# Patient Record
Sex: FEMALE | Race: WHITE | NOT HISPANIC OR LATINO | Employment: OTHER | URBAN - METROPOLITAN AREA
[De-identification: names, ages, dates, MRNs, and addresses within clinical notes are randomized per-mention and may not be internally consistent; named-entity substitution may affect disease eponyms.]

---

## 2017-07-29 ENCOUNTER — HOSPITAL ENCOUNTER (EMERGENCY)
Facility: HOSPITAL | Age: 82
Discharge: HOME | End: 2017-07-29
Attending: EMERGENCY MEDICINE | Admitting: EMERGENCY MEDICINE

## 2017-07-29 ENCOUNTER — APPOINTMENT (OUTPATIENT)
Dept: CT IMAGING | Facility: HOSPITAL | Age: 82
End: 2017-07-29

## 2017-07-29 ENCOUNTER — APPOINTMENT (OUTPATIENT)
Dept: GENERAL RADIOLOGY | Facility: HOSPITAL | Age: 82
End: 2017-07-29

## 2017-07-29 VITALS
TEMPERATURE: 97.5 F | RESPIRATION RATE: 18 BRPM | SYSTOLIC BLOOD PRESSURE: 156 MMHG | HEART RATE: 72 BPM | BODY MASS INDEX: 21.26 KG/M2 | WEIGHT: 120 LBS | DIASTOLIC BLOOD PRESSURE: 70 MMHG | OXYGEN SATURATION: 98 % | HEIGHT: 63 IN

## 2017-07-29 DIAGNOSIS — R42 DIZZINESS: ICD-10-CM

## 2017-07-29 DIAGNOSIS — N39.0 ACUTE UTI (URINARY TRACT INFECTION): Primary | ICD-10-CM

## 2017-07-29 LAB
ALBUMIN SERPL-MCNC: 4.2 G/DL (ref 3.2–4.8)
ALBUMIN/GLOB SERPL: 1.3 G/DL (ref 1.5–2.5)
ALP SERPL-CCNC: 50 U/L (ref 25–100)
ALT SERPL W P-5'-P-CCNC: 11 U/L (ref 7–40)
ANION GAP SERPL CALCULATED.3IONS-SCNC: 4 MMOL/L (ref 3–11)
AST SERPL-CCNC: 14 U/L (ref 0–33)
BACTERIA UR QL AUTO: ABNORMAL /HPF
BASOPHILS # BLD AUTO: 0.07 10*3/MM3 (ref 0–0.2)
BASOPHILS NFR BLD AUTO: 0.8 % (ref 0–1)
BILIRUB SERPL-MCNC: 0.2 MG/DL (ref 0.3–1.2)
BILIRUB UR QL STRIP: NEGATIVE
BUN BLD-MCNC: 24 MG/DL (ref 9–23)
BUN/CREAT SERPL: 17.1 (ref 7–25)
CALCIUM SPEC-SCNC: 9.7 MG/DL (ref 8.7–10.4)
CHLORIDE SERPL-SCNC: 105 MMOL/L (ref 99–109)
CLARITY UR: CLEAR
CO2 SERPL-SCNC: 25 MMOL/L (ref 20–31)
COLOR UR: YELLOW
CREAT BLD-MCNC: 1.4 MG/DL (ref 0.6–1.3)
DEPRECATED RDW RBC AUTO: 46.3 FL (ref 37–54)
EOSINOPHIL # BLD AUTO: 0.3 10*3/MM3 (ref 0–0.3)
EOSINOPHIL NFR BLD AUTO: 3.6 % (ref 0–3)
ERYTHROCYTE [DISTWIDTH] IN BLOOD BY AUTOMATED COUNT: 14 % (ref 11.3–14.5)
GFR SERPL CREATININE-BSD FRML MDRD: 36 ML/MIN/1.73
GLOBULIN UR ELPH-MCNC: 3.2 GM/DL
GLUCOSE BLD-MCNC: 91 MG/DL (ref 70–100)
GLUCOSE UR STRIP-MCNC: NEGATIVE MG/DL
HCT VFR BLD AUTO: 34.5 % (ref 34.5–44)
HGB BLD-MCNC: 11 G/DL (ref 11.5–15.5)
HGB UR QL STRIP.AUTO: NEGATIVE
HOLD SPECIMEN: NORMAL
HOLD SPECIMEN: NORMAL
HYALINE CASTS UR QL AUTO: ABNORMAL /LPF
IMM GRANULOCYTES # BLD: 0.01 10*3/MM3 (ref 0–0.03)
IMM GRANULOCYTES NFR BLD: 0.1 % (ref 0–0.6)
KETONES UR QL STRIP: NEGATIVE
LEUKOCYTE ESTERASE UR QL STRIP.AUTO: ABNORMAL
LYMPHOCYTES # BLD AUTO: 1.5 10*3/MM3 (ref 0.6–4.8)
LYMPHOCYTES NFR BLD AUTO: 18 % (ref 24–44)
MAGNESIUM SERPL-MCNC: 2.1 MG/DL (ref 1.3–2.7)
MCH RBC QN AUTO: 28.9 PG (ref 27–31)
MCHC RBC AUTO-ENTMCNC: 31.9 G/DL (ref 32–36)
MCV RBC AUTO: 90.8 FL (ref 80–99)
MONOCYTES # BLD AUTO: 1.03 10*3/MM3 (ref 0–1)
MONOCYTES NFR BLD AUTO: 12.4 % (ref 0–12)
NEUTROPHILS # BLD AUTO: 5.42 10*3/MM3 (ref 1.5–8.3)
NEUTROPHILS NFR BLD AUTO: 65.1 % (ref 41–71)
NITRITE UR QL STRIP: NEGATIVE
PH UR STRIP.AUTO: 6 [PH] (ref 5–8)
PLATELET # BLD AUTO: 311 10*3/MM3 (ref 150–450)
PMV BLD AUTO: 10.3 FL (ref 6–12)
POTASSIUM BLD-SCNC: 4.6 MMOL/L (ref 3.5–5.5)
PROT SERPL-MCNC: 7.4 G/DL (ref 5.7–8.2)
PROT UR QL STRIP: NEGATIVE
RBC # BLD AUTO: 3.8 10*6/MM3 (ref 3.89–5.14)
RBC # UR: ABNORMAL /HPF
REF LAB TEST METHOD: ABNORMAL
SODIUM BLD-SCNC: 134 MMOL/L (ref 132–146)
SP GR UR STRIP: 1.01 (ref 1–1.03)
SQUAMOUS #/AREA URNS HPF: ABNORMAL /HPF
TROPONIN I SERPL-MCNC: 0 NG/ML (ref 0–0.07)
UROBILINOGEN UR QL STRIP: ABNORMAL
WBC NRBC COR # BLD: 8.33 10*3/MM3 (ref 3.5–10.8)
WBC UR QL AUTO: ABNORMAL /HPF
WHOLE BLOOD HOLD SPECIMEN: NORMAL
WHOLE BLOOD HOLD SPECIMEN: NORMAL

## 2017-07-29 PROCEDURE — 96365 THER/PROPH/DIAG IV INF INIT: CPT

## 2017-07-29 PROCEDURE — 99284 EMERGENCY DEPT VISIT MOD MDM: CPT

## 2017-07-29 PROCEDURE — 71010 HC CHEST PA OR AP: CPT

## 2017-07-29 PROCEDURE — 87147 CULTURE TYPE IMMUNOLOGIC: CPT | Performed by: EMERGENCY MEDICINE

## 2017-07-29 PROCEDURE — 96361 HYDRATE IV INFUSION ADD-ON: CPT

## 2017-07-29 PROCEDURE — 87086 URINE CULTURE/COLONY COUNT: CPT | Performed by: EMERGENCY MEDICINE

## 2017-07-29 PROCEDURE — 70450 CT HEAD/BRAIN W/O DYE: CPT

## 2017-07-29 PROCEDURE — 93005 ELECTROCARDIOGRAM TRACING: CPT

## 2017-07-29 PROCEDURE — 84484 ASSAY OF TROPONIN QUANT: CPT

## 2017-07-29 PROCEDURE — 85025 COMPLETE CBC W/AUTO DIFF WBC: CPT | Performed by: EMERGENCY MEDICINE

## 2017-07-29 PROCEDURE — 25010000002 CEFTRIAXONE PER 250 MG: Performed by: NURSE PRACTITIONER

## 2017-07-29 PROCEDURE — 81001 URINALYSIS AUTO W/SCOPE: CPT | Performed by: EMERGENCY MEDICINE

## 2017-07-29 PROCEDURE — 83735 ASSAY OF MAGNESIUM: CPT | Performed by: EMERGENCY MEDICINE

## 2017-07-29 PROCEDURE — 80053 COMPREHEN METABOLIC PANEL: CPT | Performed by: EMERGENCY MEDICINE

## 2017-07-29 RX ORDER — CEFTRIAXONE SODIUM 1 G/50ML
1 INJECTION, SOLUTION INTRAVENOUS ONCE
Status: COMPLETED | OUTPATIENT
Start: 2017-07-29 | End: 2017-07-29

## 2017-07-29 RX ORDER — CEFDINIR 300 MG/1
300 CAPSULE ORAL 2 TIMES DAILY
Qty: 20 CAPSULE | Refills: 0 | Status: SHIPPED | OUTPATIENT
Start: 2017-07-29 | End: 2017-09-24

## 2017-07-29 RX ORDER — SODIUM CHLORIDE 0.9 % (FLUSH) 0.9 %
10 SYRINGE (ML) INJECTION AS NEEDED
Status: DISCONTINUED | OUTPATIENT
Start: 2017-07-29 | End: 2017-07-29 | Stop reason: HOSPADM

## 2017-07-29 RX ADMIN — SODIUM CHLORIDE 500 ML: 9 INJECTION, SOLUTION INTRAVENOUS at 17:44

## 2017-07-29 RX ADMIN — SODIUM CHLORIDE 500 ML: 9 INJECTION, SOLUTION INTRAVENOUS at 15:27

## 2017-07-29 RX ADMIN — CEFTRIAXONE SODIUM 1 G: 1 INJECTION, SOLUTION INTRAVENOUS at 17:00

## 2017-07-31 LAB
BACTERIA SPEC AEROBE CULT: ABNORMAL
BACTERIA SPEC AEROBE CULT: ABNORMAL
STREP GROUPING: ABNORMAL

## 2017-09-24 ENCOUNTER — APPOINTMENT (OUTPATIENT)
Dept: GENERAL RADIOLOGY | Facility: HOSPITAL | Age: 82
End: 2017-09-24

## 2017-09-24 ENCOUNTER — HOSPITAL ENCOUNTER (OUTPATIENT)
Facility: HOSPITAL | Age: 82
Setting detail: OBSERVATION
Discharge: HOME HEALTH | End: 2017-09-27
Attending: EMERGENCY MEDICINE | Admitting: INTERNAL MEDICINE

## 2017-09-24 DIAGNOSIS — N39.0 UTI (URINARY TRACT INFECTION), BACTERIAL: ICD-10-CM

## 2017-09-24 DIAGNOSIS — R55 SYNCOPE AND COLLAPSE: Primary | ICD-10-CM

## 2017-09-24 DIAGNOSIS — Z74.09 IMPAIRED MOBILITY AND ADLS: ICD-10-CM

## 2017-09-24 DIAGNOSIS — E87.1 HYPONATREMIA: ICD-10-CM

## 2017-09-24 DIAGNOSIS — Z78.9 IMPAIRED MOBILITY AND ADLS: ICD-10-CM

## 2017-09-24 DIAGNOSIS — Z86.73 HISTORY OF CVA (CEREBROVASCULAR ACCIDENT): ICD-10-CM

## 2017-09-24 DIAGNOSIS — Z74.09 IMPAIRED FUNCTIONAL MOBILITY, BALANCE, GAIT, AND ENDURANCE: ICD-10-CM

## 2017-09-24 DIAGNOSIS — D64.9 ANEMIA, UNSPECIFIED TYPE: ICD-10-CM

## 2017-09-24 DIAGNOSIS — R53.1 GENERALIZED WEAKNESS: ICD-10-CM

## 2017-09-24 DIAGNOSIS — A49.9 UTI (URINARY TRACT INFECTION), BACTERIAL: ICD-10-CM

## 2017-09-24 PROBLEM — N30.00 ACUTE CYSTITIS: Status: ACTIVE | Noted: 2017-09-24

## 2017-09-24 PROBLEM — I10 HYPERTENSION: Status: ACTIVE | Noted: 2017-09-24

## 2017-09-24 LAB
ALBUMIN SERPL-MCNC: 4 G/DL (ref 3.2–4.8)
ALBUMIN/GLOB SERPL: 1.4 G/DL (ref 1.5–2.5)
ALP SERPL-CCNC: 47 U/L (ref 25–100)
ALT SERPL W P-5'-P-CCNC: 9 U/L (ref 7–40)
ANION GAP SERPL CALCULATED.3IONS-SCNC: 4 MMOL/L (ref 3–11)
AST SERPL-CCNC: 18 U/L (ref 0–33)
BACTERIA UR QL AUTO: ABNORMAL /HPF
BASOPHILS # BLD AUTO: 0.09 10*3/MM3 (ref 0–0.2)
BASOPHILS NFR BLD AUTO: 1.4 % (ref 0–1)
BILIRUB SERPL-MCNC: 0.2 MG/DL (ref 0.3–1.2)
BILIRUB UR QL STRIP: NEGATIVE
BNP SERPL-MCNC: 292 PG/ML (ref 0–100)
BUN BLD-MCNC: 12 MG/DL (ref 9–23)
BUN/CREAT SERPL: 10.9 (ref 7–25)
CALCIUM SPEC-SCNC: 8.7 MG/DL (ref 8.7–10.4)
CHLORIDE SERPL-SCNC: 100 MMOL/L (ref 99–109)
CLARITY UR: ABNORMAL
CO2 SERPL-SCNC: 23 MMOL/L (ref 20–31)
COLOR UR: YELLOW
CREAT BLD-MCNC: 1.1 MG/DL (ref 0.6–1.3)
DEPRECATED RDW RBC AUTO: 41.6 FL (ref 37–54)
EOSINOPHIL # BLD AUTO: 0.4 10*3/MM3 (ref 0–0.3)
EOSINOPHIL NFR BLD AUTO: 6.3 % (ref 0–3)
ERYTHROCYTE [DISTWIDTH] IN BLOOD BY AUTOMATED COUNT: 13.3 % (ref 11.3–14.5)
GFR SERPL CREATININE-BSD FRML MDRD: 47 ML/MIN/1.73
GLOBULIN UR ELPH-MCNC: 2.9 GM/DL
GLUCOSE BLD-MCNC: 115 MG/DL (ref 70–100)
GLUCOSE UR STRIP-MCNC: NEGATIVE MG/DL
HCT VFR BLD AUTO: 29.4 % (ref 34.5–44)
HGB BLD-MCNC: 9.9 G/DL (ref 11.5–15.5)
HGB UR QL STRIP.AUTO: ABNORMAL
HOLD SPECIMEN: NORMAL
HOLD SPECIMEN: NORMAL
HYALINE CASTS UR QL AUTO: ABNORMAL /LPF
IMM GRANULOCYTES # BLD: 0.01 10*3/MM3 (ref 0–0.03)
IMM GRANULOCYTES NFR BLD: 0.2 % (ref 0–0.6)
KETONES UR QL STRIP: NEGATIVE
LEUKOCYTE ESTERASE UR QL STRIP.AUTO: ABNORMAL
LYMPHOCYTES # BLD AUTO: 1.18 10*3/MM3 (ref 0.6–4.8)
LYMPHOCYTES NFR BLD AUTO: 18.6 % (ref 24–44)
MAGNESIUM SERPL-MCNC: 1.8 MG/DL (ref 1.3–2.7)
MCH RBC QN AUTO: 28.8 PG (ref 27–31)
MCHC RBC AUTO-ENTMCNC: 33.7 G/DL (ref 32–36)
MCV RBC AUTO: 85.5 FL (ref 80–99)
MONOCYTES # BLD AUTO: 1.02 10*3/MM3 (ref 0–1)
MONOCYTES NFR BLD AUTO: 16.1 % (ref 0–12)
NEUTROPHILS # BLD AUTO: 3.65 10*3/MM3 (ref 1.5–8.3)
NEUTROPHILS NFR BLD AUTO: 57.4 % (ref 41–71)
NITRITE UR QL STRIP: NEGATIVE
PH UR STRIP.AUTO: 6.5 [PH] (ref 5–8)
PLATELET # BLD AUTO: 269 10*3/MM3 (ref 150–450)
PMV BLD AUTO: 10.1 FL (ref 6–12)
POTASSIUM BLD-SCNC: 3.9 MMOL/L (ref 3.5–5.5)
PROT SERPL-MCNC: 6.9 G/DL (ref 5.7–8.2)
PROT UR QL STRIP: NEGATIVE
RBC # BLD AUTO: 3.44 10*6/MM3 (ref 3.89–5.14)
RBC # UR: ABNORMAL /HPF
REF LAB TEST METHOD: ABNORMAL
SODIUM BLD-SCNC: 127 MMOL/L (ref 132–146)
SP GR UR STRIP: 1.01 (ref 1–1.03)
SQUAMOUS #/AREA URNS HPF: ABNORMAL /HPF
TROPONIN I SERPL-MCNC: 0 NG/ML (ref 0–0.07)
TROPONIN I SERPL-MCNC: 0.01 NG/ML (ref 0–0.07)
UROBILINOGEN UR QL STRIP: ABNORMAL
WBC NRBC COR # BLD: 6.35 10*3/MM3 (ref 3.5–10.8)
WBC UR QL AUTO: ABNORMAL /HPF
WHOLE BLOOD HOLD SPECIMEN: NORMAL
WHOLE BLOOD HOLD SPECIMEN: NORMAL

## 2017-09-24 PROCEDURE — 87086 URINE CULTURE/COLONY COUNT: CPT | Performed by: EMERGENCY MEDICINE

## 2017-09-24 PROCEDURE — 84484 ASSAY OF TROPONIN QUANT: CPT

## 2017-09-24 PROCEDURE — 96361 HYDRATE IV INFUSION ADD-ON: CPT

## 2017-09-24 PROCEDURE — 83880 ASSAY OF NATRIURETIC PEPTIDE: CPT | Performed by: EMERGENCY MEDICINE

## 2017-09-24 PROCEDURE — 25010000002 HEPARIN (PORCINE) PER 1000 UNITS: Performed by: INTERNAL MEDICINE

## 2017-09-24 PROCEDURE — 25010000002 CEFTRIAXONE PER 250 MG: Performed by: EMERGENCY MEDICINE

## 2017-09-24 PROCEDURE — 99220 PR INITIAL OBSERVATION CARE/DAY 70 MINUTES: CPT | Performed by: INTERNAL MEDICINE

## 2017-09-24 PROCEDURE — G0378 HOSPITAL OBSERVATION PER HR: HCPCS

## 2017-09-24 PROCEDURE — 87147 CULTURE TYPE IMMUNOLOGIC: CPT | Performed by: EMERGENCY MEDICINE

## 2017-09-24 PROCEDURE — 93005 ELECTROCARDIOGRAM TRACING: CPT

## 2017-09-24 PROCEDURE — 81001 URINALYSIS AUTO W/SCOPE: CPT | Performed by: EMERGENCY MEDICINE

## 2017-09-24 PROCEDURE — 96365 THER/PROPH/DIAG IV INF INIT: CPT

## 2017-09-24 PROCEDURE — 96372 THER/PROPH/DIAG INJ SC/IM: CPT

## 2017-09-24 PROCEDURE — 36415 COLL VENOUS BLD VENIPUNCTURE: CPT

## 2017-09-24 PROCEDURE — 93005 ELECTROCARDIOGRAM TRACING: CPT | Performed by: EMERGENCY MEDICINE

## 2017-09-24 PROCEDURE — 80053 COMPREHEN METABOLIC PANEL: CPT | Performed by: EMERGENCY MEDICINE

## 2017-09-24 PROCEDURE — 99284 EMERGENCY DEPT VISIT MOD MDM: CPT

## 2017-09-24 PROCEDURE — 85025 COMPLETE CBC W/AUTO DIFF WBC: CPT | Performed by: EMERGENCY MEDICINE

## 2017-09-24 PROCEDURE — 71010 HC CHEST PA OR AP: CPT

## 2017-09-24 PROCEDURE — 83735 ASSAY OF MAGNESIUM: CPT | Performed by: EMERGENCY MEDICINE

## 2017-09-24 RX ORDER — CEFTRIAXONE SODIUM 1 G/50ML
1 INJECTION, SOLUTION INTRAVENOUS ONCE
Status: COMPLETED | OUTPATIENT
Start: 2017-09-24 | End: 2017-09-24

## 2017-09-24 RX ORDER — HEPARIN SODIUM 5000 [USP'U]/ML
5000 INJECTION, SOLUTION INTRAVENOUS; SUBCUTANEOUS EVERY 8 HOURS SCHEDULED
Status: DISCONTINUED | OUTPATIENT
Start: 2017-09-24 | End: 2017-09-27 | Stop reason: HOSPADM

## 2017-09-24 RX ORDER — ATORVASTATIN CALCIUM 10 MG/1
10 TABLET, FILM COATED ORAL DAILY
Status: DISCONTINUED | OUTPATIENT
Start: 2017-09-24 | End: 2017-09-27 | Stop reason: HOSPADM

## 2017-09-24 RX ORDER — PANTOPRAZOLE SODIUM 40 MG/1
40 TABLET, DELAYED RELEASE ORAL
Status: DISCONTINUED | OUTPATIENT
Start: 2017-09-25 | End: 2017-09-27 | Stop reason: HOSPADM

## 2017-09-24 RX ORDER — LISINOPRIL 2.5 MG/1
2.5 TABLET ORAL
Status: DISCONTINUED | OUTPATIENT
Start: 2017-09-25 | End: 2017-09-27 | Stop reason: HOSPADM

## 2017-09-24 RX ORDER — DIGOXIN 125 MCG
125 TABLET ORAL
Status: DISCONTINUED | OUTPATIENT
Start: 2017-09-25 | End: 2017-09-27 | Stop reason: HOSPADM

## 2017-09-24 RX ORDER — ROPINIROLE 0.5 MG/1
0.5 TABLET, FILM COATED ORAL NIGHTLY
Status: DISCONTINUED | OUTPATIENT
Start: 2017-09-24 | End: 2017-09-27 | Stop reason: HOSPADM

## 2017-09-24 RX ORDER — CARVEDILOL 3.12 MG/1
3.12 TABLET ORAL EVERY 12 HOURS SCHEDULED
Status: DISCONTINUED | OUTPATIENT
Start: 2017-09-25 | End: 2017-09-26

## 2017-09-24 RX ORDER — DONEPEZIL HYDROCHLORIDE 5 MG/1
5 TABLET, FILM COATED ORAL NIGHTLY
Status: DISCONTINUED | OUTPATIENT
Start: 2017-09-25 | End: 2017-09-27 | Stop reason: HOSPADM

## 2017-09-24 RX ORDER — FAMOTIDINE 20 MG/1
20 TABLET, FILM COATED ORAL DAILY
Status: DISCONTINUED | OUTPATIENT
Start: 2017-09-25 | End: 2017-09-27 | Stop reason: HOSPADM

## 2017-09-24 RX ORDER — ALPRAZOLAM 0.25 MG/1
0.25 TABLET ORAL NIGHTLY PRN
Status: DISCONTINUED | OUTPATIENT
Start: 2017-09-24 | End: 2017-09-27 | Stop reason: HOSPADM

## 2017-09-24 RX ORDER — SODIUM CHLORIDE 0.9 % (FLUSH) 0.9 %
1-10 SYRINGE (ML) INJECTION AS NEEDED
Status: DISCONTINUED | OUTPATIENT
Start: 2017-09-24 | End: 2017-09-27 | Stop reason: HOSPADM

## 2017-09-24 RX ORDER — CLOPIDOGREL BISULFATE 75 MG/1
75 TABLET ORAL DAILY
Status: DISCONTINUED | OUTPATIENT
Start: 2017-09-25 | End: 2017-09-27 | Stop reason: HOSPADM

## 2017-09-24 RX ORDER — CYCLOSPORINE 0.5 MG/ML
1 EMULSION OPHTHALMIC 2 TIMES DAILY
Status: DISCONTINUED | OUTPATIENT
Start: 2017-09-24 | End: 2017-09-27 | Stop reason: HOSPADM

## 2017-09-24 RX ORDER — GABAPENTIN 300 MG/1
300 CAPSULE ORAL NIGHTLY
Status: DISCONTINUED | OUTPATIENT
Start: 2017-09-24 | End: 2017-09-27 | Stop reason: HOSPADM

## 2017-09-24 RX ORDER — CEFTRIAXONE SODIUM 1 G/50ML
1 INJECTION, SOLUTION INTRAVENOUS EVERY 24 HOURS
Status: DISCONTINUED | OUTPATIENT
Start: 2017-09-25 | End: 2017-09-27

## 2017-09-24 RX ORDER — SODIUM CHLORIDE 0.9 % (FLUSH) 0.9 %
10 SYRINGE (ML) INJECTION AS NEEDED
Status: DISCONTINUED | OUTPATIENT
Start: 2017-09-24 | End: 2017-09-27 | Stop reason: HOSPADM

## 2017-09-24 RX ORDER — LEVOTHYROXINE SODIUM 0.05 MG/1
50 TABLET ORAL
Status: DISCONTINUED | OUTPATIENT
Start: 2017-09-25 | End: 2017-09-27 | Stop reason: HOSPADM

## 2017-09-24 RX ADMIN — ATORVASTATIN CALCIUM 10 MG: 10 TABLET, FILM COATED ORAL at 22:19

## 2017-09-24 RX ADMIN — CYCLOSPORINE 1 DROP: 0.5 EMULSION OPHTHALMIC at 22:20

## 2017-09-24 RX ADMIN — GABAPENTIN 300 MG: 300 CAPSULE ORAL at 22:19

## 2017-09-24 RX ADMIN — HEPARIN SODIUM 5000 UNITS: 5000 INJECTION, SOLUTION INTRAVENOUS; SUBCUTANEOUS at 22:19

## 2017-09-24 RX ADMIN — SODIUM CHLORIDE 1000 ML: 9 INJECTION, SOLUTION INTRAVENOUS at 18:57

## 2017-09-24 RX ADMIN — CEFTRIAXONE SODIUM 1 G: 1 INJECTION, SOLUTION INTRAVENOUS at 20:29

## 2017-09-24 RX ADMIN — ROPINIROLE 0.5 MG: 0.5 TABLET, FILM COATED ORAL at 22:19

## 2017-09-24 NOTE — ED PROVIDER NOTES
Subjective   HPI Comments: Scott Sinha is a 87 y.o.female with a hx of CHF and CVA who presents to the ED with c/o fatigue. Last night the pt was about to use the restroom, and the next thing she remembers is waking up on the floor. She felt weak and nauseous after regaining consciousness. She has felt fatigued today, and tonight her nausea returned. After continued symptoms, she presents to the ED. At the ED the pt also c/o recent SoA but denies CP, abdominal pain, fever, HA, vomiting or any other acute sx at this time.      Patient is a 87 y.o. female presenting with weakness.   History provided by:  Patient  Weakness - Generalized   Onset quality:  Sudden  Duration:  1 day  Timing:  Constant  Progression:  Unchanged  Relieved by:  None tried  Worsened by:  Nothing  Ineffective treatments:  None tried  Associated symptoms: loss of consciousness, nausea and shortness of breath    Associated symptoms: no abdominal pain, no chest pain, no fever, no headaches and no vomiting        Review of Systems   Constitutional: Negative for chills, diaphoresis and fever.   Respiratory: Positive for shortness of breath.    Cardiovascular: Negative for chest pain.   Gastrointestinal: Positive for nausea. Negative for abdominal pain and vomiting.   Neurological: Positive for loss of consciousness and weakness. Negative for headaches.   All other systems reviewed and are negative.      Past Medical History:   Diagnosis Date   • Congestive heart failure    • Malignant neoplasm    • Stroke     mini stroke       Allergies   Allergen Reactions   • Augmentin [Amoxicillin-Pot Clavulanate]    • Claritin [Loratadine]        Past Surgical History:   Procedure Laterality Date   • CHOLECYSTECTOMY     • EYE SURGERY     • HYSTERECTOMY         History reviewed. No pertinent family history.    Social History     Social History   • Marital status:      Spouse name: N/A   • Number of children: N/A   • Years of education: N/A     Social  History Main Topics   • Smoking status: Never Smoker   • Smokeless tobacco: Never Used   • Alcohol use No   • Drug use: None   • Sexual activity: Not Asked     Other Topics Concern   • None     Social History Narrative   • None         Objective   Physical Exam   Constitutional: She is oriented to person, place, and time. She appears well-developed and well-nourished. No distress.   HENT:   Head: Normocephalic and atraumatic.   Mouth/Throat: No oropharyngeal exudate.   Dry mucous membranes.   Eyes: Conjunctivae are normal. No scleral icterus.   Neck: Normal range of motion. Neck supple. No JVD present.   Cardiovascular: Normal rate, regular rhythm and normal heart sounds.  Exam reveals no gallop and no friction rub.    No murmur heard.  Pulmonary/Chest: Effort normal and breath sounds normal. No respiratory distress. She has no wheezes. She has no rales.   Abdominal: Soft. Bowel sounds are normal. She exhibits no distension. There is no tenderness. There is no rebound and no guarding.   Genitourinary: Rectal exam shows guaiac negative stool.   Musculoskeletal: Normal range of motion. She exhibits no edema.   Neurological: She is alert and oriented to person, place, and time.   Skin: Skin is warm and dry. She is not diaphoretic.   Psychiatric: She has a normal mood and affect. Her behavior is normal.   Nursing note and vitals reviewed.      Procedures         ED Course  ED Course   Comment By Time   Dr. Phillip re-evaluated the pt and discussed all findings. [facility]   Mrs. Sinha had a fainting spell at home where she essentially woke up on the ground.  She had another near fall.  I don't think it would be a good idea to send her home until we have further treated her urinary tract infection and weakness.  She agrees.  I have ordered IV antibiotics and paged the hospitalist.  She is a little bit anemic whereas a few months ago her hemoglobin was 11.0.  She is heme negative.  Her sodium is also a  little bit low.  I reviewed her old records and that is a new finding.  She reports Augmentin as an allergy but tells us that she gets nauseous with it. I think the risk of serious allergic reaction to Rocephin is very low. Onel Phillip MD 09/24 2009                     MDM  Number of Diagnoses or Management Options  new and requires workup  new and requires workup     Amount and/or Complexity of Data Reviewed  Clinical lab tests: ordered and reviewed  Tests in the radiology section of CPT®: ordered and reviewed  Discuss the patient with other providers: yes  Independent visualization of images, tracings, or specimens: yes    Patient Progress  Patient progress: improved      Final diagnoses:   Syncope and collapse   UTI (urinary tract infection), bacterial   Generalized weakness   Anemia, unspecified type   Hyponatremia       Documentation assistance provided by tami Carlisle.  Information recorded by the scribe was done at my direction and has been verified and validated by me.     Jefry Carlisle  09/24/17 1854       Onel Phillip MD  09/27/17 0033

## 2017-09-25 ENCOUNTER — APPOINTMENT (OUTPATIENT)
Dept: CARDIOLOGY | Facility: HOSPITAL | Age: 82
End: 2017-09-25
Attending: INTERNAL MEDICINE

## 2017-09-25 PROBLEM — Z86.73 HISTORY OF CVA (CEREBROVASCULAR ACCIDENT): Status: ACTIVE | Noted: 2017-09-25

## 2017-09-25 PROBLEM — I44.7 LEFT BUNDLE BRANCH BLOCK: Status: ACTIVE | Noted: 2017-09-25

## 2017-09-25 PROBLEM — E86.0 DEHYDRATION: Status: ACTIVE | Noted: 2017-09-25

## 2017-09-25 PROBLEM — R55 VASO VAGAL EPISODE: Status: ACTIVE | Noted: 2017-09-25

## 2017-09-25 PROBLEM — F03.90 DEMENTIA (HCC): Status: ACTIVE | Noted: 2017-09-25

## 2017-09-25 PROBLEM — I50.20 SYSTOLIC HEART FAILURE (HCC): Status: ACTIVE | Noted: 2017-09-25

## 2017-09-25 LAB
ANION GAP SERPL CALCULATED.3IONS-SCNC: 6 MMOL/L (ref 3–11)
BH CV ECHO MEAS - AI DEC SLOPE: 245.3 CM/SEC^2
BH CV ECHO MEAS - AI MAX PG: 78.3 MMHG
BH CV ECHO MEAS - AI MAX VEL: 442.5 CM/SEC
BH CV ECHO MEAS - AI P1/2T: 528.3 MSEC
BH CV ECHO MEAS - AO ROOT AREA (BSA CORRECTED): 1.9
BH CV ECHO MEAS - AO ROOT AREA: 7.4 CM^2
BH CV ECHO MEAS - AO ROOT DIAM: 3.1 CM
BH CV ECHO MEAS - BSA(HAYCOCK): 1.6 M^2
BH CV ECHO MEAS - BSA: 1.6 M^2
BH CV ECHO MEAS - BZI_BMI: 20.8 KILOGRAMS/M^2
BH CV ECHO MEAS - BZI_METRIC_HEIGHT: 162.6 CM
BH CV ECHO MEAS - BZI_METRIC_WEIGHT: 54.9 KG
BH CV ECHO MEAS - CONTRAST EF 4CH: 41.7 ML/M^2
BH CV ECHO MEAS - EDV(CUBED): 186.4 ML
BH CV ECHO MEAS - EDV(MOD-SP4): 151 ML
BH CV ECHO MEAS - EDV(TEICH): 160.9 ML
BH CV ECHO MEAS - EF(CUBED): 38.7 %
BH CV ECHO MEAS - EF(MOD-SP4): 41.7 %
BH CV ECHO MEAS - EF(TEICH): 31.4 %
BH CV ECHO MEAS - ESV(CUBED): 114.3 ML
BH CV ECHO MEAS - ESV(MOD-SP4): 88 ML
BH CV ECHO MEAS - ESV(TEICH): 110.3 ML
BH CV ECHO MEAS - FS: 15 %
BH CV ECHO MEAS - IVS/LVPW: 0.94
BH CV ECHO MEAS - IVSD: 1.2 CM
BH CV ECHO MEAS - LA DIMENSION: 3.5 CM
BH CV ECHO MEAS - LA/AO: 1.1
BH CV ECHO MEAS - LAT PEAK E' VEL: 11.1 CM/SEC
BH CV ECHO MEAS - LV DIASTOLIC VOL/BSA (35-75): 95.6 ML/M^2
BH CV ECHO MEAS - LV MASS(C)D: 296.6 GRAMS
BH CV ECHO MEAS - LV MASS(C)DI: 187.7 GRAMS/M^2
BH CV ECHO MEAS - LV MAX PG: 5.5 MMHG
BH CV ECHO MEAS - LV MEAN PG: 2.2 MMHG
BH CV ECHO MEAS - LV SYSTOLIC VOL/BSA (12-30): 55.7 ML/M^2
BH CV ECHO MEAS - LV V1 MAX: 117 CM/SEC
BH CV ECHO MEAS - LV V1 MEAN: 64.3 CM/SEC
BH CV ECHO MEAS - LV V1 VTI: 22.6 CM
BH CV ECHO MEAS - LVIDD: 5.7 CM
BH CV ECHO MEAS - LVIDS: 4.9 CM
BH CV ECHO MEAS - LVLD AP4: 8.1 CM
BH CV ECHO MEAS - LVLS AP4: 7 CM
BH CV ECHO MEAS - LVOT AREA (M): 2.3 CM^2
BH CV ECHO MEAS - LVOT AREA: 2.4 CM^2
BH CV ECHO MEAS - LVOT DIAM: 1.7 CM
BH CV ECHO MEAS - LVPWD: 1.3 CM
BH CV ECHO MEAS - MED PEAK E' VEL: 3.67 CM/SEC
BH CV ECHO MEAS - MV A MAX VEL: 107.1 CM/SEC
BH CV ECHO MEAS - MV E MAX VEL: 81.4 CM/SEC
BH CV ECHO MEAS - MV E/A: 0.76
BH CV ECHO MEAS - PA ACC SLOPE: 1288 CM/SEC^2
BH CV ECHO MEAS - PA ACC TIME: 0.07 SEC
BH CV ECHO MEAS - PA PR(ACCEL): 45.7 MMHG
BH CV ECHO MEAS - RAP SYSTOLE: 8 MMHG
BH CV ECHO MEAS - RVDD: 2.5 CM
BH CV ECHO MEAS - RVSP: 35.5 MMHG
BH CV ECHO MEAS - SI(CUBED): 45.6 ML/M^2
BH CV ECHO MEAS - SI(LVOT): 33.9 ML/M^2
BH CV ECHO MEAS - SI(MOD-SP4): 39.9 ML/M^2
BH CV ECHO MEAS - SI(TEICH): 32 ML/M^2
BH CV ECHO MEAS - SV(CUBED): 72.1 ML
BH CV ECHO MEAS - SV(LVOT): 53.5 ML
BH CV ECHO MEAS - SV(MOD-SP4): 63 ML
BH CV ECHO MEAS - SV(TEICH): 50.5 ML
BH CV ECHO MEAS - TAPSE (>1.6): 2.3 CM2
BH CV ECHO MEAS - TR MAX VEL: 262 CM/SEC
BH CV VAS BP RIGHT ARM: NORMAL MMHG
BH CV XLRA - RV BASE: 2.7 CM
BH CV XLRA - RV LENGTH: 6 CM
BH CV XLRA - RV MID: 2.6 CM
BH CV XLRA - TDI S': 13.6 CM/SEC
BUN BLD-MCNC: 10 MG/DL (ref 9–23)
BUN/CREAT SERPL: 9.1 (ref 7–25)
CALCIUM SPEC-SCNC: 9.1 MG/DL (ref 8.7–10.4)
CHLORIDE SERPL-SCNC: 106 MMOL/L (ref 99–109)
CO2 SERPL-SCNC: 23 MMOL/L (ref 20–31)
CREAT BLD-MCNC: 1.1 MG/DL (ref 0.6–1.3)
DEPRECATED RDW RBC AUTO: 42.7 FL (ref 37–54)
DIGOXIN SERPL-MCNC: 1.09 NG/ML (ref 0.8–2)
E/E' RATIO: 15
ERYTHROCYTE [DISTWIDTH] IN BLOOD BY AUTOMATED COUNT: 13.5 % (ref 11.3–14.5)
GFR SERPL CREATININE-BSD FRML MDRD: 47 ML/MIN/1.73
GLUCOSE BLD-MCNC: 97 MG/DL (ref 70–100)
HCT VFR BLD AUTO: 31.9 % (ref 34.5–44)
HGB BLD-MCNC: 10.5 G/DL (ref 11.5–15.5)
LEFT ATRIUM VOLUME INDEX: 53.2 ML/M2
LV EF 2D ECHO EST: 25 %
MCH RBC QN AUTO: 28.4 PG (ref 27–31)
MCHC RBC AUTO-ENTMCNC: 32.9 G/DL (ref 32–36)
MCV RBC AUTO: 86.2 FL (ref 80–99)
PLATELET # BLD AUTO: 281 10*3/MM3 (ref 150–450)
PMV BLD AUTO: 10.5 FL (ref 6–12)
POTASSIUM BLD-SCNC: 4.2 MMOL/L (ref 3.5–5.5)
RBC # BLD AUTO: 3.7 10*6/MM3 (ref 3.89–5.14)
SODIUM BLD-SCNC: 135 MMOL/L (ref 132–146)
WBC NRBC COR # BLD: 6.13 10*3/MM3 (ref 3.5–10.8)

## 2017-09-25 PROCEDURE — 85027 COMPLETE CBC AUTOMATED: CPT | Performed by: INTERNAL MEDICINE

## 2017-09-25 PROCEDURE — G0378 HOSPITAL OBSERVATION PER HR: HCPCS

## 2017-09-25 PROCEDURE — 96366 THER/PROPH/DIAG IV INF ADDON: CPT

## 2017-09-25 PROCEDURE — 93306 TTE W/DOPPLER COMPLETE: CPT

## 2017-09-25 PROCEDURE — 25010000002 CEFTRIAXONE PER 250 MG: Performed by: INTERNAL MEDICINE

## 2017-09-25 PROCEDURE — 80162 ASSAY OF DIGOXIN TOTAL: CPT | Performed by: INTERNAL MEDICINE

## 2017-09-25 PROCEDURE — 96372 THER/PROPH/DIAG INJ SC/IM: CPT

## 2017-09-25 PROCEDURE — 99226 PR SBSQ OBSERVATION CARE/DAY 35 MINUTES: CPT | Performed by: INTERNAL MEDICINE

## 2017-09-25 PROCEDURE — 80048 BASIC METABOLIC PNL TOTAL CA: CPT | Performed by: INTERNAL MEDICINE

## 2017-09-25 PROCEDURE — 25010000002 HEPARIN (PORCINE) PER 1000 UNITS: Performed by: INTERNAL MEDICINE

## 2017-09-25 PROCEDURE — 93306 TTE W/DOPPLER COMPLETE: CPT | Performed by: INTERNAL MEDICINE

## 2017-09-25 RX ORDER — SODIUM CHLORIDE 9 MG/ML
50 INJECTION, SOLUTION INTRAVENOUS CONTINUOUS
Status: ACTIVE | OUTPATIENT
Start: 2017-09-25 | End: 2017-09-26

## 2017-09-25 RX ADMIN — ALPRAZOLAM 0.25 MG: 0.25 TABLET ORAL at 23:18

## 2017-09-25 RX ADMIN — HEPARIN SODIUM 5000 UNITS: 5000 INJECTION, SOLUTION INTRAVENOUS; SUBCUTANEOUS at 22:21

## 2017-09-25 RX ADMIN — ROPINIROLE 0.5 MG: 0.5 TABLET, FILM COATED ORAL at 20:48

## 2017-09-25 RX ADMIN — GABAPENTIN 300 MG: 300 CAPSULE ORAL at 20:48

## 2017-09-25 RX ADMIN — HEPARIN SODIUM 5000 UNITS: 5000 INJECTION, SOLUTION INTRAVENOUS; SUBCUTANEOUS at 15:15

## 2017-09-25 RX ADMIN — LEVOTHYROXINE SODIUM 50 MCG: 50 TABLET ORAL at 06:05

## 2017-09-25 RX ADMIN — CYCLOSPORINE 1 DROP: 0.5 EMULSION OPHTHALMIC at 09:23

## 2017-09-25 RX ADMIN — SODIUM CHLORIDE 50 ML/HR: 9 INJECTION, SOLUTION INTRAVENOUS at 17:13

## 2017-09-25 RX ADMIN — CARVEDILOL 3.12 MG: 3.12 TABLET, FILM COATED ORAL at 09:23

## 2017-09-25 RX ADMIN — CARVEDILOL 3.12 MG: 3.12 TABLET, FILM COATED ORAL at 20:48

## 2017-09-25 RX ADMIN — PANTOPRAZOLE SODIUM 40 MG: 40 TABLET, DELAYED RELEASE ORAL at 06:05

## 2017-09-25 RX ADMIN — HEPARIN SODIUM 5000 UNITS: 5000 INJECTION, SOLUTION INTRAVENOUS; SUBCUTANEOUS at 06:05

## 2017-09-25 RX ADMIN — CYCLOSPORINE 1 DROP: 0.5 EMULSION OPHTHALMIC at 20:51

## 2017-09-25 RX ADMIN — DONEPEZIL HYDROCHLORIDE 5 MG: 5 TABLET, FILM COATED ORAL at 20:48

## 2017-09-25 RX ADMIN — CEFTRIAXONE SODIUM 1 G: 1 INJECTION, SOLUTION INTRAVENOUS at 20:48

## 2017-09-25 RX ADMIN — DIGOXIN 125 MCG: 125 TABLET ORAL at 11:57

## 2017-09-25 RX ADMIN — FAMOTIDINE 20 MG: 20 TABLET ORAL at 09:23

## 2017-09-25 RX ADMIN — LISINOPRIL 2.5 MG: 2.5 TABLET ORAL at 09:23

## 2017-09-25 RX ADMIN — CLOPIDOGREL BISULFATE 75 MG: 75 TABLET ORAL at 09:23

## 2017-09-25 NOTE — H&P
"    [facility] Medicine Services  HISTORY AND PHYSICAL    Primary Care Physician: CHRISTY Hair MD    Subjective     Chief Complaint:  Nausea and weakness     History of Present Illness:   Ms. Sinha is an 87 year old female with hypertension, CHF, mild cognitive impairment who presents to the ED today with complaint of nausea and weakness as well as a possible syncopal episode yesterday.  She was in her usual state of health until around 4pm yesterday when she developed nausea and felt dizzy.  She went to urinate on the commode and then the next thing she knew, she was sitting on the floor next to the commode.  Today, she has felt \"dizzy\" and \"woozy\" all day and has had associated nausea and weakness.  She has also had some \"cold sweats.\"  She has not passed out today.  She has a history of UTIs and was treated at the end of July with cefdinir.  In the ED, her UA was suggestive of UTI.  She was started on ceftriaxone and given IV fluids and will be admitted to medicine for further management.    Review of Systems   GEN: \"cold sweats\"  HENT: Dry eyes  CV: Chest heaviness intermittently while at rest, no palpitations   PULM: Feels like she is not able to breathe deeply  GI: Nausea  : No dysuria  DERM: Negative  NEURO: Mild cognitive impairment  PSYCH: Negative  MSK: Feels weak generally    Past Medical History:   Diagnosis Date   • Congestive heart failure    • Malignant neoplasm    • Stroke     mini stroke       Past Surgical History:   Procedure Laterality Date   • CHOLECYSTECTOMY     • EYE SURGERY     • HYSTERECTOMY         History reviewed. No pertinent family history.    Social History     Social History   • Marital status:      Spouse name: N/A   • Number of children: N/A   • Years of education: N/A     Occupational History   • Not on file.     Social History Main Topics   • Smoking status: Never Smoker   • Smokeless tobacco: Never Used   • Alcohol use No   • Drug use: Not on " "file   • Sexual activity: Not on file     Other Topics Concern   • Not on file     Social History Narrative   • No narrative on file       Medications:  Prescriptions Prior to Admission   Medication Sig Dispense Refill Last Dose   • ALPRAZolam (XANAX) 0.25 MG tablet TAKE 1 TABLET BY MOUTH 3 TIMES A DAY AS NEEDED  0 Taking   • atorvastatin (LIPITOR) 10 MG tablet    Taking   • carvedilol (COREG) 3.125 MG tablet TAKE 1 TABLET BY MOUTH TWICE A DAY  0 Taking   • clopidogrel (PLAVIX) 75 MG tablet TAKE 1 TABLET BY MOUTH EVERY DAY  0 Taking   • clotrimazole (MYCELEX) 10 MG ekaterina DISSOLVE 1 TABLET BY MOUTH FIVE TIMES A DAY  0 Taking   • donepezil (ARICEPT) 5 MG tablet TAKE 1 TABLET BY MOUTH EVERY DAY  0 Taking   • gabapentin (NEURONTIN) 300 MG capsule TAKE 1 CAPSULE TWICE A DAY  0 Taking   • ipratropium (ATROVENT) 0.06 % nasal spray 2 PUFFS, DAILY, EACH NOSTRIL  11 Taking   • ketorolac (ACULAR) 0.5 % ophthalmic solution USE 1 DROP INTO BOTH EYES 3 TIMES A DAY  2 Taking   • lisinopril (PRINIVIL,ZESTRIL) 2.5 MG tablet TAKE 1 TABLET BY MOUTH EVERY DAY  0 Taking   • NAPHCON-A 0.025-0.3 % ophthalmic solution INSTILL 1 DROP 4 TIMES PER DAY AS NEEDED FOR ALLERGIC CONJUNCTIVI  0 Taking   • nystatin (MYCOSTATIN) 860522 UNIT/ML suspension SWISH AND GARGLE FOR 1 MINUTE AND SPIT 3 TIMES DAILY, REPEAT AFTER 3 WEEKS IF STILL SYMPTOMATIC  1 Taking   • sulfacetamide (BLEPH-10) 10 % ophthalmic solution INSTILL 2 (TWO) DROP(S), OPHTHALMIC, EVERY 6 HOURS FOR BACTERIAL CONJUNCTIVITIS  0 Taking   • triamcinolone (KENALOG) 0.1 % paste APPLY TO AFFECTED AREA TWICE DAILY FOR 5 DAYS, THEN WAIT 1 WEEK AND REPEAT  0 Taking       Allergies:  Allergies   Allergen Reactions   • Augmentin [Amoxicillin-Pot Clavulanate]    • Claritin [Loratadine]          Objective     Physical Exam:  Vital Signs: BP (!) 188/90 (BP Location: Right arm, Patient Position: Lying)  Pulse 75  Temp 97.8 °F (36.6 °C) (Oral)   Resp 18  Ht 64\" (162.6 cm)  Wt 121 lb (54.9 kg)  " SpO2 100%  BMI 20.77 kg/m2  Physical Exam  Constitutional: No acute distress, awake, alert, laying in bed  Eyes: PERRLA, sclerae anicteric, no conjunctival injection  HENT: NCAT, mucous membranes moist  Neck: Supple, trachea midline  Respiratory: Clear to auscultation bilaterally, nonlabored respirations   Cardiovascular: RRR, no murmurs, rubs, or gallops  Gastrointestinal: Positive bowel sounds, soft, nondistended, mild suprapubic tenderness  Musculoskeletal: No bilateral ankle edema, no clubbing or cyanosis to bilateral lower extremities  Psychiatric: Appropriate affect, cooperative  Neurologic: Cranial Nerves grossly intact to confrontation, speech clear  Skin: No rashes    Results Reviewed:    Results from last 7 days  Lab Units 09/24/17  1743   WBC 10*3/mm3 6.35   HEMOGLOBIN g/dL 9.9*   PLATELETS 10*3/mm3 269       Results from last 7 days  Lab Units 09/24/17  1743   SODIUM mmol/L 127*   POTASSIUM mmol/L 3.9   CO2 mmol/L 23.0   CREATININE mg/dL 1.10   GLUCOSE mg/dL 115*   CALCIUM mg/dL 8.7       Imaging Results (last 24 hours)     Procedure Component Value Units Date/Time    XR Chest 1 View [[id number]] Collected:  09/24/17 2112     Updated:  09/24/17 2116    Narrative:          EXAMINATION: XR CHEST 1 VW-      INDICATION: Weak/Dizzy/AMS triage protocol      COMPARISON: 7/29/2017     FINDINGS: The heart remains upper normal to borderline enlarged in size.  The vasculature is at upper limits of normal. Mild chronic appearing  interstitial lung changes are again noted, perhaps slightly greater in  the left midlung than on the previous exam, but this is similar to  earlier 2/18/2016 study. No lung consolidation effusion or pneumothorax  is seen..           Impression:       Chronic appearing interstitial lung changes similar to  previous exams. No clearly new chest disease is seen.     This report was finalized on 9/24/2017 9:14 PM by DR. Jan Alvarez MD.           I have personally reviewed and interpreted  available lab data, radiology studies and ECG obtained at time of admission.     Assessment / Plan     Problem List:   Hospital Problem List     * (Principal)Acute cystitis    Syncope and collapse    Anemia    Hyponatremia    Hypertension        Assessment:  87 year old female presenting with nausea, weakness, possible syncopal episode found to have UTI.    Plan:  -Continue ceftriaxone pending urine culture  -S/P 1L IVF in ED; bolus PRN  -Recheck sodium with am labs  -EKG is similar to prior and troponin negative x 2  -I suspect her syncope was related to hypovolemia with this infection, but will obtain echocardiogram to evaluate for possible cardiac etiology given her reported history of CHF and no echo in our system  -Continue other home medications    DVT prophylaxis: SQ heparin  Code Status: DNR/DNI  Admission Status: INPATIENT status due to the need for care which can only be reasonably provided in an hospital setting such as aggressive/expedited ancillary services and/or consultation services, the necessity for IV medications, close physician monitoring and/or the possible need for procedures.  In such, I feel patient’s risk for adverse outcomes and need for care warrant INPATIENT evaluation and predict the patient’s care encounter to likely last beyond 2 midnights.     Hannah Logan MD 09/24/17 11:11 PM

## 2017-09-25 NOTE — PLAN OF CARE
Problem: Patient Care Overview (Adult)  Goal: Plan of Care Review  Outcome: Ongoing (interventions implemented as appropriate)    09/25/17 0657   Coping/Psychosocial Response Interventions   Plan Of Care Reviewed With patient   Patient Care Overview   Progress progress toward functional goals as expected   Outcome Evaluation   Outcome Summary/Follow up Plan Pt rested well this shift. No c/o pain. Slightly unsteady when getting up to bathroom. VSS. NSR.       Goal: Adult Individualization and Mutuality  Outcome: Ongoing (interventions implemented as appropriate)  Goal: Discharge Needs Assessment  Outcome: Ongoing (interventions implemented as appropriate)    Problem: Fall Risk (Adult)  Goal: Identify Related Risk Factors and Signs and Symptoms  Outcome: Ongoing (interventions implemented as appropriate)  Goal: Absence of Falls  Outcome: Ongoing (interventions implemented as appropriate)

## 2017-09-25 NOTE — PLAN OF CARE
Problem: Patient Care Overview (Adult)  Goal: Plan of Care Review  Outcome: Ongoing (interventions implemented as appropriate)    09/25/17 3814   Outcome Evaluation   Outcome Summary/Follow up Plan pt has rested well today. she has complained of weakness. she is up with one to the bathroom. we are slightly hydrating her . no complaints of pain. pt has complained of being nauseated but did not request medicine. vss.        Goal: Adult Individualization and Mutuality  Outcome: Ongoing (interventions implemented as appropriate)  Goal: Discharge Needs Assessment  Outcome: Ongoing (interventions implemented as appropriate)    Problem: Fall Risk (Adult)  Goal: Identify Related Risk Factors and Signs and Symptoms  Outcome: Ongoing (interventions implemented as appropriate)  Goal: Absence of Falls  Outcome: Ongoing (interventions implemented as appropriate)    Problem: Cardiac Output, Decreased (Adult)  Goal: Identify Related Risk Factors and Signs and Symptoms  Outcome: Ongoing (interventions implemented as appropriate)  Goal: Adequate Cardiac Output/Effective Tissue Perfusion  Outcome: Ongoing (interventions implemented as appropriate)

## 2017-09-25 NOTE — PROGRESS NOTES
[facility] Medicine Services  INPATIENT PROGRESS NOTE    Date of Admission: 9/24/2017  Length of Stay: 0  Primary Care Physician: CHRISTY Hair MD    Subjective   CC: no further syncope, nausea improved  HPI:  No f/c, no chest pain, hasn't been walking yet    Review Of Systems:   Review of Systems  No f/c, no dysuria, no abd pain. + nausea. No dyspnea  Otherwise 10 system ROS negative except as noted pertinent positives above in HPI.     Objective      Temp:  [97.8 °F (36.6 °C)-98 °F (36.7 °C)] 97.9 °F (36.6 °C)  Heart Rate:  [69-86] 77  Resp:  [18] 18  BP: (149-188)/(61-92) 166/87  Physical Exam  Alert, oriented to person, year, wrong month answer simple questions appropriately  Ncat, oroph clear  rrr  ctab  abd soft, nontender  No cce  No extremity rash  Equal  and leg raise, pupils equal, eomi    Results Review:    I have reviewed the labs, radiology results and diagnostic studies.      Results from last 7 days  Lab Units 09/25/17  0618   WBC 10*3/mm3 6.13   HEMOGLOBIN g/dL 10.5*   PLATELETS 10*3/mm3 281       Results from last 7 days  Lab Units 09/25/17  0618   SODIUM mmol/L 135   POTASSIUM mmol/L 4.2   CHLORIDE mmol/L 106   CO2 mmol/L 23.0   BUN mg/dL 10   CREATININE mg/dL 1.10   GLUCOSE mg/dL 97   CALCIUM mg/dL 9.1       Culture Data: Cultures:    Urine Culture   Date Value Ref Range Status   09/24/2017 Culture in progress  Preliminary         Assessment/Plan     Problem List  Hospital Problem List     * (Principal)Acute cystitis    Syncope and collapse    Anemia    Hyponatremia    Hypertension    Vaso vagal episode    Overview Signed 9/25/2017  3:55 PM by Doug Plata MD     With nausea         Dehydration    History of CVA (cerebrovascular accident)    Dementia    Left bundle branch block    Overview Signed 9/25/2017  4:01 PM by Doug Plata MD     chronic         Systolic heart failure    Overview Signed 9/25/2017  4:12 PM by Doug Plata MD     unknow  "chronicity                    Assessment/Plan:  -day #2 rocpehin, follow urine culture  -gently hydrate w/ ns at 50cc/hr  -orthostatic bp and hr in a.m.  -fall precautions  -pt ot eval  -echo w/ EF 25% (unknown basleine; requesting any old)   -continue b-blocker, ace-inh   -likely need outpatient follow up w/ cards but will attempt get old records 1st  -get dr. Castillo ying pcp records requested (echo, any cards notes, most recent h&p, progress notes) and requested to put in chart  Hgb, bmp in a.m., mag  -restart digoxin (on at home)  -ask pharmacy call [facility] and get updated medicine list/confirm meds  -confirmed dnr status (no mech vent, no cpr) but full support otherwise  -pt ot eval. ambulate   -called son (laron @ [phone]); patient lives w/ her son and her grandchildren.    -per discussion, was nauseated at alone and went to move bowels and felt nauseated and \"woke up next to the toilet\", was generally weak. + diarrhea. Is on laxatives on at home  -possibly home soon depending on symptoms, outside records, etc (+/- cards follow up)  DVT prophylaxis: sq heparin  Discharge Planning: I expect patient to be discharged to be determined  Doug Plata MD   09/25/17   3:55 PM      "

## 2017-09-25 NOTE — CONSULTS
"Adult Nutrition  Assessment/PES    Patient Name:  Scott Sinha  YOB: 1930  MRN: [id number]  Admit Date:  9/24/2017    Assessment Date:  9/25/2017        Reason for Assessment       09/25/17 1655    Reason for Assessment    Reason For Assessment/Visit identified at risk by screening criteria;physician consult   Pt does not meet screen criteria for nutrition risk per reported weight, intake history. Continue to follow per protocol.     Identified At Risk By Screening Criteria MST SCORE 2+    Time Spent (min) 20              Nutrition/Diet History       09/25/17 1657    Nutrition/Diet History    Reported/Observed By Patient    Appetite Good    Other Pt reported UBW to be 123 lbs, last weighed a few weeks ago, reported dropping down to 118lbs and now back up to 121 lbs. Pt reported over the couple weeks that she lost weight her appetite was poor due to loss of taste and smell. Pt reported appetite to be back to normal and ate a good breakfast and lunch today. Reported drinking ensure supplements while at home.              Anthropometrics       09/25/17 1704    Anthropometrics    Height 162.6 cm (64\")    Weight 54.9 kg (121 lb)   stated weight    Ideal Body Weight (IBW)    Ideal Body Weight (IBW), Female 55.4    % Ideal Body Weight 99.27    Usual Body Weight (UBW)    Usual Body Weight 55.8 kg (123 lb)    % Usual Body Weight 98.37    Weight Loss 0.907 kg (2 lb)   Amount away from UBW    % Weight Loss  1.62 %    Weight Loss Time Frame Few weeks   In those few weeks pt lost 5lbs and regained 3lbs back.    Body Mass Index (BMI)    BMI (kg/m2) 20.81            Labs/Tests/Procedures/Meds       09/25/17 1707    Labs/Tests/Procedures/Meds    Labs/Tests Review Reviewed                Nutrition Prescription Ordered       09/25/17 1707    Nutrition Prescription PO    Current PO Diet Regular            Evaluation of Received Nutrient/Fluid Intake       09/25/17 1707    PO Evaluation    Number of Days PO Intake " Evaluated Insufficient Data   No PO data recorded.               Malnutrition Severity Assessment       09/25/17 1659    Malnutrition Severity Assessment    Malnutrition Type Acute Illness/Injury Malnutrition        Problem/Interventions:        Problem 1       09/25/17 1707    Nutrition Diagnoses Problem 1    Problem 1 No Nutrition Diagnosis at this Time              Nutrition Intervention       09/25/17 1707    Nutrition Intervention    RD/Tech Action Advise available snack;Menu provided;Encourage intake;Recommend/ordered;Follow Tx progress;Care plan reviewd    Recommended/Ordered Supplement            Nutrition Prescription       09/25/17 1708    Nutrition Prescription PO    PO Prescription Begin/change supplement    Supplement Ensure HP    Supplement Frequency Daily    New PO Prescription Ordered? Yes            Education/Evaluation       09/25/17 1708    Monitor/Evaluation    Monitor Per protocol;PO intake;Supplement intake        Electronically signed by:  Jessie Andrade  09/25/17 5:08 PM

## 2017-09-25 NOTE — PROGRESS NOTES
Discharge Planning Assessment  [facility]     Patient Name: Scott Sinha  MRN: [id number]  Today's Date: 9/25/2017    Admit Date: 9/24/2017          Discharge Needs Assessment       09/25/17 1624    Living Environment    Lives With child(chong), adult;other relative(s) (specify)    Living Arrangements house    Home Accessibility stairs (1 railing present)    Stair Railings at Home inside, present on left side    Type of Financial/Environmental Concern none    Transportation Available car;family or friend will provide    Living Environment    Provides Primary Care For no one    Discharge Needs Assessment    Concerns To Be Addressed denies needs/concerns at this time    Readmission Within The Last 30 Days no previous admission in last 30 days    Anticipated Changes Related to Illness none    Equipment Currently Used at Home walker, rolling    Equipment Needed After Discharge cane, straight;walker, rolling   has equipment but doesn't use    Discharge Disposition still a patient            Discharge Plan       09/25/17 1626    Case Management/Social Work Plan    Plan Home    Additional Comments Met with Ms. Sinha at the bedside, she reports having a rolling walker and a straight cane.  She denies ever having  services.  She reports her medications and copays are affordable. She states her son Noah lives with her and helps her.   This CM tried to call Noah to verify information given but did not get an answer at number on record, will try again 9/26/17.  Ms. Sinha plans to return home, Noah will transport.  No needs identified at this time.         Discharge Placement     No information found        Expected Discharge Date and Time     Expected Discharge Date Expected Discharge Time    Sep 26, 2017               Demographic Summary       09/25/17 1623    Referral Information    Admission Type observation    Arrived From home or self-care    Referral Source admission list    Reason For Consult discharge planning     Record Reviewed medical record    Primary Care Physician Information    Name CHRISTY Antunez            Functional Status       09/25/17 1628    Functional Status Current    Current Functional Level Comment see nursing notes    Functional Status Prior    Ambulation 0-->independent    Transferring 0-->independent    Toileting 0-->independent    Bathing 0-->independent    Dressing 0-->independent    Eating 0-->independent    Communication 0-->understands/communicates without difficulty    Swallowing 0-->swallows foods/liquids without difficulty    IADL    Medications independent    Meal Preparation independent    Housekeeping independent    Laundry independent    Shopping independent    Oral Care independent    Activity Tolerance    Usual Activity Tolerance moderate    Current Activity Tolerance moderate            Psychosocial     None            Abuse/Neglect     None            Legal     None            Substance Abuse     None            Patient Forms     None          Fabiana Lawler RN

## 2017-09-26 ENCOUNTER — APPOINTMENT (OUTPATIENT)
Dept: CT IMAGING | Facility: HOSPITAL | Age: 82
End: 2017-09-26

## 2017-09-26 PROBLEM — R42 ORTHOSTATIC DIZZINESS: Status: ACTIVE | Noted: 2017-09-26

## 2017-09-26 LAB
ANION GAP SERPL CALCULATED.3IONS-SCNC: 5 MMOL/L (ref 3–11)
BACTERIA SPEC AEROBE CULT: ABNORMAL
BUN BLD-MCNC: 13 MG/DL (ref 9–23)
BUN/CREAT SERPL: 10.8 (ref 7–25)
CALCIUM SPEC-SCNC: 9.4 MG/DL (ref 8.7–10.4)
CHLORIDE SERPL-SCNC: 103 MMOL/L (ref 99–109)
CO2 SERPL-SCNC: 24 MMOL/L (ref 20–31)
CREAT BLD-MCNC: 1.2 MG/DL (ref 0.6–1.3)
GFR SERPL CREATININE-BSD FRML MDRD: 42 ML/MIN/1.73
GLUCOSE BLD-MCNC: 93 MG/DL (ref 70–100)
HCT VFR BLD AUTO: 29.8 % (ref 34.5–44)
HGB BLD-MCNC: 10.1 G/DL (ref 11.5–15.5)
MAGNESIUM SERPL-MCNC: 1.9 MG/DL (ref 1.3–2.7)
POTASSIUM BLD-SCNC: 4.3 MMOL/L (ref 3.5–5.5)
SODIUM BLD-SCNC: 132 MMOL/L (ref 132–146)
STREP GROUPING: ABNORMAL

## 2017-09-26 PROCEDURE — 85018 HEMOGLOBIN: CPT | Performed by: INTERNAL MEDICINE

## 2017-09-26 PROCEDURE — G8979 MOBILITY GOAL STATUS: HCPCS

## 2017-09-26 PROCEDURE — 25010000002 CEFTRIAXONE PER 250 MG: Performed by: INTERNAL MEDICINE

## 2017-09-26 PROCEDURE — 25010000002 HEPARIN (PORCINE) PER 1000 UNITS: Performed by: INTERNAL MEDICINE

## 2017-09-26 PROCEDURE — G0378 HOSPITAL OBSERVATION PER HR: HCPCS

## 2017-09-26 PROCEDURE — 80048 BASIC METABOLIC PNL TOTAL CA: CPT | Performed by: INTERNAL MEDICINE

## 2017-09-26 PROCEDURE — 70450 CT HEAD/BRAIN W/O DYE: CPT

## 2017-09-26 PROCEDURE — 83735 ASSAY OF MAGNESIUM: CPT | Performed by: INTERNAL MEDICINE

## 2017-09-26 PROCEDURE — G8978 MOBILITY CURRENT STATUS: HCPCS

## 2017-09-26 PROCEDURE — 99204 OFFICE O/P NEW MOD 45 MIN: CPT | Performed by: INTERNAL MEDICINE

## 2017-09-26 PROCEDURE — 99225 PR SBSQ OBSERVATION CARE/DAY 25 MINUTES: CPT | Performed by: INTERNAL MEDICINE

## 2017-09-26 PROCEDURE — 96372 THER/PROPH/DIAG INJ SC/IM: CPT

## 2017-09-26 PROCEDURE — 96366 THER/PROPH/DIAG IV INF ADDON: CPT

## 2017-09-26 PROCEDURE — 85014 HEMATOCRIT: CPT | Performed by: INTERNAL MEDICINE

## 2017-09-26 RX ORDER — LEVOTHYROXINE SODIUM 0.05 MG/1
50 TABLET ORAL
COMMUNITY
End: 2022-07-21 | Stop reason: SDUPTHER

## 2017-09-26 RX ORDER — DIGOXIN 125 MCG
125 TABLET ORAL
COMMUNITY
End: 2017-11-08 | Stop reason: SINTOL

## 2017-09-26 RX ORDER — DEXLANSOPRAZOLE 60 MG/1
60 CAPSULE, DELAYED RELEASE ORAL DAILY
Status: ON HOLD | COMMUNITY
End: 2018-06-15

## 2017-09-26 RX ORDER — RANITIDINE 300 MG/1
300 TABLET ORAL NIGHTLY
COMMUNITY
End: 2017-09-27 | Stop reason: HOSPADM

## 2017-09-26 RX ORDER — ACETAMINOPHEN 325 MG/1
650 TABLET ORAL EVERY 6 HOURS PRN
Status: DISCONTINUED | OUTPATIENT
Start: 2017-09-26 | End: 2017-09-27 | Stop reason: HOSPADM

## 2017-09-26 RX ORDER — CYCLOSPORINE 0.5 MG/ML
1 EMULSION OPHTHALMIC EVERY 12 HOURS
Status: ON HOLD | COMMUNITY
End: 2022-03-04

## 2017-09-26 RX ORDER — ROPINIROLE 0.5 MG/1
0.5 TABLET, FILM COATED ORAL DAILY
Status: ON HOLD | COMMUNITY
End: 2022-03-03

## 2017-09-26 RX ORDER — SODIUM CHLORIDE 9 MG/ML
50 INJECTION, SOLUTION INTRAVENOUS CONTINUOUS
Status: ACTIVE | OUTPATIENT
Start: 2017-09-26 | End: 2017-09-27

## 2017-09-26 RX ADMIN — HEPARIN SODIUM 5000 UNITS: 5000 INJECTION, SOLUTION INTRAVENOUS; SUBCUTANEOUS at 22:18

## 2017-09-26 RX ADMIN — CYCLOSPORINE 1 DROP: 0.5 EMULSION OPHTHALMIC at 08:33

## 2017-09-26 RX ADMIN — FAMOTIDINE 20 MG: 20 TABLET ORAL at 08:32

## 2017-09-26 RX ADMIN — LEVOTHYROXINE SODIUM 50 MCG: 50 TABLET ORAL at 05:43

## 2017-09-26 RX ADMIN — PANTOPRAZOLE SODIUM 40 MG: 40 TABLET, DELAYED RELEASE ORAL at 05:43

## 2017-09-26 RX ADMIN — METOPROLOL TARTRATE 12.5 MG: 25 TABLET, FILM COATED ORAL at 14:42

## 2017-09-26 RX ADMIN — GABAPENTIN 300 MG: 300 CAPSULE ORAL at 20:02

## 2017-09-26 RX ADMIN — ROPINIROLE 0.5 MG: 0.5 TABLET, FILM COATED ORAL at 20:03

## 2017-09-26 RX ADMIN — SODIUM CHLORIDE 50 ML/HR: 9 INJECTION, SOLUTION INTRAVENOUS at 20:01

## 2017-09-26 RX ADMIN — ATORVASTATIN CALCIUM 10 MG: 10 TABLET, FILM COATED ORAL at 20:02

## 2017-09-26 RX ADMIN — LISINOPRIL 2.5 MG: 2.5 TABLET ORAL at 08:32

## 2017-09-26 RX ADMIN — ACETAMINOPHEN 650 MG: 325 TABLET, FILM COATED ORAL at 15:13

## 2017-09-26 RX ADMIN — HEPARIN SODIUM 5000 UNITS: 5000 INJECTION, SOLUTION INTRAVENOUS; SUBCUTANEOUS at 05:43

## 2017-09-26 RX ADMIN — ALPRAZOLAM 0.25 MG: 0.25 TABLET ORAL at 22:18

## 2017-09-26 RX ADMIN — CARVEDILOL 3.12 MG: 3.12 TABLET, FILM COATED ORAL at 08:32

## 2017-09-26 RX ADMIN — CLOPIDOGREL BISULFATE 75 MG: 75 TABLET ORAL at 08:32

## 2017-09-26 RX ADMIN — CEFTRIAXONE SODIUM 1 G: 1 INJECTION, SOLUTION INTRAVENOUS at 20:01

## 2017-09-26 RX ADMIN — DONEPEZIL HYDROCHLORIDE 5 MG: 5 TABLET, FILM COATED ORAL at 20:02

## 2017-09-26 RX ADMIN — CYCLOSPORINE 1 DROP: 0.5 EMULSION OPHTHALMIC at 20:03

## 2017-09-26 RX ADMIN — DIGOXIN 125 MCG: 125 TABLET ORAL at 12:01

## 2017-09-26 RX ADMIN — HEPARIN SODIUM 5000 UNITS: 5000 INJECTION, SOLUTION INTRAVENOUS; SUBCUTANEOUS at 14:38

## 2017-09-26 RX ADMIN — METOPROLOL TARTRATE 12.5 MG: 25 TABLET, FILM COATED ORAL at 20:04

## 2017-09-26 NOTE — PLAN OF CARE
Problem: Patient Care Overview (Adult)  Goal: Plan of Care Review  Outcome: Ongoing (interventions implemented as appropriate)    09/26/17 8567   Outcome Evaluation   Outcome Summary/Follow up Plan pt has rested well today, did complain of a headache. she received tylenol for pain and has not mentioned anymore about pain. she is up with one to the bathroom. vss. cardiology seen her for dizzy episodes. stopped her carvedilol and started metoplrolol.        Goal: Adult Individualization and Mutuality  Outcome: Ongoing (interventions implemented as appropriate)  Goal: Discharge Needs Assessment  Outcome: Ongoing (interventions implemented as appropriate)    Problem: Fall Risk (Adult)  Goal: Identify Related Risk Factors and Signs and Symptoms  Outcome: Ongoing (interventions implemented as appropriate)  Goal: Absence of Falls  Outcome: Ongoing (interventions implemented as appropriate)    Problem: Cardiac Output, Decreased (Adult)  Goal: Identify Related Risk Factors and Signs and Symptoms  Outcome: Ongoing (interventions implemented as appropriate)  Goal: Adequate Cardiac Output/Effective Tissue Perfusion  Outcome: Ongoing (interventions implemented as appropriate)

## 2017-09-26 NOTE — PROGRESS NOTES
[facility] Medicine Services  INPATIENT PROGRESS NOTE    Date of Admission: 9/24/2017  Length of Stay: 0  Primary Care Physician: CHRISTY Hair MD    Subjective   CC: no further syncope, nausea improved  HPI:  No f/c, no chest pain. dizzi upon standing. No dyspnea. Dysuria resolved. Tolerating diet    Review Of Systems:   Review of Systems  No f/c, no dysuria, no abd pain. + nausea. No dyspnea    Otherwise 10 system ROS negative except as noted pertinent positives above in HPI.     Objective      Temp:  [97.6 °F (36.4 °C)-98 °F (36.7 °C)] 97.7 °F (36.5 °C)  Heart Rate:  [72-84] 75  Resp:  [16-18] 16  BP: (131-173)/(64-83) 173/76  Physical Exam  Alert, oriented to person, year, wrong month answer simple questions appropriately  Ncat, oroph clear  rrr  ctab  abd soft, nontender  No cce  No extremity rash  Equal  and leg raise, pupils equal, eomi    Results Review:    I have reviewed the labs, radiology results and diagnostic studies.      Results from last 7 days  Lab Units 09/26/17  0452 09/25/17  0618   WBC 10*3/mm3  --  6.13   HEMOGLOBIN g/dL 10.1* 10.5*   PLATELETS 10*3/mm3  --  281       Results from last 7 days  Lab Units 09/26/17  0452   SODIUM mmol/L 132   POTASSIUM mmol/L 4.3   CHLORIDE mmol/L 103   CO2 mmol/L 24.0   BUN mg/dL 13   CREATININE mg/dL 1.20   GLUCOSE mg/dL 93   CALCIUM mg/dL 9.4       Culture Data: Cultures:    reviewed    Assessment/Plan     Problem List  Hospital Problem List     * (Principal)Acute cystitis    Syncope and collapse    Vaso vagal episode    Overview Signed 9/25/2017  3:55 PM by Doug Plata MD     With nausea         Systolic heart failure    Overview Addendum 9/26/2017  6:54 PM by Doug Plata MD     unknown chronicity; EF 25%         Orthostatic dizziness    Anemia    Hyponatremia    Hypertension    Dehydration    History of CVA (cerebrovascular accident)    Dementia    Left bundle branch block    Overview Signed 9/25/2017  4:01  PM by Doug Plata MD     chronic                    Assessment/Plan:  -day #3/5 rocephin (or ceftin to complete rx). Gets dose in evening  -ns at 50cc/hr x 6 hours  -a.m. Orthostatics  -bmp in a.m.  -appreciate cards assistance in maximal medical management of systolic heart failure (not candidate for icd given age/cognitive impairment)   -if stil orthostatic, considering holding ace-inh, etc but attempt to keep on aceinh due to systolic dysfxn  -s/p pt eval; if orthostasis improved, then possible d/c home w/ h.h. In a.m.    DVT prophylaxis: sq heparin  Discharge Planning: I expect patient to be discharged to be determined  Doug Plata MD   09/26/17   6:50 PM

## 2017-09-26 NOTE — PROGRESS NOTES
Continued Stay Note  [facility]     Patient Name: Scott Sinha  MRN: [id number]  Today's Date: 9/26/2017    Admit Date: 9/24/2017          Discharge Plan       09/26/17 1633    Case Management/Social Work Plan    Plan Home    Additional Comments Spoke with pt's son Noah his plan is the same as his mother's states she is independant with ADLs and he will transport her home at DC.  CM will cont. to follow.               Discharge Codes     None        Expected Discharge Date and Time     Expected Discharge Date Expected Discharge Time    Sep 26, 2017             Fabiana Lawler RN

## 2017-09-26 NOTE — PLAN OF CARE
Problem: Patient Care Overview (Adult)  Goal: Plan of Care Review  Outcome: Ongoing (interventions implemented as appropriate)    09/26/17 0522   Coping/Psychosocial Response Interventions   Plan Of Care Reviewed With patient   Patient Care Overview   Progress no change   Outcome Evaluation   Outcome Summary/Follow up Plan Pt. was pleasant and cooperative during the evening; maintenance fluids and IV Abx. continued; difficulty falling asleep, PRN Xanex given; VSS; no further c/o of nausea or vomiting; continue to monitor.

## 2017-09-26 NOTE — THERAPY EVALUATION
"Acute Care - Physical Therapy Initial Evaluation  [facility]     Patient Name: Scott Sinha  : 4/10/1930  MRN: [id number]  Today's Date: 2017   Onset of Illness/Injury or Date of Surgery Date: 17  Date of Referral to PT: 17  Referring Physician: MD Cecy      Admit Date: 2017     Visit Dx:    ICD-10-CM ICD-9-CM   1. Syncope and collapse R55 780.2   2. UTI (urinary tract infection), bacterial N39.0 599.0    A49.9 041.9   3. Generalized weakness R53.1 780.79   4. Anemia, unspecified type D64.9 285.9   5. Hyponatremia E87.1 276.1   6. Impaired functional mobility, balance, gait, and endurance Z74.09 V49.89     Patient Active Problem List   Diagnosis   • Post-concussion headache   • Idiopathic peripheral neuropathy   • Neck pain   • Mild cognitive impairment   • Syncope and collapse   • Acute cystitis   • Anemia   • Hyponatremia   • Hypertension   • Vaso vagal episode   • Dehydration   • History of CVA (cerebrovascular accident)   • Dementia   • Left bundle branch block   • Systolic heart failure     Past Medical History:   Diagnosis Date   • Congestive heart failure    • Malignant neoplasm    • Stroke     mini stroke     Past Surgical History:   Procedure Laterality Date   • CHOLECYSTECTOMY     • EYE SURGERY     • HYSTERECTOMY            PT ASSESSMENT (last 72 hours)      PT Evaluation       17 1530 17 1624    Rehab Evaluation    Document Type evaluation  -DM     Subjective Information agree to therapy;complains of;weakness;pain;nausea/vomiting   \"So sick from all my company;wore me out;2 tylenol;toBR ok \"  -DM     Patient Effort, Rehab Treatment good  -DM     Symptoms Noted During/After Treatment fatigue;increased pain  -DM     Symptoms Noted Comment \"call my nurse & tell her I need pain med\"(pt shown callBell)  -DM     General Information    Patient Profile Review yes  -DM     Onset of Illness/Injury or Date of Surgery Date 17  -DM     Referring Physician MD Cecy  " "-DM     General Observations SUP IN BED;TELE;RA;exit alarm;iv hep locked  -DM     Pertinent History Of Current Problem onset dizziness,nausea, weakness & apparent syncope spell 9-23 in her BR (\"found herself sitting on BR Floor\"); to ER, W/ DX OF SYNCOPE & COLLAPSE,UTI, BLADDER INFLAM, dehydration, syst. heart fail; h/o Dem, chr HA & Dry eyes   -DM     Precautions/Limitations fall precautions;other (see comments)   Dem.; episode syncope/collapse P.T.A.  -DM     Prior Level of Function independent:;all household mobility;community mobility;gait;transfer;bed mobility;ADL's;cooking;cleaning;dependent:;driving;shopping;yard work   Indep gt w/o AD;\"does own laundry,cooking,&Son does his own\"  -DM     Equipment Currently Used at Home walker, rolling;cane, straight;shower chair   doesn't use either A.D., or sh.ch.(\"only sponge bathes\")  -DM walker, rolling  -TC    Plans/Goals Discussed With patient;agreed upon  -DM     Risks Reviewed patient:;LOB;nausea/vomiting;dizziness;increased discomfort;change in vital signs;lines disloged  -DM     Benefits Reviewed patient:;improve function;increase independence;increase strength;increase balance;decrease pain;increase knowledge  -DM     Barriers to Rehab cognitive status  -DM     Living Environment    Lives With child(chong), adult;other relative(s) (specify)   son & 3 gr.grandkids moved in w/pt.  -DM child(chong), adult;other relative(s) (specify)  -TC    Living Arrangements house  -DM house  -TC    Home Accessibility stairs within home;tub/shower is not walk in   doesn't use steps to downst.(Walk-outBasement)  -DM stairs (1 railing present)  -TC    Number of Stairs to Enter Home --   none into front door (her access)  -DM     Number of Stairs Within Home 12   to walk out basement level  -DM     Stair Railings at Home inside, present on left side  -DM inside, present on left side  -TC    Type of Financial/Environmental Concern none  -DM none  -TC    Transportation Available car;family " "or friend will provide  -DM car;family or friend will provide  -TC    Living Environment Comment \"lets son/gr.grandkids use the tub/shower;I sponge bathe\"  -DM     Clinical Impression    Date of Referral to PT 09/25/17  -DM     PT Diagnosis impaired funct mobil  -DM     Criteria for Skilled Therapeutic Interventions Met yes;treatment indicated  -DM     Pathology/Pathophysiology Noted (Describe Specifically for Each System) genitourinary  -DM     Impairments Found (describe specific impairments) gait, locomotion, and balance  -DM     Rehab Potential fair, will monitor progress closely  -DM     Vital Signs    Pre Systolic BP Rehab 173  -DM     Pre Treatment Diastolic BP 76  -DM     Post Systolic BP Rehab 176  -DM     Post Treatment Diastolic BP 69  -DM     Pretreatment Heart Rate (beats/min) 77  -DM     Intratreatment Heart Rate (beats/min) 126  -DM     Posttreatment Heart Rate (beats/min) 99  -DM     O2 Delivery Pre Treatment room air  -DM     O2 Delivery Intra Treatment room air  -DM     O2 Delivery Post Treatment room air  -DM     Pre Patient Position Supine  -DM     Intra Patient Position Standing  -DM     Post Patient Position Supine  -DM     Pain Assessment    Pain Assessment Abbott-Reed FACES  -DM     Abbott-Reed FACES Pain Rating 4  -DM     Pain Type Chronic pain  -DM     Pain Location Head  -DM     Pain Descriptors Aching  -DM     Pain Frequency Constant/continuous  -DM     Patient's Stated Pain Goal No pain  -DM     Pain Intervention(s) Medication (See MAR);Repositioned;Ambulation/increased activity   tylenol x 2  -DM     Response to Interventions tolerated  -DM     Cognitive Assessment/Intervention    Current Cognitive/Communication Assessment impaired  -DM     Orientation Status oriented x 4  -DM     Follows Commands/Answers Questions 100% of the time;able to follow single-step instructions;needs cueing;needs increased time;needs repetition  -DM     Personal Safety mild impairment;decreased awareness, need " for assist;decreased awareness, need for safety;decreased insight to deficits;impulsive  -DM     Personal Safety Interventions fall prevention program maintained;gait belt;nonskid shoes/slippers when out of bed  -DM     ROM (Range of Motion)    General ROM no range of motion deficits identified  -DM     MMT (Manual Muscle Testing)    General MMT Assessment lower extremity strength deficits identified   B hip flex 4+/5  -DM     Bed Mobility, Assessment/Treatment    Bed Mobility, Assistive Device bed rails;head of bed elevated  -DM     Bed Mobility, Roll Right, [place] conditional independence  -DM     Bed Mob, Supine to Sit, [place] conditional independence  -DM     Bed Mob, Sit to Supine, [place] conditional independence  -DM     Transfer Assessment/Treatment    Transfers, Sit-Stand [place] minimum assist (75% patient effort);verbal cues required   cued to use bedrail;pt holding head& c/o INCR.HA pain  -DM     Transfers, Stand-Sit [place] minimum assist (75% patient effort)   gingerly feeling for bedrail & c/o incr. HA  -DM     Transfers, Sit-Stand-Sit, Assist Device --   GT Belt  -DM     Toilet Transfer, [place] minimum assist (75% patient effort);verbal cues required  -DM     Toilet Transfer, Assistive Device other (see comments)   low commode;grab bar  -DM     Transfer, Safety Issues weight-shifting ability decreased;loses balance backward   slow transit mvmts  -DM     Transfer, Impairments strength decreased;impaired balance;pain  -DM     Gait Assessment/Treatment    Gait, [place] Level minimum assist (75% patient effort);verbal cues required   min HHA ON R to BR,then on L to ret. to EOB;toileting 5 min.  -DM     Gait, Assistive Device --   gt belt;UE supp  -DM     Gait, Distance (Feet) 20  -DM     Gait, Gait Pattern Analysis swing-to gait  -DM     Gait, Gait Deviations maria e decreased;decreased heel strike;forward flexed posture;step length decreased;weight-shifting ability  decreased  -DM     Gait, Safety Issues step length decreased;weight-shifting ability decreased  -DM     Gait, Impairments strength decreased;impaired balance;pain  -DM     Gait, Comment cues to incr. step length,ext. trunk  -DM     Therapy Exercises    Bilateral Lower Extremities AROM:;10 reps;sitting;supine;ankle pumps/circles;glut sets;heel slides;hip abduction/adduction;hip ER;hip flexion;hip IR;LAQ;quad sets   HS sets; BKFO  -DM     Bilateral Upper Extremity AROM:;5 reps;sitting;elbow flexion/extension;shoulder abduction/adduction;shoulder extension/flexion   PT WILL do 5 addl reps s/p HA med  -DM     Positioning and Restraints    Pre-Treatment Position in bed  -DM     Post Treatment Position bed  -DM     In Bed notified nsg;supine;call light within reach;encouraged to call for assist;exit alarm on  -DM       09/25/17 0657 09/24/17 3254    General Information    Equipment Currently Used at Home none  - cane, straight   does not use  -    Living Environment    Lives With  child(chong), adult;other relative(s) (specify)   grandchildren  -    Living Arrangements  house  -    Home Accessibility  stairs (1 railing present)  -    Stair Railings at Home  inside, present on left side  -    Type of Financial/Environmental Concern  none  -    Transportation Available  car;family or friend will provide  -      User Key  (r) = Recorded By, (t) = Taken By, (c) = Cosigned By    Initials Name Provider Type    KAPIL Hargrove, PT Physical Therapist    TC Fabiana Lawler, RN Case Manager    EDMOND Iqbal, RN Registered Nurse          Physical Therapy Education     Title: PT OT SLP Therapies (Active)     Topic: Physical Therapy (Active)     Point: Mobility training (Active)    Learning Progress Summary    Learner Readiness Method Response Comment Documented by Status   Patient ROSARIO Luis NR   09/26/17 9964 Active               Point: Home exercise program (Active)    Learning Progress Summary    Learner  "Readiness Method Response Comment Documented by Status   Patient ROSARIO Luis NR  DM 09/26/17 1705 Active               Point: Body mechanics (Active)    Learning Progress Summary    Learner Readiness Method Response Comment Documented by Status   Patient ROSARIO Luis NR  DM 09/26/17 1705 Active               Point: Precautions (Active)    Learning Progress Summary    Learner Readiness Method Response Comment Documented by Status   Patient ROSARIO Luis NR  DM 09/26/17 1705 Active                      User Key     Initials Effective Dates Name Provider Type Discipline     06/19/15 -  Aye Hargrove, PT Physical Therapist PT                PT Recommendation and Plan  Anticipated Discharge Disposition: home with home health  PT Frequency: daily  Plan of Care Review  Plan Of Care Reviewed With: patient  Progress: progress towards functional goals is fair  Outcome Summary/Follow up Plan: Presents w/ evolving sympt incl. syncope w/collapse, decr.NA+ , anemia, elev SBP, Dehyd, nausea/chr. HA & dry eyes,  & impulsive behavior d/t dementia; thinks she struck head & afraid she has \"knot\" as result; able to amb 20 ft w/ Min.HHA (decl. A.D.), & c/o wave of nausea;will prog. gt as freddy, possibly w/  SPC for supp., to achieve indep mobil.  & ret. to PLOF            IP PT Goals       09/26/17 1705          Bed Mobility PT LTG    Bed Mobility PT LTG, Date Established 09/26/17  -DM      Bed Mobility PT LTG, Time to Achieve 5 days  -DM      Bed Mobility PT LTG, Activity Type all bed mobility  -DM      Bed Mobility PT LTG, [place] Level independent  -DM      Transfer Training PT LTG    Transfer Training PT LTG, Date Established 09/26/17  -DM      Transfer Training PT LTG, Time to Achieve 5 days  -DM      Transfer Training PT LTG, Activity Type bed to chair /chair to bed;sit to stand/stand to sit;toilet  -DM      Transfer Training PT LTG, [place] Level independent  -DM      Gait Training PT LTG    Gait Training Goal PT LTG, Date " Established 09/26/17  -DM      Gait Training Goal PT LTG, Time to Achieve 5 days  -DM      Gait Training Goal PT LTG, [place] Level independent   stable VS  -DM      Gait Training Goal PT LTG, Distance to Achieve 350  -DM      Stair Training PT LTG    Stair Training Goal PT LTG, Date Established 09/26/17  -DM      Stair Training Goal PT LTG, Time to Achieve 5 days  -DM      Stair Training Goal PT LTG, Number of Steps 10  -DM      Stair Training Goal PT LTG, [place] Level independent  -DM        User Key  (r) = Recorded By, (t) = Taken By, (c) = Cosigned By    Initials Name Provider Type    KAPIL Hargrove, PT Physical Therapist                Outcome Measures       09/26/17 1530          How much help from another person do you currently need...    Turning from your back to your side while in flat bed without using bedrails? 4  -DM      Moving from lying on back to sitting on the side of a flat bed without bedrails? 4  -DM      Moving to and from a bed to a chair (including a wheelchair)? 3  -DM      Standing up from a chair using your arms (e.g., wheelchair, bedside chair)? 3  -DM      Climbing 3-5 steps with a railing? 3  -DM      To walk in hospital room? 3  -DM      AM-PAC 6 Clicks Score 20  -DM      Functional Assessment    Outcome Measure Options AM-PAC 6 Clicks Basic Mobility (PT)  -DM        User Key  (r) = Recorded By, (t) = Taken By, (c) = Cosigned By    Initials Name Provider Type    KAPIL Hargrove, PT Physical Therapist           Time Calculation:         PT Charges       09/26/17 1705          Time Calculation    Start Time 1530  -DM      PT Received On 09/26/17  -DM      PT Goal Re-Cert Due Date 10/06/17  -DM      Time Calculation- PT    Total Timed Code Minutes- PT 35 minute(s)  -DM        User Key  (r) = Recorded By, (t) = Taken By, (c) = Cosigned By    Initials Name Provider Type    KAPIL Hargrove, PT Physical Therapist          Therapy Charges for Today     Code Description  Service Date Service Provider Modifiers Qty    94178194239 HC PT MOBILITY CURRENT 9/26/2017 Aye Hargrove, PT GP, CJ 1    [id number] HC PT MOBILITY PROJECTED 9/26/2017 Aye Hargrove, PT GP, CI 1          PT G-Codes  PT Professional Judgement Used?: Yes  Outcome Measure Options: AM-PAC 6 Clicks Basic Mobility (PT)  Score: 20  Functional Limitation: Mobility: Walking and moving around  Mobility: Walking and Moving Around Current Status (): At least 20 percent but less than 40 percent impaired, limited or restricted  Mobility: Walking and Moving Around Goal Status (): At least 1 percent but less than 20 percent impaired, limited or restricted      Aye Hargrove, PT  9/26/2017

## 2017-09-26 NOTE — PLAN OF CARE
"Problem: Patient Care Overview (Adult)  Goal: Plan of Care Review  Outcome: Ongoing (interventions implemented as appropriate)    09/26/17 1705   Coping/Psychosocial Response Interventions   Plan Of Care Reviewed With patient   Patient Care Overview   Progress progress towards functional goals is fair   Outcome Evaluation   Outcome Summary/Follow up Plan Presents w/ evolving sympt incl. syncope w/collapse, decr.NA+ , anemia, elev SBP, Dehyd, nausea/chr. HA & dry eyes, & impulsive behavior d/t dementia; thinks she struck head & afraid she has \"knot\" as result; able to amb 20 ft w/ Min.HHA (decl. A.D.), & c/o wave of nausea;will prog. gt as freddy, possibly w/ SPC for supp., to achieve indep mobil. & ret. to PLOF          Problem: Inpatient Physical Therapy  Goal: Bed Mobility Goal LTG- PT  Outcome: Ongoing (interventions implemented as appropriate)    09/26/17 1705   Bed Mobility PT LTG   Bed Mobility PT LTG, Date Established 09/26/17   Bed Mobility PT LTG, Time to Achieve 5 days   Bed Mobility PT LTG, Activity Type all bed mobility   Bed Mobility PT LTG, [place] Level independent       Goal: Transfer Training Goal 1 LTG- PT  Outcome: Ongoing (interventions implemented as appropriate)    09/26/17 1705   Transfer Training PT LTG   Transfer Training PT LTG, Date Established 09/26/17   Transfer Training PT LTG, Time to Achieve 5 days   Transfer Training PT LTG, Activity Type bed to chair /chair to bed;sit to stand/stand to sit;toilet   Transfer Training PT LTG, [place] Level independent       Goal: Gait Training Goal LTG- PT  Outcome: Ongoing (interventions implemented as appropriate)    09/26/17 1705   Gait Training PT LTG   Gait Training Goal PT LTG, Date Established 09/26/17   Gait Training Goal PT LTG, Time to Achieve 5 days   Gait Training Goal PT LTG, [place] Level independent  (stable VS)   Gait Training Goal PT LTG, Distance to Achieve 350       Goal: Stair Training Goal LTG- PT  Outcome: Ongoing " (interventions implemented as appropriate)    09/26/17 1705   Stair Training PT LTG   Stair Training Goal PT LTG, Date Established 09/26/17   Stair Training Goal PT LTG, Time to Achieve 5 days   Stair Training Goal PT LTG, Number of Steps 10   Stair Training Goal PT LTG, [place] Level independent

## 2017-09-26 NOTE — CONSULTS
"Cardiology Consultation     Scott Sinha  [id number]  [id number]  4/10/1930    DATE OF ADMISSION: 9/24/2017    CHRISTY Hair MD  Primary Cardiologist: Jose Antonio Aragon MD      Chief complaint: CHF and Syncope     History of Present Illness     Patient is an 86 yo male with a hx of NICM, TIA, and LBBB that presented to ED on 9/24/17 with complaints of nausea, weakness, and near syncope. She had been in her usual state of health until the day prior when she became nauseated. She went to urinate and then the next thing she remembers is sitting on the floor next to the toilet. She felt dizzy and \"woozy\" the entire following day and that is when she presented to ED. Her UA was suggestive of UTI. She has seen Dr. Aragon in past following TIA and known NICM w/ LVEF 35-40% on last echo in 2012. She stated she has noticed ALMANZAR a little more over the past week. She is somewhat of a poor historian with mild cognitive impairment. Her only complaint today is that she is nauseated. She denies feeling dizzy. Negative troponins and BNP mildly elevated at 292. No chest pain, PND, edema, orthopnea, fevers, chills.     Past Medical History:   Diagnosis Date   • Congestive heart failure    • Malignant neoplasm    • Stroke     mini stroke         Past Surgical History:   Procedure Laterality Date   • CHOLECYSTECTOMY     • EYE SURGERY     • HYSTERECTOMY         Prescriptions Prior to Admission   Medication Sig Dispense Refill Last Dose   • ALPRAZolam (XANAX) 0.25 MG tablet TAKE 1 TABLET BY MOUTH 3 TIMES A DAY AS NEEDED  0 Taking   • atorvastatin (LIPITOR) 10 MG tablet    Taking   • carvedilol (COREG) 3.125 MG tablet TAKE 1 TABLET BY MOUTH TWICE A DAY  0 Taking   • clopidogrel (PLAVIX) 75 MG tablet TAKE 1 TABLET BY MOUTH EVERY DAY  0 Taking   • clotrimazole (MYCELEX) 10 MG ekaterina DISSOLVE 1 TABLET BY MOUTH FIVE TIMES A DAY  0 Taking   • donepezil (ARICEPT) 5 MG tablet TAKE 1 TABLET BY MOUTH EVERY DAY  0 Taking   • gabapentin (NEURONTIN) 300 " MG capsule TAKE 1 CAPSULE TWICE A DAY  0 Taking   • ipratropium (ATROVENT) 0.06 % nasal spray 2 PUFFS, DAILY, EACH NOSTRIL  11 Taking   • ketorolac (ACULAR) 0.5 % ophthalmic solution USE 1 DROP INTO BOTH EYES 3 TIMES A DAY  2 Taking   • lisinopril (PRINIVIL,ZESTRIL) 2.5 MG tablet TAKE 1 TABLET BY MOUTH EVERY DAY  0 Taking   • NAPHCON-A 0.025-0.3 % ophthalmic solution INSTILL 1 DROP 4 TIMES PER DAY AS NEEDED FOR ALLERGIC CONJUNCTIVI  0 Taking   • nystatin (MYCOSTATIN) 014580 UNIT/ML suspension SWISH AND GARGLE FOR 1 MINUTE AND SPIT 3 TIMES DAILY, REPEAT AFTER 3 WEEKS IF STILL SYMPTOMATIC  1 Taking   • sulfacetamide (BLEPH-10) 10 % ophthalmic solution INSTILL 2 (TWO) DROP(S), OPHTHALMIC, EVERY 6 HOURS FOR BACTERIAL CONJUNCTIVITIS  0 Taking   • triamcinolone (KENALOG) 0.1 % paste APPLY TO AFFECTED AREA TWICE DAILY FOR 5 DAYS, THEN WAIT 1 WEEK AND REPEAT  0 Taking       Current Meds  No current facility-administered medications on file prior to encounter.      Current Outpatient Prescriptions on File Prior to Encounter   Medication Sig Dispense Refill   • ALPRAZolam (XANAX) 0.25 MG tablet TAKE 1 TABLET BY MOUTH 3 TIMES A DAY AS NEEDED  0   • atorvastatin (LIPITOR) 10 MG tablet      • carvedilol (COREG) 3.125 MG tablet TAKE 1 TABLET BY MOUTH TWICE A DAY  0   • clopidogrel (PLAVIX) 75 MG tablet TAKE 1 TABLET BY MOUTH EVERY DAY  0   • clotrimazole (MYCELEX) 10 MG ekaterina DISSOLVE 1 TABLET BY MOUTH FIVE TIMES A DAY  0   • donepezil (ARICEPT) 5 MG tablet TAKE 1 TABLET BY MOUTH EVERY DAY  0   • gabapentin (NEURONTIN) 300 MG capsule TAKE 1 CAPSULE TWICE A DAY  0   • ipratropium (ATROVENT) 0.06 % nasal spray 2 PUFFS, DAILY, EACH NOSTRIL  11   • ketorolac (ACULAR) 0.5 % ophthalmic solution USE 1 DROP INTO BOTH EYES 3 TIMES A DAY  2   • lisinopril (PRINIVIL,ZESTRIL) 2.5 MG tablet TAKE 1 TABLET BY MOUTH EVERY DAY  0   • NAPHCON-A 0.025-0.3 % ophthalmic solution INSTILL 1 DROP 4 TIMES PER DAY AS NEEDED FOR ALLERGIC CONJUNCTIVI  0  "  • nystatin (MYCOSTATIN) 259070 UNIT/ML suspension SWISH AND GARGLE FOR 1 MINUTE AND SPIT 3 TIMES DAILY, REPEAT AFTER 3 WEEKS IF STILL SYMPTOMATIC  1   • sulfacetamide (BLEPH-10) 10 % ophthalmic solution INSTILL 2 (TWO) DROP(S), OPHTHALMIC, EVERY 6 HOURS FOR BACTERIAL CONJUNCTIVITIS  0   • triamcinolone (KENALOG) 0.1 % paste APPLY TO AFFECTED AREA TWICE DAILY FOR 5 DAYS, THEN WAIT 1 WEEK AND REPEAT  0         Social History     Social History   • Marital status:      Spouse name: N/A   • Number of children: N/A   • Years of education: N/A     Occupational History   • Not on file.     Social History Main Topics   • Smoking status: Never Smoker   • Smokeless tobacco: Never Used   • Alcohol use No   • Drug use: Not on file   • Sexual activity: Not on file     Other Topics Concern   • Not on file     Social History Narrative   • No narrative on file       History reviewed. No pertinent family history.      REVIEW OF SYSTEMS:   12 point ROS was performed and is negative except as outlined in HPI           Objective:     Vitals:    09/26/17 0328 09/26/17 0729 09/26/17 0730 09/26/17 0732   BP: 173/83 150/77 152/68 131/64   BP Location: Left arm Left arm Left arm Left arm   Patient Position: Lying Lying Sitting Standing   Pulse: 75 84 80 72   Resp: 18 16     Temp: 97.7 °F (36.5 °C) 97.6 °F (36.4 °C)     TempSrc: Oral Oral     SpO2: 95% 97% 95% 94%   Weight:       Height:         Body mass index is 20.77 kg/(m^2).  Flowsheet Rows         First Filed Value    Admission Height  64\" (162.6 cm) Documented at 09/24/2017 1728    Admission Weight  121 lb (54.9 kg) Documented at 09/24/2017 1728          General Appearance:    Alert, cooperative, thin female in no acute distress   Head:    Normocephalic, without obvious abnormality, atraumatic   Eyes:            Lids and lashes normal, conjunctivae and sclerae normal, no   icterus, no pallor, corneas clear, PERRLA   Ears:    Ears appear intact with no abnormalities noted     "   Neck:  supple, trachea midline, no carotid bruit, no JVD   Back:     No kyphosis present, no scoliosis present, no skin lesions, erythema or scars, no tenderness to percussion or palpation, range of motion normal   Lungs:     Clear to auscultation,respirations regular, even and unlabored    Heart:    Regular rhythm and normal rate, normal S1 and S2, no murmur, no gallop, no rub, no click   Chest Wall:    No abnormalities observed   Abdomen:     Normal bowel sounds, no masses, no organomegaly, soft        non-tender, non-distended, no guarding, no rebound  tenderness   Extremities:   Moves all extremities well, no edema, no cyanosis, no redness   Pulses:   Pulses palpable and equal bilaterally. Normal radial, carotid, femoral, dorsalis pedis and posterior tibial pulses bilaterally. Normal abdominal aorta   Skin:   No bleeding, bruising or rash       sychiatric:   Alert and oented x 3, normal mood and affect. Some confusion and difficulty remembering details.    Lab Review:      Results Review:     I reviewed the patient's new clinical results.      Results from last 7 days  Lab Units 09/26/17 0452 09/25/17  0618   WBC 10*3/mm3  --  6.13   HEMOGLOBIN g/dL 10.1* 10.5*   HEMATOCRIT % 29.8* 31.9*   PLATELETS 10*3/mm3  --  281       Results from last 7 days  Lab Units 09/26/17  0452  09/24/17  1743   SODIUM mmol/L 132  < > 127*   POTASSIUM mmol/L 4.3  < > 3.9   CHLORIDE mmol/L 103  < > 100   CO2 mmol/L 24.0  < > 23.0   BUN mg/dL 13  < > 12   CREATININE mg/dL 1.20  < > 1.10   CALCIUM mg/dL 9.4  < > 8.7   BILIRUBIN mg/dL  --   --  0.2*   ALK PHOS U/L  --   --  47   ALT (SGPT) U/L  --   --  9   AST (SGOT) U/L  --   --  18   GLUCOSE mg/dL 93  < > 115*   < > = values in this interval not displayed.    Results from last 7 days  Lab Units 09/26/17  0452   SODIUM mmol/L 132   POTASSIUM mmol/L 4.3   CHLORIDE mmol/L 103   CO2 mmol/L 24.0   BUN mg/dL 13   CREATININE mg/dL 1.20   GLUCOSE mg/dL 93   CALCIUM mg/dL 9.4       I  personally viewed and interpreted the patient's EKG/Telemetry data    Assessment:    Principal Problem:    Acute cystitis  Active Problems:    Syncope and collapse    Anemia    Hyponatremia    Hypertension    Vaso vagal episode    Dehydration    History of CVA (cerebrovascular accident)    Dementia    Left bundle branch block    Systolic heart failure       Plan:       1. Chronic Systolic Heart failure/Nonischemic cardiomyopathy w/ LVEF 25%   - patient with chronic NYHA FC III HF symptoms. Compensated  - continue optimal rx w/ Coreg and Lisinopril   - patient not a candidate for ICD given advanced age and cognitive impairment.     2. Syncope and collapse/ probably vasovagal syncope   - likely secondary to acute infection and dehydration   - continue with gentle hydration and treatment of acute cystitis     3. Acute Cystitis   - abx per hospitalists     Scribed for Rafita Goldman MD by Hannah Pinto PA-C. 9/26/2017  10:19 AM     I talked to the patient and she tells me that she does get woozy when she stands up.  I had the patient stand up at the bedside and she became very lightheaded and nauseous and had a setback quickly.  We also documented some orthostatic changes.  At this time we'll stop her Coreg and put her on metoprolol as her beta blocker.  If she continues to have orthostatic issues after that we may have to get rid of her ACE inhibitor but with her LV dysfunction and history of heart failure we need to leave that on board if possible.      TOSIN Nix  [facility]  09/26/17  10:19 AM

## 2017-09-27 VITALS
BODY MASS INDEX: 20.66 KG/M2 | RESPIRATION RATE: 14 BRPM | OXYGEN SATURATION: 94 % | HEIGHT: 64 IN | DIASTOLIC BLOOD PRESSURE: 56 MMHG | HEART RATE: 86 BPM | TEMPERATURE: 97.8 F | SYSTOLIC BLOOD PRESSURE: 126 MMHG | WEIGHT: 121 LBS

## 2017-09-27 LAB
ANION GAP SERPL CALCULATED.3IONS-SCNC: 4 MMOL/L (ref 3–11)
BUN BLD-MCNC: 16 MG/DL (ref 9–23)
BUN/CREAT SERPL: 13.3 (ref 7–25)
CALCIUM SPEC-SCNC: 9.3 MG/DL (ref 8.7–10.4)
CHLORIDE SERPL-SCNC: 102 MMOL/L (ref 99–109)
CO2 SERPL-SCNC: 27 MMOL/L (ref 20–31)
CREAT BLD-MCNC: 1.2 MG/DL (ref 0.6–1.3)
GFR SERPL CREATININE-BSD FRML MDRD: 42 ML/MIN/1.73
GLUCOSE BLD-MCNC: 95 MG/DL (ref 70–100)
MAGNESIUM SERPL-MCNC: 1.8 MG/DL (ref 1.3–2.7)
POTASSIUM BLD-SCNC: 4.4 MMOL/L (ref 3.5–5.5)
SODIUM BLD-SCNC: 133 MMOL/L (ref 132–146)

## 2017-09-27 PROCEDURE — 25010000002 HEPARIN (PORCINE) PER 1000 UNITS: Performed by: INTERNAL MEDICINE

## 2017-09-27 PROCEDURE — G8987 SELF CARE CURRENT STATUS: HCPCS

## 2017-09-27 PROCEDURE — G8988 SELF CARE GOAL STATUS: HCPCS

## 2017-09-27 PROCEDURE — 83735 ASSAY OF MAGNESIUM: CPT | Performed by: INTERNAL MEDICINE

## 2017-09-27 PROCEDURE — 96372 THER/PROPH/DIAG INJ SC/IM: CPT

## 2017-09-27 PROCEDURE — G8989 SELF CARE D/C STATUS: HCPCS

## 2017-09-27 PROCEDURE — 97165 OT EVAL LOW COMPLEX 30 MIN: CPT

## 2017-09-27 PROCEDURE — 80048 BASIC METABOLIC PNL TOTAL CA: CPT | Performed by: INTERNAL MEDICINE

## 2017-09-27 PROCEDURE — G0378 HOSPITAL OBSERVATION PER HR: HCPCS

## 2017-09-27 PROCEDURE — 99217 PR OBSERVATION CARE DISCHARGE MANAGEMENT: CPT | Performed by: INTERNAL MEDICINE

## 2017-09-27 RX ORDER — ATORVASTATIN CALCIUM 10 MG/1
10 TABLET, FILM COATED ORAL DAILY
Qty: 30 TABLET | Refills: 0 | Status: ON HOLD | OUTPATIENT
Start: 2017-09-27 | End: 2018-06-15

## 2017-09-27 RX ORDER — CEFUROXIME AXETIL 250 MG/1
250 TABLET ORAL EVERY 12 HOURS SCHEDULED
Qty: 6 TABLET | Refills: 0 | Status: SHIPPED | OUTPATIENT
Start: 2017-09-27 | End: 2017-11-08

## 2017-09-27 RX ORDER — CEFUROXIME AXETIL 250 MG/1
250 TABLET ORAL EVERY 12 HOURS SCHEDULED
Status: DISCONTINUED | OUTPATIENT
Start: 2017-09-27 | End: 2017-09-27 | Stop reason: HOSPADM

## 2017-09-27 RX ORDER — ONDANSETRON 4 MG/1
4 TABLET, FILM COATED ORAL EVERY 8 HOURS PRN
Qty: 30 TABLET | Refills: 0 | Status: SHIPPED | OUTPATIENT
Start: 2017-09-27 | End: 2017-11-08

## 2017-09-27 RX ORDER — GABAPENTIN 300 MG/1
300 CAPSULE ORAL NIGHTLY
Qty: 30 CAPSULE | Refills: 0 | Status: SHIPPED | OUTPATIENT
Start: 2017-09-27 | End: 2017-12-26 | Stop reason: HOSPADM

## 2017-09-27 RX ADMIN — LISINOPRIL 2.5 MG: 2.5 TABLET ORAL at 08:21

## 2017-09-27 RX ADMIN — DIGOXIN 125 MCG: 125 TABLET ORAL at 13:22

## 2017-09-27 RX ADMIN — FAMOTIDINE 20 MG: 20 TABLET ORAL at 08:21

## 2017-09-27 RX ADMIN — CYCLOSPORINE 1 DROP: 0.5 EMULSION OPHTHALMIC at 08:21

## 2017-09-27 RX ADMIN — LEVOTHYROXINE SODIUM 50 MCG: 50 TABLET ORAL at 06:31

## 2017-09-27 RX ADMIN — PANTOPRAZOLE SODIUM 40 MG: 40 TABLET, DELAYED RELEASE ORAL at 06:31

## 2017-09-27 RX ADMIN — ACETAMINOPHEN 650 MG: 325 TABLET, FILM COATED ORAL at 08:21

## 2017-09-27 RX ADMIN — CLOPIDOGREL BISULFATE 75 MG: 75 TABLET ORAL at 08:21

## 2017-09-27 RX ADMIN — METOPROLOL TARTRATE 12.5 MG: 25 TABLET, FILM COATED ORAL at 08:21

## 2017-09-27 RX ADMIN — HEPARIN SODIUM 5000 UNITS: 5000 INJECTION, SOLUTION INTRAVENOUS; SUBCUTANEOUS at 06:31

## 2017-09-27 RX ADMIN — HEPARIN SODIUM 5000 UNITS: 5000 INJECTION, SOLUTION INTRAVENOUS; SUBCUTANEOUS at 13:24

## 2017-09-27 NOTE — PROGRESS NOTES
Continued Stay Note  [facility]     Patient Name: Scott Sinha  MRN: [id number]  Today's Date: 9/27/2017    Admit Date: 9/24/2017          Discharge Plan       09/27/17 1555    Case Management/Social Work Plan    Plan Home with Home Health    Additional Comments Received consult from Dr. Plata for home health PT/OT.  Gave Ms. Sinha a list of providers in [place] she chose [facility].  Referral called to [facility].  CM will cont to follow.               Discharge Codes     None        Expected Discharge Date and Time     Expected Discharge Date Expected Discharge Time    Sep 27, 2017             Fabiana Lawler RN

## 2017-09-27 NOTE — PLAN OF CARE
Problem: Patient Care Overview (Adult)  Goal: Plan of Care Review  Outcome: Ongoing (interventions implemented as appropriate)    09/27/17 6616   Coping/Psychosocial Response Interventions   Plan Of Care Reviewed With patient;family   Outcome Evaluation   Outcome Summary/Follow up Plan OT initial evaluation completed. No goals established, pt appears at baseline with ADL's, will defer balance and mobility to PTx.

## 2017-09-27 NOTE — PROGRESS NOTES
Continued Stay Note  [facility]     Patient Name: Scott Sinha  MRN: [id number]  Today's Date: 9/27/2017    Admit Date: 9/24/2017          Discharge Plan     Consent obtained for the participation in the [facility] Transitions Program. Keri Faircihld RN                Discharge Codes     None        Expected Discharge Date and Time     Expected Discharge Date Expected Discharge Time    Sep 26, 2017             Keri Fairchild RN

## 2017-09-27 NOTE — PLAN OF CARE
Problem: Patient Care Overview (Adult)  Goal: Plan of Care Review  Outcome: Ongoing (interventions implemented as appropriate)    09/27/17 0561   Coping/Psychosocial Response Interventions   Plan Of Care Reviewed With patient   Patient Care Overview   Progress no change   Outcome Evaluation   Outcome Summary/Follow up Plan Pt. was taken down for a CT scan- no acute findings; seemed to rest better than the night before; VSS; cont. IV antibiotic therapy; continue to monitor.

## 2017-09-27 NOTE — THERAPY DISCHARGE NOTE
"Acute Care - Occupational Therapy Initial Eval/Discharge  [facility]     Patient Name: Scott Sinha  : 4/10/1930  MRN: [id number]  Today's Date: 2017  Onset of Illness/Injury or Date of Surgery Date: 17  Date of Referral to OT: 17  Referring Physician: Dr. Plata      Admit Date: 2017       ICD-10-CM ICD-9-CM   1. Syncope and collapse R55 780.2   2. UTI (urinary tract infection), bacterial N39.0 599.0    A49.9 041.9   3. Generalized weakness R53.1 780.79   4. Anemia, unspecified type D64.9 285.9   5. Hyponatremia E87.1 276.1   6. Impaired functional mobility, balance, gait, and endurance Z74.09 V49.89   7. Impaired mobility and ADLs Z74.09 799.89     Patient Active Problem List   Diagnosis   • Post-concussion headache   • Idiopathic peripheral neuropathy   • Neck pain   • Mild cognitive impairment   • Syncope and collapse   • Acute cystitis   • Anemia   • Hyponatremia   • Hypertension   • Vaso vagal episode   • Dehydration   • History of CVA (cerebrovascular accident)   • Dementia   • Left bundle branch block   • Systolic heart failure   • Orthostatic dizziness     Past Medical History:   Diagnosis Date   • Congestive heart failure    • Malignant neoplasm    • Stroke     mini stroke     Past Surgical History:   Procedure Laterality Date   • CHOLECYSTECTOMY     • EYE SURGERY     • HYSTERECTOMY            OT ASSESSMENT FLOWSHEET (last 72 hours)      OT Evaluation       17 1442 17 1346 17 1530 17 1624 17 0657    Rehab Evaluation    Document Type  evaluation;discharge summary  -JR evaluation  -DM      Subjective Information  no complaints;agree to therapy  -JR agree to therapy;complains of;weakness;pain;nausea/vomiting   \"So sick from all my company;wore me out;2 tylenol;toBR ok \"  -DM      Patient Effort, Rehab Treatment  good  -JR good  -DM      Symptoms Noted During/After Treatment  none  -JR fatigue;increased pain  -DM      Symptoms Noted Comment   \"call my " "nurse & tell her I need pain med\"(pt shown callBell)  -DM      General Information    Patient Profile Review  yes  -JR yes  -DM      Onset of Illness/Injury or Date of Surgery Date  09/24/17  -JR 09/24/17  -DM      Referring Physician  Dr. Plata  -JR Cecy MD  -DM      General Observations   SUP IN BED;TELE;RA;exit alarm;iv hep locked  -DM      Pertinent History Of Current Problem  Pt admitted with nausea, weakness as well as possible syncopal episode. Pt found to have UTI. CT of head showed no acute findings.  -JR onset dizziness,nausea, weakness & apparent syncope spell 9-23 in her BR (\"found herself sitting on BR Floor\"); to ER, W/ DX OF SYNCOPE & COLLAPSE,UTI, BLADDER INFLAM, dehydration, syst. heart fail; h/o Dem, chr HA & Dry eyes   -DM      Precautions/Limitations  fall precautions  -JR fall precautions;other (see comments)   Dem.; episode syncope/collapse P.T.A.  -DM      Prior Level of Function  independent:;gait;transfer;bed mobility;ADL's;cooking;cleaning;home management  -JR independent:;all household mobility;community mobility;gait;transfer;bed mobility;ADL's;cooking;cleaning;dependent:;driving;shopping;yard work   Indep gt w/o AD;\"does own laundry,cooking,&Son does his own\"  -DM      Equipment Currently Used at Home  walker, rolling;cane, straight;shower chair   Not currently using equipment  -JR walker, rolling;cane, straight;shower chair   doesn't use either A.D., or .ch.(\"only sponge bathes\")  -DM walker, rolling  -TC none  -EDMOND    Plans/Goals Discussed With  patient;agreed upon  -JR patient;agreed upon  -DM      Risks Reviewed  patient:;nausea/vomiting;increased discomfort  -JR patient:;LOB;nausea/vomiting;dizziness;increased discomfort;change in vital signs;lines disloged  -DM      Benefits Reviewed  patient:;improve function;increase independence  -JR patient:;improve function;increase independence;increase strength;increase balance;decrease pain;increase knowledge  -DM      Barriers to Rehab  " "cognitive status  -JR cognitive status  -DM      Living Environment    Lives With  child(chong), adult;other (see comments)   Lives with son and great grandchildren  -JR child(chong), adult;other relative(s) (specify)   son & 3 gr.grandkids moved in w/pt.  -DM child(chong), adult;other relative(s) (specify)  -TC     Living Arrangements  house  -JR house  -DM house  -TC     Home Accessibility  stairs to enter home;stairs within home;tub/shower is not walk in  -JR stairs within home;tub/shower is not walk in   doesn't use steps to downst.(Walk-outBasement)  -DM stairs (1 railing present)  -TC     Number of Stairs to Enter Home  2  -JR --   none into front door (her access)  -DM      Number of Stairs Within Home  12  -JR 12   to walk out basement level  -DM      Stair Railings at Home   inside, present on left side  -DM inside, present on left side  -TC     Type of Financial/Environmental Concern   none  -DM none  -TC     Transportation Available   car;family or friend will provide  -DM car;family or friend will provide  -TC     Living Environment Comment  Pt reports living in a one story home with a basement. Pt reports she goes to the basement and there is a walk-in shower in the basement. Pt reports she prefers to sponge bathe and does not use shower chair.  -JR \"lets son/gr.grandkids use the tub/shower;I sponge bathe\"  -DM      Clinical Impression    Date of Referral to OT  09/25/17  -JR       Patient/Family Goals Statement  Return home  -JR       Criteria for Skilled Therapeutic Interventions Met  no;no problems identified which require skilled intervention  -JR       Rehab Potential  good, to achieve stated therapy goals  -JR       Therapy Frequency  evaluation only  -JR       Anticipated Equipment Needs At Discharge  --   none  -JR       Anticipated Discharge Disposition home with home health  -JR home with home health  -JR       Functional Level Prior    Ambulation    0-->independent  -TC     Transferring    " 0-->independent  -TC     Toileting    0-->independent  -TC     Bathing    0-->independent  -TC     Dressing    0-->independent  -TC     Eating    0-->independent  -TC     Communication    0-->understands/communicates without difficulty  -TC     Swallowing    0-->swallows foods/liquids without difficulty  -TC     Vital Signs    Pre Systolic BP Rehab  127  -  -DM      Pre Treatment Diastolic BP  56  -JR 76  -DM      Post Systolic BP Rehab  177  -  -DM      Post Treatment Diastolic BP  83  -JR 69  -DM      Pretreatment Heart Rate (beats/min)   77  -DM      Intratreatment Heart Rate (beats/min)   126  -DM      Posttreatment Heart Rate (beats/min)   99  -DM      O2 Delivery Pre Treatment   room air  -DM      O2 Delivery Intra Treatment   room air  -DM      O2 Delivery Post Treatment   room air  -DM      Pre Patient Position  Supine  -JR Supine  -DM      Intra Patient Position  Standing  -JR Standing  -DM      Post Patient Position  Sitting  -JR Supine  -DM      Pain Assessment    Pain Assessment  Abbott-Reed FACES  -JR Abbott-Reed FACES  -DM      Abbott-Reed FACES Pain Rating  4  -JR 4  -DM      Pain Score  4  -JR       Post Pain Score  0  -JR       Pain Type   Chronic pain  -DM      Pain Location  Head  -JR Head  -DM      Pain Descriptors   Aching  -DM      Pain Frequency   Constant/continuous  -DM      Patient's Stated Pain Goal   No pain  -DM      Pain Intervention(s)  Repositioned;Ambulation/increased activity  -JR Medication (See MAR);Repositioned;Ambulation/increased activity   tylenol x 2  -DM      Response to Interventions  pain improved  -JR tolerated  -DM      Cognitive Assessment/Intervention    Current Cognitive/Communication Assessment  functional  -JR impaired  -DM      Orientation Status  oriented to;person;place   Oriented to year, not month  -JR oriented x 4  -DM      Follows Commands/Answers Questions  100% of the time;able to follow single-step instructions  -% of the time;able to follow  single-step instructions;needs cueing;needs increased time;needs repetition  -DM      Personal Safety  mild impairment;decreased awareness, need for assist;decreased awareness, need for safety;decreased insight to deficits  -JR mild impairment;decreased awareness, need for assist;decreased awareness, need for safety;decreased insight to deficits;impulsive  -DM      Personal Safety Interventions   fall prevention program maintained;gait belt;nonskid shoes/slippers when out of bed  -DM      ROM (Range of Motion)    General ROM  no range of motion deficits identified  -JR no range of motion deficits identified  -DM      MMT (Manual Muscle Testing)    General MMT Assessment  no strength deficits identified  -JR lower extremity strength deficits identified   B hip flex 4+/5  -DM      Bed Mobility, Assessment/Treatment    Bed Mobility, Assistive Device  head of bed elevated  -JR bed rails;head of bed elevated  -DM      Bed Mobility, Roll Right, Colony   conditional independence  -DM      Bed Mob, Supine to Sit, Colony  conditional independence  -JR conditional independence  -DM      Bed Mob, Sit to Supine, Colony   conditional independence  -DM      Transfer Assessment/Treatment    Transfers, Sit-Stand Colony  supervision required  -JR minimum assist (75% patient effort);verbal cues required   cued to use bedrail;pt holding head& c/o INCR.HA pain  -DM      Transfers, Stand-Sit Colony  supervision required  -JR minimum assist (75% patient effort)   gingerly feeling for bedrail & c/o incr. HA  -DM      Transfers, Sit-Stand-Sit, Assist Device   --   GT Belt  -DM      Toilet Transfer, Colony   minimum assist (75% patient effort);verbal cues required  -DM      Toilet Transfer, Assistive Device   other (see comments)   low commode;grab bar  -DM      Transfer, Safety Issues   weight-shifting ability decreased;loses balance backward   slow transit mvmts  -DM      Transfer, Impairments    strength decreased;impaired balance;pain  -DM      Transfer, Comment  Pt required verbal cues for hand placement with transfers.  -       Functional Mobility    Functional Mobility- Ind. Level  minimum assist (75% patient effort)  -       Functional Mobility- Device  --   UE support x1  -       Functional Mobility-Distance (Feet)  --   in hallway  -       Functional Mobility- Safety Issues  step length decreased;balance decreased during turns  -       Functional Mobility- Comment  Verbal cues to increase step length on L and strike with L heel.  -       ADL Assessment/Intervention    Additional Documentation  Self-Feeding Assessment/Training (Group)  -       Lower Body Dressing Assessment/Training    LB Dressing Assess/Train, Comment  Pt able to adjust B socks in sitting with no LOB  -       Self-Feeding Assessment/Training    Self-Feeding Assess/Train, Position  supported sitting  -       Self-Feeding Assess/Train, Spencer  independent  -       Motor Skills/Interventions    Additional Documentation  Balance Skills Training (Group)  -       Balance Skills Training    Sitting-Level of Assistance  Independent  -       Sitting-Balance Support  Feet supported  -       Standing-Level of Assistance  Contact guard  -       Static Standing Balance Support  No upper extremity supported  -       Gait Balance-Level of Assistance  Minimum assistance  -       Gait Balance Support  Left upper extremity supported  -       Therapy Exercises    Bilateral Lower Extremities   AROM:;10 reps;sitting;supine;ankle pumps/circles;glut sets;heel slides;hip abduction/adduction;hip ER;hip flexion;hip IR;LAQ;quad sets   HS sets; BKFO  -DM      Bilateral Upper Extremity   AROM:;5 reps;sitting;elbow flexion/extension;shoulder abduction/adduction;shoulder extension/flexion   PT WILL do 5 addl reps s/p HA med  -DM      Sensory Assessment/Intervention    Light Touch  LUE;RUE  -JR       LUE Light Touch  WNL  -        RUE Light Touch  WNL  -JR       Positioning and Restraints    Pre-Treatment Position  in bed  -JR in bed  -DM      Post Treatment Position  chair  -JR bed  -DM      In Bed   notified nsg;supine;call light within reach;encouraged to call for assist;exit alarm on  -DM      In Chair  notified nsg;reclined;call light within reach;encouraged to call for assist;exit alarm on;with family/caregiver  -JR         09/24/17 2341                General Information    Equipment Currently Used at Home cane, straight   does not use  -EDMOND        Living Environment    Lives With child(chong), adult;other relative(s) (specify)   grandchildren  -EDMOND        Living Arrangements house  -EDMOND        Home Accessibility stairs (1 railing present)  -EDMOND        Stair Railings at Home inside, present on left side  -EDMOND        Type of Financial/Environmental Concern none  -EDMOND        Transportation Available car;family or friend will provide  -EDMOND        Functional Level Prior    Ambulation 0-->independent  -EDMOND        Transferring 0-->independent  -EDMOND        Toileting 0-->independent  -EDMOND        Bathing 0-->independent  -EDMOND        Dressing 0-->independent  -DEMOND        Eating 0-->independent  -EDMOND        Communication 0-->understands/communicates without difficulty  -EDMOND        Swallowing 0-->swallows foods/liquids without difficulty  -EDMOND        Prior Functional Level Comment 0  -EDMOND          User Key  (r) = Recorded By, (t) = Taken By, (c) = Cosigned By    Initials Name Effective Dates    DM Aye Hargrove, PT 06/19/15 -     JR Kasey Daily OT 06/22/15 -     KATHI Lawler RN 07/24/17 -     EDMOND Iqbal RN 06/16/16 -           Occupational Therapy Education     Title: PT OT SLP Therapies (Active)     Topic: Occupational Therapy (Done)     Point: ADL training (Done)    Description: Instruct learner(s) on proper safety adaptation and remediation techniques during self care or transfers.   Instruct in proper use of assistive devices.     Learning Progress Summary    Learner Readiness Method Response Comment Documented by Status   Patient Acceptance E VU Educated pt on appropriate transfer techniques and role of therapy.  09/27/17 1438 Done   Family Acceptance E VU Educated pt on appropriate transfer techniques and role of therapy.  09/27/17 1438 Done                      User Key     Initials Effective Dates Name Provider Type Discipline     06/22/15 -  Kasey Daily, OT Occupational Therapist OT                OT Recommendation and Plan  Anticipated Equipment Needs At Discharge:  (none)  Anticipated Discharge Disposition: home with home health  Therapy Frequency: evaluation only  Plan of Care Review  Plan Of Care Reviewed With: patient, family  Outcome Summary/Follow up Plan: OT initial evaluation completed. No goals established, pt appears at baseline with ADL's, will defer balance and mobility to PTx.               Outcome Measures       09/27/17 1346 09/26/17 1530       How much help from another person do you currently need...    Turning from your back to your side while in flat bed without using bedrails?  4  -DM     Moving from lying on back to sitting on the side of a flat bed without bedrails?  4  -DM     Moving to and from a bed to a chair (including a wheelchair)?  3  -DM     Standing up from a chair using your arms (e.g., wheelchair, bedside chair)?  3  -DM     Climbing 3-5 steps with a railing?  3  -DM     To walk in hospital room?  3  -DM     AM-PAC 6 Clicks Score  20  -DM     How much help from another is currently needed...    Putting on and taking off regular lower body clothing? 4  -JR      Bathing (including washing, rinsing, and drying) 4  -JR      Toileting (which includes using toilet bed pan or urinal) 4  -JR      Putting on and taking off regular upper body clothing 4  -JR      Taking care of personal grooming (such as brushing teeth) 4  -JR      Eating meals 4  -JR      Score 24  -JR      Functional Assessment     Outcome Measure Options AM-PAC 6 Clicks Daily Activity (OT)  -JR AM-PAC 6 Clicks Basic Mobility (PT)  -DM       User Key  (r) = Recorded By, (t) = Taken By, (c) = Cosigned By    Initials Name Provider Type    KAPIL Hargrove, PT Physical Therapist    JR Kasey Daily, OT Occupational Therapist          Time Calculation:         Time Calculation- OT       09/27/17 1443          Time Calculation- OT    OT Start Time 1346  -JR      OT Received On 09/27/17  -        User Key  (r) = Recorded By, (t) = Taken By, (c) = Cosigned By    Initials Name Provider Type    JR Kasey Daily, OT Occupational Therapist          Therapy Charges for Today     Code Description Service Date Service Provider Modifiers Qty    26093218710 HC OT SELFCARE CURRENT 9/27/2017 Kasey LOCKE [place], OT GO, CH 1    25530928791 HC OT SELFCARE PROJECTED 9/27/2017 Kasey LOCKE [place], OT GO, CH 1    [id number] HC OT SELFCARE DISCHARGE 9/27/2017 Kasey LOCKE [place], OT GO, CH 1    [id number]  OT EVAL LOW COMPLEXITY 4 9/27/2017 Kasey LOCKE [place], OT GO 1          OT G-codes  OT Functional Scales Options: AM-PAC 6 Clicks Daily Activity (OT)  Functional Limitation: Self care  Self Care Current Status (): 0 percent impaired, limited or restricted  Self Care Goal Status (): 0 percent impaired, limited or restricted  Self Care Discharge Status (): 0 percent impaired, limited or restricted    OT Discharge Summary  Anticipated Discharge Disposition: home with home health  Reason for Discharge: At baseline function  Outcomes Achieved: Refer to plan of care for updates on goals achieved  Discharge Destination: Home with home health    Kasey Daily OT  9/27/2017

## 2017-09-27 NOTE — DISCHARGE SUMMARY
"    [facility]  DISCHARGE SUMMARY       Date of Admission: 9/24/2017  Date of Discharge:  9/27/2017  Primary Care Physician: CHRISTY Hair MD  Consulting Physician(s)     Provider Relationship Specialty    Jose Antonio Aragon MD Consulting Physician Cardiology          Discharge Diagnoses:  Active Hospital Problems (** Indicates Principal Problem)    Diagnosis Date Noted   • **Syncope and collapse [R55] 09/24/2017     Priority: High   • Vaso vagal episode [R55] 09/25/2017     Priority: High     With nausea     • Acute cystitis [N30.00] 09/24/2017     Priority: High   • Orthostatic dizziness [R42] 09/26/2017     Priority: Medium   • Dehydration [E86.0] 09/25/2017     Priority: Medium   • Systolic heart failure [I50.20] 09/25/2017     Priority: Medium     Chronic/compensated (EF 25%)     • Dementia [F03.90] 09/25/2017     Priority: Low   • History of CVA (cerebrovascular accident) [Z86.73] 09/25/2017   • Left bundle branch block [I44.7] 09/25/2017     chronic     • Anemia [D64.9] 09/24/2017   • Hyponatremia [E87.1] 09/24/2017     resolved     • Hypertension [I10] 09/24/2017      Resolved Hospital Problems    Diagnosis Date Noted Date Resolved   No resolved problems to display.       Presenting Problem/History of Present Illness  Syncope and collapse [R55]     Chief Complaint on Day of Discharge: following up orthostasis, uti, systolic heart failure, prior stroke presented after an apparent syncopal event. Patient lives with son, but family was out of the house when event occurred. Patient remembered sitting on toilet to move bowels, then \"woke up on the toilet\". Wasn't sure is she lost consciousness but feels that she might have. Was very weak and ultimately brought to ER at [facility]. Was found to have a uti, clinically dehydrated and given fluids. Orthostatic with lightheadeness with standing. Echocardiogram revealed EF of 25%. Cards consulted. Due to persistent " orthostasis (after iv fluids) cardiology switched coreg to metoprolol, kept on low dose ace-inhibitor. Patient deemed not candidate for icd (cognitive impairement, age) and patient encouraged to get compression hose, saw pt/ot and taught to stand slowly from seated position. Day of discharge patient ambulated with ot well. Tolerating some food, no pain. Still weak but improved. No dysuria. Eager to go home today. Son at bedside lives with patient and will be present tonight. Will continue another 3 days of ceftin for uti. Encourage hydration/po intake. Will give prn zofran. Will set up pt ot home health evaluation. Will set up w/ pcp 1 week and cardiology 6 weeks. Son appreciative of care received.    History of Present Illness on Day of Discharge:   Worked with o.t. Today and ambulated, still nausea at times but did tolerate some food. Says at times gets nauseated sitting up but doing much better than admission. No chest pain, no dyspnea. Eager to go home  Hospital Course  Patient is a 87 y.o. female with a history of dementia, chr,        Consults:   Consults     Date and Time Order Name Status Description    9/26/2017 0719 Inpatient Consult to Cardiology Completed           Pertinent Test Results:  ----------------------------------------------------------------------  Transthoracic Echo 9/25/17:  Interpretation Summary      · Left ventricular systolic function is moderately decreased. Estimated EF = 25%.  · The left ventricular cavity is moderately dilated.  · Mild aortic valve regurgitation is present.  · Mild mitral valve regurgitation is present   -----------------------------------------------------------------------  CT Head 9/26/17:  CT Head Without Intravenous Contrast     CLINICAL HISTORY:  87 years old, female; Bacterial infection, unspecified; Anemia, unspecified;   Hypo-osmolality and hyponatremia; Urinary tract infection, site not specified;   Weakness; Syncope and collapse; Other reduced mobility;  "Signs and symptoms;   Other: See notes; Additional info: Headache after fall 9/24     TECHNIQUE:  Axial computed tomography images of the head/brain without intravenous   contrast.  All CT scans at this facility use one or more dose reduction   techniques, viz.: automated exposure control; ma/kV adjustment per patient size   (including targeted exams where dose is matched to indication; i.e. head); or   iterative reconstruction technique.     COMPARISON:  CT HEAD WO CONTRAST 7/29/2017 2:49:50 PM     FINDINGS:  Brain:  Scattered areas of hypoattenuation, likely chronic small vessel   ischemic change, demyelination, or gliosis.  There is parenchymal atrophy.  Ventricles:  Normal.  Bones/joints:  Normal.  No acute fracture.  Soft tissues:  Normal.  Vasculature:  Atherosclerotic vascular calcifications.  Sinuses:  Minimal ethmoid sinus disease.  Mastoid air cells:  Normal as visualized.  No mastoid effusion.     IMPRESSION:     1.                    No acute findings.     2.                    Non-acute findings are described above.    Condition on Discharge:  stable    Physical Exam on Discharge:/56 (BP Location: Left arm, Patient Position: Standing)  Pulse 86  Temp 97.8 °F (36.6 °C) (Oral)   Resp 14  Ht 64\" (162.6 cm)  Wt 121 lb (54.9 kg) Comment: stated weight  SpO2 94%  BMI 20.77 kg/m2  Physical Exam  Alert, oriented to person, confused to details, answers simple questions appropriately but does exhibit tangential speech at times  Appropriate affect  Ncat, oroph clear, pupils equal  Equal , face symmetric, speech clear  rrr  Lungs clear, equal   No cce  No extremity rash    Discharge Disposition  Home or Self Care  Set up with home health pt/ot evaluation (home safety evaluation/gait stability & training    Discharge Medications   Scott Sinha   Home Medication Instructions GIOVANNA:[id number]    Printed on:09/27/17 150   Medication Information                      atorvastatin (LIPITOR) 10 MG " tablet  Take 1 tablet by mouth Daily.             cefuroxime (CEFTIN) 250 MG tablet  Take 1 tablet by mouth Every 12 (Twelve) Hours. Indications: Urinary Tract Infection             clopidogrel (PLAVIX) 75 MG tablet  TAKE 1 TABLET BY MOUTH EVERY DAY             cycloSPORINE (RESTASIS) 0.05 % ophthalmic emulsion  Administer 1 drop to both eyes Every 12 (Twelve) Hours.             dexlansoprazole (DEXILANT) 60 MG capsule  Take 60 mg by mouth Daily.             digoxin (LANOXIN) 125 MCG tablet  Take 125 mcg by mouth Daily.             donepezil (ARICEPT) 5 MG tablet  TAKE 1 TABLET BY MOUTH EVERY DAY             gabapentin (NEURONTIN) 300 MG capsule  Take 1 capsule by mouth Every Night.             levothyroxine (SYNTHROID, LEVOTHROID) 50 MCG tablet  Take 50 mcg by mouth Daily.             lisinopril (PRINIVIL,ZESTRIL) 2.5 MG tablet  TAKE 1 TABLET BY MOUTH EVERY DAY             metoprolol tartrate (LOPRESSOR) 25 MG tablet  Take 0.5 tablets by mouth Every 12 (Twelve) Hours.             ondansetron (ZOFRAN) 4 MG tablet  Take 1 tablet by mouth Every 8 (Eight) Hours As Needed for Nausea or Vomiting.             rOPINIRole (REQUIP) 0.5 MG tablet  Take 0.5 mg by mouth Daily. Take 1 hour before bedtime.                 Discharge Diet: regular    Discharge Care Plan / Instructions: d/c home w/ family; lives with son who will be with her tonight    Activity at Discharge: ad jazmyn, rise slowly from seated position to minimize orthostasis    Follow-up Appointments  No future appointments.  Additional Instructions for the Follow-ups that You Need to Schedule     Discharge Follow-up with PCP    As directed    Follow Up Details:  1 week       Discharge Follow-up with Specialty    As directed    Specialty:  [facility] cardiology   Follow Up:  6 Weeks                    Doug Plata MD 09/27/17 3:08 PM    Time: total 40 min spent at bedside, discussing with patient, coordinating and documenting discharge    Please note that portions  of this note may have been completed with a voice recognition program. Efforts were made to edit the dictations, but occasionally words are mistranscribed.

## 2017-09-30 NOTE — THERAPY DISCHARGE NOTE
Acute Care - Physical Therapy Discharge Summary  [facility]       Patient Name: Scott Sinha  : 4/10/1930  MRN: [id number]    Today's Date: 2017  Onset of Illness/Injury or Date of Surgery Date: 17    Date of Referral to PT: 17  Referring Physician: Dr. Plata      Admit Date: 2017      PT Recommendation and Plan    Visit Dx:    ICD-10-CM ICD-9-CM   1. Syncope and collapse R55 780.2   2. UTI (urinary tract infection), bacterial N39.0 599.0    A49.9 041.9   3. Generalized weakness R53.1 780.79   4. Anemia, unspecified type D64.9 285.9   5. Hyponatremia E87.1 276.1   6. Impaired functional mobility, balance, gait, and endurance Z74.09 V49.89   7. Impaired mobility and ADLs Z74.09 799.89   8. History of CVA (cerebrovascular accident) Z86.73 V12.54             Outcome Measures       17 1346          How much help from another is currently needed...    Putting on and taking off regular lower body clothing? 4  -JR      Bathing (including washing, rinsing, and drying) 4  -JR      Toileting (which includes using toilet bed pan or urinal) 4  -JR      Putting on and taking off regular upper body clothing 4  -JR      Taking care of personal grooming (such as brushing teeth) 4  -JR      Eating meals 4  -      Score 24  -      Functional Assessment    Outcome Measure Options AM-PAC 6 Clicks Daily Activity (OT)  -JR        User Key  (r) = Recorded By, (t) = Taken By, (c) = Cosigned By    Initials Name Provider Type    JR Kasey Daily, OT Occupational Therapist                      IP PT Goals       17 1705          Bed Mobility PT LTG    Bed Mobility PT LTG, Date Established 17  -DM      Bed Mobility PT LTG, Time to Achieve 5 days  -DM      Bed Mobility PT LTG, Activity Type all bed mobility  -DM      Bed Mobility PT LTG, [place] Level independent  -DM      Transfer Training PT LTG    Transfer Training PT LTG, Date Established 17  -DM      Transfer Training PT LTG,  Time to Achieve 5 days  -DM      Transfer Training PT LTG, Activity Type bed to chair /chair to bed;sit to stand/stand to sit;toilet  -DM      Transfer Training PT LTG, [place] Level independent  -DM      Gait Training PT LTG    Gait Training Goal PT LTG, Date Established 09/26/17  -DM      Gait Training Goal PT LTG, Time to Achieve 5 days  -DM      Gait Training Goal PT LTG, [place] Level independent   stable VS  -DM      Gait Training Goal PT LTG, Distance to Achieve 350  -DM      Stair Training PT LTG    Stair Training Goal PT LTG, Date Established 09/26/17  -DM      Stair Training Goal PT LTG, Time to Achieve 5 days  -DM      Stair Training Goal PT LTG, Number of Steps 10  -DM      Stair Training Goal PT LTG, [place] Level independent  -DM        User Key  (r) = Recorded By, (t) = Taken By, (c) = Cosigned By    Initials Name Provider Type    KAPIL Hargrove, PT Physical Therapist            Goals Status: Treatment plan discontinued secondary to discharge from acute facility.  PT Discharge Summary  Reason for Discharge: Discharge from facility  Outcomes Achieved: Refer to plan of care for updates on goals achieved  Discharge Destination: Home with home health      Elizabeth Valentino, PT   9/30/2017

## 2017-10-05 ENCOUNTER — OUTSIDE FACILITY SERVICE (OUTPATIENT)
Dept: HOSPITALIST | Facility: HOSPITAL | Age: 82
End: 2017-10-05

## 2017-10-05 PROCEDURE — G0180 MD CERTIFICATION HHA PATIENT: HCPCS | Performed by: INTERNAL MEDICINE

## 2017-11-08 ENCOUNTER — OFFICE VISIT (OUTPATIENT)
Dept: CARDIOLOGY | Facility: CLINIC | Age: 82
End: 2017-11-08

## 2017-11-08 VITALS
BODY MASS INDEX: 21.24 KG/M2 | SYSTOLIC BLOOD PRESSURE: 174 MMHG | DIASTOLIC BLOOD PRESSURE: 64 MMHG | HEIGHT: 64 IN | HEART RATE: 62 BPM | WEIGHT: 124.4 LBS

## 2017-11-08 DIAGNOSIS — I44.7 LBBB (LEFT BUNDLE BRANCH BLOCK): ICD-10-CM

## 2017-11-08 DIAGNOSIS — I50.22 CHRONIC SYSTOLIC CONGESTIVE HEART FAILURE (HCC): Primary | ICD-10-CM

## 2017-11-08 PROCEDURE — 99213 OFFICE O/P EST LOW 20 MIN: CPT | Performed by: INTERNAL MEDICINE

## 2017-11-08 RX ORDER — CHLORAL HYDRATE 500 MG
1000 CAPSULE ORAL 2 TIMES DAILY
COMMUNITY
End: 2022-03-03

## 2017-11-08 NOTE — PROGRESS NOTES
[facility] at [facility]  Office Progress Note  Scott Sinha  4/10/1930  [phone]    Visit Date: 11/08/2017    PCP: CHRISTY Hair MD  [address] [address]    IDENTIFICATION: A 87 y.o. female   female from [place]      PROBLEM LIST:  1. NICM  9/17 EF 25% -deemed not candidate for ICD  bnp 292, chronic LBBB  2. Orthostasis/syncope-vasodepressor  3.  Mild cognitive decline/AD  4. Hypothyroidism    Allergies  Allergies   Allergen Reactions   • Augmentin [Amoxicillin-Pot Clavulanate]    • Claritin [Loratadine]        Current Medications    Current Outpatient Prescriptions:   •  atorvastatin (LIPITOR) 10 MG tablet, Take 1 tablet by mouth Daily., Disp: 30 tablet, Rfl: 0  •  clopidogrel (PLAVIX) 75 MG tablet, TAKE 1 TABLET BY MOUTH EVERY DAY, Disp: , Rfl: 0  •  cycloSPORINE (RESTASIS) 0.05 % ophthalmic emulsion, Administer 1 drop to both eyes Every 12 (Twelve) Hours., Disp: , Rfl:   •  dexlansoprazole (DEXILANT) 60 MG capsule, Take 60 mg by mouth Daily., Disp: , Rfl:   •  digoxin (LANOXIN) 125 MCG tablet, Take 125 mcg by mouth Daily., Disp: , Rfl:   •  donepezil (ARICEPT) 5 MG tablet, TAKE 1 TABLET BY MOUTH EVERY DAY, Disp: , Rfl: 0  •  gabapentin (NEURONTIN) 300 MG capsule, Take 1 capsule by mouth Every Night., Disp: 30 capsule, Rfl: 0  •  levothyroxine (SYNTHROID, LEVOTHROID) 50 MCG tablet, Take 50 mcg by mouth Daily., Disp: , Rfl:   •  lisinopril (PRINIVIL,ZESTRIL) 2.5 MG tablet, TAKE 1 TABLET BY MOUTH EVERY DAY, Disp: , Rfl: 0  •  metoprolol tartrate (LOPRESSOR) 25 MG tablet, Take 0.5 tablets by mouth Every 12 (Twelve) Hours., Disp: 60 tablet, Rfl: 0  •  Multiple Vitamins-Minerals (MULTI COMPLETE PO), Take  by mouth., Disp: , Rfl:   •  Omega-3 Fatty Acids (FISH OIL) 1000 MG capsule capsule, Take  by mouth Daily With Breakfast., Disp: , Rfl:   •  rOPINIRole (REQUIP) 0.5 MG tablet, Take 0.5 mg by mouth Daily. Take 1 hour before bedtime., Disp: , Rfl:    "    History of Present Illness     Pt denies any new chest pain, dyspnea, dyspnea on exertion, orthopnea, PND, palpitations, lower extremity edema, or claudication.  She was in the hospital 9 of 17 with a syncopal episode after vasodepressor event after continues restroom.  She was noted at that time with decline of her previous LVEF from 35-25%.  She she attained maximum benefit of hospitalization was discharged after discussion of potential tachyarrhythmic devices being deemed not indicated due to advanced age and dementia.  She is coming by her son today states that she does activities daily living she continues to care for herself largely.  She is pacing herself somewhat.  She continues to have presyncope without overt syncopal episodes.    ROS:  All systems have been reviewed and are negative with the exception of those mentioned in the HPI.    OBJECTIVE:  Vitals:    11/08/17 1410   BP: 174/64   BP Location: Right arm   Patient Position: Sitting   Pulse: 62   Weight: 124 lb 6.4 oz (56.4 kg)   Height: 64\" (162.6 cm)     Physical Exam   Constitutional: She appears well-developed and well-nourished.   Neck: Normal range of motion. Neck supple. No hepatojugular reflux and no JVD present. Carotid bruit is not present. No tracheal deviation present. No thyromegaly present.   Cardiovascular: Normal rate, regular rhythm, S1 normal, S2 normal, intact distal pulses and normal pulses.  PMI is not displaced.  Exam reveals no gallop, no distant heart sounds, no friction rub, no midsystolic click and no opening snap.    No murmur heard.  Pulses:       Radial pulses are 2+ on the right side, and 2+ on the left side.        Dorsalis pedis pulses are 2+ on the right side, and 2+ on the left side.        Posterior tibial pulses are 2+ on the right side, and 2+ on the left side.   Pulmonary/Chest: Effort normal and breath sounds normal. She has no wheezes. She has no rales.   Abdominal: Soft. Bowel sounds are normal. She exhibits " no mass. There is no tenderness. There is no guarding.       Diagnostic Data:  Procedures      ASSESSMENT:   Diagnosis Plan   1. Chronic systolic congestive heart failure     2. LBBB (left bundle branch block)         PLAN:  I would discontinue concomitant digoxin with metoprolol will refill her metoprolol at current.    She remained on antiplatelets due to her prior TIA symptomatology.    CHRISTY Hair MD, thank you for referring Ms. Sinha for evaluation.  I have forwarded my electronically generated recommendations to you for review.  Please do not hesitate to call with any questions.      Jose Antonio Aragon MD, [facility]

## 2017-12-13 ENCOUNTER — APPOINTMENT (OUTPATIENT)
Dept: GENERAL RADIOLOGY | Facility: HOSPITAL | Age: 82
End: 2017-12-13

## 2017-12-13 ENCOUNTER — HOSPITAL ENCOUNTER (EMERGENCY)
Facility: HOSPITAL | Age: 82
Discharge: HOME | End: 2017-12-14
Attending: EMERGENCY MEDICINE | Admitting: EMERGENCY MEDICINE

## 2017-12-13 DIAGNOSIS — T50.905A ADVERSE EFFECT OF DRUG, INITIAL ENCOUNTER: ICD-10-CM

## 2017-12-13 DIAGNOSIS — N28.9 ACUTE RENAL INSUFFICIENCY: ICD-10-CM

## 2017-12-13 DIAGNOSIS — E86.0 DEHYDRATION: ICD-10-CM

## 2017-12-13 DIAGNOSIS — R55 SYNCOPE, UNSPECIFIED SYNCOPE TYPE: Primary | ICD-10-CM

## 2017-12-13 LAB
ALBUMIN SERPL-MCNC: 4.3 G/DL (ref 3.2–4.8)
ALBUMIN/GLOB SERPL: 1.4 G/DL (ref 1.5–2.5)
ALP SERPL-CCNC: 38 U/L (ref 25–100)
ALT SERPL W P-5'-P-CCNC: 13 U/L (ref 7–40)
ANION GAP SERPL CALCULATED.3IONS-SCNC: 9 MMOL/L (ref 3–11)
AST SERPL-CCNC: 24 U/L (ref 0–33)
BASOPHILS # BLD AUTO: 0.08 10*3/MM3 (ref 0–0.2)
BASOPHILS NFR BLD AUTO: 1.2 % (ref 0–1)
BILIRUB SERPL-MCNC: 0.2 MG/DL (ref 0.3–1.2)
BILIRUB UR QL STRIP: NEGATIVE
BUN BLD-MCNC: 31 MG/DL (ref 9–23)
BUN/CREAT SERPL: 20.7 (ref 7–25)
CALCIUM SPEC-SCNC: 9.4 MG/DL (ref 8.7–10.4)
CHLORIDE SERPL-SCNC: 104 MMOL/L (ref 99–109)
CLARITY UR: CLEAR
CO2 SERPL-SCNC: 20 MMOL/L (ref 20–31)
COLOR UR: YELLOW
CREAT BLD-MCNC: 1.5 MG/DL (ref 0.6–1.3)
DEPRECATED RDW RBC AUTO: 44.5 FL (ref 37–54)
EOSINOPHIL # BLD AUTO: 0.19 10*3/MM3 (ref 0–0.3)
EOSINOPHIL NFR BLD AUTO: 2.9 % (ref 0–3)
ERYTHROCYTE [DISTWIDTH] IN BLOOD BY AUTOMATED COUNT: 14 % (ref 11.3–14.5)
GFR SERPL CREATININE-BSD FRML MDRD: 33 ML/MIN/1.73
GLOBULIN UR ELPH-MCNC: 3 GM/DL
GLUCOSE BLD-MCNC: 152 MG/DL (ref 70–100)
GLUCOSE UR STRIP-MCNC: NEGATIVE MG/DL
HCT VFR BLD AUTO: 28.8 % (ref 34.5–44)
HGB BLD-MCNC: 9.1 G/DL (ref 11.5–15.5)
HGB UR QL STRIP.AUTO: NEGATIVE
HOLD SPECIMEN: NORMAL
HOLD SPECIMEN: NORMAL
IMM GRANULOCYTES # BLD: 0.01 10*3/MM3 (ref 0–0.03)
IMM GRANULOCYTES NFR BLD: 0.2 % (ref 0–0.6)
KETONES UR QL STRIP: NEGATIVE
LEUKOCYTE ESTERASE UR QL STRIP.AUTO: NEGATIVE
LYMPHOCYTES # BLD AUTO: 1.17 10*3/MM3 (ref 0.6–4.8)
LYMPHOCYTES NFR BLD AUTO: 18.1 % (ref 24–44)
MAGNESIUM SERPL-MCNC: 2.1 MG/DL (ref 1.3–2.7)
MCH RBC QN AUTO: 27.3 PG (ref 27–31)
MCHC RBC AUTO-ENTMCNC: 31.6 G/DL (ref 32–36)
MCV RBC AUTO: 86.5 FL (ref 80–99)
MONOCYTES # BLD AUTO: 0.71 10*3/MM3 (ref 0–1)
MONOCYTES NFR BLD AUTO: 11 % (ref 0–12)
NEUTROPHILS # BLD AUTO: 4.32 10*3/MM3 (ref 1.5–8.3)
NEUTROPHILS NFR BLD AUTO: 66.6 % (ref 41–71)
NITRITE UR QL STRIP: NEGATIVE
PH UR STRIP.AUTO: 7.5 [PH] (ref 5–8)
PLATELET # BLD AUTO: 263 10*3/MM3 (ref 150–450)
PMV BLD AUTO: 11.1 FL (ref 6–12)
POTASSIUM BLD-SCNC: 5.6 MMOL/L (ref 3.5–5.5)
PROT SERPL-MCNC: 7.3 G/DL (ref 5.7–8.2)
PROT UR QL STRIP: NEGATIVE
RBC # BLD AUTO: 3.33 10*6/MM3 (ref 3.89–5.14)
SODIUM BLD-SCNC: 133 MMOL/L (ref 132–146)
SP GR UR STRIP: 1.01 (ref 1–1.03)
TROPONIN I SERPL-MCNC: 0 NG/ML (ref 0–0.07)
UROBILINOGEN UR QL STRIP: NORMAL
WBC NRBC COR # BLD: 6.48 10*3/MM3 (ref 3.5–10.8)
WHOLE BLOOD HOLD SPECIMEN: NORMAL
WHOLE BLOOD HOLD SPECIMEN: NORMAL

## 2017-12-13 PROCEDURE — 99285 EMERGENCY DEPT VISIT HI MDM: CPT

## 2017-12-13 PROCEDURE — 36415 COLL VENOUS BLD VENIPUNCTURE: CPT

## 2017-12-13 PROCEDURE — 85025 COMPLETE CBC W/AUTO DIFF WBC: CPT | Performed by: EMERGENCY MEDICINE

## 2017-12-13 PROCEDURE — P9612 CATHETERIZE FOR URINE SPEC: HCPCS

## 2017-12-13 PROCEDURE — 81003 URINALYSIS AUTO W/O SCOPE: CPT | Performed by: EMERGENCY MEDICINE

## 2017-12-13 PROCEDURE — 93005 ELECTROCARDIOGRAM TRACING: CPT

## 2017-12-13 PROCEDURE — 84484 ASSAY OF TROPONIN QUANT: CPT

## 2017-12-13 PROCEDURE — 83735 ASSAY OF MAGNESIUM: CPT | Performed by: EMERGENCY MEDICINE

## 2017-12-13 PROCEDURE — 80053 COMPREHEN METABOLIC PANEL: CPT | Performed by: EMERGENCY MEDICINE

## 2017-12-13 PROCEDURE — 71010 HC CHEST PA OR AP: CPT

## 2017-12-13 RX ORDER — SODIUM CHLORIDE 0.9 % (FLUSH) 0.9 %
10 SYRINGE (ML) INJECTION AS NEEDED
Status: DISCONTINUED | OUTPATIENT
Start: 2017-12-13 | End: 2017-12-14 | Stop reason: HOSPADM

## 2017-12-13 RX ADMIN — SODIUM CHLORIDE 500 ML: 9 INJECTION, SOLUTION INTRAVENOUS at 23:00

## 2017-12-14 VITALS
WEIGHT: 122 LBS | BODY MASS INDEX: 23.03 KG/M2 | HEIGHT: 61 IN | TEMPERATURE: 97.5 F | RESPIRATION RATE: 18 BRPM | SYSTOLIC BLOOD PRESSURE: 150 MMHG | HEART RATE: 84 BPM | OXYGEN SATURATION: 96 % | DIASTOLIC BLOOD PRESSURE: 81 MMHG

## 2017-12-14 NOTE — ED PROVIDER NOTES
Subjective   HPI Comments: Scott Sinha is a 87 y.o.female who presents to the ED with complaints of a syncopal episode with onset a couple hours ago. EMS states that the syncopal episode lasts around 2-3 minutes. She states that she started to take an increased dosage of Gabapentin and Xanaflex, yesterday, and she states that she felt dizzy shortly after taking those medications. She then adds that her dizziness was resolved upon laying down. She then states that she was able to go about her daily routine all day today, up until when she took the medications again. She then states that she became dizzy again and had fallen. The son denies that she did not hit her head during the fall. She also reports not eating much today, nausea, and generalized weakness. There are no other known complaints at this time. Additionally, the patient has a hx of syncopal episodes with relation to a UTI.      Patient is a 87 y.o. female presenting with syncope.   History provided by:  Patient  Syncope   Episode history:  Single  Most recent episode:  Today  Timing:  Constant  Progression:  Resolved  Chronicity:  New  Witnessed: yes    Relieved by:  Nothing  Worsened by:  Nothing  Ineffective treatments:  None tried  Associated symptoms: dizziness, nausea and weakness (Generalized)    Risk factors comment:  Increase in medication dosage      Review of Systems   Constitutional: Positive for appetite change (Decreased).   Cardiovascular: Positive for syncope.   Gastrointestinal: Positive for nausea.   Neurological: Positive for dizziness and weakness (Generalized).   All other systems reviewed and are negative.      Past Medical History:   Diagnosis Date   • Congestive heart failure    • Malignant neoplasm    • Stroke     mini stroke       Allergies   Allergen Reactions   • Augmentin [Amoxicillin-Pot Clavulanate]    • Claritin [Loratadine]        Past Surgical History:   Procedure Laterality Date   • CHOLECYSTECTOMY     • EYE SURGERY      • HYSTERECTOMY         Family History   Problem Relation Age of Onset   • Cancer Mother    • No Known Problems Father    • Cancer Sister    • Cancer Brother        Social History     Social History   • Marital status:      Spouse name: N/A   • Number of children: N/A   • Years of education: N/A     Social History Main Topics   • Smoking status: Never Smoker   • Smokeless tobacco: Never Used   • Alcohol use No   • Drug use: No   • Sexual activity: Defer     Other Topics Concern   • Not on file     Social History Narrative         Objective   Physical Exam   Constitutional: She is oriented to person, place, and time. She appears well-developed and well-nourished.   Slight female who is under no acute distress. She jokes and talks with ease. I encountered in front of the nurses station on [facility]. She had a emesis bag in hand, and had a smal amount in the bag.     HENT:   Head: Normocephalic and atraumatic.   Nose: Nose normal.   Eyes: Conjunctivae are normal. No scleral icterus.   Neck: Normal range of motion. Neck supple.   Cardiovascular: Normal rate and regular rhythm.    Pulmonary/Chest: Effort normal and breath sounds normal. No respiratory distress.   Abdominal: Soft. There is no tenderness.   Musculoskeletal: Normal range of motion. She exhibits no tenderness.   Neurological: She is alert and oriented to person, place, and time. No cranial nerve deficit.   Skin: Skin is warm and dry. There is pallor.   Psychiatric: She has a normal mood and affect. Her behavior is normal.   Nursing note and vitals reviewed.      Procedures         ED Course  ED Course       Recent Results (from the past 24 hour(s))   Comprehensive Metabolic Panel    Collection Time: 12/13/17  9:59 PM   Result Value Ref Range    Glucose 152 (H) 70 - 100 mg/dL    BUN 31 (H) 9 - 23 mg/dL    Creatinine 1.50 (H) 0.60 - 1.30 mg/dL    Sodium 133 132 - 146 mmol/L    Potassium 5.6 (H) 3.5 - 5.5 mmol/L    Chloride 104 99 - 109 mmol/L    CO2 20.0  20.0 - 31.0 mmol/L    Calcium 9.4 8.7 - 10.4 mg/dL    Total Protein 7.3 5.7 - 8.2 g/dL    Albumin 4.30 3.20 - 4.80 g/dL    ALT (SGPT) 13 7 - 40 U/L    AST (SGOT) 24 0 - 33 U/L    Alkaline Phosphatase 38 25 - 100 U/L    Total Bilirubin 0.2 (L) 0.3 - 1.2 mg/dL    eGFR Non African Amer 33 (L) >60 mL/min/1.73    Globulin 3.0 gm/dL    A/G Ratio 1.4 (L) 1.5 - 2.5 g/dL    BUN/Creatinine Ratio 20.7 7.0 - 25.0    Anion Gap 9.0 3.0 - 11.0 mmol/L   Magnesium    Collection Time: 12/13/17  9:59 PM   Result Value Ref Range    Magnesium 2.1 1.3 - 2.7 mg/dL   Light Blue Top    Collection Time: 12/13/17  9:59 PM   Result Value Ref Range    Extra Tube hold for add-on    Green Top (Gel)    Collection Time: 12/13/17  9:59 PM   Result Value Ref Range    Extra Tube Hold for add-ons.    Lavender Top    Collection Time: 12/13/17  9:59 PM   Result Value Ref Range    Extra Tube hold for add-on    Gold Top - SST    Collection Time: 12/13/17  9:59 PM   Result Value Ref Range    Extra Tube Hold for add-ons.    CBC Auto Differential    Collection Time: 12/13/17  9:59 PM   Result Value Ref Range    WBC 6.48 3.50 - 10.80 10*3/mm3    RBC 3.33 (L) 3.89 - 5.14 10*6/mm3    Hemoglobin 9.1 (L) 11.5 - 15.5 g/dL    Hematocrit 28.8 (L) 34.5 - 44.0 %    MCV 86.5 80.0 - 99.0 fL    MCH 27.3 27.0 - 31.0 pg    MCHC 31.6 (L) 32.0 - 36.0 g/dL    RDW 14.0 11.3 - 14.5 %    RDW-SD 44.5 37.0 - 54.0 fl    MPV 11.1 6.0 - 12.0 fL    Platelets 263 150 - 450 10*3/mm3    Neutrophil % 66.6 41.0 - 71.0 %    Lymphocyte % 18.1 (L) 24.0 - 44.0 %    Monocyte % 11.0 0.0 - 12.0 %    Eosinophil % 2.9 0.0 - 3.0 %    Basophil % 1.2 (H) 0.0 - 1.0 %    Immature Grans % 0.2 0.0 - 0.6 %    Neutrophils, Absolute 4.32 1.50 - 8.30 10*3/mm3    Lymphocytes, Absolute 1.17 0.60 - 4.80 10*3/mm3    Monocytes, Absolute 0.71 0.00 - 1.00 10*3/mm3    Eosinophils, Absolute 0.19 0.00 - 0.30 10*3/mm3    Basophils, Absolute 0.08 0.00 - 0.20 10*3/mm3    Immature Grans, Absolute 0.01 0.00 - 0.03 10*3/mm3    POC Troponin, Rapid    Collection Time: 12/13/17 10:01 PM   Result Value Ref Range    Troponin I 0.00 0.00 - 0.07 ng/mL   Urinalysis With / Culture If Indicated - Urine, Catheter    Collection Time: 12/13/17 10:30 PM   Result Value Ref Range    Color, UA Yellow Yellow, Straw    Appearance, UA Clear Clear    pH, UA 7.5 5.0 - 8.0    Specific Gravity, UA 1.010 1.001 - 1.030    Glucose, UA Negative Negative    Ketones, UA Negative Negative    Bilirubin, UA Negative Negative    Blood, UA Negative Negative    Protein, UA Negative Negative    Leuk Esterase, UA Negative Negative    Nitrite, UA Negative Negative    Urobilinogen, UA 0.2 E.U./dL 0.2 - 1.0 E.U./dL     Note: In addition to lab results from this visit, the labs listed above may include labs taken at another facility or during a different encounter within the last 24 hours. Please correlate lab times with ED admission and discharge times for further clarification of the services performed during this visit.    XR Chest 1 View   Final Result     No acute cardiopulmonary disease.      THIS DOCUMENT HAS BEEN ELECTRONICALLY SIGNED BY SMILEY AVILEZ MD        Vitals:    12/13/17 2148 12/13/17 2230 12/13/17 2231 12/13/17 2234   BP: 137/61 133/65 141/67 135/67   BP Location: Left arm      Patient Position: Sitting Lying Sitting Standing   Pulse: 61 64 64 65   Resp: 16      Temp: 97.5 °F (36.4 °C)      TempSrc: Oral      SpO2: 96%      Weight:       Height:         Medications   sodium chloride 0.9 % flush 10 mL (not administered)   sodium chloride 0.9 % bolus 500 mL (not administered)     ECG/EMG Results (last 24 hours)     Procedure Component Value Units Date/Time    ECG 12 Lead [[id number]] Collected:  12/13/17 2127     Updated:  12/13/17 2135    Narrative:       Test Reason : SYNCOPE  Blood Pressure : **/** mmHG  Vent. Rate : 060 BPM     Atrial Rate : 060 BPM     P-R Int : 188 ms          QRS Dur : 158 ms      QT Int : 522 ms       P-R-T Axes : 054 -28 139 degrees      QTc Int : 522 ms    Normal sinus rhythm  Left bundle branch block  Abnormal ECG  Confirmed by BISHOP TORIBIO, CANDIE ([id number]) on 12/13/2017 9:35:00 PM    Referred By:  BISHOP           Confirmed By:CANDIE ALAS MD                      [facility]    Final diagnoses:   Syncope, unspecified syncope type   Dehydration   Acute renal insufficiency   Adverse effect of drug, initial encounter       Documentation assistance provided by tami Bsas.  Information recorded by the scribe was done at my direction and has been verified and validated by me.     Ty Bass  12/13/17 2154       Ty Bass  12/13/17 2212       Ty Bass  12/13/17 2320       Candie Alas MD  12/15/17 0104

## 2017-12-14 NOTE — DISCHARGE INSTRUCTIONS
Please push fluids    If you being to feel light-headed, lie down and raise your head.     Return to your Gabapentin 300 mg nightly, but do not take the double dose.    Decrease Zanaflex by one half.    Return to the ED If you begin to experience any acute or worsening of symptoms.    Please follow up with Dr. Hair at the earliest of convenience.    Please review the medications you are supposed to be taking according to prior physician recommendations. I have not changed your home medications during this visit. If your discharge instructions indicate that I have changed your home medications, this is not the case, and you should disregard. If you have any questions about the medication you should be taking at home, please call your physician.

## 2017-12-24 ENCOUNTER — HOSPITAL ENCOUNTER (INPATIENT)
Facility: HOSPITAL | Age: 82
LOS: 1 days | Discharge: HOME | End: 2017-12-26
Attending: EMERGENCY MEDICINE | Admitting: HOSPITALIST

## 2017-12-24 ENCOUNTER — APPOINTMENT (OUTPATIENT)
Dept: GENERAL RADIOLOGY | Facility: HOSPITAL | Age: 82
End: 2017-12-24

## 2017-12-24 DIAGNOSIS — E87.1 HYPONATREMIA: ICD-10-CM

## 2017-12-24 DIAGNOSIS — N28.9 RENAL INSUFFICIENCY: ICD-10-CM

## 2017-12-24 DIAGNOSIS — I50.23 ACUTE ON CHRONIC SYSTOLIC HEART FAILURE (HCC): Primary | ICD-10-CM

## 2017-12-24 DIAGNOSIS — J06.9 VIRAL URI WITH COUGH: ICD-10-CM

## 2017-12-24 DIAGNOSIS — I42.8 NONISCHEMIC CARDIOMYOPATHY (HCC): ICD-10-CM

## 2017-12-24 LAB
ALBUMIN SERPL-MCNC: 4.4 G/DL (ref 3.2–4.8)
ALBUMIN/GLOB SERPL: 1.4 G/DL (ref 1.5–2.5)
ALP SERPL-CCNC: 76 U/L (ref 25–100)
ALT SERPL W P-5'-P-CCNC: 27 U/L (ref 7–40)
ANION GAP SERPL CALCULATED.3IONS-SCNC: 7 MMOL/L (ref 3–11)
AST SERPL-CCNC: 63 U/L (ref 0–33)
BASOPHILS # BLD AUTO: 0.06 10*3/MM3 (ref 0–0.2)
BASOPHILS NFR BLD AUTO: 0.8 % (ref 0–1)
BILIRUB SERPL-MCNC: 0.3 MG/DL (ref 0.3–1.2)
BNP SERPL-MCNC: 678 PG/ML (ref 0–100)
BUN BLD-MCNC: 19 MG/DL (ref 9–23)
BUN/CREAT SERPL: 12.7 (ref 7–25)
CALCIUM SPEC-SCNC: 9.5 MG/DL (ref 8.7–10.4)
CHLORIDE SERPL-SCNC: 94 MMOL/L (ref 99–109)
CO2 SERPL-SCNC: 24 MMOL/L (ref 20–31)
CREAT BLD-MCNC: 1.5 MG/DL (ref 0.6–1.3)
DEPRECATED RDW RBC AUTO: 43.9 FL (ref 37–54)
EOSINOPHIL # BLD AUTO: 0.3 10*3/MM3 (ref 0–0.3)
EOSINOPHIL NFR BLD AUTO: 3.8 % (ref 0–3)
ERYTHROCYTE [DISTWIDTH] IN BLOOD BY AUTOMATED COUNT: 14 % (ref 11.3–14.5)
GFR SERPL CREATININE-BSD FRML MDRD: 33 ML/MIN/1.73
GLOBULIN UR ELPH-MCNC: 3.1 GM/DL
GLUCOSE BLD-MCNC: 121 MG/DL (ref 70–100)
HCT VFR BLD AUTO: 29.6 % (ref 34.5–44)
HGB BLD-MCNC: 9.4 G/DL (ref 11.5–15.5)
HOLD SPECIMEN: NORMAL
HOLD SPECIMEN: NORMAL
IMM GRANULOCYTES # BLD: 0.01 10*3/MM3 (ref 0–0.03)
IMM GRANULOCYTES NFR BLD: 0.1 % (ref 0–0.6)
LYMPHOCYTES # BLD AUTO: 1.19 10*3/MM3 (ref 0.6–4.8)
LYMPHOCYTES NFR BLD AUTO: 14.9 % (ref 24–44)
MCH RBC QN AUTO: 27.3 PG (ref 27–31)
MCHC RBC AUTO-ENTMCNC: 31.8 G/DL (ref 32–36)
MCV RBC AUTO: 86 FL (ref 80–99)
MONOCYTES # BLD AUTO: 0.78 10*3/MM3 (ref 0–1)
MONOCYTES NFR BLD AUTO: 9.8 % (ref 0–12)
NEUTROPHILS # BLD AUTO: 5.66 10*3/MM3 (ref 1.5–8.3)
NEUTROPHILS NFR BLD AUTO: 70.6 % (ref 41–71)
PLATELET # BLD AUTO: 286 10*3/MM3 (ref 150–450)
PMV BLD AUTO: 11.3 FL (ref 6–12)
POTASSIUM BLD-SCNC: 5.6 MMOL/L (ref 3.5–5.5)
PROT SERPL-MCNC: 7.5 G/DL (ref 5.7–8.2)
RBC # BLD AUTO: 3.44 10*6/MM3 (ref 3.89–5.14)
SODIUM BLD-SCNC: 125 MMOL/L (ref 132–146)
TROPONIN I SERPL-MCNC: 0 NG/ML (ref 0–0.07)
WBC NRBC COR # BLD: 8 10*3/MM3 (ref 3.5–10.8)
WHOLE BLOOD HOLD SPECIMEN: NORMAL
WHOLE BLOOD HOLD SPECIMEN: NORMAL

## 2017-12-24 PROCEDURE — 83880 ASSAY OF NATRIURETIC PEPTIDE: CPT | Performed by: EMERGENCY MEDICINE

## 2017-12-24 PROCEDURE — 93005 ELECTROCARDIOGRAM TRACING: CPT

## 2017-12-24 PROCEDURE — 84484 ASSAY OF TROPONIN QUANT: CPT

## 2017-12-24 PROCEDURE — 80053 COMPREHEN METABOLIC PANEL: CPT | Performed by: EMERGENCY MEDICINE

## 2017-12-24 PROCEDURE — 99284 EMERGENCY DEPT VISIT MOD MDM: CPT

## 2017-12-24 PROCEDURE — 71010 HC CHEST PA OR AP: CPT

## 2017-12-24 PROCEDURE — 85025 COMPLETE CBC W/AUTO DIFF WBC: CPT | Performed by: EMERGENCY MEDICINE

## 2017-12-24 PROCEDURE — 25010000002 FUROSEMIDE PER 20 MG: Performed by: EMERGENCY MEDICINE

## 2017-12-24 RX ORDER — FUROSEMIDE 10 MG/ML
40 INJECTION INTRAMUSCULAR; INTRAVENOUS ONCE
Status: COMPLETED | OUTPATIENT
Start: 2017-12-24 | End: 2017-12-24

## 2017-12-24 RX ORDER — SODIUM CHLORIDE 0.9 % (FLUSH) 0.9 %
10 SYRINGE (ML) INJECTION AS NEEDED
Status: DISCONTINUED | OUTPATIENT
Start: 2017-12-24 | End: 2017-12-26 | Stop reason: HOSPADM

## 2017-12-24 RX ORDER — ONDANSETRON 2 MG/ML
4 INJECTION INTRAMUSCULAR; INTRAVENOUS ONCE
Status: COMPLETED | OUTPATIENT
Start: 2017-12-24 | End: 2017-12-25

## 2017-12-24 RX ADMIN — FUROSEMIDE 40 MG: 10 INJECTION, SOLUTION INTRAMUSCULAR; INTRAVENOUS at 23:06

## 2017-12-25 PROBLEM — R09.02 HYPOXIA: Status: ACTIVE | Noted: 2017-12-25

## 2017-12-25 PROBLEM — I50.23 ACUTE ON CHRONIC SYSTOLIC HEART FAILURE: Status: ACTIVE | Noted: 2017-12-25

## 2017-12-25 PROBLEM — R06.00 DYSPNEA: Status: ACTIVE | Noted: 2017-12-25

## 2017-12-25 PROBLEM — E87.5 HYPERKALEMIA: Status: ACTIVE | Noted: 2017-12-25

## 2017-12-25 PROBLEM — N17.9 AKI (ACUTE KIDNEY INJURY): Status: ACTIVE | Noted: 2017-12-25

## 2017-12-25 PROBLEM — D64.9 NORMOCYTIC ANEMIA: Status: ACTIVE | Noted: 2017-12-25

## 2017-12-25 LAB
ANION GAP SERPL CALCULATED.3IONS-SCNC: 6 MMOL/L (ref 3–11)
BUN BLD-MCNC: 18 MG/DL (ref 9–23)
BUN/CREAT SERPL: 12.9 (ref 7–25)
CALCIUM SPEC-SCNC: 9.4 MG/DL (ref 8.7–10.4)
CHLORIDE SERPL-SCNC: 95 MMOL/L (ref 99–109)
CO2 SERPL-SCNC: 26 MMOL/L (ref 20–31)
CREAT BLD-MCNC: 1.4 MG/DL (ref 0.6–1.3)
DEPRECATED RDW RBC AUTO: 42.5 FL (ref 37–54)
ERYTHROCYTE [DISTWIDTH] IN BLOOD BY AUTOMATED COUNT: 13.7 % (ref 11.3–14.5)
FERRITIN SERPL-MCNC: 25 NG/ML (ref 10–291)
GFR SERPL CREATININE-BSD FRML MDRD: 36 ML/MIN/1.73
GLUCOSE BLD-MCNC: 105 MG/DL (ref 70–100)
HCT VFR BLD AUTO: 28.2 % (ref 34.5–44)
HGB BLD-MCNC: 9.1 G/DL (ref 11.5–15.5)
IRON 24H UR-MRATE: 32 MCG/DL (ref 50–175)
IRON SATN MFR SERPL: 8 % (ref 15–50)
MAGNESIUM SERPL-MCNC: 1.9 MG/DL (ref 1.3–2.7)
MCH RBC QN AUTO: 27.3 PG (ref 27–31)
MCHC RBC AUTO-ENTMCNC: 32.3 G/DL (ref 32–36)
MCV RBC AUTO: 84.7 FL (ref 80–99)
PLATELET # BLD AUTO: 303 10*3/MM3 (ref 150–450)
PMV BLD AUTO: 11.1 FL (ref 6–12)
POTASSIUM BLD-SCNC: 5.1 MMOL/L (ref 3.5–5.5)
RBC # BLD AUTO: 3.33 10*6/MM3 (ref 3.89–5.14)
SODIUM BLD-SCNC: 127 MMOL/L (ref 132–146)
TIBC SERPL-MCNC: 392 MCG/DL (ref 250–450)
TSH SERPL DL<=0.05 MIU/L-ACNC: 2.07 MIU/ML (ref 0.35–5.35)
WBC NRBC COR # BLD: 8.78 10*3/MM3 (ref 3.5–10.8)

## 2017-12-25 PROCEDURE — 94640 AIRWAY INHALATION TREATMENT: CPT

## 2017-12-25 PROCEDURE — 83735 ASSAY OF MAGNESIUM: CPT | Performed by: PHYSICIAN ASSISTANT

## 2017-12-25 PROCEDURE — 84443 ASSAY THYROID STIM HORMONE: CPT | Performed by: PHYSICIAN ASSISTANT

## 2017-12-25 PROCEDURE — 25010000002 ONDANSETRON PER 1 MG: Performed by: EMERGENCY MEDICINE

## 2017-12-25 PROCEDURE — 83540 ASSAY OF IRON: CPT | Performed by: PHYSICIAN ASSISTANT

## 2017-12-25 PROCEDURE — 85027 COMPLETE CBC AUTOMATED: CPT | Performed by: PHYSICIAN ASSISTANT

## 2017-12-25 PROCEDURE — 83550 IRON BINDING TEST: CPT | Performed by: PHYSICIAN ASSISTANT

## 2017-12-25 PROCEDURE — 80048 BASIC METABOLIC PNL TOTAL CA: CPT | Performed by: PHYSICIAN ASSISTANT

## 2017-12-25 PROCEDURE — 94799 UNLISTED PULMONARY SVC/PX: CPT

## 2017-12-25 PROCEDURE — 25010000002 HEPARIN (PORCINE) PER 1000 UNITS: Performed by: PHYSICIAN ASSISTANT

## 2017-12-25 PROCEDURE — 99223 1ST HOSP IP/OBS HIGH 75: CPT | Performed by: INTERNAL MEDICINE

## 2017-12-25 PROCEDURE — 82728 ASSAY OF FERRITIN: CPT | Performed by: PHYSICIAN ASSISTANT

## 2017-12-25 RX ORDER — LORAZEPAM 0.5 MG/1
0.5 TABLET ORAL ONCE
Status: COMPLETED | OUTPATIENT
Start: 2017-12-25 | End: 2017-12-25

## 2017-12-25 RX ORDER — ACETAMINOPHEN 325 MG/1
650 TABLET ORAL EVERY 4 HOURS PRN
Status: DISCONTINUED | OUTPATIENT
Start: 2017-12-25 | End: 2017-12-26 | Stop reason: HOSPADM

## 2017-12-25 RX ORDER — GABAPENTIN 300 MG/1
300 CAPSULE ORAL EVERY 12 HOURS SCHEDULED
Status: DISCONTINUED | OUTPATIENT
Start: 2017-12-25 | End: 2017-12-25

## 2017-12-25 RX ORDER — DONEPEZIL HYDROCHLORIDE 5 MG/1
5 TABLET, FILM COATED ORAL NIGHTLY
Status: DISCONTINUED | OUTPATIENT
Start: 2017-12-25 | End: 2017-12-26 | Stop reason: HOSPADM

## 2017-12-25 RX ORDER — CLOPIDOGREL BISULFATE 75 MG/1
75 TABLET ORAL DAILY
Status: DISCONTINUED | OUTPATIENT
Start: 2017-12-25 | End: 2017-12-26 | Stop reason: HOSPADM

## 2017-12-25 RX ORDER — SODIUM CHLORIDE 0.9 % (FLUSH) 0.9 %
1-10 SYRINGE (ML) INJECTION AS NEEDED
Status: DISCONTINUED | OUTPATIENT
Start: 2017-12-25 | End: 2017-12-26 | Stop reason: HOSPADM

## 2017-12-25 RX ORDER — IPRATROPIUM BROMIDE AND ALBUTEROL SULFATE 2.5; .5 MG/3ML; MG/3ML
3 SOLUTION RESPIRATORY (INHALATION)
Status: DISCONTINUED | OUTPATIENT
Start: 2017-12-25 | End: 2017-12-26 | Stop reason: HOSPADM

## 2017-12-25 RX ORDER — METHOCARBAMOL 500 MG/1
1000 TABLET, FILM COATED ORAL EVERY 8 HOURS SCHEDULED
Status: DISCONTINUED | OUTPATIENT
Start: 2017-12-25 | End: 2017-12-26 | Stop reason: HOSPADM

## 2017-12-25 RX ORDER — ASCORBIC ACID 500 MG
500 TABLET ORAL 2 TIMES DAILY
Status: DISCONTINUED | OUTPATIENT
Start: 2017-12-25 | End: 2017-12-26 | Stop reason: HOSPADM

## 2017-12-25 RX ORDER — GABAPENTIN 100 MG/1
100 CAPSULE ORAL EVERY 8 HOURS SCHEDULED
Status: DISCONTINUED | OUTPATIENT
Start: 2017-12-25 | End: 2017-12-26 | Stop reason: HOSPADM

## 2017-12-25 RX ORDER — CEPHALEXIN 250 MG/1
CAPSULE ORAL 4 TIMES DAILY
COMMUNITY
End: 2017-12-26 | Stop reason: HOSPADM

## 2017-12-25 RX ORDER — ROPINIROLE 0.5 MG/1
0.5 TABLET, FILM COATED ORAL NIGHTLY
Status: DISCONTINUED | OUTPATIENT
Start: 2017-12-25 | End: 2017-12-26 | Stop reason: HOSPADM

## 2017-12-25 RX ORDER — HEPARIN SODIUM 5000 [USP'U]/ML
5000 INJECTION, SOLUTION INTRAVENOUS; SUBCUTANEOUS EVERY 12 HOURS SCHEDULED
Status: DISCONTINUED | OUTPATIENT
Start: 2017-12-25 | End: 2017-12-26 | Stop reason: HOSPADM

## 2017-12-25 RX ORDER — FERROUS SULFATE 325(65) MG
325 TABLET ORAL 2 TIMES DAILY WITH MEALS
Status: DISCONTINUED | OUTPATIENT
Start: 2017-12-25 | End: 2017-12-26 | Stop reason: HOSPADM

## 2017-12-25 RX ADMIN — Medication 325 MG: at 10:38

## 2017-12-25 RX ADMIN — HEPARIN SODIUM 5000 UNITS: 5000 INJECTION, SOLUTION INTRAVENOUS; SUBCUTANEOUS at 21:11

## 2017-12-25 RX ADMIN — IPRATROPIUM BROMIDE AND ALBUTEROL SULFATE 3 ML: .5; 3 SOLUTION RESPIRATORY (INHALATION) at 19:44

## 2017-12-25 RX ADMIN — DONEPEZIL HYDROCHLORIDE 5 MG: 5 TABLET ORAL at 21:11

## 2017-12-25 RX ADMIN — IPRATROPIUM BROMIDE AND ALBUTEROL SULFATE 3 ML: .5; 3 SOLUTION RESPIRATORY (INHALATION) at 15:54

## 2017-12-25 RX ADMIN — IPRATROPIUM BROMIDE AND ALBUTEROL SULFATE 3 ML: .5; 3 SOLUTION RESPIRATORY (INHALATION) at 08:11

## 2017-12-25 RX ADMIN — IPRATROPIUM BROMIDE AND ALBUTEROL SULFATE 3 ML: .5; 3 SOLUTION RESPIRATORY (INHALATION) at 12:55

## 2017-12-25 RX ADMIN — HEPARIN SODIUM 5000 UNITS: 5000 INJECTION, SOLUTION INTRAVENOUS; SUBCUTANEOUS at 08:36

## 2017-12-25 RX ADMIN — ONDANSETRON 4 MG: 2 INJECTION INTRAMUSCULAR; INTRAVENOUS at 00:07

## 2017-12-25 RX ADMIN — METHOCARBAMOL 1000 MG: 500 TABLET ORAL at 13:59

## 2017-12-25 RX ADMIN — LORAZEPAM 0.5 MG: 0.5 TABLET ORAL at 14:00

## 2017-12-25 RX ADMIN — ROPINIROLE 0.5 MG: 0.5 TABLET, FILM COATED ORAL at 21:11

## 2017-12-25 RX ADMIN — OXYCODONE HYDROCHLORIDE AND ACETAMINOPHEN 500 MG: 500 TABLET ORAL at 21:11

## 2017-12-25 RX ADMIN — OXYCODONE HYDROCHLORIDE AND ACETAMINOPHEN 500 MG: 500 TABLET ORAL at 10:38

## 2017-12-25 RX ADMIN — Medication 325 MG: at 17:45

## 2017-12-25 RX ADMIN — GABAPENTIN 100 MG: 100 CAPSULE ORAL at 21:10

## 2017-12-25 RX ADMIN — METOPROLOL TARTRATE 12.5 MG: 25 TABLET, FILM COATED ORAL at 21:11

## 2017-12-25 RX ADMIN — GABAPENTIN 300 MG: 300 CAPSULE ORAL at 10:38

## 2017-12-25 RX ADMIN — CLOPIDOGREL BISULFATE 75 MG: 75 TABLET ORAL at 13:59

## 2017-12-25 RX ADMIN — METHOCARBAMOL 1000 MG: 500 TABLET ORAL at 21:11

## 2017-12-25 RX ADMIN — METOPROLOL TARTRATE 12.5 MG: 25 TABLET, FILM COATED ORAL at 13:59

## 2017-12-26 VITALS
TEMPERATURE: 98.6 F | WEIGHT: 124 LBS | HEIGHT: 64 IN | RESPIRATION RATE: 18 BRPM | DIASTOLIC BLOOD PRESSURE: 73 MMHG | HEART RATE: 98 BPM | OXYGEN SATURATION: 96 % | SYSTOLIC BLOOD PRESSURE: 129 MMHG | BODY MASS INDEX: 21.17 KG/M2

## 2017-12-26 LAB
ANION GAP SERPL CALCULATED.3IONS-SCNC: 10 MMOL/L (ref 3–11)
BNP SERPL-MCNC: 366 PG/ML (ref 0–100)
BUN BLD-MCNC: 21 MG/DL (ref 9–23)
BUN/CREAT SERPL: 14 (ref 7–25)
CALCIUM SPEC-SCNC: 9 MG/DL (ref 8.7–10.4)
CHLORIDE SERPL-SCNC: 91 MMOL/L (ref 99–109)
CO2 SERPL-SCNC: 25 MMOL/L (ref 20–31)
CREAT BLD-MCNC: 1.5 MG/DL (ref 0.6–1.3)
DEPRECATED RDW RBC AUTO: 42.6 FL (ref 37–54)
ERYTHROCYTE [DISTWIDTH] IN BLOOD BY AUTOMATED COUNT: 13.9 % (ref 11.3–14.5)
GFR SERPL CREATININE-BSD FRML MDRD: 33 ML/MIN/1.73
GLUCOSE BLD-MCNC: 90 MG/DL (ref 70–100)
HCT VFR BLD AUTO: 25.8 % (ref 34.5–44)
HGB BLD-MCNC: 8.4 G/DL (ref 11.5–15.5)
MAGNESIUM SERPL-MCNC: 1.9 MG/DL (ref 1.3–2.7)
MCH RBC QN AUTO: 27.3 PG (ref 27–31)
MCHC RBC AUTO-ENTMCNC: 32.6 G/DL (ref 32–36)
MCV RBC AUTO: 83.8 FL (ref 80–99)
PHOSPHATE SERPL-MCNC: 4 MG/DL (ref 2.4–5.1)
PLATELET # BLD AUTO: 272 10*3/MM3 (ref 150–450)
PMV BLD AUTO: 11.2 FL (ref 6–12)
POTASSIUM BLD-SCNC: 4.6 MMOL/L (ref 3.5–5.5)
RBC # BLD AUTO: 3.08 10*6/MM3 (ref 3.89–5.14)
SODIUM BLD-SCNC: 126 MMOL/L (ref 132–146)
WBC NRBC COR # BLD: 6.23 10*3/MM3 (ref 3.5–10.8)

## 2017-12-26 PROCEDURE — 84100 ASSAY OF PHOSPHORUS: CPT | Performed by: HOSPITALIST

## 2017-12-26 PROCEDURE — 25010000002 HEPARIN (PORCINE) PER 1000 UNITS: Performed by: PHYSICIAN ASSISTANT

## 2017-12-26 PROCEDURE — 94640 AIRWAY INHALATION TREATMENT: CPT

## 2017-12-26 PROCEDURE — 83735 ASSAY OF MAGNESIUM: CPT | Performed by: HOSPITALIST

## 2017-12-26 PROCEDURE — 94799 UNLISTED PULMONARY SVC/PX: CPT

## 2017-12-26 PROCEDURE — 85027 COMPLETE CBC AUTOMATED: CPT | Performed by: HOSPITALIST

## 2017-12-26 PROCEDURE — 99239 HOSP IP/OBS DSCHRG MGMT >30: CPT | Performed by: HOSPITALIST

## 2017-12-26 PROCEDURE — 83880 ASSAY OF NATRIURETIC PEPTIDE: CPT | Performed by: HOSPITALIST

## 2017-12-26 PROCEDURE — 80048 BASIC METABOLIC PNL TOTAL CA: CPT | Performed by: HOSPITALIST

## 2017-12-26 RX ORDER — FERROUS SULFATE 325(65) MG
325 TABLET ORAL 2 TIMES DAILY WITH MEALS
Qty: 60 TABLET | Refills: 0 | Status: SHIPPED | OUTPATIENT
Start: 2017-12-27 | End: 2018-01-26

## 2017-12-26 RX ORDER — FUROSEMIDE 20 MG/1
10 TABLET ORAL DAILY
Status: DISCONTINUED | OUTPATIENT
Start: 2017-12-26 | End: 2018-01-04 | Stop reason: HOSPADM

## 2017-12-26 RX ORDER — ASCORBIC ACID 500 MG
500 TABLET ORAL 2 TIMES DAILY
Qty: 60 TABLET | Refills: 0 | Status: SHIPPED | OUTPATIENT
Start: 2017-12-26 | End: 2018-01-25

## 2017-12-26 RX ADMIN — Medication 325 MG: at 17:08

## 2017-12-26 RX ADMIN — GABAPENTIN 100 MG: 100 CAPSULE ORAL at 14:13

## 2017-12-26 RX ADMIN — GABAPENTIN 100 MG: 100 CAPSULE ORAL at 05:59

## 2017-12-26 RX ADMIN — Medication 325 MG: at 09:08

## 2017-12-26 RX ADMIN — HEPARIN SODIUM 5000 UNITS: 5000 INJECTION, SOLUTION INTRAVENOUS; SUBCUTANEOUS at 09:08

## 2017-12-26 RX ADMIN — IPRATROPIUM BROMIDE AND ALBUTEROL SULFATE 3 ML: .5; 3 SOLUTION RESPIRATORY (INHALATION) at 08:43

## 2017-12-26 RX ADMIN — IPRATROPIUM BROMIDE AND ALBUTEROL SULFATE 3 ML: .5; 3 SOLUTION RESPIRATORY (INHALATION) at 16:47

## 2017-12-26 RX ADMIN — METOPROLOL TARTRATE 12.5 MG: 25 TABLET, FILM COATED ORAL at 09:08

## 2017-12-26 RX ADMIN — METHOCARBAMOL 1000 MG: 500 TABLET ORAL at 14:14

## 2017-12-26 RX ADMIN — OXYCODONE HYDROCHLORIDE AND ACETAMINOPHEN 500 MG: 500 TABLET ORAL at 14:14

## 2017-12-26 RX ADMIN — CLOPIDOGREL BISULFATE 75 MG: 75 TABLET ORAL at 09:08

## 2017-12-26 RX ADMIN — METHOCARBAMOL 1000 MG: 500 TABLET ORAL at 05:59

## 2017-12-26 RX ADMIN — IPRATROPIUM BROMIDE AND ALBUTEROL SULFATE 3 ML: .5; 3 SOLUTION RESPIRATORY (INHALATION) at 12:56

## 2017-12-28 ENCOUNTER — HOSPITAL ENCOUNTER (EMERGENCY)
Facility: HOSPITAL | Age: 82
Discharge: HOME | End: 2017-12-29
Attending: EMERGENCY MEDICINE | Admitting: EMERGENCY MEDICINE

## 2017-12-28 DIAGNOSIS — I50.9 CONGESTIVE HEART FAILURE, UNSPECIFIED CONGESTIVE HEART FAILURE CHRONICITY, UNSPECIFIED CONGESTIVE HEART FAILURE TYPE: Primary | ICD-10-CM

## 2017-12-28 PROCEDURE — 80053 COMPREHEN METABOLIC PANEL: CPT | Performed by: NURSE PRACTITIONER

## 2017-12-28 PROCEDURE — 85025 COMPLETE CBC W/AUTO DIFF WBC: CPT | Performed by: NURSE PRACTITIONER

## 2017-12-28 PROCEDURE — 99284 EMERGENCY DEPT VISIT MOD MDM: CPT

## 2017-12-28 PROCEDURE — 93005 ELECTROCARDIOGRAM TRACING: CPT | Performed by: NURSE PRACTITIONER

## 2017-12-28 PROCEDURE — 96374 THER/PROPH/DIAG INJ IV PUSH: CPT

## 2017-12-28 PROCEDURE — 83880 ASSAY OF NATRIURETIC PEPTIDE: CPT | Performed by: NURSE PRACTITIONER

## 2017-12-28 RX ORDER — SODIUM CHLORIDE 0.9 % (FLUSH) 0.9 %
10 SYRINGE (ML) INJECTION AS NEEDED
Status: DISCONTINUED | OUTPATIENT
Start: 2017-12-28 | End: 2017-12-29 | Stop reason: HOSPADM

## 2017-12-29 ENCOUNTER — APPOINTMENT (OUTPATIENT)
Dept: CT IMAGING | Facility: HOSPITAL | Age: 82
End: 2017-12-29

## 2017-12-29 ENCOUNTER — APPOINTMENT (OUTPATIENT)
Dept: GENERAL RADIOLOGY | Facility: HOSPITAL | Age: 82
End: 2017-12-29

## 2017-12-29 VITALS
DIASTOLIC BLOOD PRESSURE: 76 MMHG | SYSTOLIC BLOOD PRESSURE: 131 MMHG | TEMPERATURE: 98.4 F | HEART RATE: 82 BPM | HEIGHT: 65 IN | RESPIRATION RATE: 16 BRPM | WEIGHT: 124 LBS | OXYGEN SATURATION: 95 % | BODY MASS INDEX: 20.66 KG/M2

## 2017-12-29 LAB
ALBUMIN SERPL-MCNC: 4.2 G/DL (ref 3.2–4.8)
ALBUMIN/GLOB SERPL: 1.3 G/DL (ref 1.5–2.5)
ALP SERPL-CCNC: 51 U/L (ref 25–100)
ALT SERPL W P-5'-P-CCNC: 18 U/L (ref 7–40)
ANION GAP SERPL CALCULATED.3IONS-SCNC: 7 MMOL/L (ref 3–11)
AST SERPL-CCNC: 26 U/L (ref 0–33)
BASOPHILS # BLD AUTO: 0.04 10*3/MM3 (ref 0–0.2)
BASOPHILS NFR BLD AUTO: 0.5 % (ref 0–1)
BILIRUB SERPL-MCNC: 0.2 MG/DL (ref 0.3–1.2)
BILIRUB UR QL STRIP: NEGATIVE
BNP SERPL-MCNC: 592 PG/ML (ref 0–100)
BUN BLD-MCNC: 22 MG/DL (ref 9–23)
BUN/CREAT SERPL: 16.9 (ref 7–25)
CALCIUM SPEC-SCNC: 9.1 MG/DL (ref 8.7–10.4)
CHLORIDE SERPL-SCNC: 94 MMOL/L (ref 99–109)
CLARITY UR: CLEAR
CO2 SERPL-SCNC: 23 MMOL/L (ref 20–31)
COLOR UR: YELLOW
CREAT BLD-MCNC: 1.3 MG/DL (ref 0.6–1.3)
DEPRECATED RDW RBC AUTO: 42.5 FL (ref 37–54)
EOSINOPHIL # BLD AUTO: 0.14 10*3/MM3 (ref 0–0.3)
EOSINOPHIL NFR BLD AUTO: 1.7 % (ref 0–3)
ERYTHROCYTE [DISTWIDTH] IN BLOOD BY AUTOMATED COUNT: 14 % (ref 11.3–14.5)
FLUAV AG NPH QL: NEGATIVE
FLUBV AG NPH QL IA: NEGATIVE
GFR SERPL CREATININE-BSD FRML MDRD: 39 ML/MIN/1.73
GLOBULIN UR ELPH-MCNC: 3.2 GM/DL
GLUCOSE BLD-MCNC: 112 MG/DL (ref 70–100)
GLUCOSE UR STRIP-MCNC: NEGATIVE MG/DL
HCT VFR BLD AUTO: 28.2 % (ref 34.5–44)
HGB BLD-MCNC: 9.2 G/DL (ref 11.5–15.5)
HGB UR QL STRIP.AUTO: NEGATIVE
IMM GRANULOCYTES # BLD: 0.02 10*3/MM3 (ref 0–0.03)
IMM GRANULOCYTES NFR BLD: 0.2 % (ref 0–0.6)
KETONES UR QL STRIP: NEGATIVE
LEUKOCYTE ESTERASE UR QL STRIP.AUTO: NEGATIVE
LYMPHOCYTES # BLD AUTO: 0.87 10*3/MM3 (ref 0.6–4.8)
LYMPHOCYTES NFR BLD AUTO: 10.7 % (ref 24–44)
MCH RBC QN AUTO: 27.2 PG (ref 27–31)
MCHC RBC AUTO-ENTMCNC: 32.6 G/DL (ref 32–36)
MCV RBC AUTO: 83.4 FL (ref 80–99)
MONOCYTES # BLD AUTO: 0.86 10*3/MM3 (ref 0–1)
MONOCYTES NFR BLD AUTO: 10.6 % (ref 0–12)
NEUTROPHILS # BLD AUTO: 6.2 10*3/MM3 (ref 1.5–8.3)
NEUTROPHILS NFR BLD AUTO: 76.3 % (ref 41–71)
NITRITE UR QL STRIP: NEGATIVE
PH UR STRIP.AUTO: 6.5 [PH] (ref 5–8)
PLATELET # BLD AUTO: 340 10*3/MM3 (ref 150–450)
PMV BLD AUTO: 11 FL (ref 6–12)
POTASSIUM BLD-SCNC: 4.3 MMOL/L (ref 3.5–5.5)
PROT SERPL-MCNC: 7.4 G/DL (ref 5.7–8.2)
PROT UR QL STRIP: NEGATIVE
RBC # BLD AUTO: 3.38 10*6/MM3 (ref 3.89–5.14)
SODIUM BLD-SCNC: 124 MMOL/L (ref 132–146)
SP GR UR STRIP: 1.01 (ref 1–1.03)
TROPONIN I SERPL-MCNC: 0.01 NG/ML (ref 0–0.07)
UROBILINOGEN UR QL STRIP: NORMAL
WBC NRBC COR # BLD: 8.13 10*3/MM3 (ref 3.5–10.8)

## 2017-12-29 PROCEDURE — 81003 URINALYSIS AUTO W/O SCOPE: CPT | Performed by: NURSE PRACTITIONER

## 2017-12-29 PROCEDURE — 84484 ASSAY OF TROPONIN QUANT: CPT

## 2017-12-29 PROCEDURE — 96374 THER/PROPH/DIAG INJ IV PUSH: CPT

## 2017-12-29 PROCEDURE — 71010 HC CHEST PA OR AP: CPT

## 2017-12-29 PROCEDURE — 87804 INFLUENZA ASSAY W/OPTIC: CPT | Performed by: NURSE PRACTITIONER

## 2017-12-29 PROCEDURE — 25010000002 FUROSEMIDE PER 20 MG: Performed by: NURSE PRACTITIONER

## 2017-12-29 PROCEDURE — 70450 CT HEAD/BRAIN W/O DYE: CPT

## 2017-12-29 RX ORDER — FUROSEMIDE 10 MG/ML
40 INJECTION INTRAMUSCULAR; INTRAVENOUS ONCE
Status: COMPLETED | OUTPATIENT
Start: 2017-12-29 | End: 2017-12-29

## 2017-12-29 RX ADMIN — NITROGLYCERIN 1 INCH: 20 OINTMENT TOPICAL at 01:26

## 2017-12-29 RX ADMIN — FUROSEMIDE 40 MG: 10 INJECTION, SOLUTION INTRAMUSCULAR; INTRAVENOUS at 01:31

## 2017-12-30 ENCOUNTER — APPOINTMENT (OUTPATIENT)
Dept: GENERAL RADIOLOGY | Facility: HOSPITAL | Age: 82
End: 2017-12-30

## 2017-12-30 ENCOUNTER — HOSPITAL ENCOUNTER (INPATIENT)
Facility: HOSPITAL | Age: 82
LOS: 4 days | Discharge: HOME | End: 2018-01-04
Attending: EMERGENCY MEDICINE | Admitting: HOSPITALIST

## 2017-12-30 DIAGNOSIS — E87.1 HYPONATREMIA: ICD-10-CM

## 2017-12-30 DIAGNOSIS — E86.0 DEHYDRATION: ICD-10-CM

## 2017-12-30 DIAGNOSIS — E87.8 HYPOCHLOREMIA: ICD-10-CM

## 2017-12-30 DIAGNOSIS — R53.83 OTHER FATIGUE: ICD-10-CM

## 2017-12-30 DIAGNOSIS — I50.23 ACUTE ON CHRONIC SYSTOLIC HEART FAILURE (HCC): Primary | ICD-10-CM

## 2017-12-30 PROBLEM — E03.9 HYPOTHYROID: Status: ACTIVE | Noted: 2017-12-30

## 2017-12-30 LAB
ALBUMIN SERPL-MCNC: 4.5 G/DL (ref 3.2–4.8)
ALBUMIN/GLOB SERPL: 1.4 G/DL (ref 1.5–2.5)
ALP SERPL-CCNC: 53 U/L (ref 25–100)
ALT SERPL W P-5'-P-CCNC: 14 U/L (ref 7–40)
ANION GAP SERPL CALCULATED.3IONS-SCNC: 9 MMOL/L (ref 3–11)
AST SERPL-CCNC: 26 U/L (ref 0–33)
BASOPHILS # BLD AUTO: 0.07 10*3/MM3 (ref 0–0.2)
BASOPHILS NFR BLD AUTO: 0.8 % (ref 0–1)
BILIRUB SERPL-MCNC: 0.2 MG/DL (ref 0.3–1.2)
BILIRUB UR QL STRIP: NEGATIVE
BNP SERPL-MCNC: 354 PG/ML (ref 0–100)
BUN BLD-MCNC: 21 MG/DL (ref 9–23)
BUN/CREAT SERPL: 16.2 (ref 7–25)
CALCIUM SPEC-SCNC: 9.4 MG/DL (ref 8.7–10.4)
CHLORIDE SERPL-SCNC: 90 MMOL/L (ref 99–109)
CLARITY UR: CLEAR
CO2 SERPL-SCNC: 26 MMOL/L (ref 20–31)
COLOR UR: YELLOW
CREAT BLD-MCNC: 1.3 MG/DL (ref 0.6–1.3)
DEPRECATED RDW RBC AUTO: 43.6 FL (ref 37–54)
EOSINOPHIL # BLD AUTO: 0.26 10*3/MM3 (ref 0–0.3)
EOSINOPHIL NFR BLD AUTO: 2.9 % (ref 0–3)
ERYTHROCYTE [DISTWIDTH] IN BLOOD BY AUTOMATED COUNT: 14.2 % (ref 11.3–14.5)
FLUAV AG NPH QL: NEGATIVE
FLUBV AG NPH QL IA: NEGATIVE
GFR SERPL CREATININE-BSD FRML MDRD: 39 ML/MIN/1.73
GLOBULIN UR ELPH-MCNC: 3.2 GM/DL
GLUCOSE BLD-MCNC: 101 MG/DL (ref 70–100)
GLUCOSE UR STRIP-MCNC: NEGATIVE MG/DL
HCT VFR BLD AUTO: 30.4 % (ref 34.5–44)
HGB BLD-MCNC: 10 G/DL (ref 11.5–15.5)
HGB UR QL STRIP.AUTO: NEGATIVE
HOLD SPECIMEN: NORMAL
HOLD SPECIMEN: NORMAL
IMM GRANULOCYTES # BLD: 0.02 10*3/MM3 (ref 0–0.03)
IMM GRANULOCYTES NFR BLD: 0.2 % (ref 0–0.6)
KETONES UR QL STRIP: NEGATIVE
LEUKOCYTE ESTERASE UR QL STRIP.AUTO: NEGATIVE
LYMPHOCYTES # BLD AUTO: 1.64 10*3/MM3 (ref 0.6–4.8)
LYMPHOCYTES NFR BLD AUTO: 18 % (ref 24–44)
MCH RBC QN AUTO: 27.7 PG (ref 27–31)
MCHC RBC AUTO-ENTMCNC: 32.9 G/DL (ref 32–36)
MCV RBC AUTO: 84.2 FL (ref 80–99)
MONOCYTES # BLD AUTO: 1.2 10*3/MM3 (ref 0–1)
MONOCYTES NFR BLD AUTO: 13.2 % (ref 0–12)
NEUTROPHILS # BLD AUTO: 5.92 10*3/MM3 (ref 1.5–8.3)
NEUTROPHILS NFR BLD AUTO: 64.9 % (ref 41–71)
NITRITE UR QL STRIP: NEGATIVE
PH UR STRIP.AUTO: 6.5 [PH] (ref 5–8)
PLATELET # BLD AUTO: 359 10*3/MM3 (ref 150–450)
PMV BLD AUTO: 10.8 FL (ref 6–12)
POTASSIUM BLD-SCNC: 3.8 MMOL/L (ref 3.5–5.5)
PROT SERPL-MCNC: 7.7 G/DL (ref 5.7–8.2)
PROT UR QL STRIP: NEGATIVE
RBC # BLD AUTO: 3.61 10*6/MM3 (ref 3.89–5.14)
SODIUM BLD-SCNC: 125 MMOL/L (ref 132–146)
SP GR UR STRIP: 1.01 (ref 1–1.03)
UROBILINOGEN UR QL STRIP: NORMAL
WBC NRBC COR # BLD: 9.11 10*3/MM3 (ref 3.5–10.8)
WHOLE BLOOD HOLD SPECIMEN: NORMAL
WHOLE BLOOD HOLD SPECIMEN: NORMAL

## 2017-12-30 PROCEDURE — 93005 ELECTROCARDIOGRAM TRACING: CPT | Performed by: EMERGENCY MEDICINE

## 2017-12-30 PROCEDURE — 81003 URINALYSIS AUTO W/O SCOPE: CPT | Performed by: PHYSICIAN ASSISTANT

## 2017-12-30 PROCEDURE — 83935 ASSAY OF URINE OSMOLALITY: CPT | Performed by: NURSE PRACTITIONER

## 2017-12-30 PROCEDURE — 85025 COMPLETE CBC W/AUTO DIFF WBC: CPT | Performed by: EMERGENCY MEDICINE

## 2017-12-30 PROCEDURE — 84300 ASSAY OF URINE SODIUM: CPT | Performed by: NURSE PRACTITIONER

## 2017-12-30 PROCEDURE — 83880 ASSAY OF NATRIURETIC PEPTIDE: CPT | Performed by: EMERGENCY MEDICINE

## 2017-12-30 PROCEDURE — G0378 HOSPITAL OBSERVATION PER HR: HCPCS

## 2017-12-30 PROCEDURE — 80053 COMPREHEN METABOLIC PANEL: CPT | Performed by: EMERGENCY MEDICINE

## 2017-12-30 PROCEDURE — 99219 PR INITIAL OBSERVATION CARE/DAY 50 MINUTES: CPT | Performed by: NURSE PRACTITIONER

## 2017-12-30 PROCEDURE — 99284 EMERGENCY DEPT VISIT MOD MDM: CPT

## 2017-12-30 PROCEDURE — 87804 INFLUENZA ASSAY W/OPTIC: CPT | Performed by: PHYSICIAN ASSISTANT

## 2017-12-30 PROCEDURE — 71010 HC CHEST PA OR AP: CPT

## 2017-12-30 RX ORDER — SODIUM CHLORIDE 0.9 % (FLUSH) 0.9 %
10 SYRINGE (ML) INJECTION AS NEEDED
Status: DISCONTINUED | OUTPATIENT
Start: 2017-12-30 | End: 2018-01-04 | Stop reason: HOSPADM

## 2017-12-30 RX ADMIN — SODIUM CHLORIDE 500 ML: 9 INJECTION, SOLUTION INTRAVENOUS at 21:30

## 2017-12-31 LAB
ANION GAP SERPL CALCULATED.3IONS-SCNC: 7 MMOL/L (ref 3–11)
BASOPHILS # BLD AUTO: 0.06 10*3/MM3 (ref 0–0.2)
BASOPHILS NFR BLD AUTO: 0.6 % (ref 0–1)
BUN BLD-MCNC: 18 MG/DL (ref 9–23)
BUN/CREAT SERPL: 15 (ref 7–25)
CALCIUM SPEC-SCNC: 9.6 MG/DL (ref 8.7–10.4)
CHLORIDE SERPL-SCNC: 95 MMOL/L (ref 99–109)
CO2 SERPL-SCNC: 25 MMOL/L (ref 20–31)
CREAT BLD-MCNC: 1.2 MG/DL (ref 0.6–1.3)
DEPRECATED RDW RBC AUTO: 43.2 FL (ref 37–54)
EOSINOPHIL # BLD AUTO: 0.36 10*3/MM3 (ref 0–0.3)
EOSINOPHIL NFR BLD AUTO: 3.9 % (ref 0–3)
ERYTHROCYTE [DISTWIDTH] IN BLOOD BY AUTOMATED COUNT: 14.2 % (ref 11.3–14.5)
FLUAV SUBTYP SPEC NAA+PROBE: NOT DETECTED
FLUBV RNA ISLT QL NAA+PROBE: NOT DETECTED
GFR SERPL CREATININE-BSD FRML MDRD: 42 ML/MIN/1.73
GLUCOSE BLD-MCNC: 114 MG/DL (ref 70–100)
HCT VFR BLD AUTO: 30.3 % (ref 34.5–44)
HGB BLD-MCNC: 9.9 G/DL (ref 11.5–15.5)
IMM GRANULOCYTES # BLD: 0.03 10*3/MM3 (ref 0–0.03)
IMM GRANULOCYTES NFR BLD: 0.3 % (ref 0–0.6)
LYMPHOCYTES # BLD AUTO: 1.53 10*3/MM3 (ref 0.6–4.8)
LYMPHOCYTES NFR BLD AUTO: 16.5 % (ref 24–44)
MCH RBC QN AUTO: 27.5 PG (ref 27–31)
MCHC RBC AUTO-ENTMCNC: 32.7 G/DL (ref 32–36)
MCV RBC AUTO: 84.2 FL (ref 80–99)
MONOCYTES # BLD AUTO: 1.28 10*3/MM3 (ref 0–1)
MONOCYTES NFR BLD AUTO: 13.8 % (ref 0–12)
NEUTROPHILS # BLD AUTO: 6.02 10*3/MM3 (ref 1.5–8.3)
NEUTROPHILS NFR BLD AUTO: 64.9 % (ref 41–71)
OSMOLALITY SERPL: 274 MOSM/KG (ref 275–295)
OSMOLALITY UR: 239 MOSM/KG (ref 300–1100)
PLATELET # BLD AUTO: 363 10*3/MM3 (ref 150–450)
PMV BLD AUTO: 11.4 FL (ref 6–12)
POTASSIUM BLD-SCNC: 3.7 MMOL/L (ref 3.5–5.5)
RBC # BLD AUTO: 3.6 10*6/MM3 (ref 3.89–5.14)
SODIUM BLD-SCNC: 127 MMOL/L (ref 132–146)
SODIUM UR-SCNC: 31 MMOL/L (ref 30–90)
WBC NRBC COR # BLD: 9.28 10*3/MM3 (ref 3.5–10.8)

## 2017-12-31 PROCEDURE — 80048 BASIC METABOLIC PNL TOTAL CA: CPT | Performed by: NURSE PRACTITIONER

## 2017-12-31 PROCEDURE — 99232 SBSQ HOSP IP/OBS MODERATE 35: CPT | Performed by: NURSE PRACTITIONER

## 2017-12-31 PROCEDURE — 25010000002 HEPARIN (PORCINE) PER 1000 UNITS: Performed by: NURSE PRACTITIONER

## 2017-12-31 PROCEDURE — 87502 INFLUENZA DNA AMP PROBE: CPT | Performed by: NURSE PRACTITIONER

## 2017-12-31 PROCEDURE — 83930 ASSAY OF BLOOD OSMOLALITY: CPT | Performed by: NURSE PRACTITIONER

## 2017-12-31 PROCEDURE — 85025 COMPLETE CBC W/AUTO DIFF WBC: CPT | Performed by: NURSE PRACTITIONER

## 2017-12-31 RX ORDER — SODIUM CHLORIDE 0.9 % (FLUSH) 0.9 %
1-10 SYRINGE (ML) INJECTION AS NEEDED
Status: DISCONTINUED | OUTPATIENT
Start: 2017-12-31 | End: 2018-01-04 | Stop reason: HOSPADM

## 2017-12-31 RX ORDER — ALPRAZOLAM 0.25 MG/1
0.25 TABLET ORAL 3 TIMES DAILY PRN
Status: ON HOLD | COMMUNITY
End: 2022-03-17 | Stop reason: SDUPTHER

## 2017-12-31 RX ORDER — SODIUM CHLORIDE 9 MG/ML
75 INJECTION, SOLUTION INTRAVENOUS CONTINUOUS
Status: ACTIVE | OUTPATIENT
Start: 2017-12-31 | End: 2017-12-31

## 2017-12-31 RX ORDER — LEVOTHYROXINE SODIUM 0.05 MG/1
50 TABLET ORAL DAILY
Status: DISCONTINUED | OUTPATIENT
Start: 2017-12-31 | End: 2018-01-04 | Stop reason: HOSPADM

## 2017-12-31 RX ORDER — ATORVASTATIN CALCIUM 10 MG/1
10 TABLET, FILM COATED ORAL NIGHTLY
Status: DISCONTINUED | OUTPATIENT
Start: 2017-12-31 | End: 2018-01-04 | Stop reason: HOSPADM

## 2017-12-31 RX ORDER — CYCLOSPORINE 0.5 MG/ML
1 EMULSION OPHTHALMIC EVERY 12 HOURS
Status: DISCONTINUED | OUTPATIENT
Start: 2017-12-31 | End: 2018-01-04 | Stop reason: HOSPADM

## 2017-12-31 RX ORDER — FLUTICASONE PROPIONATE 50 MCG
1 SPRAY, SUSPENSION (ML) NASAL DAILY
Status: DISCONTINUED | OUTPATIENT
Start: 2017-12-31 | End: 2018-01-04 | Stop reason: HOSPADM

## 2017-12-31 RX ORDER — ROPINIROLE 0.5 MG/1
0.5 TABLET, FILM COATED ORAL NIGHTLY
Status: DISCONTINUED | OUTPATIENT
Start: 2017-12-31 | End: 2018-01-04 | Stop reason: HOSPADM

## 2017-12-31 RX ORDER — FERROUS SULFATE 325(65) MG
325 TABLET ORAL 2 TIMES DAILY WITH MEALS
Status: DISCONTINUED | OUTPATIENT
Start: 2017-12-31 | End: 2018-01-04 | Stop reason: HOSPADM

## 2017-12-31 RX ORDER — ALPRAZOLAM 0.25 MG/1
0.25 TABLET ORAL 2 TIMES DAILY PRN
Status: DISCONTINUED | OUTPATIENT
Start: 2017-12-31 | End: 2018-01-04 | Stop reason: HOSPADM

## 2017-12-31 RX ORDER — CLOPIDOGREL BISULFATE 75 MG/1
75 TABLET ORAL DAILY
Status: DISCONTINUED | OUTPATIENT
Start: 2017-12-31 | End: 2018-01-04 | Stop reason: HOSPADM

## 2017-12-31 RX ORDER — ECHINACEA PURPUREA EXTRACT 125 MG
2 TABLET ORAL AS NEEDED
Status: DISCONTINUED | OUTPATIENT
Start: 2017-12-31 | End: 2018-01-04 | Stop reason: HOSPADM

## 2017-12-31 RX ORDER — ACETAMINOPHEN 325 MG/1
650 TABLET ORAL EVERY 6 HOURS PRN
Status: DISCONTINUED | OUTPATIENT
Start: 2017-12-31 | End: 2018-01-04 | Stop reason: HOSPADM

## 2017-12-31 RX ORDER — PANTOPRAZOLE SODIUM 40 MG/1
40 TABLET, DELAYED RELEASE ORAL
Status: DISCONTINUED | OUTPATIENT
Start: 2017-12-31 | End: 2018-01-04 | Stop reason: HOSPADM

## 2017-12-31 RX ORDER — HEPARIN SODIUM 5000 [USP'U]/ML
5000 INJECTION, SOLUTION INTRAVENOUS; SUBCUTANEOUS EVERY 12 HOURS SCHEDULED
Status: DISCONTINUED | OUTPATIENT
Start: 2017-12-31 | End: 2018-01-04 | Stop reason: HOSPADM

## 2017-12-31 RX ORDER — DONEPEZIL HYDROCHLORIDE 5 MG/1
5 TABLET, FILM COATED ORAL NIGHTLY
Status: DISCONTINUED | OUTPATIENT
Start: 2017-12-31 | End: 2018-01-04 | Stop reason: HOSPADM

## 2017-12-31 RX ADMIN — METOPROLOL TARTRATE 12.5 MG: 25 TABLET, FILM COATED ORAL at 20:00

## 2017-12-31 RX ADMIN — ALPRAZOLAM 0.25 MG: 0.25 TABLET ORAL at 19:58

## 2017-12-31 RX ADMIN — CLOPIDOGREL BISULFATE 75 MG: 75 TABLET ORAL at 08:20

## 2017-12-31 RX ADMIN — DONEPEZIL HYDROCHLORIDE 5 MG: 5 TABLET ORAL at 20:00

## 2017-12-31 RX ADMIN — METOPROLOL TARTRATE 12.5 MG: 25 TABLET, FILM COATED ORAL at 08:16

## 2017-12-31 RX ADMIN — ALPRAZOLAM 0.25 MG: 0.25 TABLET ORAL at 14:30

## 2017-12-31 RX ADMIN — SODIUM CHLORIDE 75 ML/HR: 9 INJECTION, SOLUTION INTRAVENOUS at 03:10

## 2017-12-31 RX ADMIN — FLUTICASONE PROPIONATE 1 SPRAY: 50 SPRAY, METERED NASAL at 12:52

## 2017-12-31 RX ADMIN — HEPARIN SODIUM 5000 UNITS: 5000 INJECTION, SOLUTION INTRAVENOUS; SUBCUTANEOUS at 08:20

## 2017-12-31 RX ADMIN — PANTOPRAZOLE SODIUM 40 MG: 40 TABLET, DELAYED RELEASE ORAL at 06:45

## 2017-12-31 RX ADMIN — Medication 325 MG: at 08:20

## 2017-12-31 RX ADMIN — ROPINIROLE 0.5 MG: 0.5 TABLET, FILM COATED ORAL at 20:00

## 2017-12-31 RX ADMIN — CYCLOSPORINE 1 DROP: 0.5 EMULSION OPHTHALMIC at 08:20

## 2017-12-31 RX ADMIN — HEPARIN SODIUM 5000 UNITS: 5000 INJECTION, SOLUTION INTRAVENOUS; SUBCUTANEOUS at 20:00

## 2017-12-31 RX ADMIN — PANTOPRAZOLE SODIUM 40 MG: 40 TABLET, DELAYED RELEASE ORAL at 18:00

## 2017-12-31 RX ADMIN — LEVOTHYROXINE SODIUM 50 MCG: 50 TABLET ORAL at 06:45

## 2017-12-31 RX ADMIN — ATORVASTATIN CALCIUM 10 MG: 10 TABLET, FILM COATED ORAL at 20:00

## 2017-12-31 RX ADMIN — Medication 325 MG: at 18:00

## 2017-12-31 RX ADMIN — SALINE NASAL SPRAY 2 SPRAY: 1.5 SOLUTION NASAL at 16:20

## 2018-01-01 ENCOUNTER — APPOINTMENT (OUTPATIENT)
Dept: GENERAL RADIOLOGY | Facility: HOSPITAL | Age: 83
End: 2018-01-01

## 2018-01-01 LAB
ANION GAP SERPL CALCULATED.3IONS-SCNC: 10 MMOL/L (ref 3–11)
BUN BLD-MCNC: 15 MG/DL (ref 9–23)
BUN/CREAT SERPL: 13.6 (ref 7–25)
CALCIUM SPEC-SCNC: 9.1 MG/DL (ref 8.7–10.4)
CHLORIDE SERPL-SCNC: 99 MMOL/L (ref 99–109)
CO2 SERPL-SCNC: 21 MMOL/L (ref 20–31)
CREAT BLD-MCNC: 1.1 MG/DL (ref 0.6–1.3)
GFR SERPL CREATININE-BSD FRML MDRD: 47 ML/MIN/1.73
GLUCOSE BLD-MCNC: 102 MG/DL (ref 70–100)
POTASSIUM BLD-SCNC: 3.8 MMOL/L (ref 3.5–5.5)
SODIUM BLD-SCNC: 130 MMOL/L (ref 132–146)

## 2018-01-01 PROCEDURE — 71045 X-RAY EXAM CHEST 1 VIEW: CPT

## 2018-01-01 PROCEDURE — 99232 SBSQ HOSP IP/OBS MODERATE 35: CPT | Performed by: INTERNAL MEDICINE

## 2018-01-01 PROCEDURE — 80048 BASIC METABOLIC PNL TOTAL CA: CPT | Performed by: NURSE PRACTITIONER

## 2018-01-01 PROCEDURE — 25010000002 HEPARIN (PORCINE) PER 1000 UNITS: Performed by: NURSE PRACTITIONER

## 2018-01-01 RX ORDER — ALPRAZOLAM 0.25 MG/1
0.25 TABLET ORAL NIGHTLY
Status: DISCONTINUED | OUTPATIENT
Start: 2018-01-01 | End: 2018-01-04 | Stop reason: HOSPADM

## 2018-01-01 RX ORDER — OXYMETAZOLINE HYDROCHLORIDE 0.05 G/100ML
1 SPRAY NASAL 2 TIMES DAILY
Status: DISPENSED | OUTPATIENT
Start: 2018-01-01 | End: 2018-01-03

## 2018-01-01 RX ADMIN — Medication 325 MG: at 10:03

## 2018-01-01 RX ADMIN — PANTOPRAZOLE SODIUM 40 MG: 40 TABLET, DELAYED RELEASE ORAL at 05:44

## 2018-01-01 RX ADMIN — ROPINIROLE 0.5 MG: 0.5 TABLET, FILM COATED ORAL at 20:48

## 2018-01-01 RX ADMIN — ACETAMINOPHEN 650 MG: 325 TABLET, FILM COATED ORAL at 21:51

## 2018-01-01 RX ADMIN — PSYLLIUM HUSK 1 PACKET: 3.5 GRANULE ORAL at 16:17

## 2018-01-01 RX ADMIN — CYCLOSPORINE 1 DROP: 0.5 EMULSION OPHTHALMIC at 10:03

## 2018-01-01 RX ADMIN — FLUTICASONE PROPIONATE 1 SPRAY: 50 SPRAY, METERED NASAL at 10:03

## 2018-01-01 RX ADMIN — ATORVASTATIN CALCIUM 10 MG: 10 TABLET, FILM COATED ORAL at 20:49

## 2018-01-01 RX ADMIN — CLOPIDOGREL BISULFATE 75 MG: 75 TABLET ORAL at 10:03

## 2018-01-01 RX ADMIN — METOPROLOL TARTRATE 12.5 MG: 25 TABLET, FILM COATED ORAL at 20:48

## 2018-01-01 RX ADMIN — METOPROLOL TARTRATE 12.5 MG: 25 TABLET, FILM COATED ORAL at 10:02

## 2018-01-01 RX ADMIN — PANTOPRAZOLE SODIUM 40 MG: 40 TABLET, DELAYED RELEASE ORAL at 16:17

## 2018-01-01 RX ADMIN — LEVOTHYROXINE SODIUM 50 MCG: 50 TABLET ORAL at 05:44

## 2018-01-01 RX ADMIN — ALPRAZOLAM 0.25 MG: 0.25 TABLET ORAL at 20:48

## 2018-01-01 RX ADMIN — DONEPEZIL HYDROCHLORIDE 5 MG: 5 TABLET ORAL at 20:49

## 2018-01-01 RX ADMIN — HEPARIN SODIUM 5000 UNITS: 5000 INJECTION, SOLUTION INTRAVENOUS; SUBCUTANEOUS at 10:02

## 2018-01-01 RX ADMIN — Medication 325 MG: at 16:17

## 2018-01-01 RX ADMIN — ALPRAZOLAM 0.25 MG: 0.25 TABLET ORAL at 17:34

## 2018-01-01 RX ADMIN — HEPARIN SODIUM 5000 UNITS: 5000 INJECTION, SOLUTION INTRAVENOUS; SUBCUTANEOUS at 20:48

## 2018-01-01 NOTE — PROGRESS NOTES
"    [facility]  PROGRESS NOTE    Patient Name: Scott Sinha  : 4/10/1930  MRN: [id number]    Date of Admission: 2017  Length of Stay: 1  Primary Care Physician: CHRISTY Hair MD    Subjective   Subjective     CC:  soa and weakness     HPI:  In bed.  \"You can guess how I feel.  Miserable!\"  Nose is dripping.  Cough is annoying.  Slept poorly.  Had a panic attack last night.   Notes that she takes QHS xanax at home.     Review of Systems   Constitutional: Positive for appetite change and fatigue. Negative for fever.   HENT: Positive for congestion.    Respiratory: Positive for cough. Negative for shortness of breath.    Cardiovascular: Negative for chest pain, palpitations and leg swelling.   Gastrointestinal: Negative for constipation, diarrhea, nausea and vomiting.   Genitourinary: Negative for dysuria.       Otherwise ROS is negative except as mentioned in the HPI.    Objective   Objective     Vital Signs:   Temp:  [97.8 °F (36.6 °C)-98 °F (36.7 °C)] 98 °F (36.7 °C)  Heart Rate:  [82] 82  Resp:  [16-20] 16  BP: (110-147)/(51-75) 110/56        Physical Exam:  Constitutional: In bed, just ate breakfast.  Sniffling and coughing up sputum. Alert but looks ill.   Eyes: PERRLA, sclerae anicteric, no conjunctival injection  HENT: NCAT, mucous membranes moist, nose with discharge.   Neck: Supple, no thyromegaly, no lymphadenopathy, trachea midline  Respiratory: no wheeze, nonlabored at rest with oxygen off, few crackles R base.  Cardiovascular: RRR, no murmurs, rubs, or gallops, palpable pedal pulses bilaterally  Gastrointestinal: Positive bowel sounds, soft, nontender, nondistended  Musculoskeletal: No bilateral ankle edema, no clubbing or cyanosis to extremities  Psychiatric: Appropriate affect, cooperative  Neurologic: Oriented x 3, strength symmetric in all extremities, Cranial Nerves grossly intact to confrontation, speech clear  Skin: No rashes, pale       Results " Reviewed:  I have personally reviewed current lab, radiology, and data and agree.      Results from last 7 days  Lab Units 12/31/17  0230 12/30/17  1816 12/28/17  2356   WBC 10*3/mm3 9.28 9.11 8.13   HEMOGLOBIN g/dL 9.9* 10.0* 9.2*   HEMATOCRIT % 30.3* 30.4* 28.2*   PLATELETS 10*3/mm3 363 359 340       Results from last 7 days  Lab Units 01/01/18  0542 12/31/17  0230 12/30/17  1816 12/28/17  2356   SODIUM mmol/L 130* 127* 125* 124*   POTASSIUM mmol/L 3.8 3.7 3.8 4.3   CHLORIDE mmol/L 99 95* 90* 94*   CO2 mmol/L 21.0 25.0 26.0 23.0   BUN mg/dL 15 18 21 22   CREATININE mg/dL 1.10 1.20 1.30 1.30   GLUCOSE mg/dL 102* 114* 101* 112*   CALCIUM mg/dL 9.1 9.6 9.4 9.1   ALT (SGPT) U/L  --   --  14 18   AST (SGOT) U/L  --   --  26 26     BNP   Date Value Ref Range Status   12/30/2017 354.0 (H) 0.0 - 100.0 pg/mL Final     No results found for: PHART    Microbiology Results Abnormal     Procedure Component Value - Date/Time    Influenza A & B, RT PCR - Swab, Nasopharynx [[id number]]  (Normal) Collected:  12/31/17 1431    Lab Status:  Final result Specimen:  Swab from Nasopharynx Updated:  12/31/17 1543     Influenza A PCR Not Detected     Influenza B PCR Not Detected    Influenza Antigen, Rapid - Swab, Nasopharynx [[id number]]  (Normal) Collected:  12/30/17 2132    Lab Status:  Final result Specimen:  Swab from Nasopharynx Updated:  12/30/17 2150     Influenza A Ag, EIA Negative     Influenza B Ag, EIA Negative          Imaging Results (last 24 hours)     Procedure Component Value Units Date/Time    XR Chest 1 View [[id number]] Collected:  01/01/18 0859     Updated:  01/01/18 0945    Narrative:       EXAMINATION: XR CHEST 1 VW-      INDICATION: Shortness of air; I50.23-Acute on chronic systolic  (congestive) heart failure; E87.2-Ptpb-krbudfrcfj and hyponatremia;  E87.8-Other disorders of electrolyte and fluid balance, not elsewhere  classified; E86.0-Dehydration; R53.83-Other fatigue.      COMPARISON: Chest x-ray 12/30/2017.      FINDINGS: Cardiac silhouette enlarged. Pulmonary vascularity within  normal limits. No focal opacification or consolidation. No pneumothorax  or significant pleural effusion.           Impression:       No significant interval change without focal consolidation  or acute cardiopulmonary process identified.     D:  01/01/2018  E:  01/01/2018           Results for orders placed during the hospital encounter of 09/24/17   Adult Transthoracic Echo Complete W/ Cont if Necessary Per Protocol    Narrative · Left ventricular systolic function is moderately decreased. Estimated EF   = 25%.  · The left ventricular cavity is moderately dilated.  · Mild aortic valve regurgitation is present.  · Mild mitral valve regurgitation is present          I have reviewed the medications.    Assessment/Plan   Assessment / Plan     Hospital Problem List     * (Principal)Dehydration    Anemia    History of CVA (cerebrovascular accident)    Acute on chronic systolic heart failure    Hyponatremia    Hypothyroid             Brief Hospital Course to date:  Scott Sinha is a 87 y.o. female with a past medical history significant for chronic systolic dysfunction, hypertension, anemia of chronic disease, dementia, and history of CVA who presents with complaints of SOA. She was recently discharged from [facility] on 12/26/17 after admission for acute CHF and hyponatremia, and was readmitted 12/30 for ongoing SOA and cough despite taking her lasix at home. Hyponatremia and BNP elevation on admission but appeared dry..       Assessment & Plan:    URI  -- supportive care.  Too sick to be at home.    Hyponatremia  -- Na improved with IVF and holding lasix  -- decreased oral intake over last couple weeks   -- start supplement     CM, EF 25  -- holding lasix  -- on BB      DVT Prophylaxis:  Heparin, scuds, teds,     CODE STATUS: Full Code    Disposition: I expect the patient to be discharged tbd     Kimberli Mcdermott MD  01/01/18  10:32 AM

## 2018-01-01 NOTE — PLAN OF CARE
Problem: Patient Care Overview (Adult)  Goal: Plan of Care Review   01/01/18 0413   Coping/Psychosocial Response Interventions   Plan Of Care Reviewed With patient   Patient Care Overview   Progress no change   Outcome Evaluation   Outcome Summary/Follow up Plan VSS. Continues to have frequent cough, occ productive. Anxiety last pm, eased with Xanax.

## 2018-01-01 NOTE — PLAN OF CARE
Problem: Patient Care Overview (Adult)  Goal: Plan of Care Review  Outcome: Ongoing (interventions implemented as appropriate)   01/01/18 1530   Coping/Psychosocial Response Interventions   Plan Of Care Reviewed With patient   Patient Care Overview   Progress improving   Outcome Evaluation   Outcome Summary/Follow up Plan VSS, patient complaining of dry mouth, mouthwash used. Fiber stool softener given, no further complaints.      Goal: Adult Individualization and Mutuality  Outcome: Ongoing (interventions implemented as appropriate)    Goal: Discharge Needs Assessment  Outcome: Ongoing (interventions implemented as appropriate)      Problem: Fluid Volume Deficit (Adult)  Goal: Identify Related Risk Factors and Signs and Symptoms  Outcome: Ongoing (interventions implemented as appropriate)    Goal: Fluid/Electrolyte Balance  Outcome: Ongoing (interventions implemented as appropriate)    Goal: Comfort/Well Being  Outcome: Ongoing (interventions implemented as appropriate)

## 2018-01-02 LAB
ANION GAP SERPL CALCULATED.3IONS-SCNC: 7 MMOL/L (ref 3–11)
BUN BLD-MCNC: 14 MG/DL (ref 9–23)
BUN/CREAT SERPL: 11.7 (ref 7–25)
CALCIUM SPEC-SCNC: 9.2 MG/DL (ref 8.7–10.4)
CHLORIDE SERPL-SCNC: 99 MMOL/L (ref 99–109)
CO2 SERPL-SCNC: 23 MMOL/L (ref 20–31)
CREAT BLD-MCNC: 1.2 MG/DL (ref 0.6–1.3)
GFR SERPL CREATININE-BSD FRML MDRD: 42 ML/MIN/1.73
GLUCOSE BLD-MCNC: 90 MG/DL (ref 70–100)
POTASSIUM BLD-SCNC: 4 MMOL/L (ref 3.5–5.5)
SODIUM BLD-SCNC: 129 MMOL/L (ref 132–146)

## 2018-01-02 PROCEDURE — 99232 SBSQ HOSP IP/OBS MODERATE 35: CPT | Performed by: NURSE PRACTITIONER

## 2018-01-02 PROCEDURE — 25010000002 HEPARIN (PORCINE) PER 1000 UNITS: Performed by: NURSE PRACTITIONER

## 2018-01-02 PROCEDURE — 80048 BASIC METABOLIC PNL TOTAL CA: CPT | Performed by: INTERNAL MEDICINE

## 2018-01-02 RX ORDER — GUAIFENESIN 600 MG/1
600 TABLET, EXTENDED RELEASE ORAL EVERY 12 HOURS SCHEDULED
Status: DISCONTINUED | OUTPATIENT
Start: 2018-01-02 | End: 2018-01-04 | Stop reason: HOSPADM

## 2018-01-02 RX ORDER — BENZONATATE 100 MG/1
200 CAPSULE ORAL 3 TIMES DAILY PRN
Status: DISCONTINUED | OUTPATIENT
Start: 2018-01-02 | End: 2018-01-04 | Stop reason: HOSPADM

## 2018-01-02 RX ADMIN — PANTOPRAZOLE SODIUM 40 MG: 40 TABLET, DELAYED RELEASE ORAL at 04:44

## 2018-01-02 RX ADMIN — Medication 325 MG: at 09:14

## 2018-01-02 RX ADMIN — Medication 325 MG: at 17:48

## 2018-01-02 RX ADMIN — ALPRAZOLAM 0.25 MG: 0.25 TABLET ORAL at 19:36

## 2018-01-02 RX ADMIN — BENZONATATE 200 MG: 100 CAPSULE ORAL at 16:55

## 2018-01-02 RX ADMIN — ACETAMINOPHEN 650 MG: 325 TABLET, FILM COATED ORAL at 21:28

## 2018-01-02 RX ADMIN — ACETAMINOPHEN 650 MG: 325 TABLET, FILM COATED ORAL at 09:14

## 2018-01-02 RX ADMIN — DONEPEZIL HYDROCHLORIDE 5 MG: 5 TABLET ORAL at 20:20

## 2018-01-02 RX ADMIN — METOPROLOL TARTRATE 12.5 MG: 25 TABLET, FILM COATED ORAL at 20:20

## 2018-01-02 RX ADMIN — BENZONATATE 200 MG: 100 CAPSULE ORAL at 04:44

## 2018-01-02 RX ADMIN — ATORVASTATIN CALCIUM 10 MG: 10 TABLET, FILM COATED ORAL at 20:20

## 2018-01-02 RX ADMIN — FLUTICASONE PROPIONATE 1 SPRAY: 50 SPRAY, METERED NASAL at 09:15

## 2018-01-02 RX ADMIN — HEPARIN SODIUM 5000 UNITS: 5000 INJECTION, SOLUTION INTRAVENOUS; SUBCUTANEOUS at 09:15

## 2018-01-02 RX ADMIN — HEPARIN SODIUM 5000 UNITS: 5000 INJECTION, SOLUTION INTRAVENOUS; SUBCUTANEOUS at 20:19

## 2018-01-02 RX ADMIN — CYCLOSPORINE 1 DROP: 0.5 EMULSION OPHTHALMIC at 20:21

## 2018-01-02 RX ADMIN — ROPINIROLE 0.5 MG: 0.5 TABLET, FILM COATED ORAL at 20:20

## 2018-01-02 RX ADMIN — GUAIFENESIN 600 MG: 600 TABLET, EXTENDED RELEASE ORAL at 20:20

## 2018-01-02 RX ADMIN — PANTOPRAZOLE SODIUM 40 MG: 40 TABLET, DELAYED RELEASE ORAL at 17:48

## 2018-01-02 RX ADMIN — OXYMETAZOLINE HYDROCHLORIDE 1 SPRAY: 5 SPRAY NASAL at 09:15

## 2018-01-02 RX ADMIN — CYCLOSPORINE 1 DROP: 0.5 EMULSION OPHTHALMIC at 09:15

## 2018-01-02 RX ADMIN — GUAIFENESIN 600 MG: 600 TABLET, EXTENDED RELEASE ORAL at 11:45

## 2018-01-02 RX ADMIN — CLOPIDOGREL BISULFATE 75 MG: 75 TABLET ORAL at 09:14

## 2018-01-02 RX ADMIN — METOPROLOL TARTRATE 12.5 MG: 25 TABLET, FILM COATED ORAL at 09:14

## 2018-01-02 RX ADMIN — LEVOTHYROXINE SODIUM 50 MCG: 50 TABLET ORAL at 04:44

## 2018-01-02 RX ADMIN — ALPRAZOLAM 0.25 MG: 0.25 TABLET ORAL at 14:59

## 2018-01-02 NOTE — PLAN OF CARE
Problem: Patient Care Overview (Adult)  Goal: Plan of Care Review  Outcome: Ongoing (interventions implemented as appropriate)  Pt c/o a little anxiety today, walked in hallway & given Xanax around 230 to help. Pt states that she is normally up & about @ home, started Mucinex to help bring up secretions as well

## 2018-01-02 NOTE — PLAN OF CARE
Problem: Fluid Volume Deficit (Adult)  Goal: Fluid/Electrolyte Balance  Outcome: Ongoing (interventions implemented as appropriate)  Pt encouraged to take in fluids, did better job today.

## 2018-01-02 NOTE — PROGRESS NOTES
Discharge Planning Assessment  [facility]     Patient Name: Scott Sinha  MRN: [id number]  Today's Date: 1/2/2018    Admit Date: 12/30/2017          Discharge Needs Assessment       01/02/18 1625    Living Environment    Lives With child(chong), adult    Living Arrangements house    Home Accessibility no concerns    Type of Financial/Environmental Concern none    Transportation Available car;family or friend will provide    Living Environment    Provides Primary Care For no one    Quality Of Family Relationships supportive    Able to Return to Prior Living Arrangements yes    Discharge Needs Assessment    Concerns To Be Addressed no discharge needs identified    Anticipated Changes Related to Illness none    Equipment Currently Used at Home none    Equipment Needed After Discharge none            Discharge Plan       01/02/18 1625    Case Management/Social Work Plan    Plan Home    Patient/Family In Agreement With Plan yes    Additional Comments Pt is still independent in ADL's and IADL's. SW attempted to speak with pt today x2. SW will continue to follow for discharge needs.    Final Note    Final Note SW is following        Discharge Placement     No information found                Demographic Summary       01/02/18 1624    Referral Information    Arrived From home or self-care    Reason For Consult discharge planning    Primary Care Physician Information    Name Chalino Hair            Functional Status       01/02/18 1624    Functional Status Prior    Ambulation 0-->independent    Transferring 0-->independent    Toileting 0-->independent    Bathing 0-->independent    Dressing 0-->independent    Eating 0-->independent    Communication 0-->understands/communicates without difficulty    IADL    Medications independent    Meal Preparation independent    Housekeeping independent    Laundry independent    Shopping independent    Oral Care independent            Psychosocial     None            Abuse/Neglect     None             Legal     None            Substance Abuse     None            Patient Forms     None          LUPIS Engel

## 2018-01-02 NOTE — PROGRESS NOTES
"    [facility] Medicine Services  PROGRESS NOTE    Patient Name: Scott Sinha  : 4/10/1930  MRN: [id number]    Date of Admission: 2017  Length of Stay: 2  Primary Care Physician: CHRISTY Hair MD    Subjective   Subjective     CC:  soa and weakness     HPI:  Sitting up in bed, now with productive cough.  NAD, remains on room air.  Continues to have SOA intermittently.  Patient upset and doesn't understand what she needs to do with her home Lasix, \" they just can't get my dose right\".  Continues to have decreased appetite. Not feeling strong enough to go home today. Walked in hallway with RN today without complications.     Review of Systems   Constitutional: Positive for appetite change and fatigue. Negative for fever.   HENT: Positive for congestion.    Respiratory: Positive for cough. Negative for shortness of breath.    Cardiovascular: Negative for chest pain, palpitations and leg swelling.   Gastrointestinal: Negative for constipation, diarrhea, nausea and vomiting.   Genitourinary: Negative for dysuria.       Otherwise ROS is negative except as mentioned in the HPI.    Objective   Objective     Vital Signs:   Temp:  [97.3 °F (36.3 °C)-97.7 °F (36.5 °C)] 97.5 °F (36.4 °C)  Heart Rate:  [] 102  Resp:  [16-18] 16  BP: (127-130)/(55-66) 130/55        Physical Exam:  Constitutional: In bed.  Sniffling and coughing up sputum. Alert but looks ill.   Eyes: PERRLA, sclerae anicteric, no conjunctival injection  HENT: NCAT, mucous membranes moist, nose with clear discharge.   Neck: Supple, no thyromegaly, no lymphadenopathy, trachea midline  Respiratory: no wheeze, nonlabored at rest with oxygen off, Rhonchi bilateral bases. Coughing up yellow sputum   Cardiovascular: RRR, no murmurs, rubs, or gallops, palpable pedal pulses bilaterally  Gastrointestinal: Positive bowel sounds, soft, nontender, nondistended  Musculoskeletal: No bilateral ankle edema, no clubbing or cyanosis to " extremities  Psychiatric: Appropriate affect, cooperative  Neurologic: Oriented x 3, strength symmetric in all extremities, Cranial Nerves grossly intact to confrontation, speech clear  Skin: No rashes, pale       Results Reviewed:  I have personally reviewed current lab, radiology, and data and agree.      Results from last 7 days  Lab Units 12/31/17  0230 12/30/17  1816 12/28/17  2356   WBC 10*3/mm3 9.28 9.11 8.13   HEMOGLOBIN g/dL 9.9* 10.0* 9.2*   HEMATOCRIT % 30.3* 30.4* 28.2*   PLATELETS 10*3/mm3 363 359 340       Results from last 7 days  Lab Units 01/02/18  0749 01/01/18  0542 12/31/17  0230 12/30/17 1816 12/28/17  2356   SODIUM mmol/L 129* 130* 127* 125* 124*   POTASSIUM mmol/L 4.0 3.8 3.7 3.8 4.3   CHLORIDE mmol/L 99 99 95* 90* 94*   CO2 mmol/L 23.0 21.0 25.0 26.0 23.0   BUN mg/dL 14 15 18 21 22   CREATININE mg/dL 1.20 1.10 1.20 1.30 1.30   GLUCOSE mg/dL 90 102* 114* 101* 112*   CALCIUM mg/dL 9.2 9.1 9.6 9.4 9.1   ALT (SGPT) U/L  --   --   --  14 18   AST (SGOT) U/L  --   --   --  26 26     BNP   Date Value Ref Range Status   12/30/2017 354.0 (H) 0.0 - 100.0 pg/mL Final     No results found for: PHART    Microbiology Results Abnormal     Procedure Component Value - Date/Time    Influenza A & B, RT PCR - Swab, Nasopharynx [[id number]]  (Normal) Collected:  12/31/17 1431    Lab Status:  Final result Specimen:  Swab from Nasopharynx Updated:  12/31/17 1543     Influenza A PCR Not Detected     Influenza B PCR Not Detected    Influenza Antigen, Rapid - Swab, Nasopharynx [[id number]]  (Normal) Collected:  12/30/17 2132    Lab Status:  Final result Specimen:  Swab from Nasopharynx Updated:  12/30/17 2150     Influenza A Ag, EIA Negative     Influenza B Ag, EIA Negative          Imaging Results (last 24 hours)     Procedure Component Value Units Date/Time    XR Chest 1 View [[id number]] Collected:  01/01/18 0859     Updated:  01/01/18 1828    Narrative:       EXAMINATION: XR CHEST 1 VW-      INDICATION: Shortness of  air; I50.23-Acute on chronic systolic  (congestive) heart failure; E87.8-Aziq-mluodbalqf and hyponatremia;  E87.8-Other disorders of electrolyte and fluid balance, not elsewhere  classified; E86.0-Dehydration; R53.83-Other fatigue.      COMPARISON: Chest x-ray 12/30/2017.     FINDINGS: Cardiac silhouette enlarged. Pulmonary vascularity within  normal limits. No focal opacification or consolidation. No pneumothorax  or significant pleural effusion.           Impression:       No significant interval change without focal consolidation  or acute cardiopulmonary process identified.     D:  01/01/2018  E:  01/01/2018     This report was finalized on 1/1/2018 6:26 PM by Dr. Jadiel Chance.           Results for orders placed during the hospital encounter of 09/24/17   Adult Transthoracic Echo Complete W/ Cont if Necessary Per Protocol    Narrative · Left ventricular systolic function is moderately decreased. Estimated EF   = 25%.  · The left ventricular cavity is moderately dilated.  · Mild aortic valve regurgitation is present.  · Mild mitral valve regurgitation is present          I have reviewed the medications.    Assessment/Plan   Assessment / Plan     Hospital Problem List     * (Principal)Dehydration    Anemia    History of CVA (cerebrovascular accident)    Acute on chronic systolic heart failure    Hyponatremia    Hypothyroid             Brief Hospital Course to date:  Scott Sinha is a 87 y.o. female with a past medical history significant for chronic systolic dysfunction, hypertension, anemia of chronic disease, dementia, and history of CVA who presents with complaints of SOA. She was recently discharged from [facility] on 12/26/17 after admission for acute CHF and hyponatremia, and was readmitted 12/30 for ongoing SOA and cough despite taking her lasix at home. Hyponatremia and BNP elevation on admission but appeared dry..       Assessment & Plan:    URI  -- supportive care.  Too sick to be at home.  --Added  Mucinex    Hyponatremia  -- Na improved with IVF and holding lasix  -- decreased oral intake over last couple weeks   -- Continue to monitor    CM, EF 25  -- holding lasix  -- on BB      DVT Prophylaxis:  Heparin, scuds, teds,     CODE STATUS: Full Code    Disposition: I expect the patient to be discharged home in 1-2 days     Laly Islas, APRN  01/02/18  5:17 PM

## 2018-01-02 NOTE — NURSING NOTE
Patient Name:  Scott Sinha  :  4/10/1930  DOS:  18    Hospital Problem List     * (Principal)Dehydration    Anemia    History of CVA (cerebrovascular accident)    Acute on chronic systolic heart failure    Hyponatremia    Hypothyroid          Consult has been received.  Medical records have been reviewed.  Patient is a good candidate for the [facility].  Education provided.  Education time 15 minutes.  Patient to be scheduled follow up 1 week post discharge.    Echo Results:17  · Left ventricular systolic function is moderately decreased. Estimated EF = 25%.  · The left ventricular cavity is moderately dilated.  · Mild aortic valve regurgitation is present.  · Mild mitral valve regurgitation is present        NYHA Class:III    Heart Failure Quality Measures    ACE I, ARB, ARNI (if EF <40%)     If no contraindications:  Hypotension/syncope    Evidence-based Beta Blocker (EF<40%)    (Bisoprolol, Carvedilol, Metoprolol Succinate)  If no contraindications:  Other:  On metoprolol tartrate for rate control and PVCs    Aldosterone Antagonist (EF <40%)  If no contraindications:  Other:  hyponatremia/hypotension    Heart Failure Education    Signs / symptoms, Daily weight monitoring and Role of [facility] and when to call    If EF < 35%  Other:  Deemed not candidate for ICD

## 2018-01-02 NOTE — PLAN OF CARE
Problem: Patient Care Overview (Adult)  Goal: Plan of Care Review   01/02/18 0410   Coping/Psychosocial Response Interventions   Plan Of Care Reviewed With patient   Patient Care Overview   Progress no change   Outcome Evaluation   Outcome Summary/Follow up Plan VSS. Continues to cough at intervals. Rested fairly well. No new complaints.

## 2018-01-03 PROCEDURE — 25010000002 HEPARIN (PORCINE) PER 1000 UNITS: Performed by: NURSE PRACTITIONER

## 2018-01-03 PROCEDURE — 99232 SBSQ HOSP IP/OBS MODERATE 35: CPT | Performed by: INTERNAL MEDICINE

## 2018-01-03 RX ADMIN — GUAIFENESIN 600 MG: 600 TABLET, EXTENDED RELEASE ORAL at 21:02

## 2018-01-03 RX ADMIN — LEVOTHYROXINE SODIUM 50 MCG: 50 TABLET ORAL at 05:26

## 2018-01-03 RX ADMIN — PANTOPRAZOLE SODIUM 40 MG: 40 TABLET, DELAYED RELEASE ORAL at 18:30

## 2018-01-03 RX ADMIN — ATORVASTATIN CALCIUM 10 MG: 10 TABLET, FILM COATED ORAL at 21:07

## 2018-01-03 RX ADMIN — ROPINIROLE 0.5 MG: 0.5 TABLET, FILM COATED ORAL at 21:02

## 2018-01-03 RX ADMIN — CYCLOSPORINE 1 DROP: 0.5 EMULSION OPHTHALMIC at 08:36

## 2018-01-03 RX ADMIN — HEPARIN SODIUM 5000 UNITS: 5000 INJECTION, SOLUTION INTRAVENOUS; SUBCUTANEOUS at 21:02

## 2018-01-03 RX ADMIN — FLUTICASONE PROPIONATE 1 SPRAY: 50 SPRAY, METERED NASAL at 08:36

## 2018-01-03 RX ADMIN — GUAIFENESIN 600 MG: 600 TABLET, EXTENDED RELEASE ORAL at 21:07

## 2018-01-03 RX ADMIN — ACETAMINOPHEN 650 MG: 325 TABLET, FILM COATED ORAL at 23:51

## 2018-01-03 RX ADMIN — METOPROLOL TARTRATE 12.5 MG: 25 TABLET, FILM COATED ORAL at 21:11

## 2018-01-03 RX ADMIN — PANTOPRAZOLE SODIUM 40 MG: 40 TABLET, DELAYED RELEASE ORAL at 05:26

## 2018-01-03 RX ADMIN — GUAIFENESIN 600 MG: 600 TABLET, EXTENDED RELEASE ORAL at 08:36

## 2018-01-03 RX ADMIN — CLOPIDOGREL BISULFATE 75 MG: 75 TABLET ORAL at 08:36

## 2018-01-03 RX ADMIN — DONEPEZIL HYDROCHLORIDE 5 MG: 5 TABLET ORAL at 21:02

## 2018-01-03 RX ADMIN — ALPRAZOLAM 0.25 MG: 0.25 TABLET ORAL at 21:02

## 2018-01-03 RX ADMIN — HEPARIN SODIUM 5000 UNITS: 5000 INJECTION, SOLUTION INTRAVENOUS; SUBCUTANEOUS at 08:36

## 2018-01-03 RX ADMIN — Medication 325 MG: at 08:36

## 2018-01-03 RX ADMIN — ATORVASTATIN CALCIUM 10 MG: 10 TABLET, FILM COATED ORAL at 21:02

## 2018-01-03 RX ADMIN — ROPINIROLE 0.5 MG: 0.5 TABLET, FILM COATED ORAL at 21:07

## 2018-01-03 RX ADMIN — ALPRAZOLAM 0.25 MG: 0.25 TABLET ORAL at 08:39

## 2018-01-03 RX ADMIN — DONEPEZIL HYDROCHLORIDE 5 MG: 5 TABLET ORAL at 21:07

## 2018-01-03 RX ADMIN — ALPRAZOLAM 0.25 MG: 0.25 TABLET ORAL at 15:08

## 2018-01-03 RX ADMIN — Medication 325 MG: at 18:30

## 2018-01-03 RX ADMIN — BENZONATATE 200 MG: 100 CAPSULE ORAL at 21:01

## 2018-01-03 RX ADMIN — METOPROLOL TARTRATE 12.5 MG: 25 TABLET, FILM COATED ORAL at 08:36

## 2018-01-03 NOTE — PROGRESS NOTES
"    [facility] Medicine Services  PROGRESS NOTE    Patient Name: Scott Sinha  : 4/10/1930  MRN: [id number]    Date of Admission: 2017  Length of Stay: 3  Primary Care Physician: CHRISTY Hair MD    Subjective   Subjective     CC:  soa and weakness     HPI:  In bed getting a foot massage.  Doesn't feel much better than yesterday.  \"Can't go home until I'm stronger - I;ve got stairs to climb and kids to take care of.\"  Has teens at home.  Mouth has been dry since admission which is not her baseline.   No dyspnea but cough continues.     Review of Systems   Constitutional: Positive for appetite change and fatigue. Negative for fever.   HENT: Positive for congestion.    Respiratory: Positive for cough. Negative for shortness of breath.    Cardiovascular: Negative for chest pain, palpitations and leg swelling.   Gastrointestinal: Negative for constipation, diarrhea, nausea and vomiting.   Genitourinary: Negative for dysuria.       Otherwise ROS is negative except as mentioned in the HPI.    Objective   Objective     Vital Signs:   Temp:  [98 °F (36.7 °C)-98.5 °F (36.9 °C)] 98.2 °F (36.8 °C)  Heart Rate:  [71-93] 87  Resp:  [16-20] 16  BP: (123-140)/(61-91) 123/86        Physical Exam:  Constitutional: In bed.  Looks much better than yesterday. Not currently sniffling or coughing.   Eyes: PERRLA, sclerae anicteric, no conjunctival injection  HENT: NCAT, mucous membranes dry, nose with clear discharge.   Neck: Supple, no LAD, trachea midline  Respiratory: no wheeze, nonlabored at rest with oxygen off, currently no rhonchi or wheeze  Cardiovascular: RRR, no murmurs, rubs, or gallops,   Gastrointestinal: Positive bowel sounds, soft, nontender, nondistended  Musculoskeletal: No bilateral ankle edema, no clubbing or cyanosis to extremities  Psychiatric: Appropriate affect, cooperative  Neurologic: Oriented x 3, strength symmetric in all extremities, Cranial Nerves grossly intact to " confrontation, speech clear  Skin: No rashes, pale       Results Reviewed:  I have personally reviewed current lab, radiology, and data and agree.      Results from last 7 days  Lab Units 12/31/17  0230 12/30/17  1816 12/28/17  2356   WBC 10*3/mm3 9.28 9.11 8.13   HEMOGLOBIN g/dL 9.9* 10.0* 9.2*   HEMATOCRIT % 30.3* 30.4* 28.2*   PLATELETS 10*3/mm3 363 359 340       Results from last 7 days  Lab Units 01/02/18  0749 01/01/18  0542 12/31/17  0230 12/30/17  1816 12/28/17  2356   SODIUM mmol/L 129* 130* 127* 125* 124*   POTASSIUM mmol/L 4.0 3.8 3.7 3.8 4.3   CHLORIDE mmol/L 99 99 95* 90* 94*   CO2 mmol/L 23.0 21.0 25.0 26.0 23.0   BUN mg/dL 14 15 18 21 22   CREATININE mg/dL 1.20 1.10 1.20 1.30 1.30   GLUCOSE mg/dL 90 102* 114* 101* 112*   CALCIUM mg/dL 9.2 9.1 9.6 9.4 9.1   ALT (SGPT) U/L  --   --   --  14 18   AST (SGOT) U/L  --   --   --  26 26     No results found for: BNP  No results found for: PHART    Microbiology Results Abnormal     Procedure Component Value - Date/Time    Influenza A & B, RT PCR - Swab, Nasopharynx [[id number]]  (Normal) Collected:  12/31/17 1431    Lab Status:  Final result Specimen:  Swab from Nasopharynx Updated:  12/31/17 1543     Influenza A PCR Not Detected     Influenza B PCR Not Detected    Influenza Antigen, Rapid - Swab, Nasopharynx [[id number]]  (Normal) Collected:  12/30/17 2132    Lab Status:  Final result Specimen:  Swab from Nasopharynx Updated:  12/30/17 2150     Influenza A Ag, EIA Negative     Influenza B Ag, EIA Negative          Imaging Results (last 24 hours)     ** No results found for the last 24 hours. **        Results for orders placed during the hospital encounter of 09/24/17   Adult Transthoracic Echo Complete W/ Cont if Necessary Per Protocol    Narrative · Left ventricular systolic function is moderately decreased. Estimated EF   = 25%.  · The left ventricular cavity is moderately dilated.  · Mild aortic valve regurgitation is present.  · Mild mitral valve  regurgitation is present          I have reviewed the medications.    Assessment/Plan   Assessment / Plan     Hospital Problem List     * (Principal)Dehydration    Anemia    History of CVA (cerebrovascular accident)    Acute on chronic systolic heart failure    Hyponatremia    Hypothyroid             Brief Hospital Course to date:  Scott Sinha is a 87 y.o. female with a past medical history significant for chronic systolic dysfunction, hypertension, anemia of chronic disease, dementia, and history of CVA who presents with complaints of SOA. She was recently discharged from [facility] on 12/26/17 after admission for acute CHF and hyponatremia, and was readmitted 12/30 for ongoing SOA and cough despite taking her lasix at home. Hyponatremia and BNP elevation on admission but appeared dry..       Assessment & Plan:    URI  -- supportive care.  Too sick to be at home.  --Added Mucinex    Hyponatremia  -- Na improved with IVF and holding lasix  -- decreased oral intake over last couple weeks   -- Continue to monitor    CM, EF 25  -- holding lasix - mouth is still dry  -- on BB  -- encouraged to drink a bit more fluids      DVT Prophylaxis:  Heparin, scuds, teds,     CODE STATUS: Full Code    Disposition: I expect the patient to be discharged home in 1 day.  Just trying to regain strength.     Kimberli Mcdermott MD  01/03/18  2:56 PM

## 2018-01-03 NOTE — PLAN OF CARE
Problem: Patient Care Overview (Adult)  Goal: Plan of Care Review   01/03/18 0429   Coping/Psychosocial Response Interventions   Plan Of Care Reviewed With patient   Patient Care Overview   Progress no change   Outcome Evaluation   Outcome Summary/Follow up Plan VSS. Cough less tonight, rested better.

## 2018-01-04 VITALS
BODY MASS INDEX: 20.55 KG/M2 | HEART RATE: 81 BPM | DIASTOLIC BLOOD PRESSURE: 60 MMHG | WEIGHT: 120.4 LBS | HEIGHT: 64 IN | TEMPERATURE: 98.6 F | RESPIRATION RATE: 16 BRPM | OXYGEN SATURATION: 96 % | SYSTOLIC BLOOD PRESSURE: 113 MMHG

## 2018-01-04 PROBLEM — J06.9 URI (UPPER RESPIRATORY INFECTION): Status: ACTIVE | Noted: 2018-01-04

## 2018-01-04 PROBLEM — I50.22 CHRONIC SYSTOLIC HEART FAILURE: Status: ACTIVE | Noted: 2017-12-25

## 2018-01-04 LAB
ANION GAP SERPL CALCULATED.3IONS-SCNC: 9 MMOL/L (ref 3–11)
BUN BLD-MCNC: 16 MG/DL (ref 9–23)
BUN/CREAT SERPL: 13.3 (ref 7–25)
CALCIUM SPEC-SCNC: 9 MG/DL (ref 8.7–10.4)
CHLORIDE SERPL-SCNC: 94 MMOL/L (ref 99–109)
CO2 SERPL-SCNC: 24 MMOL/L (ref 20–31)
CREAT BLD-MCNC: 1.2 MG/DL (ref 0.6–1.3)
GFR SERPL CREATININE-BSD FRML MDRD: 42 ML/MIN/1.73
GLUCOSE BLD-MCNC: 96 MG/DL (ref 70–100)
POTASSIUM BLD-SCNC: 4.1 MMOL/L (ref 3.5–5.5)
SODIUM BLD-SCNC: 127 MMOL/L (ref 132–146)

## 2018-01-04 PROCEDURE — 80048 BASIC METABOLIC PNL TOTAL CA: CPT | Performed by: INTERNAL MEDICINE

## 2018-01-04 PROCEDURE — 99239 HOSP IP/OBS DSCHRG MGMT >30: CPT | Performed by: HOSPITALIST

## 2018-01-04 PROCEDURE — 25010000002 HEPARIN (PORCINE) PER 1000 UNITS: Performed by: NURSE PRACTITIONER

## 2018-01-04 RX ORDER — FLUTICASONE PROPIONATE 50 MCG
1 SPRAY, SUSPENSION (ML) NASAL DAILY
Qty: 1 BOTTLE | Refills: 0 | Status: SHIPPED | OUTPATIENT
Start: 2018-01-05 | End: 2018-02-04

## 2018-01-04 RX ORDER — GUAIFENESIN 600 MG/1
600 TABLET, EXTENDED RELEASE ORAL EVERY 12 HOURS SCHEDULED
Qty: 10 TABLET | Refills: 0 | Status: ON HOLD | OUTPATIENT
Start: 2018-01-04 | End: 2018-06-15

## 2018-01-04 RX ORDER — BENZONATATE 200 MG/1
200 CAPSULE ORAL 3 TIMES DAILY PRN
Qty: 15 CAPSULE | Refills: 0 | Status: ON HOLD | OUTPATIENT
Start: 2018-01-04 | End: 2018-06-15

## 2018-01-04 RX ADMIN — Medication 325 MG: at 08:31

## 2018-01-04 RX ADMIN — FLUTICASONE PROPIONATE 1 SPRAY: 50 SPRAY, METERED NASAL at 08:31

## 2018-01-04 RX ADMIN — CYCLOSPORINE 1 DROP: 0.5 EMULSION OPHTHALMIC at 05:54

## 2018-01-04 RX ADMIN — HEPARIN SODIUM 5000 UNITS: 5000 INJECTION, SOLUTION INTRAVENOUS; SUBCUTANEOUS at 08:31

## 2018-01-04 RX ADMIN — CLOPIDOGREL BISULFATE 75 MG: 75 TABLET ORAL at 08:31

## 2018-01-04 RX ADMIN — METOPROLOL TARTRATE 12.5 MG: 25 TABLET, FILM COATED ORAL at 08:31

## 2018-01-04 RX ADMIN — PANTOPRAZOLE SODIUM 40 MG: 40 TABLET, DELAYED RELEASE ORAL at 05:47

## 2018-01-04 RX ADMIN — GUAIFENESIN 600 MG: 600 TABLET, EXTENDED RELEASE ORAL at 08:31

## 2018-01-04 RX ADMIN — CYCLOSPORINE 1 DROP: 0.5 EMULSION OPHTHALMIC at 08:32

## 2018-01-04 RX ADMIN — LEVOTHYROXINE SODIUM 50 MCG: 50 TABLET ORAL at 05:47

## 2018-01-04 NOTE — PLAN OF CARE
Problem: Fluid Volume Deficit (Adult)  Goal: Fluid/Electrolyte Balance  Outcome: Ongoing (interventions implemented as appropriate)  Pt had great intake today & output as well.

## 2018-01-04 NOTE — DISCHARGE SUMMARY
"    [facility] Medicine Services  DISCHARGE SUMMARY    Patient Name: Scott Sinha  : 4/10/1930  MRN: [id number]    Date of Admission: 2017  Date of Discharge:  18  Primary Care Physician: CHRISTY Hair MD    Consults     No orders found from 2017 to 2017.        Hospital Course     Presenting Problem:   Acute on chronic systolic heart failure [I50.23]  Hyponatremia [E87.1]    Active Hospital Problems (** Indicates Principal Problem)    Diagnosis Date Noted   • **URI (upper respiratory infection) [J06.9] 2018   • Hyponatremia [E87.1] 2017   • Hypothyroid [E03.9] 2017   • Chronic systolic heart failure [I50.22] 2017   • History of CVA (cerebrovascular accident) [Z86.73] 2017   • Dehydration [E86.0] 2017   • Anemia [D64.9] 2017      Resolved Hospital Problems    Diagnosis Date Noted Date Resolved   No resolved problems to display.          Hospital Course:  Scott Sinha is a 87 y.o. female with hx of chronic systolic heart failure presents with SOA and cough. She was recently discharged from [facility] on 17 after admission for acute CHF and hyponatremia. Upon readmission, she appeared clinically dry. She has not been taking in much PO recently. Hyponatremia improved with fluids and holding Lasix. She was felt to have a viral URI and treated with supportive measures. Patient will be discharged home today with Mucinex, Tessalon perles, and Flonase nasal spray for continued symptom management. Encouraged her to get plenty of rest and drink fluids, but keep an eye on her weight and if she starts gaining more than 3 lbs of her dry weight, to take her Lasix. She says \"it is a fine line\" between dehydration and volume overload, and seems to understand this. Son will come to pick her up today. Will need to check a BMP again by 18 to monitor her hyponatremia which seems to be chronic. Follow up with PCP in 1 week.          "   Day of Discharge     HPI:   Patient seen this morning. Cough is better but still feels she has mucus that needs to come up. Has ambulated in room and hallway. Feels well enough to go home today.     Review of Systems  Gen-no fevers, no chills  CV-no chest pain, no palpitations  Resp-+cough, no dyspnea  GI-no N/V/D, no abd pain      Otherwise ROS is negative except as mentioned in the HPI.    Vital Signs:   Temp:  [97.8 °F (36.6 °C)-98.6 °F (37 °C)] 98.6 °F (37 °C)  Heart Rate:  [63-96] 81  Resp:  [15-20] 16  BP: (106-131)/(53-68) 113/60     Physical Exam:  Gen-no acute distress  CV-RRR, S1 S2 normal, no m/r/g  Resp-CTAB, no wheezes, +cough  Abd-soft, NT, ND, +BS  Ext-no edema  Neuro-A&Ox3, no focal deficits  Psych-appropriate mood      Pertinent  and/or Most Recent Results       Results from last 7 days  Lab Units 01/04/18  0451 01/02/18  0749 01/01/18  0542 12/31/17  0230 12/30/17  1816 12/28/17  2356   WBC 10*3/mm3  --   --   --  9.28 9.11 8.13   HEMOGLOBIN g/dL  --   --   --  9.9* 10.0* 9.2*   HEMATOCRIT %  --   --   --  30.3* 30.4* 28.2*   PLATELETS 10*3/mm3  --   --   --  363 359 340   SODIUM mmol/L 127* 129* 130* 127* 125* 124*   POTASSIUM mmol/L 4.1 4.0 3.8 3.7 3.8 4.3   CHLORIDE mmol/L 94* 99 99 95* 90* 94*   CO2 mmol/L 24.0 23.0 21.0 25.0 26.0 23.0   BUN mg/dL 16 14 15 18 21 22   CREATININE mg/dL 1.20 1.20 1.10 1.20 1.30 1.30   GLUCOSE mg/dL 96 90 102* 114* 101* 112*   CALCIUM mg/dL 9.0 9.2 9.1 9.6 9.4 9.1       Results from last 7 days  Lab Units 12/30/17  1816 12/28/17  2356   BILIRUBIN mg/dL 0.2* 0.2*   ALK PHOS U/L 53 51   ALT (SGPT) U/L 14 18   AST (SGOT) U/L 26 26           Invalid input(s): TG, LDLREALC    Results from last 7 days  Lab Units 12/30/17  1816 12/28/17  2356   BNP pg/mL 354.0* 592.0*     Brief Urine Lab Results  (Last result in the past 365 days)      Color   Clarity   Blood   Leuk Est   Nitrite   Protein   CREAT   Urine HCG        12/30/17 2118 Yellow Clear Negative Negative Negative  Negative               Microbiology Results Abnormal     Procedure Component Value - Date/Time    Influenza A & B, RT PCR - Swab, Nasopharynx [[id number]]  (Normal) Collected:  12/31/17 1431    Lab Status:  Final result Specimen:  Swab from Nasopharynx Updated:  12/31/17 1543     Influenza A PCR Not Detected     Influenza B PCR Not Detected    Influenza Antigen, Rapid - Swab, Nasopharynx [[id number]]  (Normal) Collected:  12/30/17 2132    Lab Status:  Final result Specimen:  Swab from Nasopharynx Updated:  12/30/17 2150     Influenza A Ag, EIA Negative     Influenza B Ag, EIA Negative          Imaging Results (all)     Procedure Component Value Units Date/Time    XR Chest 1 View [[id number]] Collected:  12/30/17 1807     Updated:  12/30/17 2021    Narrative:       EXAM:    XR Chest, 1 View    CLINICAL HISTORY:    87 years old, female; Pain; Other: SOA; Additional info: SOA triage protocol    TECHNIQUE:    Frontal view of the chest.    COMPARISON:    CR - XR CHEST 1 VW 2017-12-28 23:58    FINDINGS:    Lungs:  Unremarkable.  No consolidation.    Pleural space:  Unremarkable.  No pneumothorax.    Heart:  The heart demonstrates mild diffuse enlargement.    Mediastinum:  Unremarkable.    Bones/joints:  Unremarkable.      Impression:         No acute findings.    THIS DOCUMENT HAS BEEN ELECTRONICALLY SIGNED BY INEZ ARMIJO MD    XR Chest 1 View [[id number]] Collected:  01/01/18 0859     Updated:  01/01/18 1828    Narrative:       EXAMINATION: XR CHEST 1 VW-      INDICATION: Shortness of air; I50.23-Acute on chronic systolic  (congestive) heart failure; E87.8-Tevd-gaqbsmpwtv and hyponatremia;  E87.8-Other disorders of electrolyte and fluid balance, not elsewhere  classified; E86.0-Dehydration; R53.83-Other fatigue.      COMPARISON: Chest x-ray 12/30/2017.     FINDINGS: Cardiac silhouette enlarged. Pulmonary vascularity within  normal limits. No focal opacification or consolidation. No pneumothorax  or significant pleural  effusion.           Impression:       No significant interval change without focal consolidation  or acute cardiopulmonary process identified.     D:  01/01/2018  E:  01/01/2018     This report was finalized on 1/1/2018 6:26 PM by Dr. Jadiel Chance.                   Discharge Details      Scott Sinha   Home Medication Instructions GIOVANNA:[id number]    Printed on:01/04/18 1229   Medication Information                      ALPRAZolam (XANAX) 0.25 MG tablet  Take 0.25 mg by mouth 2 (Two) Times a Day As Needed for Anxiety.             atorvastatin (LIPITOR) 10 MG tablet  Take 1 tablet by mouth Daily.             benzonatate (TESSALON) 200 MG capsule  Take 1 capsule by mouth 3 (Three) Times a Day As Needed for Cough.             clopidogrel (PLAVIX) 75 MG tablet  TAKE 1 TABLET BY MOUTH EVERY DAY             cycloSPORINE (RESTASIS) 0.05 % ophthalmic emulsion  Administer 1 drop to both eyes Every 12 (Twelve) Hours.             dexlansoprazole (DEXILANT) 60 MG capsule  Take 60 mg by mouth Daily.             donepezil (ARICEPT) 5 MG tablet  TAKE 1 TABLET BY MOUTH EVERY DAY             ferrous sulfate 325 (65 FE) MG tablet  Take 1 tablet by mouth 2 (Two) Times a Day With Meals for 30 days.             fluticasone (FLONASE) 50 MCG/ACT nasal spray  1 spray by Each Nare route Daily for 30 days.             guaiFENesin (MUCINEX) 600 MG 12 hr tablet  Take 1 tablet by mouth Every 12 (Twelve) Hours.             levothyroxine (SYNTHROID, LEVOTHROID) 50 MCG tablet  Take 50 mcg by mouth Daily.             metoprolol tartrate (LOPRESSOR) 25 MG tablet  Take 0.5 tablets by mouth Every 12 (Twelve) Hours.             Multiple Vitamins-Minerals (MULTI COMPLETE PO)  Take  by mouth.             Omega-3 Fatty Acids (FISH OIL) 1000 MG capsule capsule  Take  by mouth Daily With Breakfast.             rOPINIRole (REQUIP) 0.5 MG tablet  Take 0.5 mg by mouth Daily. Take 1 hour before bedtime.             vitamin C (VITAMIN C) 500 MG tablet  Take 1  tablet by mouth 2 (Two) Times a Day for 30 days.                   Discharge Disposition:  Home or Self Care    Discharge Diet:  Diet Instructions     Advance Diet As Tolerated                     Discharge Activity:   Activity Instructions     Activity as Tolerated                         Future Appointments  Date Time Provider Department Center   11/16/2018 1:15 PM Jose Antonio Aragon MD [facility] None       Additional Instructions for the Follow-ups that You Need to Schedule     Discharge Follow-up with PCP    As directed    Follow Up Details:  1 week                     Time Spent on Discharge:  40 mins    Yadira Morton MD  01/04/18  12:24 PM

## 2018-01-04 NOTE — PLAN OF CARE
Problem: Patient Care Overview (Adult)  Goal: Plan of Care Review  Outcome: Ongoing (interventions implemented as appropriate)   01/04/18 0449   Coping/Psychosocial Response Interventions   Plan Of Care Reviewed With patient   Patient Care Overview   Progress improving   Outcome Evaluation   Outcome Summary/Follow up Plan Pt's vitals stable, remained on room majority of the night, 1ltr NC placed on pt this am per pt's comfort. Pt 's cough less frequent. Pt had no events through out the night.      Goal: Discharge Needs Assessment  Outcome: Ongoing (interventions implemented as appropriate)   01/01/18 1530 01/02/18 1625   Discharge Needs Assessment   Concerns To Be Addressed --  no discharge needs identified   Discharge Disposition still a patient --    Living Environment   Transportation Available --  car;family or friend will provide       Problem: Fluid Volume Deficit (Adult)  Goal: Identify Related Risk Factors and Signs and Symptoms  Outcome: Ongoing (interventions implemented as appropriate)   01/01/18 1530 01/04/18 0449   Fluid Volume Deficit   Fluid Volume Deficit: Related Risk Factors age extremes;cognitive impairment --    Signs and Symptoms (Fluid Volume Deficit) --  muscle weakness;thirst     Goal: Fluid/Electrolyte Balance  Outcome: Ongoing (interventions implemented as appropriate)   01/04/18 0449   Fluid Volume Deficit (Adult)   Fluid/Electrolyte Balance achieves outcome     Goal: Comfort/Well Being  Outcome: Ongoing (interventions implemented as appropriate)   01/04/18 0449   Fluid Volume Deficit (Adult)   Comfort/Well Being achieves outcome

## 2018-01-05 NOTE — PROGRESS NOTES
Continued Stay Note  [facility]     Patient Name: Scott Sinha  MRN: [id number]  Today's Date: 1/5/2018    Admit Date: 12/30/2017          Discharge Plan     Consent obtained for the participation in the [facility] Transitions Program. Keri Fairchild RN                Discharge Codes     None        Expected Discharge Date and Time     Expected Discharge Date Expected Discharge Time    Jan 4, 2018             Keri Fairchild RN

## 2018-06-13 ENCOUNTER — APPOINTMENT (OUTPATIENT)
Dept: CT IMAGING | Facility: HOSPITAL | Age: 83
End: 2018-06-13

## 2018-06-13 ENCOUNTER — APPOINTMENT (OUTPATIENT)
Dept: GENERAL RADIOLOGY | Facility: HOSPITAL | Age: 83
End: 2018-06-13

## 2018-06-13 ENCOUNTER — HOSPITAL ENCOUNTER (OUTPATIENT)
Facility: HOSPITAL | Age: 83
Setting detail: OBSERVATION
Discharge: HOME | End: 2018-06-16
Attending: EMERGENCY MEDICINE | Admitting: HOSPITALIST

## 2018-06-13 DIAGNOSIS — R06.00 DYSPNEA, UNSPECIFIED TYPE: Primary | ICD-10-CM

## 2018-06-13 DIAGNOSIS — I31.39 PERICARDIAL EFFUSION: ICD-10-CM

## 2018-06-13 DIAGNOSIS — I50.9 ACUTE ON CHRONIC CONGESTIVE HEART FAILURE, UNSPECIFIED CONGESTIVE HEART FAILURE TYPE: ICD-10-CM

## 2018-06-13 DIAGNOSIS — D64.9 ANEMIA, UNSPECIFIED TYPE: ICD-10-CM

## 2018-06-13 LAB
ALBUMIN SERPL-MCNC: 4.36 G/DL (ref 3.2–4.8)
ALBUMIN/GLOB SERPL: 1.3 G/DL (ref 1.5–2.5)
ALP SERPL-CCNC: 52 U/L (ref 25–100)
ALT SERPL W P-5'-P-CCNC: 11 U/L (ref 7–40)
ANION GAP SERPL CALCULATED.3IONS-SCNC: 8 MMOL/L (ref 3–11)
AST SERPL-CCNC: 22 U/L (ref 0–33)
BASOPHILS # BLD AUTO: 0.07 10*3/MM3 (ref 0–0.2)
BASOPHILS NFR BLD AUTO: 0.9 % (ref 0–1)
BILIRUB SERPL-MCNC: 0.3 MG/DL (ref 0.3–1.2)
BNP SERPL-MCNC: 616 PG/ML (ref 0–100)
BUN BLD-MCNC: 14 MG/DL (ref 9–23)
BUN/CREAT SERPL: 11.9 (ref 7–25)
CALCIUM SPEC-SCNC: 9.3 MG/DL (ref 8.7–10.4)
CHLORIDE SERPL-SCNC: 94 MMOL/L (ref 99–109)
CO2 SERPL-SCNC: 24 MMOL/L (ref 20–31)
CREAT BLD-MCNC: 1.18 MG/DL (ref 0.6–1.3)
DEPRECATED RDW RBC AUTO: 45.5 FL (ref 37–54)
EOSINOPHIL # BLD AUTO: 0.21 10*3/MM3 (ref 0–0.3)
EOSINOPHIL NFR BLD AUTO: 2.7 % (ref 0–3)
ERYTHROCYTE [DISTWIDTH] IN BLOOD BY AUTOMATED COUNT: 15 % (ref 11.3–14.5)
GFR SERPL CREATININE-BSD FRML MDRD: 43 ML/MIN/1.73
GLOBULIN UR ELPH-MCNC: 3.2 GM/DL
GLUCOSE BLD-MCNC: 113 MG/DL (ref 70–100)
HCT VFR BLD AUTO: 29.3 % (ref 34.5–44)
HGB BLD-MCNC: 9.5 G/DL (ref 11.5–15.5)
HOLD SPECIMEN: NORMAL
HOLD SPECIMEN: NORMAL
IMM GRANULOCYTES # BLD: 0.01 10*3/MM3 (ref 0–0.03)
IMM GRANULOCYTES NFR BLD: 0.1 % (ref 0–0.6)
LYMPHOCYTES # BLD AUTO: 1.1 10*3/MM3 (ref 0.6–4.8)
LYMPHOCYTES NFR BLD AUTO: 14.1 % (ref 24–44)
MCH RBC QN AUTO: 27 PG (ref 27–31)
MCHC RBC AUTO-ENTMCNC: 32.4 G/DL (ref 32–36)
MCV RBC AUTO: 83.2 FL (ref 80–99)
MONOCYTES # BLD AUTO: 0.73 10*3/MM3 (ref 0–1)
MONOCYTES NFR BLD AUTO: 9.3 % (ref 0–12)
NEUTROPHILS # BLD AUTO: 5.7 10*3/MM3 (ref 1.5–8.3)
NEUTROPHILS NFR BLD AUTO: 73 % (ref 41–71)
PLATELET # BLD AUTO: 272 10*3/MM3 (ref 150–450)
PMV BLD AUTO: 11 FL (ref 6–12)
POTASSIUM BLD-SCNC: 4.6 MMOL/L (ref 3.5–5.5)
PROT SERPL-MCNC: 7.6 G/DL (ref 5.7–8.2)
RBC # BLD AUTO: 3.52 10*6/MM3 (ref 3.89–5.14)
SODIUM BLD-SCNC: 126 MMOL/L (ref 132–146)
TROPONIN I SERPL-MCNC: 0 NG/ML (ref 0–0.07)
WBC NRBC COR # BLD: 7.81 10*3/MM3 (ref 3.5–10.8)
WHOLE BLOOD HOLD SPECIMEN: NORMAL
WHOLE BLOOD HOLD SPECIMEN: NORMAL

## 2018-06-13 PROCEDURE — 84484 ASSAY OF TROPONIN QUANT: CPT

## 2018-06-13 PROCEDURE — 93005 ELECTROCARDIOGRAM TRACING: CPT | Performed by: EMERGENCY MEDICINE

## 2018-06-13 PROCEDURE — 80053 COMPREHEN METABOLIC PANEL: CPT | Performed by: EMERGENCY MEDICINE

## 2018-06-13 PROCEDURE — 96374 THER/PROPH/DIAG INJ IV PUSH: CPT

## 2018-06-13 PROCEDURE — 71045 X-RAY EXAM CHEST 1 VIEW: CPT

## 2018-06-13 PROCEDURE — 99284 EMERGENCY DEPT VISIT MOD MDM: CPT

## 2018-06-13 PROCEDURE — 0 IOPAMIDOL PER 1 ML: Performed by: EMERGENCY MEDICINE

## 2018-06-13 PROCEDURE — 71275 CT ANGIOGRAPHY CHEST: CPT

## 2018-06-13 PROCEDURE — 85025 COMPLETE CBC W/AUTO DIFF WBC: CPT | Performed by: EMERGENCY MEDICINE

## 2018-06-13 PROCEDURE — 83880 ASSAY OF NATRIURETIC PEPTIDE: CPT | Performed by: EMERGENCY MEDICINE

## 2018-06-13 RX ORDER — GABAPENTIN 300 MG/1
300 CAPSULE ORAL 3 TIMES DAILY
Status: ON HOLD | COMMUNITY
End: 2018-06-19

## 2018-06-13 RX ORDER — MECLIZINE HCL 12.5 MG/1
12.5 TABLET ORAL 3 TIMES DAILY PRN
COMMUNITY
End: 2022-03-03

## 2018-06-13 RX ORDER — SODIUM CHLORIDE 0.9 % (FLUSH) 0.9 %
10 SYRINGE (ML) INJECTION AS NEEDED
Status: DISCONTINUED | OUTPATIENT
Start: 2018-06-13 | End: 2018-06-16 | Stop reason: HOSPADM

## 2018-06-13 RX ADMIN — IOPAMIDOL 70 ML: 755 INJECTION, SOLUTION INTRAVENOUS at 23:34

## 2018-06-14 ENCOUNTER — APPOINTMENT (OUTPATIENT)
Dept: CARDIOLOGY | Facility: HOSPITAL | Age: 83
End: 2018-06-14
Attending: HOSPITALIST

## 2018-06-14 PROBLEM — I50.23 ACUTE ON CHRONIC SYSTOLIC HEART FAILURE (HCC): Status: ACTIVE | Noted: 2018-06-14

## 2018-06-14 PROBLEM — E87.1 HYPONATREMIA: Status: ACTIVE | Noted: 2018-06-14

## 2018-06-14 PROBLEM — I31.39 PERICARDIAL EFFUSION: Status: ACTIVE | Noted: 2018-06-14

## 2018-06-14 LAB
ANION GAP SERPL CALCULATED.3IONS-SCNC: 8 MMOL/L (ref 3–11)
BH CV ECHO MEAS - AI DEC SLOPE: 498.9 CM/SEC^2
BH CV ECHO MEAS - AI MAX PG: 72.2 MMHG
BH CV ECHO MEAS - AI MAX VEL: 424.9 CM/SEC
BH CV ECHO MEAS - AI P1/2T: 249.4 MSEC
BH CV ECHO MEAS - AO ROOT AREA (BSA CORRECTED): 1.9
BH CV ECHO MEAS - AO ROOT AREA: 7.3 CM^2
BH CV ECHO MEAS - AO ROOT DIAM: 3.1 CM
BH CV ECHO MEAS - BSA(HAYCOCK): 1.7 M^2
BH CV ECHO MEAS - BSA: 1.6 M^2
BH CV ECHO MEAS - BZI_BMI: 22.8 KILOGRAMS/M^2
BH CV ECHO MEAS - BZI_METRIC_HEIGHT: 162.6 CM
BH CV ECHO MEAS - BZI_METRIC_WEIGHT: 60.3 KG
BH CV ECHO MEAS - CONTRAST EF (2CH): 28.7 ML/M^2
BH CV ECHO MEAS - CONTRAST EF 4CH: 37.9 ML/M^2
BH CV ECHO MEAS - EDV(CUBED): 128.9 ML
BH CV ECHO MEAS - EDV(MOD-SP2): 94 ML
BH CV ECHO MEAS - EDV(MOD-SP4): 124 ML
BH CV ECHO MEAS - EDV(TEICH): 121.1 ML
BH CV ECHO MEAS - EF(CUBED): 42.3 %
BH CV ECHO MEAS - EF(MOD-SP2): 28.7 %
BH CV ECHO MEAS - EF(MOD-SP4): 37.9 %
BH CV ECHO MEAS - EF(TEICH): 34.9 %
BH CV ECHO MEAS - ESV(CUBED): 74.3 ML
BH CV ECHO MEAS - ESV(MOD-SP2): 67 ML
BH CV ECHO MEAS - ESV(MOD-SP4): 77 ML
BH CV ECHO MEAS - ESV(TEICH): 78.8 ML
BH CV ECHO MEAS - FS: 16.8 %
BH CV ECHO MEAS - IVS/LVPW: 0.87
BH CV ECHO MEAS - IVSD: 1.1 CM
BH CV ECHO MEAS - LA DIMENSION: 3.3 CM
BH CV ECHO MEAS - LA/AO: 1.1
BH CV ECHO MEAS - LV DIASTOLIC VOL/BSA (35-75): 75.4 ML/M^2
BH CV ECHO MEAS - LV MASS(C)D: 236.3 GRAMS
BH CV ECHO MEAS - LV MASS(C)DI: 143.7 GRAMS/M^2
BH CV ECHO MEAS - LV SYSTOLIC VOL/BSA (12-30): 46.8 ML/M^2
BH CV ECHO MEAS - LVIDD: 5.1 CM
BH CV ECHO MEAS - LVIDS: 4.2 CM
BH CV ECHO MEAS - LVLD AP2: 7.2 CM
BH CV ECHO MEAS - LVLD AP4: 7.1 CM
BH CV ECHO MEAS - LVLS AP2: 6.6 CM
BH CV ECHO MEAS - LVLS AP4: 6 CM
BH CV ECHO MEAS - LVPWD: 1.3 CM
BH CV ECHO MEAS - MV A MAX VEL: 122.9 CM/SEC
BH CV ECHO MEAS - MV DEC SLOPE: 737.6 CM/SEC^2
BH CV ECHO MEAS - MV DEC TIME: 0.24 SEC
BH CV ECHO MEAS - MV E MAX VEL: 92.3 CM/SEC
BH CV ECHO MEAS - MV E/A: 0.75
BH CV ECHO MEAS - MV P1/2T MAX VEL: 101 CM/SEC
BH CV ECHO MEAS - MV P1/2T: 40.1 MSEC
BH CV ECHO MEAS - MVA P1/2T LCG: 2.2 CM^2
BH CV ECHO MEAS - MVA(P1/2T): 5.5 CM^2
BH CV ECHO MEAS - PA ACC SLOPE: 1218 CM/SEC^2
BH CV ECHO MEAS - PA ACC TIME: 0.07 SEC
BH CV ECHO MEAS - PA PR(ACCEL): 46.9 MMHG
BH CV ECHO MEAS - RAP SYSTOLE: 3 MMHG
BH CV ECHO MEAS - RV MAX PG: 4.5 MMHG
BH CV ECHO MEAS - RV V1 MAX: 106.6 CM/SEC
BH CV ECHO MEAS - RVSP: 27 MMHG
BH CV ECHO MEAS - SI(CUBED): 33.2 ML/M^2
BH CV ECHO MEAS - SI(MOD-SP2): 16.4 ML/M^2
BH CV ECHO MEAS - SI(MOD-SP4): 28.6 ML/M^2
BH CV ECHO MEAS - SI(TEICH): 25.7 ML/M^2
BH CV ECHO MEAS - SV(CUBED): 54.6 ML
BH CV ECHO MEAS - SV(MOD-SP2): 27 ML
BH CV ECHO MEAS - SV(MOD-SP4): 47 ML
BH CV ECHO MEAS - SV(TEICH): 42.3 ML
BH CV ECHO MEAS - TAPSE (>1.6): 2.1 CM2
BH CV ECHO MEAS - TR MAX VEL: 241.5 CM/SEC
BH CV XLRA - RV BASE: 3.7 CM
BH CV XLRA - RV LENGTH: 6 CM
BH CV XLRA - RV MID: 3.2 CM
BH CV XLRA - TDI S': 13.1 CM/SEC
BNP SERPL-MCNC: 1028 PG/ML (ref 0–100)
BUN BLD-MCNC: 13 MG/DL (ref 9–23)
BUN/CREAT SERPL: 10.8 (ref 7–25)
CALCIUM SPEC-SCNC: 9.7 MG/DL (ref 8.7–10.4)
CHLORIDE SERPL-SCNC: 93 MMOL/L (ref 99–109)
CO2 SERPL-SCNC: 28 MMOL/L (ref 20–31)
CREAT BLD-MCNC: 1.2 MG/DL (ref 0.6–1.3)
DEPRECATED RDW RBC AUTO: 44.8 FL (ref 37–54)
ERYTHROCYTE [DISTWIDTH] IN BLOOD BY AUTOMATED COUNT: 15 % (ref 11.3–14.5)
GFR SERPL CREATININE-BSD FRML MDRD: 42 ML/MIN/1.73
GLUCOSE BLD-MCNC: 109 MG/DL (ref 70–100)
HCT VFR BLD AUTO: 28.7 % (ref 34.5–44)
HGB BLD-MCNC: 9.3 G/DL (ref 11.5–15.5)
LV EF 2D ECHO EST: 20 %
MAXIMAL PREDICTED HEART RATE: 132 BPM
MCH RBC QN AUTO: 26.7 PG (ref 27–31)
MCHC RBC AUTO-ENTMCNC: 32.4 G/DL (ref 32–36)
MCV RBC AUTO: 82.5 FL (ref 80–99)
OSMOLALITY SERPL: 271 MOSM/KG (ref 275–295)
OSMOLALITY UR: 234 MOSM/KG (ref 300–1100)
PLATELET # BLD AUTO: 272 10*3/MM3 (ref 150–450)
PMV BLD AUTO: 11.2 FL (ref 6–12)
POTASSIUM BLD-SCNC: 4.3 MMOL/L (ref 3.5–5.5)
RBC # BLD AUTO: 3.48 10*6/MM3 (ref 3.89–5.14)
SODIUM BLD-SCNC: 129 MMOL/L (ref 132–146)
SODIUM UR-SCNC: 98 MMOL/L (ref 30–90)
STRESS TARGET HR: 112 BPM
TROPONIN I SERPL-MCNC: 0 NG/ML (ref 0–0.07)
UUN 24H UR-MCNC: 38 MG/DL
WBC NRBC COR # BLD: 8.87 10*3/MM3 (ref 3.5–10.8)

## 2018-06-14 PROCEDURE — 84484 ASSAY OF TROPONIN QUANT: CPT

## 2018-06-14 PROCEDURE — 83935 ASSAY OF URINE OSMOLALITY: CPT | Performed by: HOSPITALIST

## 2018-06-14 PROCEDURE — 83930 ASSAY OF BLOOD OSMOLALITY: CPT | Performed by: HOSPITALIST

## 2018-06-14 PROCEDURE — 63710000001 GABAPENTIN 300 MG CAPSULE: Performed by: HOSPITALIST

## 2018-06-14 PROCEDURE — 84540 ASSAY OF URINE/UREA-N: CPT | Performed by: HOSPITALIST

## 2018-06-14 PROCEDURE — G0378 HOSPITAL OBSERVATION PER HR: HCPCS

## 2018-06-14 PROCEDURE — 99220 PR INITIAL OBSERVATION CARE/DAY 70 MINUTES: CPT | Performed by: HOSPITALIST

## 2018-06-14 PROCEDURE — A9270 NON-COVERED ITEM OR SERVICE: HCPCS | Performed by: HOSPITALIST

## 2018-06-14 PROCEDURE — 85027 COMPLETE CBC AUTOMATED: CPT | Performed by: HOSPITALIST

## 2018-06-14 PROCEDURE — 96372 THER/PROPH/DIAG INJ SC/IM: CPT

## 2018-06-14 PROCEDURE — 80048 BASIC METABOLIC PNL TOTAL CA: CPT | Performed by: HOSPITALIST

## 2018-06-14 PROCEDURE — 96376 TX/PRO/DX INJ SAME DRUG ADON: CPT

## 2018-06-14 PROCEDURE — 63710000001 LEVOTHYROXINE 50 MCG TABLET: Performed by: HOSPITALIST

## 2018-06-14 PROCEDURE — 93306 TTE W/DOPPLER COMPLETE: CPT

## 2018-06-14 PROCEDURE — 93306 TTE W/DOPPLER COMPLETE: CPT | Performed by: INTERNAL MEDICINE

## 2018-06-14 PROCEDURE — 63710000001 CLOPIDOGREL 75 MG TABLET: Performed by: HOSPITALIST

## 2018-06-14 PROCEDURE — 25010000002 HEPARIN (PORCINE) PER 1000 UNITS: Performed by: HOSPITALIST

## 2018-06-14 PROCEDURE — 63710000001 METOPROLOL TARTRATE 12.5 MG TABLET: Performed by: HOSPITALIST

## 2018-06-14 PROCEDURE — 93005 ELECTROCARDIOGRAM TRACING: CPT | Performed by: EMERGENCY MEDICINE

## 2018-06-14 PROCEDURE — 84300 ASSAY OF URINE SODIUM: CPT | Performed by: HOSPITALIST

## 2018-06-14 PROCEDURE — 83880 ASSAY OF NATRIURETIC PEPTIDE: CPT | Performed by: HOSPITALIST

## 2018-06-14 RX ORDER — ONDANSETRON 2 MG/ML
4 INJECTION INTRAMUSCULAR; INTRAVENOUS EVERY 6 HOURS PRN
Status: DISCONTINUED | OUTPATIENT
Start: 2018-06-14 | End: 2018-06-16 | Stop reason: HOSPADM

## 2018-06-14 RX ORDER — LEVOTHYROXINE SODIUM 0.05 MG/1
50 TABLET ORAL DAILY
Status: DISCONTINUED | OUTPATIENT
Start: 2018-06-14 | End: 2018-06-16 | Stop reason: HOSPADM

## 2018-06-14 RX ORDER — ACETAMINOPHEN 325 MG/1
650 TABLET ORAL EVERY 4 HOURS PRN
Status: DISCONTINUED | OUTPATIENT
Start: 2018-06-14 | End: 2018-06-16 | Stop reason: HOSPADM

## 2018-06-14 RX ORDER — DONEPEZIL HYDROCHLORIDE 5 MG/1
5 TABLET, FILM COATED ORAL NIGHTLY
Status: DISCONTINUED | OUTPATIENT
Start: 2018-06-14 | End: 2018-06-16 | Stop reason: HOSPADM

## 2018-06-14 RX ORDER — GABAPENTIN 300 MG/1
300 CAPSULE ORAL 3 TIMES DAILY
Status: DISCONTINUED | OUTPATIENT
Start: 2018-06-14 | End: 2018-06-16 | Stop reason: HOSPADM

## 2018-06-14 RX ORDER — ONDANSETRON 4 MG/1
4 TABLET, FILM COATED ORAL EVERY 6 HOURS PRN
Status: DISCONTINUED | OUTPATIENT
Start: 2018-06-14 | End: 2018-06-16 | Stop reason: HOSPADM

## 2018-06-14 RX ORDER — BUMETANIDE 0.25 MG/ML
1 INJECTION INTRAMUSCULAR; INTRAVENOUS ONCE
Status: COMPLETED | OUTPATIENT
Start: 2018-06-14 | End: 2018-06-14

## 2018-06-14 RX ORDER — HEPARIN SODIUM 5000 [USP'U]/ML
5000 INJECTION, SOLUTION INTRAVENOUS; SUBCUTANEOUS EVERY 12 HOURS SCHEDULED
Status: DISCONTINUED | OUTPATIENT
Start: 2018-06-14 | End: 2018-06-16 | Stop reason: HOSPADM

## 2018-06-14 RX ORDER — CLOPIDOGREL BISULFATE 75 MG/1
75 TABLET ORAL DAILY
Status: DISCONTINUED | OUTPATIENT
Start: 2018-06-14 | End: 2018-06-16 | Stop reason: HOSPADM

## 2018-06-14 RX ORDER — SODIUM CHLORIDE 0.9 % (FLUSH) 0.9 %
1-10 SYRINGE (ML) INJECTION AS NEEDED
Status: DISCONTINUED | OUTPATIENT
Start: 2018-06-14 | End: 2018-06-16 | Stop reason: HOSPADM

## 2018-06-14 RX ORDER — ALPRAZOLAM 0.25 MG/1
0.25 TABLET ORAL 2 TIMES DAILY PRN
Status: DISCONTINUED | OUTPATIENT
Start: 2018-06-14 | End: 2018-06-16 | Stop reason: HOSPADM

## 2018-06-14 RX ORDER — ROPINIROLE 0.5 MG/1
0.5 TABLET, FILM COATED ORAL NIGHTLY
Status: DISCONTINUED | OUTPATIENT
Start: 2018-06-14 | End: 2018-06-16 | Stop reason: HOSPADM

## 2018-06-14 RX ADMIN — METOPROLOL TARTRATE 12.5 MG: 25 TABLET, FILM COATED ORAL at 19:55

## 2018-06-14 RX ADMIN — GABAPENTIN 300 MG: 300 CAPSULE ORAL at 10:11

## 2018-06-14 RX ADMIN — DONEPEZIL HYDROCHLORIDE 5 MG: 5 TABLET ORAL at 19:55

## 2018-06-14 RX ADMIN — ALPRAZOLAM 0.25 MG: 0.25 TABLET ORAL at 20:39

## 2018-06-14 RX ADMIN — METOPROLOL TARTRATE 12.5 MG: 25 TABLET, FILM COATED ORAL at 10:10

## 2018-06-14 RX ADMIN — BUMETANIDE 1 MG: 0.25 INJECTION INTRAMUSCULAR; INTRAVENOUS at 13:10

## 2018-06-14 RX ADMIN — GABAPENTIN 300 MG: 300 CAPSULE ORAL at 15:10

## 2018-06-14 RX ADMIN — HEPARIN SODIUM 5000 UNITS: 5000 INJECTION, SOLUTION INTRAVENOUS; SUBCUTANEOUS at 10:10

## 2018-06-14 RX ADMIN — LEVOTHYROXINE SODIUM 50 MCG: 50 TABLET ORAL at 06:33

## 2018-06-14 RX ADMIN — HEPARIN SODIUM 5000 UNITS: 5000 INJECTION, SOLUTION INTRAVENOUS; SUBCUTANEOUS at 19:55

## 2018-06-14 RX ADMIN — GABAPENTIN 300 MG: 300 CAPSULE ORAL at 19:54

## 2018-06-14 RX ADMIN — BUMETANIDE 1 MG: 0.25 INJECTION INTRAMUSCULAR; INTRAVENOUS at 06:33

## 2018-06-14 RX ADMIN — CLOPIDOGREL BISULFATE 75 MG: 75 TABLET ORAL at 10:10

## 2018-06-14 RX ADMIN — ROPINIROLE 0.5 MG: 0.5 TABLET, FILM COATED ORAL at 19:39

## 2018-06-14 RX ADMIN — BUMETANIDE 1 MG: 0.25 INJECTION INTRAMUSCULAR; INTRAVENOUS at 00:47

## 2018-06-14 NOTE — PLAN OF CARE
Problem: Fall Risk (Adult)  Goal: Identify Related Risk Factors and Signs and Symptoms  Outcome: Outcome(s) achieved Date Met: 06/14/18 06/14/18 0433   Fall Risk (Adult)   Related Risk Factors (Fall Risk) age-related changes;fatigue/slow reaction;environment unfamiliar   Signs and Symptoms (Fall Risk) presence of risk factors     Goal: Absence of Fall  Outcome: Ongoing (interventions implemented as appropriate)      Problem: Patient Care Overview  Goal: Plan of Care Review  Outcome: Ongoing (interventions implemented as appropriate)   06/14/18 0433   Coping/Psychosocial   Plan of Care Reviewed With patient   Coping/Psychosocial   Patient Agreement with Plan of Care agrees   Plan of Care Review   Progress improving   OTHER   Outcome Summary VSS, pt states dyspnea is improving,to have more bu   06/14/18 0433   Coping/Psychosocial   Plan of Care Reviewed With patient   Coping/Psychosocial   Patient Agreement with Plan of Care agrees   Plan of Care Review   Progress improving   OTHER   Outcome Summary VSS, pt states dyspnea is improving,to have more bumex and echo in am.   martin and echo in am.

## 2018-06-14 NOTE — PROGRESS NOTES
Discharge Planning Assessment  [facility]     Patient Name: Scott Sinha  MRN: [id number]  Today's Date: 6/14/2018    Admit Date: 6/13/2018          Discharge Needs Assessment     Row Name 06/14/18 1041       Living Environment    Lives With child(chong), adult;grandchild(chong)    Current Living Arrangements home/apartment/condo    Living Arrangement Comments States her son and grandchildren live with her and are very helpful with any needs.  Has steps to basement but no conern.       Discharge Needs Assessment    Equipment Currently Used at Home walker, rolling;cane, straight    Equipment Needed After Discharge none    Discharge Coordination/Progress Has had [facility] in the past. Uses the cane mostly if needed.            Discharge Plan     Row Name 06/14/18 1044       Plan    Plan Home at DC    Patient/Family in Agreement with Plan yes    Plan Comments I spoke with the pt. She has no DC needs at this time.    Row Name 06/14/18 0846       Plan    Final Discharge Disposition Code 01 - home or self-care        Destination     No service coordination in this encounter.      Durable Medical Equipment     No service coordination in this encounter.      Dialysis/Infusion     No service coordination in this encounter.      Home Medical Care     No service coordination in this encounter.      Social Care     No service coordination in this encounter.        Expected Discharge Date and Time     Expected Discharge Date Expected Discharge Time    Jun 14, 2018               Demographic Summary    No documentation.           Functional Status     Row Name 06/14/18 1041       Functional Status    Usual Activity Tolerance good       Functional Status, IADL    Medications independent    Meal Preparation independent    Housekeeping assistive person    Laundry assistive person    Shopping assistive person            Psychosocial    No documentation.           Abuse/Neglect    No documentation.           Legal    No documentation.            Substance Abuse    No documentation.           Patient Forms    No documentation.         Zuleika Baez RN

## 2018-06-14 NOTE — PROGRESS NOTES
"Patient admitted overnight with dyspnea due to CHF exacerbation. Has responded well to IV diuresis and feels a little better. HypoNa also better with diuresis. Will repeat IV diuresis and await echo regarding size of pericardial effusion, which is probably related to her volume overload. She has no hemodynamic evidence of tamponade. She says this hospitalization was caused because she thought she was dehydrated and \"drank a ton of water.\" She will hopefully be ready for d/c in 24-48 hours.  "

## 2018-06-14 NOTE — H&P
[facility] Medicine Services  HISTORY AND PHYSICAL    Patient Name: Scott Sinha  : 4/10/1930  MRN: [id number]  Primary Care Physician: CHRISTY Hiar MD    Subjective   Subjective     Chief Complaint:  SOA    HPI:  Scott Sinha is a 88 y.o. female with hx of chronic systolic CHF (EF 25%), HTN, mild cognitive impairment, CVA, and chronic hyponatremia presents due to SOA and weakness which began earlier today while going to [employer] with family. She was unable to make it inside due to her SOA, had to sit down. SOA persisted after returning home, prompting ER evaluation. She denies any chest pain or tightness, no palpitations, no significant cough. No fevers or chills. In the ER, BNP was 616, Na 126, and CTA chest showed early changes of pulmonary edema and small to moderate pericardial effusion. Given 1 mg Bumex and was going to be discharged home however she was still too SOA with walking to the bathroom, so hospitalists will admit for observation. O2 sats dropped to low 90s but no documented hypoxia.     Review of Systems       Otherwise 10-system ROS reviewed and is negative except as mentioned in the HPI.    Personal History     Past Medical History:   Diagnosis Date   • Congestive heart failure    • Malignant neoplasm    • Stroke     mini stroke   • Systolic heart failure 2017    Chronic/compensated (EF 25%)       Past Surgical History:   Procedure Laterality Date   • CHOLECYSTECTOMY     • EYE SURGERY     • HYSTERECTOMY         Family History: family history includes Cancer in her brother, mother, and sister; No Known Problems in her father.     Social History:  reports that she has never smoked. She has never used smokeless tobacco. She reports that she does not drink alcohol or use drugs.  Social History     Social History Narrative   • No narrative on file       Medications:    (Not in a hospital admission)    Allergies   Allergen Reactions   • Augmentin  [Amoxicillin-Pot Clavulanate] Nausea And Vomiting   • Claritin [Loratadine] Unknown (See Comments)     Doesn't remember   • Keflex [Cephalexin] Nausea And Vomiting       Objective   Objective     Vital Signs:   Temp:  [98.5 °F (36.9 °C)] 98.5 °F (36.9 °C)  Heart Rate:  [71-83] 71  Resp:  [24] 24  BP: (145-160)/(70-92) 155/76        Physical Exam   Constitutional: No acute distress, awake, alert  Eyes: PERRLA, sclerae anicteric, no conjunctival injection  HENT: NCAT, mucous membranes moist  Neck: Supple, no thyromegaly, no lymphadenopathy, trachea midline  Respiratory: some coarse breath sounds at the bases, otherwise CTAB, nonlabored respirations   Cardiovascular: RRR, no murmurs, rubs, or gallops, palpable pedal pulses bilaterally  Gastrointestinal: Positive bowel sounds, soft, nontender, nondistended  Musculoskeletal: trace BLE edema, no clubbing or cyanosis to extremities  Psychiatric: Appropriate affect, cooperative  Neurologic: Oriented x 3, strength symmetric in all extremities, Cranial Nerves grossly intact to confrontation, speech clear  Skin: No rashes      Results Reviewed:  I have personally reviewed current lab, radiology, and data and agree.      Results from last 7 days  Lab Units 06/13/18  2218   WBC 10*3/mm3 7.81   HEMOGLOBIN g/dL 9.5*   HEMATOCRIT % 29.3*   PLATELETS 10*3/mm3 272       Results from last 7 days  Lab Units 06/13/18  2218   SODIUM mmol/L 126*   POTASSIUM mmol/L 4.6   CHLORIDE mmol/L 94*   CO2 mmol/L 24.0   BUN mg/dL 14   CREATININE mg/dL 1.18   GLUCOSE mg/dL 113*   CALCIUM mg/dL 9.3   ALT (SGPT) U/L 11   AST (SGOT) U/L 22     Estimated Creatinine Clearance: 30.7 mL/min (by C-G formula based on SCr of 1.18 mg/dL).  Brief Urine Lab Results  (Last result in the past 365 days)      Color   Clarity   Blood   Leuk Est   Nitrite   Protein   CREAT   Urine HCG        12/30/17 2118 Yellow Clear Negative Negative Negative Negative             BNP   Date Value Ref Range Status   06/13/2018 616.0  (H) 0.0 - 100.0 pg/mL Final     Comment:     Results may be falsely decreased if patient taking Biotin.     No results found for: PHART  Imaging Results (last 24 hours)     Procedure Component Value Units Date/Time    CT Angiogram Chest With Contrast [[id number]] Collected:  06/13/18 2219     Updated:  06/13/18 2358    Narrative:       EXAM:    CT Angiography Chest With Intravenous Contrast    CLINICAL HISTORY:    88 years old, female; Signs and symptoms; Shortness of breath; Additional   info: SOB    TECHNIQUE:    Axial computed tomographic angiography images of the chest with intravenous   contrast using pulmonary embolism protocol.  All CT scans at this facility use   one or more dose reduction techniques, viz.: automated exposure control; ma/kV   adjustment per patient size (including targeted exams where dose is matched to   indication; i.e. head); or iterative reconstruction technique.    MIP reconstructed images were created and reviewed.    CONTRAST:    70 mL of [id number] administered intravenously.    COMPARISON:    No relevant prior studies available.    FINDINGS:    Pulmonary arteries:  No acute findings. No pulmonary embolism.    Aorta:  No acute findings.  No thoracic aortic aneurysm.    Lungs:  Mild bibasal dependent and subsegmental atelectasis.  Mild diffuse   peribronchial and interlobular septal thickening, more prominent in the bases.    Mild scattered mosaic attenuation.  No mass or consolidation.    Pleural space:  Trace bilateral pleural effusions.    Heart:  Mildly enlarged.  Prominent pulmonary veins.  Small to moderate   pericardial effusion, mostly posteriorly and inferiorly measuring up to 1.6 cm   inferiorly.    Bones/joints:  No acute fracture.  No dislocation.    Soft tissues:  Unremarkable.    Lymph nodes:  Unremarkable. No enlarged lymph nodes.    Upper abdomen:  Diminutive atrophic right kidney.      Impression:       1.  Small to moderate pericardial effusion.  2.  Interstitial  opacities suggestive of early edema.    THIS DOCUMENT HAS BEEN ELECTRONICALLY SIGNED BY SARITA TERAN MD    XR Chest 1 View [[id number]] Collected:  06/13/18 2217     Updated:  06/13/18 2309    Narrative:       EXAM:    XR Chest, 1 View    CLINICAL HISTORY:    88 years old, female; Signs and symptoms; Shortness of breath; Additional   info: SOA triage protocol    TECHNIQUE:    Frontal view of the chest.    COMPARISON:    CR - XR CHEST 1 VW 2018-01-01 05:59    FINDINGS:    Lungs:  Mild diffuse increased interstitial opacities.  No consolidation.    Pleural space:  Unremarkable.  No pneumothorax.    Heart: Enlarged, appears increased in size.    Mediastinum:  Unremarkable.    Bones/joints:  Unremarkable.      Impression:         Cardiac silhouette appears slightly increased in size with increased   interstitial opacities suggestive of edema.    THIS DOCUMENT HAS BEEN ELECTRONICALLY SIGNED BY SARITA TERAN MD            Assessment/Plan   Assessment / Plan     Hospital Problem List     * (Principal)Acute on chronic systolic heart failure    Dyspnea    Pericardial effusion    Hyponatremia            Assessment & Plan:  87 yo F with hx of chronic systolic CHF (EF 25%), HTN, mild cognitive impairment, CVA, and chronic hyponatremia presents due to SOA and weakness. Found to have acute on chronic CHF.    PLAN:  --s/p one dose of IV Bumex in the ER. Will repeat a dose in the morning.   --Check Echo to evaluate the pericardial effusion seen on CTA chest.   --Check serum and urine osmolality.  Repeat sodium in AM. Hyponatremia has been chronic but she is below her baseline.   --Hopefully can get her home tomorrow if improved.     DVT prophylaxis: H    CODE STATUS:  Full Code    Admission Status:  I believe this patient meets OBSERVATION status, however if further evaluation or treatment plans warrant, status may change.  Based upon current information, I predict patient's care encounter to be less than or equal to 2  midnights.      Electronically signed by Yadira Morton MD, 06/14/18, 1:51 AM

## 2018-06-14 NOTE — ED PROVIDER NOTES
Subjective   88-year-old female presents complaining of weakness and shortness of breath.  The patient has a history of CHF but denies any prior history of coronary artery disease.  She states that this afternoon, she went to [employer] with her family and shortly after arrival began experiencing shortness of breath to the point that she felt as if she needed to sit down.  She was unable to shop secondary to her shortness of breath.  Her family took her home and her dyspnea persisted there as well.  She endorses a productive cough.  No fevers.  She states that her symptoms are worsened by exertion and improved with sitting still.  She denies any chest pain or palpitations.        Shortness of Breath   Severity:  Moderate  Onset quality:  Sudden  Duration:  1 day  Timing:  Constant  Progression:  Improving  Context: activity (shopping)    Relieved by:  Rest and lying down  Worsened by:  Exertion and activity  Ineffective treatments:  None tried  Associated symptoms: cough and sputum production        Review of Systems   Respiratory: Positive for cough, sputum production and shortness of breath.    Neurological: Positive for weakness.   All other systems reviewed and are negative.      Past Medical History:   Diagnosis Date   • Congestive heart failure    • Malignant neoplasm    • Stroke     mini stroke   • Systolic heart failure 9/25/2017    Chronic/compensated (EF 25%)       Allergies   Allergen Reactions   • Augmentin [Amoxicillin-Pot Clavulanate] Nausea And Vomiting   • Claritin [Loratadine] Unknown (See Comments)     Doesn't remember   • Keflex [Cephalexin] Nausea And Vomiting       Past Surgical History:   Procedure Laterality Date   • CHOLECYSTECTOMY     • EYE SURGERY     • HYSTERECTOMY         Family History   Problem Relation Age of Onset   • Cancer Mother    • No Known Problems Father    • Cancer Sister    • Cancer Brother        Social History     Social History   • Marital status:      Social  History Main Topics   • Smoking status: Never Smoker   • Smokeless tobacco: Never Used   • Alcohol use No   • Drug use: No   • Sexual activity: Defer     Other Topics Concern   • Not on file         Objective   Physical Exam   Constitutional: She is oriented to person, place, and time. She appears well-developed and well-nourished. No distress.   Well-appearing elderly female in no acute distress   HENT:   Head: Normocephalic and atraumatic.   Mouth/Throat: Oropharynx is clear and moist.   Neck: Normal range of motion. Neck supple. No JVD present.   Cardiovascular: Normal rate, regular rhythm and normal heart sounds.  Exam reveals no gallop and no friction rub.    No murmur heard.  Pulmonary/Chest: Effort normal and breath sounds normal. No respiratory distress. She has no wheezes. She has no rales.   Abdominal: Soft. Bowel sounds are normal. She exhibits no distension and no mass. There is no tenderness. There is no rebound and no guarding.   Musculoskeletal: Normal range of motion. She exhibits no edema.   Neurological: She is alert and oriented to person, place, and time.   Skin: Skin is warm and dry. No rash noted. She is not diaphoretic. No erythema.   Psychiatric: She has a normal mood and affect. Judgment and thought content normal.   Nursing note and vitals reviewed.      Procedures         ED Course  ED Course as of Jun 14 0038 Wed Jun 13, 2018   2336 88-year-old female with a history of CHF presents for evaluation of shortness of breath with exertion since earlier this afternoon.  On arrival to the ED, patient nontoxic appearing.  Benign exam.  The patient's initial EKG revealed what appeared to be rate controlled atrial flutter with occasional PVCs and left bundle branch block (old compared to priors and negative by Sgarbossa's criteria) with a heart rate of 77.  Labs remarkable for a BNP greater than 600.  Chest x-ray suggestive of mild CHF.  Moderate risk Well's.  We will obtain a chest CTA and  reassess.  [DD]   Thu Jun 14, 2018   0004 CTA negative for pulmonary embolism but showed a small to moderate-sized pericardial effusion as well as mild pulmonary edema.  [DD]   0013 Upon reevaluation, patient improved.  Patient attempted to walk to the bathroom and became significantly dyspneic and tachypneic with increased work of breathing, and her oxygen saturations dropped to the low 90s..  Given her age, comorbidities, and objective findings in the ED, I feel like she warrants admission at this time.  Bumex given.  Awaiting callback from the hospitalist at this time.  [DD]   0023 I discussed the patient's case with Dr. Vaughan and will admit for further evaluation and treatment.  She is hemodynamically stable and aware/agreeable with the plan.  [DD]      ED Course User Index  [DD] Anselmo Moraes MD               Recent Results (from the past 24 hour(s))   Comprehensive Metabolic Panel    Collection Time: 06/13/18 10:18 PM   Result Value Ref Range    Glucose 113 (H) 70 - 100 mg/dL    BUN 14 9 - 23 mg/dL    Creatinine 1.18 0.60 - 1.30 mg/dL    Sodium 126 (L) 132 - 146 mmol/L    Potassium 4.6 3.5 - 5.5 mmol/L    Chloride 94 (L) 99 - 109 mmol/L    CO2 24.0 20.0 - 31.0 mmol/L    Calcium 9.3 8.7 - 10.4 mg/dL    Total Protein 7.6 5.7 - 8.2 g/dL    Albumin 4.36 3.20 - 4.80 g/dL    ALT (SGPT) 11 7 - 40 U/L    AST (SGOT) 22 0 - 33 U/L    Alkaline Phosphatase 52 25 - 100 U/L    Total Bilirubin 0.3 0.3 - 1.2 mg/dL    eGFR Non African Amer 43 (L) >60 mL/min/1.73    Globulin 3.2 gm/dL    A/G Ratio 1.3 (L) 1.5 - 2.5 g/dL    BUN/Creatinine Ratio 11.9 7.0 - 25.0    Anion Gap 8.0 3.0 - 11.0 mmol/L   BNP    Collection Time: 06/13/18 10:18 PM   Result Value Ref Range    .0 (H) 0.0 - 100.0 pg/mL   Light Blue Top    Collection Time: 06/13/18 10:18 PM   Result Value Ref Range    Extra Tube hold for add-on    Green Top (Gel)    Collection Time: 06/13/18 10:18 PM   Result Value Ref Range    Extra Tube Hold for add-ons.     Lavender Top    Collection Time: 06/13/18 10:18 PM   Result Value Ref Range    Extra Tube hold for add-on    Gold Top - SST    Collection Time: 06/13/18 10:18 PM   Result Value Ref Range    Extra Tube Hold for add-ons.    CBC Auto Differential    Collection Time: 06/13/18 10:18 PM   Result Value Ref Range    WBC 7.81 3.50 - 10.80 10*3/mm3    RBC 3.52 (L) 3.89 - 5.14 10*6/mm3    Hemoglobin 9.5 (L) 11.5 - 15.5 g/dL    Hematocrit 29.3 (L) 34.5 - 44.0 %    MCV 83.2 80.0 - 99.0 fL    MCH 27.0 27.0 - 31.0 pg    MCHC 32.4 32.0 - 36.0 g/dL    RDW 15.0 (H) 11.3 - 14.5 %    RDW-SD 45.5 37.0 - 54.0 fl    MPV 11.0 6.0 - 12.0 fL    Platelets 272 150 - 450 10*3/mm3    Neutrophil % 73.0 (H) 41.0 - 71.0 %    Lymphocyte % 14.1 (L) 24.0 - 44.0 %    Monocyte % 9.3 0.0 - 12.0 %    Eosinophil % 2.7 0.0 - 3.0 %    Basophil % 0.9 0.0 - 1.0 %    Immature Grans % 0.1 0.0 - 0.6 %    Neutrophils, Absolute 5.70 1.50 - 8.30 10*3/mm3    Lymphocytes, Absolute 1.10 0.60 - 4.80 10*3/mm3    Monocytes, Absolute 0.73 0.00 - 1.00 10*3/mm3    Eosinophils, Absolute 0.21 0.00 - 0.30 10*3/mm3    Basophils, Absolute 0.07 0.00 - 0.20 10*3/mm3    Immature Grans, Absolute 0.01 0.00 - 0.03 10*3/mm3   POC Troponin, Rapid    Collection Time: 06/13/18 10:26 PM   Result Value Ref Range    Troponin I 0.00 0.00 - 0.07 ng/mL   POC Troponin, Rapid    Collection Time: 06/14/18 12:10 AM   Result Value Ref Range    Troponin I 0.00 0.00 - 0.07 ng/mL     Note: In addition to lab results from this visit, the labs listed above may include labs taken at another facility or during a different encounter within the last 24 hours. Please correlate lab times with ED admission and discharge times for further clarification of the services performed during this visit.    CT Angiogram Chest With Contrast   Final Result   1.  Small to moderate pericardial effusion.   2.  Interstitial opacities suggestive of early edema.      THIS DOCUMENT HAS BEEN ELECTRONICALLY SIGNED BY SARITA  JEFFRY TORIBIO      XR Chest 1 View   Final Result     Cardiac silhouette appears slightly increased in size with increased    interstitial opacities suggestive of edema.      THIS DOCUMENT HAS BEEN ELECTRONICALLY SIGNED BY SARITA TERAN MD        Vitals:    06/13/18 2205 06/14/18 0000 06/14/18 0001 06/14/18 0030   BP: 145/92 151/79  150/77   BP Location:       Patient Position:       Pulse:   75 74   Resp:       Temp:       TempSrc:       SpO2: 97%  91% 97%   Weight:       Height:         Medications   sodium chloride 0.9 % flush 10 mL (not administered)   bumetanide (BUMEX) injection 1 mg (not administered)   iopamidol (ISOVUE-370) 76 % injection 100 mL (70 mL Intravenous Given 6/13/18 2334)     ECG/EMG Results (last 24 hours)     Procedure Component Value Units Date/Time    ECG 12 Lead [[id number]] Collected:  06/13/18 2212     Updated:  06/13/18 2210    ECG 12 Lead [[id number]] Collected:  06/13/18 2217     Updated:  06/13/18 2215    ECG 12 Lead [[id number]] Collected:  06/14/18 0014     Updated:  06/14/18 0014            MDM    Final diagnoses:   Dyspnea, unspecified type   Acute on chronic congestive heart failure, unspecified congestive heart failure type   Pericardial effusion   Anemia, unspecified type       Documentation assistance provided by tami Wheeler.  Information recorded by the scribe was done at my direction and has been verified and validated by me.     Manuel Wheeler  06/13/18 2222       Anselmo Moraes MD  06/14/18 0039

## 2018-06-15 LAB
ANION GAP SERPL CALCULATED.3IONS-SCNC: 8 MMOL/L (ref 3–11)
BUN BLD-MCNC: 24 MG/DL (ref 9–23)
BUN/CREAT SERPL: 14.5 (ref 7–25)
CALCIUM SPEC-SCNC: 8.7 MG/DL (ref 8.7–10.4)
CHLORIDE SERPL-SCNC: 93 MMOL/L (ref 99–109)
CO2 SERPL-SCNC: 26 MMOL/L (ref 20–31)
CREAT BLD-MCNC: 1.65 MG/DL (ref 0.6–1.3)
CREAT UR-MCNC: 141.1 MG/DL
GFR SERPL CREATININE-BSD FRML MDRD: 29 ML/MIN/1.73
GLUCOSE BLD-MCNC: 99 MG/DL (ref 70–100)
POTASSIUM BLD-SCNC: 3.9 MMOL/L (ref 3.5–5.5)
SODIUM BLD-SCNC: 127 MMOL/L (ref 132–146)
TSH SERPL DL<=0.05 MIU/L-ACNC: 0.78 MIU/ML (ref 0.35–5.35)

## 2018-06-15 PROCEDURE — 25010000002 HEPARIN (PORCINE) PER 1000 UNITS: Performed by: HOSPITALIST

## 2018-06-15 PROCEDURE — 99225 PR SBSQ OBSERVATION CARE/DAY 25 MINUTES: CPT | Performed by: HOSPITALIST

## 2018-06-15 PROCEDURE — 82570 ASSAY OF URINE CREATININE: CPT | Performed by: HOSPITALIST

## 2018-06-15 PROCEDURE — G0378 HOSPITAL OBSERVATION PER HR: HCPCS

## 2018-06-15 PROCEDURE — 80048 BASIC METABOLIC PNL TOTAL CA: CPT | Performed by: INTERNAL MEDICINE

## 2018-06-15 PROCEDURE — 96372 THER/PROPH/DIAG INJ SC/IM: CPT

## 2018-06-15 PROCEDURE — 99202 OFFICE O/P NEW SF 15 MIN: CPT | Performed by: NURSE PRACTITIONER

## 2018-06-15 PROCEDURE — 84443 ASSAY THYROID STIM HORMONE: CPT | Performed by: HOSPITALIST

## 2018-06-15 RX ORDER — BENZONATATE 200 MG/1
200 CAPSULE ORAL 3 TIMES DAILY PRN
COMMUNITY
End: 2018-06-16 | Stop reason: HOSPADM

## 2018-06-15 RX ORDER — DONEPEZIL HYDROCHLORIDE 5 MG/1
5 TABLET, FILM COATED ORAL NIGHTLY
COMMUNITY
End: 2022-03-03

## 2018-06-15 RX ORDER — CLOPIDOGREL BISULFATE 75 MG/1
75 TABLET ORAL DAILY
COMMUNITY
End: 2022-03-03

## 2018-06-15 RX ORDER — GUAIFENESIN 600 MG/1
600 TABLET, EXTENDED RELEASE ORAL EVERY 12 HOURS SCHEDULED
COMMUNITY
End: 2018-06-16 | Stop reason: HOSPADM

## 2018-06-15 RX ADMIN — GABAPENTIN 300 MG: 300 CAPSULE ORAL at 20:36

## 2018-06-15 RX ADMIN — ACETAMINOPHEN 650 MG: 325 TABLET ORAL at 05:17

## 2018-06-15 RX ADMIN — METOPROLOL TARTRATE 12.5 MG: 25 TABLET, FILM COATED ORAL at 20:35

## 2018-06-15 RX ADMIN — DONEPEZIL HYDROCHLORIDE 5 MG: 5 TABLET ORAL at 20:36

## 2018-06-15 RX ADMIN — ALPRAZOLAM 0.25 MG: 0.25 TABLET ORAL at 20:39

## 2018-06-15 RX ADMIN — HEPARIN SODIUM 5000 UNITS: 5000 INJECTION, SOLUTION INTRAVENOUS; SUBCUTANEOUS at 09:03

## 2018-06-15 RX ADMIN — GABAPENTIN 300 MG: 300 CAPSULE ORAL at 09:03

## 2018-06-15 RX ADMIN — GABAPENTIN 300 MG: 300 CAPSULE ORAL at 15:50

## 2018-06-15 RX ADMIN — ROPINIROLE 0.5 MG: 0.5 TABLET, FILM COATED ORAL at 20:36

## 2018-06-15 RX ADMIN — CLOPIDOGREL BISULFATE 75 MG: 75 TABLET ORAL at 09:03

## 2018-06-15 RX ADMIN — LEVOTHYROXINE SODIUM 50 MCG: 50 TABLET ORAL at 05:18

## 2018-06-15 RX ADMIN — HEPARIN SODIUM 5000 UNITS: 5000 INJECTION, SOLUTION INTRAVENOUS; SUBCUTANEOUS at 20:35

## 2018-06-15 RX ADMIN — METOPROLOL TARTRATE 12.5 MG: 25 TABLET, FILM COATED ORAL at 09:02

## 2018-06-15 NOTE — PROGRESS NOTES
Discharge Planning Assessment  [facility]     Patient Name: Scott Sinha  MRN: [id number]  Today's Date: 6/15/2018    Admit Date: 6/13/2018          Discharge Needs Assessment    No documentation.             Discharge Plan     Row Name 06/15/18 1547       Plan    Plan update    Plan Comments Pt agrees to participate in Transition of Care program, consent signed by pt        Destination     No service coordination in this encounter.      Durable Medical Equipment     No service coordination in this encounter.      Dialysis/Infusion     No service coordination in this encounter.      Home Medical Care     No service coordination in this encounter.      Social Care     No service coordination in this encounter.        Expected Discharge Date and Time     Expected Discharge Date Expected Discharge Time    Jun 14, 2018               Demographic Summary    No documentation.           Functional Status    No documentation.           Psychosocial    No documentation.           Abuse/Neglect    No documentation.           Legal    No documentation.           Substance Abuse    No documentation.           Patient Forms    No documentation.         Nelda Muller

## 2018-06-15 NOTE — CONSULTS
[facility]  In-Patient Heart and Valve APRN History and Physical   LOS: 0 days   Patient Care Team:  AMA Hair MD as PCP - General  W Chalino Hair MD as PCP - Family Medicine    Reason for Consult: A/C CHF  Chief Complaint: SOB    Subjective     HPI  Ms. Sinha is an 89 YO F with history of chronic systolic HF, HTN, CKD, CVA, chronic hyponatremia and mild cognitive impairment who presented to ED with acute exertional dyspnea and weakness. Symptoms developed while going to [place] with her family. She was unable to make it inside due to dyspnea. Denies chest pain, tightness, palpitations, dizziness, orthopnea/PND or cough. She has notices some hoarseness and mild LE edema. She also has gained about 10lbs recently but she is not sure over what time period and relates this to recent improvement in her appetite. She received 1mg of Bumex IV in ED and felt no improvement. BNP was 616. CTA chest showed early changes of pulmonary edema and small to moderate pericardial effusion  Review of Systems   Constitutional: Positive for activity change and fatigue. Negative for appetite change, chills and fever.   HENT: Positive for congestion and trouble swallowing.    Eyes: Negative.    Respiratory: Positive for shortness of breath. Negative for apnea, cough, choking, chest tightness and wheezing.    Cardiovascular: Positive for leg swelling. Negative for chest pain and palpitations.   Gastrointestinal: Negative.    Endocrine: Negative.    Genitourinary: Negative.    Musculoskeletal: Positive for arthralgias, back pain and gait problem.   Skin: Negative.    Allergic/Immunologic: Negative.    Hematological: Negative.    Psychiatric/Behavioral: Negative.          No problems updated.  Patient Active Problem List   Diagnosis   • Post-concussion headache   • Idiopathic peripheral neuropathy   • Neck pain   • Mild cognitive impairment   • Syncope and collapse   • Acute cystitis   • Anemia   • Hyponatremia   •  Hypertension   • Vaso vagal episode   • Dehydration   • History of CVA (cerebrovascular accident)   • Dementia   • Left bundle branch block   • Orthostatic dizziness   • Chronic systolic heart failure   • Hyperkalemia   • Normocytic anemia   • CARYN (acute kidney injury)   • Dyspnea   • Hypoxia   • Hyponatremia   • Hypothyroid   • URI (upper respiratory infection)   • Acute on chronic systolic heart failure   • Pericardial effusion   • Hyponatremia     Past Medical History:   Diagnosis Date   • Congestive heart failure    • Malignant neoplasm    • Stroke     mini stroke   • Systolic heart failure 9/25/2017    Chronic/compensated (EF 25%)     Past Surgical History:   Procedure Laterality Date   • CHOLECYSTECTOMY     • EYE SURGERY     • HYSTERECTOMY       Family History   Problem Relation Age of Onset   • Cancer Mother    • No Known Problems Father    • Cancer Sister    • Cancer Brother      Social History     Social History   • Marital status:      Spouse name: N/A   • Number of children: N/A   • Years of education: N/A     Occupational History   • Not on file.     Social History Main Topics   • Smoking status: Never Smoker   • Smokeless tobacco: Never Used   • Alcohol use No   • Drug use: No   • Sexual activity: Defer     Other Topics Concern   • Not on file     Social History Narrative   • No narrative on file         Current Facility-Administered Medications:   •  acetaminophen (TYLENOL) tablet 650 mg, 650 mg, Oral, Q4H PRN, Yadira CALLAHAN MD, 650 mg at 06/15/18 0517  •  ALPRAZolam (XANAX) tablet 0.25 mg, 0.25 mg, Oral, BID PRN, Yadira CALLAHAN MD, 0.25 mg at 06/14/18 2039  •  clopidogrel (PLAVIX) tablet 75 mg, 75 mg, Oral, Daily, Yadira CALLAHAN MD, 75 mg at 06/15/18 0903  •  donepezil (ARICEPT) tablet 5 mg, 5 mg, Oral, Nightly, Yadira CALLAHAN MD, 5 mg at 06/14/18 1955  •  gabapentin (NEURONTIN) capsule 300 mg, 300 mg, Oral, TID, Yadira CALLAHAN MD, 300 mg at 06/15/18 0903  •  heparin (porcine) 5000  "UNIT/ML injection 5,000 Units, 5,000 Units, Subcutaneous, Q12H, Yadira CALLAHAN MD, 5,000 Units at 06/15/18 0903  •  levothyroxine (SYNTHROID, LEVOTHROID) tablet 50 mcg, 50 mcg, Oral, Daily, Yadira CALLAHAN MD, 50 mcg at 06/15/18 0518  •  metoprolol tartrate (LOPRESSOR) half tablet 12.5 mg, 12.5 mg, Oral, Q12H, Yadira CALLAHAN MD, 12.5 mg at 06/15/18 0902  •  ondansetron (ZOFRAN) tablet 4 mg, 4 mg, Oral, Q6H PRN **OR** ondansetron (ZOFRAN) injection 4 mg, 4 mg, Intravenous, Q6H PRN, Yadira CALLAHAN MD  •  Pharmacy Consult - [facility], , Does not apply, Daily, Valentina Moreira, [facility]  •  rOPINIRole (REQUIP) tablet 0.5 mg, 0.5 mg, Oral, Nightly, Yadira CALLAHAN MD, 0.5 mg at 06/14/18 1939  •  sodium chloride 0.9 % flush 1-10 mL, 1-10 mL, Intravenous, PRN, Yadira CALLAHAN MD  •  sodium chloride 0.9 % flush 10 mL, 10 mL, Intravenous, PRN, Anselmo Moraes MD       Allergies as of 06/13/2018 - Reviewed 06/13/2018   Allergen Reaction Noted   • Augmentin [amoxicillin-pot clavulanate] Nausea And Vomiting 08/15/2016   • Claritin [loratadine] Unknown (See Comments) 08/15/2016   • Keflex [cephalexin] Nausea And Vomiting 06/13/2018         Tele: Sinus Rythym with PVC's. LBBB    Vitals:  Blood pressure 126/63, pulse 73, temperature 98.2 °F (36.8 °C), temperature source Oral, resp. rate 18, height 162.6 cm (64\"), weight 61.1 kg (134 lb 12.8 oz), SpO2 97 %.     Intake/Output Summary (Last 24 hours) at 06/15/18 1446  Last data filed at 06/14/18 2000   Gross per 24 hour   Intake              360 ml   Output             1000 ml   Net             -640 ml       Physical Exam:      Physical Exam   Constitutional: She is oriented to person, place, and time. She appears well-developed and well-nourished. No distress.   HENT:   Head: Normocephalic.   Eyes: Conjunctivae are normal. Pupils are equal, round, and reactive to light.   Neck: Neck supple. No JVD present. No thyromegaly present.   Cardiovascular: Normal rate, regular rhythm, normal " heart sounds and intact distal pulses.  Exam reveals no gallop and no friction rub.    No murmur heard.  Pulmonary/Chest: Effort normal. No respiratory distress. She has no wheezes. She has rales (mild crackles at bases). She exhibits no tenderness.   Abdominal: Soft. Bowel sounds are normal.   Musculoskeletal: Normal range of motion. She exhibits edema (mild edema).   Neurological: She is alert and oriented to person, place, and time.   Skin: Skin is warm and dry.   Psychiatric: She has a normal mood and affect. Her behavior is normal. Thought content normal.   Vitals reviewed.        Results Review:     I reviewed the patient's new clinical results.    ·   Results from last 7 days  Lab Units 06/14/18  0500   WBC 10*3/mm3 8.87   HEMOGLOBIN g/dL 9.3*   HEMATOCRIT % 28.7*   PLATELETS 10*3/mm3 272   ·   ·   Results from last 7 days  Lab Units 06/15/18  0513  06/13/18  2218   SODIUM mmol/L 127*  < > 126*   POTASSIUM mmol/L 3.9  < > 4.6   CHLORIDE mmol/L 93*  < > 94*   CO2 mmol/L 26.0  < > 24.0   BUN mg/dL 24*  < > 14   CREATININE mg/dL 1.65*  < > 1.18   CALCIUM mg/dL 8.7  < > 9.3   BILIRUBIN mg/dL  --   --  0.3   ALK PHOS U/L  --   --  52   ALT (SGPT) U/L  --   --  11   AST (SGOT) U/L  --   --  22   GLUCOSE mg/dL 99  < > 113*   CT Chest  FINDINGS:    Pulmonary arteries:  No acute findings. No pulmonary embolism.    Aorta:  No acute findings.  No thoracic aortic aneurysm.    Lungs:  Mild bibasal dependent and subsegmental atelectasis.  Mild diffuse   peribronchial and interlobular septal thickening, more prominent in the bases.    Mild scattered mosaic attenuation.  No mass or consolidation.    Pleural space:  Trace bilateral pleural effusions.    Heart:  Mildly enlarged.  Prominent pulmonary veins.  Small to moderate   pericardial effusion, mostly posteriorly and inferiorly measuring up to 1.6 cm   inferiorly.    Bones/joints:  No acute fracture.  No dislocation.    Soft tissues:  Unremarkable.    Lymph nodes:   Unremarkable. No enlarged lymph nodes.    Upper abdomen:  Diminutive atrophic right kidney.     IMPRESSION:  1.  Small to moderate pericardial effusion.  2.  Interstitial opacities suggestive of early edema.    Echo 6/14/18  ·    Moderate aortic valve regurgitation is present.  · Mild mitral valve regurgitation is present  · Mild tricuspid valve regurgitation is present.  · There is a moderate (1-2cm) circumferential pericardial effusion.  · Left ventricular systolic function is severely decreased. Estimated EF = 20%.  · There is mild right atrial diastolic collapse from the pericardial effusion. The transmitral Doppler show some variation with inspiration. Clinical correlation suggested in this patient i.e. as the patient hypotensive could also consider CT scan of the chest to look at the pericardial effusion  ·     ·         ASSESSMENT/PLAN:    1. Acute on chronic systolic heart failure  - s/p IV diuretics. Now on hold due to CARYN. Patient is prone to dehydration  - Heart failure education provided today including signs and symptoms, causes of heart failure, medications, daily weights, low sodium diet (less than 2000 mg per day), 1.5L fluid restriction and daily physical activity as tolerated. Reviewed HF Zones with patient   - Not candidate for ICD due to advanced age and dementia per notes  - No ACE/ARB due to renal insufficiency. On low dose metoprolol tartrate, recommend changing to metoprolol succinate. History of orthostasis/syncope, so only low doses of meds tolerated    2. Pericardial Effusion  - No s/s of tamponade  - If no symptomatic improvement, recommend to consult cardiology for further evaluation. She is established with Dr. Aragon    3. Hyponatremia  - Chronic, baseline around 127-130  - Asymptomatic    4. Chronic anemia  - Stable    5. Acute on chronic CKD  - Worse with IV diuretics. Now on hold. Continue to monitor closely    We will see patient within a week of discharge. Recommend to consult  cardiology if symptoms persist despite diuresis.     Chrissy Toribio, APRN - 6/15/2018, 2:46 PM

## 2018-06-15 NOTE — PROGRESS NOTES
Continued Stay Note   [facility]     Patient Name: Scott Sinha  MRN: [id number]  Today's Date: 6/15/2018    Admit Date: 6/13/2018          Discharge Plan     Row Name 06/15/18 1155       Plan    Plan Home at DC    Patient/Family in Agreement with Plan yes    Plan Comments I spoke with the pt. Her goal remains home with her sons assist at DC. She is on RA. States no DC needs.              Discharge Codes    No documentation.       Expected Discharge Date and Time     Expected Discharge Date Expected Discharge Time    Jun 14, 2018             Zuleika Baez RN

## 2018-06-15 NOTE — PROGRESS NOTES
[facility] Medicine Services  PROGRESS NOTE    Patient Name: Scott Sinha  : 4/10/1930  MRN: [id number]    Date of Admission: 2018  Length of Stay: 0  Primary Care Physician: CHRISTY Hair MD    Subjective   Subjective     CC:  FU CHF    HPI:  Pt reports feeling well other than having muscle cramps.    Review of Systems  Gen- No fevers, chills  CV- No chest pain, palpitations  Resp- No cough, dyspnea  GI- No N/V/D, abd pain    Otherwise ROS is negative except as mentioned in the HPI.    Objective   Objective     Vital Signs:   Temp:  [98.2 °F (36.8 °C)-99.6 °F (37.6 °C)] 98.2 °F (36.8 °C)  Heart Rate:  [73-95] 73  Resp:  [16-18] 18  BP: (107-148)/(53-77) 126/63        Physical Exam:  Constitutional: No acute distress, awake, alert on RA  Eyes: PERRLA, sclerae anicteric, no conjunctival injection  HENT: NCAT, mucous membranes moist  Neck: Supple, no thyromegaly, no lymphadenopathy, trachea midline  Respiratory: Clear to auscultation bilaterally, nonlabored respirations   Cardiovascular: RRR, no murmurs, rubs, or gallops, palpable pedal pulses bilaterally  Gastrointestinal: Positive bowel sounds, soft, nontender, nondistended  Musculoskeletal: No bilateral ankle edema, no clubbing or cyanosis to extremities  Psychiatric: Appropriate affect, cooperative  Neurologic: Oriented x 3, strength symmetric in all extremities, Cranial Nerves grossly intact to confrontation, speech clear  Skin: No rashes    Results Reviewed:  I have personally reviewed current lab, radiology, and data and agree.      Results from last 7 days  Lab Units 18  0500 18  2218   WBC 10*3/mm3 8.87 7.81   HEMOGLOBIN g/dL 9.3* 9.5*   HEMATOCRIT % 28.7* 29.3*   PLATELETS 10*3/mm3 272 272       Results from last 7 days  Lab Units 06/15/18  0513 18  0500 18  2218   SODIUM mmol/L 127* 129* 126*   POTASSIUM mmol/L 3.9 4.3 4.6   CHLORIDE mmol/L 93* 93* 94*   CO2 mmol/L 26.0 28.0 24.0   BUN mg/dL 24*  13 14   CREATININE mg/dL 1.65* 1.20 1.18   GLUCOSE mg/dL 99 109* 113*   CALCIUM mg/dL 8.7 9.7 9.3   ALT (SGPT) U/L  --   --  11   AST (SGOT) U/L  --   --  22     BNP   Date Value Ref Range Status   06/14/2018 1,028.0 (H) 0.0 - 100.0 pg/mL Final     Comment:     Results may be falsely decreased if patient taking Biotin.     No results found for: PHART    Microbiology Results Abnormal     None          Imaging Results (last 24 hours)     ** No results found for the last 24 hours. **        Results for orders placed during the hospital encounter of 06/13/18   Adult Transthoracic Echo Complete W/ Cont if Necessary Per Protocol    Narrative · Moderate aortic valve regurgitation is present.  · Mild mitral valve regurgitation is present  · Mild tricuspid valve regurgitation is present.  · There is a moderate (1-2cm) circumferential pericardial effusion.  · Left ventricular systolic function is severely decreased. Estimated EF =   20%.  · There is mild right atrial diastolic collapse from the pericardial   effusion. The transmitral Doppler show some variation with inspiration.   Clinical correlation suggested in this patient i.e. as the patient   hypotensive could also consider CT scan of the chest to look at the   pericardial effusion          I have reviewed the medications.    Assessment/Plan   Assessment / Plan     Hospital Problem List     * (Principal)Acute on chronic systolic heart failure    Hyponatremia    Overview Signed 9/27/2017  3:09 PM by Doug Plata MD     resolved         Dyspnea    Pericardial effusion    Hyponatremia           Brief Hospital Course to date:  Scott Sinha is a 88 y.o. female  with hx of chronic systolic CHF (EF 25%), HTN, mild cognitive impairment, CVA, and chronic hyponatremia presents due to SOA and weakness.     Assessment & Plan:    - Acute on chronic systolic CHF, NICM: ECHO 20%, moderate pericardial effusion. HD stable. Will need FU with Dr. Aragon in 4-6 weeks. BB. Notes  reviewed- deemed not to be a candidate for ICD  - CARYN on CKD: Baseline Cr 1.2. Worse. Hold diuresis today  - Hypotonic hyponatremia:   - MCI: Donepezil  - Hypothyr: Levothyr  - Hx of CVA: Plavix    DVT Prophylaxis:  [facility]    CODE STATUS: Full Code    Disposition: I expect the patient to be discharged1-2 days    Dipti Medellin MD  06/15/18  5:45 PM

## 2018-06-16 VITALS
TEMPERATURE: 97.8 F | HEART RATE: 100 BPM | WEIGHT: 131.4 LBS | RESPIRATION RATE: 18 BRPM | BODY MASS INDEX: 22.43 KG/M2 | HEIGHT: 64 IN | OXYGEN SATURATION: 95 % | DIASTOLIC BLOOD PRESSURE: 67 MMHG | SYSTOLIC BLOOD PRESSURE: 125 MMHG

## 2018-06-16 LAB
ALBUMIN SERPL-MCNC: 3.6 G/DL (ref 3.2–4.8)
ANION GAP SERPL CALCULATED.3IONS-SCNC: 7 MMOL/L (ref 3–11)
BUN BLD-MCNC: 28 MG/DL (ref 9–23)
BUN/CREAT SERPL: 20.3 (ref 7–25)
CALCIUM SPEC-SCNC: 8.7 MG/DL (ref 8.7–10.4)
CHLORIDE SERPL-SCNC: 95 MMOL/L (ref 99–109)
CO2 SERPL-SCNC: 25 MMOL/L (ref 20–31)
CREAT BLD-MCNC: 1.38 MG/DL (ref 0.6–1.3)
GFR SERPL CREATININE-BSD FRML MDRD: 36 ML/MIN/1.73
GLUCOSE BLD-MCNC: 100 MG/DL (ref 70–100)
PHOSPHATE SERPL-MCNC: 3.8 MG/DL (ref 2.4–5.1)
POTASSIUM BLD-SCNC: 4.6 MMOL/L (ref 3.5–5.5)
SODIUM BLD-SCNC: 127 MMOL/L (ref 132–146)

## 2018-06-16 PROCEDURE — 25010000002 HEPARIN (PORCINE) PER 1000 UNITS: Performed by: HOSPITALIST

## 2018-06-16 PROCEDURE — 80069 RENAL FUNCTION PANEL: CPT | Performed by: HOSPITALIST

## 2018-06-16 PROCEDURE — G0378 HOSPITAL OBSERVATION PER HR: HCPCS

## 2018-06-16 PROCEDURE — 99217 PR OBSERVATION CARE DISCHARGE MANAGEMENT: CPT | Performed by: HOSPITALIST

## 2018-06-16 PROCEDURE — 96372 THER/PROPH/DIAG INJ SC/IM: CPT

## 2018-06-16 RX ORDER — FUROSEMIDE 20 MG/1
20 TABLET ORAL DAILY
Qty: 30 TABLET | Refills: 2 | Status: SHIPPED | OUTPATIENT
Start: 2018-06-17 | End: 2022-03-17 | Stop reason: HOSPADM

## 2018-06-16 RX ORDER — METOPROLOL SUCCINATE 25 MG/1
12.5 TABLET, EXTENDED RELEASE ORAL
Qty: 30 TABLET | Refills: 2 | Status: SHIPPED | OUTPATIENT
Start: 2018-06-17 | End: 2018-07-19 | Stop reason: DRUGHIGH

## 2018-06-16 RX ORDER — FUROSEMIDE 20 MG/1
20 TABLET ORAL DAILY
Status: DISCONTINUED | OUTPATIENT
Start: 2018-06-16 | End: 2018-06-16 | Stop reason: HOSPADM

## 2018-06-16 RX ORDER — METOPROLOL SUCCINATE 25 MG/1
12.5 TABLET, EXTENDED RELEASE ORAL
Status: DISCONTINUED | OUTPATIENT
Start: 2018-06-16 | End: 2018-06-16 | Stop reason: HOSPADM

## 2018-06-16 RX ADMIN — LEVOTHYROXINE SODIUM 50 MCG: 50 TABLET ORAL at 05:40

## 2018-06-16 RX ADMIN — METOPROLOL SUCCINATE 12.5 MG: 25 TABLET, EXTENDED RELEASE ORAL at 09:55

## 2018-06-16 RX ADMIN — GABAPENTIN 300 MG: 300 CAPSULE ORAL at 09:55

## 2018-06-16 RX ADMIN — FUROSEMIDE 20 MG: 20 TABLET ORAL at 09:55

## 2018-06-16 RX ADMIN — CLOPIDOGREL BISULFATE 75 MG: 75 TABLET ORAL at 09:55

## 2018-06-16 RX ADMIN — HEPARIN SODIUM 5000 UNITS: 5000 INJECTION, SOLUTION INTRAVENOUS; SUBCUTANEOUS at 09:55

## 2018-06-16 NOTE — DISCHARGE SUMMARY
[facility]  DISCHARGE SUMMARY    Patient Name: Scott Sinha  : 4/10/1930  MRN: [id number]    Date of Admission: 2018  Date of Discharge:  2018  Length of Stay: 0  Primary Care Physician: CHRISTY Hair MD    Consults     No orders found from 5/15/2018 to 2018.        Hospital Course     Presenting Problem:   Dyspnea, unspecified type [R06.00]    Active Hospital Problems (** Indicates Principal Problem)    Diagnosis Date Noted   • **Acute on chronic systolic heart failure [I50.23] 2018   • Pericardial effusion [I31.3] 2018   • Hyponatremia [E87.1] 2018   • Dyspnea [R06.00] 2017   • Hyponatremia [E87.1] 2017      Resolved Hospital Problems    Diagnosis Date Noted Date Resolved   No resolved problems to display.          Hospital Course:  Scott Sinha is a 88 y.o. female with hx of chronic systolic CHF (EF 20% deemed not to be a candidate for ICD due to dementia and advenced age), HTN, mild cognitive impairment, CVA, and chronic hyponatremia presents due to SOA and weakness. Pt was diuresed with clinical improvement. Developed CARYN from diuresis that improved prior to discharged. Discharged on PO lasix for maintenance therapy. Pt [facility] within 1 week with repeat renal function lab.     Pt was noted to have moderated pericardial effusion on ECHO. No tamponade physiology and pt is not symptomatic. TSH normal. She is to FU with Dr. Aragon in 4-6 weeks for pericardial effusion.      Day of Discharge     HPI:   Reports feeling well. No CP, cough. Ambulated around the halls on RA with O2 sats in mid-90s    Review of Systems  Gen- No fevers or chills  CV- No chest pain or palpitations  Resp- No cough or dyspnea  GI- No N/V/D or abd pain    Otherwise ROS is negative except as mentioned in the HPI.    Vital Signs:   Temp:  [97.8 °F (36.6 °C)-100.1 °F (37.8 °C)] 97.8 °F (36.6 °C)  Heart Rate:  []  100  Resp:  [18-19] 18  BP: (125-150)/(67-80) 125/67     Physical Exam:  Constitutional: No acute distress, awake, alert on RA  Eyes: PERRLA, sclerae anicteric, no conjunctival injection  HENT: NCAT, mucous membranes moist  Neck: Supple, no thyromegaly, no lymphadenopathy, trachea midline  Respiratory: Clear to auscultation bilaterally, nonlabored respirations   Cardiovascular: RRR, no murmurs, rubs, or gallops, palpable pedal pulses bilaterally  Gastrointestinal: Positive bowel sounds, soft, nontender, nondistended  Musculoskeletal: No bilateral ankle edema, no clubbing or cyanosis to extremities  Psychiatric: Appropriate affect, cooperative  Neurologic: Oriented x 3, strength symmetric in all extremities, Cranial Nerves grossly intact to confrontation, speech clear  Skin: No rashes    Pertinent  and/or Most Recent Results         Results from last 7 days  Lab Units 06/16/18  0502 06/15/18  0513 06/14/18  0500 06/13/18  2218   WBC 10*3/mm3  --   --  8.87 7.81   HEMOGLOBIN g/dL  --   --  9.3* 9.5*   HEMATOCRIT %  --   --  28.7* 29.3*   PLATELETS 10*3/mm3  --   --  272 272   SODIUM mmol/L 127* 127* 129* 126*   POTASSIUM mmol/L 4.6 3.9 4.3 4.6   CHLORIDE mmol/L 95* 93* 93* 94*   CO2 mmol/L 25.0 26.0 28.0 24.0   BUN mg/dL 28* 24* 13 14   CREATININE mg/dL 1.38* 1.65* 1.20 1.18   GLUCOSE mg/dL 100 99 109* 113*   CALCIUM mg/dL 8.7 8.7 9.7 9.3       Results from last 7 days  Lab Units 06/13/18  2218   BILIRUBIN mg/dL 0.3   ALK PHOS U/L 52   ALT (SGPT) U/L 11   AST (SGOT) U/L 22           Invalid input(s): TG, LDLCALC, LDLREALC    Results from last 7 days  Lab Units 06/15/18  0513 06/14/18  0500 06/13/18  2218   TSH mIU/mL 0.777  --   --    BNP pg/mL  --  1,028.0* 616.0*     Brief Urine Lab Results  (Last result in the past 365 days)      Color   Clarity   Blood   Leuk Est   Nitrite   Protein   CREAT   Urine HCG        06/15/18 0950             141.1             Microbiology Results Abnormal     None          Imaging  Results (all)     Procedure Component Value Units Date/Time    CT Angiogram Chest With Contrast [[id number]] Collected:  06/13/18 2219     Updated:  06/13/18 8619    Narrative:       EXAM:    CT Angiography Chest With Intravenous Contrast    CLINICAL HISTORY:    88 years old, female; Signs and symptoms; Shortness of breath; Additional   info: SOB    TECHNIQUE:    Axial computed tomographic angiography images of the chest with intravenous   contrast using pulmonary embolism protocol.  All CT scans at this facility use   one or more dose reduction techniques, viz.: automated exposure control; ma/kV   adjustment per patient size (including targeted exams where dose is matched to   indication; i.e. head); or iterative reconstruction technique.    MIP reconstructed images were created and reviewed.    CONTRAST:    70 mL of [id number] administered intravenously.    COMPARISON:    No relevant prior studies available.    FINDINGS:    Pulmonary arteries:  No acute findings. No pulmonary embolism.    Aorta:  No acute findings.  No thoracic aortic aneurysm.    Lungs:  Mild bibasal dependent and subsegmental atelectasis.  Mild diffuse   peribronchial and interlobular septal thickening, more prominent in the bases.    Mild scattered mosaic attenuation.  No mass or consolidation.    Pleural space:  Trace bilateral pleural effusions.    Heart:  Mildly enlarged.  Prominent pulmonary veins.  Small to moderate   pericardial effusion, mostly posteriorly and inferiorly measuring up to 1.6 cm   inferiorly.    Bones/joints:  No acute fracture.  No dislocation.    Soft tissues:  Unremarkable.    Lymph nodes:  Unremarkable. No enlarged lymph nodes.    Upper abdomen:  Diminutive atrophic right kidney.      Impression:       1.  Small to moderate pericardial effusion.  2.  Interstitial opacities suggestive of early edema.    THIS DOCUMENT HAS BEEN ELECTRONICALLY SIGNED BY SARITA TERAN MD    XR Chest 1 View [[id number]] Collected:  06/13/18  2217     Updated:  06/13/18 2309    Narrative:       EXAM:    XR Chest, 1 View    CLINICAL HISTORY:    88 years old, female; Signs and symptoms; Shortness of breath; Additional   info: SOA triage protocol    TECHNIQUE:    Frontal view of the chest.    COMPARISON:    CR - XR CHEST 1 VW 2018-01-01 05:59    FINDINGS:    Lungs:  Mild diffuse increased interstitial opacities.  No consolidation.    Pleural space:  Unremarkable.  No pneumothorax.    Heart: Enlarged, appears increased in size.    Mediastinum:  Unremarkable.    Bones/joints:  Unremarkable.      Impression:         Cardiac silhouette appears slightly increased in size with increased   interstitial opacities suggestive of edema.    THIS DOCUMENT HAS BEEN ELECTRONICALLY SIGNED BY SARITA TERAN MD          Results for orders placed during the hospital encounter of 06/13/18   Adult Transthoracic Echo Complete W/ Cont if Necessary Per Protocol    Narrative · Moderate aortic valve regurgitation is present.  · Mild mitral valve regurgitation is present  · Mild tricuspid valve regurgitation is present.  · There is a moderate (1-2cm) circumferential pericardial effusion.  · Left ventricular systolic function is severely decreased. Estimated EF =   20%.  · There is mild right atrial diastolic collapse from the pericardial   effusion. The transmitral Doppler show some variation with inspiration.   Clinical correlation suggested in this patient i.e. as the patient   hypotensive could also consider CT scan of the chest to look at the   pericardial effusion            Discharge Details        Discharge Medications      New Medications      Instructions Start Date   furosemide 20 MG tablet  Commonly known as:  LASIX   Take 1 tablet by mouth Daily.   Start Date:  6/17/2018     metoprolol succinate XL 25 MG 24 hr tablet  Commonly known as:  TOPROL-XL   Take 0.5 tablets by mouth Daily.   Start Date:  6/17/2018        Continue These Medications      Instructions Start Date    ALPRAZolam 0.25 MG tablet  Commonly known as:  XANAX   Take 0.25 mg by mouth 2 (Two) Times a Day As Needed for Anxiety.      clopidogrel 75 MG tablet  Commonly known as:  PLAVIX   Take 75 mg by mouth Daily.      cycloSPORINE 0.05 % ophthalmic emulsion  Commonly known as:  RESTASIS   Administer 1 drop to both eyes Every 12 (Twelve) Hours.      donepezil 5 MG tablet  Commonly known as:  ARICEPT   Take 5 mg by mouth Every Night.      fish oil 1000 MG capsule capsule   Take  by mouth Daily With Breakfast.      gabapentin 300 MG capsule  Commonly known as:  NEURONTIN   Take 300 mg by mouth 3 (Three) Times a Day.      levothyroxine 50 MCG tablet  Commonly known as:  SYNTHROID, LEVOTHROID   Take 50 mcg by mouth Daily.      meclizine 12.5 MG tablet  Commonly known as:  ANTIVERT   Take 12.5 mg by mouth 3 (Three) Times a Day As Needed for dizziness.      MULTI COMPLETE PO   Take  by mouth.      nystatin [id number] UNIT/ML suspension  Commonly known as:  MYCOSTATIN   Swish and swallow 500,000 Units 4 (Four) Times a Day As Needed.      Olopatadine HCl 0.7 % solution   Apply 1 drop to eye Daily.      rOPINIRole 0.5 MG tablet  Commonly known as:  REQUIP   Take 0.5 mg by mouth Daily. Take 1 hour before bedtime.         Stop These Medications    benzonatate 200 MG capsule  Commonly known as:  TESSALON     guaiFENesin 600 MG 12 hr tablet  Commonly known as:  MUCINEX     metoprolol tartrate 25 MG tablet  Commonly known as:  LOPRESSOR              Discharge Disposition:  Home or Self Care    Discharge Diet:  1.5L daily fluid restriction, 2g sodium  Regular diet    Discharge Activity:  As tolerated    Special Instructions:  Weight yourself daily. Take an extra lasix pill if you gain >3lbs in 2 days    Future Appointments  Date Time Provider Department Center   11/16/2018 1:15 PM Jose Antonio Aragon MD [facility] None       Additional Instructions for the Follow-ups that You Need to Schedule     Discharge Follow-up with PCP    As directed       Follow Up Details:  1-2 weeks         Discharge Follow-up with Specified Provider: [facility] - to see with Renal Function Panel within 1 week; 1 Week    As directed      To:  [facility] - to see with Renal Function Panel within 1 week    Follow Up:  1 Week         Discharge Follow-up with Specified Provider: Dr. Aragon; 1 Month    As directed      To:  Dr. Aragon    Follow Up:  1 Month               Time Spent on Discharge:  >35min    Dipti Medellin MD  06/16/18  12:55 PM

## 2018-06-16 NOTE — PLAN OF CARE
Problem: Fall Risk (Adult)  Goal: Absence of Fall  Outcome: Ongoing (interventions implemented as appropriate)   06/16/18 0431   Fall Risk (Adult)   Absence of Fall making progress toward outcome       Problem: Patient Care Overview  Goal: Plan of Care Review  Outcome: Ongoing (interventions implemented as appropriate)   06/14/18 0433 06/15/18 0500 06/15/18 2000   Coping/Psychosocial   Plan of Care Reviewed With --  --  patient   Coping/Psychosocial   Patient Agreement with Plan of Care agrees --  --    Plan of Care Review   Progress --  improving --    OTHER   Outcome Summary VSS, pt states dyspnea is improving,to have more bumex and echo in am. --  --      Goal: Individualization and Mutuality  Outcome: Ongoing (interventions implemented as appropriate)   06/16/18 0431   Personal Strengths/Vulnerabilities   Patient Personal Strengths motivated for treatment;positive attitude     Goal: Discharge Needs Assessment  Outcome: Ongoing (interventions implemented as appropriate)   06/14/18 0230 06/14/18 1041 06/16/18 0431   Discharge Needs Assessment   Readmission Within the Last 30 Days --  --  no previous admission in last 30 days   Concerns to be Addressed --  --  denies needs/concerns at this time   Patient/Family Anticipates Transition to home with family --  --    Patient/Family Anticipated Services at Transition none --  --    Transportation Anticipated family or friend will provide --  --    Discharge Coordination/Progress --  Has had [facility] in the past. Uses the cane mostly if needed. --      Goal: Interprofessional Rounds/Family Conf  Outcome: Ongoing (interventions implemented as appropriate)   06/16/18 0431   Interdisciplinary Rounds/Family Conf   Participants nursing

## 2018-06-17 ENCOUNTER — HOSPITAL ENCOUNTER (INPATIENT)
Facility: HOSPITAL | Age: 83
LOS: 2 days | Discharge: HOME HEALTH | End: 2018-06-19
Attending: EMERGENCY MEDICINE | Admitting: INTERNAL MEDICINE

## 2018-06-17 ENCOUNTER — APPOINTMENT (OUTPATIENT)
Dept: GENERAL RADIOLOGY | Facility: HOSPITAL | Age: 83
End: 2018-06-17

## 2018-06-17 ENCOUNTER — APPOINTMENT (OUTPATIENT)
Dept: CT IMAGING | Facility: HOSPITAL | Age: 83
End: 2018-06-17

## 2018-06-17 DIAGNOSIS — R06.09 DOE (DYSPNEA ON EXERTION): ICD-10-CM

## 2018-06-17 DIAGNOSIS — R53.81 PHYSICAL DEBILITY: ICD-10-CM

## 2018-06-17 DIAGNOSIS — Z78.9 IMPAIRED MOBILITY AND ADLS: ICD-10-CM

## 2018-06-17 DIAGNOSIS — Z86.79 HISTORY OF HYPERTENSION: ICD-10-CM

## 2018-06-17 DIAGNOSIS — Z74.09 IMPAIRED MOBILITY AND ADLS: ICD-10-CM

## 2018-06-17 DIAGNOSIS — R13.10 DYSPHAGIA, UNSPECIFIED TYPE: Primary | ICD-10-CM

## 2018-06-17 DIAGNOSIS — Z86.79 HISTORY OF CHF (CONGESTIVE HEART FAILURE): ICD-10-CM

## 2018-06-17 DIAGNOSIS — Z86.59 HISTORY OF DEMENTIA: ICD-10-CM

## 2018-06-17 DIAGNOSIS — R09.02 HYPOXIA: ICD-10-CM

## 2018-06-17 DIAGNOSIS — Z74.09 IMPAIRED FUNCTIONAL MOBILITY AND ENDURANCE: ICD-10-CM

## 2018-06-17 PROBLEM — E87.1 HYPONATREMIA: Status: RESOLVED | Noted: 2018-06-14 | Resolved: 2018-06-17

## 2018-06-17 PROBLEM — R55 VASO VAGAL EPISODE: Status: RESOLVED | Noted: 2017-09-25 | Resolved: 2018-06-17

## 2018-06-17 PROBLEM — E87.5 HYPERKALEMIA: Status: RESOLVED | Noted: 2017-12-25 | Resolved: 2018-06-17

## 2018-06-17 PROBLEM — J06.9 URI (UPPER RESPIRATORY INFECTION): Status: RESOLVED | Noted: 2018-01-04 | Resolved: 2018-06-17

## 2018-06-17 PROBLEM — E87.1 HYPONATREMIA: Status: RESOLVED | Noted: 2017-12-30 | Resolved: 2018-06-17

## 2018-06-17 PROBLEM — J96.01 ACUTE RESPIRATORY FAILURE WITH HYPOXIA (HCC): Status: ACTIVE | Noted: 2018-06-17

## 2018-06-17 PROBLEM — R55 SYNCOPE AND COLLAPSE: Status: RESOLVED | Noted: 2017-09-24 | Resolved: 2018-06-17

## 2018-06-17 PROBLEM — I50.23 ACUTE ON CHRONIC SYSTOLIC HEART FAILURE (HCC): Status: RESOLVED | Noted: 2018-06-14 | Resolved: 2018-06-17

## 2018-06-17 PROBLEM — N30.00 ACUTE CYSTITIS: Status: RESOLVED | Noted: 2017-09-24 | Resolved: 2018-06-17

## 2018-06-17 LAB
ALBUMIN SERPL-MCNC: 4.53 G/DL (ref 3.2–4.8)
ALBUMIN/GLOB SERPL: 1.3 G/DL (ref 1.5–2.5)
ALP SERPL-CCNC: 52 U/L (ref 25–100)
ALT SERPL W P-5'-P-CCNC: 13 U/L (ref 7–40)
ANION GAP SERPL CALCULATED.3IONS-SCNC: 11 MMOL/L (ref 3–11)
AST SERPL-CCNC: 26 U/L (ref 0–33)
BASOPHILS # BLD AUTO: 0.03 10*3/MM3 (ref 0–0.2)
BASOPHILS NFR BLD AUTO: 0.3 % (ref 0–1)
BILIRUB SERPL-MCNC: 0.4 MG/DL (ref 0.3–1.2)
BILIRUB UR QL STRIP: NEGATIVE
BNP SERPL-MCNC: 287 PG/ML (ref 0–100)
BUN BLD-MCNC: 33 MG/DL (ref 9–23)
BUN/CREAT SERPL: 19.4 (ref 7–25)
CALCIUM SPEC-SCNC: 9.2 MG/DL (ref 8.7–10.4)
CHLORIDE SERPL-SCNC: 95 MMOL/L (ref 99–109)
CLARITY UR: CLEAR
CO2 SERPL-SCNC: 25 MMOL/L (ref 20–31)
COLOR UR: YELLOW
CREAT BLD-MCNC: 1.7 MG/DL (ref 0.6–1.3)
DEPRECATED RDW RBC AUTO: 45.6 FL (ref 37–54)
EOSINOPHIL # BLD AUTO: 0.2 10*3/MM3 (ref 0–0.3)
EOSINOPHIL NFR BLD AUTO: 2.2 % (ref 0–3)
ERYTHROCYTE [DISTWIDTH] IN BLOOD BY AUTOMATED COUNT: 14.9 % (ref 11.3–14.5)
GFR SERPL CREATININE-BSD FRML MDRD: 28 ML/MIN/1.73
GLOBULIN UR ELPH-MCNC: 3.5 GM/DL
GLUCOSE BLD-MCNC: 106 MG/DL (ref 70–100)
GLUCOSE UR STRIP-MCNC: NEGATIVE MG/DL
HCT VFR BLD AUTO: 28.6 % (ref 34.5–44)
HGB BLD-MCNC: 9.1 G/DL (ref 11.5–15.5)
HGB UR QL STRIP.AUTO: NEGATIVE
HOLD SPECIMEN: NORMAL
HOLD SPECIMEN: NORMAL
IMM GRANULOCYTES # BLD: 0.01 10*3/MM3 (ref 0–0.03)
IMM GRANULOCYTES NFR BLD: 0.1 % (ref 0–0.6)
KETONES UR QL STRIP: ABNORMAL
LEUKOCYTE ESTERASE UR QL STRIP.AUTO: NEGATIVE
LYMPHOCYTES # BLD AUTO: 1.48 10*3/MM3 (ref 0.6–4.8)
LYMPHOCYTES NFR BLD AUTO: 16.3 % (ref 24–44)
MCH RBC QN AUTO: 26.8 PG (ref 27–31)
MCHC RBC AUTO-ENTMCNC: 31.8 G/DL (ref 32–36)
MCV RBC AUTO: 84.1 FL (ref 80–99)
MONOCYTES # BLD AUTO: 1.35 10*3/MM3 (ref 0–1)
MONOCYTES NFR BLD AUTO: 14.8 % (ref 0–12)
NEUTROPHILS # BLD AUTO: 6.04 10*3/MM3 (ref 1.5–8.3)
NEUTROPHILS NFR BLD AUTO: 66.4 % (ref 41–71)
NITRITE UR QL STRIP: NEGATIVE
PH UR STRIP.AUTO: <=5 [PH] (ref 5–8)
PLATELET # BLD AUTO: 250 10*3/MM3 (ref 150–450)
PMV BLD AUTO: 10.8 FL (ref 6–12)
POTASSIUM BLD-SCNC: 4.2 MMOL/L (ref 3.5–5.5)
PROT SERPL-MCNC: 8 G/DL (ref 5.7–8.2)
PROT UR QL STRIP: NEGATIVE
RBC # BLD AUTO: 3.4 10*6/MM3 (ref 3.89–5.14)
SODIUM BLD-SCNC: 131 MMOL/L (ref 132–146)
SP GR UR STRIP: 1.01 (ref 1–1.03)
TROPONIN I SERPL-MCNC: 0.01 NG/ML (ref 0–0.07)
UROBILINOGEN UR QL STRIP: ABNORMAL
WBC NRBC COR # BLD: 9.1 10*3/MM3 (ref 3.5–10.8)
WHOLE BLOOD HOLD SPECIMEN: NORMAL
WHOLE BLOOD HOLD SPECIMEN: NORMAL

## 2018-06-17 PROCEDURE — 71045 X-RAY EXAM CHEST 1 VIEW: CPT

## 2018-06-17 PROCEDURE — 25010000002 CEFTRIAXONE PER 250 MG: Performed by: FAMILY MEDICINE

## 2018-06-17 PROCEDURE — 99222 1ST HOSP IP/OBS MODERATE 55: CPT | Performed by: FAMILY MEDICINE

## 2018-06-17 PROCEDURE — 85025 COMPLETE CBC W/AUTO DIFF WBC: CPT | Performed by: EMERGENCY MEDICINE

## 2018-06-17 PROCEDURE — 80053 COMPREHEN METABOLIC PANEL: CPT | Performed by: EMERGENCY MEDICINE

## 2018-06-17 PROCEDURE — P9612 CATHETERIZE FOR URINE SPEC: HCPCS

## 2018-06-17 PROCEDURE — 99285 EMERGENCY DEPT VISIT HI MDM: CPT

## 2018-06-17 PROCEDURE — 84484 ASSAY OF TROPONIN QUANT: CPT

## 2018-06-17 PROCEDURE — 71250 CT THORAX DX C-: CPT

## 2018-06-17 PROCEDURE — 93005 ELECTROCARDIOGRAM TRACING: CPT | Performed by: EMERGENCY MEDICINE

## 2018-06-17 PROCEDURE — 25010000002 HEPARIN (PORCINE) PER 1000 UNITS: Performed by: FAMILY MEDICINE

## 2018-06-17 PROCEDURE — 81003 URINALYSIS AUTO W/O SCOPE: CPT | Performed by: NURSE PRACTITIONER

## 2018-06-17 PROCEDURE — 83880 ASSAY OF NATRIURETIC PEPTIDE: CPT | Performed by: EMERGENCY MEDICINE

## 2018-06-17 RX ORDER — LEVOTHYROXINE SODIUM 0.05 MG/1
50 TABLET ORAL DAILY
Status: DISCONTINUED | OUTPATIENT
Start: 2018-06-18 | End: 2018-06-19 | Stop reason: HOSPADM

## 2018-06-17 RX ORDER — GABAPENTIN 300 MG/1
300 CAPSULE ORAL EVERY 12 HOURS SCHEDULED
Status: DISCONTINUED | OUTPATIENT
Start: 2018-06-17 | End: 2018-06-19 | Stop reason: HOSPADM

## 2018-06-17 RX ORDER — SODIUM CHLORIDE 0.9 % (FLUSH) 0.9 %
1-10 SYRINGE (ML) INJECTION AS NEEDED
Status: DISCONTINUED | OUTPATIENT
Start: 2018-06-17 | End: 2018-06-19 | Stop reason: HOSPADM

## 2018-06-17 RX ORDER — KETOTIFEN FUMARATE 0.35 MG/ML
1 SOLUTION/ DROPS OPHTHALMIC 2 TIMES DAILY
Status: DISCONTINUED | OUTPATIENT
Start: 2018-06-17 | End: 2018-06-19 | Stop reason: HOSPADM

## 2018-06-17 RX ORDER — ONDANSETRON 2 MG/ML
4 INJECTION INTRAMUSCULAR; INTRAVENOUS EVERY 6 HOURS PRN
Status: DISCONTINUED | OUTPATIENT
Start: 2018-06-17 | End: 2018-06-19 | Stop reason: HOSPADM

## 2018-06-17 RX ORDER — CLOPIDOGREL BISULFATE 75 MG/1
75 TABLET ORAL DAILY
Status: DISCONTINUED | OUTPATIENT
Start: 2018-06-17 | End: 2018-06-19 | Stop reason: HOSPADM

## 2018-06-17 RX ORDER — CALCIUM CARBONATE 200(500)MG
2 TABLET,CHEWABLE ORAL 2 TIMES DAILY PRN
Status: DISCONTINUED | OUTPATIENT
Start: 2018-06-17 | End: 2018-06-19 | Stop reason: HOSPADM

## 2018-06-17 RX ORDER — HEPARIN SODIUM 5000 [USP'U]/ML
5000 INJECTION, SOLUTION INTRAVENOUS; SUBCUTANEOUS EVERY 12 HOURS SCHEDULED
Status: DISCONTINUED | OUTPATIENT
Start: 2018-06-17 | End: 2018-06-19 | Stop reason: HOSPADM

## 2018-06-17 RX ORDER — MULTIPLE VITAMINS W/ MINERALS TAB 9MG-400MCG
1 TAB ORAL DAILY
Status: DISCONTINUED | OUTPATIENT
Start: 2018-06-17 | End: 2018-06-19 | Stop reason: HOSPADM

## 2018-06-17 RX ORDER — ACETAMINOPHEN 325 MG/1
650 TABLET ORAL EVERY 6 HOURS PRN
Status: DISCONTINUED | OUTPATIENT
Start: 2018-06-17 | End: 2018-06-19 | Stop reason: HOSPADM

## 2018-06-17 RX ORDER — ROPINIROLE 0.5 MG/1
0.5 TABLET, FILM COATED ORAL DAILY
Status: DISCONTINUED | OUTPATIENT
Start: 2018-06-17 | End: 2018-06-19 | Stop reason: HOSPADM

## 2018-06-17 RX ORDER — DONEPEZIL HYDROCHLORIDE 5 MG/1
5 TABLET, FILM COATED ORAL NIGHTLY
Status: DISCONTINUED | OUTPATIENT
Start: 2018-06-17 | End: 2018-06-19 | Stop reason: HOSPADM

## 2018-06-17 RX ORDER — ALPRAZOLAM 0.25 MG/1
0.25 TABLET ORAL 2 TIMES DAILY PRN
Status: DISCONTINUED | OUTPATIENT
Start: 2018-06-17 | End: 2018-06-19 | Stop reason: HOSPADM

## 2018-06-17 RX ORDER — METOPROLOL SUCCINATE 25 MG/1
12.5 TABLET, EXTENDED RELEASE ORAL
Status: DISCONTINUED | OUTPATIENT
Start: 2018-06-17 | End: 2018-06-19 | Stop reason: HOSPADM

## 2018-06-17 RX ORDER — CYCLOSPORINE 0.5 MG/ML
1 EMULSION OPHTHALMIC EVERY 12 HOURS
Status: DISCONTINUED | OUTPATIENT
Start: 2018-06-17 | End: 2018-06-19 | Stop reason: HOSPADM

## 2018-06-17 RX ORDER — SODIUM CHLORIDE 0.9 % (FLUSH) 0.9 %
10 SYRINGE (ML) INJECTION AS NEEDED
Status: DISCONTINUED | OUTPATIENT
Start: 2018-06-17 | End: 2018-06-19 | Stop reason: HOSPADM

## 2018-06-17 RX ORDER — CEFTRIAXONE SODIUM 1 G/50ML
1 INJECTION, SOLUTION INTRAVENOUS EVERY 24 HOURS
Status: DISCONTINUED | OUTPATIENT
Start: 2018-06-17 | End: 2018-06-19 | Stop reason: HOSPADM

## 2018-06-17 RX ADMIN — CYCLOSPORINE 1 DROP: 0.5 EMULSION OPHTHALMIC at 21:27

## 2018-06-17 RX ADMIN — GABAPENTIN 300 MG: 300 CAPSULE ORAL at 20:34

## 2018-06-17 RX ADMIN — DONEPEZIL HYDROCHLORIDE 5 MG: 5 TABLET ORAL at 20:34

## 2018-06-17 RX ADMIN — SODIUM CHLORIDE 500 ML: 9 INJECTION, SOLUTION INTRAVENOUS at 15:52

## 2018-06-17 RX ADMIN — HEPARIN SODIUM 5000 UNITS: 5000 INJECTION, SOLUTION INTRAVENOUS; SUBCUTANEOUS at 20:34

## 2018-06-17 RX ADMIN — CEFTRIAXONE SODIUM 1 G: 1 INJECTION, SOLUTION INTRAVENOUS at 23:30

## 2018-06-17 RX ADMIN — ALPRAZOLAM 0.25 MG: 0.25 TABLET ORAL at 20:39

## 2018-06-17 RX ADMIN — ROPINIROLE 0.5 MG: 0.5 TABLET, FILM COATED ORAL at 21:25

## 2018-06-17 RX ADMIN — KETOTIFEN FUMARATE 1 DROP: 0.35 SOLUTION/ DROPS OPHTHALMIC at 21:27

## 2018-06-17 NOTE — ED PROVIDER NOTES
"Subjective   Patient presents with complaint of generalized weakness and shortness of breath.  She was discharged yesterday from our facility after a 3-day hospitalization for acute and chronic congestive heart failure.  Patient states she felt well at the time of her discharge.  \"They walked me and I did fine.\"  Patient does not have access to oxygen at home.  She states that this morning, she felt a marked decline in that she was not able to get up due to lack of strength in her legs and minimal effective exertions resulted in intolerable shortness of breath.  She denies chest pain or syncope or fall.      Later note, attempt to ambulate the patient by the staff was initially ineffective due to the patient's failure to maintain a standing position effectively.  She was allowed to rest and receive a modest volume of IV fluids while tests were resulting in the ER.  A second attempt to ambulate patient was more effective and the patient ambulated effectively however, her heart rate after a brief walk elevated to 130s and her pulse oximetry on room air dropped to 85%.  Patient was allowed to rest and given oxygen and she felt restored within a moment.  She still denies chest pain.            History provided by:  Patient and relative   used: No    Shortness of Breath   Severity:  Moderate  Onset quality:  Gradual  Duration:  1 day  Timing:  Intermittent  Progression:  Worsening  Chronicity:  Recurrent  Context: activity and URI    Relieved by:  Rest and oxygen  Worsened by:  Activity  Associated symptoms: cough and sputum production    Associated symptoms: no abdominal pain, no chest pain, no diaphoresis, no fever, no syncope, no vomiting and no wheezing        Review of Systems   Constitutional: Positive for fatigue. Negative for diaphoresis and fever.   HENT: Negative.    Eyes: Negative.  Negative for pain and visual disturbance.   Respiratory: Positive for cough, sputum production and shortness " of breath. Negative for wheezing.    Cardiovascular: Negative for chest pain, leg swelling and syncope.   Gastrointestinal: Negative.  Negative for abdominal pain, diarrhea, nausea and vomiting.   Endocrine: Negative.    Genitourinary: Negative.    Musculoskeletal: Negative.    Skin: Negative.    Allergic/Immunologic: Negative.    Neurological: Positive for weakness. Negative for dizziness and light-headedness.   Hematological: Negative.    Psychiatric/Behavioral: Negative.    All other systems reviewed and are negative.      Past Medical History:   Diagnosis Date   • Congestive heart failure    • Malignant neoplasm    • Stroke     mini stroke   • Systolic heart failure 9/25/2017    Chronic/compensated (EF 25%)       Allergies   Allergen Reactions   • Augmentin [Amoxicillin-Pot Clavulanate] Nausea And Vomiting   • Claritin [Loratadine] Unknown (See Comments)     Doesn't remember   • Keflex [Cephalexin] Nausea And Vomiting       Past Surgical History:   Procedure Laterality Date   • CHOLECYSTECTOMY     • EYE SURGERY     • HYSTERECTOMY         Family History   Problem Relation Age of Onset   • Cancer Mother    • No Known Problems Father    • Cancer Sister    • Cancer Brother        Social History     Social History   • Marital status:      Social History Main Topics   • Smoking status: Never Smoker   • Smokeless tobacco: Never Used   • Alcohol use No   • Drug use: No   • Sexual activity: Defer     Other Topics Concern   • Not on file           Objective   Physical Exam   Constitutional: She is oriented to person, place, and time. She appears well-developed and well-nourished. No distress.   HENT:   Head: Normocephalic.   Eyes: EOM are normal. Pupils are equal, round, and reactive to light.   Neck: Normal range of motion. Neck supple. No JVD present. No tracheal deviation present.   Cardiovascular: Normal rate and regular rhythm.    Pulmonary/Chest: Effort normal and breath sounds normal. No respiratory  distress. She has no wheezes. Rales: no rales in bases.  She exhibits no tenderness.   Abdominal: Soft. Bowel sounds are normal. She exhibits no distension. There is no rebound and no guarding.   Musculoskeletal: Normal range of motion. She exhibits no edema (no pedal edema).   Neurological: She is alert and oriented to person, place, and time. No sensory deficit.   Skin: Skin is warm and dry. Capillary refill takes less than 2 seconds. No rash noted. She is not diaphoretic. No erythema. No pallor.   Psychiatric: She has a normal mood and affect. Her behavior is normal. Thought content normal.   Nursing note and vitals reviewed.      Procedures           ED Course  ED Course as of Jun 17 1914   Sun Jun 17, 2018   1529 BUN: (!) 33 [ML]   1529 Creatinine: (!) 1.70 [ML]      ED Course User Index  [ML] VASILE Navarrete      Recent Results (from the past 24 hour(s))   Comprehensive Metabolic Panel    Collection Time: 06/17/18  2:50 PM   Result Value Ref Range    Glucose 106 (H) 70 - 100 mg/dL    BUN 33 (H) 9 - 23 mg/dL    Creatinine 1.70 (H) 0.60 - 1.30 mg/dL    Sodium 131 (L) 132 - 146 mmol/L    Potassium 4.2 3.5 - 5.5 mmol/L    Chloride 95 (L) 99 - 109 mmol/L    CO2 25.0 20.0 - 31.0 mmol/L    Calcium 9.2 8.7 - 10.4 mg/dL    Total Protein 8.0 5.7 - 8.2 g/dL    Albumin 4.53 3.20 - 4.80 g/dL    ALT (SGPT) 13 7 - 40 U/L    AST (SGOT) 26 0 - 33 U/L    Alkaline Phosphatase 52 25 - 100 U/L    Total Bilirubin 0.4 0.3 - 1.2 mg/dL    eGFR Non African Amer 28 (L) >60 mL/min/1.73    Globulin 3.5 gm/dL    A/G Ratio 1.3 (L) 1.5 - 2.5 g/dL    BUN/Creatinine Ratio 19.4 7.0 - 25.0    Anion Gap 11.0 3.0 - 11.0 mmol/L   BNP    Collection Time: 06/17/18  2:50 PM   Result Value Ref Range    .0 (H) 0.0 - 100.0 pg/mL   Light Blue Top    Collection Time: 06/17/18  2:50 PM   Result Value Ref Range    Extra Tube hold for add-on    Green Top (Gel)    Collection Time: 06/17/18  2:50 PM   Result Value Ref Range    Extra Tube Hold  for add-ons.    Lavender Top    Collection Time: 06/17/18  2:50 PM   Result Value Ref Range    Extra Tube hold for add-on    Gold Top - SST    Collection Time: 06/17/18  2:50 PM   Result Value Ref Range    Extra Tube Hold for add-ons.    CBC Auto Differential    Collection Time: 06/17/18  2:50 PM   Result Value Ref Range    WBC 9.10 3.50 - 10.80 10*3/mm3    RBC 3.40 (L) 3.89 - 5.14 10*6/mm3    Hemoglobin 9.1 (L) 11.5 - 15.5 g/dL    Hematocrit 28.6 (L) 34.5 - 44.0 %    MCV 84.1 80.0 - 99.0 fL    MCH 26.8 (L) 27.0 - 31.0 pg    MCHC 31.8 (L) 32.0 - 36.0 g/dL    RDW 14.9 (H) 11.3 - 14.5 %    RDW-SD 45.6 37.0 - 54.0 fl    MPV 10.8 6.0 - 12.0 fL    Platelets 250 150 - 450 10*3/mm3    Neutrophil % 66.4 41.0 - 71.0 %    Lymphocyte % 16.3 (L) 24.0 - 44.0 %    Monocyte % 14.8 (H) 0.0 - 12.0 %    Eosinophil % 2.2 0.0 - 3.0 %    Basophil % 0.3 0.0 - 1.0 %    Immature Grans % 0.1 0.0 - 0.6 %    Neutrophils, Absolute 6.04 1.50 - 8.30 10*3/mm3    Lymphocytes, Absolute 1.48 0.60 - 4.80 10*3/mm3    Monocytes, Absolute 1.35 (H) 0.00 - 1.00 10*3/mm3    Eosinophils, Absolute 0.20 0.00 - 0.30 10*3/mm3    Basophils, Absolute 0.03 0.00 - 0.20 10*3/mm3    Immature Grans, Absolute 0.01 0.00 - 0.03 10*3/mm3   POC Troponin, Rapid    Collection Time: 06/17/18  2:54 PM   Result Value Ref Range    Troponin I 0.01 0.00 - 0.07 ng/mL   Urinalysis With / Microscopic If Indicated (No Culture) - Urine, Catheter    Collection Time: 06/17/18  3:50 PM   Result Value Ref Range    Color, UA Yellow Yellow, Straw    Appearance, UA Clear Clear    pH, UA <=5.0 5.0 - 8.0    Specific Gravity, UA 1.014 1.001 - 1.030    Glucose, UA Negative Negative    Ketones, UA Trace (A) Negative    Bilirubin, UA Negative Negative    Blood, UA Negative Negative    Protein, UA Negative Negative    Leuk Esterase, UA Negative Negative    Nitrite, UA Negative Negative    Urobilinogen, UA 0.2 E.U./dL 0.2 - 1.0 E.U./dL     Note: In addition to lab results from this visit, the labs  "listed above may include labs taken at another facility or during a different encounter within the last 24 hours. Please correlate lab times with ED admission and discharge times for further clarification of the services performed during this visit.    XR Chest 1 View   Preliminary Result   Persistent cardiomegaly without overt edema,  focal   consolidation, or effusion.       DICTATED:   6/17/2018   EDITED/ls :   6/17/2018               CT Chest Without Contrast    (Results Pending)     Vitals:    06/17/18 1436 06/17/18 1630 06/17/18 1745 06/17/18 1830   BP: 145/66 127/65 131/87 136/66   BP Location: Left arm      Patient Position: Sitting      Pulse: 74 76 82 81   Resp: 20      Temp: 98.5 °F (36.9 °C)      TempSrc: Oral      SpO2: 96% 96% 97% 96%   Weight: 59.3 kg (130 lb 12.8 oz)      Height: 163.8 cm (64.5\")        Medications   sodium chloride 0.9 % flush 10 mL (not administered)   sodium chloride 0.9 % bolus 500 mL (0 mL Intravenous Stopped 6/17/18 1636)     ECG/EMG Results (last 24 hours)     Procedure Component Value Units Date/Time    ECG 12 Lead [[id number]] Collected:  06/17/18 1443     Updated:  06/17/18 1441                    MDM      Final diagnoses:   Hypoxia   Physical debility   ALMANZAR (dyspnea on exertion)   History of CHF (congestive heart failure)   History of hypertension   History of dementia            Alexa Mata, APROSCAR  06/17/18 1914    "

## 2018-06-17 NOTE — H&P
"    [facility] Medicine Services  HISTORY AND PHYSICAL    Patient Name: Scott Sinha  : 4/10/1930  MRN: [id number]  Primary Care Physician: CHRISTY Hair MD    Subjective   Subjective     Chief Complaint:  Weakness    HPI:  Scott Sinha is a 88 y.o. female with PMH significant for chronic systolic CHF (EF 20% deemed not to be a candidate for ICD due to dementia and advenced age), HTN, mild cognitive impairment, CVA, and chronic hyponatremia.  She was here at [facility] from  through 18 (yesterday) for dyspnea and weakness due to exacerbation of her systolic heart failure.  She was diuresed with clinical improvement but developed CARYN which also improved to creatinine 1.4 prior to discharge.  She was also noted to have 1-2 cm pericardial effusion on ECHO but patient was not symptomatic.  F/u was scheduled with heart and valve clinic 1 week after discharge and 4-6 weeks with Dr. Aragon to follow the pericardial effusion.  Med changes at discharge were to start lasix 20 mg per day and to decrease Toprol XL to 12.5 mg daily from 25 mg daily.    This morning, the patient got OOB and felt \"weak.\" She felt as if she were going to fall but was able to hold onto objects to prevent falling.  She did not feel dizzy or lightheaded.  The weakness and fatigue persisted throughout the day.  At about 1PM she was on the telephone and noticed that she was too SOA to continue the conversation.  Her son who is present states that she was somewhat SOA yesterday but the patient did not notice until today.  She asked to come back to the ER due to SOA.  Here at the ER, her O2 dropped to 85% with ambulation; she does not have O2 available at home.  Additionally, her creatinine is 1.7 and was 1.4 yesterday.  BNP is 287 down from 1028 last admission.  CXR is non acute.    The patient also has had problems with cough.  Her cough began during hospitalization but since then her cough has worsened, becoming " "productive.  She states the sputum is \"green.\"  She has not had fever at home but has felt somewhat chilled.  No chest pain, no swelling, no syncope, no dizziness, no wounds, no headache, no palpitations.  No abdominal pain, N/V or diarrhea.    Review of Systems     Otherwise 10-system ROS reviewed and is negative except as mentioned in the HPI.    Personal History     Past Medical History:   Diagnosis Date   • Congestive heart failure    • Malignant neoplasm    • Stroke     mini stroke   • Systolic heart failure 9/25/2017    Chronic/compensated (EF 25%)       Past Surgical History:   Procedure Laterality Date   • CHOLECYSTECTOMY     • EYE SURGERY     • HYSTERECTOMY         Family History: family history includes Cancer in her brother, mother, and sister; No Known Problems in her father.     Social History:  reports that she has never smoked. She has never used smokeless tobacco. She reports that she does not drink alcohol or use drugs.  Social History     Social History Narrative   • No narrative on file       Medications:    (Not in a hospital admission)    Allergies   Allergen Reactions   • Augmentin [Amoxicillin-Pot Clavulanate] Nausea And Vomiting   • Claritin [Loratadine] Unknown (See Comments)     Doesn't remember   • Keflex [Cephalexin] Nausea And Vomiting       Objective   Objective     Vital Signs:   Temp:  [98.5 °F (36.9 °C)] 98.5 °F (36.9 °C)  Heart Rate:  [74-82] 82  Resp:  [20] 20  BP: (127-145)/(65-87) 131/87        Physical Exam   Constitutional: No acute distress, awake, alert  Eyes: PERRLA, sclerae anicteric, no conjunctival injection  HENT: NCAT, mucous membranes dry  Neck: Supple, no thyromegaly, no lymphadenopathy, trachea midline  Respiratory: Course right, clear left, nonlabored respirations   Cardiovascular: RRR, palpable pedal pulses bilaterally  Gastrointestinal: Positive bowel sounds, soft, nontender, nondistended  Musculoskeletal: No bilateral ankle edema, no clubbing or cyanosis to " extremities  Psychiatric: Appropriate affect, cooperative  Neurologic: Oriented x 3, strength symmetric in all extremities, Cranial Nerves grossly intact to confrontation, speech clear  Skin: No rashes      Results Reviewed:  I have personally reviewed current lab, radiology, and data and agree.      Results from last 7 days  Lab Units 06/17/18  1450   WBC 10*3/mm3 9.10   HEMOGLOBIN g/dL 9.1*   HEMATOCRIT % 28.6*   PLATELETS 10*3/mm3 250       Results from last 7 days  Lab Units 06/17/18  1450   SODIUM mmol/L 131*   POTASSIUM mmol/L 4.2   CHLORIDE mmol/L 95*   CO2 mmol/L 25.0   BUN mg/dL 33*   CREATININE mg/dL 1.70*   GLUCOSE mg/dL 106*   CALCIUM mg/dL 9.2   ALT (SGPT) U/L 13   AST (SGOT) U/L 26     Estimated Creatinine Clearance: 21.4 mL/min (A) (by C-G formula based on SCr of 1.7 mg/dL (H)).  Brief Urine Lab Results  (Last result in the past 365 days)      Color   Clarity   Blood   Leuk Est   Nitrite   Protein   CREAT   Urine HCG        06/17/18 1550 Yellow Clear Negative Negative Negative Negative             BNP   Date Value Ref Range Status   06/17/2018 287.0 (H) 0.0 - 100.0 pg/mL Final     Comment:     Results may be falsely decreased if patient taking Biotin.     No results found for: PHART  Imaging Results (last 24 hours)     Procedure Component Value Units Date/Time    XR Chest 1 View [[id number]] Collected:  06/17/18 1527     Updated:  06/17/18 1715    Narrative:          EXAMINATION: XR CHEST 1 VW - 6/17/2018     INDICATION: Shortness of air.     COMPARISON: Chest x-ray 6/13/2018.     FINDINGS: Persistent cardiomegaly without overt edema. Chronic lung  changes including bibasilar atelectasis, however, no focal  consolidation. No pneumothorax or significant pleural effusion.  Degenerative changes of the spine.           Impression:       Persistent cardiomegaly without overt edema,  focal  consolidation, or effusion.     DICTATED:   6/17/2018  EDITED/ls :   6/17/2018               Results for orders placed  during the hospital encounter of 06/13/18   Adult Transthoracic Echo Complete W/ Cont if Necessary Per Protocol    Narrative · Moderate aortic valve regurgitation is present.  · Mild mitral valve regurgitation is present  · Mild tricuspid valve regurgitation is present.  · There is a moderate (1-2cm) circumferential pericardial effusion.  · Left ventricular systolic function is severely decreased. Estimated EF =   20%.  · There is mild right atrial diastolic collapse from the pericardial   effusion. The transmitral Doppler show some variation with inspiration.   Clinical correlation suggested in this patient i.e. as the patient   hypotensive could also consider CT scan of the chest to look at the   pericardial effusion          Assessment/Plan   Assessment / Plan     Hospital Problem List     * (Principal)Acute respiratory failure with hypoxia    Hyponatremia    Overview Deleted 6/17/2018  6:30 PM by Kasey Erickson MD            Hypertension    History of CVA (cerebrovascular accident)    Chronic systolic heart failure    Normocytic anemia    CARYN (acute kidney injury)    Dyspnea    Hypothyroid    Pericardial effusion        Assessment & Plan:  Ms. Sinha is a delightful 88 year old woman who presents with CARYN, weakness and cough after hospitalization for systolic heart failure exacerbation.  Recent ER 20%.    Difficult cardiorenal balance in this elderly lady.  Lasix 20mg seems to have potentially overdiuresed her and caused CARYN.  Consider cardiology consultation in AM depending on patient's clinical status.    Will hold lasix for now.  Got 500mL bolus in ER.  Will recheck BMP in AM and follow.  Decreased gabapentin to BID while patient has CARYN.    CT chest without contrast to evaluate worsening cough.    Hyponatremia at 131, but actually a bit better than baseline.  Monitor.    PT/OT/CM.  Patient is resistant to the idea of acute rehab but it would likely benefit her.    May need home O2 as her sat dropped to 85%  while ambulating in the ER.    DVT prophylaxis: SubQ hepatin Q12H    CODE STATUS:  Prior FULL    INPATIENT status due to the need for care which can only be reasonably provided in an hospital setting such as aggressive/expedited ancillary services and/or consultation services, the necessity for IV medications, close physician monitoring.  In such, I feel patient’s risk for adverse outcomes and need for care warrant INPATIENT evaluation and predict the patient’s care encounter to likely last beyond 2 midnights.    Electronically signed by Kasey Erickson MD, 06/17/18, 6:33 PM.

## 2018-06-18 LAB
ANION GAP SERPL CALCULATED.3IONS-SCNC: 6 MMOL/L (ref 3–11)
BUN BLD-MCNC: 25 MG/DL (ref 9–23)
BUN/CREAT SERPL: 20 (ref 7–25)
CALCIUM SPEC-SCNC: 9 MG/DL (ref 8.7–10.4)
CHLORIDE SERPL-SCNC: 98 MMOL/L (ref 99–109)
CO2 SERPL-SCNC: 26 MMOL/L (ref 20–31)
CREAT BLD-MCNC: 1.25 MG/DL (ref 0.6–1.3)
DEPRECATED RDW RBC AUTO: 46.2 FL (ref 37–54)
ERYTHROCYTE [DISTWIDTH] IN BLOOD BY AUTOMATED COUNT: 14.9 % (ref 11.3–14.5)
GFR SERPL CREATININE-BSD FRML MDRD: 40 ML/MIN/1.73
GLUCOSE BLD-MCNC: 105 MG/DL (ref 70–100)
GLUCOSE BLDC GLUCOMTR-MCNC: 131 MG/DL (ref 70–130)
HCT VFR BLD AUTO: 26.3 % (ref 34.5–44)
HGB BLD-MCNC: 8.4 G/DL (ref 11.5–15.5)
MAGNESIUM SERPL-MCNC: 1.9 MG/DL (ref 1.3–2.7)
MCH RBC QN AUTO: 26.8 PG (ref 27–31)
MCHC RBC AUTO-ENTMCNC: 31.9 G/DL (ref 32–36)
MCV RBC AUTO: 84 FL (ref 80–99)
PLATELET # BLD AUTO: 240 10*3/MM3 (ref 150–450)
PMV BLD AUTO: 10.3 FL (ref 6–12)
POTASSIUM BLD-SCNC: 4.4 MMOL/L (ref 3.5–5.5)
RBC # BLD AUTO: 3.13 10*6/MM3 (ref 3.89–5.14)
SODIUM BLD-SCNC: 130 MMOL/L (ref 132–146)
WBC NRBC COR # BLD: 9.29 10*3/MM3 (ref 3.5–10.8)

## 2018-06-18 PROCEDURE — 25010000002 MAGNESIUM SULFATE IN D5W 1G/100ML (PREMIX) 1-5 GM/100ML-% SOLUTION: Performed by: INTERNAL MEDICINE

## 2018-06-18 PROCEDURE — 80048 BASIC METABOLIC PNL TOTAL CA: CPT | Performed by: FAMILY MEDICINE

## 2018-06-18 PROCEDURE — 83735 ASSAY OF MAGNESIUM: CPT | Performed by: INTERNAL MEDICINE

## 2018-06-18 PROCEDURE — 97166 OT EVAL MOD COMPLEX 45 MIN: CPT

## 2018-06-18 PROCEDURE — 92610 EVALUATE SWALLOWING FUNCTION: CPT

## 2018-06-18 PROCEDURE — 25010000002 CEFTRIAXONE PER 250 MG: Performed by: FAMILY MEDICINE

## 2018-06-18 PROCEDURE — 93010 ELECTROCARDIOGRAM REPORT: CPT | Performed by: INTERNAL MEDICINE

## 2018-06-18 PROCEDURE — 99233 SBSQ HOSP IP/OBS HIGH 50: CPT | Performed by: INTERNAL MEDICINE

## 2018-06-18 PROCEDURE — 93005 ELECTROCARDIOGRAM TRACING: CPT | Performed by: INTERNAL MEDICINE

## 2018-06-18 PROCEDURE — 85027 COMPLETE CBC AUTOMATED: CPT | Performed by: FAMILY MEDICINE

## 2018-06-18 PROCEDURE — 82962 GLUCOSE BLOOD TEST: CPT

## 2018-06-18 PROCEDURE — 25010000002 HEPARIN (PORCINE) PER 1000 UNITS: Performed by: FAMILY MEDICINE

## 2018-06-18 RX ORDER — MAGNESIUM SULFATE 1 G/100ML
1 INJECTION INTRAVENOUS ONCE
Status: COMPLETED | OUTPATIENT
Start: 2018-06-18 | End: 2018-06-18

## 2018-06-18 RX ADMIN — CYCLOSPORINE 1 DROP: 0.5 EMULSION OPHTHALMIC at 08:58

## 2018-06-18 RX ADMIN — MAGNESIUM SULFATE HEPTAHYDRATE 1 G: 1 INJECTION, SOLUTION INTRAVENOUS at 11:51

## 2018-06-18 RX ADMIN — DOXYCYCLINE 100 MG: 100 INJECTION, POWDER, LYOPHILIZED, FOR SOLUTION INTRAVENOUS at 09:09

## 2018-06-18 RX ADMIN — CLOPIDOGREL BISULFATE 75 MG: 75 TABLET ORAL at 08:54

## 2018-06-18 RX ADMIN — ALPRAZOLAM 0.25 MG: 0.25 TABLET ORAL at 20:44

## 2018-06-18 RX ADMIN — KETOTIFEN FUMARATE 1 DROP: 0.35 SOLUTION/ DROPS OPHTHALMIC at 20:50

## 2018-06-18 RX ADMIN — MULTIPLE VITAMINS W/ MINERALS TAB 1 TABLET: TAB ORAL at 08:54

## 2018-06-18 RX ADMIN — GABAPENTIN 300 MG: 300 CAPSULE ORAL at 08:55

## 2018-06-18 RX ADMIN — ROPINIROLE 0.5 MG: 0.5 TABLET, FILM COATED ORAL at 08:54

## 2018-06-18 RX ADMIN — DOXYCYCLINE 100 MG: 100 INJECTION, POWDER, LYOPHILIZED, FOR SOLUTION INTRAVENOUS at 22:14

## 2018-06-18 RX ADMIN — CEFTRIAXONE SODIUM 1 G: 1 INJECTION, SOLUTION INTRAVENOUS at 21:30

## 2018-06-18 RX ADMIN — METOPROLOL SUCCINATE 12.5 MG: 25 TABLET, EXTENDED RELEASE ORAL at 08:54

## 2018-06-18 RX ADMIN — SODIUM CHLORIDE 500 ML: 9 INJECTION, SOLUTION INTRAVENOUS at 11:56

## 2018-06-18 RX ADMIN — KETOTIFEN FUMARATE 1 DROP: 0.35 SOLUTION/ DROPS OPHTHALMIC at 08:58

## 2018-06-18 RX ADMIN — GABAPENTIN 300 MG: 300 CAPSULE ORAL at 20:44

## 2018-06-18 RX ADMIN — DONEPEZIL HYDROCHLORIDE 5 MG: 5 TABLET ORAL at 20:44

## 2018-06-18 RX ADMIN — DOXYCYCLINE 100 MG: 100 INJECTION, POWDER, LYOPHILIZED, FOR SOLUTION INTRAVENOUS at 00:39

## 2018-06-18 RX ADMIN — HEPARIN SODIUM 5000 UNITS: 5000 INJECTION, SOLUTION INTRAVENOUS; SUBCUTANEOUS at 20:44

## 2018-06-18 RX ADMIN — HEPARIN SODIUM 5000 UNITS: 5000 INJECTION, SOLUTION INTRAVENOUS; SUBCUTANEOUS at 08:56

## 2018-06-18 NOTE — PROGRESS NOTES
Discharge Planning Assessment  [facility]     Patient Name: Scott Sinha  MRN: [id number]  Today's Date: 6/18/2018    Admit Date: 6/17/2018          Discharge Needs Assessment     Row Name 06/18/18 1512       Living Environment    Lives With child(chong), adult;grandchild(chong);other (see comments)   great grandchildren    Name(s) of Who Lives With Patient Miguel Sinha(son), Miguel(grandson) and great grandchildren ages 13, 14, and 15    Current Living Arrangements home/apartment/condo    Primary Care Provided by self    Provides Primary Care For no one, unable/limited ability to care for self    Family Caregiver if Needed child(chong), adult    Quality of Family Relationships supportive    Able to Return to Prior Arrangements yes    Living Arrangement Comments Spoke with pt in room with permission in regards to discharge planning. Pt resides in a home with son, grandson, and 3 great grandchildren ages 13, 14, and 15 in [facility]       Resource/Environmental Concerns    Resource/Environmental Concerns none    Transportation Concerns other (see comments)   Denies transportation concerns.        Transition Planning    Patient/Family Anticipates Transition to home with family    Patient/Family Anticipated Services at Transition        Discharge Needs Assessment    Readmission Within the Last 30 Days previous discharge plan unsuccessful    Concerns to be Addressed discharge planning    Equipment Currently Used at Home cane, straight;other (see comments)   Pt reports she has straight cane, but doesn't use it.     Anticipated Changes Related to Illness inability to care for self    Equipment Needed After Discharge cane, straight    Discharge Coordination/Progress Pt has Medicare and [employer] insurance and denies recent changes in insurance. Pt states her insurance does provide prescription coverage and denies difficulty affording prescriptions. Pt uses [facility] on [address]. Pt is adamant about going home.  Denies needing rehab or HH. CM will cont to follow            Discharge Plan     Row Name 06/18/18 1520       Plan    Plan discharge plan    Patient/Family in Agreement with Plan yes    Plan Comments Plan is home with family. Pt denies discharge needs. Pt denies needing in patient rehab or HH. CM will cont to follow.        Destination     No service coordination in this encounter.      Durable Medical Equipment     No service coordination in this encounter.      Dialysis/Infusion     No service coordination in this encounter.      Home Medical Care     No service coordination in this encounter.      Social Care     No service coordination in this encounter.        Expected Discharge Date and Time     Expected Discharge Date Expected Discharge Time    Jun 21, 2018               Demographic Summary     Row Name 06/18/18 1511       General Information    General Information Comments Pt's PCP is Chalino Hair       Contact Information    Permission Granted to Share Info With     Contact Information Obtained for     Contact Information Comments Miguel Sinha(son): [phone]            Functional Status     Row Name 06/18/18 1511       Functional Status    Functional Status Comments Pt was independent of ADLS prior to previous admission.             Psychosocial    No documentation.           Abuse/Neglect    No documentation.           Legal    No documentation.           Substance Abuse    No documentation.           Patient Forms    No documentation.         Marion Sanders, RN

## 2018-06-18 NOTE — THERAPY EVALUATION
Acute Care - Occupational Therapy Initial Evaluation  [facility]     Patient Name: Scott Sinha  : 4/10/1930  MRN: [id number]  Today's Date: 2018  Onset of Illness/Injury or Date of Surgery: 18  Date of Referral to OT: 18  Referring Physician: MD Chester    Admit Date: 2018       ICD-10-CM ICD-9-CM   1. Dysphagia, unspecified type R13.10 787.20   2. Hypoxia R09.02 799.02   3. Physical debility R53.81 799.3   4. ALMANZAR (dyspnea on exertion) R06.09 786.09   5. History of CHF (congestive heart failure) Z86.79 V12.59   6. History of hypertension Z86.79 V12.59   7. History of dementia Z86.59 V11.8   8. Impaired mobility and ADLs Z74.09 799.89     Patient Active Problem List   Diagnosis   • Idiopathic peripheral neuropathy   • Neck pain   • Mild cognitive impairment   • Anemia   • Hyponatremia   • Hypertension   • Dehydration   • History of CVA (cerebrovascular accident)   • Dementia   • Left bundle branch block   • Orthostatic dizziness   • Chronic systolic heart failure   • Normocytic anemia   • CARYN (acute kidney injury)   • Dyspnea   • Hypoxia   • Hypothyroid   • Pericardial effusion   • Acute respiratory failure with hypoxia     Past Medical History:   Diagnosis Date   • Congestive heart failure    • Malignant neoplasm    • Stroke     mini stroke   • Systolic heart failure 2017    Chronic/compensated (EF 25%)     Past Surgical History:   Procedure Laterality Date   • CHOLECYSTECTOMY     • EYE SURGERY     • HYSTERECTOMY            OT ASSESSMENT FLOWSHEET (last 72 hours)      Occupational Therapy Evaluation     Row Name 18 0815                   OT Evaluation Time/Intention    Subjective Information complains of;weakness;fatigue  -HK        Document Type evaluation  -HK        Mode of Treatment individual therapy;occupational therapy  -HK        Patient Effort excellent  -HK        Symptoms Noted During/After Treatment none  -HK        Comment pt complains of decreased vision due to  "\"a cold in her eyes\". Pt applied several warm wasb cloths with no improvement.   -HK           General Information    Patient Profile Reviewed? yes  -HK        Onset of Illness/Injury or Date of Surgery 06/17/18  -HK        Referring Physician MD Chester  -HK        Patient Observations alert;cooperative;agree to therapy  -HK        Patient/Family Observations No family at bedside  -HK        General Observations of Patient Pt received supine in bed with IV intact and double exit alarms.    -HK        Prior Level of Function independent:;all household mobility;community mobility;gait;transfer;bed mobility;ADL's;home management;cooking;cleaning  -HK        Equipment Currently Used at Home none  -HK        Pertinent History of Current Functional Problem Pt is a 88 YOF with recent hospital stay from 6/13 to 6/16 for dyspnea and weakness due to exacerbation of systolic HF. Pt presented back the ED on 6/17 due to weakness, fatigue, SOA, and worsening cough.     -HK        Existing Precautions/Restrictions fall;other (see comments)   dementia  -HK        Risks Reviewed patient:;LOB;nausea/vomiting;increased discomfort;dizziness;change in vital signs;increased drainage;lines disloged  -HK        Benefits Reviewed patient:;improve function;increase independence;increase strength;increase balance;decrease pain;decrease risk of DVT;improve skin integrity;increase knowledge  -HK        Barriers to Rehab previous functional deficit;cognitive status  -HK           Relationship/Environment    Primary Source of Support/Comfort child(chong)  -HK        Lives With child(chong), adult;grandchild(chong);other (see comments)   great grand children  -HK           Resource/Environmental Concerns    Current Living Arrangements home/apartment/condo  -HK           Home Main Entrance    Number of Stairs, Main Entrance two  -HK        Stair Railings, Main Entrance none  -HK           Stairs Within Home, Primary    Stairs, Within Home, Primary full " flight; patient unsure of how many.  -HK        Stair Railings, Within Home, Primary railing on right side (ascending)  -HK           Cognitive Assessment/Interventions    Additional Documentation Cognitive Assessment/Intervention (Group)  -HK           Cognitive Assessment/Intervention- PT/OT    Affect/Mental Status (Cognitive) WNL  -HK        Orientation Status (Cognition) oriented x 4  -HK        Follows Commands (Cognition) follows one step commands;over 90% accuracy  -HK        Cognitive Function (Cognitive) WFL  -HK        Safety Deficit (Cognitive) mild deficit;safety precautions follow-through/compliance;safety precautions awareness  -HK        Personal Safety Interventions fall prevention program maintained;gait belt;nonskid shoes/slippers when out of bed  -HK           Safety Issues, Functional Mobility    Impairments Affecting Function (Mobility) balance;strength  -HK           Bed Mobility Assessment/Treatment    Bed Mobility Assessment/Treatment supine-sit;scooting/bridging  -HK        Scooting/Bridging [place] (Bed Mobility) supervision  -HK        Supine-Sit [place] (Bed Mobility) supervision  -HK        Assistive Device (Bed Mobility) head of bed elevated;bed rails  -HK        Comment (Bed Mobility) Pt advanced to EOB with supervision and verbal cues.  -HK           Functional Mobility    Functional Mobility- Ind. Level minimum assist (75% patient effort);verbal cues required  -HK        Functional Mobility- Device other (see comments)  -HK        Functional Mobility- Safety Issues step length decreased;weight-shifting ability decreased  -HK        Functional Mobility- Comment Pt requried Aron to take steps from bed to chair.   -HK           Transfer Assessment/Treatment    Transfer Assessment/Treatment sit-stand transfer;stand-sit transfer  -HK        Comment (Transfers) verbal cues for safe hand placement and sequencing.   -HK        Sit-Stand [place] (Transfers) contact guard;verbal  cues  -HK        Stand-Sit [place] (Transfers) contact guard;verbal cues  -HK           Sit-Stand Transfer    Assistive Device (Sit-Stand Transfers) other (see comments)  -HK           Stand-Sit Transfer    Assistive Device (Stand-Sit Transfers) other (see comments)  -HK           ADL Assessment/Intervention    BADL Assessment/Intervention lower body dressing  -HK           Lower Body Dressing Assessment/Training    Lower Body Dressing [place] Level doff;don;socks;independent  -HK        Lower Body Dressing Position unsupported sitting  -HK        Comment (Lower Body Dressing) Pt don dof socks independently with no LOB noted  -HK           General ROM    RT Upper Ext Rt Shoulder Flexion  -HK        LT Upper Ext Lt Shoulder Flexion  -HK           Right Upper Ext    Rt Shoulder Flexion AROM WFL  -HK           Left Upper Ext    Lt Shoulder Flexion AROM WFL  -HK           MMT (Manual Muscle Testing)    Additional Documentation General Assessment (Manual Muscle Testing) (Group)  -HK           General Assessment (Manual Muscle Testing)    General Manual Muscle Testing (MMT) Assessment upper extremity strength deficits identified  -HK           Upper Extremity (Manual Muscle Testing)    Upper Extremity: Manual Muscle Testing (MMT) left shoulder strength deficit;right shoulder strength deficit  -HK           Left Shoulder (Manual Muscle Testing)    Left Shoulder Manual Muscle Testing (MMT) flexion  -HK        MMT: Flexion, Left Shoulder flexion  -HK        MMT, Gross Movement: Left Shoulder Flexion (3+/5) fair plus  -HK           Right Shoulder (Manual Muscle Testing)    Right Shoulder Manual Muscle Testing (MMT) flexion  -HK        MMT: Flexion, Right Shoulder flexion  -HK        MMT, Gross Movement: Right Shoulder Flexion (3+/5) fair plus  -HK           Motor Assessment/Interventions    Additional Documentation Balance (Group)  -HK           Balance    Balance static sitting balance;dynamic sitting  balance;static standing balance  -HK           Static Sitting Balance    Level of [place] (Unsupported Sitting, Static Balance) independent  -HK        Sitting Position (Unsupported Sitting, Static Balance) sitting on edge of bed  -HK        Time Able to Maintain Position (Unsupported Sitting, Static Balance) 2 to 3 minutes  -HK           Dynamic Sitting Balance    Level of [place], Reaches Outside Midline (Sitting, Dynamic Balance) supervision  -HK        Sitting Position, Reaches Outside Midline (Sitting, Dynamic Balance) sitting on edge of bed  -HK        Comment, Reaches Outside Midline (Sitting, Dynamic Balance) don/doff socks  -HK           Static Standing Balance    Level of [place] (Supported Standing, Static Balance) contact guard assist  -HK        Time Able to Maintain Position (Supported Standing, Static Balance) 45 to 60 seconds  -HK           Sensory Assessment/Intervention    Sensory General Assessment no sensation deficits identified  -HK           Positioning and Restraints    Pre-Treatment Position in bed  -HK        Post Treatment Position chair  -HK        In Chair notified nsg;reclined;call light within reach;encouraged to call for assist;exit alarm on;with nsg  -HK           Pain Assessment    Additional Documentation Pain Scale: Numbers Pre/Post-Treatment (Group)  -HK           Pain Scale: Numbers Pre/Post-Treatment    Pain Scale: Numbers, Pretreatment 0/10 - no pain  -HK        Pain Scale: Numbers, Post-Treatment 0/10 - no pain  -HK           Plan of Care Review    Plan of Care Reviewed With patient  -HK           Clinical Impression (OT)    Date of Referral to OT 06/17/18  -HK        OT Diagnosis Decreased independence with ADLS and Mobility  -HK        Patient/Family Goals Statement (OT Eval) Pt would like to improve and return home  -HK        Criteria for Skilled Therapeutic Interventions Met (OT Eval) yes;treatment indicated  -HK        Rehab Potential (OT Eval) good, to  achieve stated therapy goals  -HK        Therapy Frequency (OT Eval) daily  -HK        Care Plan Review (OT) evaluation/treatment results reviewed;care plan/treatment goals reviewed;patient/other agree to care plan  -HK        Patient/Family Concerns, Equipment Needs at Discharge (OT) Pt reports she does not want IPR or HH, although patient would benefit from IPR.   -HK        Anticipated Discharge Disposition (OT) inpatient rehabilitation facility  -HK           Vital Signs    Pre Systolic BP Rehab 138  -HK        Pre Treatment Diastolic BP 70  -HK        Post Systolic BP Rehab 132  -HK        Post Treatment Diastolic BP 87  -HK        Pretreatment Heart Rate (beats/min) 96  -HK        Posttreatment Heart Rate (beats/min) 110  -HK        Pre Patient Position Supine  -HK        Intra Patient Position Standing  -HK        Post Patient Position Sitting  -HK           OT Goals    Bed Mobility Goal Selection (OT) bed mobility, OT goal 1  -HK        Transfer Goal Selection (OT) transfer, OT goal 1  -HK        Toileting Goal Selection (OT) toileting, OT goal 1  -HK        Strength Goal Selection (OT) strength, OT goal 1  -HK        Additional Documentation Strength Goal Selection (OT) (Row)  -HK           Bed Mobility Goal 1 (OT)    Activity/Assistive Device (Bed Mobility Goal 1, OT) scooting;sit to supine/supine to sit  -HK        [place] Level/Cues Needed (Bed Mobility Goal 1, OT) conditional independence  -HK        Time Frame (Bed Mobility Goal 1, OT) 5 days  -HK        Progress/Outcomes (Bed Mobility Goal 1, OT) goal ongoing  -HK           Transfer Goal 1 (OT)    Activity/Assistive Device (Transfer Goal 1, OT) sit-to-stand/stand-to-sit;toilet  -HK        [place] Level/Cues Needed (Transfer Goal 1, OT) supervision required  -HK        Time Frame (Transfer Goal 1, OT) 5 days  -HK        Progress/Outcome (Transfer Goal 1, OT) goal ongoing  -HK           Toileting Goal 1 (OT)    Activity/Device (Toileting Goal  1, OT) toileting skills, all  -HK        [place] Level/Cues Needed (Toileting Goal 1, OT) minimum assist (75% or more patient effort)  -HK        Time Frame (Toileting Goal 1, OT) 5 days  -HK        Progress/Outcome (Toileting Goal 1, OT) goal ongoing  -HK           Strength Goal 1 (OT)    Strength Goal 1 (OT) Pt will complete BUE AROM x15 reps to increase strength and independence with ADLS.   -HK        Time Frame (Strength Goal 1, OT) 5 days  -HK        Progress/Outcome (Strength Goal 1, OT) goal ongoing  -HK           Living Environment    Home Accessibility stairs to enter home;stairs within home  -HK          User Key  (r) = Recorded By, (t) = Taken By, (c) = Cosigned By    Initials Name Effective Dates     Zoe Kauffman, JOURDAN 03/07/18 -            Occupational Therapy Education     Title: PT OT SLP Therapies (Active)     Topic: Occupational Therapy (Active)     Point: ADL training (Done)     Description: Instruct learner(s) on proper safety adaptation and remediation techniques during self care or transfers.   Instruct in proper use of assistive devices.   Learning Progress Summary     Learner Status Readiness Method Response Comment Documented by    Patient Done Acceptance E VU Pt educated on ADL retraining with LBD, safety precautions, and appropriate body mechanics.  06/18/18 1131          Point: Precautions (Done)     Description: Instruct learner(s) on prescribed precautions during self-care and functional transfers.   Learning Progress Summary     Learner Status Readiness Method Response Comment Documented by    Patient Done Acceptance E VU Pt educated on ADL retraining with LBD, safety precautions, and appropriate body mechanics.  06/18/18 1131          Point: Body mechanics (Done)     Description: Instruct learner(s) on proper positioning and spine alignment during self-care, functional mobility activities and/or exercises.   Learning Progress Summary     Learner Status Readiness Method  Response Comment Documented by    Patient Done Acceptance E VU Pt educated on ADL retraining with LBD, safety precautions, and appropriate body mechanics.  06/18/18 1131                      User Key     Initials Effective Dates Name Provider Type [facility] 03/07/18 -  Zoe Kauffman OT Occupational Therapist OT                  OT Recommendation and Plan  Outcome Summary/Treatment Plan (OT)  Patient/Family Concerns, Equipment Needs at Discharge (OT): Pt reports she does not want IPR or HH, although patient would benefit from IPR.   Anticipated Discharge Disposition (OT): inpatient rehabilitation facility  Therapy Frequency (OT Eval): daily  Plan of Care Review  Plan of Care Reviewed With: patient  Plan of Care Reviewed With: patient  Outcome Summary: OT eval complete. Pt presents with decreased strength and balance. Pt completes bed mobiliy with supervision and complete sit to stand with CGA. Pt requires Aron  to take steps from bed to chair. Pt would benefit from IPR if agreeable.           Outcome Measures     Row Name 06/18/18 0815             How much help from another is currently needed...    Putting on and taking off regular lower body clothing? 4  -HK      Bathing (including washing, rinsing, and drying) 3  -HK      Toileting (which includes using toilet bed pan or urinal) 3  -HK      Putting on and taking off regular upper body clothing 4  -HK      Taking care of personal grooming (such as brushing teeth) 3  -HK      Eating meals 4  -HK      Score 21  -HK         Functional Assessment    Outcome Measure Options AM-PAC 6 Clicks Daily Activity (OT)  -HK        User Key  (r) = Recorded By, (t) = Taken By, (c) = Cosigned By    Initials Name Provider Type     Zoe Kauffman OT Occupational Therapist          Time Calculation:   OT Start Time: 0815  Therapy Suggested Charges     Code   Minutes Charges    None           Therapy Charges for Today     Code Description Service Date Service Provider  Modifiers Qty    [id number] HC OT EVAL MOD COMPLEXITY 4 6/18/2018 Zoe Kauffman OT GO 1               Zoe Kauffman OT  6/18/2018

## 2018-06-18 NOTE — PROGRESS NOTES
"    [facility] Medicine Services  PROGRESS NOTE    Patient Name: Scott Sinha  : 4/10/1930  MRN: [id number]    Date of Admission: 2018  Length of Stay: 1  Primary Care Physician: CHRISTY Hair MD    Subjective   Subjective     CC:  F/u weakness    HPI:  Pt sitting up in chair at bedside this am, working with SLP when I initially saw her.  Later, just after I left pt's room, during SLP evaluation, pt had episode of \"twitching\" and BP dropped to 80s systolic and tele showed abnormal rhythm.  Pt came to within a few moments, EKG was done and shows only frequent PACs, BP now back up to 100s systolic.      Review of Systems  Gen- No fevers, chills  CV- No chest pain, palpitations  Resp- No cough, dyspnea  GI- No N/V/D, abd pain    Otherwise ROS is negative except as mentioned in the HPI.    Objective   Objective     Vital Signs:   Temp:  [98.2 °F (36.8 °C)-98.8 °F (37.1 °C)] 98.4 °F (36.9 °C)  Heart Rate:  [] 59  Resp:  [16-20] 18  BP: ()/(58-87) 88/58        Physical Exam:  Constitutional: No acute distress, awake, alert  HENT: NCAT, mucous membranes moist  Respiratory: Clear to auscultation bilaterally, respiratory effort normal   Cardiovascular: RRR, no murmurs, rubs, or gallops, palpable pedal pulses bilaterally  Gastrointestinal: Positive bowel sounds, soft, nontender, nondistended  Musculoskeletal: No bilateral ankle edema  Psychiatric: Appropriate affect, cooperative  Neurologic: Oriented x 3, strength symmetric in all extremities, Cranial Nerves grossly intact to confrontation, speech clear  Skin: No rashes    Results Reviewed:  I have personally reviewed current lab, radiology, and data and agree.      Results from last 7 days  Lab Units 18  0502 18  1450 18  0500   WBC 10*3/mm3 9.29 9.10 8.87   HEMOGLOBIN g/dL 8.4* 9.1* 9.3*   HEMATOCRIT % 26.3* 28.6* 28.7*   PLATELETS 10*3/mm3 240 250 272       Results from last 7 days  Lab Units 18  0502 " 06/17/18  1450 06/16/18  0502  06/13/18  2218   SODIUM mmol/L 130* 131* 127*  < > 126*   POTASSIUM mmol/L 4.4 4.2 4.6  < > 4.6   CHLORIDE mmol/L 98* 95* 95*  < > 94*   CO2 mmol/L 26.0 25.0 25.0  < > 24.0   BUN mg/dL 25* 33* 28*  < > 14   CREATININE mg/dL 1.25 1.70* 1.38*  < > 1.18   GLUCOSE mg/dL 105* 106* 100  < > 113*   CALCIUM mg/dL 9.0 9.2 8.7  < > 9.3   ALT (SGPT) U/L  --  13  --   --  11   AST (SGOT) U/L  --  26  --   --  22   < > = values in this interval not displayed.  Estimated Creatinine Clearance: 29.7 mL/min (by C-G formula based on SCr of 1.25 mg/dL).  BNP   Date Value Ref Range Status   06/17/2018 287.0 (H) 0.0 - 100.0 pg/mL Final     Comment:     Results may be falsely decreased if patient taking Biotin.     No results found for: PHART    Microbiology Results Abnormal     None          Imaging Results (last 24 hours)     Procedure Component Value Units Date/Time    XR Chest 1 View [[id number]] Collected:  06/17/18 1527     Updated:  06/18/18 0748    Narrative:          EXAMINATION: XR CHEST 1 VW - 6/17/2018     INDICATION: Shortness of air.     COMPARISON: Chest x-ray 6/13/2018.     FINDINGS: Persistent cardiomegaly without overt edema. Chronic lung  changes including bibasilar atelectasis, however, no focal  consolidation. No pneumothorax or significant pleural effusion.  Degenerative changes of the spine.           Impression:       Persistent cardiomegaly without overt edema,  focal  consolidation, or effusion.     DICTATED:   6/17/2018  EDITED/ls :   6/17/2018      This report was finalized on 6/18/2018 7:46 AM by Dr. Jadiel Chance.       CT Chest Without Contrast [[id number]] Collected:  06/17/18 1910     Updated:  06/17/18 2011    Narrative:       EXAM:  CT Chest Without Intravenous Contrast    CLINICAL HISTORY:  88 years old, female; Other forms of dyspnea; Hypoxemia; Other malaise;   Personal history of other mental and behavioral disorders; Personal history of   other diseases of the  circulatory system; Personal history of other diseases of   the circulatory system; Signs and symptoms; Cough; Symptoms not specified;   Additional info: Cough, persistent, xray nondiagnostic    TECHNIQUE:  Axial computed tomography images of the chest without intravenous contrast.    All CT scans at this facility use one or more dose reduction techniques, viz.:   automated exposure control; ma/kV adjustment per patient size (including   targeted exams where dose is matched to indication; i.e. head); or iterative   reconstruction technique.  Coronal reformatted images were created and reviewed.    COMPARISON:  CT ANGIOGRAM CHEST W CONTRAST 2018-06-13 23:31    FINDINGS:  Lungs:  Small consolidation within the posteromedial aspect of the right lower   lobe, likely pneumonia.  Bilateral mid and lower lung zone bronchial wall   thickening, compatible with reactive airway disease or bronchitis. Small amount   of endobronchial material within the right lower lobe bronchi.  Atelectasis   and/or scarring within the medial segment right middle lobe.  Mild bibasilar   atelectasis and/or scarring.  Calcified granuloma within the lingula.  Pleural space:  Normal.  No pneumothorax.  No significant effusion.  Heart:  Small pericardial effusion, similar to comparison study.  Bones/joints:  Degenerative changes of the glenohumeral joints.  Multilevel   thoracic spine degenerative changes.  No acute fracture.  No dislocation.  Soft tissues:  Normal.  Vasculature:  Normal.  No thoracic aortic aneurysm.  Lymph nodes:  Several small lymph nodes in the mediastinum, likely reactive.    Calcified left hilar lymph nodes, compatible with prior granulomatous disease.  Kidneys and ureters:  Severe right renal atrophy.      Impression:         1.  Small consolidation within the posteromedial aspect of the right lower   lobe, likely pneumonia.    2.  Bilateral mid and lower lung zone bronchial wall thickening, compatible   with reactive airway  disease or bronchitis. Small amount of endobronchial   material within the right lower lobe bronchi.    3.  Incidental/non-acute findings are described above.    THIS DOCUMENT HAS BEEN ELECTRONICALLY SIGNED BY TOBIN OLIVARES MD        Results for orders placed during the hospital encounter of 06/13/18   Adult Transthoracic Echo Complete W/ Cont if Necessary Per Protocol    Narrative · Moderate aortic valve regurgitation is present.  · Mild mitral valve regurgitation is present  · Mild tricuspid valve regurgitation is present.  · There is a moderate (1-2cm) circumferential pericardial effusion.  · Left ventricular systolic function is severely decreased. Estimated EF =   20%.  · There is mild right atrial diastolic collapse from the pericardial   effusion. The transmitral Doppler show some variation with inspiration.   Clinical correlation suggested in this patient i.e. as the patient   hypotensive could also consider CT scan of the chest to look at the   pericardial effusion          I have reviewed the medications.    Assessment/Plan   Assessment / Plan     Hospital Problem List     * (Principal)Acute respiratory failure with hypoxia    Hyponatremia    Overview Deleted 6/17/2018  6:30 PM by Kasey Erickson MD            Hypertension    History of CVA (cerebrovascular accident)    Chronic systolic heart failure    Normocytic anemia    CARYN (acute kidney injury)    Dyspnea    Hypothyroid    Pericardial effusion             Brief Hospital Course to date:  Scott Sinha is a 88 y.o. female with hx of chronic systolic CHF (EF 20%), HTN, mild cognitive impairment, CVA, and chronic hyponatremia presents due to SOA and weakness after just being discharged on 6/17 for CHF exacerbation. Pt found to have CARYN on admission, likely due to overdiuresis.  This am, pt had episode of abnormal heart rhythm associated with hypotension and decreased responsiveness.     Plan:  --unable to catch arrhythmia on EKG, however, pt is at high  risk of arrhythmias based on her severely decreased EF- it has been determined in the past that she is not an ICD candidate given her advanced age. At this point, will give IV magnesium and monitor all electrolytes and replace PRN as this is the most reliable way we can prevent further occurrences.    --continue to hold diuretics for time being  --Monitor Na+, has baseline hyponatremia  --PT/OT to evaluate  --CM to arrange for home O2 if needed  --CT scan shows ? Small area in RLL concerning for PNA, continue coverage for CAP, if worsens would expand abx to cover for HCAP given recent hospitalization.     DVT Prophylaxis:  YESICA    CODE STATUS: Full Code    Disposition: I expect the patient to be discharged TBD      Electronically signed by Jasmin Briggs MD, 06/18/18, 10:47 AM.

## 2018-06-18 NOTE — PLAN OF CARE
Problem: Patient Care Overview  Goal: Plan of Care Review  Outcome: Ongoing (interventions implemented as appropriate)   06/18/18 1131   Coping/Psychosocial   Plan of Care Reviewed With patient   Plan of Care Review   Progress improving   OTHER   Outcome Summary OT eval complete. Pt presents with decreased strength and balance. Pt completes bed mobiliy with supervision and complete sit to stand with CGA. Pt requires Aron to take steps from bed to chair. Pt would benefit from IPR if agreeable.

## 2018-06-18 NOTE — PLAN OF CARE
Problem: Patient Care Overview  Goal: Plan of Care Review   06/18/18 1048   Coping/Psychosocial   Plan of Care Reviewed With patient   Plan of Care Review   Progress (Eval)     SLP evaluation completed. Will continue to address dysphagia via re-eval tomorrow. Please see note for further details and recommendations.

## 2018-06-18 NOTE — THERAPY EVALUATION
Acute Care - Speech Language Pathology   Swallow Initial Evaluation [facility]   Clinical Swallow Evaluation       Patient Name: Scott Sinha  : 4/10/1930  MRN: [id number]  Today's Date: 2018               Admit Date: 2018    Visit Dx:     ICD-10-CM ICD-9-CM   1. Dysphagia, unspecified type R13.10 787.20   2. Hypoxia R09.02 799.02   3. Physical debility R53.81 799.3   4. ALMANZAR (dyspnea on exertion) R06.09 786.09   5. History of CHF (congestive heart failure) Z86.79 V12.59   6. History of hypertension Z86.79 V12.59   7. History of dementia Z86.59 V11.8     Patient Active Problem List   Diagnosis   • Idiopathic peripheral neuropathy   • Neck pain   • Mild cognitive impairment   • Anemia   • Hyponatremia   • Hypertension   • Dehydration   • History of CVA (cerebrovascular accident)   • Dementia   • Left bundle branch block   • Orthostatic dizziness   • Chronic systolic heart failure   • Normocytic anemia   • CARYN (acute kidney injury)   • Dyspnea   • Hypoxia   • Hypothyroid   • Pericardial effusion   • Acute respiratory failure with hypoxia     Past Medical History:   Diagnosis Date   • Congestive heart failure    • Malignant neoplasm    • Stroke     mini stroke   • Systolic heart failure 2017    Chronic/compensated (EF 25%)     Past Surgical History:   Procedure Laterality Date   • CHOLECYSTECTOMY     • EYE SURGERY     • HYSTERECTOMY            SWALLOW EVALUATION (last 72 hours)      SLP Adult Swallow Evaluation     Row Name 18 1000                   Rehab Evaluation    Document Type evaluation  -SG        Subjective Information complains of;weakness;fatigue;dizziness;nausea/vomiting   Episode of unresponsiveness at end of eval. RN notified  -SG        Patient Observations alert;cooperative  -SG        Evaluation Not Performed, Comment Evaluation cut short 2' pt's change in status. RN immediately called to room for pt assessment. SLP remained in room to provide details on unresponsive episode.  Pt not alert for appx 10 seconds. Alert and responsive 30 seconds after episode  -SG        Patient Effort adequate  -SG        Symptoms Noted During/After Treatment dizziness;fatigue;significant change in vital signs;other (see comments)   RN monitoring, episode unrealted to dys eval  -SG           General Information    Patient Profile Reviewed yes  -SG        Pertinent History Of Current Problem adm w/ resp issues, RLL pna on CT of chest, cardiomegaly on CXR, h/o dementia, CHF  -SG        Current Method of Nutrition NPO  -SG        Precautions/Limitations, Vision WFL with corrective lenses  -SG        Precautions/Limitations, Hearing WFL  -SG        Prior Level of Function-Communication other (see comments);cognitive-linguistic impairment   baseline dementia  -SG        Prior Level of Function-Swallowing no diet consistency restrictions  -SG        Plans/Goals Discussed with patient;agreed upon  -SG        Barriers to Rehab medically complex  -SG        Patient's Goals for Discharge return to PO diet  -SG           Pain Assessment    Additional Documentation Pain Scale: FACES Pre/Post-Treatment (Group)  -SG           Pain Scale: FACES Pre/Post-Treatment    Pain: FACES Scale, Pretreatment 2-->hurts little bit  -SG        Pain: FACES Scale, Post-Treatment 2-->hurts little bit  -SG        Pre/Post Treatment Pain Comment Repositioned, tolerated, RN notified  -SG           Oral Motor and Function    Dentition Assessment edentulous, does not have dentures  -SG        Secretion Management WNL/WFL  -SG        Mucosal Quality dry;cracked  -SG        Gag Response WFL  -SG        Volitional Swallow WFL  -SG        Volitional Cough WFL  -SG           Oral Musculature and Cranial Nerve Assessment    Oral Motor General Assessment generalized oral motor weakness  -SG           General Eating/Swallowing Observations    Respiratory Support Currently in Use nasal cannula  -SG        Eating/Swallowing Skills self-fed;fed by SLP   -SG        Positioning During Eating upright 90 degree;upright in chair  -SG        Utensils Used spoon;cup;straw  -SG        Consistencies Trialed regular textures;pureed;thin liquids  -SG           Respiratory    Respiratory Status WFL  -SG           Clinical Swallow Eval    Oral Prep Phase impaired  -SG        Oral Transit WFL  -SG        Oral Residue impaired  -SG        Pharyngeal Phase no overt signs/symptoms of pharyngeal impairment  -SG        Clinical Swallow Evaluation Summary Pt seen for clinical dys eval this date. NO s/s w/ any PO even when pushed, oral residue w/ cracker, unable to compeltely clear without liquid wash. SLP only able to observe appx 4 PO trials. Pt experiened cold sweats, nausea, anxiety, then momentarily became unresponsive (none of this appeared realted to dys eval). SLP immediatly called RN to room who arrived quickly and assessed pt. Pt unresponsive for appx 10 seconds, became alert and fully responsive within 30 seconds. Rec: [facility] soft, ground, thins, SLP to f/u for DT tomorrow to ensure no issues or changes.    -SG           Oral Prep Concerns    Oral Prep Concerns prolonged mastication;inefficient mastication   w/ solid  -SG           Oral Residue Concerns    Oral Residue Concerns sulci residue, bilateral;other (see comments)   w/ solid 2' edentulous  -SG           Clinical Impression    SLP Swallowing Diagnosis other (see comments);oral dysfunction   no s/s of pharyngeal dys, trials limited. SLP to f/u  -SG        Functional Impact risk of aspiration/pneumonia  -SG        Rehab Potential/Prognosis, Swallowing good, to achieve stated therapy goals  -SG        Criteria for Skilled Therapeutic Interventions Met demonstrates skilled criteria  -SG           Recommendations    Therapy Frequency (Swallow) PRN  -SG        Predicted Duration Therapy Intervention (Days) 1 day;2 days  -SG        SLP Diet Recommendation soft textures;ground;thin liquids  -SG        Recommended Diagnostics  reassess via clinical swallow evaluation;other (see comments)   DT f/u tomorrow  -SG        Recommended Precautions and Strategies small bites of food and sips of liquid;upright posture during/after eating  -SG        SLP Rec. for Method of Medication Administration meds whole;meds crushed;with pudding or applesauce;with thin liquids   per ease of admin  -SG        Monitor for Signs of Aspiration yes;notify SLP if any concerns;cough;elevated WBC count;gurgly voice;throat clearing;fever;upper respiratory;pneumonia  -SG        Anticipated Dischage Disposition unknown;anticipate therapy at next level of care  -SG          User Key  (r) = Recorded By, (t) = Taken By, (c) = Cosigned By    Initials Name Effective Dates    SG Lisette Jerry-Colby, MS SHUN-SLP 06/22/15 -         EDUCATION  The patient has been educated in the following areas:   Dysphagia (Swallowing Impairment) Oral Care/Hydration Modified Diet Instruction.    SLP Recommendation and Plan  SLP Swallowing Diagnosis: other (see comments), oral dysfunction (no s/s of pharyngeal dys, trials limited. SLP to f/u)  SLP Diet Recommendation: soft textures, ground, thin liquids  Recommended Precautions and Strategies: small bites of food and sips of liquid, upright posture during/after eating     Monitor for Signs of Aspiration: yes, notify SLP if any concerns, cough, elevated WBC count, gurgly voice, throat clearing, fever, upper respiratory, pneumonia  Recommended Diagnostics: reassess via clinical swallow evaluation, other (see comments) (DT f/u tomorrow)  Criteria for Skilled Therapeutic Interventions Met: demonstrates skilled criteria  Anticipated Dischage Disposition: unknown, anticipate therapy at next level of care  Rehab Potential/Prognosis, Swallowing: good, to achieve stated therapy goals  Therapy Frequency (Swallow): PRN  Predicted Duration Therapy Intervention (Days): 1 day, 2 days       Plan of Care Reviewed With: patient  Plan of Care Review  Plan  of Care Reviewed With: patient  Progress:  (Eval)             Time Calculation:         Time Calculation- SLP     Row Name 06/18/18 1050             Time Calculation- SLP    SLP Start Time 1000  -SG      SLP Received On 06/18/18  -SG        User Key  (r) = Recorded By, (t) = Taken By, (c) = Cosigned By    Initials Name Provider Type    RENETTA Judd MS CCC-SLP Speech and Language Pathologist          Therapy Charges for Today     Code Description Service Date Service Provider Modifiers Qty    [id number]  ST EVAL ORAL PHARYNG SWALLOW 4 6/18/2018 Lisette Judd MS CCC-SLP GN 1               Lisette Judd MS CCC-SLP  6/18/2018

## 2018-06-19 VITALS
SYSTOLIC BLOOD PRESSURE: 130 MMHG | DIASTOLIC BLOOD PRESSURE: 64 MMHG | HEIGHT: 64 IN | WEIGHT: 132 LBS | OXYGEN SATURATION: 95 % | RESPIRATION RATE: 18 BRPM | TEMPERATURE: 98.7 F | BODY MASS INDEX: 22.53 KG/M2 | HEART RATE: 96 BPM

## 2018-06-19 PROBLEM — J18.9 RIGHT LOWER LOBE PNEUMONIA: Status: ACTIVE | Noted: 2018-06-19

## 2018-06-19 LAB
ANION GAP SERPL CALCULATED.3IONS-SCNC: 8 MMOL/L (ref 3–11)
BUN BLD-MCNC: 20 MG/DL (ref 9–23)
BUN/CREAT SERPL: 18 (ref 7–25)
CALCIUM SPEC-SCNC: 9.3 MG/DL (ref 8.7–10.4)
CHLORIDE SERPL-SCNC: 101 MMOL/L (ref 99–109)
CO2 SERPL-SCNC: 25 MMOL/L (ref 20–31)
CREAT BLD-MCNC: 1.11 MG/DL (ref 0.6–1.3)
DEPRECATED RDW RBC AUTO: 47 FL (ref 37–54)
ERYTHROCYTE [DISTWIDTH] IN BLOOD BY AUTOMATED COUNT: 15 % (ref 11.3–14.5)
GFR SERPL CREATININE-BSD FRML MDRD: 46 ML/MIN/1.73
GLUCOSE BLD-MCNC: 92 MG/DL (ref 70–100)
HCT VFR BLD AUTO: 25.5 % (ref 34.5–44)
HGB BLD-MCNC: 8 G/DL (ref 11.5–15.5)
MCH RBC QN AUTO: 26.7 PG (ref 27–31)
MCHC RBC AUTO-ENTMCNC: 31.4 G/DL (ref 32–36)
MCV RBC AUTO: 85 FL (ref 80–99)
PLATELET # BLD AUTO: 264 10*3/MM3 (ref 150–450)
PMV BLD AUTO: 11.1 FL (ref 6–12)
POTASSIUM BLD-SCNC: 4.9 MMOL/L (ref 3.5–5.5)
RBC # BLD AUTO: 3 10*6/MM3 (ref 3.89–5.14)
SODIUM BLD-SCNC: 134 MMOL/L (ref 132–146)
WBC NRBC COR # BLD: 5.35 10*3/MM3 (ref 3.5–10.8)

## 2018-06-19 PROCEDURE — 85027 COMPLETE CBC AUTOMATED: CPT | Performed by: INTERNAL MEDICINE

## 2018-06-19 PROCEDURE — 97162 PT EVAL MOD COMPLEX 30 MIN: CPT

## 2018-06-19 PROCEDURE — 97116 GAIT TRAINING THERAPY: CPT

## 2018-06-19 PROCEDURE — 92610 EVALUATE SWALLOWING FUNCTION: CPT

## 2018-06-19 PROCEDURE — 25010000002 ONDANSETRON PER 1 MG: Performed by: FAMILY MEDICINE

## 2018-06-19 PROCEDURE — 99239 HOSP IP/OBS DSCHRG MGMT >30: CPT | Performed by: INTERNAL MEDICINE

## 2018-06-19 PROCEDURE — 80048 BASIC METABOLIC PNL TOTAL CA: CPT | Performed by: INTERNAL MEDICINE

## 2018-06-19 PROCEDURE — 97110 THERAPEUTIC EXERCISES: CPT

## 2018-06-19 PROCEDURE — 25010000002 HEPARIN (PORCINE) PER 1000 UNITS: Performed by: FAMILY MEDICINE

## 2018-06-19 RX ORDER — GABAPENTIN 300 MG/1
300 CAPSULE ORAL 2 TIMES DAILY
Status: ON HOLD
Start: 2018-06-19 | End: 2022-03-04

## 2018-06-19 RX ORDER — DOXYCYCLINE 50 MG/1
100 CAPSULE ORAL EVERY 12 HOURS SCHEDULED
Qty: 16 CAPSULE | Refills: 0 | Status: SHIPPED | OUTPATIENT
Start: 2018-06-19 | End: 2018-06-23

## 2018-06-19 RX ADMIN — METOPROLOL SUCCINATE 12.5 MG: 25 TABLET, EXTENDED RELEASE ORAL at 10:02

## 2018-06-19 RX ADMIN — ONDANSETRON 4 MG: 2 INJECTION INTRAMUSCULAR; INTRAVENOUS at 12:10

## 2018-06-19 RX ADMIN — LEVOTHYROXINE SODIUM 50 MCG: 50 TABLET ORAL at 06:52

## 2018-06-19 RX ADMIN — HEPARIN SODIUM 5000 UNITS: 5000 INJECTION, SOLUTION INTRAVENOUS; SUBCUTANEOUS at 10:01

## 2018-06-19 RX ADMIN — DOXYCYCLINE 100 MG: 100 INJECTION, POWDER, LYOPHILIZED, FOR SOLUTION INTRAVENOUS at 10:01

## 2018-06-19 RX ADMIN — KETOTIFEN FUMARATE 1 DROP: 0.35 SOLUTION/ DROPS OPHTHALMIC at 12:12

## 2018-06-19 RX ADMIN — ROPINIROLE 0.5 MG: 0.5 TABLET, FILM COATED ORAL at 10:01

## 2018-06-19 RX ADMIN — GABAPENTIN 300 MG: 300 CAPSULE ORAL at 10:02

## 2018-06-19 RX ADMIN — MULTIPLE VITAMINS W/ MINERALS TAB 1 TABLET: TAB ORAL at 10:01

## 2018-06-19 RX ADMIN — CLOPIDOGREL BISULFATE 75 MG: 75 TABLET ORAL at 10:02

## 2018-06-19 RX ADMIN — CYCLOSPORINE 1 DROP: 0.5 EMULSION OPHTHALMIC at 09:59

## 2018-06-19 NOTE — PROGRESS NOTES
Case Management Discharge Note    Final Note: Plan is home with family and [facility]  for PT and OT. Family will transport.    Destination     No service coordination in this encounter.      Durable Medical Equipment     No service coordination in this encounter.      Dialysis/Infusion     No service coordination in this encounter.      Home Medical Care     No service coordination in this encounter.      Social Care     No service coordination in this encounter.             Final Discharge Disposition Code: 06 - home with home health care

## 2018-06-19 NOTE — DISCHARGE SUMMARY
[facility] Medicine Services  DISCHARGE SUMMARY    Patient Name: Scott Sinha  : 4/10/1930  MRN: [id number]    Date of Admission: 2018  Date of Discharge:  2018  Primary Care Physician: CHRISTY Hair MD    Consults     No orders found from 2018 to 2018.        Hospital Course     Presenting Problem:   Acute respiratory failure with hypoxia [J96.01]    Active Hospital Problems (** Indicates Principal Problem)    Diagnosis Date Noted   • **Acute respiratory failure with hypoxia [J96.01] 2018     Priority: High   • Right lower lobe pneumonia [J18.1] 2018     Priority: High   • CARYN (acute kidney injury) [N17.9] 2017     Priority: High   • Dyspnea [R06.00] 2017     Priority: Medium   • Hyponatremia [E87.1] 2017     Priority: Medium   • Pericardial effusion [I31.3] 2018     Priority: Low   • Hypothyroid [E03.9] 2017     Priority: Low   • Chronic systolic heart failure [I50.22] 2017     Priority: Low   • Normocytic anemia [D64.9] 2017     Priority: Low   • History of CVA (cerebrovascular accident) [Z86.73] 2017     Priority: Low   • Hypertension [I10] 2017     Priority: Low      Resolved Hospital Problems    Diagnosis Date Noted Date Resolved   No resolved problems to display.          Hospital Course:  Scott Sinha is a 88 y.o. female with hx of chronic systolic CHF (EF 20%), HTN, mild cognitive impairment, CVA, and chronic hyponatremia presents due to SOA and weakness after just being discharged on  for CHF exacerbation. Pt found to have CARYN on admission, likely due to overdiuresis.  Pt was admitted to our service, also found to be hypoxic on RA on admission, so required supplemental O2 initially during her stay. CXR was unremarkable, however, CT chest showed a right sided opacification c/w PNA.  Pt was treated with IV Rocephin and Doxycycline, cultures have been unremarkable and she has had no  "fever nor leukocytosis.  She will be discharged home today on PO Doxycycline to complete a 7 day course.     Pt's diuretics were held and her CARYN has improved now that she is euvolemic. She may continue her home medication regimen on d/c.  Of note, she did have an episode of decreased responsiveness noted by RN and SLP on day two of admission- tele reportedly showed \"abnormal rhythm\" but EKG only revealed PACs. I reviewed tele strip from that event and it appears she had frequent PACs there as well.  She does have a h/o CHF, however, is not an ICD candidate per Cardiology due to her advanced age. We replaced her electrolytes and she has not had any further events since then.  She was also hyponatremic on admission, but this has corrected with holding her diuretic.     PT/OT has seen and recommend home with home health.            Day of Discharge     HPI:   Pt doing well today, denies any complaints. Still feeling weak but wants to go home    Review of Systems  Gen- No fevers, chills  CV- No chest pain, palpitations  Resp- No cough, dyspnea  GI- No N/V/D, abd pain  Otherwise ROS is negative except as mentioned in the HPI.    Vital Signs:   Temp:  [98.3 °F (36.8 °C)-98.7 °F (37.1 °C)] 98.7 °F (37.1 °C)  Heart Rate:  [96] 96  Resp:  [16-18] 18  BP: (130-152)/(52-79) 130/64     Physical Exam:  Constitutional: No acute distress, awake, alert  HENT: NCAT, mucous membranes moist  Respiratory: Clear to auscultation bilaterally, respiratory effort normal   Cardiovascular: RRR, no murmurs, rubs, or gallops, palpable pedal pulses bilaterally  Gastrointestinal: Positive bowel sounds, soft, nontender, nondistended  Musculoskeletal: No bilateral ankle edema  Psychiatric: Appropriate affect, cooperative  Neurologic: Oriented x 3, strength symmetric in all extremities, Cranial Nerves grossly intact to confrontation, speech clear  Skin: No rashes    Pertinent  and/or Most Recent Results       Results from last 7 days  Lab Units " 06/19/18  0505 06/18/18  0502 06/17/18  1450 06/16/18  0502 06/15/18  0513 06/14/18  0500 06/13/18  2218   WBC 10*3/mm3 5.35 9.29 9.10  --   --  8.87 7.81   HEMOGLOBIN g/dL 8.0* 8.4* 9.1*  --   --  9.3* 9.5*   HEMATOCRIT % 25.5* 26.3* 28.6*  --   --  28.7* 29.3*   PLATELETS 10*3/mm3 264 240 250  --   --  272 272   SODIUM mmol/L 134 130* 131* 127* 127* 129* 126*   POTASSIUM mmol/L 4.9 4.4 4.2 4.6 3.9 4.3 4.6   CHLORIDE mmol/L 101 98* 95* 95* 93* 93* 94*   CO2 mmol/L 25.0 26.0 25.0 25.0 26.0 28.0 24.0   BUN mg/dL 20 25* 33* 28* 24* 13 14   CREATININE mg/dL 1.11 1.25 1.70* 1.38* 1.65* 1.20 1.18   GLUCOSE mg/dL 92 105* 106* 100 99 109* 113*   CALCIUM mg/dL 9.3 9.0 9.2 8.7 8.7 9.7 9.3       Results from last 7 days  Lab Units 06/17/18  1450 06/13/18  2218   BILIRUBIN mg/dL 0.4 0.3   ALK PHOS U/L 52 52   ALT (SGPT) U/L 13 11   AST (SGOT) U/L 26 22           Invalid input(s): TG, LDLCALC, LDLREALC    Results from last 7 days  Lab Units 06/17/18  1450 06/15/18  0513 06/14/18  0500 06/13/18  2218   TSH mIU/mL  --  0.777  --   --    BNP pg/mL 287.0*  --  1,028.0* 616.0*     Brief Urine Lab Results  (Last result in the past 365 days)      Color   Clarity   Blood   Leuk Est   Nitrite   Protein   CREAT   Urine HCG        06/17/18 1550 Yellow Clear Negative Negative Negative Negative               Microbiology Results Abnormal     None          Imaging Results (all)     Procedure Component Value Units Date/Time    XR Chest 1 View [[id number]] Collected:  06/17/18 1527     Updated:  06/18/18 0748    Narrative:          EXAMINATION: XR CHEST 1 VW - 6/17/2018     INDICATION: Shortness of air.     COMPARISON: Chest x-ray 6/13/2018.     FINDINGS: Persistent cardiomegaly without overt edema. Chronic lung  changes including bibasilar atelectasis, however, no focal  consolidation. No pneumothorax or significant pleural effusion.  Degenerative changes of the spine.           Impression:       Persistent cardiomegaly without overt edema,   focal  consolidation, or effusion.     DICTATED:   6/17/2018  EDITED/ls :   6/17/2018      This report was finalized on 6/18/2018 7:46 AM by Dr. Jadiel Chance.       CT Chest Without Contrast [[id number]] Collected:  06/17/18 1910     Updated:  06/17/18 2011    Narrative:       EXAM:  CT Chest Without Intravenous Contrast    CLINICAL HISTORY:  88 years old, female; Other forms of dyspnea; Hypoxemia; Other malaise;   Personal history of other mental and behavioral disorders; Personal history of   other diseases of the circulatory system; Personal history of other diseases of   the circulatory system; Signs and symptoms; Cough; Symptoms not specified;   Additional info: Cough, persistent, xray nondiagnostic    TECHNIQUE:  Axial computed tomography images of the chest without intravenous contrast.    All CT scans at this facility use one or more dose reduction techniques, viz.:   automated exposure control; ma/kV adjustment per patient size (including   targeted exams where dose is matched to indication; i.e. head); or iterative   reconstruction technique.  Coronal reformatted images were created and reviewed.    COMPARISON:  CT ANGIOGRAM CHEST W CONTRAST 2018-06-13 23:31    FINDINGS:  Lungs:  Small consolidation within the posteromedial aspect of the right lower   lobe, likely pneumonia.  Bilateral mid and lower lung zone bronchial wall   thickening, compatible with reactive airway disease or bronchitis. Small amount   of endobronchial material within the right lower lobe bronchi.  Atelectasis   and/or scarring within the medial segment right middle lobe.  Mild bibasilar   atelectasis and/or scarring.  Calcified granuloma within the lingula.  Pleural space:  Normal.  No pneumothorax.  No significant effusion.  Heart:  Small pericardial effusion, similar to comparison study.  Bones/joints:  Degenerative changes of the glenohumeral joints.  Multilevel   thoracic spine degenerative changes.  No acute fracture.  No  dislocation.  Soft tissues:  Normal.  Vasculature:  Normal.  No thoracic aortic aneurysm.  Lymph nodes:  Several small lymph nodes in the mediastinum, likely reactive.    Calcified left hilar lymph nodes, compatible with prior granulomatous disease.  Kidneys and ureters:  Severe right renal atrophy.      Impression:         1.  Small consolidation within the posteromedial aspect of the right lower   lobe, likely pneumonia.    2.  Bilateral mid and lower lung zone bronchial wall thickening, compatible   with reactive airway disease or bronchitis. Small amount of endobronchial   material within the right lower lobe bronchi.    3.  Incidental/non-acute findings are described above.    THIS DOCUMENT HAS BEEN ELECTRONICALLY SIGNED BY TOBIN OLIVARES MD                    Results for orders placed during the hospital encounter of 06/13/18   Adult Transthoracic Echo Complete W/ Cont if Necessary Per Protocol    Narrative · Moderate aortic valve regurgitation is present.  · Mild mitral valve regurgitation is present  · Mild tricuspid valve regurgitation is present.  · There is a moderate (1-2cm) circumferential pericardial effusion.  · Left ventricular systolic function is severely decreased. Estimated EF =   20%.  · There is mild right atrial diastolic collapse from the pericardial   effusion. The transmitral Doppler show some variation with inspiration.   Clinical correlation suggested in this patient i.e. as the patient   hypotensive could also consider CT scan of the chest to look at the   pericardial effusion            Discharge Details        Discharge Medications      New Medications      Instructions Start Date   doxycycline 50 MG capsule  Commonly known as:  MONODOX   100 mg, Oral, Every 12 Hours Scheduled         Changes to Medications      Instructions Start Date   gabapentin 300 MG capsule  Commonly known as:  NEURONTIN  What changed:  when to take this   300 mg, Oral, 2 Times Daily         Continue These  Medications      Instructions Start Date   ALPRAZolam 0.25 MG tablet  Commonly known as:  XANAX   0.25 mg, Oral, 2 Times Daily PRN      clopidogrel 75 MG tablet  Commonly known as:  PLAVIX   75 mg, Oral, Daily      cycloSPORINE 0.05 % ophthalmic emulsion  Commonly known as:  RESTASIS   1 drop, Both Eyes, Every 12 Hours      donepezil 5 MG tablet  Commonly known as:  ARICEPT   5 mg, Oral, Nightly      fish oil 1000 MG capsule capsule   Oral, Daily With Breakfast      furosemide 20 MG tablet  Commonly known as:  LASIX   20 mg, Oral, Daily      levothyroxine 50 MCG tablet  Commonly known as:  SYNTHROID, LEVOTHROID   50 mcg, Oral, Daily      meclizine 12.5 MG tablet  Commonly known as:  ANTIVERT   12.5 mg, Oral, 3 Times Daily PRN      metoprolol succinate XL 25 MG 24 hr tablet  Commonly known as:  TOPROL-XL   12.5 mg, Oral, Every 24 Hours Scheduled      MULTI COMPLETE PO   Oral      nystatin 064993 UNIT/ML suspension  Commonly known as:  MYCOSTATIN   500,000 Units, Swish & Swallow, 4 Times Daily PRN      Olopatadine HCl 0.7 % solution   1 drop, Ophthalmic, Daily      rOPINIRole 0.5 MG tablet  Commonly known as:  REQUIP   0.5 mg, Oral, Daily, Take 1 hour before bedtime.                Discharge Disposition:  Home or Self Care    Discharge Diet:      Discharge Activity:       Special Instructions:      Future Appointments  Date Time Provider Department Center   11/16/2018 1:15 PM Jose Antonio Aragon MD [facility] None       Additional Instructions for the Follow-ups that You Need to Schedule     Discharge Follow-up with PCP    As directed      Currently Documented PCP:  CHRISTY Hair MD  PCP Phone Number:  [phone]    Follow Up Details:  in one week with PCP               Time Spent on Discharge:  35 minutes    Electronically signed by Jasmin Briggs MD, 06/19/18, 11:54 AM.

## 2018-06-19 NOTE — PLAN OF CARE
Problem: Fall Risk (Adult)  Goal: Identify Related Risk Factors and Signs and Symptoms  Outcome: Ongoing (interventions implemented as appropriate)   06/19/18 1503   Fall Risk (Adult)   Related Risk Factors (Fall Risk) gait/mobility problems   Signs and Symptoms (Fall Risk) presence of risk factors

## 2018-06-19 NOTE — PROGRESS NOTES
Continued Stay Note  [facility]     Patient Name: Scott Sinha  MRN: [id number]  Today's Date: 6/19/2018    Admit Date: 6/17/2018          Discharge Plan     Row Name 06/19/18 1353       Plan    Plan discharge plan    Patient/Family in Agreement with Plan yes    Final Discharge Disposition Code 06 - home with home health care    Final Note Plan is home with family and [facility]  for PT and OT. Family will transport.    Row Name 06/19/18 4966       Plan    Plan discharge plan    Patient/Family in Agreement with Plan yes    Plan Comments Per provider note, pt medically ready for discharge. Spoke with pt and pt's family in room and referral made to [facility]  per request. Spoke with Diony and she will retrieve clinical information from [facility].  Diony is aware pt medically ready for discharge today and [facility]  will be in contact with pt,. Pt and pt's family is aware and family will transport pt home.               Discharge Codes    No documentation.       Expected Discharge Date and Time     Expected Discharge Date Expected Discharge Time    Jun 19, 2018             Marion Sanders RN

## 2018-06-19 NOTE — PLAN OF CARE
Problem: Patient Care Overview  Goal: Plan of Care Review  Outcome: Ongoing (interventions implemented as appropriate)   06/19/18 0504   Coping/Psychosocial   Plan of Care Reviewed With patient   Plan of Care Review   Progress improving   OTHER   Outcome Summary VSS, Denies pain and denies discomfort. No significant concern at this time. Pt report has small BM last night.        Problem: Fall Risk (Adult)  Goal: Absence of Fall  Outcome: Ongoing (interventions implemented as appropriate)   06/19/18 0504   Fall Risk (Adult)   Absence of Fall achieves outcome

## 2018-06-19 NOTE — NURSING NOTE
[facility]    8Patient Name:  Scott Sinha  :  4/10/1930  DOS:  18    Hospital Problem List     * (Principal)Acute respiratory failure with hypoxia    Hyponatremia    Overview Deleted 2018  6:30 PM by Kasey Erickson MD            Hypertension    History of CVA (cerebrovascular accident)    Chronic systolic heart failure    Normocytic anemia    CARYN (acute kidney injury)    Dyspnea    Hypothyroid    Pericardial effusion    Right lower lobe pneumonia        9 yo female with a hx of SHF (EF 20%), HTN, mild cognitive impairment, CVA, chronic hyponatremia.  Pt last hospitalized on 18 for CHF exacerbation. Readmitted on 18 for dyspnea.  Found to have CARYN felt likely due to overdiuresis.  Treated for ? PNA.  CARYN improved.  Pt noted to have frequent PACs and RN has reported an episode of decreased responsiveness.  Her hyponatremia improved with holding diuretics.  Pt being d/c today with home health.    Pt previously evaluated by H&V APRN during last hospital stay.  Scheduled f/u, but readmitted prior to visit.  Discussed role of [facility] Center today with pt.  I feel that patient needs to be seen and evaluated with cardiac heart monitor (high risk for Afib with hx of CVA, frequent PACs)      Medical records have been reviewed.  Patient is a good candidate for the [facility].  Education provided.  Education time 10 min.  Patient to be scheduled follow up 1 week post discharge.    Echo Results: 18  · Moderate aortic valve regurgitation is present.  · Mild mitral valve regurgitation is present  · Mild tricuspid valve regurgitation is present.  · There is a moderate (1-2cm) circumferential pericardial effusion.  · Left ventricular systolic function is severely decreased. Estimated EF = 20%.  · There is mild right atrial diastolic collapse from the pericardial effusion. The transmitral Doppler show some variation with inspiration. Clinical correlation suggested in  this patient i.e. as the patient hypotensive could also consider CT scan of the chest to look at the pericardial effusion    NYHA Class:III    Heart Failure Quality Measures    ACE I, ARB, ARNI (if EF <40%)     If no contraindications:  CARYN / CKD / Renal Stenosis  Now resolved.  Pt being d/c today.  Consider outpatient f/u visit.    Evidence-based Beta Blocker (EF<40%)    (Bisoprolol, Carvedilol, Metoprolol Succinate)  Yes  Metoprolol succinate    Aldosterone Antagonist (EF <40%)  If no contraindications:  CARYN / CKD / Renal Stenosis  To be considered f/u outpatient visit.    Heart Failure Education    Risk factors, Signs / symptoms, Daily weight monitoring and Role of [facility] and when to call    If EF < 35%  Not a candidate due to advance age for ICD and cognitive impairment.

## 2018-06-19 NOTE — NURSING NOTE
"Patient complaining of feeling \"cold, clammy, and anxious.\" She explained that this feeling is similar to the symptoms she experienced yesterday before she lost consciousness. Patient still experiencing frequent PVCs, with several in a row. Dr Briggs paged.  "

## 2018-06-19 NOTE — PROGRESS NOTES
Continued Stay Note  [facility]     Patient Name: Scott Sinha  MRN: [id number]  Today's Date: 6/19/2018    Admit Date: 6/17/2018          Discharge Plan     Row Name 06/19/18 1347       Plan    Plan discharge plan    Patient/Family in Agreement with Plan yes    Plan Comments Per provider note, pt medically ready for discharge. Spoke with pt and pt's family in room and [facility]  per request. Pt wants to go home with family and HH.  Spoke with Diony and she will retrieve clinical information from [facility].  Diony is aware pt medically ready for discharge today and [facility]  will be in contact with pt,. Pt and pt's family is aware  And agreeable to discharge plan.  Family will transport pt home.               Discharge Codes    No documentation.       Expected Discharge Date and Time     Expected Discharge Date Expected Discharge Time    Jun 19, 2018             Marion Sanders RN

## 2018-06-19 NOTE — PLAN OF CARE
Problem: Patient Care Overview  Goal: Plan of Care Review  Outcome: Ongoing (interventions implemented as appropriate)   06/19/18 1500   Coping/Psychosocial   Plan of Care Reviewed With patient;family     SLP re-evaluation completed. Will sign-off as pt is preparing to d/c home and demonstrated no overt clinical s/sxs aspiration this date. Suspect possible esophageal dysfunction. Rec'd aspiration & reflux precautions. Provided info re: OP MBS should issues/concerns arise once pt home and may consider further esophageal work-up/mgt, as indicated. Please see note for further details and recommendations.

## 2018-06-19 NOTE — THERAPY EVALUATION
Acute Care - Speech Language Pathology   Swallow Re-Evaluation [facility]   Clinical Swallow Evaluation     Patient Name: Scott Sinha  : 4/10/1930  MRN: [id number]  Today's Date: 2018  Onset of Illness/Injury or Date of Surgery: 18     Referring Physician: [place] Erickson MD      Admit Date: 2018    Visit Dx:     ICD-10-CM ICD-9-CM   1. Dysphagia, unspecified type R13.10 787.20   2. Hypoxia R09.02 799.02   3. Physical debility R53.81 799.3   4. LAMANZAR (dyspnea on exertion) R06.09 786.09   5. History of CHF (congestive heart failure) Z86.79 V12.59   6. History of hypertension Z86.79 V12.59   7. History of dementia Z86.59 V11.8   8. Impaired mobility and ADLs Z74.09 799.89   9. Impaired functional mobility and endurance Z74.09 V49.89     Patient Active Problem List   Diagnosis   • Idiopathic peripheral neuropathy   • Neck pain   • Mild cognitive impairment   • Anemia   • Hyponatremia   • Hypertension   • Dehydration   • History of CVA (cerebrovascular accident)   • Dementia   • Left bundle branch block   • Orthostatic dizziness   • Chronic systolic heart failure   • Normocytic anemia   • CARYN (acute kidney injury)   • Dyspnea   • Hypoxia   • Hypothyroid   • Pericardial effusion   • Acute respiratory failure with hypoxia   • Right lower lobe pneumonia     Past Medical History:   Diagnosis Date   • Congestive heart failure    • Malignant neoplasm    • Stroke     mini stroke   • Systolic heart failure 2017    Chronic/compensated (EF 25%)     Past Surgical History:   Procedure Laterality Date   • CHOLECYSTECTOMY     • EYE SURGERY     • HYSTERECTOMY            SWALLOW EVALUATION (last 72 hours)      SLP Adult Swallow Evaluation     Row Name 18 1145 18 1000                Rehab Evaluation    Document Type re-evaluation  -AC evaluation  -SG       Subjective Information no complaints  -AC complains of;weakness;fatigue;dizziness;nausea/vomiting   Episode of unresponsiveness at end of eval. RN  notified  -SG       Patient Observations alert;cooperative  -AC alert;cooperative  -SG       Patient/Family Observations Family present during latter half of eval.  -AC  --       Evaluation Not Performed, Comment  -- Evaluation cut short 2' pt's change in status. RN immediately called to room for pt assessment. SLP remained in room to provide details on unresponsive episode. Pt not alert for appx 10 seconds. Alert and responsive 30 seconds after episode  -SG       Patient Effort good  -AC adequate  -SG       Symptoms Noted During/After Treatment  -- dizziness;fatigue;significant change in vital signs;other (see comments)   RN monitoring, episode unrealted to dys eval  -SG          General Information    Patient Profile Reviewed yes  -AC yes  -SG       Pertinent History Of Current Problem  -- adm w/ resp issues, RLL pna on CT of chest, cardiomegaly on CXR, h/o dementia, CHF  -SG       Current Method of Nutrition soft textures;ground;thin liquids  -AC NPO  -SG       Precautions/Limitations, Vision  -- WFL with corrective lenses  -SG       Precautions/Limitations, Hearing  -- WFL  -SG       Prior Level of Function-Communication  -- other (see comments);cognitive-linguistic impairment   baseline dementia  -SG       Prior Level of Function-Swallowing  -- no diet consistency restrictions  -SG       Plans/Goals Discussed with patient and family;agreed upon  -AC patient;agreed upon  -SG       Barriers to Rehab none identified  -AC medically complex  -SG       Patient's Goals for Discharge patient did not state  -AC return to PO diet  -SG       Family Goals for Discharge family did not state  -AC  --          Pain Assessment    Additional Documentation Pain Scale: FACES Pre/Post-Treatment (Group)  -AC Pain Scale: FACES Pre/Post-Treatment (Group)  -SG          Pain Scale: FACES Pre/Post-Treatment    Pain: FACES Scale, Pretreatment 0-->no hurt  -AC 2-->hurts little bit  -SG       Pain: FACES Scale, Post-Treatment 0-->no hurt   "-AC 2-->hurts little bit  -SG       Pre/Post Treatment Pain Comment  -- Repositioned, tolerated, RN notified  -SG          Oral Motor and Function    Dentition Assessment dentures are ill fitting;other (see comments)   ill-fitting/painful lower implants  -AC edentulous, does not have dentures  -SG       Secretion Management WNL/WFL  -AC WNL/WFL  -SG       Mucosal Quality moist, healthy  -AC dry;cracked  -SG       Gag Response  -- WFL  -SG       Volitional Swallow WFL  -AC WFL  -SG       Volitional Cough WFL  -AC WFL  -SG          Oral Musculature and Cranial Nerve Assessment    Oral Motor General Assessment  -- generalized oral motor weakness  -SG          General Eating/Swallowing Observations    Respiratory Support Currently in Use nasal cannula  - nasal cannula  -SG       Eating/Swallowing Skills self-fed;appropriate self-feeding skills observed  - self-fed;fed by SLP  -SG       Positioning During Eating upright 90 degree;upright in bed  - upright 90 degree;upright in chair  -       Utensils Used cup;straw  - spoon;cup;straw  -SG       Consistencies Trialed thin liquids;regular textures  - regular textures;pureed;thin liquids  -SG          Respiratory    Respiratory Status  -- WFL  -SG          Clinical Swallow Eval    Oral Prep Phase impaired  - impaired  -SG       Oral Transit  -- WFL  -SG       Oral Residue  -- impaired  -SG       Pharyngeal Phase no overt signs/symptoms of pharyngeal impairment  - no overt signs/symptoms of pharyngeal impairment  -SG       Esophageal Phase suspected esophageal impairment  -  --       Clinical Swallow Evaluation Summary Pt c/o having difficulty w/ breakfast potatoes earlier this morning- sensation of potatoes \"getting hung\" in chest.  Pt/family reported hx reflux & pt takes Prilosec. Pt also admitted to eating/drinking while lying flat, which at times causes her difficulty. No overt clinical s/sxs aspiration appreciated w/ any consistency trialed when sitting " upright in bed--though risk factors are present (CHF, PNA). Pt preparing to d/c today. Spoke to pt/family re: s/sxs aspiration, risks of aspiration, and OP MBS should issues in swallowing become apparent upon d/c. May also consider further esophageal work-up/mgt, given complaints.  -AC Pt seen for clinical dys eval this date. NO s/s w/ any PO even when pushed, oral residue w/ cracker, unable to compeltely clear without liquid wash. SLP only able to observe appx 4 PO trials. Pt experiened cold sweats, nausea, anxiety, then momentarily became unresponsive (none of this appeared realted to dys eval). SLP immediatly called RN to room who arrived quickly and assessed pt. Pt unresponsive for appx 10 seconds, became alert and fully responsive within 30 seconds. Rec: Mech soft, ground, thins, SLP to f/u for DT tomorrow to ensure no issues or changes.    -SG          Oral Prep Concerns    Oral Prep Concerns prolonged mastication  -AC prolonged mastication;inefficient mastication   w/ solid  -SG       Prolonged Mastication regular consistencies  -AC  --       Oral Prep Concerns, Comment 2' illfitting implants  -AC  --          Oral Residue Concerns    Oral Residue Concerns  -- sulci residue, bilateral;other (see comments)   w/ solid 2' edentulous  -SG          Esophageal Phase Concerns    Esophageal Phase Concerns sensation of material sticking  -AC  --       Sensation of Material Sticking other (see comments)   w/ breakfast potatoes earlier today  -AC  --          Clinical Impression    SLP Swallowing Diagnosis other (see comments)   no overt s/sxs pharyngeal dysphagia, suspect esophageal dys  -AC other (see comments);oral dysfunction   no s/s of pharyngeal dys, trials limited. SLP to f/u  -SG       Functional Impact  -- risk of aspiration/pneumonia  -SG       Rehab Potential/Prognosis, Swallowing  -- good, to achieve stated therapy goals  -SG       Criteria for Skilled Therapeutic Interventions Met other (see comments)   pt  d/c'ing home - f/u as OP, as indicated  -AC demonstrates skilled criteria  -SG          Recommendations    Therapy Frequency (Swallow)  -- PRN  -SG       Predicted Duration Therapy Intervention (Days)  -- 1 day;2 days  -SG       SLP Diet Recommendation soft textures;ground;thin liquids  -AC soft textures;ground;thin liquids  -SG       Recommended Diagnostics VFSS (MBS);other (see comments)   OP MBS if indicated upon d/c  -AC reassess via clinical swallow evaluation;other (see comments)   DT f/u tomorrow  -SG       Recommended Precautions and Strategies upright posture during/after eating;other (see comments)   general aspiration & reflux precautions  -AC small bites of food and sips of liquid;upright posture during/after eating  -SG       SLP Rec. for Method of Medication Administration meds whole;with thin liquids;with pudding or applesauce;as tolerated  -AC meds whole;meds crushed;with pudding or applesauce;with thin liquids   per ease of admin  -SG       Monitor for Signs of Aspiration yes;notify SLP if any concerns;cough;gurgly voice;throat clearing  -AC yes;notify SLP if any concerns;cough;elevated WBC count;gurgly voice;throat clearing;fever;upper respiratory;pneumonia  -SG       Anticipated Dischage Disposition home;other (see comments)   OP MBS should issues arise  -AC unknown;anticipate therapy at next level of care  -SG       Demonstrates Need for Referral to Another Service other (see comments)   may consider further esophageal work-up/mgt, as indicated  -AC  --         User Key  (r) = Recorded By, (t) = Taken By, (c) = Cosigned By    Initials Name Effective Dates     Lisette Judd, MS CCC-SLP 06/22/15 -     AC No Bryan, MS CCC-SLP 07/27/17 -         EDUCATION  The patient has been educated in the following areas:   Dysphagia (Swallowing Impairment) Oral Care/Hydration Modified Diet Instruction.    SLP Recommendation and Plan  SLP Swallowing Diagnosis: other (see comments) (no overt  s/sxs pharyngeal dysphagia, suspect esophageal dys)  SLP Diet Recommendation: soft textures, ground, thin liquids  Recommended Precautions and Strategies: upright posture during/after eating, other (see comments) (general aspiration & reflux precautions)     Monitor for Signs of Aspiration: yes, notify SLP if any concerns, cough, gurgly voice, throat clearing  Recommended Diagnostics: VFSS (MBS), other (see comments) (OP MBS if indicated upon d/c)  Criteria for Skilled Therapeutic Interventions Met: other (see comments) (pt d/c'ing home - f/u as OP, as indicated)  Anticipated Dischage Disposition: home           Demonstrates Need for Referral to Another Service: other (see comments) (may consider further esophageal work-up/mgt, as indicated)    Plan of Care Reviewed With: patient, family  Plan of Care Review  Plan of Care Reviewed With: patient, family           Time Calculation:         Time Calculation- SLP     Row Name 06/19/18 1501             Time Calculation- SLP    SLP Start Time 1145  -AC      SLP Received On 06/19/18  -        User Key  (r) = Recorded By, (t) = Taken By, (c) = Cosigned By    Initials Name Provider Type    AC No Bryan MS CCC-SLP Speech and Language Pathologist          Therapy Charges for Today     Code Description Service Date Service Provider Modifiers Qty    [id number] HC ST EVAL ORAL PHARYNG SWALLOW 4 6/19/2018 No Bryan MS CCC-SLP GN 1               MS NOY Machado  6/19/2018

## 2018-06-19 NOTE — THERAPY EVALUATION
Acute Care - Physical Therapy Initial Evaluation  [facility]     Patient Name: Scott Sinha  : 4/10/1930  MRN: [id number]  Today's Date: 2018   Onset of Illness/Injury or Date of Surgery: 18  Date of Referral to PT: 18  Referring Physician: INDIANA Erickson MD      Admit Date: 2018    Visit Dx:     ICD-10-CM ICD-9-CM   1. Dysphagia, unspecified type R13.10 787.20   2. Hypoxia R09.02 799.02   3. Physical debility R53.81 799.3   4. ALMANZAR (dyspnea on exertion) R06.09 786.09   5. History of CHF (congestive heart failure) Z86.79 V12.59   6. History of hypertension Z86.79 V12.59   7. History of dementia Z86.59 V11.8   8. Impaired mobility and ADLs Z74.09 799.89   9. Impaired functional mobility and endurance Z74.09 V49.89     Patient Active Problem List   Diagnosis   • Idiopathic peripheral neuropathy   • Neck pain   • Mild cognitive impairment   • Anemia   • Hyponatremia   • Hypertension   • Dehydration   • History of CVA (cerebrovascular accident)   • Dementia   • Left bundle branch block   • Orthostatic dizziness   • Chronic systolic heart failure   • Normocytic anemia   • CARYN (acute kidney injury)   • Dyspnea   • Hypoxia   • Hypothyroid   • Pericardial effusion   • Acute respiratory failure with hypoxia     Past Medical History:   Diagnosis Date   • Congestive heart failure    • Malignant neoplasm    • Stroke     mini stroke   • Systolic heart failure 2017    Chronic/compensated (EF 25%)     Past Surgical History:   Procedure Laterality Date   • CHOLECYSTECTOMY     • EYE SURGERY     • HYSTERECTOMY          PT ASSESSMENT (last 12 hours)      Physical Therapy Evaluation     Row Name 18 0810          PT Evaluation Time/Intention    Subjective Information no complaints  -BD     Document Type evaluation  -BD     Mode of Treatment physical therapy  -BD     Patient Effort excellent  -BD     Comment Pt with excellent participation in PT activities  -BD     Row Name 18 0810          General  Information    Patient Profile Reviewed? yes  -BD     Onset of Illness/Injury or Date of Surgery 06/17/18  -BD     Referring Physician INDIANA Erickson MD  -BD     Patient Observations alert;cooperative;agree to therapy  -BD     General Observations of Patient Pt up in bathroom with assist, PT assisted in room to add gt belt  -BD     Prior Level of Function independent:  -BD     Equipment Currently Used at Home none  -BD     Pertinent History of Current Functional Problem Pt admitted for acute respiratory failure with hypoxia  -BD     Existing Precautions/Restrictions fall  -BD     Risks Reviewed spouse/S.O.:;other:;dizziness;change in vital signs  -BD     Benefits Reviewed patient:;improve function;increase strength  -BD     Barriers to Rehab previous functional deficit  -BD     Row Name 06/19/18 0810          Relationship/Environment    Primary Source of Support/Comfort child(chong)  -BD     Lives With child(chong), adult;grandchild(chong)  -BD     Family Caregiver if Needed child(chong), adult  -BD     Row Name 06/19/18 0810          Resource/Environmental Concerns    Current Living Arrangements home/apartment/condo  -BD     Transportation Concerns other (see comments)   no concerns  -BD     Row Name 06/19/18 0810          Home Main Entrance    Number of Stairs, Main Entrance two  -BD     Stair Railings, Main Entrance other (see comments)   Post to hold by stairs  -BD     Row Name 06/19/18 0810          Stairs Within Home, Primary    Stairs, Within Home, Primary Stairs up and also down to basement. Pt states she will not need to go to basement.  -BD     Number of Stairs, Within Home, Primary other (see comments)   13  -BD     Stair Railings, Within Home, Primary railing on right side (ascending)  -BD     Row Name 06/19/18 0810          Cognitive Assessment/Intervention- PT/OT    Affect/Mental Status (Cognitive) WNL  -BD     Orientation Status (Cognition) oriented x 4  -BD     Follows Commands (Cognition) follows one step  commands;over 90% accuracy  -BD     Cognitive Function (Cognitive) WFL  -BD     Safety Deficit (Cognitive) mild deficit  -BD     Personal Safety Interventions fall prevention program maintained;gait belt;nonskid shoes/slippers when out of bed  -     Row Name 06/19/18 0810          Safety Issues, Functional Mobility    Impairments Affecting Function (Mobility) strength  -BD     Comment, Safety Issues/Impairments (Mobility) Pt has independent standing balance  -     Row Name 06/19/18 0810          Bed Mobility Assessment/Treatment    Comment (Bed Mobility) Pt up in bathroom when PT arrived  -     Row Name 06/19/18 0810          Transfer Assessment/Treatment    Transfer Assessment/Treatment sit-stand transfer  -BD     Comment (Transfers) Supervision, VC  -BD     Sit-Stand [place] (Transfers) contact guard  -     Stand-Sit [place] (Transfers) independent  -[facility] Name 06/19/18 0810          Gait/Stairs Assessment/Training    10150 - Gait Training Minutes  15  -     Gait/Stairs Assessment/Training gait/ambulation assistive device  -     [place] Level (Gait) contact guard  -BD     Assistive Device (Gait) walker, front-wheeled  -BD     Distance in Feet (Gait) 350  -BD     Pattern (Gait) step-through  -BD     Deviations/Abnormal Patterns (Gait) base of support, narrow;stride length decreased  -BD     Bilateral Gait Deviations forward flexed posture  -BD     Maintains Weight-bearing Status (Gait) able to maintain  -     Row Name 06/19/18 0810          General ROM    GENERAL ROM COMMENTS Bilateral LE, ROM WFL  -     Row Name 06/19/18 0810          Motor Assessment/Intervention    Additional Documentation Therapeutic Exercise (Group);Therapeutic Exercise Interventions (Group)  -     Row Name 06/19/18 0810          Therapeutic Exercise    41159 - PT Therapeutic Exercise Minutes 15  -     Row Name 06/19/18 0810          Therapeutic Exercise    Lower Extremity (Therapeutic Exercise) marching  while standing;hamstring stretch, bilateral;quad sets, bilateral  -BD     Lower Extremity Range of Motion (Therapeutic Exercise) knee flexion/extension, bilateral  -BD     Exercise Type (Therapeutic Exercise) AROM (active range of motion)  -BD     Position (Therapeutic Exercise) other (see comments);seated   standing  -BD     Row Name 06/19/18 0810          Static Sitting Balance    Level of [place] (Unsupported Sitting, Static Balance) independent  -BD     Row Name 06/19/18 0810          Dynamic Sitting Balance    Level of [place], Reaches Outside Midline (Sitting, Dynamic Balance) independent  -BD     Sitting Position, Reaches Outside Midline (Sitting, Dynamic Balance) sitting in chair  -BD     Row Name 06/19/18 0810          Static Standing Balance    Level of [place] (Supported Standing, Static Balance) independent;supervision  -BD     Time Able to Maintain Position (Supported Standing, Static Balance) 3 to 4 minutes  -BD     Row Name 06/19/18 0810          Sensory Assessment/Intervention    Sensory General Assessment no sensation deficits identified  -BD     Row Name 06/19/18 0810          Vision Assessment/Intervention    Visual Impairment/Limitations WFL  -BD     Row Name 06/19/18 0810          Pain Scale: Numbers Pre/Post-Treatment    Pain Scale: Numbers, Pretreatment 0/10 - no pain  -BD     Pain Scale: Numbers, Post-Treatment 0/10 - no pain  -BD     Row Name 06/19/18 0810          Physical Therapy Clinical Impression    Date of Referral to PT 06/17/18  -BD     PT Diagnosis (PT Clinical Impression) impaired functional mobility  -BD     Patient/Family Goals Statement (PT Clinical Impression) For pt to return home  -BD     Criteria for Skilled Interventions Met (PT Clinical Impression) yes  -BD     Functional Limitations in Following Categories (Describe Specific Limitations) home management  -BD     Rehab Potential (PT Clinical Summary) good, to achieve stated therapy goals  -BD     Care Plan  Review (PT) care plan/treatment goals reviewed  -BD     Row Name 06/19/18 0810          Vital Signs    Pre Systolic BP Rehab 130  -BD     Pre Treatment Diastolic BP 64  -BD     Pretreatment Heart Rate (beats/min) 77  -BD     Pre Patient Position Standing  -BD     Intra Patient Position Standing  -BD     Post Patient Position Sitting  -BD     Row Name 06/19/18 0810          Physical Therapy Goals    Bed Mobility Goal Selection (PT) bed mobility, PT goal 1  -BD     Transfer Goal Selection (PT) transfer, PT goal 1  -BD     Gait Training Goal Selection (PT) gait training, PT goal 2  -BD     Row Name 06/19/18 0810          Bed Mobility Goal 1 (PT)    Activity/Assistive Device (Bed Mobility Goal 1, PT) bed mobility activities, all  -BD     [place] Level/Cues Needed (Bed Mobility Goal 1, PT) independent  -BD     Time Frame (Bed Mobility Goal 1, PT) 2 weeks  -BD     Row Name 06/19/18 0810          Transfer Goal 1 (PT)    Activity/Assistive Device (Transfer Goal 1, PT) sit-to-stand/stand-to-sit;bed-to-chair/chair-to-bed;walker, rolling  -BD     [place] Level/Cues Needed (Transfer Goal 1, PT) independent  -BD     Time Frame (Transfer Goal 1, PT) 2 weeks  -BD     Row Name 06/19/18 0810          Patient Education Goal (PT)    Activity (Patient Education Goal, PT) Pt to understand Gait and ambulation safety  -BD     Time Frame (Patient Education Goal, PT) by discharge  -BD     Row Name 06/19/18 0810          Positioning and Restraints    Pre-Treatment Position bathroom  -BD     Post Treatment Position chair  -BD     In Chair notified nsg;reclined;call light within reach;encouraged to call for assist;exit alarm on;with nsg;legs elevated  -BD     Row Name 06/19/18 0810          Living Environment    Home Accessibility stairs to enter home;stairs within home  -BD       User Key  (r) = Recorded By, (t) = Taken By, (c) = Cosigned By    Initials Name Provider Type    IMTIAZ Jain, PT Physical Therapist           Physical Therapy Education     Title: PT OT SLP Therapies (Active)     Topic: Physical Therapy (Done)     Point: Mobility training (Done)    Learning Progress Summary     Learner Status Readiness Method Response Comment Documented by    Patient Done Acceptance ROSARIO SANDY,NR  BD 06/19/18 1014          Point: Home exercise program (Done)    Learning Progress Summary     Learner Status Readiness Method Response Comment Documented by    Patient Done Acceptance ROSARIO SANDY VU,NR  BD 06/19/18 1014          Point: Body mechanics (Done)    Learning Progress Summary     Learner Status Readiness Method Response Comment Documented by    Patient Done Acceptance EROSARIO VU,NR  BD 06/19/18 1014          Point: Precautions (Done)    Learning Progress Summary     Learner Status Readiness Method Response Comment Documented by    Patient Done Acceptance ROSARIO SANDY VU,NR  BD 06/19/18 1014                      User Key     Initials Effective Dates Name Provider Type Discipline     06/08/18 -  Dory Jain, PT Physical Therapist PT                PT Recommendation and Plan  Anticipated Discharge Disposition (PT): home with home health  Planned Therapy Interventions (PT Eval): bed mobility training, gait training, home exercise program, transfer training  Therapy Frequency (PT Clinical Impression): daily  Outcome Summary/Treatment Plan (PT)  Anticipated Discharge Disposition (PT): home with home health  Plan of Care Reviewed With: patient  Progress: improving  Outcome Summary: Pt worked very well with PT on all activities. Pt wanting to be back to her baseline for movement, balance and independence. Continue with PT goals.          Outcome Measures     Row Name 06/19/18 0810 06/18/18 0815          How much help from another person do you currently need...    Turning from your back to your side while in flat bed without using bedrails? 4  -BD  --     Moving from lying on back to sitting on the side of a flat bed without bedrails? 4  -BD  --      Moving to and from a bed to a chair (including a wheelchair)? 3  -BD  --     Standing up from a chair using your arms (e.g., wheelchair, bedside chair)? 4  -BD  --     Climbing 3-5 steps with a railing? 3  -BD  --     To walk in hospital room? 3  -BD  --     AM-PAC 6 Clicks Score 21  -BD  --        How much help from another is currently needed...    Putting on and taking off regular lower body clothing?  -- 4  -HK     Bathing (including washing, rinsing, and drying)  -- 3  -HK     Toileting (which includes using toilet bed pan or urinal)  -- 3  -HK     Putting on and taking off regular upper body clothing  -- 4  -HK     Taking care of personal grooming (such as brushing teeth)  -- 3  -HK     Eating meals  -- 4  -HK     Score  -- 21  -HK        Functional Assessment    Outcome Measure Options AM-PAC 6 Clicks Basic Mobility (PT)  -BD AM-PAC 6 Clicks Daily Activity (OT)  -HK       User Key  (r) = Recorded By, (t) = Taken By, (c) = Cosigned By    Initials Name Provider Type    IMTIAZ Jain, PT Physical Therapist    HK Zoe Kauffman, OT Occupational Therapist           Time Calculation:         PT Charges     Row Name 06/19/18 1017 06/19/18 0810          Time Calculation    Start Time 0810  -  --     PT Received On 06/19/18  -  --     PT Goal Re-Cert Due Date 07/03/18  -  --        Time Calculation- PT    Total Timed Code Minutes- PT 30 minute(s)  -BD  --        Timed Charges    92724 - PT Therapeutic Exercise Minutes  -- 15  -BD     38321 - Gait Training Minutes   -- 15  -BD       User Key  (r) = Recorded By, (t) = Taken By, (c) = Cosigned By    Initials Name Provider Type    IMTIAZ Jain, PT Physical Therapist        Therapy Suggested Charges     Code   Minutes Charges    47534 (CPT®) Hc Pt Neuromusc Re Education Ea 15 Min      77231 (CPT®) Hc Pt Ther Proc Ea 15 Min 15 1    38800 (CPT®) Hc Gait Training Ea 15 Min 15 1    24808 (CPT®) Hc Pt Therapeutic Act Ea 15 Min      84163 (CPT®) Hc Pt Manual  Therapy Ea 15 Min      19332 (CPT®) Hc Pt Iontophoresis Ea 15 Min      86006 (CPT®) Hc Pt Elec Stim Ea-Per 15 Min      04970 (CPT®) Hc Pt Ultrasound Ea 15 Min      86605 (CPT®) Hc Pt Self Care/Mgmt/Train Ea 15 Min      Total  30 2        Therapy Charges for Today     Code Description Service Date Service Provider Modifiers Qty    36701092264 HC PT THER PROC EA 15 MIN 6/19/2018 Dory Jain, PT GP 1    28329734280 HC GAIT TRAINING EA 15 MIN 6/19/2018 Dory Jain, PT GP 1    74529465973 HC PT EVAL MOD COMPLEXITY 2 6/19/2018 Dory Jain, PT GP 1          PT G-Codes  Outcome Measure Options: AM-PAC 6 Clicks Basic Mobility (PT)      Dory Jain, PT  6/19/2018

## 2018-06-19 NOTE — PLAN OF CARE
Problem: Patient Care Overview  Goal: Plan of Care Review  Outcome: Ongoing (interventions implemented as appropriate)   06/19/18 1015   Coping/Psychosocial   Plan of Care Reviewed With patient   Plan of Care Review   Progress improving   OTHER   Outcome Summary Pt worked very well with PT on all activities. Pt wanting to be back to her baseline for movement, balance and independence. Continue with PT goals.

## 2018-06-26 ENCOUNTER — HOSPITAL ENCOUNTER (OUTPATIENT)
Dept: CARDIOLOGY | Facility: HOSPITAL | Age: 83
Discharge: HOME | End: 2018-06-26
Admitting: NURSE PRACTITIONER

## 2018-06-26 ENCOUNTER — OFFICE VISIT (OUTPATIENT)
Dept: CARDIOLOGY | Facility: HOSPITAL | Age: 83
End: 2018-06-26

## 2018-06-26 ENCOUNTER — HOSPITAL ENCOUNTER (OUTPATIENT)
Dept: CARDIOLOGY | Facility: HOSPITAL | Age: 83
Discharge: HOME | End: 2018-06-26

## 2018-06-26 VITALS
DIASTOLIC BLOOD PRESSURE: 69 MMHG | TEMPERATURE: 97.7 F | OXYGEN SATURATION: 98 % | HEART RATE: 87 BPM | HEIGHT: 64 IN | RESPIRATION RATE: 18 BRPM | SYSTOLIC BLOOD PRESSURE: 148 MMHG | BODY MASS INDEX: 22.53 KG/M2 | WEIGHT: 132 LBS

## 2018-06-26 DIAGNOSIS — I63.9 CEREBROVASCULAR ACCIDENT (CVA), UNSPECIFIED MECHANISM (HCC): ICD-10-CM

## 2018-06-26 DIAGNOSIS — R06.00 DYSPNEA, UNSPECIFIED TYPE: ICD-10-CM

## 2018-06-26 DIAGNOSIS — R09.02 HYPOXIA: ICD-10-CM

## 2018-06-26 DIAGNOSIS — I10 ESSENTIAL HYPERTENSION: ICD-10-CM

## 2018-06-26 DIAGNOSIS — I50.22 CHRONIC SYSTOLIC HEART FAILURE (HCC): Primary | ICD-10-CM

## 2018-06-26 DIAGNOSIS — I49.1 PAC (PREMATURE ATRIAL CONTRACTION): ICD-10-CM

## 2018-06-26 PROCEDURE — 93010 ELECTROCARDIOGRAM REPORT: CPT | Performed by: INTERNAL MEDICINE

## 2018-06-26 PROCEDURE — 99214 OFFICE O/P EST MOD 30 MIN: CPT | Performed by: NURSE PRACTITIONER

## 2018-06-26 PROCEDURE — 93005 ELECTROCARDIOGRAM TRACING: CPT | Performed by: NURSE PRACTITIONER

## 2018-06-26 PROCEDURE — 0296T HC EXT ECG > 48HR TO 21 DAY RCRD W/CONECT INTL RCRD: CPT

## 2018-06-26 NOTE — PROGRESS NOTES
"[facility]  Heart and Valve Center    Encounter Date:06/26/2018     Scott Sinha  [address] [place] [address]  [phone]    4/10/1930    CHRISTY Hair MD    Scott Sinha is a 88 y.o. female.      Subjective:     Chief Complaint:  Establish Care (HS FU ) and Congestive Heart Failure       HPI :  Ms. Sinha comes in to the Heart and Valve clinic today for one week post hospital check.  LVEF 20%.  She was hospitalized on 6/16 and 6/17 due to CARYN/ HR exacerbations.  She currently is following with Home Health, too.  Hx of CVA and frequent PAC's per inpatient telemetry.  Consideration for Zio monitor today.      Overall, patient states she does not feel as good as when she left the hospital.  She states she crawled under a bed looking for something on Saturday and \"over-exerted herself\".  She felt very tired on Sunday and has since then.  No c/o peripheral edema, but she has frequent bouts of SOA which take longer to resolve that she would like.   She is weighing daily and states her weight continues to decline.    Patient states she saw Dr. Hair yesterday and had labs drawn.    Allergies   Allergen Reactions   • Augmentin [Amoxicillin-Pot Clavulanate] Nausea And Vomiting   • Claritin [Loratadine] Unknown (See Comments)     Doesn't remember   • Keflex [Cephalexin] Nausea And Vomiting   • Sulfa Antibiotics Other (See Comments)     Does not remember       Current Outpatient Prescriptions:   •  ALPRAZolam (XANAX) 0.25 MG tablet, Take 0.25 mg by mouth 2 (Two) Times a Day As Needed for Anxiety., Disp: , Rfl:   •  clopidogrel (PLAVIX) 75 MG tablet, Take 75 mg by mouth Daily., Disp: , Rfl:   •  cycloSPORINE (RESTASIS) 0.05 % ophthalmic emulsion, Administer 1 drop to both eyes Every 12 (Twelve) Hours., Disp: , Rfl:   •  donepezil (ARICEPT) 5 MG tablet, Take 5 mg by mouth Every Night., Disp: , Rfl:   •  furosemide (LASIX) 20 MG tablet, Take 1 tablet by mouth Daily., Disp: 30 tablet, Rfl: 2  •  gabapentin " (NEURONTIN) 300 MG capsule, Take 1 capsule by mouth 2 (Two) Times a Day., Disp: , Rfl:   •  levothyroxine (SYNTHROID, LEVOTHROID) 50 MCG tablet, Take 50 mcg by mouth Daily., Disp: , Rfl:   •  meclizine (ANTIVERT) 12.5 MG tablet, Take 12.5 mg by mouth 3 (Three) Times a Day As Needed for dizziness., Disp: , Rfl:   •  metoprolol succinate XL (TOPROL-XL) 25 MG 24 hr tablet, Take 0.5 tablets by mouth Daily., Disp: 30 tablet, Rfl: 2  •  Multiple Vitamins-Minerals (MULTI COMPLETE PO), Take 1 tablet by mouth Daily., Disp: , Rfl:   •  nystatin (MYCOSTATIN) 151121 UNIT/ML suspension, Swish and swallow 500,000 Units 4 (Four) Times a Day As Needed., Disp: , Rfl:   •  Olopatadine HCl 0.7 % solution, Apply 1 drop to eye Daily., Disp: , Rfl:   •  Omega-3 Fatty Acids (FISH OIL) 1000 MG capsule capsule, Take  by mouth Daily With Breakfast., Disp: , Rfl:   •  rOPINIRole (REQUIP) 0.5 MG tablet, Take 0.5 mg by mouth Daily. Take 1 hour before bedtime., Disp: , Rfl:     The following portions of the patient's history were reviewed and updated as appropriate in Epic:  Problem list, allergies, current medications, past medical and surgical history, past social and family history.     Review of Systems   Constitution: Positive for weakness, malaise/fatigue and weight loss.   HENT: Positive for congestion and hearing loss.    Eyes: Negative.    Cardiovascular: Positive for dyspnea on exertion, irregular heartbeat, orthopnea and paroxysmal nocturnal dyspnea.   Respiratory: Positive for shortness of breath.    Endocrine: Positive for cold intolerance.   Hematologic/Lymphatic: Bruises/bleeds easily.   Skin: Negative.    Musculoskeletal: Positive for muscle cramps and muscle weakness.   Gastrointestinal: Positive for bloating, constipation, heartburn and nausea.   Genitourinary: Negative.    Neurological: Positive for excessive daytime sleepiness, dizziness, headaches, light-headedness and loss of balance.   Psychiatric/Behavioral: Positive for  memory loss. The patient is nervous/anxious.    Allergic/Immunologic: Positive for environmental allergies.       Objective:     Vitals:    06/26/18 1313 06/26/18 1314 06/26/18 1512 06/26/18 1513   BP: 146/57 148/69     BP Location: Left arm Left arm     Patient Position: Sitting Standing     Pulse: 81 87     Resp:    18   Temp:       TempSrc:       SpO2:  96% (w/ 2 liters O2) (!) 83% (ambulating) 98%  (rest)   Weight:       Height:             Physical Exam   Constitutional: She is oriented to person, place, and time. She appears well-developed and well-nourished. No distress.   Petite, talkative,very pleasant elderly female accompanied by son   DARYL:   Head: Normocephalic and atraumatic.   Eyes: Conjunctivae are normal. Pupils are equal, round, and reactive to light. No scleral icterus.   Neck: Normal range of motion. Neck supple. No JVD present.   Cardiovascular: Normal rate, regular rhythm and intact distal pulses.  Exam reveals no gallop and no friction rub.    Murmur heard.  II/VI systolic murmur   Pulmonary/Chest: Effort normal and breath sounds normal. No respiratory distress. She has no wheezes. She has no rales. She exhibits no tenderness.   No rales or rhonchi   Musculoskeletal: Normal range of motion. She exhibits no edema.   Neurological: She is alert and oriented to person, place, and time. No cranial nerve deficit.   Skin: Skin is warm and dry.   Psychiatric: She has a normal mood and affect. Her behavior is normal. Judgment and thought content normal.       Lab and Diagnostic Review: DC summary, echocardiogram, inpatient labs.  Requested copy of labs from Dr. Hair    Assessment and Plan:     1. Chronic systolic heart failure  - LVEF 20% with moderate aortic valve regurgitation  - Appropriate medical therapy:  Metoprolol succinate, low dose lasix (due to recent CARYN and hyponatremia)  - Not a candidate for ICD due to advanced age  - Patient interested in medical therapy and comfort measures.  Wants to  live as long as possible with her family and to be as independent as possible.  - Continue physical therapy  - Reviewed Heart Failure educational booklet with patient and family today.    - 3 week follow up  - Reviewed s/sx for which to call HF clinic and when to go to ER.      2. Dyspnea/ hypoxemia with ambulation  - order supplemental oxygen @ 2 liters NC  - Resting SaO2 98%, 83% while ambulating in clinic, then back up to 96% with supplemental oxygen @ 2liters.      3. PAC (premature atrial contraction)  - High risk for afib or other dysrhythmias due to frequent observed PAC's inpatient and hx of CVA.    - Holter Monitor - 72 Hour Up To 21 Days    5. Essential hypertension  - Metoprolol LX 12.5 mg QD    6.  CARYN/ Hyponatremia  - Noted during recent inpatient stay  - Lab monitoring per PCP

## 2018-07-10 PROCEDURE — 0298T HOLTER MONITOR - 72 HOUR UP TO 21 DAY: CPT | Performed by: INTERNAL MEDICINE

## 2018-07-19 ENCOUNTER — OFFICE VISIT (OUTPATIENT)
Dept: CARDIOLOGY | Facility: HOSPITAL | Age: 83
End: 2018-07-19

## 2018-07-19 VITALS
OXYGEN SATURATION: 99 % | HEIGHT: 64 IN | BODY MASS INDEX: 22.53 KG/M2 | RESPIRATION RATE: 17 BRPM | TEMPERATURE: 97.6 F | WEIGHT: 132 LBS | SYSTOLIC BLOOD PRESSURE: 150 MMHG | DIASTOLIC BLOOD PRESSURE: 64 MMHG | HEART RATE: 95 BPM

## 2018-07-19 DIAGNOSIS — I10 ESSENTIAL HYPERTENSION: ICD-10-CM

## 2018-07-19 DIAGNOSIS — I49.3 VENTRICULAR ECTOPY: ICD-10-CM

## 2018-07-19 DIAGNOSIS — I50.22 CHRONIC SYSTOLIC HEART FAILURE (HCC): Primary | ICD-10-CM

## 2018-07-19 PROCEDURE — 99213 OFFICE O/P EST LOW 20 MIN: CPT | Performed by: NURSE PRACTITIONER

## 2018-07-19 RX ORDER — MULTIVIT WITH MINERALS/LUTEIN
250 TABLET ORAL DAILY
COMMUNITY
End: 2022-03-03

## 2018-07-19 RX ORDER — METOPROLOL SUCCINATE 25 MG/1
25 TABLET, EXTENDED RELEASE ORAL DAILY
Qty: 30 TABLET | Refills: 4 | Status: SHIPPED | OUTPATIENT
Start: 2018-07-19 | End: 2018-12-13 | Stop reason: SDUPTHER

## 2018-07-19 NOTE — PROGRESS NOTES
"  Subjective:     Encounter Date:07/19/2018      Patient ID: Scott Sinha is a 88 y.o. female.    Chief Complaint: SHF and Zio monitor follow up     History of Present Illness:  Ms. Sinha returns to the HF clinic after her initial post hospital visit on 6/26/18.  She completed a Zio monitor to assess for possible atrial fibrillation or other dysrhythmias.      Overall, she states she is feeling better.  She is pacing herself/ \"slowing down\" for household chores and ADL's.  Has been able to reduce use of supplemental O2.  Symptoms of dizziness are only if she \"over does it\".  She states she is sleeping well and feeling up to getting out of the house for errands/Orthodox.  Dr. Hair checked her labs.      Past Medical History:   Diagnosis Date   • Congestive heart failure (CMS/HCC)    • Malignant neoplasm (CMS/HCC)    • Stroke (CMS/HCC)     mini stroke   • Systolic heart failure (CMS/HCC) 9/25/2017    Chronic/compensated (EF 25%)       Past Surgical History:   Procedure Laterality Date   • CHOLECYSTECTOMY     • EYE SURGERY     • HYSTERECTOMY         Social History     Social History   • Marital status:      Spouse name: N/A   • Number of children: N/A   • Years of education: N/A     Occupational History   • Retired      Social History Main Topics   • Smoking status: Never Smoker   • Smokeless tobacco: Never Used   • Alcohol use No   • Drug use: No   • Sexual activity: Defer     Other Topics Concern   • Not on file     Social History Narrative    Caffeine Intake:0-1  servings per day    Patient lives at her son's home       Family History   Problem Relation Age of Onset   • Cancer Mother    • No Known Problems Father    • Cancer Sister    • Cancer Brother        Review of Systems   Constitution: Positive for weakness and malaise/fatigue. Negative for chills, decreased appetite, diaphoresis, fever, night sweats, weight gain and weight loss.   HENT: Positive for congestion. Negative for hearing loss, hoarse voice and " "nosebleeds.    Eyes: Negative for blurred vision, visual disturbance and visual halos.   Cardiovascular: Positive for dyspnea on exertion, orthopnea and palpitations. Negative for chest pain, claudication, cyanosis, irregular heartbeat, leg swelling, near-syncope, paroxysmal nocturnal dyspnea and syncope.   Respiratory: Positive for cough and shortness of breath. Negative for hemoptysis, sleep disturbances due to breathing, snoring, sputum production and wheezing.    Endocrine: Positive for cold intolerance.   Hematologic/Lymphatic: Negative for bleeding problem. Bruises/bleeds easily.   Skin: Negative for dry skin, itching and rash.   Musculoskeletal: Negative for arthritis, joint pain, joint swelling and myalgias.   Gastrointestinal: Positive for dysphagia and heartburn. Negative for bloating, abdominal pain, constipation, diarrhea, flatus, hematemesis, hematochezia, melena, nausea and vomiting.   Genitourinary: Negative for dysuria, frequency, hematuria, nocturia and urgency.   Neurological: Positive for dizziness and light-headedness. Negative for excessive daytime sleepiness, headaches and loss of balance.   Psychiatric/Behavioral: Positive for memory loss. Negative for depression. The patient does not have insomnia and is not nervous/anxious.    Allergic/Immunologic:        Seasonal allergies     Vitals:    07/19/18 1207 07/19/18 1210 07/19/18 1211   BP: 130/55 157/66 150/64   BP Location: Right arm Left arm Left arm   Patient Position: Sitting Sitting Standing   Cuff Size: Adult     Pulse: 77 76 95   Resp: 17     Temp: 97.6 °F (36.4 °C)     TempSrc: Temporal Artery      SpO2: 99%     Weight: 59.9 kg (132 lb)     Height: 162.6 cm (64.02\")         Objective:     Physical Exam   Constitutional: She is oriented to person, place, and time. She appears well-developed and well-nourished. No distress.   HENT:   Head: Normocephalic and atraumatic.   Eyes: Pupils are equal, round, and reactive to light. Conjunctivae " are normal.   Neck: Normal range of motion. Neck supple. No JVD present.   Cardiovascular: Normal rate, regular rhythm and intact distal pulses.  Exam reveals no gallop and no friction rub.    Murmur heard.  Systolic murmur II/VI   Pulmonary/Chest: Effort normal and breath sounds normal. No respiratory distress. She has no wheezes. She has no rales. She exhibits no tenderness.   Musculoskeletal: Normal range of motion. She exhibits no edema.   Neurological: She is alert and oriented to person, place, and time. No cranial nerve deficit.   Skin: Skin is warm and dry.   Psychiatric: She has a normal mood and affect. Her behavior is normal. Thought content normal.   Bright/talkative affect       Lab Review: Onzoo monitor 6/26-7/10.       Assessment/ Plan:          Diagnosis Plan   1. Chronic systolic heart failure (CMS/HCC)  NYHA class II.  LVEF 20%.  No evidence of volume overload.  Multiple episodes of asymptomatic ventricular ectopy (SVT and NSVT).  Again, patient is very marcia in her understanding that her heat is weak and she is only interested in medications/ no devices or other invasive interventions.      Increase Metoprolol XL back to 25 mg daily.  May be able to further titrate per PCP or next Cardiology clinic to suppress ectopy.  Home health monitors BP and she is also having physical therapy.  Use supplemental O2 PRN.  RTC in 6 months as she is scheduled to see Dr. Aargon in November.  Reviewed s/sx HF exacerbations for which she/family may call clinic and come in PRN.         2. Essential hypertension  Controlled.  BP should accommodate increase in BB as noted above.   nurses to monitor.     3. CARYN/hyponatremia Labs per Dr. Hiar.

## 2018-11-13 ENCOUNTER — OFFICE VISIT (OUTPATIENT)
Dept: CARDIOLOGY | Facility: CLINIC | Age: 83
End: 2018-11-13

## 2018-11-13 VITALS
HEART RATE: 72 BPM | SYSTOLIC BLOOD PRESSURE: 120 MMHG | DIASTOLIC BLOOD PRESSURE: 62 MMHG | WEIGHT: 138.4 LBS | HEIGHT: 64 IN | BODY MASS INDEX: 23.63 KG/M2

## 2018-11-13 DIAGNOSIS — I48.0 PAROXYSMAL ATRIAL FIBRILLATION (HCC): Primary | ICD-10-CM

## 2018-11-13 DIAGNOSIS — I35.1 NONRHEUMATIC AORTIC VALVE INSUFFICIENCY: ICD-10-CM

## 2018-11-13 DIAGNOSIS — I50.22 CHRONIC SYSTOLIC CONGESTIVE HEART FAILURE (HCC): ICD-10-CM

## 2018-11-13 PROCEDURE — 99213 OFFICE O/P EST LOW 20 MIN: CPT | Performed by: INTERNAL MEDICINE

## 2018-11-13 NOTE — PROGRESS NOTES
[facility]  Office Progress Note  Scott Sinha  4/10/1930  [phone]    Visit Date: 11/13/2018     PCP: AMA Hair MD  [address] [facility] [address]  [facility] [address]    IDENTIFICATION: A 88 y.o. female   female from [place]  Owner of [employer]      PROBLEM LIST:  1. NICM  1. 9/17 EF 25% -deemed not candidate for ICD  2. 6/18 EF 20% mod peric effusion, mod AI  3. bnp 292, chronic LBBB  2. Palpitations  1. 6/26/18 Zio SVt and NSVT  3. Orthostasis/syncope-vasodepressor  4. Mild cognitive decline/AD  5. Hypothyroidism    Allergies  Allergies   Allergen Reactions   • Augmentin [Amoxicillin-Pot Clavulanate] Nausea And Vomiting   • Claritin [Loratadine] Unknown (See Comments)     Doesn't remember   • Keflex [Cephalexin] Nausea And Vomiting   • Sulfa Antibiotics Other (See Comments)     Does not remember       Current Medications    Current Outpatient Medications:   •  ALPRAZolam (XANAX) 0.25 MG tablet, Take 0.25 mg by mouth 2 (Two) Times a Day As Needed for Anxiety., Disp: , Rfl:   •  clopidogrel (PLAVIX) 75 MG tablet, Take 75 mg by mouth Daily., Disp: , Rfl:   •  cycloSPORINE (RESTASIS) 0.05 % ophthalmic emulsion, Administer 1 drop to both eyes Every 12 (Twelve) Hours., Disp: , Rfl:   •  donepezil (ARICEPT) 5 MG tablet, Take 5 mg by mouth Every Night., Disp: , Rfl:   •  furosemide (LASIX) 20 MG tablet, Take 1 tablet by mouth Daily., Disp: 30 tablet, Rfl: 2  •  gabapentin (NEURONTIN) 300 MG capsule, Take 1 capsule by mouth 2 (Two) Times a Day., Disp: , Rfl:   •  levothyroxine (SYNTHROID, LEVOTHROID) 50 MCG tablet, Take 50 mcg by mouth Daily., Disp: , Rfl:   •  meclizine (ANTIVERT) 12.5 MG tablet, Take 12.5 mg by mouth 3 (Three) Times a Day As Needed for dizziness., Disp: , Rfl:   •  metoprolol succinate XL (TOPROL-XL) 25 MG 24 hr tablet, Take 1 tablet by mouth Daily., Disp: 30 tablet, Rfl: 4  •  nystatin (MYCOSTATIN) 451136 UNIT/ML suspension, Swish and swallow  "500,000 Units 4 (Four) Times a Day As Needed., Disp: , Rfl:   •  Olopatadine HCl 0.7 % solution, Apply 1 drop to eye Daily., Disp: , Rfl:   •  Omega-3 Fatty Acids (FISH OIL) 1000 MG capsule capsule, Take 1,000 mg by mouth 2 (Two) Times a Day., Disp: , Rfl:   •  rOPINIRole (REQUIP) 0.5 MG tablet, Take 0.5 mg by mouth Daily. Take 1 hour before bedtime., Disp: , Rfl:   •  vitamin C (ASCORBIC ACID) 250 MG tablet, Take 250 mg by mouth Daily., Disp: , Rfl:       History of Present Illness     Pt denies any new chest pain, dyspnea, dyspnea on exertion, orthopnea, PND, palpitations, lower extremity edema, or claudication.  She continues to live independently with her 14-year-old daughter.  She noticed no increase in symptomatology  ROS:  All systems have been reviewed and are negative with the exception of those mentioned in the HPI.    OBJECTIVE:  Vitals:    11/13/18 1327   BP: 120/62   BP Location: Left arm   Patient Position: Sitting   Pulse: 72   Weight: 62.8 kg (138 lb 6.4 oz)   Height: 162.6 cm (64\")     Physical Exam   Constitutional: She appears well-developed and well-nourished.   Neck: Normal range of motion. Neck supple. No hepatojugular reflux and no JVD present. Carotid bruit is not present. No tracheal deviation present. No thyromegaly present.   Cardiovascular: Normal rate, regular rhythm, S1 normal, S2 normal, intact distal pulses and normal pulses. PMI is not displaced. Exam reveals no gallop, no distant heart sounds, no friction rub, no midsystolic click and no opening snap.   Murmur heard.   Diastolic murmur is present with a grade of 2/6 at the upper right sternal border.  Pulses:       Radial pulses are 2+ on the right side, and 2+ on the left side.        Dorsalis pedis pulses are 2+ on the right side, and 2+ on the left side.        Posterior tibial pulses are 2+ on the right side, and 2+ on the left side.   Pulmonary/Chest: Effort normal and breath sounds normal. She has no wheezes. She has no rales. "   Abdominal: Soft. Bowel sounds are normal. She exhibits no mass. There is no tenderness. There is no guarding.       Diagnostic Data:  Procedures      ASSESSMENT:   Diagnosis Plan   1. Paroxysmal atrial fibrillation (CMS/HCC)     2. Nonrheumatic aortic valve insufficiency     3. Chronic systolic congestive heart failure (CMS/HCC)         PLAN:  I would  will refill her metoprolol at current.    She remained on antiplatelets due to her prior TIA symptomatology.    AMA Hair MD, thank you for referring Ms. Sinha for evaluation.  I have forwarded my electronically generated recommendations to you for review.  Please do not hesitate to call with any questions.     11/13/2018  2:19 PM   Jose Antonio Aragon MD, FACC

## 2018-12-06 ENCOUNTER — HOSPITAL ENCOUNTER (OUTPATIENT)
Dept: GENERAL RADIOLOGY | Facility: HOSPITAL | Age: 83
Discharge: HOME | End: 2018-12-06
Admitting: STUDENT IN AN ORGANIZED HEALTH CARE EDUCATION/TRAINING PROGRAM

## 2018-12-06 ENCOUNTER — TRANSCRIBE ORDERS (OUTPATIENT)
Dept: ADMINISTRATIVE | Facility: HOSPITAL | Age: 83
End: 2018-12-06

## 2018-12-06 DIAGNOSIS — J20.9 ACUTE BRONCHITIS, UNSPECIFIED ORGANISM: Primary | ICD-10-CM

## 2018-12-06 PROCEDURE — 71046 X-RAY EXAM CHEST 2 VIEWS: CPT

## 2018-12-13 RX ORDER — METOPROLOL SUCCINATE 25 MG/1
TABLET, EXTENDED RELEASE ORAL
Qty: 30 TABLET | Refills: 4 | Status: SHIPPED | OUTPATIENT
Start: 2018-12-13 | End: 2019-03-21 | Stop reason: SDUPTHER

## 2019-03-18 ENCOUNTER — TELEPHONE (OUTPATIENT)
Dept: CARDIOLOGY | Facility: HOSPITAL | Age: 84
End: 2019-03-18

## 2019-03-18 NOTE — TELEPHONE ENCOUNTER
Received fax requesting 90 day supply of metoprolol from pharmacy. Patient not seen since 7/2018 and has since followed with Dr. Aragon, instructed pharmacy to follow up with him and provided fax number.    Kristina Pelaez, PharmD  Pharmacy Resident  3/18/2019  12:27 PM

## 2019-03-21 RX ORDER — METOPROLOL SUCCINATE 25 MG/1
TABLET, EXTENDED RELEASE ORAL
Qty: 30 TABLET | Refills: 0 | Status: ON HOLD | OUTPATIENT
Start: 2019-03-21 | End: 2022-03-16 | Stop reason: SDUPTHER

## 2019-03-21 NOTE — TELEPHONE ENCOUNTER
Patient not seen since 7/2018. Received prescription + 4 refills in 12/2018. Has followed up with Dr. Aragon. Approved one time fill with instructions to contact Dr. Aragon for further refills.    Kristina Pelaez, PharmD  Pharmacy Resident  3/21/2019  9:25 AM

## 2019-04-19 RX ORDER — METOPROLOL SUCCINATE 25 MG/1
TABLET, EXTENDED RELEASE ORAL
Qty: 30 TABLET | Refills: 0 | OUTPATIENT
Start: 2019-04-19

## 2019-04-19 NOTE — TELEPHONE ENCOUNTER
Patient last seen 07/2018 and is not following up in the [facility], but has followed with Dr. Aragon. A onetime refill was sent last month with instructions to contact Dr. Aragon for further refills. Refills for metoprolol succinate declined today.

## 2019-05-20 RX ORDER — METOPROLOL SUCCINATE 25 MG/1
TABLET, EXTENDED RELEASE ORAL
Qty: 30 TABLET | Refills: 3 | OUTPATIENT
Start: 2019-05-20

## 2019-05-20 NOTE — TELEPHONE ENCOUNTER
Patient last seen 07/2018 and is not following up in the [facility], but has followed with Dr. Aragon. A onetime refill was sent in March with instructions to contact Dr. Aragon for further refills. Refills for metoprolol succinate declined today.

## 2022-01-01 ENCOUNTER — TELEPHONE (OUTPATIENT)
Dept: INTERNAL MEDICINE | Facility: CLINIC | Age: 87
End: 2022-01-01

## 2022-01-01 ENCOUNTER — TELEPHONE (OUTPATIENT)
Dept: NEUROLOGY | Facility: CLINIC | Age: 87
End: 2022-01-01

## 2022-01-01 DIAGNOSIS — R51.9 ACUTE NONINTRACTABLE HEADACHE, UNSPECIFIED HEADACHE TYPE: ICD-10-CM

## 2022-01-01 DIAGNOSIS — R11.0 NAUSEA: Primary | ICD-10-CM

## 2022-01-01 RX ORDER — ONDANSETRON 4 MG/1
4 TABLET, ORALLY DISINTEGRATING ORAL EVERY 8 HOURS PRN
Qty: 20 TABLET | Refills: 0 | Status: SHIPPED | OUTPATIENT
Start: 2022-01-01 | End: 2022-01-01

## 2022-03-02 ENCOUNTER — APPOINTMENT (OUTPATIENT)
Dept: GENERAL RADIOLOGY | Facility: HOSPITAL | Age: 87
End: 2022-03-02

## 2022-03-02 LAB
ALBUMIN SERPL-MCNC: 3.6 G/DL (ref 3.5–5.2)
ALBUMIN/GLOB SERPL: 0.9 G/DL
ALP SERPL-CCNC: 44 U/L (ref 39–117)
ALT SERPL W P-5'-P-CCNC: 10 U/L (ref 1–33)
ANION GAP SERPL CALCULATED.3IONS-SCNC: 8 MMOL/L (ref 5–15)
AST SERPL-CCNC: 20 U/L (ref 1–32)
BASOPHILS # BLD AUTO: 0.08 10*3/MM3 (ref 0–0.2)
BASOPHILS NFR BLD AUTO: 0.7 % (ref 0–1.5)
BILIRUB SERPL-MCNC: 0.3 MG/DL (ref 0–1.2)
BUN SERPL-MCNC: 21 MG/DL (ref 8–23)
BUN/CREAT SERPL: 14.2 (ref 7–25)
CALCIUM SPEC-SCNC: 9.2 MG/DL (ref 8.2–9.6)
CHLORIDE SERPL-SCNC: 102 MMOL/L (ref 98–107)
CO2 SERPL-SCNC: 22 MMOL/L (ref 22–29)
CREAT SERPL-MCNC: 1.48 MG/DL (ref 0.57–1)
DEPRECATED RDW RBC AUTO: 48.9 FL (ref 37–54)
EGFRCR SERPLBLD CKD-EPI 2021: 33.3 ML/MIN/1.73
EOSINOPHIL # BLD AUTO: 0.51 10*3/MM3 (ref 0–0.4)
EOSINOPHIL NFR BLD AUTO: 4.6 % (ref 0.3–6.2)
ERYTHROCYTE [DISTWIDTH] IN BLOOD BY AUTOMATED COUNT: 14.5 % (ref 12.3–15.4)
GLOBULIN UR ELPH-MCNC: 4 GM/DL
GLUCOSE SERPL-MCNC: 100 MG/DL (ref 65–99)
HCT VFR BLD AUTO: 34.8 % (ref 34–46.6)
HGB BLD-MCNC: 11.3 G/DL (ref 12–15.9)
HOLD SPECIMEN: NORMAL
HOLD SPECIMEN: NORMAL
IMM GRANULOCYTES # BLD AUTO: 0.02 10*3/MM3 (ref 0–0.05)
IMM GRANULOCYTES NFR BLD AUTO: 0.2 % (ref 0–0.5)
LYMPHOCYTES # BLD AUTO: 1.54 10*3/MM3 (ref 0.7–3.1)
LYMPHOCYTES NFR BLD AUTO: 13.8 % (ref 19.6–45.3)
MCH RBC QN AUTO: 29.9 PG (ref 26.6–33)
MCHC RBC AUTO-ENTMCNC: 32.5 G/DL (ref 31.5–35.7)
MCV RBC AUTO: 92.1 FL (ref 79–97)
MONOCYTES # BLD AUTO: 1.23 10*3/MM3 (ref 0.1–0.9)
MONOCYTES NFR BLD AUTO: 11 % (ref 5–12)
NEUTROPHILS NFR BLD AUTO: 69.7 % (ref 42.7–76)
NEUTROPHILS NFR BLD AUTO: 7.79 10*3/MM3 (ref 1.7–7)
NRBC BLD AUTO-RTO: 0 /100 WBC (ref 0–0.2)
NT-PROBNP SERPL-MCNC: 2861 PG/ML (ref 0–1800)
PLATELET # BLD AUTO: 143 10*3/MM3 (ref 140–450)
PMV BLD AUTO: 11.9 FL (ref 6–12)
POTASSIUM SERPL-SCNC: 5.1 MMOL/L (ref 3.5–5.2)
PROT SERPL-MCNC: 7.6 G/DL (ref 6–8.5)
RBC # BLD AUTO: 3.78 10*6/MM3 (ref 3.77–5.28)
SODIUM SERPL-SCNC: 132 MMOL/L (ref 136–145)
TROPONIN T SERPL-MCNC: 0.01 NG/ML (ref 0–0.03)
WBC NRBC COR # BLD: 11.17 10*3/MM3 (ref 3.4–10.8)
WHOLE BLOOD HOLD SPECIMEN: NORMAL
WHOLE BLOOD HOLD SPECIMEN: NORMAL

## 2022-03-02 PROCEDURE — 99285 EMERGENCY DEPT VISIT HI MDM: CPT

## 2022-03-02 PROCEDURE — 71045 X-RAY EXAM CHEST 1 VIEW: CPT

## 2022-03-02 PROCEDURE — 86140 C-REACTIVE PROTEIN: CPT | Performed by: STUDENT IN AN ORGANIZED HEALTH CARE EDUCATION/TRAINING PROGRAM

## 2022-03-02 PROCEDURE — 93005 ELECTROCARDIOGRAM TRACING: CPT

## 2022-03-02 PROCEDURE — 85025 COMPLETE CBC W/AUTO DIFF WBC: CPT | Performed by: STUDENT IN AN ORGANIZED HEALTH CARE EDUCATION/TRAINING PROGRAM

## 2022-03-02 PROCEDURE — 84484 ASSAY OF TROPONIN QUANT: CPT

## 2022-03-02 PROCEDURE — 83605 ASSAY OF LACTIC ACID: CPT | Performed by: STUDENT IN AN ORGANIZED HEALTH CARE EDUCATION/TRAINING PROGRAM

## 2022-03-02 PROCEDURE — 83880 ASSAY OF NATRIURETIC PEPTIDE: CPT

## 2022-03-02 PROCEDURE — 93005 ELECTROCARDIOGRAM TRACING: CPT | Performed by: STUDENT IN AN ORGANIZED HEALTH CARE EDUCATION/TRAINING PROGRAM

## 2022-03-02 PROCEDURE — 80053 COMPREHEN METABOLIC PANEL: CPT

## 2022-03-02 PROCEDURE — 36415 COLL VENOUS BLD VENIPUNCTURE: CPT

## 2022-03-02 RX ORDER — SODIUM CHLORIDE 0.9 % (FLUSH) 0.9 %
10 SYRINGE (ML) INJECTION AS NEEDED
Status: DISCONTINUED | OUTPATIENT
Start: 2022-03-02 | End: 2022-03-17 | Stop reason: HOSPADM

## 2022-03-03 ENCOUNTER — APPOINTMENT (OUTPATIENT)
Dept: CT IMAGING | Facility: HOSPITAL | Age: 87
End: 2022-03-03

## 2022-03-03 ENCOUNTER — HOSPITAL ENCOUNTER (INPATIENT)
Facility: HOSPITAL | Age: 87
LOS: 11 days | Discharge: NURSING, REHAB OR LONG-TERM CARE | End: 2022-03-17
Attending: STUDENT IN AN ORGANIZED HEALTH CARE EDUCATION/TRAINING PROGRAM | Admitting: INTERNAL MEDICINE

## 2022-03-03 DIAGNOSIS — R13.11 ORAL PHASE DYSPHAGIA: ICD-10-CM

## 2022-03-03 DIAGNOSIS — D64.9 NORMOCYTIC ANEMIA: ICD-10-CM

## 2022-03-03 DIAGNOSIS — F03.90 DEMENTIA WITHOUT BEHAVIORAL DISTURBANCE, UNSPECIFIED DEMENTIA TYPE: ICD-10-CM

## 2022-03-03 DIAGNOSIS — R79.89 ELEVATED SERUM CREATININE: ICD-10-CM

## 2022-03-03 DIAGNOSIS — Z74.09 IMPAIRED FUNCTIONAL MOBILITY, BALANCE, GAIT, AND ENDURANCE: ICD-10-CM

## 2022-03-03 DIAGNOSIS — N17.9 AKI (ACUTE KIDNEY INJURY): Primary | ICD-10-CM

## 2022-03-03 LAB
AMMONIA BLD-SCNC: <10 UMOL/L (ref 11–51)
ANION GAP SERPL CALCULATED.3IONS-SCNC: 6 MMOL/L (ref 5–15)
BACTERIA UR QL AUTO: ABNORMAL /HPF
BASOPHILS # BLD AUTO: 0.07 10*3/MM3 (ref 0–0.2)
BASOPHILS NFR BLD AUTO: 0.9 % (ref 0–1.5)
BILIRUB UR QL STRIP: NEGATIVE
BUN SERPL-MCNC: 18 MG/DL (ref 8–23)
BUN/CREAT SERPL: 14.3 (ref 7–25)
CALCIUM SPEC-SCNC: 8.8 MG/DL (ref 8.2–9.6)
CHLORIDE SERPL-SCNC: 111 MMOL/L (ref 98–107)
CLARITY UR: CLEAR
CO2 SERPL-SCNC: 22 MMOL/L (ref 22–29)
COLOR UR: YELLOW
CREAT SERPL-MCNC: 1.26 MG/DL (ref 0.57–1)
CREAT UR-MCNC: 54.9 MG/DL
CRP SERPL-MCNC: <0.3 MG/DL (ref 0–0.5)
D-LACTATE SERPL-SCNC: 1 MMOL/L (ref 0.5–2)
DEPRECATED RDW RBC AUTO: 48.6 FL (ref 37–54)
EGFRCR SERPLBLD CKD-EPI 2021: 40.4 ML/MIN/1.73
EOSINOPHIL # BLD AUTO: 0.56 10*3/MM3 (ref 0–0.4)
EOSINOPHIL NFR BLD AUTO: 7 % (ref 0.3–6.2)
ERYTHROCYTE [DISTWIDTH] IN BLOOD BY AUTOMATED COUNT: 14.4 % (ref 12.3–15.4)
FLUAV RNA RESP QL NAA+PROBE: NOT DETECTED
FLUBV RNA RESP QL NAA+PROBE: NOT DETECTED
GLUCOSE SERPL-MCNC: 88 MG/DL (ref 65–99)
GLUCOSE UR STRIP-MCNC: NEGATIVE MG/DL
HCT VFR BLD AUTO: 31.8 % (ref 34–46.6)
HGB BLD-MCNC: 10.2 G/DL (ref 12–15.9)
HGB UR QL STRIP.AUTO: NEGATIVE
HOLD SPECIMEN: NORMAL
HYALINE CASTS UR QL AUTO: ABNORMAL /LPF
IMM GRANULOCYTES # BLD AUTO: 0.02 10*3/MM3 (ref 0–0.05)
IMM GRANULOCYTES NFR BLD AUTO: 0.2 % (ref 0–0.5)
KETONES UR QL STRIP: NEGATIVE
LEUKOCYTE ESTERASE UR QL STRIP.AUTO: ABNORMAL
LYMPHOCYTES # BLD AUTO: 1.76 10*3/MM3 (ref 0.7–3.1)
LYMPHOCYTES NFR BLD AUTO: 22 % (ref 19.6–45.3)
MAGNESIUM SERPL-MCNC: 1.6 MG/DL (ref 1.7–2.3)
MCH RBC QN AUTO: 29.7 PG (ref 26.6–33)
MCHC RBC AUTO-ENTMCNC: 32.1 G/DL (ref 31.5–35.7)
MCV RBC AUTO: 92.7 FL (ref 79–97)
MONOCYTES # BLD AUTO: 1.09 10*3/MM3 (ref 0.1–0.9)
MONOCYTES NFR BLD AUTO: 13.6 % (ref 5–12)
NEUTROPHILS NFR BLD AUTO: 4.51 10*3/MM3 (ref 1.7–7)
NEUTROPHILS NFR BLD AUTO: 56.3 % (ref 42.7–76)
NITRITE UR QL STRIP: NEGATIVE
NRBC BLD AUTO-RTO: 0 /100 WBC (ref 0–0.2)
OSMOLALITY SERPL: 293 MOSM/KG (ref 275–295)
OSMOLALITY UR: 312 MOSM/KG (ref 300–1100)
PH UR STRIP.AUTO: <=5 [PH] (ref 5–8)
PHOSPHATE SERPL-MCNC: 3.6 MG/DL (ref 2.5–4.5)
PLATELET # BLD AUTO: 129 10*3/MM3 (ref 140–450)
PMV BLD AUTO: 12.2 FL (ref 6–12)
POTASSIUM SERPL-SCNC: 5.5 MMOL/L (ref 3.5–5.2)
PROT ?TM UR-MCNC: 5.9 MG/DL
PROT UR QL STRIP: NEGATIVE
RBC # BLD AUTO: 3.43 10*6/MM3 (ref 3.77–5.28)
RBC # UR STRIP: ABNORMAL /HPF
REF LAB TEST METHOD: ABNORMAL
RSV RNA NPH QL NAA+NON-PROBE: NOT DETECTED
SARS-COV-2 RNA RESP QL NAA+PROBE: NOT DETECTED
SODIUM SERPL-SCNC: 139 MMOL/L (ref 136–145)
SODIUM UR-SCNC: 58 MMOL/L
SP GR UR STRIP: 1.01 (ref 1–1.03)
SQUAMOUS #/AREA URNS HPF: ABNORMAL /HPF
T4 FREE SERPL-MCNC: 0.81 NG/DL (ref 0.93–1.7)
TSH SERPL DL<=0.05 MIU/L-ACNC: 1.44 UIU/ML (ref 0.27–4.2)
UROBILINOGEN UR QL STRIP: ABNORMAL
WBC # UR STRIP: ABNORMAL /HPF
WBC NRBC COR # BLD: 8.01 10*3/MM3 (ref 3.4–10.8)

## 2022-03-03 PROCEDURE — 81001 URINALYSIS AUTO W/SCOPE: CPT | Performed by: STUDENT IN AN ORGANIZED HEALTH CARE EDUCATION/TRAINING PROGRAM

## 2022-03-03 PROCEDURE — 99223 1ST HOSP IP/OBS HIGH 75: CPT | Performed by: INTERNAL MEDICINE

## 2022-03-03 PROCEDURE — 84443 ASSAY THYROID STIM HORMONE: CPT | Performed by: STUDENT IN AN ORGANIZED HEALTH CARE EDUCATION/TRAINING PROGRAM

## 2022-03-03 PROCEDURE — 83935 ASSAY OF URINE OSMOLALITY: CPT | Performed by: NURSE PRACTITIONER

## 2022-03-03 PROCEDURE — 82140 ASSAY OF AMMONIA: CPT | Performed by: STUDENT IN AN ORGANIZED HEALTH CARE EDUCATION/TRAINING PROGRAM

## 2022-03-03 PROCEDURE — 70450 CT HEAD/BRAIN W/O DYE: CPT

## 2022-03-03 PROCEDURE — 84100 ASSAY OF PHOSPHORUS: CPT | Performed by: STUDENT IN AN ORGANIZED HEALTH CARE EDUCATION/TRAINING PROGRAM

## 2022-03-03 PROCEDURE — 87637 SARSCOV2&INF A&B&RSV AMP PRB: CPT | Performed by: INTERNAL MEDICINE

## 2022-03-03 PROCEDURE — 25010000002 HEPARIN (PORCINE) PER 1000 UNITS: Performed by: NURSE PRACTITIONER

## 2022-03-03 PROCEDURE — 84156 ASSAY OF PROTEIN URINE: CPT | Performed by: NURSE PRACTITIONER

## 2022-03-03 PROCEDURE — 25010000002 MAGNESIUM SULFATE IN D5W 1G/100ML (PREMIX) 1-5 GM/100ML-% SOLUTION: Performed by: INTERNAL MEDICINE

## 2022-03-03 PROCEDURE — 84439 ASSAY OF FREE THYROXINE: CPT | Performed by: STUDENT IN AN ORGANIZED HEALTH CARE EDUCATION/TRAINING PROGRAM

## 2022-03-03 PROCEDURE — 83735 ASSAY OF MAGNESIUM: CPT | Performed by: STUDENT IN AN ORGANIZED HEALTH CARE EDUCATION/TRAINING PROGRAM

## 2022-03-03 PROCEDURE — 85025 COMPLETE CBC W/AUTO DIFF WBC: CPT | Performed by: NURSE PRACTITIONER

## 2022-03-03 PROCEDURE — 93005 ELECTROCARDIOGRAM TRACING: CPT | Performed by: STUDENT IN AN ORGANIZED HEALTH CARE EDUCATION/TRAINING PROGRAM

## 2022-03-03 PROCEDURE — 80048 BASIC METABOLIC PNL TOTAL CA: CPT | Performed by: NURSE PRACTITIONER

## 2022-03-03 PROCEDURE — 83930 ASSAY OF BLOOD OSMOLALITY: CPT | Performed by: NURSE PRACTITIONER

## 2022-03-03 PROCEDURE — 84300 ASSAY OF URINE SODIUM: CPT | Performed by: NURSE PRACTITIONER

## 2022-03-03 PROCEDURE — 82570 ASSAY OF URINE CREATININE: CPT | Performed by: NURSE PRACTITIONER

## 2022-03-03 RX ORDER — METOPROLOL SUCCINATE 25 MG/1
25 TABLET, EXTENDED RELEASE ORAL DAILY
Status: DISCONTINUED | OUTPATIENT
Start: 2022-03-03 | End: 2022-03-17 | Stop reason: HOSPADM

## 2022-03-03 RX ORDER — CYCLOSPORINE 0.5 MG/ML
1 EMULSION OPHTHALMIC 2 TIMES DAILY
Status: DISCONTINUED | OUTPATIENT
Start: 2022-03-03 | End: 2022-03-03

## 2022-03-03 RX ORDER — ACETAMINOPHEN 160 MG/5ML
650 SOLUTION ORAL EVERY 4 HOURS PRN
Status: DISCONTINUED | OUTPATIENT
Start: 2022-03-03 | End: 2022-03-17 | Stop reason: HOSPADM

## 2022-03-03 RX ORDER — FAMOTIDINE 20 MG/1
20 TABLET, FILM COATED ORAL DAILY
Status: DISCONTINUED | OUTPATIENT
Start: 2022-03-03 | End: 2022-03-17 | Stop reason: HOSPADM

## 2022-03-03 RX ORDER — LISINOPRIL 10 MG/1
10 TABLET ORAL DAILY
Status: DISCONTINUED | OUTPATIENT
Start: 2022-03-03 | End: 2022-03-03

## 2022-03-03 RX ORDER — ROPINIROLE 0.5 MG/1
0.5 TABLET, FILM COATED ORAL NIGHTLY
Status: DISCONTINUED | OUTPATIENT
Start: 2022-03-03 | End: 2022-03-17 | Stop reason: HOSPADM

## 2022-03-03 RX ORDER — ERYTHROMYCIN 5 MG/G
1 OINTMENT OPHTHALMIC NIGHTLY
Status: DISCONTINUED | OUTPATIENT
Start: 2022-03-03 | End: 2022-03-17 | Stop reason: HOSPADM

## 2022-03-03 RX ORDER — LEVOTHYROXINE SODIUM 0.05 MG/1
50 TABLET ORAL
Status: DISCONTINUED | OUTPATIENT
Start: 2022-03-03 | End: 2022-03-17 | Stop reason: HOSPADM

## 2022-03-03 RX ORDER — ATORVASTATIN CALCIUM 80 MG/1
80 TABLET, FILM COATED ORAL NIGHTLY
COMMUNITY
End: 2022-11-23

## 2022-03-03 RX ORDER — OFLOXACIN 3 MG/ML
1 SOLUTION/ DROPS OPHTHALMIC 3 TIMES DAILY
COMMUNITY
End: 2022-09-10 | Stop reason: HOSPADM

## 2022-03-03 RX ORDER — ACETAMINOPHEN 650 MG/1
650 SUPPOSITORY RECTAL EVERY 4 HOURS PRN
Status: DISCONTINUED | OUTPATIENT
Start: 2022-03-03 | End: 2022-03-17 | Stop reason: HOSPADM

## 2022-03-03 RX ORDER — FEXOFENADINE HCL 180 MG/1
180 TABLET ORAL DAILY
COMMUNITY
End: 2022-03-17 | Stop reason: HOSPADM

## 2022-03-03 RX ORDER — FAMOTIDINE 20 MG/1
20 TABLET, FILM COATED ORAL 2 TIMES DAILY
COMMUNITY
End: 2022-09-10 | Stop reason: HOSPADM

## 2022-03-03 RX ORDER — ERYTHROMYCIN 5 MG/G
1 OINTMENT OPHTHALMIC NIGHTLY
COMMUNITY
End: 2022-03-17 | Stop reason: HOSPADM

## 2022-03-03 RX ORDER — CETIRIZINE HYDROCHLORIDE 10 MG/1
5 TABLET ORAL DAILY
Status: DISCONTINUED | OUTPATIENT
Start: 2022-03-03 | End: 2022-03-17 | Stop reason: HOSPADM

## 2022-03-03 RX ORDER — OFLOXACIN 3 MG/ML
1 SOLUTION/ DROPS OPHTHALMIC 3 TIMES DAILY
Status: DISCONTINUED | OUTPATIENT
Start: 2022-03-03 | End: 2022-03-17 | Stop reason: HOSPADM

## 2022-03-03 RX ORDER — FERROUS SULFATE 325(65) MG
325 TABLET ORAL DAILY
COMMUNITY
End: 2022-04-05 | Stop reason: SDUPTHER

## 2022-03-03 RX ORDER — SODIUM CHLORIDE 0.9 % (FLUSH) 0.9 %
10 SYRINGE (ML) INJECTION EVERY 12 HOURS SCHEDULED
Status: DISCONTINUED | OUTPATIENT
Start: 2022-03-03 | End: 2022-03-17 | Stop reason: HOSPADM

## 2022-03-03 RX ORDER — ALPRAZOLAM 0.25 MG/1
0.25 TABLET ORAL 2 TIMES DAILY PRN
Status: DISCONTINUED | OUTPATIENT
Start: 2022-03-03 | End: 2022-03-07

## 2022-03-03 RX ORDER — ASPIRIN 81 MG/1
81 TABLET, CHEWABLE ORAL NIGHTLY
COMMUNITY
End: 2022-04-05 | Stop reason: SDUPTHER

## 2022-03-03 RX ORDER — LISINOPRIL 10 MG/1
10 TABLET ORAL DAILY
COMMUNITY
End: 2022-03-17 | Stop reason: HOSPADM

## 2022-03-03 RX ORDER — ASPIRIN 81 MG/1
81 TABLET, CHEWABLE ORAL NIGHTLY
Status: DISCONTINUED | OUTPATIENT
Start: 2022-03-03 | End: 2022-03-17 | Stop reason: HOSPADM

## 2022-03-03 RX ORDER — SODIUM CHLORIDE 0.9 % (FLUSH) 0.9 %
10 SYRINGE (ML) INJECTION AS NEEDED
Status: DISCONTINUED | OUTPATIENT
Start: 2022-03-03 | End: 2022-03-17 | Stop reason: HOSPADM

## 2022-03-03 RX ORDER — CLOPIDOGREL BISULFATE 75 MG/1
75 TABLET ORAL DAILY
Status: DISCONTINUED | OUTPATIENT
Start: 2022-03-03 | End: 2022-03-03

## 2022-03-03 RX ORDER — POLYVINYL ALCOHOL 14 MG/ML
1 SOLUTION/ DROPS OPHTHALMIC
Status: DISCONTINUED | OUTPATIENT
Start: 2022-03-03 | End: 2022-03-17 | Stop reason: HOSPADM

## 2022-03-03 RX ORDER — ACETAMINOPHEN 325 MG/1
650 TABLET ORAL EVERY 4 HOURS PRN
Status: DISCONTINUED | OUTPATIENT
Start: 2022-03-03 | End: 2022-03-17 | Stop reason: HOSPADM

## 2022-03-03 RX ORDER — GABAPENTIN 300 MG/1
300 CAPSULE ORAL NIGHTLY
Status: DISCONTINUED | OUTPATIENT
Start: 2022-03-03 | End: 2022-03-17 | Stop reason: HOSPADM

## 2022-03-03 RX ORDER — MAGNESIUM SULFATE 1 G/100ML
1 INJECTION INTRAVENOUS ONCE
Status: COMPLETED | OUTPATIENT
Start: 2022-03-03 | End: 2022-03-03

## 2022-03-03 RX ORDER — FERROUS SULFATE 325(65) MG
325 TABLET ORAL DAILY
Status: DISCONTINUED | OUTPATIENT
Start: 2022-03-03 | End: 2022-03-17 | Stop reason: HOSPADM

## 2022-03-03 RX ORDER — CARBOXYMETHYLCELLULOSE SODIUM 5 MG/ML
SOLUTION/ DROPS OPHTHALMIC 3 TIMES DAILY PRN
COMMUNITY

## 2022-03-03 RX ORDER — MECLIZINE HYDROCHLORIDE 25 MG/1
12.5 TABLET ORAL 3 TIMES DAILY PRN
Status: DISCONTINUED | OUTPATIENT
Start: 2022-03-03 | End: 2022-03-17 | Stop reason: HOSPADM

## 2022-03-03 RX ORDER — HEPARIN SODIUM 5000 [USP'U]/ML
5000 INJECTION, SOLUTION INTRAVENOUS; SUBCUTANEOUS EVERY 12 HOURS SCHEDULED
Status: DISCONTINUED | OUTPATIENT
Start: 2022-03-03 | End: 2022-03-17 | Stop reason: HOSPADM

## 2022-03-03 RX ORDER — SODIUM CHLORIDE 9 MG/ML
75 INJECTION, SOLUTION INTRAVENOUS CONTINUOUS
Status: DISCONTINUED | OUTPATIENT
Start: 2022-03-03 | End: 2022-03-04

## 2022-03-03 RX ORDER — ATORVASTATIN CALCIUM 40 MG/1
80 TABLET, FILM COATED ORAL NIGHTLY
Status: DISCONTINUED | OUTPATIENT
Start: 2022-03-03 | End: 2022-03-17 | Stop reason: HOSPADM

## 2022-03-03 RX ORDER — DONEPEZIL HYDROCHLORIDE 10 MG/1
5 TABLET, FILM COATED ORAL NIGHTLY
Status: DISCONTINUED | OUTPATIENT
Start: 2022-03-03 | End: 2022-03-17 | Stop reason: HOSPADM

## 2022-03-03 RX ORDER — GABAPENTIN 300 MG/1
300 CAPSULE ORAL 2 TIMES DAILY
Status: DISCONTINUED | OUTPATIENT
Start: 2022-03-03 | End: 2022-03-03

## 2022-03-03 RX ADMIN — NYSTATIN 500000 UNITS: 100000 SUSPENSION ORAL at 21:16

## 2022-03-03 RX ADMIN — Medication 10 ML: at 21:16

## 2022-03-03 RX ADMIN — Medication 10 ML: at 16:44

## 2022-03-03 RX ADMIN — OFLOXACIN 1 DROP: 3 SOLUTION/ DROPS OPHTHALMIC at 21:16

## 2022-03-03 RX ADMIN — LEVOTHYROXINE SODIUM 50 MCG: 50 TABLET ORAL at 13:52

## 2022-03-03 RX ADMIN — DONEPEZIL HYDROCHLORIDE 5 MG: 5 TABLET, FILM COATED ORAL at 21:14

## 2022-03-03 RX ADMIN — ERYTHROMYCIN 1 APPLICATION: 5 OINTMENT OPHTHALMIC at 21:15

## 2022-03-03 RX ADMIN — NYSTATIN 500000 UNITS: 100000 SUSPENSION ORAL at 15:32

## 2022-03-03 RX ADMIN — ATORVASTATIN CALCIUM 80 MG: 40 TABLET, FILM COATED ORAL at 21:14

## 2022-03-03 RX ADMIN — CLOPIDOGREL BISULFATE 75 MG: 75 TABLET ORAL at 13:52

## 2022-03-03 RX ADMIN — SODIUM ZIRCONIUM CYCLOSILICATE 10 G: 10 POWDER, FOR SUSPENSION ORAL at 16:43

## 2022-03-03 RX ADMIN — HEPARIN SODIUM 5000 UNITS: 5000 INJECTION, SOLUTION INTRAVENOUS; SUBCUTANEOUS at 21:14

## 2022-03-03 RX ADMIN — CYCLOSPORINE 1 DROP: 0.5 EMULSION OPHTHALMIC at 13:52

## 2022-03-03 RX ADMIN — HEPARIN SODIUM 5000 UNITS: 5000 INJECTION, SOLUTION INTRAVENOUS; SUBCUTANEOUS at 13:49

## 2022-03-03 RX ADMIN — FAMOTIDINE 20 MG: 20 TABLET, FILM COATED ORAL at 18:32

## 2022-03-03 RX ADMIN — NYSTATIN 500000 UNITS: 100000 SUSPENSION ORAL at 17:53

## 2022-03-03 RX ADMIN — SODIUM CHLORIDE 1000 ML: 9 INJECTION, SOLUTION INTRAVENOUS at 02:49

## 2022-03-03 RX ADMIN — GABAPENTIN 300 MG: 300 CAPSULE ORAL at 21:14

## 2022-03-03 RX ADMIN — ACETAMINOPHEN 650 MG: 325 TABLET ORAL at 16:49

## 2022-03-03 RX ADMIN — Medication 325 MG: at 18:32

## 2022-03-03 RX ADMIN — ROPINIROLE HYDROCHLORIDE 0.5 MG: 0.5 TABLET, FILM COATED ORAL at 21:14

## 2022-03-03 RX ADMIN — METOPROLOL SUCCINATE 25 MG: 25 TABLET, EXTENDED RELEASE ORAL at 13:51

## 2022-03-03 RX ADMIN — ASPIRIN 81 MG 81 MG: 81 TABLET ORAL at 21:14

## 2022-03-03 RX ADMIN — SODIUM CHLORIDE 75 ML/HR: 9 INJECTION, SOLUTION INTRAVENOUS at 15:00

## 2022-03-03 RX ADMIN — CETIRIZINE HYDROCHLORIDE 5 MG: 10 TABLET, FILM COATED ORAL at 18:32

## 2022-03-03 RX ADMIN — ALPRAZOLAM 0.25 MG: 0.25 TABLET ORAL at 16:44

## 2022-03-03 RX ADMIN — GABAPENTIN 300 MG: 300 CAPSULE ORAL at 13:52

## 2022-03-03 RX ADMIN — MAGNESIUM SULFATE HEPTAHYDRATE 1 G: 1 INJECTION, SOLUTION INTRAVENOUS at 16:44

## 2022-03-03 NOTE — H&P
[facility] Medicine Services  HISTORY AND PHYSICAL    Patient Name: Scott Diego  : 4/10/1930  MRN: [id number]  Primary Care Physician: Yolande Escobar MD  Date of admission: 3/3/2022    Subjective   Subjective     Chief Complaint:  Panic attacks    HPI:  Scott Diego is a 89 y.o. female with history of hypertension, chronic systolic CHF, mild cognitive impairment, CVA, chronic hyponatremia, presents to the ED with hypoxia and panic attacks.  Per the patient's son she has been experiencing panic attacks for over the last week.  Her   on 2022 and since then she has been living with her son.  He reports that since this time her memory has gotten worse and she has physically declined as well.  He reports that she does not drink or eat much food and has been sleeping more than usual.  She has increased weakness and now is having difficulty getting from bed to chair.  He checked her oxygen saturation at home today and it was 86%.  He put her on her home oxygen and brought her to the ED for evaluation.  Son is interested in placement because he does not feel that he can adequately care for her at home.  No fevers, cough, chest pain, vomiting, diarrhea, or any other complaints at this time.  CT of the head shows no acute intracranial abnormality, moderate volume loss, moderate chronic small vessel ischemic changes.  Chest x-ray shows mild diffuse pulmonary interstitial disease pattern but fairly similar to multiple prior studies.  Pertinent labs include BUN 21, creatinine 1.48.  Patient was given 1 liter of saline in the ED, and is being admitted to the Hospitalist for further evaluation and management.    COVID Details:        Symptoms: [x] NONE [] Fever []  Cough [] Shortness of breath [] Change in taste or smell  The patient qualifies to receive the vaccine, but they have not yet received it.    Review of Systems   Constitutional: Positive for appetite change and  fatigue. Negative for chills and fever.   HENT: Negative.    Eyes: Negative.    Respiratory: Negative.    Cardiovascular: Negative.    Gastrointestinal: Positive for abdominal pain. Negative for abdominal distention, diarrhea, nausea and vomiting.   Endocrine: Negative.    Genitourinary: Negative.    Musculoskeletal: Negative.    Skin: Negative.    Allergic/Immunologic: Negative.    Neurological: Positive for weakness (generalized). Negative for headaches.   Hematological: Negative.    Psychiatric/Behavioral: The patient is nervous/anxious.         All other systems reviewed and are negative.     Personal History     Past Medical History:   Diagnosis Date   • Congestive heart failure (HCC)    • Malignant neoplasm (HCC)    • Stroke (HCC)     mini stroke   • Systolic heart failure (HCC) 9/25/2017    Chronic/compensated (EF 25%)       Past Surgical History:   Procedure Laterality Date   • CHOLECYSTECTOMY     • EYE SURGERY     • HYSTERECTOMY         Family History:  family history includes Cancer in her brother, mother, and sister; No Known Problems in her father. Otherwise pertinent FHx was reviewed and unremarkable.     Social History:  reports that she has never smoked. She has never used smokeless tobacco. She reports that she does not drink alcohol and does not use drugs.  Social History     Social History Narrative    Caffeine Intake:0-1  servings per day    Patient lives at her son's home       Medications:  ALPRAZolam, Olopatadine HCl, clopidogrel, cycloSPORINE, donepezil, fish oil, furosemide, gabapentin, levothyroxine, meclizine, metoprolol succinate XL, nystatin, rOPINIRole, and vitamin C    Allergies   Allergen Reactions   • Augmentin [Amoxicillin-Pot Clavulanate] Nausea And Vomiting   • Claritin [Loratadine] Unknown (See Comments)     Doesn't remember   • Keflex [Cephalexin] Nausea And Vomiting   • Sulfa Antibiotics Other (See Comments)     Does not remember       Objective   Objective     Vital Signs:    Temp:  [97.8 °F (36.6 °C)] 97.8 °F (36.6 °C)  Heart Rate:  [89] 89  Resp:  [18] 18  BP: (105-129)/(61-64) 105/64    Physical Exam   Constitutional: Awake, alert, resting in bed  Eyes: PERRLA, sclerae anicteric, no conjunctival injection  HENT: NCAT, mucous membranes moist  Neck: Supple, no thyromegaly, no lymphadenopathy, trachea midline  Respiratory: Clear to auscultation bilaterally, nonlabored respirations   Cardiovascular: RRR, no murmurs, rubs, or gallops, palpable pedal pulses bilaterally  Gastrointestinal: Positive bowel sounds, soft, nontender, nondistended  Musculoskeletal: No bilateral ankle edema, no clubbing or cyanosis to extremities  Psychiatric: Appropriate affect, cooperative  Neurologic: Oriented to self, strength symmetric in all extremities, Cranial Nerves grossly intact to confrontation, speech clear  Skin: No rashes       Result Review:  I have personally reviewed the results from the time of this admission to 03/03/22 3:55 AM EST and agree with these findings:  [x]  Laboratory  []  Microbiology  [x]  Radiology  []  EKG/Telemetry   []  Cardiology/Vascular   []  Pathology  []  Old records  []  Other:  Most notable findings include:       LAB RESULTS:      Lab 03/02/22 2038   WBC 11.17*   HEMOGLOBIN 11.3*   HEMATOCRIT 34.8   PLATELETS 143   NEUTROS ABS 7.79*   IMMATURE GRANS (ABS) 0.02   LYMPHS ABS 1.54   MONOS ABS 1.23*   EOS ABS 0.51*   MCV 92.1   CRP <0.30   LACTATE 1.0         Lab 03/03/22  0230 03/02/22 2038   SODIUM  --  132*   POTASSIUM  --  5.1   CHLORIDE  --  102   CO2  --  22.0   ANION GAP  --  8.0   BUN  --  21   CREATININE  --  1.48*   EGFR  --  33.3*   GLUCOSE  --  100*   CALCIUM  --  9.2   MAGNESIUM 1.6*  --    PHOSPHORUS 3.6  --    TSH 1.440  --          Lab 03/02/22 2038   TOTAL PROTEIN 7.6   ALBUMIN 3.60   GLOBULIN 4.0   ALT (SGPT) 10   AST (SGOT) 20   BILIRUBIN 0.3   ALK PHOS 44         Lab 03/02/22 2038   PROBNP 2,861.0*   TROPONIN T 0.011                   Microbiology  Results (last 10 days)     ** No results found for the last 240 hours. **          CT Head Without Contrast    Result Date: 3/3/2022  EXAMINATION: CT HEAD WO CONTRAST DATE: 3/3/2022 1:49 AM  INDICATION: Confusion.  COMPARISON: None available.  TECHNIQUE: Thin section noncontrast axial images were obtained through the head. Coronal reformatted images were created.  CT dose lowering techniques were used, to include: automated exposure control, adjustment for patient size, and or use of iterative  reconstruction FINDINGS: No acute intracranial hemorrhage. Prominent bifrontal CSF spaces, greater on the left, are likely related to volume loss and represents prominent subarachnoid space. Subdural hygromas could've a similar appearance, however, cortical veins are seen coursing through this prominent CSF density space, for instance (axial series 3 image 35). No acute territorial infarct. No intracranial mass or mass effect. Mild burden of low attenuation in the white matter is nonspecific, but likely reflects sequelae of chronic microvascular ischemia.  Moderate diffuse parenchymal volume loss. No hydrocephalus. Basal cisterns are patent. Atherosclerotic calcifications of both carotid siphons. Bilateral ocular lens surgery. Trace mucosal thickening in the ethmoid air cells. Mastoid air cells are clear. Calvarium is intact.     Impression: 1.  No acute intracranial abnormality. 2.  Moderate volume loss. Mild chronic small vessel ischemic changes. Electronically signed by:  Og Rajan DO  3/3/2022 12:12 AM Mountain Time    XR Chest 1 View    Result Date: 3/2/2022  EXAMINATION: XR CHEST 1 VW-  INDICATION: SOA triage protocol  COMPARISON: 12/6/2019  FINDINGS: Heart is normal in size. Vasculature is upper limits of normal. There is a mild diffuse, finely reticular interstitial disease pattern the lungs, present on prior studies and probably stable allowing for differences in film technique. Similar pattern is present back to  at least 2018. No lung consolidation effusion or pneumothorax is seen.       Impression: Mild diffuse pulmonary interstitial disease pattern, but fairly similar to multiple prior studies. Findings could reflect chronic lung disease, less likely mild interstitial edema or early changes of viral syndrome. No significant focal lung disease is seen.    This report was finalized on 3/2/2022 9:51 PM by Dr. Jan Alvarez MD.        Results for orders placed during the hospital encounter of 06/13/18    Adult Transthoracic Echo Complete W/ Cont if Necessary Per Protocol    Interpretation Summary  · Moderate aortic valve regurgitation is present.  · Mild mitral valve regurgitation is present  · Mild tricuspid valve regurgitation is present.  · There is a moderate (1-2cm) circumferential pericardial effusion.  · Left ventricular systolic function is severely decreased. Estimated EF = 20%.  · There is mild right atrial diastolic collapse from the pericardial effusion. The transmitral Doppler show some variation with inspiration. Clinical correlation suggested in this patient i.e. as the patient hypotensive could also consider CT scan of the chest to look at the pericardial effusion      Assessment/Plan   Assessment & Plan       Hyponatremia    Hypertension    History of CVA (cerebrovascular accident)    Dementia (HCC)    Chronic systolic heart failure (HCC)    Hypothyroid    Elevated serum creatinine    Scott Diego is a 89 y.o. female with history of hypertension, chronic systolic CHF, mild cognitive impairment, CVA, chronic hyponatremia, presents to the ED with hypoxia and panic attacks.      Assessment and Plan:    Elevated creatinine  --Baseline creatinine 1.1-1.3  --Creatinine 1.48 today  --Was given 1 L saline in the ED  --Urine studies  --IV fluids  --hold lasix  --BMP in the a.m.    Hyponatremia  --Urine sodium and urine osmolality  --Serum osmolality  --Was given a liter of saline in the ED  --BMP in the  a.m.    Chronic systolic HF  --Chest x-ray shows mild diffuse pulmonary interstitial disease pattern but fairly similar to multiple prior studies.  --Strict I's and O's  --Daily weight  --Metoprolol    HTN  --Metoprolol    Hypothyroidism  --Levothyroxine    Debility  Generalized weakness  Dementia  History of CVA  --Fall precautions  --Aricept  --consult case management  --consult pt/ot in the am    DVT prophylaxis:  Heparin    CODE STATUS:     Level Of Support Discussed With: Next of Kin (If No Surrogate)  Code Status (Patient has no pulse and is not breathing): CPR (Attempt to Resuscitate)  Medical Interventions (Patient has pulse or is breathing): Full Support      This note has been completed as part of a split-shared workflow.   Signature: Electronically signed by VASILE Nguyen, 03/03/22, 5:04 AM EST.       Attending   Admission Attestation       I have seen and examined the patient, performing an independent face-to-face diagnostic evaluation with plan of care reviewed and developed with the advanced practice clinician (APC).      Brief Summary Statement:   Scott Diego is a 89 y.o. female  Who lives with her son in [place] following the recent death of her  in January.  Son states since this time, pt has gone down hill.  Poor po intake.  Unable to take care of self.  Has a lot of anxiety.  Worsening memory.  Son notes she has O2 at night and he checked her sats at home and they were 86 today.  Son brings pt in as he feels he cannot safely take care of her.   Remainder of detailed HPI is as noted by APC and has been reviewed and/or edited by me for completeness.    Attending Physical Exam:   performed the above exam; no changes to the above    Brief Assessment/Plan :  See detailed assessment and plan developed with APC which I have reviewed and/or edited for completeness.  89 y/o female here w/ frailty, dehydration, electrolyte derangement;  1. Elevated creatinine/dehydration;  -gentle  IVF's  2. Hypomagnesemia;  -electrolyte replacement  3. Anemia,   -improved from baseline   4.frailty/debility  -case mgmt for possible NH placement      Admission Status: I believe that this patient meets  INPATIENT status due to  Arf, hypomag.  I feel patient’s risk for adverse outcomes and need for care warrant INPATIENT evaluation and I predict the patient’s care encounter to likely last beyond 2 midnights.    Electronically signed by Tova Vaughan MD, 03/03/22, 5:24 AM VITA.      Tova Vaughan MD  03/03/22

## 2022-03-03 NOTE — PLAN OF CARE
Goal Outcome Evaluation:  Plan of Care Reviewed With: patient        Progress: no change       Pt receiving IVF. One time dose of IV Mag. Lokelma given. Alert but only oriented to self. NSR on monitor. 2LNC. Alarms in place.

## 2022-03-03 NOTE — CASE MANAGEMENT/SOCIAL WORK
Discharge Planning Assessment  [facility]     Patient Name: Scott Diego  MRN: [id number]  Today's Date: 3/3/2022    Admit Date: 3/3/2022     Discharge Needs Assessment     Row Name 22 1207       Living Environment    Lives With child(chong), adult; grandchild(chong)    Name(s) of Who Lives With Patient NOAH GASPAR Son [phone]    Current Living Arrangements home/apartment/condo    Primary Care Provided by child(chong)    Provides Primary Care For no one, unable/limited ability to care for self    Family Caregiver if Needed child(chong), adult    Family Caregiver Names NOAH GASPAR [phone]    Quality of Family Relationships helpful; involved; supportive       Resource/Environmental Concerns    Resource/Environmental Concerns none    Transportation Concerns car, none       Transition Planning    Patient/Family Anticipates Transition to long-term care facility    Patient/Family Anticipated Services at Transition ; rehabilitation services    Transportation Anticipated health plan transportation       Discharge Needs Assessment    Readmission Within the Last 30 Days no previous admission in last 30 days    Equipment Currently Used at Home oxygen; cane, straight    Concerns to be Addressed discharge planning    Anticipated Changes Related to Illness none    Discharge Facility/Level of Care Needs nursing facility, intermediate; nursing facility, skilled    Current Discharge Risk cognitively impaired; dependent with mobility/activities of daily living               Discharge Plan     Row Name 22 8390       Plan    Plan Initial    Plan Comments CM spoke with patient's son, Noah at bedside. Patient resides with her son, Noah and her grandkids. Patient has increased weakness and now is having difficulty getting from bed to chair. Her   on 2022 and since then she has been living with her son. Son reports that since this time her memory has gotten worse and she has physically  declined as well, not eating and sleeping more. Patient is on home oxygen prn, DME company unknown. Son denies any current home health or outpatient services. Patient has medical insurance, prescription coverage and is able to afford/obtain medications without difficulty. Son states patient has never been to rehab. Son reports he has been having difficulty lately taking care of patient and he may be seeking long term care for patient. CM will continue to follow.     Final Discharge Disposition Code 30 - still a patient              Continued Care and Services - Admitted Since 3/3/2022    Coordination has not been started for this encounter.          Demographic Summary     Row Name 03/03/22 1200       General Information    Arrived From home    Referral Source emergency department    Reason for Consult discharge planning    Preferred Language English     Used During This Interaction no       Contact Information    Contact Information Comments VAUGHN GASPAR [phone]               Functional Status     Row Name 03/03/22 1201       Functional Status    Usual Activity Tolerance fair    Current Activity Tolerance poor       Functional Status, IADL    Medications completely dependent    Meal Preparation completely dependent    Housekeeping completely dependent    Laundry completely dependent    Shopping completely dependent       Mental Status    General Appearance WDL WDL       Mental Status Summary    Recent Changes in Mental Status/Cognitive Functioning unable to assess       Employment/    Employment Status retired               Psychosocial    No documentation.                Abuse/Neglect    No documentation.                Legal    No documentation.                Substance Abuse    No documentation.                Patient Forms    No documentation.                   Francia Lagos RN

## 2022-03-03 NOTE — PROGRESS NOTES
[facility]  ADMISSION FOLLOW-UP NOTE          Patient admitted after midnight, H&P by my partner performed earlier on today's date reviewed.  Interim findings, labs, and charting also reviewed.        [facility] Hospital Problem List has been managed and updated to include any new diagnoses:  Active Hospital Problems    Diagnosis  POA   • Elevated serum creatinine [R79.89]  Yes   • Hypothyroid [E03.9]  Yes   • Chronic systolic heart failure (HCC) [I50.22]  Yes   • History of CVA (cerebrovascular accident) [Z86.73]  Not Applicable   • Dementia (HCC) [F03.90]  Yes   • Hypertension [I10]  Yes   • Hyponatremia [E87.1]  Yes      Resolved Hospital Problems   No resolved problems to display.         ADDITIONAL PLAN:  - detailed assessment and plan from admission reviewed  - Scott Diego is a 89 y.o. female  Who lives with her son in [place] following the recent death of her  in January.  Son states since this time, pt has gone down hill.  Poor po intake.  Unable to take care of self.  Has a lot of anxiety.  Worsening memory.  Son notes she has O2 at night and he checked her sats at home and they were 86 today.  Son brings pt in as he feels he cannot safely take care of her.     Today patient states that she feels ok.  States that just hasn't been hungry so decreased PO.  Denies any n/v/abdominal pain.  States that she has a lot of trouble with her false teeth causing sores and mouth pain.  Mouth has been burning and recently said that dixon on her sandwich burned.     CARYN  --Baseline creatinine 1.1-1.3  --Creatinine 1.48 on admit  --holding lasix.  Better with IVF     Hyponatremia  --resolved with IVF     Chronic systolic HF  --Chest x-ray shows mild diffuse pulmonary interstitial disease pattern but fairly similar to multiple prior studies.  --Strict I's and O's  --Daily weight  --Metoprolol.  Holding lasix currently d/t CARYN    Possible Thrush  --nystatin swish and  swallow     HTN  --Metoprolol    Hyperkalemia  --lokelma x1     Hypothyroidism  --Levothyroxine     Debility  Generalized weakness  Dementia  History of CVA  --Fall precautions  --Aricept  --consult pt/ot    Bereavement  -- passed away January 2022    D/w son at bedside on 3/3/22     DVT prophylaxis:  Heparin     CODE STATUS:     Level Of Support Discussed With: Next of Kin (If No Surrogate)  Code Status (Patient has no pulse and is not breathing): CPR (Attempt to Resuscitate)  Medical Interventions (Patient has pulse or is breathing): Full Support    Jaron Oliver MD  03/03/22

## 2022-03-04 ENCOUNTER — APPOINTMENT (OUTPATIENT)
Dept: CARDIOLOGY | Facility: HOSPITAL | Age: 87
End: 2022-03-04

## 2022-03-04 LAB
ANION GAP SERPL CALCULATED.3IONS-SCNC: 6 MMOL/L (ref 5–15)
BH CV ECHO MEAS - AI DEC SLOPE: 353.4 CM/SEC^2
BH CV ECHO MEAS - AI MAX PG: 52.1 MMHG
BH CV ECHO MEAS - AI MAX VEL: 361 CM/SEC
BH CV ECHO MEAS - AI P1/2T: 299.2 MSEC
BH CV ECHO MEAS - AO MAX PG (FULL): 2.6 MMHG
BH CV ECHO MEAS - AO MAX PG: 4.9 MMHG
BH CV ECHO MEAS - AO MEAN PG (FULL): 1.3 MMHG
BH CV ECHO MEAS - AO MEAN PG: 2.5 MMHG
BH CV ECHO MEAS - AO ROOT AREA (BSA CORRECTED): 1.9
BH CV ECHO MEAS - AO ROOT AREA: 7.1 CM^2
BH CV ECHO MEAS - AO ROOT DIAM: 3 CM
BH CV ECHO MEAS - AO V2 MAX: 111 CM/SEC
BH CV ECHO MEAS - AO V2 MEAN: 72.3 CM/SEC
BH CV ECHO MEAS - AO V2 VTI: 26.7 CM
BH CV ECHO MEAS - ASC AORTA: 3.2 CM
BH CV ECHO MEAS - AVA(I,A): 2.2 CM^2
BH CV ECHO MEAS - AVA(I,D): 2.2 CM^2
BH CV ECHO MEAS - AVA(V,A): 2.1 CM^2
BH CV ECHO MEAS - AVA(V,D): 2.1 CM^2
BH CV ECHO MEAS - BSA(HAYCOCK): 1.5 M^2
BH CV ECHO MEAS - BSA: 1.6 M^2
BH CV ECHO MEAS - BZI_BMI: 19 KILOGRAMS/M^2
BH CV ECHO MEAS - BZI_METRIC_HEIGHT: 165.1 CM
BH CV ECHO MEAS - BZI_METRIC_WEIGHT: 51.7 KG
BH CV ECHO MEAS - EDV(CUBED): 274.5 ML
BH CV ECHO MEAS - EDV(MOD-SP2): 72 ML
BH CV ECHO MEAS - EDV(MOD-SP4): 109 ML
BH CV ECHO MEAS - EDV(TEICH): 215.9 ML
BH CV ECHO MEAS - EF(CUBED): 67.1 %
BH CV ECHO MEAS - EF(MOD-BP): 30 %
BH CV ECHO MEAS - EF(MOD-SP2): 55.6 %
BH CV ECHO MEAS - EF(MOD-SP4): 54.1 %
BH CV ECHO MEAS - EF(TEICH): 57.5 %
BH CV ECHO MEAS - ESV(CUBED): 90.2 ML
BH CV ECHO MEAS - ESV(MOD-SP2): 32 ML
BH CV ECHO MEAS - ESV(MOD-SP4): 50 ML
BH CV ECHO MEAS - ESV(TEICH): 91.7 ML
BH CV ECHO MEAS - FS: 31 %
BH CV ECHO MEAS - IVS/LVPW: 0.96
BH CV ECHO MEAS - IVSD: 0.84 CM
BH CV ECHO MEAS - LA DIMENSION: 4.9 CM
BH CV ECHO MEAS - LA/AO: 1.6
BH CV ECHO MEAS - LAD MAJOR: 6 CM
BH CV ECHO MEAS - LAT PEAK E' VEL: 5.3 CM/SEC
BH CV ECHO MEAS - LATERAL E/E' RATIO: 17.2
BH CV ECHO MEAS - LV DIASTOLIC VOL/BSA (35-75): 70 ML/M^2
BH CV ECHO MEAS - LV IVRT: 0.07 SEC
BH CV ECHO MEAS - LV MASS(C)D: 234.1 GRAMS
BH CV ECHO MEAS - LV MASS(C)DI: 150.3 GRAMS/M^2
BH CV ECHO MEAS - LV MAX PG: 2.3 MMHG
BH CV ECHO MEAS - LV MEAN PG: 1.3 MMHG
BH CV ECHO MEAS - LV SYSTOLIC VOL/BSA (12-30): 32.1 ML/M^2
BH CV ECHO MEAS - LV V1 MAX: 75.5 CM/SEC
BH CV ECHO MEAS - LV V1 MEAN: 52.8 CM/SEC
BH CV ECHO MEAS - LV V1 VTI: 19.1 CM
BH CV ECHO MEAS - LVIDD: 6.5 CM
BH CV ECHO MEAS - LVIDS: 4.5 CM
BH CV ECHO MEAS - LVLD AP2: 7 CM
BH CV ECHO MEAS - LVLD AP4: 7 CM
BH CV ECHO MEAS - LVLS AP2: 5.7 CM
BH CV ECHO MEAS - LVLS AP4: 6 CM
BH CV ECHO MEAS - LVOT AREA (M): 3.1 CM^2
BH CV ECHO MEAS - LVOT AREA: 3.1 CM^2
BH CV ECHO MEAS - LVOT DIAM: 2 CM
BH CV ECHO MEAS - LVPWD: 0.88 CM
BH CV ECHO MEAS - MED PEAK E' VEL: 3.4 CM/SEC
BH CV ECHO MEAS - MEDIAL E/E' RATIO: 26.5
BH CV ECHO MEAS - MV A MAX VEL: 102.2 CM/SEC
BH CV ECHO MEAS - MV DEC SLOPE: 465.9 CM/SEC^2
BH CV ECHO MEAS - MV DEC TIME: 0.25 SEC
BH CV ECHO MEAS - MV E MAX VEL: 92.3 CM/SEC
BH CV ECHO MEAS - MV E/A: 0.9
BH CV ECHO MEAS - MV MAX PG: 4.6 MMHG
BH CV ECHO MEAS - MV MEAN PG: 2.6 MMHG
BH CV ECHO MEAS - MV P1/2T MAX VEL: 108 CM/SEC
BH CV ECHO MEAS - MV P1/2T: 67.9 MSEC
BH CV ECHO MEAS - MV V2 MAX: 106.8 CM/SEC
BH CV ECHO MEAS - MV V2 MEAN: 75 CM/SEC
BH CV ECHO MEAS - MV V2 VTI: 32.2 CM
BH CV ECHO MEAS - MVA P1/2T LCG: 2 CM^2
BH CV ECHO MEAS - MVA(P1/2T): 3.2 CM^2
BH CV ECHO MEAS - MVA(VTI): 1.8 CM^2
BH CV ECHO MEAS - PA ACC SLOPE: 606.1 CM/SEC^2
BH CV ECHO MEAS - PA ACC TIME: 0.12 SEC
BH CV ECHO MEAS - PA MAX PG: 3.2 MMHG
BH CV ECHO MEAS - PA PR(ACCEL): 25.1 MMHG
BH CV ECHO MEAS - PA V2 MAX: 89.2 CM/SEC
BH CV ECHO MEAS - RAP SYSTOLE: 3 MMHG
BH CV ECHO MEAS - RVSP: 36 MMHG
BH CV ECHO MEAS - SI(AO): 122.7 ML/M^2
BH CV ECHO MEAS - SI(CUBED): 118.3 ML/M^2
BH CV ECHO MEAS - SI(LVOT): 38 ML/M^2
BH CV ECHO MEAS - SI(MOD-SP2): 25.7 ML/M^2
BH CV ECHO MEAS - SI(MOD-SP4): 37.9 ML/M^2
BH CV ECHO MEAS - SI(TEICH): 79.7 ML/M^2
BH CV ECHO MEAS - SV(AO): 191.1 ML
BH CV ECHO MEAS - SV(CUBED): 184.3 ML
BH CV ECHO MEAS - SV(LVOT): 59.1 ML
BH CV ECHO MEAS - SV(MOD-SP2): 40 ML
BH CV ECHO MEAS - SV(MOD-SP4): 59 ML
BH CV ECHO MEAS - SV(TEICH): 124.2 ML
BH CV ECHO MEAS - TAPSE (>1.6): 1.6 CM
BH CV ECHO MEAS - TR MAX PG: 33 MMHG
BH CV ECHO MEAS - TR MAX VEL: 285 CM/SEC
BH CV ECHO MEASUREMENTS AVERAGE E/E' RATIO: 21.22
BH CV VAS BP LEFT ARM: NORMAL MMHG
BH CV XLRA - RV BASE: 3.5 CM
BH CV XLRA - RV LENGTH: 5.7 CM
BH CV XLRA - RV MID: 2.8 CM
BH CV XLRA - TDI S': 13 CM/SEC
BUN SERPL-MCNC: 15 MG/DL (ref 8–23)
BUN/CREAT SERPL: 12.8 (ref 7–25)
CALCIUM SPEC-SCNC: 8.7 MG/DL (ref 8.2–9.6)
CHLORIDE SERPL-SCNC: 110 MMOL/L (ref 98–107)
CO2 SERPL-SCNC: 22 MMOL/L (ref 22–29)
CREAT SERPL-MCNC: 1.17 MG/DL (ref 0.57–1)
DEPRECATED RDW RBC AUTO: 48.8 FL (ref 37–54)
EGFRCR SERPLBLD CKD-EPI 2021: 44.1 ML/MIN/1.73
ERYTHROCYTE [DISTWIDTH] IN BLOOD BY AUTOMATED COUNT: 14.2 % (ref 12.3–15.4)
GLUCOSE SERPL-MCNC: 87 MG/DL (ref 65–99)
HCT VFR BLD AUTO: 32.6 % (ref 34–46.6)
HGB BLD-MCNC: 10.2 G/DL (ref 12–15.9)
LEFT ATRIUM VOLUME INDEX: 43 ML/M^2
LEFT ATRIUM VOLUME: 67 ML
LV EF 2D ECHO EST: 30 %
MAGNESIUM SERPL-MCNC: 1.8 MG/DL (ref 1.7–2.3)
MAXIMAL PREDICTED HEART RATE: 129 BPM
MCH RBC QN AUTO: 29.6 PG (ref 26.6–33)
MCHC RBC AUTO-ENTMCNC: 31.3 G/DL (ref 31.5–35.7)
MCV RBC AUTO: 94.5 FL (ref 79–97)
PLATELET # BLD AUTO: 141 10*3/MM3 (ref 140–450)
PMV BLD AUTO: 12.3 FL (ref 6–12)
POTASSIUM SERPL-SCNC: 5.1 MMOL/L (ref 3.5–5.2)
RBC # BLD AUTO: 3.45 10*6/MM3 (ref 3.77–5.28)
SODIUM SERPL-SCNC: 138 MMOL/L (ref 136–145)
STRESS TARGET HR: 110 BPM
WBC NRBC COR # BLD: 5.91 10*3/MM3 (ref 3.4–10.8)

## 2022-03-04 PROCEDURE — 93306 TTE W/DOPPLER COMPLETE: CPT | Performed by: INTERNAL MEDICINE

## 2022-03-04 PROCEDURE — G0378 HOSPITAL OBSERVATION PER HR: HCPCS

## 2022-03-04 PROCEDURE — 99233 SBSQ HOSP IP/OBS HIGH 50: CPT | Performed by: INTERNAL MEDICINE

## 2022-03-04 PROCEDURE — 25010000002 MAGNESIUM SULFATE 2 GM/50ML SOLUTION

## 2022-03-04 PROCEDURE — 85027 COMPLETE CBC AUTOMATED: CPT | Performed by: INTERNAL MEDICINE

## 2022-03-04 PROCEDURE — 83735 ASSAY OF MAGNESIUM: CPT | Performed by: INTERNAL MEDICINE

## 2022-03-04 PROCEDURE — 80048 BASIC METABOLIC PNL TOTAL CA: CPT | Performed by: INTERNAL MEDICINE

## 2022-03-04 PROCEDURE — 25010000002 HEPARIN (PORCINE) PER 1000 UNITS: Performed by: NURSE PRACTITIONER

## 2022-03-04 PROCEDURE — 97535 SELF CARE MNGMENT TRAINING: CPT

## 2022-03-04 PROCEDURE — 93306 TTE W/DOPPLER COMPLETE: CPT

## 2022-03-04 PROCEDURE — 97162 PT EVAL MOD COMPLEX 30 MIN: CPT

## 2022-03-04 PROCEDURE — 97166 OT EVAL MOD COMPLEX 45 MIN: CPT

## 2022-03-04 RX ORDER — GABAPENTIN 300 MG/1
300 CAPSULE ORAL NIGHTLY
COMMUNITY
End: 2022-03-17 | Stop reason: HOSPADM

## 2022-03-04 RX ORDER — HALOPERIDOL 5 MG/ML
0.5 INJECTION INTRAMUSCULAR ONCE
Status: COMPLETED | OUTPATIENT
Start: 2022-03-05 | End: 2022-03-04

## 2022-03-04 RX ORDER — MAGNESIUM SULFATE HEPTAHYDRATE 40 MG/ML
2 INJECTION, SOLUTION INTRAVENOUS AS NEEDED
Status: DISCONTINUED | OUTPATIENT
Start: 2022-03-04 | End: 2022-03-04 | Stop reason: DRUGHIGH

## 2022-03-04 RX ORDER — POTASSIUM CHLORIDE 7.45 MG/ML
10 INJECTION INTRAVENOUS
Status: DISCONTINUED | OUTPATIENT
Start: 2022-03-04 | End: 2022-03-17 | Stop reason: HOSPADM

## 2022-03-04 RX ORDER — POTASSIUM CHLORIDE 750 MG/1
40 CAPSULE, EXTENDED RELEASE ORAL AS NEEDED
Status: DISCONTINUED | OUTPATIENT
Start: 2022-03-04 | End: 2022-03-17 | Stop reason: HOSPADM

## 2022-03-04 RX ORDER — MAGNESIUM SULFATE HEPTAHYDRATE 40 MG/ML
4 INJECTION, SOLUTION INTRAVENOUS AS NEEDED
Status: DISCONTINUED | OUTPATIENT
Start: 2022-03-04 | End: 2022-03-17 | Stop reason: HOSPADM

## 2022-03-04 RX ORDER — POTASSIUM CHLORIDE 1.5 G/1.77G
40 POWDER, FOR SOLUTION ORAL AS NEEDED
Status: DISCONTINUED | OUTPATIENT
Start: 2022-03-04 | End: 2022-03-17 | Stop reason: HOSPADM

## 2022-03-04 RX ORDER — MAGNESIUM SULFATE 1 G/100ML
1 INJECTION INTRAVENOUS AS NEEDED
Status: DISCONTINUED | OUTPATIENT
Start: 2022-03-04 | End: 2022-03-17 | Stop reason: HOSPADM

## 2022-03-04 RX ORDER — MAGNESIUM SULFATE HEPTAHYDRATE 40 MG/ML
4 INJECTION, SOLUTION INTRAVENOUS AS NEEDED
Status: DISCONTINUED | OUTPATIENT
Start: 2022-03-04 | End: 2022-03-04 | Stop reason: DRUGHIGH

## 2022-03-04 RX ORDER — QUETIAPINE FUMARATE 25 MG/1
12.5 TABLET, FILM COATED ORAL ONCE
Status: COMPLETED | OUTPATIENT
Start: 2022-03-04 | End: 2022-03-04

## 2022-03-04 RX ORDER — MAGNESIUM SULFATE HEPTAHYDRATE 40 MG/ML
2 INJECTION, SOLUTION INTRAVENOUS AS NEEDED
Status: DISCONTINUED | OUTPATIENT
Start: 2022-03-04 | End: 2022-03-17 | Stop reason: HOSPADM

## 2022-03-04 RX ADMIN — MAGNESIUM SULFATE HEPTAHYDRATE 2 G: 2 INJECTION, SOLUTION INTRAVENOUS at 17:37

## 2022-03-04 RX ADMIN — ATORVASTATIN CALCIUM 80 MG: 40 TABLET, FILM COATED ORAL at 20:01

## 2022-03-04 RX ADMIN — HEPARIN SODIUM 5000 UNITS: 5000 INJECTION, SOLUTION INTRAVENOUS; SUBCUTANEOUS at 20:00

## 2022-03-04 RX ADMIN — OFLOXACIN 1 DROP: 3 SOLUTION/ DROPS OPHTHALMIC at 08:57

## 2022-03-04 RX ADMIN — Medication 10 ML: at 20:01

## 2022-03-04 RX ADMIN — FAMOTIDINE 20 MG: 20 TABLET, FILM COATED ORAL at 08:56

## 2022-03-04 RX ADMIN — NYSTATIN 500000 UNITS: 100000 SUSPENSION ORAL at 20:00

## 2022-03-04 RX ADMIN — CETIRIZINE HYDROCHLORIDE 5 MG: 10 TABLET, FILM COATED ORAL at 08:56

## 2022-03-04 RX ADMIN — HEPARIN SODIUM 5000 UNITS: 5000 INJECTION, SOLUTION INTRAVENOUS; SUBCUTANEOUS at 08:56

## 2022-03-04 RX ADMIN — NYSTATIN 500000 UNITS: 100000 SUSPENSION ORAL at 08:56

## 2022-03-04 RX ADMIN — HALOPERIDOL LACTATE 0.5 MG: 5 INJECTION, SOLUTION INTRAMUSCULAR at 23:23

## 2022-03-04 RX ADMIN — QUETIAPINE FUMARATE 12.5 MG: 25 TABLET ORAL at 20:01

## 2022-03-04 RX ADMIN — Medication 10 ML: at 08:57

## 2022-03-04 RX ADMIN — Medication 325 MG: at 08:57

## 2022-03-04 RX ADMIN — ERYTHROMYCIN 1 APPLICATION: 5 OINTMENT OPHTHALMIC at 20:01

## 2022-03-04 RX ADMIN — NYSTATIN 500000 UNITS: 100000 SUSPENSION ORAL at 16:14

## 2022-03-04 RX ADMIN — LEVOTHYROXINE SODIUM 50 MCG: 50 TABLET ORAL at 05:14

## 2022-03-04 RX ADMIN — GABAPENTIN 300 MG: 300 CAPSULE ORAL at 20:00

## 2022-03-04 RX ADMIN — METOPROLOL SUCCINATE 25 MG: 25 TABLET, EXTENDED RELEASE ORAL at 08:56

## 2022-03-04 RX ADMIN — ASPIRIN 81 MG 81 MG: 81 TABLET ORAL at 20:01

## 2022-03-04 RX ADMIN — OFLOXACIN 1 DROP: 3 SOLUTION/ DROPS OPHTHALMIC at 17:15

## 2022-03-04 RX ADMIN — DONEPEZIL HYDROCHLORIDE 5 MG: 5 TABLET, FILM COATED ORAL at 20:01

## 2022-03-04 RX ADMIN — OFLOXACIN 1 DROP: 3 SOLUTION/ DROPS OPHTHALMIC at 20:00

## 2022-03-04 RX ADMIN — ALPRAZOLAM 0.25 MG: 0.25 TABLET ORAL at 17:37

## 2022-03-04 RX ADMIN — ROPINIROLE HYDROCHLORIDE 0.5 MG: 0.5 TABLET, FILM COATED ORAL at 20:01

## 2022-03-04 RX ADMIN — NYSTATIN 500000 UNITS: 100000 SUSPENSION ORAL at 11:26

## 2022-03-04 NOTE — CASE MANAGEMENT/SOCIAL WORK
Continued Stay Note  [facility]     Patient Name: Scott Diego  MRN: [id number]  Today's Date: 3/4/2022    Admit Date: 3/3/2022     Discharge Plan     Row Name 03/04/22 1437       Plan    Plan Home vs SNF    Patient/Family in Agreement with Plan yes    Plan Comments Case mgt f/u. I spoke with son at bedside ( Dayron tony) re: d/c options. He was interested is short placement, possibly long term placement. Reviewed Medicare/insurance guidelines for payment for SNF. Son is the primary caregiver for Ms Diego and two teenage grandchildren, he does not work outside the home. Referrals sent to several [facility] SNFs.List of SNFs provided to son. Advised son that her insurance will require insurance authorization and after reviewing PT notes,she may not get approved. We also discussed Home Health services and list provided of private duty services to assist, we discussed hiring for 2-3 times per week to assist with bathing. Case mgt will f/u Monday               Discharge Codes    No documentation.               Expected Discharge Date and Time     Expected Discharge Date Expected Discharge Time    Mar 8, 2022             Sonja C Kellerman, RN

## 2022-03-04 NOTE — DISCHARGE PLACEMENT REQUEST
"Scott Diego (89 y.o. Female)             Date of Birth Social Security Number Address Home Phone MRN    04/10/1930  [address]  [facility] [id number] [phone] [id number]    Catholic Marital Status             None        Admission Date Admission Type Admitting Provider Attending Provider Department, Room/Bed    3/3/22 Emergency Lisette Pacheco MD Hunter, Sarah M, MD [facility] 4G, S446/1    Discharge Date Discharge Disposition Discharge Destination                         Attending Provider: Lisette Pacheco MD    Allergies: Augmentin [Amoxicillin-pot Clavulanate], Claritin [Loratadine], Keflex [Cephalexin], Sulfa Antibiotics    Isolation: None   Infection: None   Code Status: CPR   Advance Care Planning Activity    Ht: 165.1 cm (65\")   Wt: 51.7 kg (114 lb)    Admission Cmt: None   Principal Problem: None                Active Insurance as of 3/3/2022     Primary Coverage     Payor Plan Insurance Group Employer/Plan Group    ANTHEM MEDICARE REPLACEMENT ANTH MEDICARE ADVANTAGE [id number]     Payor Plan Address Payor Plan Phone Number Payor Plan Fax Number Effective Dates    [address] [phone]  3/1/2022 - None Entered    [facility] [id number]       Subscriber Name Subscriber Birth Date Member ID       SCOTT DIEGO 4/10/1930 [id number]                 Emergency Contacts      (Rel.) Home Phone Work Phone Mobile Phone    VAUGHN GASPAR (Son) [phone] -- [phone]               History & Physical      Day, Tova NAIR MD at 22 0355              [facility] Medicine Services  HISTORY AND PHYSICAL    Patient Name: Scott Diego  : 4/10/1930  MRN: [id number]  Primary Care Physician: Yolande Escobar MD  Date of admission: 3/3/2022    Subjective   Subjective     Chief Complaint:  Panic attacks    HPI:  Scott Diego is a 89 y.o. female with history of hypertension, chronic systolic CHF, mild cognitive impairment, CVA, " chronic hyponatremia, presents to the ED with hypoxia and panic attacks.  Per the patient's son she has been experiencing panic attacks for over the last week.  Her   on 2022 and since then she has been living with her son.  He reports that since this time her memory has gotten worse and she has physically declined as well.  He reports that she does not drink or eat much food and has been sleeping more than usual.  She has increased weakness and now is having difficulty getting from bed to chair.  He checked her oxygen saturation at home today and it was 86%.  He put her on her home oxygen and brought her to the ED for evaluation.  Son is interested in placement because he does not feel that he can adequately care for her at home.  No fevers, cough, chest pain, vomiting, diarrhea, or any other complaints at this time.  CT of the head shows no acute intracranial abnormality, moderate volume loss, moderate chronic small vessel ischemic changes.  Chest x-ray shows mild diffuse pulmonary interstitial disease pattern but fairly similar to multiple prior studies.  Pertinent labs include BUN 21, creatinine 1.48.  Patient was given 1 liter of saline in the ED, and is being admitted to the Hospitalist for further evaluation and management.    COVID Details:        Symptoms: [x] NONE [] Fever []  Cough [] Shortness of breath [] Change in taste or smell  The patient qualifies to receive the vaccine, but they have not yet received it.    Review of Systems   Constitutional: Positive for appetite change and fatigue. Negative for chills and fever.   HENT: Negative.    Eyes: Negative.    Respiratory: Negative.    Cardiovascular: Negative.    Gastrointestinal: Positive for abdominal pain. Negative for abdominal distention, diarrhea, nausea and vomiting.   Endocrine: Negative.    Genitourinary: Negative.    Musculoskeletal: Negative.    Skin: Negative.    Allergic/Immunologic: Negative.    Neurological: Positive for  weakness (generalized). Negative for headaches.   Hematological: Negative.    Psychiatric/Behavioral: The patient is nervous/anxious.         All other systems reviewed and are negative.     Personal History     Past Medical History:   Diagnosis Date   • Congestive heart failure (HCC)    • Malignant neoplasm (HCC)    • Stroke (HCC)     mini stroke   • Systolic heart failure (HCC) 9/25/2017    Chronic/compensated (EF 25%)       Past Surgical History:   Procedure Laterality Date   • CHOLECYSTECTOMY     • EYE SURGERY     • HYSTERECTOMY         Family History:  family history includes Cancer in her brother, mother, and sister; No Known Problems in her father. Otherwise pertinent FHx was reviewed and unremarkable.     Social History:  reports that she has never smoked. She has never used smokeless tobacco. She reports that she does not drink alcohol and does not use drugs.  Social History     Social History Narrative    Caffeine Intake:0-1  servings per day    Patient lives at her son's home       Medications:  ALPRAZolam, Olopatadine HCl, clopidogrel, cycloSPORINE, donepezil, fish oil, furosemide, gabapentin, levothyroxine, meclizine, metoprolol succinate XL, nystatin, rOPINIRole, and vitamin C    Allergies   Allergen Reactions   • Augmentin [Amoxicillin-Pot Clavulanate] Nausea And Vomiting   • Claritin [Loratadine] Unknown (See Comments)     Doesn't remember   • Keflex [Cephalexin] Nausea And Vomiting   • Sulfa Antibiotics Other (See Comments)     Does not remember       Objective   Objective     Vital Signs:   Temp:  [97.8 °F (36.6 °C)] 97.8 °F (36.6 °C)  Heart Rate:  [89] 89  Resp:  [18] 18  BP: (105-129)/(61-64) 105/64    Physical Exam   Constitutional: Awake, alert, resting in bed  Eyes: PERRLA, sclerae anicteric, no conjunctival injection  HENT: NCAT, mucous membranes moist  Neck: Supple, no thyromegaly, no lymphadenopathy, trachea midline  Respiratory: Clear to auscultation bilaterally, nonlabored respirations    Cardiovascular: RRR, no murmurs, rubs, or gallops, palpable pedal pulses bilaterally  Gastrointestinal: Positive bowel sounds, soft, nontender, nondistended  Musculoskeletal: No bilateral ankle edema, no clubbing or cyanosis to extremities  Psychiatric: Appropriate affect, cooperative  Neurologic: Oriented to self, strength symmetric in all extremities, Cranial Nerves grossly intact to confrontation, speech clear  Skin: No rashes       Result Review:  I have personally reviewed the results from the time of this admission to 03/03/22 3:55 AM EST and agree with these findings:  [x]  Laboratory  []  Microbiology  [x]  Radiology  []  EKG/Telemetry   []  Cardiology/Vascular   []  Pathology  []  Old records  []  Other:  Most notable findings include:       LAB RESULTS:      Lab 03/02/22 2038   WBC 11.17*   HEMOGLOBIN 11.3*   HEMATOCRIT 34.8   PLATELETS 143   NEUTROS ABS 7.79*   IMMATURE GRANS (ABS) 0.02   LYMPHS ABS 1.54   MONOS ABS 1.23*   EOS ABS 0.51*   MCV 92.1   CRP <0.30   LACTATE 1.0         Lab 03/03/22  0230 03/02/22 2038   SODIUM  --  132*   POTASSIUM  --  5.1   CHLORIDE  --  102   CO2  --  22.0   ANION GAP  --  8.0   BUN  --  21   CREATININE  --  1.48*   EGFR  --  33.3*   GLUCOSE  --  100*   CALCIUM  --  9.2   MAGNESIUM 1.6*  --    PHOSPHORUS 3.6  --    TSH 1.440  --          Lab 03/02/22 2038   TOTAL PROTEIN 7.6   ALBUMIN 3.60   GLOBULIN 4.0   ALT (SGPT) 10   AST (SGOT) 20   BILIRUBIN 0.3   ALK PHOS 44         Lab 03/02/22 2038   PROBNP 2,861.0*   TROPONIN T 0.011                   Microbiology Results (last 10 days)     ** No results found for the last 240 hours. **          CT Head Without Contrast    Result Date: 3/3/2022  EXAMINATION: CT HEAD WO CONTRAST DATE: 3/3/2022 1:49 AM  INDICATION: Confusion.  COMPARISON: None available.  TECHNIQUE: Thin section noncontrast axial images were obtained through the head. Coronal reformatted images were created.  CT dose lowering techniques were used, to include:  automated exposure control, adjustment for patient size, and or use of iterative  reconstruction FINDINGS: No acute intracranial hemorrhage. Prominent bifrontal CSF spaces, greater on the left, are likely related to volume loss and represents prominent subarachnoid space. Subdural hygromas could've a similar appearance, however, cortical veins are seen coursing through this prominent CSF density space, for instance (axial series 3 image 35). No acute territorial infarct. No intracranial mass or mass effect. Mild burden of low attenuation in the white matter is nonspecific, but likely reflects sequelae of chronic microvascular ischemia.  Moderate diffuse parenchymal volume loss. No hydrocephalus. Basal cisterns are patent. Atherosclerotic calcifications of both carotid siphons. Bilateral ocular lens surgery. Trace mucosal thickening in the ethmoid air cells. Mastoid air cells are clear. Calvarium is intact.     Impression: 1.  No acute intracranial abnormality. 2.  Moderate volume loss. Mild chronic small vessel ischemic changes. Electronically signed by:  Og Rajan DO  3/3/2022 12:12 AM Mountain Time    XR Chest 1 View    Result Date: 3/2/2022  EXAMINATION: XR CHEST 1 VW-  INDICATION: SOA triage protocol  COMPARISON: 12/6/2019  FINDINGS: Heart is normal in size. Vasculature is upper limits of normal. There is a mild diffuse, finely reticular interstitial disease pattern the lungs, present on prior studies and probably stable allowing for differences in film technique. Similar pattern is present back to at least 2018. No lung consolidation effusion or pneumothorax is seen.       Impression: Mild diffuse pulmonary interstitial disease pattern, but fairly similar to multiple prior studies. Findings could reflect chronic lung disease, less likely mild interstitial edema or early changes of viral syndrome. No significant focal lung disease is seen.    This report was finalized on 3/2/2022 9:51 PM by Dr. Jan Alvarez MD.         Results for orders placed during the hospital encounter of 06/13/18    Adult Transthoracic Echo Complete W/ Cont if Necessary Per Protocol    Interpretation Summary  · Moderate aortic valve regurgitation is present.  · Mild mitral valve regurgitation is present  · Mild tricuspid valve regurgitation is present.  · There is a moderate (1-2cm) circumferential pericardial effusion.  · Left ventricular systolic function is severely decreased. Estimated EF = 20%.  · There is mild right atrial diastolic collapse from the pericardial effusion. The transmitral Doppler show some variation with inspiration. Clinical correlation suggested in this patient i.e. as the patient hypotensive could also consider CT scan of the chest to look at the pericardial effusion      Assessment/Plan   Assessment & Plan       Hyponatremia    Hypertension    History of CVA (cerebrovascular accident)    Dementia (HCC)    Chronic systolic heart failure (HCC)    Hypothyroid    Elevated serum creatinine    Scott Diego is a 89 y.o. female with history of hypertension, chronic systolic CHF, mild cognitive impairment, CVA, chronic hyponatremia, presents to the ED with hypoxia and panic attacks.      Assessment and Plan:    Elevated creatinine  --Baseline creatinine 1.1-1.3  --Creatinine 1.48 today  --Was given 1 L saline in the ED  --Urine studies  --IV fluids  --hold lasix  --BMP in the a.m.    Hyponatremia  --Urine sodium and urine osmolality  --Serum osmolality  --Was given a liter of saline in the ED  --BMP in the a.m.    Chronic systolic HF  --Chest x-ray shows mild diffuse pulmonary interstitial disease pattern but fairly similar to multiple prior studies.  --Strict I's and O's  --Daily weight  --Metoprolol    HTN  --Metoprolol    Hypothyroidism  --Levothyroxine    Debility  Generalized weakness  Dementia  History of CVA  --Fall precautions  --Aricept  --consult case management  --consult pt/ot in the am    DVT prophylaxis:   Heparin    CODE STATUS:     Level Of Support Discussed With: Next of Kin (If No Surrogate)  Code Status (Patient has no pulse and is not breathing): CPR (Attempt to Resuscitate)  Medical Interventions (Patient has pulse or is breathing): Full Support      This note has been completed as part of a split-shared workflow.   Signature: Electronically signed by VASILE Nguyen, 03/03/22, 5:04 AM EST.       Attending   Admission Attestation       I have seen and examined the patient, performing an independent face-to-face diagnostic evaluation with plan of care reviewed and developed with the advanced practice clinician (APC).      Brief Summary Statement:   Scott Diego is a 89 y.o. female  Who lives with her son in [place] following the recent death of her  in January.  Son states since this time, pt has gone down hill.  Poor po intake.  Unable to take care of self.  Has a lot of anxiety.  Worsening memory.  Son notes she has O2 at night and he checked her sats at home and they were 86 today.  Son brings pt in as he feels he cannot safely take care of her.   Remainder of detailed HPI is as noted by APC and has been reviewed and/or edited by me for completeness.    Attending Physical Exam:   performed the above exam; no changes to the above    Brief Assessment/Plan :  See detailed assessment and plan developed with APC which I have reviewed and/or edited for completeness.  89 y/o female here w/ frailty, dehydration, electrolyte derangement;  1. Elevated creatinine/dehydration;  -gentle IVF's  2. Hypomagnesemia;  -electrolyte replacement  3. Anemia,   -improved from baseline   4.frailty/debility  -case mgmt for possible NH placement      Admission Status: I believe that this patient meets  INPATIENT status due to  Arf, hypomag.  I feel patient’s risk for adverse outcomes and need for care warrant INPATIENT evaluation and I predict the patient’s care encounter to likely last beyond 2  midnights.    Electronically signed by Tova Vaughan MD, 22, 5:24 AM EST.      Tova Vaughan MD  22                          Electronically signed by Tova Vaughan MD at 22 0524          Physical Therapy Notes (most recent note)      Priya Monzon PT at 22 1044  Version 1 of 1         Patient Name: Scott Diego  : 4/10/1930    MRN: [id number]                              Today's Date: 3/4/2022       Admit Date: 3/3/2022    Visit Dx:     ICD-10-CM ICD-9-CM   1. CARYN (acute kidney injury) (HCC)  N17.9 584.9   2. Elevated serum creatinine  R79.89 790.99   3. Normocytic anemia  D64.9 285.9   4. Impaired functional mobility, balance, gait, and endurance  Z74.09 V49.89     Patient Active Problem List   Diagnosis   • Idiopathic peripheral neuropathy   • Neck pain   • Mild cognitive impairment   • Anemia   • Hyponatremia   • Hypertension   • Dehydration   • History of CVA (cerebrovascular accident)   • Dementia (HCC)   • Left bundle branch block   • Orthostatic dizziness   • Chronic systolic heart failure (HCC)   • Normocytic anemia   • CARYN (acute kidney injury) (HCC)   • Dyspnea   • Hypoxia   • Hypothyroid   • Pericardial effusion   • Acute respiratory failure with hypoxia (HCC)   • Right lower lobe pneumonia   • Ventricular ectopy   • Elevated serum creatinine     Past Medical History:   Diagnosis Date   • Congestive heart failure (HCC)    • Malignant neoplasm (HCC)    • Stroke (HCC)     mini stroke   • Systolic heart failure (HCC) 2017    Chronic/compensated (EF 25%)     Past Surgical History:   Procedure Laterality Date   • CHOLECYSTECTOMY     • EYE SURGERY     • HYSTERECTOMY        General Information     Row Name 22 1044          Physical Therapy Time and Intention    Document Type evaluation  -SJ     Mode of Treatment physical therapy  -SJ     Row Name 22 1044          General Information    Patient Profile Reviewed yes  -SJ     Prior Level of Function min assist:;  transfer; bed mobility; ADL's  per pt, however is an unreliable historian  -     Existing Precautions/Restrictions fall  -     Barriers to Rehab cognitive status; previous functional deficit  -[facility] Name 03/04/22 1044          Living Environment    Lives With child(chong), adult; grandchild(chong)  -[facility] Name 03/04/22 1044          Stairs Within Home, Primary    Stairs, Within Home, Primary unknown  -[facility] Name 03/04/22 1044          Cognition    Orientation Status (Cognition) oriented to; person; disoriented to; place; situation; time; verbal cues/prompts needed for orientation  -     Row Name 03/04/22 1044          Safety Issues, Functional Mobility    Safety Issues Affecting Function (Mobility) awareness of need for assistance; impulsivity; insight into deficits/self-awareness; judgment; safety precaution awareness; safety precautions follow-through/compliance; problem-solving  -     Impairments Affecting Function (Mobility) balance; coordination; visual/perceptual; endurance/activity tolerance; strength; cognition  -           User Key  (r) = Recorded By, (t) = Taken By, (c) = Cosigned By    Initials Name Provider Type    SJ Priya Monzon, PT Physical Therapist               Mobility     Row Name 03/04/22 1142          Bed Mobility    Supine-Sit [place] (Bed Mobility) supervision; verbal cues; nonverbal cues (demo/gesture)  -     Assistive Device (Bed Mobility) bed rails; head of bed elevated  -     Comment (Bed Mobility) Pt needed cues for sequencing and safety  -[facility] Name 03/04/22 1142          Transfers    Comment (Transfers) STS from EOB and bathroom toilet with SBA. Pt needs cues for safety due to visual deficit.  -     Row Name 03/04/22 1142          Sit-Stand Transfer    Sit-Stand [place] (Transfers) standby assist  -[facility] Name 03/04/22 1142          Gait/Stairs (Locomotion)    [place] Level (Gait) minimum assist (75% patient effort); 1 person  "assist; nonverbal cues (demo/gesture); verbal cues  -SJ     Assistive Device (Gait) cane, straight  -SJ     Distance in Feet (Gait) 200  -SJ     Deviations/Abnormal Patterns (Gait) bilateral deviations; base of support, narrow  -SJ     Bilateral Gait Deviations heel strike decreased  -SJ     Comment (Gait/Stairs) Pt states she normally ambulates with a cane. Cane obtained and sized for patient. Pt ambulated in hallway with cane, however preferred to hold onto therapist's hand for support and just carried cane in right hand without using it. Pt dem periods of decreased safety awareness where she would increase her speed, saying \"Let's race!\" Pt given cues for safe gait speed and use of cane. VSS on RA.  -SJ           User Key  (r) = Recorded By, (t) = Taken By, (c) = Cosigned By    Initials Name Provider Type    Priya Miller PT Physical Therapist               Obj/Interventions     Row Name 03/04/22 1145          Range of Motion Comprehensive    General Range of Motion bilateral lower extremity ROM WFL  -SJ     Row Name 03/04/22 1145          Strength Comprehensive (MMT)    General Manual Muscle Testing (MMT) Assessment lower extremity strength deficits identified  -SJ           User Key  (r) = Recorded By, (t) = Taken By, (c) = Cosigned By    Initials Name Provider Type    Priya Miller PT Physical Therapist               Goals/Plan     Row Name 03/04/22 1148          Bed Mobility Goal 1 (PT)    Activity/Assistive Device (Bed Mobility Goal 1, PT) sit to supine; supine to sit  -SJ     [place] Level/Cues Needed (Bed Mobility Goal 1, PT) modified independence  -SJ     Time Frame (Bed Mobility Goal 1, PT) long term goal (LTG)  -SJ     Row Name 03/04/22 1148          Transfer Goal 1 (PT)    Activity/Assistive Device (Transfer Goal 1, PT) sit-to-stand/stand-to-sit; bed-to-chair/chair-to-bed  -SJ     [place] Level/Cues Needed (Transfer Goal 1, PT) modified independence  -SJ     Time Frame (Transfer " "Goal 1, PT) long term goal (LTG); 2 weeks  -     Row Name 03/04/22 1148          Gait Training Goal 1 (PT)    Activity/Assistive Device (Gait Training Goal 1, PT) gait (walking locomotion); decrease fall risk; improve balance and speed  -     [place] Level (Gait Training Goal 1, PT) supervision required  -SJ     Distance (Gait Training Goal 1, PT) 400  -SJ     Time Frame (Gait Training Goal 1, PT) long term goal (LTG); 2 weeks  -           User Key  (r) = Recorded By, (t) = Taken By, (c) = Cosigned By    Initials Name Provider Type    SJ Priya Monzon, PT Physical Therapist               Clinical Impression     Row Name 03/04/22 1146          Pain    Additional Documentation Pain Scale: Numbers Pre/Post-Treatment (Group)  -     Row Name 03/04/22 1146          Pain Scale: Numbers Pre/Post-Treatment    Pretreatment Pain Rating 0/10 - no pain  -SJ     Posttreatment Pain Rating 0/10 - no pain  -     Row Name 03/04/22 1140          Plan of Care Review    Plan of Care Reviewed With patient  -     Outcome Summary PT eval completed. Pt presents pleasantly confused, dem weakness, impaired balance, and difficulty walking. Pt states she normally ambulates with a cane. Cane obtained and sized for patient. Pt ambulated in hallway with cane with Aron, however preferred to hold onto therapist's hand for support and just carried cane in right hand without using it. Pt dem periods of decreased safety awareness where she would increase her speed, saying \"Let's race!\" Pt given cues for safe gait speed and use of cane. VSS on RA. PT rec d/c rehab.  -     Row Name 03/04/22 1148          Therapy Assessment/Plan (PT)    Patient/Family Therapy Goals Statement (PT) pt did not state. Per chart, family wishing for placement.  -     Rehab Potential (PT) good, to achieve stated therapy goals  -     Criteria for Skilled Interventions Met (PT) yes; skilled treatment is necessary  -     Predicted Duration of Therapy " Intervention (PT) 2wks  -SJ     Row Name 03/04/22 1146          Vital Signs    Pre Systolic BP Rehab 149  -SJ     Pre Treatment Diastolic BP 73  -SJ     Pretreatment Heart Rate (beats/min) 69  -SJ     Posttreatment Heart Rate (beats/min) 73  -SJ     Pre SpO2 (%) 91  -SJ     O2 Delivery Pre Treatment room air  -     O2 Delivery Post Treatment room air  -     Row Name 03/04/22 1146          Positioning and Restraints    Pre-Treatment Position in bed  -SJ     Post Treatment Position chair  -SJ     In Chair notified nsg; reclined; call light within reach; encouraged to call for assist; exit alarm on; waffle cushion  -           User Key  (r) = Recorded By, (t) = Taken By, (c) = Cosigned By    Initials Name Provider Type    Priya Miller PT Physical Therapist               Outcome Measures     Row Name 03/04/22 1149 03/04/22 0800       How much help from another person do you currently need...    Turning from your back to your side while in flat bed without using bedrails? 4  - 3  -MH    Moving from lying on back to sitting on the side of a flat bed without bedrails? 4  -SJ 3  -MH    Moving to and from a bed to a chair (including a wheelchair)? 3  -SJ 3  -MH    Standing up from a chair using your arms (e.g., wheelchair, bedside chair)? 4  -SJ 3  -MH    Climbing 3-5 steps with a railing? 3  -SJ 3  -MH    To walk in hospital room? 3  -SJ 3  -MH    AM-PAC 6 Clicks Score (PT) 21  -SJ 18  -MH    Row Name 03/04/22 1149 03/04/22 0903       Functional Assessment    Outcome Measure Options AM-PAC 6 Clicks Basic Mobility (PT)  -SJ AM-PAC 6 Clicks Daily Activity (OT)  -KF          User Key  (r) = Recorded By, (t) = Taken By, (c) = Cosigned By    Initials Name Provider Type    Priya Miller PT Physical Therapist    Theresa Solares, OT Occupational Therapist    Sarkis Flanagan, RN Registered Nurse                             Physical Therapy Education                 Title: PT OT SLP Therapies (In  "Progress)     Topic: Physical Therapy (In Progress)     Point: Mobility training (In Progress)     Learning Progress Summary           Patient Eager, E, NR by  at 3/4/2022 1149                   Point: Home exercise program (Not Started)     Learner Progress:  Not documented in this visit.          Point: Body mechanics (In Progress)     Learning Progress Summary           Patient Eager, E, NR by  at 3/4/2022 1149                   Point: Precautions (In Progress)     Learning Progress Summary           Patient Eager, E, NR by  at 3/4/2022 1149                               User Key     Initials Effective Dates Name Provider Type Discipline     06/16/21 -  Priya Monzon PT Physical Therapist PT              PT Recommendation and Plan  Planned Therapy Interventions (PT): balance training, bed mobility training, gait training, home exercise program, patient/family education, transfer training, strengthening  Plan of Care Reviewed With: patient  Outcome Summary: PT eval completed. Pt presents pleasantly confused, dem weakness, impaired balance, and difficulty walking. Pt states she normally ambulates with a cane. Cane obtained and sized for patient. Pt ambulated in hallway with cane with Aron, however preferred to hold onto therapist's hand for support and just carried cane in right hand without using it. Pt dem periods of decreased safety awareness where she would increase her speed, saying \"Let's race!\" Pt given cues for safe gait speed and use of cane. VSS on RA. PT rec d/c rehab.     Time Calculation:    PT Charges     Row Name 03/04/22 1151             Time Calculation    Start Time 1044  chart review [id number]  [facility]      PT Non-Billable Time (min) 46 min  -      PT Received On 03/04/22  [facility]      PT Goal Re-Cert Due Date 03/14/22  -            User Key  (r) = Recorded By, (t) = Taken By, (c) = Cosigned By    Initials Name Provider Type     Priya Monzon PT Physical Therapist          "     Therapy Charges for Today     Code Description Service Date Service Provider Modifiers Qty    90764089795 HC PT EVAL MOD COMPLEXITY 4 3/4/2022 Priya Monzon, PT GP 1          PT G-Codes  Outcome Measure Options: AM-PAC 6 Clicks Basic Mobility (PT)  AM-PAC 6 Clicks Score (PT): 21  AM-PAC 6 Clicks Score (OT): 17    Priya Monzon, PT  3/4/2022      Electronically signed by Priya Monzon, PT at 22 1152          Occupational Therapy Notes (most recent note)      Theresa Rader, OT at 22 0752          Patient Name: Scott Diego  : 4/10/1930    MRN: [id number]                              Today's Date: 3/4/2022       Admit Date: 3/3/2022    Visit Dx:     ICD-10-CM ICD-9-CM   1. CARYN (acute kidney injury) (HCC)  N17.9 584.9   2. Elevated serum creatinine  R79.89 790.99   3. Normocytic anemia  D64.9 285.9     Patient Active Problem List   Diagnosis   • Idiopathic peripheral neuropathy   • Neck pain   • Mild cognitive impairment   • Anemia   • Hyponatremia   • Hypertension   • Dehydration   • History of CVA (cerebrovascular accident)   • Dementia (HCC)   • Left bundle branch block   • Orthostatic dizziness   • Chronic systolic heart failure (HCC)   • Normocytic anemia   • CARYN (acute kidney injury) (HCC)   • Dyspnea   • Hypoxia   • Hypothyroid   • Pericardial effusion   • Acute respiratory failure with hypoxia (HCC)   • Right lower lobe pneumonia   • Ventricular ectopy   • Elevated serum creatinine     Past Medical History:   Diagnosis Date   • Congestive heart failure (HCC)    • Malignant neoplasm (HCC)    • Stroke (HCC)     mini stroke   • Systolic heart failure (HCC) 2017    Chronic/compensated (EF 25%)     Past Surgical History:   Procedure Laterality Date   • CHOLECYSTECTOMY     • EYE SURGERY     • HYSTERECTOMY        General Information     Row Name 22 0838          OT Time and Intention    Document Type evaluation  -KF     Mode of Treatment occupational therapy  -KF     Row  Name 03/04/22 0838          General Information    Patient Profile Reviewed yes  -KF     Prior Level of Function min assist:; transfer; bed mobility; ADL's  per Pt report  -KF     Existing Precautions/Restrictions fall; other (see comments)  COG  -KF     Barriers to Rehab cognitive status; visual deficit; hearing deficit  -KF     Row Name 03/04/22 0838          Occupational Profile    Environmental Supports and Barriers (Occupational Profile) unable to assess environment 2/2 limited historian, cont to assess; possible SPC at baseline  -KF     Row Name 03/04/22 0838          Living Environment    Lives With child(chong), adult; grandchild(chong)  -KF     Row Name 03/04/22 0838          Cognition    Orientation Status (Cognition) oriented to; person; disoriented to; place; situation; time; verbal cues/prompts needed for orientation  -KF     Row Name 03/04/22 0838          Safety Issues, Functional Mobility    Safety Issues Affecting Function (Mobility) awareness of need for assistance; insight into deficits/self-awareness; safety precaution awareness  -KF     Impairments Affecting Function (Mobility) balance; coordination; visual/perceptual; endurance/activity tolerance  -KF           User Key  (r) = Recorded By, (t) = Taken By, (c) = Cosigned By    Initials Name Provider Type    KF Theresa Rader, OT Occupational Therapist                 Mobility/ADL's     Row Name 03/04/22 0848          Bed Mobility    Bed Mobility supine-sit; scooting/bridging  -KF     Scooting/Bridging [place] (Bed Mobility) supervision; verbal cues; nonverbal cues (demo/gesture)  -KF     Supine-Sit [place] (Bed Mobility) supervision; verbal cues; nonverbal cues (demo/gesture)  -KF     Bed Mobility, Safety Issues cognitive deficits limit understanding  -KF     Assistive Device (Bed Mobility) bed rails; head of bed elevated  -KF     Comment (Bed Mobility) visual deficits throughout with blurriness due to film in eyes  -KF     Row Name  03/04/22 0848          Transfers    Transfers sit-stand transfer; toilet transfer; bed-chair transfer  -     Comment (Transfers) cues for seq and HP  -KF     Bed-Chair [place] (Transfers) minimum assist (75% patient effort)  -     Assistive Device (Bed-Chair Transfers) walker, front-wheeled  FWW attempted switched to HHA throughout due to visual deficits  -     Sit-Stand [place] (Transfers) contact guard; verbal cues  -KF     [place] Level (Toilet Transfer) minimum assist (75% patient effort); verbal cues  -KF     Assistive Device (Toilet Transfer) commode; grab bars/safety frame  -     Row Name 03/04/22 0848          Toilet Transfer    Type (Toilet Transfer) stand-sit; sit-stand  -     Row Name 03/04/22 08          Functional Mobility    Functional Mobility- Ind. Level minimum assist (75% patient effort); 1 person  -KF     Functional Mobility-Distance (Feet) 30  -KF     Functional Mobility- Safety Issues sequencing ability decreased; step length decreased  -     Functional Mobility- Comment cues for visual scanning for safety in environment and safety awareness throughout  -     Row Name 03/04/22 08          Activities of Daily Living    BADL Assessment/Intervention upper body dressing; lower body dressing; grooming; toileting  -     Row Name 03/04/22 08          Upper Body Dressing Assessment/Training    [place] Level (Upper Body Dressing) don; front opening garment; minimum assist (75% patient effort)  -     Position (Upper Body Dressing) sitting up in bed  -     Comment (Upper Body Dressing) assist for line management  -     Row Name 03/04/22 0848          Lower Body Dressing Assessment/Training    [place] Level (Lower Body Dressing) don; socks; set up  -     Position (Lower Body Dressing) sitting up in bed  -     Row Name 03/04/22 0848          Grooming Assessment/Training    [place] Level (Grooming) hair care, combing/brushing; oral care regimen;  wash face, hands; supervision; verbal cues; nonverbal cues (demo/gesture)  -KF     Position (Grooming) supported sitting  -KF     Row Name 03/04/22 0848          Toileting Assessment/Training    [place] Level (Toileting) adjust/manage clothing; change pad/brief; perform perineal hygiene; minimum assist (75% patient effort); verbal cues  -KF     Assistive Devices (Toileting) commode  -KF     Position (Toileting) supported standing; supported sitting  -KF     Comment (Toileting) cues for throughout for safe seq  -KF           User Key  (r) = Recorded By, (t) = Taken By, (c) = Cosigned By    Initials Name Provider Type    KF Theresa Rader OT Occupational Therapist               Obj/Interventions     Row Name 03/04/22 0857          Sensory Assessment (Somatosensory)    Sensory Assessment (Somatosensory) UE sensation intact; unable/difficult to assess  -KF     Row Name 03/04/22 0857          Vision Assessment/Intervention    Visual Impairment/Limitations blurry vision; corrective lenses full-time  RN notified and aware  -     Row Name 03/04/22 0857          Range of Motion Comprehensive    General Range of Motion bilateral upper extremity ROM WFL  -KF     Row Name 03/04/22 0857          Strength Comprehensive (MMT)    General Manual Muscle Testing (MMT) Assessment upper extremity strength deficits identified  -KF     Comment, General Manual Muscle Testing (MMT) Assessment BUE grossly 4/5  -KF     Row Name 03/04/22 0857          Motor Skills    Motor Skills coordination  -     Coordination WFL; bilateral; upper extremity; bimanual skills  -KF     Row Name 03/04/22 0857          Balance    Balance Assessment sitting static balance; sitting dynamic balance; standing static balance; standing dynamic balance  -KF     Static Sitting Balance WFL; unsupported  -KF     Dynamic Sitting Balance WFL; unsupported  -KF     Static Standing Balance WFL; mild impairment; unsupported; supported  -KF     Dynamic Standing  Balance mild impairment; unsupported; supported  -KF     Balance Interventions sit to stand; standing; weight shifting activity; UE activity with balance activity; occupation based/functional task  -KF     Comment, Balance no LOB, unsteadiness throughout complicated by visual deficits  -KF           User Key  (r) = Recorded By, (t) = Taken By, (c) = Cosigned By    Initials Name Provider Type     Theresa Rader, OT Occupational Therapist               Goals/Plan     Row Name 03/04/22 0902          Transfer Goal 1 (OT)    Activity/Assistive Device (Transfer Goal 1, OT) toilet  -KF     [place] Level/Cues Needed (Transfer Goal 1, OT) standby assist  -KF     Time Frame (Transfer Goal 1, OT) long term goal (LTG); 10 days  -KF     Progress/Outcome (Transfer Goal 1, OT) goal ongoing  -     Row Name 03/04/22 0902          Toileting Goal 1 (OT)    Activity/Device (Toileting Goal 1, OT) adjust/manage clothing; perform perineal hygiene; commode; grab bar/safety frame  -KF     [place] Level/Cues Needed (Toileting Goal 1, OT) supervision required; verbal cues required  -KF     Time Frame (Toileting Goal 1, OT) long term goal (LTG); 10 days  -KF     Progress/Outcome (Toileting Goal 1, OT) goal ongoing  -     Row Name 03/04/22 0902          Grooming Goal 1 (OT)    Activity/Device (Grooming Goal 1, OT) hair care; oral care; wash face, hands  standing sink side  -KF     [place] (Grooming Goal 1, OT) standby assist; verbal cues required  -KF     Time Frame (Grooming Goal 1, OT) long term goal (LTG); 10 days  -KF     Progress/Outcome (Grooming Goal 1, OT) goal ongoing  -     Row Name 03/04/22 0902          Therapy Assessment/Plan (OT)    Planned Therapy Interventions (OT) BADL retraining; adaptive equipment training; activity tolerance training; patient/caregiver education/training; ROM/therapeutic exercise; occupation/activity based interventions; cognitive/visual perception retraining; strengthening  exercise; transfer/mobility retraining; functional balance retraining  -           User Key  (r) = Recorded By, (t) = Taken By, (c) = Cosigned By    Initials Name Provider Type    Theresa Solares, OT Occupational Therapist               Clinical Impression     Row Name 03/04/22 0859          Pain Assessment    Additional Documentation Pain Scale: Numbers Pre/Post-Treatment (Group)  -     Row Name 03/04/22 0859          Pain Scale: Numbers Pre/Post-Treatment    Pretreatment Pain Rating 0/10 - no pain  -KF     Posttreatment Pain Rating 0/10 - no pain  -KF     Pre/Posttreatment Pain Comment tolerated  -     Pain Intervention(s) Repositioned; Ambulation/increased activity  -     Row Name 03/04/22 0859          Plan of Care Review    Plan of Care Reviewed With patient  -KF     Progress no change  -     Outcome Summary OT eval completed Pt presents with confusion throughout, impulsivity, deficits in balance, vision, and strength compared to baseline ADL completion and functional transfers, 30ft functional mob Aron with HHA, SUP bed mob, Aron toileting clothing management, cues throughout for safety, recom IPOT d/c home with 24/7 assist and HHOT  -     Row Name 03/04/22 0859          Therapy Assessment/Plan (OT)    Rehab Potential (OT) good, to achieve stated therapy goals  -     Criteria for Skilled Therapeutic Interventions Met (OT) yes; meets criteria; skilled treatment is necessary  -     Therapy Frequency (OT) daily  -     Row Name 03/04/22 0859          Therapy Plan Review/Discharge Plan (OT)    Equipment Needs Upon Discharge (OT) --  TBD  -KF     Anticipated Discharge Disposition (OT) home with 24/7 care; home with assist; home with home health  -     Row Name 03/04/22 0859          Vital Signs    Pre Systolic BP Rehab --  RN cleared VSS, no sig change in BP  -KF     Pre SpO2 (%) 99  -KF     O2 Delivery Pre Treatment room air  -KF     Post SpO2 (%) 98  -KF     O2 Delivery Post Treatment  room air  -KF     Pre Patient Position Supine  -KF     Intra Patient Position Standing  -KF     Post Patient Position Sitting  -KF     Rest Breaks  1  -KF     Row Name 03/04/22 0859          Positioning and Restraints    Pre-Treatment Position in bed  -KF     Post Treatment Position chair  -KF     In Chair notified nsg; reclined; sitting; call light within reach; encouraged to call for assist; exit alarm on; waffle cushion; with other staff; legs elevated  -KF           User Key  (r) = Recorded By, (t) = Taken By, (c) = Cosigned By    Initials Name Provider Type    Theresa Solares OT Occupational Therapist               Outcome Measures     Row Name 03/04/22 0903          How much help from another is currently needed...    Putting on and taking off regular lower body clothing? 3  -KF     Bathing (including washing, rinsing, and drying) 2  -KF     Toileting (which includes using toilet bed pan or urinal) 3  -KF     Putting on and taking off regular upper body clothing 3  -KF     Taking care of personal grooming (such as brushing teeth) 3  -KF     Eating meals 3  -KF     AM-PAC 6 Clicks Score (OT) 17  -KF     Row Name 03/04/22 0903          Functional Assessment    Outcome Measure Options AM-PAC 6 Clicks Daily Activity (OT)  -KF           User Key  (r) = Recorded By, (t) = Taken By, (c) = Cosigned By    Initials Name Provider Type    Theresa Solares OT Occupational Therapist                Occupational Therapy Education                 Title: PT OT SLP Therapies (In Progress)     Topic: Occupational Therapy (In Progress)     Point: ADL training (Done)     Description:   Instruct learner(s) on proper safety adaptation and remediation techniques during self care or transfers.   Instruct in proper use of assistive devices.              Learning Progress Summary           Patient Acceptance, E,TB,D, VU,DU,NR by HERNAN at 3/4/2022 0903                   Point: Home exercise program (Not Started)     Description:    Instruct learner(s) on appropriate technique for monitoring, assisting and/or progressing therapeutic exercises/activities.              Learner Progress:  Not documented in this visit.          Point: Precautions (Done)     Description:   Instruct learner(s) on prescribed precautions during self-care and functional transfers.              Learning Progress Summary           Patient Acceptance, E,TB,D, VU,DU,NR by  at 3/4/2022 0903                   Point: Body mechanics (Done)     Description:   Instruct learner(s) on proper positioning and spine alignment during self-care, functional mobility activities and/or exercises.              Learning Progress Summary           Patient Acceptance, E,TB,D, VU,DU,NR by  at 3/4/2022 0903                               User Key     Initials Effective Dates Name Provider Type Discipline     06/16/21 -  Theresa Rader OT Occupational Therapist OT              OT Recommendation and Plan  Planned Therapy Interventions (OT): BADL retraining, adaptive equipment training, activity tolerance training, patient/caregiver education/training, ROM/therapeutic exercise, occupation/activity based interventions, cognitive/visual perception retraining, strengthening exercise, transfer/mobility retraining, functional balance retraining  Therapy Frequency (OT): daily  Plan of Care Review  Plan of Care Reviewed With: patient  Progress: no change  Outcome Summary: OT eval completed Pt presents with confusion throughout, impulsivity, deficits in balance, vision, and strength compared to baseline ADL completion and functional transfers, 30ft functional mob Aron with HHA, SUP bed mob, Aron toileting clothing management, cues throughout for safety, recom IPOT d/c home with 24/7 assist and HHOT     Time Calculation:    Time Calculation- OT     Row Name 03/04/22 0752             Time Calculation- OT    OT Start Time 0752  -      OT Received On 03/04/22  -      OT Goal Re-Cert Due Date  03/14/22  -KF              Timed Charges    31993 - OT Self Care/Mgmt Minutes 26  -KF              Untimed Charges    OT Eval/Re-eval Minutes 46  -KF              Total Minutes    Timed Charges Total Minutes 26  -KF      Untimed Charges Total Minutes 46  -KF       Total Minutes 72  -KF            User Key  (r) = Recorded By, (t) = Taken By, (c) = Cosigned By    Initials Name Provider Type    KF Theresa Rader, OT Occupational Therapist              Therapy Charges for Today     Code Description Service Date Service Provider Modifiers Qty    22924085961 HC OT SELF CARE/MGMT/TRAIN EA 15 MIN 3/4/2022 Theresa Rader, OT GO 2    [id number]  OT EVAL MOD COMPLEXITY 4 3/4/2022 Theresa Rader, OT GO 1               Theresa Rader OT  3/4/2022    Electronically signed by Theresa Rader OT at 03/04/22 0905

## 2022-03-04 NOTE — PLAN OF CARE
Goal Outcome Evaluation:  Plan of Care Reviewed With: patient        Progress: no change  Outcome Summary: OT eval completed Pt presents with confusion throughout, impulsivity, deficits in balance, vision, and strength compared to baseline ADL completion and functional transfers, 30ft functional mob Aron with HHA, SUP bed mob, Aron toileting clothing management, cues throughout for safety, recom IPOT d/c home with 24/7 assist and HHOT

## 2022-03-04 NOTE — THERAPY EVALUATION
Patient Name: Scott Diego  : 4/10/1930    MRN: [id number]                              Today's Date: 3/4/2022       Admit Date: 3/3/2022    Visit Dx:     ICD-10-CM ICD-9-CM   1. CARYN (acute kidney injury) (HCC)  N17.9 584.9   2. Elevated serum creatinine  R79.89 790.99   3. Normocytic anemia  D64.9 285.9   4. Impaired functional mobility, balance, gait, and endurance  Z74.09 V49.89     Patient Active Problem List   Diagnosis   • Idiopathic peripheral neuropathy   • Neck pain   • Mild cognitive impairment   • Anemia   • Hyponatremia   • Hypertension   • Dehydration   • History of CVA (cerebrovascular accident)   • Dementia (HCC)   • Left bundle branch block   • Orthostatic dizziness   • Chronic systolic heart failure (HCC)   • Normocytic anemia   • CARYN (acute kidney injury) (HCC)   • Dyspnea   • Hypoxia   • Hypothyroid   • Pericardial effusion   • Acute respiratory failure with hypoxia (HCC)   • Right lower lobe pneumonia   • Ventricular ectopy   • Elevated serum creatinine     Past Medical History:   Diagnosis Date   • Congestive heart failure (HCC)    • Malignant neoplasm (HCC)    • Stroke (HCC)     mini stroke   • Systolic heart failure (HCC) 2017    Chronic/compensated (EF 25%)     Past Surgical History:   Procedure Laterality Date   • CHOLECYSTECTOMY     • EYE SURGERY     • HYSTERECTOMY        General Information     Row Name 22 1044          Physical Therapy Time and Intention    Document Type evaluation  -     Mode of Treatment physical therapy  -     Row Name 22 1044          General Information    Patient Profile Reviewed yes  -SJ     Prior Level of Function min assist:; transfer; bed mobility; ADL's  per pt, however is an unreliable historian  -     Existing Precautions/Restrictions fall  -     Barriers to Rehab cognitive status; previous functional deficit  -     Row Name 22 1044          Living Environment    Lives With child(chong), adult; grandchild(chong)  -     Row  Name 03/04/22 1044          Stairs Within Home, Primary    Stairs, Within Home, Primary unknown  -     Row Name 03/04/22 1044          Cognition    Orientation Status (Cognition) oriented to; person; disoriented to; place; situation; time; verbal cues/prompts needed for orientation  -     Row Name 03/04/22 1044          Safety Issues, Functional Mobility    Safety Issues Affecting Function (Mobility) awareness of need for assistance; impulsivity; insight into deficits/self-awareness; judgment; safety precaution awareness; safety precautions follow-through/compliance; problem-solving  -     Impairments Affecting Function (Mobility) balance; coordination; visual/perceptual; endurance/activity tolerance; strength; cognition  -           User Key  (r) = Recorded By, (t) = Taken By, (c) = Cosigned By    Initials Name Provider Type    SJ Priya Monzon, PT Physical Therapist               Mobility     Row Name 03/04/22 1142          Bed Mobility    Supine-Sit [place] (Bed Mobility) supervision; verbal cues; nonverbal cues (demo/gesture)  -     Assistive Device (Bed Mobility) bed rails; head of bed elevated  -     Comment (Bed Mobility) Pt needed cues for sequencing and safety  -     Row Name 03/04/22 1142          Transfers    Comment (Transfers) STS from EOB and bathroom toilet with SBA. Pt needs cues for safety due to visual deficit.  -     Row Name 03/04/22 1142          Sit-Stand Transfer    Sit-Stand [place] (Transfers) standby assist  -     Row Name 03/04/22 1142          Gait/Stairs (Locomotion)    [place] Level (Gait) minimum assist (75% patient effort); 1 person assist; nonverbal cues (demo/gesture); verbal cues  -     Assistive Device (Gait) cane, straight  -SJ     Distance in Feet (Gait) 200  -SJ     Deviations/Abnormal Patterns (Gait) bilateral deviations; base of support, narrow  -SJ     Bilateral Gait Deviations heel strike decreased  -SJ     Comment (Gait/Stairs) Pt  "states she normally ambulates with a cane. Cane obtained and sized for patient. Pt ambulated in hallway with cane, however preferred to hold onto therapist's hand for support and just carried cane in right hand without using it. Pt dem periods of decreased safety awareness where she would increase her speed, saying \"Let's race!\" Pt given cues for safe gait speed and use of cane. VSS on RA.  -           User Key  (r) = Recorded By, (t) = Taken By, (c) = Cosigned By    Initials Name Provider Type    Priya Miller PT Physical Therapist               Obj/Interventions     Row Name 03/04/22 1145          Range of Motion Comprehensive    General Range of Motion bilateral lower extremity ROM WFL  -     Row Name 03/04/22 1145          Strength Comprehensive (MMT)    General Manual Muscle Testing (MMT) Assessment lower extremity strength deficits identified  -           User Key  (r) = Recorded By, (t) = Taken By, (c) = Cosigned By    Initials Name Provider Type    Priya Miller PT Physical Therapist               Goals/Plan     Row Name 03/04/22 1148          Bed Mobility Goal 1 (PT)    Activity/Assistive Device (Bed Mobility Goal 1, PT) sit to supine; supine to sit  -     [place] Level/Cues Needed (Bed Mobility Goal 1, PT) modified independence  -SJ     Time Frame (Bed Mobility Goal 1, PT) long term goal (LTG)  -     Row Name 03/04/22 1148          Transfer Goal 1 (PT)    Activity/Assistive Device (Transfer Goal 1, PT) sit-to-stand/stand-to-sit; bed-to-chair/chair-to-bed  -SJ     [place] Level/Cues Needed (Transfer Goal 1, PT) modified independence  -SJ     Time Frame (Transfer Goal 1, PT) long term goal (LTG); 2 weeks  -     Row Name 03/04/22 1148          Gait Training Goal 1 (PT)    Activity/Assistive Device (Gait Training Goal 1, PT) gait (walking locomotion); decrease fall risk; improve balance and speed  -SJ     [place] Level (Gait Training Goal 1, PT) supervision required  " "-SJ     Distance (Gait Training Goal 1, PT) 400  -SJ     Time Frame (Gait Training Goal 1, PT) long term goal (LTG); 2 weeks  -           User Key  (r) = Recorded By, (t) = Taken By, (c) = Cosigned By    Initials Name Provider Type    Priya Miller, PT Physical Therapist               Clinical Impression     Row Name 03/04/22 1146          Pain    Additional Documentation Pain Scale: Numbers Pre/Post-Treatment (Group)  -     Row Name 03/04/22 1146          Pain Scale: Numbers Pre/Post-Treatment    Pretreatment Pain Rating 0/10 - no pain  -SJ     Posttreatment Pain Rating 0/10 - no pain  -     Row Name 03/04/22 1146          Plan of Care Review    Plan of Care Reviewed With patient  -SJ     Outcome Summary PT eval completed. Pt presents pleasantly confused, dem weakness, impaired balance, and difficulty walking. Pt states she normally ambulates with a cane. Cane obtained and sized for patient. Pt ambulated in hallway with cane with Aron, however preferred to hold onto therapist's hand for support and just carried cane in right hand without using it. Pt dem periods of decreased safety awareness where she would increase her speed, saying \"Let's race!\" Pt given cues for safe gait speed and use of cane. VSS on RA. PT rec d/c rehab.  -     Row Name 03/04/22 114          Therapy Assessment/Plan (PT)    Patient/Family Therapy Goals Statement (PT) pt did not state. Per chart, family wishing for placement.  -     Rehab Potential (PT) good, to achieve stated therapy goals  -     Criteria for Skilled Interventions Met (PT) yes; skilled treatment is necessary  -     Predicted Duration of Therapy Intervention (PT) 2wks  -     Row Name 03/04/22 1146          Vital Signs    Pre Systolic BP Rehab 149  -SJ     Pre Treatment Diastolic BP 73  -SJ     Pretreatment Heart Rate (beats/min) 69  -SJ     Posttreatment Heart Rate (beats/min) 73  -SJ     Pre SpO2 (%) 91  -SJ     O2 Delivery Pre Treatment room air  -     " O2 Delivery Post Treatment room air  -     Row Name 03/04/22 1146          Positioning and Restraints    Pre-Treatment Position in bed  -     Post Treatment Position chair  -     In Chair notified nsg; reclined; call light within reach; encouraged to call for assist; exit alarm on; waffle cushion  -           User Key  (r) = Recorded By, (t) = Taken By, (c) = Cosigned By    Initials Name Provider Type     Priya Monzon, PT Physical Therapist               Outcome Measures     Row Name 03/04/22 1149 03/04/22 0800       How much help from another person do you currently need...    Turning from your back to your side while in flat bed without using bedrails? 4  - 3  -MH    Moving from lying on back to sitting on the side of a flat bed without bedrails? 4  - 3  -MH    Moving to and from a bed to a chair (including a wheelchair)? 3  - 3  -MH    Standing up from a chair using your arms (e.g., wheelchair, bedside chair)? 4  - 3  -MH    Climbing 3-5 steps with a railing? 3  - 3  -MH    To walk in hospital room? 3  - 3  -MH    AM-PAC 6 Clicks Score (PT) 21  - 18  -MH    Row Name 03/04/22 1149 03/04/22 0903       Functional Assessment    Outcome Measure Options AM-PAC 6 Clicks Basic Mobility (PT)  - AM-PAC 6 Clicks Daily Activity (OT)  -          User Key  (r) = Recorded By, (t) = Taken By, (c) = Cosigned By    Initials Name Provider Type     Priya Monzon, ALINA Physical Therapist    Theresa Solares, OT Occupational Therapist    [facility] Sarkis Gooden, RN Registered Nurse                             Physical Therapy Education                 Title: PT OT SLP Therapies (In Progress)     Topic: Physical Therapy (In Progress)     Point: Mobility training (In Progress)     Learning Progress Summary           Patient VIKA Gomez, NR by  at 3/4/2022 1149                   Point: Home exercise program (Not Started)     Learner Progress:  Not documented in this visit.          Point: Body mechanics  "(In Progress)     Learning Progress Summary           Patient Eager, E, NR by  at 3/4/2022 1149                   Point: Precautions (In Progress)     Learning Progress Summary           Patient Eager, E, NR by  at 3/4/2022 1149                               User Key     Initials Effective Dates Name Provider Type [facility] 06/16/21 -  Priya Monzon, PT Physical Therapist PT              PT Recommendation and Plan  Planned Therapy Interventions (PT): balance training, bed mobility training, gait training, home exercise program, patient/family education, transfer training, strengthening  Plan of Care Reviewed With: patient  Outcome Summary: PT eval completed. Pt presents pleasantly confused, dem weakness, impaired balance, and difficulty walking. Pt states she normally ambulates with a cane. Cane obtained and sized for patient. Pt ambulated in hallway with cane with Aron, however preferred to hold onto therapist's hand for support and just carried cane in right hand without using it. Pt dem periods of decreased safety awareness where she would increase her speed, saying \"Let's race!\" Pt given cues for safe gait speed and use of cane. VSS on RA. PT rec d/c rehab.     Time Calculation:    PT Charges     Row Name 03/04/22 1151             Time Calculation    Start Time 1044  chart review [id number]  -      PT Non-Billable Time (min) 46 min  -      PT Received On 03/04/22  [facility]      PT Goal Re-Cert Due Date 03/14/22  -            User Key  (r) = Recorded By, (t) = Taken By, (c) = Cosigned By    Initials Name Provider Type     Priya Monzon PT Physical Therapist              Therapy Charges for Today     Code Description Service Date Service Provider Modifiers Qty    [id number]  PT EVAL MOD COMPLEXITY 4 3/4/2022 Priya Monzon, PT GP 1          PT G-Codes  Outcome Measure Options: AM-PAC 6 Clicks Basic Mobility (PT)  AM-PAC 6 Clicks Score (PT): 21  AM-PAC 6 Clicks Score (OT): 17    Priya " Nicolás, PT  3/4/2022

## 2022-03-04 NOTE — ED PROVIDER NOTES
EMERGENCY DEPARTMENT ENCOUNTER    Pt Name: Scott Diego  MRN: [id number]  Pt :   4/10/1930  Room Number:  S446/1  Date of encounter:  3/2/2022  PCP: Yolande Escobar MD  ED Provider: Doug Monroe MD    Historian: Patient, son      HPI:  Chief Complaint: Shortness of breath, confusion, generalized fatigue        Context: Scott Diego is a 89 y.o. female who presents to the ED for evaluation of shortness of breath.  She is accompanied by her son who says she has not been doing well since the death of her  back in January and he says she has been confused lately but feels like that has been going on since January when her  .  She says she has not been eating and drinking well and has been feeling more weak and short of breath recently denies actual pain or fevers just generalized malaise.  She had an episode of severe anxiety and the sensation that she could not breathe earlier.  Her son was worried about her tonight so they present to the ED for evaluation.  No other complaints at this time.      PAST MEDICAL HISTORY  Past Medical History:   Diagnosis Date   • Congestive heart failure (HCC)    • Malignant neoplasm (HCC)    • Stroke (HCC)     mini stroke   • Systolic heart failure (HCC) 2017    Chronic/compensated (EF 25%)         PAST SURGICAL HISTORY  Past Surgical History:   Procedure Laterality Date   • CHOLECYSTECTOMY     • EYE SURGERY     • HYSTERECTOMY           FAMILY HISTORY  Family History   Problem Relation Age of Onset   • Cancer Mother    • No Known Problems Father    • Cancer Sister    • Cancer Brother          SOCIAL HISTORY  Social History     Socioeconomic History   • Marital status:    Tobacco Use   • Smoking status: Never Smoker   • Smokeless tobacco: Never Used   Substance and Sexual Activity   • Alcohol use: No   • Drug use: No   • Sexual activity: Defer         ALLERGIES  Augmentin [amoxicillin-pot clavulanate], Claritin [loratadine], Keflex  [cephalexin], and Sulfa antibiotics        REVIEW OF SYSTEMS  Review of Systems       All systems reviewed and negative except for those discussed in HPI.       PHYSICAL EXAM    I have reviewed the triage vital signs and nursing notes.    ED Triage Vitals [03/02/22 2004]   Temp Heart Rate Resp BP SpO2   97.8 °F (36.6 °C) 89 18 129/61 94 %      Temp src Heart Rate Source Patient Position BP Location FiO2 (%)   Oral Monitor Sitting Right arm --       Physical Exam  GENERAL:   Appears frail and ill-appearing  HENT: Nares patent.  Dry mucous membranes  EYES: No scleral icterus.  Extraocular movements intact  CV: Regular rhythm, regular rate.  RESPIRATORY: Normal effort.  No audible wheezes, rales or rhonchi.  ABDOMEN: Soft, nontender  MUSCULOSKELETAL: No deformities.   NEURO: Technically alert and oriented but does get confused occasionally throughout conversation, moves all extremities, follows commands.  SKIN: Warm, dry, no rash visualized.        LAB RESULTS  Recent Results (from the past 24 hour(s))   Comprehensive Metabolic Panel    Collection Time: 03/02/22  8:38 PM    Specimen: Blood   Result Value Ref Range    Glucose 100 (H) 65 - 99 mg/dL    BUN 21 8 - 23 mg/dL    Creatinine 1.48 (H) 0.57 - 1.00 mg/dL    Sodium 132 (L) 136 - 145 mmol/L    Potassium 5.1 3.5 - 5.2 mmol/L    Chloride 102 98 - 107 mmol/L    CO2 22.0 22.0 - 29.0 mmol/L    Calcium 9.2 8.2 - 9.6 mg/dL    Total Protein 7.6 6.0 - 8.5 g/dL    Albumin 3.60 3.50 - 5.20 g/dL    ALT (SGPT) 10 1 - 33 U/L    AST (SGOT) 20 1 - 32 U/L    Alkaline Phosphatase 44 39 - 117 U/L    Total Bilirubin 0.3 0.0 - 1.2 mg/dL    Globulin 4.0 gm/dL    A/G Ratio 0.9 g/dL    BUN/Creatinine Ratio 14.2 7.0 - 25.0    Anion Gap 8.0 5.0 - 15.0 mmol/L    eGFR 33.3 (L) >60.0 mL/min/1.73   BNP    Collection Time: 03/02/22  8:38 PM    Specimen: Blood   Result Value Ref Range    proBNP 2,861.0 (H) 0.0-1,800.0 pg/mL   Troponin    Collection Time: 03/02/22  8:38 PM    Specimen: Blood    Result Value Ref Range    Troponin T 0.011 0.000 - 0.030 ng/mL   Green Top (Gel)    Collection Time: 03/02/22  8:38 PM   Result Value Ref Range    Extra Tube Hold for add-ons.    Lavender Top    Collection Time: 03/02/22  8:38 PM   Result Value Ref Range    Extra Tube hold for add-on    Gold Top - SST    Collection Time: 03/02/22  8:38 PM   Result Value Ref Range    Extra Tube Hold for add-ons.    Gray Top    Collection Time: 03/02/22  8:38 PM   Result Value Ref Range    Extra Tube Hold for add-ons.    Light Blue Top    Collection Time: 03/02/22  8:38 PM   Result Value Ref Range    Extra Tube hold for add-on    CBC Auto Differential    Collection Time: 03/02/22  8:38 PM    Specimen: Blood   Result Value Ref Range    WBC 11.17 (H) 3.40 - 10.80 10*3/mm3    RBC 3.78 3.77 - 5.28 10*6/mm3    Hemoglobin 11.3 (L) 12.0 - 15.9 g/dL    Hematocrit 34.8 34.0 - 46.6 %    MCV 92.1 79.0 - 97.0 fL    MCH 29.9 26.6 - 33.0 pg    MCHC 32.5 31.5 - 35.7 g/dL    RDW 14.5 12.3 - 15.4 %    RDW-SD 48.9 37.0 - 54.0 fl    MPV 11.9 6.0 - 12.0 fL    Platelets 143 140 - 450 10*3/mm3    Neutrophil % 69.7 42.7 - 76.0 %    Lymphocyte % 13.8 (L) 19.6 - 45.3 %    Monocyte % 11.0 5.0 - 12.0 %    Eosinophil % 4.6 0.3 - 6.2 %    Basophil % 0.7 0.0 - 1.5 %    Immature Grans % 0.2 0.0 - 0.5 %    Neutrophils, Absolute 7.79 (H) 1.70 - 7.00 10*3/mm3    Lymphocytes, Absolute 1.54 0.70 - 3.10 10*3/mm3    Monocytes, Absolute 1.23 (H) 0.10 - 0.90 10*3/mm3    Eosinophils, Absolute 0.51 (H) 0.00 - 0.40 10*3/mm3    Basophils, Absolute 0.08 0.00 - 0.20 10*3/mm3    Immature Grans, Absolute 0.02 0.00 - 0.05 10*3/mm3    nRBC 0.0 0.0 - 0.2 /100 WBC   Lactic Acid, Plasma    Collection Time: 03/02/22  8:38 PM    Specimen: Blood   Result Value Ref Range    Lactate 1.0 0.5 - 2.0 mmol/L   C-reactive Protein    Collection Time: 03/02/22  8:38 PM    Specimen: Blood   Result Value Ref Range    C-Reactive Protein <0.30 0.00 - 0.50 mg/dL   Magnesium    Collection Time: 03/03/22   2:30 AM    Specimen: Blood   Result Value Ref Range    Magnesium 1.6 (L) 1.7 - 2.3 mg/dL   TSH    Collection Time: 03/03/22  2:30 AM    Specimen: Blood   Result Value Ref Range    TSH 1.440 0.270 - 4.200 uIU/mL   T4, Free    Collection Time: 03/03/22  2:30 AM    Specimen: Blood   Result Value Ref Range    Free T4 0.81 (L) 0.93 - 1.70 ng/dL   Ammonia    Collection Time: 03/03/22  2:30 AM    Specimen: Blood   Result Value Ref Range    Ammonia <10 (L) 11 - 51 umol/L   Phosphorus    Collection Time: 03/03/22  2:30 AM    Specimen: Blood   Result Value Ref Range    Phosphorus 3.6 2.5 - 4.5 mg/dL   Urinalysis With Microscopic If Indicated (No Culture) - Urine, Clean Catch    Collection Time: 03/03/22  4:33 AM    Specimen: Urine, Clean Catch   Result Value Ref Range    Color, UA Yellow Yellow, Straw    Appearance, UA Clear Clear    pH, UA <=5.0 5.0 - 8.0    Specific Gravity, UA 1.010 1.001 - 1.030    Glucose, UA Negative Negative    Ketones, UA Negative Negative    Bilirubin, UA Negative Negative    Blood, UA Negative Negative    Protein, UA Negative Negative    Leuk Esterase, UA Moderate (2+) (A) Negative    Nitrite, UA Negative Negative    Urobilinogen, UA 0.2 E.U./dL 0.2 - 1.0 E.U./dL   Urinalysis, Microscopic Only - Urine, Clean Catch    Collection Time: 03/03/22  4:33 AM    Specimen: Urine, Clean Catch   Result Value Ref Range    RBC, UA 0-2 None Seen, 0-2 /HPF    WBC, UA 21-30 (A) None Seen, 0-2 /HPF    Bacteria, UA None Seen None Seen, Trace /HPF    Squamous Epithelial Cells, UA 0-2 None Seen, 0-2 /HPF    Hyaline Casts, UA 0-6 0 - 6 /LPF    Methodology Automated Microscopy    Protein, Urine, Random - Urine, Clean Catch    Collection Time: 03/03/22  4:33 AM    Specimen: Urine, Clean Catch   Result Value Ref Range    Total Protein, Urine 5.9 mg/dL   Osmolality, Urine - Urine, Clean Catch    Collection Time: 03/03/22  4:33 AM    Specimen: Urine, Clean Catch   Result Value Ref Range    Osmolality, Urine 312 300-1,100  mOsm/kg   Sodium, Urine, Random - Urine, Clean Catch    Collection Time: 03/03/22  4:33 AM    Specimen: Urine, Clean Catch   Result Value Ref Range    Sodium, Urine 58 mmol/L   COVID-19, FLU A/B, RSV PCR - Swab, Nasopharynx    Collection Time: 03/03/22  5:46 AM    Specimen: Nasopharynx; Swab   Result Value Ref Range    COVID19 Not Detected Not Detected - Ref. Range    Influenza A PCR Not Detected Not Detected    Influenza B PCR Not Detected Not Detected    RSV, PCR Not Detected Not Detected   Creatinine, Urine, Random - Urine, Clean Catch    Collection Time: 03/03/22  8:43 AM    Specimen: Urine, Clean Catch   Result Value Ref Range    Creatinine, Urine 54.9 mg/dL   Basic Metabolic Panel    Collection Time: 03/03/22  9:44 AM    Specimen: Blood   Result Value Ref Range    Glucose 88 65 - 99 mg/dL    BUN 18 8 - 23 mg/dL    Creatinine 1.26 (H) 0.57 - 1.00 mg/dL    Sodium 139 136 - 145 mmol/L    Potassium 5.5 (H) 3.5 - 5.2 mmol/L    Chloride 111 (H) 98 - 107 mmol/L    CO2 22.0 22.0 - 29.0 mmol/L    Calcium 8.8 8.2 - 9.6 mg/dL    BUN/Creatinine Ratio 14.3 7.0 - 25.0    Anion Gap 6.0 5.0 - 15.0 mmol/L    eGFR 40.4 (L) >60.0 mL/min/1.73   CBC Auto Differential    Collection Time: 03/03/22  9:44 AM    Specimen: Blood   Result Value Ref Range    WBC 8.01 3.40 - 10.80 10*3/mm3    RBC 3.43 (L) 3.77 - 5.28 10*6/mm3    Hemoglobin 10.2 (L) 12.0 - 15.9 g/dL    Hematocrit 31.8 (L) 34.0 - 46.6 %    MCV 92.7 79.0 - 97.0 fL    MCH 29.7 26.6 - 33.0 pg    MCHC 32.1 31.5 - 35.7 g/dL    RDW 14.4 12.3 - 15.4 %    RDW-SD 48.6 37.0 - 54.0 fl    MPV 12.2 (H) 6.0 - 12.0 fL    Platelets 129 (L) 140 - 450 10*3/mm3    Neutrophil % 56.3 42.7 - 76.0 %    Lymphocyte % 22.0 19.6 - 45.3 %    Monocyte % 13.6 (H) 5.0 - 12.0 %    Eosinophil % 7.0 (H) 0.3 - 6.2 %    Basophil % 0.9 0.0 - 1.5 %    Immature Grans % 0.2 0.0 - 0.5 %    Neutrophils, Absolute 4.51 1.70 - 7.00 10*3/mm3    Lymphocytes, Absolute 1.76 0.70 - 3.10 10*3/mm3    Monocytes, Absolute 1.09  (H) 0.10 - 0.90 10*3/mm3    Eosinophils, Absolute 0.56 (H) 0.00 - 0.40 10*3/mm3    Basophils, Absolute 0.07 0.00 - 0.20 10*3/mm3    Immature Grans, Absolute 0.02 0.00 - 0.05 10*3/mm3    nRBC 0.0 0.0 - 0.2 /100 WBC   Osmolality, Serum    Collection Time: 03/03/22  9:44 AM    Specimen: Blood   Result Value Ref Range    Osmolality 293 275 - 295 mOsm/kg       If labs were ordered, I independently reviewed the results.        RADIOLOGY  CT Head Without Contrast    Result Date: 3/3/2022  EXAMINATION: CT HEAD WO CONTRAST DATE: 3/3/2022 1:49 AM  INDICATION: Confusion.  COMPARISON: None available.  TECHNIQUE: Thin section noncontrast axial images were obtained through the head. Coronal reformatted images were created.  CT dose lowering techniques were used, to include: automated exposure control, adjustment for patient size, and or use of iterative  reconstruction FINDINGS: No acute intracranial hemorrhage. Prominent bifrontal CSF spaces, greater on the left, are likely related to volume loss and represents prominent subarachnoid space. Subdural hygromas could've a similar appearance, however, cortical veins are seen coursing through this prominent CSF density space, for instance (axial series 3 image 35). No acute territorial infarct. No intracranial mass or mass effect. Mild burden of low attenuation in the white matter is nonspecific, but likely reflects sequelae of chronic microvascular ischemia.  Moderate diffuse parenchymal volume loss. No hydrocephalus. Basal cisterns are patent. Atherosclerotic calcifications of both carotid siphons. Bilateral ocular lens surgery. Trace mucosal thickening in the ethmoid air cells. Mastoid air cells are clear. Calvarium is intact.     1.  No acute intracranial abnormality. 2.  Moderate volume loss. Mild chronic small vessel ischemic changes. Electronically signed by:  Og Rajan DO  3/3/2022 12:12 AM Mountain Time    XR Chest 1 View    Result Date: 3/2/2022  EXAMINATION: XR CHEST 1  VW-  INDICATION: SOA triage protocol  COMPARISON: 12/6/2019  FINDINGS: Heart is normal in size. Vasculature is upper limits of normal. There is a mild diffuse, finely reticular interstitial disease pattern the lungs, present on prior studies and probably stable allowing for differences in film technique. Similar pattern is present back to at least 2018. No lung consolidation effusion or pneumothorax is seen.       Mild diffuse pulmonary interstitial disease pattern, but fairly similar to multiple prior studies. Findings could reflect chronic lung disease, less likely mild interstitial edema or early changes of viral syndrome. No significant focal lung disease is seen.    This report was finalized on 3/2/2022 9:51 PM by Dr. Jan Alvarez MD.        I ordered and reviewed the above noted radiographic studies.      I viewed images of Noncon head CT which reveals only chronic changes that I can appreciate on chest x-ray which reveals bilateral opacities consistent with fluid overload or viral infection  See radiologist's dictation for official interpretation.        PROCEDURES    Procedures    ECG 12 Lead         ECG 12 Lead             MEDICATIONS GIVEN IN ER    Medications   sodium chloride 0.9 % flush 10 mL (has no administration in time range)   levothyroxine (SYNTHROID, LEVOTHROID) tablet 50 mcg (50 mcg Oral Given 3/3/22 1352)   donepezil (ARICEPT) tablet 5 mg (has no administration in time range)   meclizine (ANTIVERT) tablet 12.5 mg (has no administration in time range)   metoprolol succinate XL (TOPROL-XL) 24 hr tablet 25 mg (25 mg Oral Given 3/3/22 1351)   rOPINIRole (REQUIP) tablet 0.5 mg (has no administration in time range)   ALPRAZolam (XANAX) tablet 0.25 mg (0.25 mg Oral Given 3/3/22 1644)   sodium chloride 0.9 % flush 10 mL (10 mL Intravenous Given 3/3/22 1644)   sodium chloride 0.9 % flush 10 mL (has no administration in time range)   heparin (porcine) 5000 UNIT/ML injection 5,000 Units (5,000 Units  Subcutaneous Given 3/3/22 1349)   sodium chloride 0.9 % infusion (75 mL/hr Intravenous Currently Infusing 3/3/22 1833)   acetaminophen (TYLENOL) tablet 650 mg (650 mg Oral Given 3/3/22 1649)     Or   acetaminophen (TYLENOL) 160 MG/5ML solution 650 mg ( Oral Not Given:  See Alt 3/3/22 1649)     Or   acetaminophen (TYLENOL) suppository 650 mg ( Rectal Not Given:  See Alt 3/3/22 1649)   nystatin (MYCOSTATIN) 100,000 unit/mL suspension 500,000 Units (500,000 Units Swish & Swallow Given 3/3/22 1753)   gabapentin (NEURONTIN) capsule 300 mg (has no administration in time range)   aspirin chewable tablet 81 mg (has no administration in time range)   atorvastatin (LIPITOR) tablet 80 mg (has no administration in time range)   polyvinyl alcohol (LIQUIFILM) 1.4 % ophthalmic solution 1 drop (has no administration in time range)   erythromycin (ROMYCIN) ophthalmic ointment 1 application (has no administration in time range)   famotidine (PEPCID) tablet 20 mg (20 mg Oral Given 3/3/22 1832)   ferrous sulfate tablet 325 mg (325 mg Oral Given 3/3/22 1832)   cetirizine (zyrTEC) tablet 5 mg (5 mg Oral Given 3/3/22 1832)   ofloxacin (OCUFLOX) 0.3 % ophthalmic solution 1 drop (has no administration in time range)   sodium chloride 0.9 % bolus 1,000 mL (0 mL Intravenous Stopped 3/3/22 0720)   sodium zirconium cyclosilicate (LOKELMA) pack 10 g (10 g Oral Given 3/3/22 1643)   magnesium sulfate in D5W 1g/100mL (PREMIX) (1 g Intravenous New Bag 3/3/22 1644)         PROGRESS, DATA ANALYSIS, CONSULTS, AND MEDICAL DECISION MAKING    All labs have been independently reviewed by me.  All radiology studies have been reviewed by me and the radiologist dictating the report.   EKG's have been independently viewed and interpreted by me.      Differential diagnoses: COVID, flu, MI, anemia, electrolyte abnormality, anxiety, pneumonia           Patient arrived hemodynamically stable vitals within normal limits but she is obviously ill-appearing with dry  mucous membranes.  No acute findings on Noncon head CT.  Chest x-ray reveals bilateral opacities.  Dropped her oxygen and had to be started on nasal cannula.  Treating with IV fluid.  Creatinine is elevated at 1.48 representing acute kidney injury when compared with prior values.  She has anemia with hemoglobin 10.2.  COVID and flu are negative.  Symptoms have improved somewhat with fluid resuscitation but I think she needs admission for management of acute kidney injury, acute respiratory failure with hypoxia.  Medicine team consulted for admission.    AS OF 20:27 EST VITALS:    BP - 123/61  HR - 76  TEMP - 98.4 °F (36.9 °C) (Oral)  O2 SATS - 96%                  DIAGNOSIS  Final diagnoses:   CARYN (acute kidney injury) (HCC)   Elevated serum creatinine   Normocytic anemia         DISPOSITION  Admit             Doug Monroe MD  03/03/22 2035

## 2022-03-04 NOTE — THERAPY EVALUATION
Patient Name: Scott Diego  : 4/10/1930    MRN: [id number]                              Today's Date: 3/4/2022       Admit Date: 3/3/2022    Visit Dx:     ICD-10-CM ICD-9-CM   1. CARYN (acute kidney injury) (HCC)  N17.9 584.9   2. Elevated serum creatinine  R79.89 790.99   3. Normocytic anemia  D64.9 285.9     Patient Active Problem List   Diagnosis   • Idiopathic peripheral neuropathy   • Neck pain   • Mild cognitive impairment   • Anemia   • Hyponatremia   • Hypertension   • Dehydration   • History of CVA (cerebrovascular accident)   • Dementia (HCC)   • Left bundle branch block   • Orthostatic dizziness   • Chronic systolic heart failure (HCC)   • Normocytic anemia   • CARYN (acute kidney injury) (HCC)   • Dyspnea   • Hypoxia   • Hypothyroid   • Pericardial effusion   • Acute respiratory failure with hypoxia (HCC)   • Right lower lobe pneumonia   • Ventricular ectopy   • Elevated serum creatinine     Past Medical History:   Diagnosis Date   • Congestive heart failure (HCC)    • Malignant neoplasm (HCC)    • Stroke (HCC)     mini stroke   • Systolic heart failure (HCC) 2017    Chronic/compensated (EF 25%)     Past Surgical History:   Procedure Laterality Date   • CHOLECYSTECTOMY     • EYE SURGERY     • HYSTERECTOMY        General Information     Row Name 22 0838          OT Time and Intention    Document Type evaluation  -KF     Mode of Treatment occupational therapy  -KF     Row Name 22 0838          General Information    Patient Profile Reviewed yes  -KF     Prior Level of Function min assist:; transfer; bed mobility; ADL's  per Pt report  -KF     Existing Precautions/Restrictions fall; other (see comments)  COG  -KF     Barriers to Rehab cognitive status; visual deficit; hearing deficit  -KF     Row Name 22 0838          Occupational Profile    Environmental Supports and Barriers (Occupational Profile) unable to assess environment 2/2 limited historian, cont to assess; possible SPC at  baseline  -     Row Name 03/04/22 0838          Living Environment    Lives With child(chong), adult; grandchild(chong)  -     Row Name 03/04/22 0838          Cognition    Orientation Status (Cognition) oriented to; person; disoriented to; place; situation; time; verbal cues/prompts needed for orientation  -     Row Name 03/04/22 0838          Safety Issues, Functional Mobility    Safety Issues Affecting Function (Mobility) awareness of need for assistance; insight into deficits/self-awareness; safety precaution awareness  -     Impairments Affecting Function (Mobility) balance; coordination; visual/perceptual; endurance/activity tolerance  -           User Key  (r) = Recorded By, (t) = Taken By, (c) = Cosigned By    Initials Name Provider Type    KF Theresa Rader, OT Occupational Therapist                 Mobility/ADL's     Row Name 03/04/22 0848          Bed Mobility    Bed Mobility supine-sit; scooting/bridging  -     Scooting/Bridging [place] (Bed Mobility) supervision; verbal cues; nonverbal cues (demo/gesture)  -KF     Supine-Sit [place] (Bed Mobility) supervision; verbal cues; nonverbal cues (demo/gesture)  -     Bed Mobility, Safety Issues cognitive deficits limit understanding  -     Assistive Device (Bed Mobility) bed rails; head of bed elevated  -KF     Comment (Bed Mobility) visual deficits throughout with blurriness due to film in eyes  -     Row Name 03/04/22 0848          Transfers    Transfers sit-stand transfer; toilet transfer; bed-chair transfer  -     Comment (Transfers) cues for seq and HP  -KF     Bed-Chair [place] (Transfers) minimum assist (75% patient effort)  -KF     Assistive Device (Bed-Chair Transfers) walker, front-wheeled  FWW attempted switched to HHA throughout due to visual deficits  -     Sit-Stand [place] (Transfers) contact guard; verbal cues  -KF     [place] Level (Toilet Transfer) minimum assist (75% patient effort); verbal cues   -KF     Assistive Device (Toilet Transfer) commode; grab bars/safety frame  -     Row Name 03/04/22 08          Toilet Transfer    Type (Toilet Transfer) stand-sit; sit-stand  -     Row Name 03/04/22 08          Functional Mobility    Functional Mobility- Ind. Level minimum assist (75% patient effort); 1 person  -KF     Functional Mobility-Distance (Feet) 30  -KF     Functional Mobility- Safety Issues sequencing ability decreased; step length decreased  -KF     Functional Mobility- Comment cues for visual scanning for safety in environment and safety awareness throughout  -     Row Name 03/04/22 08          Activities of Daily Living    BADL Assessment/Intervention upper body dressing; lower body dressing; grooming; toileting  -     Row Name 03/04/22 08          Upper Body Dressing Assessment/Training    [place] Level (Upper Body Dressing) don; front opening garment; minimum assist (75% patient effort)  -     Position (Upper Body Dressing) sitting up in bed  -     Comment (Upper Body Dressing) assist for line management  -     Row Name 03/04/22 08          Lower Body Dressing Assessment/Training    [place] Level (Lower Body Dressing) don; socks; set up  -KF     Position (Lower Body Dressing) sitting up in bed  -     Row Name 03/04/22 08          Grooming Assessment/Training    [place] Level (Grooming) hair care, combing/brushing; oral care regimen; wash face, hands; supervision; verbal cues; nonverbal cues (demo/gesture)  -KF     Position (Grooming) supported sitting  -     Row Name 03/04/22 08          Toileting Assessment/Training    [place] Level (Toileting) adjust/manage clothing; change pad/brief; perform perineal hygiene; minimum assist (75% patient effort); verbal cues  -     Assistive Devices (Toileting) commode  -KF     Position (Toileting) supported standing; supported sitting  -     Comment (Toileting) cues for throughout for safe seq  -KF            User Key  (r) = Recorded By, (t) = Taken By, (c) = Cosigned By    Initials Name Provider Type    Theresa Solares OT Occupational Therapist               Obj/Interventions     Row Name 03/04/22 0857          Sensory Assessment (Somatosensory)    Sensory Assessment (Somatosensory) UE sensation intact; unable/difficult to assess  -     Row Name 03/04/22 0857          Vision Assessment/Intervention    Visual Impairment/Limitations blurry vision; corrective lenses full-time  RN notified and aware  -     Row Name 03/04/22 0857          Range of Motion Comprehensive    General Range of Motion bilateral upper extremity ROM WFL  -     Row Name 03/04/22 0857          Strength Comprehensive (MMT)    General Manual Muscle Testing (MMT) Assessment upper extremity strength deficits identified  -KF     Comment, General Manual Muscle Testing (MMT) Assessment BUE grossly 4/5  -     Row Name 03/04/22 0857          Motor Skills    Motor Skills coordination  -     Coordination WFL; bilateral; upper extremity; bimanual skills  -     Row Name 03/04/22 0857          Balance    Balance Assessment sitting static balance; sitting dynamic balance; standing static balance; standing dynamic balance  -KF     Static Sitting Balance WFL; unsupported  -KF     Dynamic Sitting Balance WFL; unsupported  -KF     Static Standing Balance WFL; mild impairment; unsupported; supported  -KF     Dynamic Standing Balance mild impairment; unsupported; supported  -KF     Balance Interventions sit to stand; standing; weight shifting activity; UE activity with balance activity; occupation based/functional task  -     Comment, Balance no LOB, unsteadiness throughout complicated by visual deficits  -           User Key  (r) = Recorded By, (t) = Taken By, (c) = Cosigned By    Initials Name Provider Type    Theresa Solares OT Occupational Therapist               Goals/Plan     Row Name 03/04/22 0902          Transfer Goal 1 (OT)     Activity/Assistive Device (Transfer Goal 1, OT) toilet  -KF     [place] Level/Cues Needed (Transfer Goal 1, OT) standby assist  -KF     Time Frame (Transfer Goal 1, OT) long term goal (LTG); 10 days  -KF     Progress/Outcome (Transfer Goal 1, OT) goal ongoing  -     Row Name 03/04/22 0902          Toileting Goal 1 (OT)    Activity/Device (Toileting Goal 1, OT) adjust/manage clothing; perform perineal hygiene; commode; grab bar/safety frame  -KF     [place] Level/Cues Needed (Toileting Goal 1, OT) supervision required; verbal cues required  -KF     Time Frame (Toileting Goal 1, OT) long term goal (LTG); 10 days  -KF     Progress/Outcome (Toileting Goal 1, OT) goal ongoing  -     Row Name 03/04/22 0902          Grooming Goal 1 (OT)    Activity/Device (Grooming Goal 1, OT) hair care; oral care; wash face, hands  standing sink side  -KF     [place] (Grooming Goal 1, OT) standby assist; verbal cues required  -KF     Time Frame (Grooming Goal 1, OT) long term goal (LTG); 10 days  -KF     Progress/Outcome (Grooming Goal 1, OT) goal ongoing  -     Row Name 03/04/22 0902          Therapy Assessment/Plan (OT)    Planned Therapy Interventions (OT) BADL retraining; adaptive equipment training; activity tolerance training; patient/caregiver education/training; ROM/therapeutic exercise; occupation/activity based interventions; cognitive/visual perception retraining; strengthening exercise; transfer/mobility retraining; functional balance retraining  -           User Key  (r) = Recorded By, (t) = Taken By, (c) = Cosigned By    Initials Name Provider Type    Theresa Solares OT Occupational Therapist               Clinical Impression     Row Name 03/04/22 0859          Pain Assessment    Additional Documentation Pain Scale: Numbers Pre/Post-Treatment (Group)  -     Row Name 03/04/22 0859          Pain Scale: Numbers Pre/Post-Treatment    Pretreatment Pain Rating 0/10 - no pain  -KF     Posttreatment  Pain Rating 0/10 - no pain  -KF     Pre/Posttreatment Pain Comment tolerated  -KF     Pain Intervention(s) Repositioned; Ambulation/increased activity  -     Row Name 03/04/22 0859          Plan of Care Review    Plan of Care Reviewed With patient  -     Progress no change  -     Outcome Summary OT eval completed Pt presents with confusion throughout, impulsivity, deficits in balance, vision, and strength compared to baseline ADL completion and functional transfers, 30ft functional mob Aron with HHA, SUP bed mob, Aron toileting clothing management, cues throughout for safety, recom IPOT d/c home with 24/7 assist and HHOT  -     Row Name 03/04/22 0859          Therapy Assessment/Plan (OT)    Rehab Potential (OT) good, to achieve stated therapy goals  -     Criteria for Skilled Therapeutic Interventions Met (OT) yes; meets criteria; skilled treatment is necessary  -     Therapy Frequency (OT) daily  -     Row Name 03/04/22 0859          Therapy Plan Review/Discharge Plan (OT)    Equipment Needs Upon Discharge (OT) --  TBD  -KF     Anticipated Discharge Disposition (OT) home with 24/7 care; home with assist; home with home health  -     Row Name 03/04/22 0859          Vital Signs    Pre Systolic BP Rehab --  RN cleared VSS, no sig change in BP  -KF     Pre SpO2 (%) 99  -KF     O2 Delivery Pre Treatment room air  -KF     Post SpO2 (%) 98  -KF     O2 Delivery Post Treatment room air  -KF     Pre Patient Position Supine  -KF     Intra Patient Position Standing  -KF     Post Patient Position Sitting  -KF     Rest Breaks  1  -     Row Name 03/04/22 0859          Positioning and Restraints    Pre-Treatment Position in bed  -KF     Post Treatment Position chair  -KF     In Chair notified nsg; reclined; sitting; call light within reach; encouraged to call for assist; exit alarm on; waffle cushion; with other staff; legs elevated  -KF           User Key  (r) = Recorded By, (t) = Taken By, (c) = Cosigned By     Initials Name Provider Type    Theresa Solares OT Occupational Therapist               Outcome Measures     Row Name 03/04/22 0903          How much help from another is currently needed...    Putting on and taking off regular lower body clothing? 3  -KF     Bathing (including washing, rinsing, and drying) 2  -KF     Toileting (which includes using toilet bed pan or urinal) 3  -KF     Putting on and taking off regular upper body clothing 3  -KF     Taking care of personal grooming (such as brushing teeth) 3  -KF     Eating meals 3  -KF     AM-PAC 6 Clicks Score (OT) 17  -KF     Row Name 03/04/22 0903          Functional Assessment    Outcome Measure Options AM-PAC 6 Clicks Daily Activity (OT)  -KF           User Key  (r) = Recorded By, (t) = Taken By, (c) = Cosigned By    Initials Name Provider Type    Theresa Solares OT Occupational Therapist                Occupational Therapy Education                 Title: PT OT SLP Therapies (In Progress)     Topic: Occupational Therapy (In Progress)     Point: ADL training (Done)     Description:   Instruct learner(s) on proper safety adaptation and remediation techniques during self care or transfers.   Instruct in proper use of assistive devices.              Learning Progress Summary           Patient Acceptance, E,TB,D, VU,DU,NR by  at 3/4/2022 0903                   Point: Home exercise program (Not Started)     Description:   Instruct learner(s) on appropriate technique for monitoring, assisting and/or progressing therapeutic exercises/activities.              Learner Progress:  Not documented in this visit.          Point: Precautions (Done)     Description:   Instruct learner(s) on prescribed precautions during self-care and functional transfers.              Learning Progress Summary           Patient Acceptance, E,TB,D, VU,DU,NR by  at 3/4/2022 0903                   Point: Body mechanics (Done)     Description:   Instruct learner(s) on proper  positioning and spine alignment during self-care, functional mobility activities and/or exercises.              Learning Progress Summary           Patient Acceptance, E,TB,D, VU,DU,NR by  at 3/4/2022 0903                               User Key     Initials Effective Dates Name Provider Type Discipline     06/16/21 -  Theresa Rader OT Occupational Therapist OT              OT Recommendation and Plan  Planned Therapy Interventions (OT): BADL retraining, adaptive equipment training, activity tolerance training, patient/caregiver education/training, ROM/therapeutic exercise, occupation/activity based interventions, cognitive/visual perception retraining, strengthening exercise, transfer/mobility retraining, functional balance retraining  Therapy Frequency (OT): daily  Plan of Care Review  Plan of Care Reviewed With: patient  Progress: no change  Outcome Summary: OT eval completed Pt presents with confusion throughout, impulsivity, deficits in balance, vision, and strength compared to baseline ADL completion and functional transfers, 30ft functional mob A[place] with HHA, SUP bed mob, [place] toileting clothing management, cues throughout for safety, recom IPOT d/c home with 24/7 assist and HHOT     Time Calculation:    Time Calculation- OT     Row Name 03/04/22 0752             Time Calculation- OT    OT Start Time 0752  -KF      OT Received On 03/04/22  -KF      OT Goal Re-Cert Due Date 03/14/22  -KF              Timed Charges    27581 - OT Self Care/Mgmt Minutes 26  -KF              Untimed Charges    OT Eval/Re-eval Minutes 46  -KF              Total Minutes    Timed Charges Total Minutes 26  -KF      Untimed Charges Total Minutes 46  -KF       Total Minutes 72  -KF            User Key  (r) = Recorded By, (t) = Taken By, (c) = Cosigned By    Initials Name Provider Type     Theresa Rader OT Occupational Therapist              Therapy Charges for Today     Code Description Service Date Service Provider  Modifiers Qty    85773649636 HC OT SELF CARE/MGMT/TRAIN EA 15 MIN 3/4/2022 Theresa Rader, OT GO 2    78441262012 HC OT EVAL MOD COMPLEXITY 4 3/4/2022 Theresa Rader OT GO 1               Theresa Rader, OT  3/4/2022

## 2022-03-04 NOTE — PLAN OF CARE
"Goal Outcome Evaluation:  Plan of Care Reviewed With: patient           Outcome Summary: PT antonio completed. Pt presents pleasantly confused, dem weakness, impaired balance, and difficulty walking. Pt states she normally ambulates with a cane. Cane obtained and sized for patient. Pt ambulated in hallway with cane with Aron, however preferred to hold onto therapist's hand for support and just carried cane in right hand without using it. Pt dem periods of decreased safety awareness where she would increase her speed, saying \"Let's race!\" Pt given cues for safe gait speed and use of cane. VSS on RA. PT rec d/c rehab.  "

## 2022-03-04 NOTE — PROGRESS NOTES
Clinical Nutrition       Patient Name: Scott Diego  YOB: 1930  MRN: [id number]  Date of Encounter: 22 14:11 EST  Admission date: 3/3/2022      Reason for Visit   Identified at risk by screening criteria      EMR  Reviewed   Yes, including CARYN, ? Thrush. H/o HF, HTN, CVA.  Ht=65in, wr=213bx per standing scale wt on 3/3/22. BMI=19 (wnl). Pt's REV=082qb x past 7-8 months, per son. Current wt c/w reported UBW.       Reported/Observed/Food/Nutrition Related - Comments     RD spoke with pt/son in room. Pt lives with Son. Son states pt with no acute po intake Nor wt changes prior to adm. Son states pt's   recently.  Son states pt with no difficulty swallowing but has some difficulty chewing d/t dentition issues/trouble with dentures and implants; based on discussion, RD will change texture to mech soft with ground meats.  Son states pt c/o some foods burning her mouth with current thrush like sx; will add GI soft to diet order. Pt consumes Boost plus ONS at home; will send during adm.     Current Nutrition Prescription     Diet Regular    Average Intake from Charting: insuffic data      Actions     Follow treatment progress, Interview for preferences, Encourage intake, Supplement provided  Changed to GI soft, soft texture/ground foods per pt pref. RD ordered SB boost plus 2x/d.  Monitor Per Protocol      Petty Becker, MS,RD,LD,   Time Spent: 25 mins

## 2022-03-04 NOTE — PROGRESS NOTES
[facility] Medicine Services  PROGRESS NOTE    Patient Name: Scott Diego  : 4/10/1930  MRN: [id number]    Date of Admission: 3/3/2022  Primary Care Physician: Yolande Escobar MD    Subjective   Subjective     CC:  CARYN/CHF/weakness    HPI:  Patient sitting up in chair eating breakfast. Very pleasant. Oriented to place/situation/self. Reports to me her son Noah will come visit her later. Denies knowledge of heart failure, but reports she would be ok with getting a repeat ECHO today. Reports she feels much better today    ROS:  Gen- No fevers, chills  CV- No chest pain, palpitations  Resp- No cough, dyspnea  GI- No N/V/D, abd pain        Objective   Objective     Vital Signs:   Temp:  [98.2 °F (36.8 °C)-98.4 °F (36.9 °C)] 98.4 °F (36.9 °C)  Heart Rate:  [58-86] 66  Resp:  [13-18] 16  BP: (109-136)/(57-72) 109/72  Flow (L/min):  [1-2] 2     Physical Exam:  GEN: NAD, resting in chair eating breakfast, awake  HEENT: on 2L, atraumatic, normocephalic  NECK: supple, no masses  RESP: on 2L, slight crackles but good effort  CV: on tele, sinus rhythm  PSYCH: normal affect, appropriate  NEURO: awake, alert, no focal deficits noted  MSK: no edema noted  SKIN: no rashes noted       Results Reviewed:  LAB RESULTS:      Lab 22   WBC 5.91 8.01 11.17*   HEMOGLOBIN 10.2* 10.2* 11.3*   HEMATOCRIT 32.6* 31.8* 34.8   PLATELETS 141 129* 143   NEUTROS ABS  --  4.51 7.79*   IMMATURE GRANS (ABS)  --  0.02 0.02   LYMPHS ABS  --  1.76 1.54   MONOS ABS  --  1.09* 1.23*   EOS ABS  --  0.56* 0.51*   MCV 94.5 92.7 92.1   CRP  --   --  <0.30   LACTATE  --   --  1.0         Lab 2214 2244 22  0230 22   SODIUM 138 139  --  132*   POTASSIUM 5.1 5.5*  --  5.1   CHLORIDE 110* 111*  --  102   CO2 22.0 22.0  --  22.0   ANION GAP 6.0 6.0  --  8.0   BUN 15 18  --  21   CREATININE 1.17* 1.26*  --  1.48*   EGFR 44.1* 40.4*  --  33.3*   GLUCOSE 87  88  --  100*   CALCIUM 8.7 8.8  --  9.2   MAGNESIUM 1.8  --  1.6*  --    PHOSPHORUS  --   --  3.6  --    TSH  --   --  1.440  --          Lab 03/02/22 2038   TOTAL PROTEIN 7.6   ALBUMIN 3.60   GLOBULIN 4.0   ALT (SGPT) 10   AST (SGOT) 20   BILIRUBIN 0.3   ALK PHOS 44         Lab 03/02/22 2038   PROBNP 2,861.0*   TROPONIN T 0.011                 Brief Urine Lab Results  (Last result in the past 365 days)      Color   Clarity   Blood   Leuk Est   Nitrite   Protein   CREAT   Urine HCG        03/03/22 0843             54.9               Microbiology Results Abnormal     Procedure Component Value - Date/Time    COVID PRE-OP / PRE-PROCEDURE SCREENING ORDER (NO ISOLATION) - Swab, Nasopharynx [[id number]]  (Normal) Collected: 03/03/22 0546    Lab Status: Final result Specimen: Swab from Nasopharynx Updated: 03/03/22 0652    Narrative:      The following orders were created for panel order COVID PRE-OP / PRE-PROCEDURE SCREENING ORDER (NO ISOLATION) - Swab, Nasopharynx.  Procedure                               Abnormality         Status                     ---------                               -----------         ------                     COVID-19, FLU A/B, RSV P...[[id number]]  Normal              Final result                 Please view results for these tests on the individual orders.    COVID-19, FLU A/B, RSV PCR - Swab, Nasopharynx [[id number]]  (Normal) Collected: 03/03/22 0546    Lab Status: Final result Specimen: Swab from Nasopharynx Updated: 03/03/22 0652     COVID19 Not Detected     Influenza A PCR Not Detected     Influenza B PCR Not Detected     RSV, PCR Not Detected    Narrative:      Fact sheet for providers: https://www.fda.gov/media/567029/download    Fact sheet for patients: https://www.fda.gov/media/206170/download    Test performed by PCR.          CT Head Without Contrast    Result Date: 3/3/2022  EXAMINATION: CT HEAD WO CONTRAST DATE: 3/3/2022 1:49 AM  INDICATION: Confusion.  COMPARISON: None  available.  TECHNIQUE: Thin section noncontrast axial images were obtained through the head. Coronal reformatted images were created.  CT dose lowering techniques were used, to include: automated exposure control, adjustment for patient size, and or use of iterative  reconstruction FINDINGS: No acute intracranial hemorrhage. Prominent bifrontal CSF spaces, greater on the left, are likely related to volume loss and represents prominent subarachnoid space. Subdural hygromas could've a similar appearance, however, cortical veins are seen coursing through this prominent CSF density space, for instance (axial series 3 image 35). No acute territorial infarct. No intracranial mass or mass effect. Mild burden of low attenuation in the white matter is nonspecific, but likely reflects sequelae of chronic microvascular ischemia.  Moderate diffuse parenchymal volume loss. No hydrocephalus. Basal cisterns are patent. Atherosclerotic calcifications of both carotid siphons. Bilateral ocular lens surgery. Trace mucosal thickening in the ethmoid air cells. Mastoid air cells are clear. Calvarium is intact.     Impression: 1.  No acute intracranial abnormality. 2.  Moderate volume loss. Mild chronic small vessel ischemic changes. Electronically signed by:  Og Rajan DO  3/3/2022 12:12 AM Mountain Time    XR Chest 1 View    Result Date: 3/2/2022  EXAMINATION: XR CHEST 1 VW-  INDICATION: SOA triage protocol  COMPARISON: 12/6/2019  FINDINGS: Heart is normal in size. Vasculature is upper limits of normal. There is a mild diffuse, finely reticular interstitial disease pattern the lungs, present on prior studies and probably stable allowing for differences in film technique. Similar pattern is present back to at least 2018. No lung consolidation effusion or pneumothorax is seen.       Impression: Mild diffuse pulmonary interstitial disease pattern, but fairly similar to multiple prior studies. Findings could reflect chronic lung disease,  less likely mild interstitial edema or early changes of viral syndrome. No significant focal lung disease is seen.    This report was finalized on 3/2/2022 9:51 PM by Dr. Jan Alvarez MD.        Results for orders placed during the hospital encounter of 06/13/18    Adult Transthoracic Echo Complete W/ Cont if Necessary Per Protocol    Interpretation Summary  · Moderate aortic valve regurgitation is present.  · Mild mitral valve regurgitation is present  · Mild tricuspid valve regurgitation is present.  · There is a moderate (1-2cm) circumferential pericardial effusion.  · Left ventricular systolic function is severely decreased. Estimated EF = 20%.  · There is mild right atrial diastolic collapse from the pericardial effusion. The transmitral Doppler show some variation with inspiration. Clinical correlation suggested in this patient i.e. as the patient hypotensive could also consider CT scan of the chest to look at the pericardial effusion      I have reviewed the medications:  Scheduled Meds:aspirin, 81 mg, Oral, Nightly  atorvastatin, 80 mg, Oral, Nightly  cetirizine, 5 mg, Oral, Daily  donepezil, 5 mg, Oral, Nightly  erythromycin, 1 application, Both Eyes, Nightly  famotidine, 20 mg, Oral, Daily  ferrous sulfate, 325 mg, Oral, Daily  gabapentin, 300 mg, Oral, Nightly  heparin (porcine), 5,000 Units, Subcutaneous, Q12H  levothyroxine, 50 mcg, Oral, Q AM  metoprolol succinate XL, 25 mg, Oral, Daily  nystatin, 5 mL, Swish & Swallow, 4x Daily  ofloxacin, 1 drop, Both Eyes, TID  rOPINIRole, 0.5 mg, Oral, Nightly  sodium chloride, 10 mL, Intravenous, Q12H      Continuous Infusions:   PRN Meds:.•  acetaminophen **OR** acetaminophen **OR** acetaminophen  •  ALPRAZolam  •  meclizine  •  polyvinyl alcohol  •  sodium chloride  •  sodium chloride    Assessment/Plan   Assessment & Plan     Active Hospital Problems    Diagnosis  POA   • Elevated serum creatinine [R79.89]  Yes   • Hypothyroid [E03.9]  Yes   • Chronic systolic  heart failure (HCC) [I50.22]  Yes   • History of CVA (cerebrovascular accident) [Z86.73]  Not Applicable   • Dementia (HCC) [F03.90]  Yes   • Hypertension [I10]  Yes   • Hyponatremia [E87.1]  Yes      Resolved Hospital Problems   No resolved problems to display.        Brief Hospital Course to date:  Scott Diego is a 89 y.o. female  Who lives with her son in [place] following the recent death of her  in January.  Son states since this time, pt has gone down hill.  Poor po intake.  Unable to take care of self.  Has a lot of anxiety.  Worsening memory.  Son notes she has O2 at night and he checked her sats at home and they were 86 today.  Son brings pt in as he feels he cannot safely take care of her.     CARYN  --Baseline creatinine 1.1-1.3  --Creatinine 1.48 on admit  --holding lasix.  Better with IVF  --Cr now improved to within baseline, monitor. Appears euvolemic today     Hyponatremia  --resolved with IVF     Chronic systolic HF  --Chest x-ray shows mild diffuse pulmonary interstitial disease pattern but fairly similar to multiple prior studies.  --patient's last ECHO from 2018 reviewed with EF 20%. Patient denies having repeat ECHO since then but seems unsure. Will repeat today- patient agreeable  --Strict I's and O's  --Daily weight  --Metoprolol.  Holding lasix currently d/t CARYN     Possible Thrush  --nystatin swish and swallow     HTN  --Metoprolol     Hyperkalemia  --lokelma x1     Hypothyroidism  --Levothyroxine     Debility  Generalized weakness  Dementia  History of CVA  --Fall precautions  --Aricept  --consult pt/ot     Bereavement  -- passed away January 2022    DVT prophylaxis:  Medical DVT prophylaxis orders are present.       AM-PAC 6 Clicks Score (PT): 18 (03/03/22 1500)    Disposition: I expect the patient to be discharged pending above- will need placement and will d/w CM.    CODE STATUS:   Code Status and Medical Interventions:   Ordered at: 03/03/22 8623     Level Of Support  Discussed With:    Next of Kin (If No Surrogate)     Code Status (Patient has no pulse and is not breathing):    CPR (Attempt to Resuscitate)     Medical Interventions (Patient has pulse or is breathing):    Full Support       Lisette Pacheco MD  03/04/22

## 2022-03-05 LAB
ALBUMIN SERPL-MCNC: 3 G/DL (ref 3.5–5.2)
ALBUMIN/GLOB SERPL: 0.9 G/DL
ALP SERPL-CCNC: 38 U/L (ref 39–117)
ALT SERPL W P-5'-P-CCNC: 7 U/L (ref 1–33)
ANION GAP SERPL CALCULATED.3IONS-SCNC: 6 MMOL/L (ref 5–15)
AST SERPL-CCNC: 15 U/L (ref 1–32)
BASOPHILS # BLD AUTO: 0.06 10*3/MM3 (ref 0–0.2)
BASOPHILS NFR BLD AUTO: 1.2 % (ref 0–1.5)
BILIRUB SERPL-MCNC: 0.3 MG/DL (ref 0–1.2)
BUN SERPL-MCNC: 12 MG/DL (ref 8–23)
BUN/CREAT SERPL: 11.1 (ref 7–25)
CALCIUM SPEC-SCNC: 9 MG/DL (ref 8.2–9.6)
CHLORIDE SERPL-SCNC: 110 MMOL/L (ref 98–107)
CO2 SERPL-SCNC: 21 MMOL/L (ref 22–29)
CREAT SERPL-MCNC: 1.08 MG/DL (ref 0.57–1)
DEPRECATED RDW RBC AUTO: 47.7 FL (ref 37–54)
EGFRCR SERPLBLD CKD-EPI 2021: 48.6 ML/MIN/1.73
EOSINOPHIL # BLD AUTO: 0.37 10*3/MM3 (ref 0–0.4)
EOSINOPHIL NFR BLD AUTO: 7.2 % (ref 0.3–6.2)
ERYTHROCYTE [DISTWIDTH] IN BLOOD BY AUTOMATED COUNT: 14.2 % (ref 12.3–15.4)
GLOBULIN UR ELPH-MCNC: 3.4 GM/DL
GLUCOSE SERPL-MCNC: 88 MG/DL (ref 65–99)
HCT VFR BLD AUTO: 31.2 % (ref 34–46.6)
HGB BLD-MCNC: 10 G/DL (ref 12–15.9)
IMM GRANULOCYTES # BLD AUTO: 0.01 10*3/MM3 (ref 0–0.05)
IMM GRANULOCYTES NFR BLD AUTO: 0.2 % (ref 0–0.5)
LYMPHOCYTES # BLD AUTO: 1.34 10*3/MM3 (ref 0.7–3.1)
LYMPHOCYTES NFR BLD AUTO: 26 % (ref 19.6–45.3)
MCH RBC QN AUTO: 29.8 PG (ref 26.6–33)
MCHC RBC AUTO-ENTMCNC: 32.1 G/DL (ref 31.5–35.7)
MCV RBC AUTO: 92.9 FL (ref 79–97)
MONOCYTES # BLD AUTO: 0.8 10*3/MM3 (ref 0.1–0.9)
MONOCYTES NFR BLD AUTO: 15.5 % (ref 5–12)
NEUTROPHILS NFR BLD AUTO: 2.58 10*3/MM3 (ref 1.7–7)
NEUTROPHILS NFR BLD AUTO: 49.9 % (ref 42.7–76)
NRBC BLD AUTO-RTO: 0 /100 WBC (ref 0–0.2)
PLATELET # BLD AUTO: 130 10*3/MM3 (ref 140–450)
PMV BLD AUTO: 12.2 FL (ref 6–12)
POTASSIUM SERPL-SCNC: 4.9 MMOL/L (ref 3.5–5.2)
PROT SERPL-MCNC: 6.4 G/DL (ref 6–8.5)
QT INTERVAL: 438 MS
QT INTERVAL: 464 MS
QTC INTERVAL: 492 MS
QTC INTERVAL: 497 MS
RBC # BLD AUTO: 3.36 10*6/MM3 (ref 3.77–5.28)
SODIUM SERPL-SCNC: 137 MMOL/L (ref 136–145)
WBC NRBC COR # BLD: 5.16 10*3/MM3 (ref 3.4–10.8)

## 2022-03-05 PROCEDURE — 99232 SBSQ HOSP IP/OBS MODERATE 35: CPT | Performed by: NURSE PRACTITIONER

## 2022-03-05 PROCEDURE — 25010000002 HALOPERIDOL LACTATE PER 5 MG: Performed by: PHYSICIAN ASSISTANT

## 2022-03-05 PROCEDURE — G0378 HOSPITAL OBSERVATION PER HR: HCPCS

## 2022-03-05 PROCEDURE — 80053 COMPREHEN METABOLIC PANEL: CPT | Performed by: INTERNAL MEDICINE

## 2022-03-05 PROCEDURE — 85025 COMPLETE CBC W/AUTO DIFF WBC: CPT | Performed by: INTERNAL MEDICINE

## 2022-03-05 PROCEDURE — 25010000002 HALOPERIDOL LACTATE PER 5 MG: Performed by: NURSE PRACTITIONER

## 2022-03-05 PROCEDURE — 25010000002 HEPARIN (PORCINE) PER 1000 UNITS: Performed by: NURSE PRACTITIONER

## 2022-03-05 RX ORDER — HALOPERIDOL 5 MG/ML
0.5 INJECTION INTRAMUSCULAR ONCE
Status: COMPLETED | OUTPATIENT
Start: 2022-03-05 | End: 2022-03-05

## 2022-03-05 RX ORDER — HALOPERIDOL 5 MG/ML
1 INJECTION INTRAMUSCULAR ONCE
Status: COMPLETED | OUTPATIENT
Start: 2022-03-05 | End: 2022-03-05

## 2022-03-05 RX ADMIN — ALPRAZOLAM 0.25 MG: 0.25 TABLET ORAL at 14:33

## 2022-03-05 RX ADMIN — HEPARIN SODIUM 5000 UNITS: 5000 INJECTION, SOLUTION INTRAVENOUS; SUBCUTANEOUS at 09:36

## 2022-03-05 RX ADMIN — OFLOXACIN 1 DROP: 3 SOLUTION/ DROPS OPHTHALMIC at 16:23

## 2022-03-05 RX ADMIN — OFLOXACIN 1 DROP: 3 SOLUTION/ DROPS OPHTHALMIC at 20:19

## 2022-03-05 RX ADMIN — GABAPENTIN 300 MG: 300 CAPSULE ORAL at 20:18

## 2022-03-05 RX ADMIN — ATORVASTATIN CALCIUM 80 MG: 40 TABLET, FILM COATED ORAL at 20:18

## 2022-03-05 RX ADMIN — HALOPERIDOL LACTATE 1 MG: 5 INJECTION, SOLUTION INTRAMUSCULAR at 22:42

## 2022-03-05 RX ADMIN — Medication 325 MG: at 09:37

## 2022-03-05 RX ADMIN — NYSTATIN 500000 UNITS: 100000 SUSPENSION ORAL at 09:37

## 2022-03-05 RX ADMIN — ROPINIROLE HYDROCHLORIDE 0.5 MG: 0.5 TABLET, FILM COATED ORAL at 20:18

## 2022-03-05 RX ADMIN — NYSTATIN 500000 UNITS: 100000 SUSPENSION ORAL at 20:18

## 2022-03-05 RX ADMIN — HALOPERIDOL LACTATE 0.5 MG: 5 INJECTION, SOLUTION INTRAMUSCULAR at 20:18

## 2022-03-05 RX ADMIN — ASPIRIN 81 MG 81 MG: 81 TABLET ORAL at 20:19

## 2022-03-05 RX ADMIN — DONEPEZIL HYDROCHLORIDE 5 MG: 5 TABLET, FILM COATED ORAL at 20:18

## 2022-03-05 RX ADMIN — ERYTHROMYCIN 1 APPLICATION: 5 OINTMENT OPHTHALMIC at 20:19

## 2022-03-05 RX ADMIN — Medication 10 ML: at 09:43

## 2022-03-05 RX ADMIN — FAMOTIDINE 20 MG: 20 TABLET, FILM COATED ORAL at 09:37

## 2022-03-05 RX ADMIN — LEVOTHYROXINE SODIUM 50 MCG: 50 TABLET ORAL at 05:53

## 2022-03-05 RX ADMIN — OFLOXACIN 1 DROP: 3 SOLUTION/ DROPS OPHTHALMIC at 09:38

## 2022-03-05 RX ADMIN — Medication 10 ML: at 20:19

## 2022-03-05 RX ADMIN — HEPARIN SODIUM 5000 UNITS: 5000 INJECTION, SOLUTION INTRAVENOUS; SUBCUTANEOUS at 20:19

## 2022-03-05 RX ADMIN — CETIRIZINE HYDROCHLORIDE 5 MG: 10 TABLET, FILM COATED ORAL at 09:37

## 2022-03-05 RX ADMIN — NYSTATIN 500000 UNITS: 100000 SUSPENSION ORAL at 17:00

## 2022-03-05 RX ADMIN — NYSTATIN 500000 UNITS: 100000 SUSPENSION ORAL at 12:28

## 2022-03-05 RX ADMIN — METOPROLOL SUCCINATE 25 MG: 25 TABLET, EXTENDED RELEASE ORAL at 09:38

## 2022-03-05 NOTE — PLAN OF CARE
Goal Outcome Evaluation:      Patient was pleasant and did well today. Started with some confusion after son left. Easy to reorient back to where she is at

## 2022-03-05 NOTE — PROGRESS NOTES
[facility] Medicine Services  PROGRESS NOTE    Patient Name: Scott Diego  : 4/10/1930  MRN: [id number]    Date of Admission: 3/3/2022  Primary Care Physician: Yolande Escobar MD    Subjective   Subjective     CC:  CARYN/CHF/weakness    HPI:  Upright in bed with family at bedside. Eating lunch. Alert and pleasant. No new complaints.     ROS:  Gen- No fevers, chills  CV- No chest pain, palpitations  Resp- No cough, dyspnea  GI- No N/V/D, abd pain        Objective   Objective     Vital Signs:   Temp:  [98.8 °F (37.1 °C)] 98.8 °F (37.1 °C)  Heart Rate:  [] 56  Resp:  [18] 18  BP: (146)/(98) 146/98  Flow (L/min):  [2] 2     Physical Exam:  Constitutional: No acute distress, awake, alert elderly female, upright in bed  HENT: NCAT, mucous membranes moist  Respiratory: Clear to auscultation bilaterally, respiratory effort normal on RA-2L   Cardiovascular: RRR, no murmurs, rubs, or gallops  Gastrointestinal: Positive bowel sounds, soft, nontender, nondistended  Musculoskeletal: No bilateral ankle edema  Psychiatric: Appropriate affect, cooperative  Neurologic:  strength symmetric in all extremities, Cranial Nerves grossly intact to confrontation, speech clear  Skin: No rashes noted to exposed loose fragile skin         Results Reviewed:  LAB RESULTS:      Lab 22  0511 22  0514 22  0944 22   WBC 5.16 5.91 8.01 11.17*   HEMOGLOBIN 10.0* 10.2* 10.2* 11.3*   HEMATOCRIT 31.2* 32.6* 31.8* 34.8   PLATELETS 130* 141 129* 143   NEUTROS ABS 2.58  --  4.51 7.79*   IMMATURE GRANS (ABS) 0.01  --  0.02 0.02   LYMPHS ABS 1.34  --  1.76 1.54   MONOS ABS 0.80  --  1.09* 1.23*   EOS ABS 0.37  --  0.56* 0.51*   MCV 92.9 94.5 92.7 92.1   CRP  --   --   --  <0.30   LACTATE  --   --   --  1.0         Lab 22  0511 22  0514 22  0944 22  0230 22   SODIUM 137 138 139  --  132*   POTASSIUM 4.9 5.1 5.5*  --  5.1   CHLORIDE 110* 110* 111*  --  102    CO2 21.0* 22.0 22.0  --  22.0   ANION GAP 6.0 6.0 6.0  --  8.0   BUN 12 15 18  --  21   CREATININE 1.08* 1.17* 1.26*  --  1.48*   EGFR 48.6* 44.1* 40.4*  --  33.3*   GLUCOSE 88 87 88  --  100*   CALCIUM 9.0 8.7 8.8  --  9.2   MAGNESIUM  --  1.8  --  1.6*  --    PHOSPHORUS  --   --   --  3.6  --    TSH  --   --   --  1.440  --          Lab 03/05/22  0511 03/02/22 2038   TOTAL PROTEIN 6.4 7.6   ALBUMIN 3.00* 3.60   GLOBULIN 3.4 4.0   ALT (SGPT) 7 10   AST (SGOT) 15 20   BILIRUBIN 0.3 0.3   ALK PHOS 38* 44         Lab 03/02/22 2038   PROBNP 2,861.0*   TROPONIN T 0.011                 Brief Urine Lab Results  (Last result in the past 365 days)      Color   Clarity   Blood   Leuk Est   Nitrite   Protein   CREAT   Urine HCG        03/03/22 0843             54.9               Microbiology Results Abnormal     Procedure Component Value - Date/Time    COVID PRE-OP / PRE-PROCEDURE SCREENING ORDER (NO ISOLATION) - Swab, Nasopharynx [[id number]]  (Normal) Collected: 03/03/22 0546    Lab Status: Final result Specimen: Swab from Nasopharynx Updated: 03/03/22 0652    Narrative:      The following orders were created for panel order COVID PRE-OP / PRE-PROCEDURE SCREENING ORDER (NO ISOLATION) - Swab, Nasopharynx.  Procedure                               Abnormality         Status                     ---------                               -----------         ------                     COVID-19, FLU A/B, RSV P...[[id number]]  Normal              Final result                 Please view results for these tests on the individual orders.    COVID-19, FLU A/B, RSV PCR - Swab, Nasopharynx [[id number]]  (Normal) Collected: 03/03/22 0546    Lab Status: Final result Specimen: Swab from Nasopharynx Updated: 03/03/22 0652     COVID19 Not Detected     Influenza A PCR Not Detected     Influenza B PCR Not Detected     RSV, PCR Not Detected    Narrative:      Fact sheet for providers: https://www.fda.gov/media/443021/download    Fact sheet for  patients: https://www.[employer].gov/media/454843/download    Test performed by PCR.          Adult Transthoracic Echo Complete W/ Cont if Necessary Per Protocol    Result Date: 3/4/2022  · Estimated left ventricular EF = 30% · Moderate mitral valve regurgitation is present. · There is calcification of the aortic valve. · Estimated right ventricular systolic pressure from tricuspid regurgitation is mildly elevated (35-45 mmHg).        Results for orders placed during the hospital encounter of 03/03/22    Adult Transthoracic Echo Complete W/ Cont if Necessary Per Protocol    Interpretation Summary  · Estimated left ventricular EF = 30%  · Moderate mitral valve regurgitation is present.  · There is calcification of the aortic valve.  · Estimated right ventricular systolic pressure from tricuspid regurgitation is mildly elevated (35-45 mmHg).      I have reviewed the medications:  Scheduled Meds:aspirin, 81 mg, Oral, Nightly  atorvastatin, 80 mg, Oral, Nightly  cetirizine, 5 mg, Oral, Daily  donepezil, 5 mg, Oral, Nightly  erythromycin, 1 application, Both Eyes, Nightly  famotidine, 20 mg, Oral, Daily  ferrous sulfate, 325 mg, Oral, Daily  gabapentin, 300 mg, Oral, Nightly  heparin (porcine), 5,000 Units, Subcutaneous, Q12H  levothyroxine, 50 mcg, Oral, Q AM  metoprolol succinate XL, 25 mg, Oral, Daily  nystatin, 5 mL, Swish & Swallow, 4x Daily  ofloxacin, 1 drop, Both Eyes, TID  rOPINIRole, 0.5 mg, Oral, Nightly  sodium chloride, 10 mL, Intravenous, Q12H      Continuous Infusions:   PRN Meds:.•  acetaminophen **OR** acetaminophen **OR** acetaminophen  •  ALPRAZolam  •  magnesium sulfate **OR** magnesium sulfate in D5W 1g/100mL (PREMIX) **OR** magnesium sulfate  •  meclizine  •  polyvinyl alcohol  •  potassium chloride **OR** potassium chloride **OR** potassium chloride  •  sodium chloride  •  sodium chloride    Assessment/Plan   Assessment & Plan     Active Hospital Problems    Diagnosis  POA   • Elevated serum creatinine  [R79.89]  Yes   • Hypothyroid [E03.9]  Yes   • Chronic systolic heart failure (HCC) [I50.22]  Yes   • CARYN (acute kidney injury) (HCC) [N17.9]  Yes   • History of CVA (cerebrovascular accident) [Z86.73]  Not Applicable   • Dementia (HCC) [F03.90]  Yes   • Hypertension [I10]  Yes   • Hyponatremia [E87.1]  Yes      Resolved Hospital Problems   No resolved problems to display.        Brief Hospital Course to date:  Scott Diego is a 89 y.o. female  Who lives with her son in [place] following the recent death of her  in January.  Son states since this time, pt has gone down hill.  Poor po intake.  Unable to take care of self.  Has a lot of anxiety.  Worsening memory.  Son notes she has O2 at night and he checked her sats at home and they were 86 today.  Son brings pt in as he feels he cannot safely take care of her.     CARYN  --Baseline creatinine 1.1-1.3  --Creatinine 1.48 on admit  --holding lasix.  Better with IVF. Now 1.08, back to her baseline.      Hyponatremia  --resolved with IVF     Chronic systolic HF  --Chest x-ray shows mild diffuse pulmonary interstitial disease pattern but fairly similar to multiple prior studies.  --patient's last ECHO from 2018 reviewed with EF 20%. Patient denies having repeat ECHO since then but seems unsure. Repeat Echo this admission-EF 30%.   --Dr. Pacheco originally consulted cardiology for this morning (3/5). She has seen Dr. Aragon in the past and they have asked that she be set up with a clinic follow up instead of seeing her inpatient.  This will need to be set up at discharge.    --Strict I's and O's  --Continue Daily weight  --Metoprolol.  Holding lasix currently d/t CARYN, which has resolved.  Resume lasix soon? She currently appears euvolemic.      Possible Thrush  --nystatin swish and swallow     HTN  --Metoprolol     Hyperkalemia  --lokelma x1     Hypothyroidism  --Levothyroxine     Debility  Generalized weakness  Dementia  History of CVA  --Fall  precautions  --Aricept  --pt/ot following     Bereavement  -- passed away January 2022    DVT prophylaxis:  Medical DVT prophylaxis orders are present.       AM-PAC 6 Clicks Score (PT): 21 (03/04/22 1149)    Disposition: I expect the patient to be discharged pending above- will need placement.    CODE STATUS:   Code Status and Medical Interventions:   Ordered at: 03/03/22 0504     Level Of Support Discussed With:    Next of Kin (If No Surrogate)     Code Status (Patient has no pulse and is not breathing):    CPR (Attempt to Resuscitate)     Medical Interventions (Patient has pulse or is breathing):    Full Support       Doris Boykin, APRN  03/05/22

## 2022-03-05 NOTE — PLAN OF CARE
Goal Outcome Evaluation:  Plan of Care Reviewed With: patient      Pt had multiple aggravated episodes this evening. Throwing the call bell and trying to hit staff with the telephone. Pt continuously trying to climb out of bed. Hospitalist notified, PRN Seroquel given with no progress, then Haldol given. Pt resting comfortably. Will continue to monitor.

## 2022-03-06 LAB — MAGNESIUM SERPL-MCNC: 1.7 MG/DL (ref 1.7–2.3)

## 2022-03-06 PROCEDURE — G0378 HOSPITAL OBSERVATION PER HR: HCPCS

## 2022-03-06 PROCEDURE — 25010000002 MAGNESIUM SULFATE 2 GM/50ML SOLUTION

## 2022-03-06 PROCEDURE — 25010000002 HEPARIN (PORCINE) PER 1000 UNITS: Performed by: NURSE PRACTITIONER

## 2022-03-06 PROCEDURE — 83735 ASSAY OF MAGNESIUM: CPT | Performed by: INTERNAL MEDICINE

## 2022-03-06 PROCEDURE — 99232 SBSQ HOSP IP/OBS MODERATE 35: CPT | Performed by: INTERNAL MEDICINE

## 2022-03-06 PROCEDURE — 25010000002 HALOPERIDOL LACTATE PER 5 MG: Performed by: NURSE PRACTITIONER

## 2022-03-06 RX ORDER — FUROSEMIDE 20 MG/1
20 TABLET ORAL DAILY
Status: DISCONTINUED | OUTPATIENT
Start: 2022-03-07 | End: 2022-03-08

## 2022-03-06 RX ORDER — HALOPERIDOL 5 MG/ML
1 INJECTION INTRAMUSCULAR ONCE
Status: COMPLETED | OUTPATIENT
Start: 2022-03-06 | End: 2022-03-06

## 2022-03-06 RX ORDER — QUETIAPINE FUMARATE 25 MG/1
25 TABLET, FILM COATED ORAL NIGHTLY PRN
Status: DISCONTINUED | OUTPATIENT
Start: 2022-03-06 | End: 2022-03-07

## 2022-03-06 RX ORDER — POLYETHYLENE GLYCOL 3350 17 G/17G
17 POWDER, FOR SOLUTION ORAL DAILY
Status: DISCONTINUED | OUTPATIENT
Start: 2022-03-06 | End: 2022-03-17 | Stop reason: HOSPADM

## 2022-03-06 RX ADMIN — HALOPERIDOL LACTATE 1 MG: 5 INJECTION, SOLUTION INTRAMUSCULAR at 22:15

## 2022-03-06 RX ADMIN — METOPROLOL SUCCINATE 25 MG: 25 TABLET, EXTENDED RELEASE ORAL at 10:19

## 2022-03-06 RX ADMIN — ALPRAZOLAM 0.25 MG: 0.25 TABLET ORAL at 14:42

## 2022-03-06 RX ADMIN — ATORVASTATIN CALCIUM 80 MG: 40 TABLET, FILM COATED ORAL at 20:50

## 2022-03-06 RX ADMIN — MAGNESIUM SULFATE HEPTAHYDRATE 2 G: 2 INJECTION, SOLUTION INTRAVENOUS at 10:17

## 2022-03-06 RX ADMIN — CETIRIZINE HYDROCHLORIDE 5 MG: 10 TABLET, FILM COATED ORAL at 10:19

## 2022-03-06 RX ADMIN — HEPARIN SODIUM 5000 UNITS: 5000 INJECTION, SOLUTION INTRAVENOUS; SUBCUTANEOUS at 20:50

## 2022-03-06 RX ADMIN — FAMOTIDINE 20 MG: 20 TABLET, FILM COATED ORAL at 10:19

## 2022-03-06 RX ADMIN — OFLOXACIN 1 DROP: 3 SOLUTION/ DROPS OPHTHALMIC at 10:18

## 2022-03-06 RX ADMIN — LEVOTHYROXINE SODIUM 50 MCG: 50 TABLET ORAL at 10:19

## 2022-03-06 RX ADMIN — Medication 325 MG: at 10:19

## 2022-03-06 RX ADMIN — OFLOXACIN 1 DROP: 3 SOLUTION/ DROPS OPHTHALMIC at 16:47

## 2022-03-06 RX ADMIN — GABAPENTIN 300 MG: 300 CAPSULE ORAL at 20:50

## 2022-03-06 RX ADMIN — NYSTATIN 500000 UNITS: 100000 SUSPENSION ORAL at 10:18

## 2022-03-06 RX ADMIN — NYSTATIN 500000 UNITS: 100000 SUSPENSION ORAL at 16:47

## 2022-03-06 RX ADMIN — OFLOXACIN 1 DROP: 3 SOLUTION/ DROPS OPHTHALMIC at 20:52

## 2022-03-06 RX ADMIN — HEPARIN SODIUM 5000 UNITS: 5000 INJECTION, SOLUTION INTRAVENOUS; SUBCUTANEOUS at 10:18

## 2022-03-06 RX ADMIN — QUETIAPINE FUMARATE 25 MG: 25 TABLET ORAL at 20:49

## 2022-03-06 RX ADMIN — DONEPEZIL HYDROCHLORIDE 5 MG: 5 TABLET, FILM COATED ORAL at 20:50

## 2022-03-06 RX ADMIN — POLYETHYLENE GLYCOL 3350 17 G: 17 POWDER, FOR SOLUTION ORAL at 16:47

## 2022-03-06 RX ADMIN — ASPIRIN 81 MG 81 MG: 81 TABLET ORAL at 20:50

## 2022-03-06 RX ADMIN — ROPINIROLE HYDROCHLORIDE 0.5 MG: 0.5 TABLET, FILM COATED ORAL at 20:50

## 2022-03-06 RX ADMIN — Medication 10 ML: at 20:53

## 2022-03-06 RX ADMIN — NYSTATIN 500000 UNITS: 100000 SUSPENSION ORAL at 20:53

## 2022-03-06 RX ADMIN — ERYTHROMYCIN 1 APPLICATION: 5 OINTMENT OPHTHALMIC at 20:53

## 2022-03-06 NOTE — PROGRESS NOTES
[facility] Medicine Services  PROGRESS NOTE    Patient Name: Scott Diego  : 4/10/1930  MRN: [id number]    Date of Admission: 3/3/2022  Primary Care Physician: Yolande Escobar MD    Subjective   Subjective     CC:  CARYN/CHF/weakness    HPI:  Resting in bed. VSS. No family present. Pleasant per nursing reports. On 2L    ROS:  uto-sleeping        Objective   Objective     Vital Signs:   Temp:  [96.1 °F (35.6 °C)-98.2 °F (36.8 °C)] 96.1 °F (35.6 °C)  Heart Rate:  [63-98] 90  Resp:  [18] 18  BP: (125-135)/(73-76) 135/76  Flow (L/min):  [2] 2     Physical Exam:  Constitutional: No acute distress, resting in bed  HENT: NCAT, mucous membranes moist  Respiratory: Clear to auscultation bilaterally, respiratory effort normal on RA-2L   Cardiovascular: on tele, rhythm sinus, pulse 90s  Skin: No rashes noted to exposed loose fragile skin   MSK: no significant edema noted        Results Reviewed:  LAB RESULTS:      Lab 22  0511 22  0514 22  0944 22   WBC 5.16 5.91 8.01 11.17*   HEMOGLOBIN 10.0* 10.2* 10.2* 11.3*   HEMATOCRIT 31.2* 32.6* 31.8* 34.8   PLATELETS 130* 141 129* 143   NEUTROS ABS 2.58  --  4.51 7.79*   IMMATURE GRANS (ABS) 0.01  --  0.02 0.02   LYMPHS ABS 1.34  --  1.76 1.54   MONOS ABS 0.80  --  1.09* 1.23*   EOS ABS 0.37  --  0.56* 0.51*   MCV 92.9 94.5 92.7 92.1   CRP  --   --   --  <0.30   LACTATE  --   --   --  1.0         Lab 22  0756 22  0511 22  0514 22  0944 22  0230 22   SODIUM  --  137 138 139  --  132*   POTASSIUM  --  4.9 5.1 5.5*  --  5.1   CHLORIDE  --  110* 110* 111*  --  102   CO2  --  21.0* 22.0 22.0  --  22.0   ANION GAP  --  6.0 6.0 6.0  --  8.0   BUN  --  12 15 18  --  21   CREATININE  --  1.08* 1.17* 1.26*  --  1.48*   EGFR  --  48.6* 44.1* 40.4*  --  33.3*   GLUCOSE  --  88 87 88  --  100*   CALCIUM  --  9.0 8.7 8.8  --  9.2   MAGNESIUM 1.7  --  1.8  --  1.6*  --    PHOSPHORUS  --   --   --    --  3.6  --    TSH  --   --   --   --  1.440  --          Lab 03/05/22  0511 03/02/22 2038   TOTAL PROTEIN 6.4 7.6   ALBUMIN 3.00* 3.60   GLOBULIN 3.4 4.0   ALT (SGPT) 7 10   AST (SGOT) 15 20   BILIRUBIN 0.3 0.3   ALK PHOS 38* 44         Lab 03/02/22 2038   PROBNP 2,861.0*   TROPONIN T 0.011                 Brief Urine Lab Results  (Last result in the past 365 days)      Color   Clarity   Blood   Leuk Est   Nitrite   Protein   CREAT   Urine HCG        03/03/22 0843             54.9               Microbiology Results Abnormal     Procedure Component Value - Date/Time    COVID PRE-OP / PRE-PROCEDURE SCREENING ORDER (NO ISOLATION) - Swab, Nasopharynx [[id number]]  (Normal) Collected: 03/03/22 0546    Lab Status: Final result Specimen: Swab from Nasopharynx Updated: 03/03/22 0652    Narrative:      The following orders were created for panel order COVID PRE-OP / PRE-PROCEDURE SCREENING ORDER (NO ISOLATION) - Swab, Nasopharynx.  Procedure                               Abnormality         Status                     ---------                               -----------         ------                     COVID-19, FLU A/B, RSV P...[[id number]]  Normal              Final result                 Please view results for these tests on the individual orders.    COVID-19, FLU A/B, RSV PCR - Swab, Nasopharynx [[id number]]  (Normal) Collected: 03/03/22 0546    Lab Status: Final result Specimen: Swab from Nasopharynx Updated: 03/03/22 0652     COVID19 Not Detected     Influenza A PCR Not Detected     Influenza B PCR Not Detected     RSV, PCR Not Detected    Narrative:      Fact sheet for providers: https://www.fda.gov/media/948137/download    Fact sheet for patients: https://www.fda.gov/media/984015/download    Test performed by PCR.          Adult Transthoracic Echo Complete W/ Cont if Necessary Per Protocol    Result Date: 3/4/2022  · Estimated left ventricular EF = 30% · Moderate mitral valve regurgitation is present. · There is  calcification of the aortic valve. · Estimated right ventricular systolic pressure from tricuspid regurgitation is mildly elevated (35-45 mmHg).        Results for orders placed during the hospital encounter of 03/03/22    Adult Transthoracic Echo Complete W/ Cont if Necessary Per Protocol    Interpretation Summary  · Estimated left ventricular EF = 30%  · Moderate mitral valve regurgitation is present.  · There is calcification of the aortic valve.  · Estimated right ventricular systolic pressure from tricuspid regurgitation is mildly elevated (35-45 mmHg).      I have reviewed the medications:  Scheduled Meds:aspirin, 81 mg, Oral, Nightly  atorvastatin, 80 mg, Oral, Nightly  cetirizine, 5 mg, Oral, Daily  donepezil, 5 mg, Oral, Nightly  erythromycin, 1 application, Both Eyes, Nightly  famotidine, 20 mg, Oral, Daily  ferrous sulfate, 325 mg, Oral, Daily  gabapentin, 300 mg, Oral, Nightly  heparin (porcine), 5,000 Units, Subcutaneous, Q12H  levothyroxine, 50 mcg, Oral, Q AM  metoprolol succinate XL, 25 mg, Oral, Daily  nystatin, 5 mL, Swish & Swallow, 4x Daily  ofloxacin, 1 drop, Both Eyes, TID  rOPINIRole, 0.5 mg, Oral, Nightly  sodium chloride, 10 mL, Intravenous, Q12H      Continuous Infusions:   PRN Meds:.•  acetaminophen **OR** acetaminophen **OR** acetaminophen  •  ALPRAZolam  •  magnesium sulfate **OR** magnesium sulfate in D5W 1g/100mL (PREMIX) **OR** magnesium sulfate  •  meclizine  •  polyvinyl alcohol  •  potassium chloride **OR** potassium chloride **OR** potassium chloride  •  sodium chloride  •  sodium chloride    Assessment/Plan   Assessment & Plan     Active Hospital Problems    Diagnosis  POA   • Elevated serum creatinine [R79.89]  Yes   • Hypothyroid [E03.9]  Yes   • Chronic systolic heart failure (HCC) [I50.22]  Yes   • CARYN (acute kidney injury) (HCC) [N17.9]  Yes   • History of CVA (cerebrovascular accident) [Z86.73]  Not Applicable   • Dementia (HCC) [F03.90]  Yes   • Hypertension [I10]  Yes   •  Hyponatremia [E87.1]  Yes      Resolved Hospital Problems   No resolved problems to display.        Brief Hospital Course to date:  Scott Diego is a 89 y.o. female  Who lives with her son in [place] following the recent death of her  in January.  Son states since this time, pt has gone down hill.  Poor po intake.  Unable to take care of self.  Has a lot of anxiety.  Worsening memory.  Son notes she has O2 at night and he checked her sats at home and they were 86 today.  Son brings pt in as he feels he cannot safely take care of her.     CARYN  --Baseline creatinine 1.1-1.3  --Creatinine 1.48 on admit  --holding lasix.  Better with IVF. Now 1.08, back to her baseline.      Hyponatremia  --resolved with IVF     Chronic systolic HF  --Chest x-ray shows mild diffuse pulmonary interstitial disease pattern but fairly similar to multiple prior studies.  --patient's last ECHO from 2018 reviewed with EF 20%. Patient denies having repeat ECHO since then but seems unsure. Repeat Echo this admission-EF 30%.   --I have d/w cardiology during stay. She has seen Dr. Aragon in the past and they have asked that she be set up with a clinic follow up instead of seeing her inpatient given not much change since last ECHO.  This will need to be set up at discharge.    --Strict I's and O's  --Continue Daily weight  --Metoprolol.  Holding lasix currently d/t CARYN, which has resolved.  Resume lasix soon? She currently appears euvolemic. Will place for tomorrow AM- watch kidney fxn closely      Possible Thrush  --nystatin swish and swallow     HTN  --Metoprolol     Hyperkalemia  --lokelma x1     Hypothyroidism  --Levothyroxine     Debility  Generalized weakness  Dementia  History of CVA  --Fall precautions  --Aricept  --pt/ot following     Bereavement  -- passed away January 2022    Son was updated at bedside 3/5    DVT prophylaxis:  Medical DVT prophylaxis orders are present.       AM-PAC 6 Clicks Score (PT): 19 (03/05/22  0800)    Disposition: I expect the patient to be discharged pending above- will need placement.    CODE STATUS:   Code Status and Medical Interventions:   Ordered at: 03/03/22 0504     Level Of Support Discussed With:    Next of Kin (If No Surrogate)     Code Status (Patient has no pulse and is not breathing):    CPR (Attempt to Resuscitate)     Medical Interventions (Patient has pulse or is breathing):    Full Support       Lisette Pacheco MD  03/06/22

## 2022-03-07 LAB
ALBUMIN SERPL-MCNC: 3.2 G/DL (ref 3.5–5.2)
ALBUMIN/GLOB SERPL: 0.8 G/DL
ALP SERPL-CCNC: 42 U/L (ref 39–117)
ALT SERPL W P-5'-P-CCNC: 8 U/L (ref 1–33)
ANION GAP SERPL CALCULATED.3IONS-SCNC: 8 MMOL/L (ref 5–15)
AST SERPL-CCNC: 18 U/L (ref 1–32)
BASOPHILS # BLD AUTO: 0.06 10*3/MM3 (ref 0–0.2)
BASOPHILS NFR BLD AUTO: 0.9 % (ref 0–1.5)
BILIRUB SERPL-MCNC: 0.3 MG/DL (ref 0–1.2)
BUN SERPL-MCNC: 14 MG/DL (ref 8–23)
BUN/CREAT SERPL: 11.4 (ref 7–25)
CALCIUM SPEC-SCNC: 9.5 MG/DL (ref 8.2–9.6)
CHLORIDE SERPL-SCNC: 105 MMOL/L (ref 98–107)
CO2 SERPL-SCNC: 25 MMOL/L (ref 22–29)
CREAT SERPL-MCNC: 1.23 MG/DL (ref 0.57–1)
DEPRECATED RDW RBC AUTO: 48.4 FL (ref 37–54)
EGFRCR SERPLBLD CKD-EPI 2021: 41.6 ML/MIN/1.73
EOSINOPHIL # BLD AUTO: 0.23 10*3/MM3 (ref 0–0.4)
EOSINOPHIL NFR BLD AUTO: 3.3 % (ref 0.3–6.2)
ERYTHROCYTE [DISTWIDTH] IN BLOOD BY AUTOMATED COUNT: 13.9 % (ref 12.3–15.4)
GLOBULIN UR ELPH-MCNC: 3.9 GM/DL
GLUCOSE SERPL-MCNC: 98 MG/DL (ref 65–99)
HCT VFR BLD AUTO: 34.7 % (ref 34–46.6)
HGB BLD-MCNC: 10.9 G/DL (ref 12–15.9)
IMM GRANULOCYTES # BLD AUTO: 0.02 10*3/MM3 (ref 0–0.05)
IMM GRANULOCYTES NFR BLD AUTO: 0.3 % (ref 0–0.5)
LYMPHOCYTES # BLD AUTO: 1.55 10*3/MM3 (ref 0.7–3.1)
LYMPHOCYTES NFR BLD AUTO: 22.3 % (ref 19.6–45.3)
MAGNESIUM SERPL-MCNC: 1.9 MG/DL (ref 1.7–2.3)
MCH RBC QN AUTO: 29.7 PG (ref 26.6–33)
MCHC RBC AUTO-ENTMCNC: 31.4 G/DL (ref 31.5–35.7)
MCV RBC AUTO: 94.6 FL (ref 79–97)
MONOCYTES # BLD AUTO: 1.04 10*3/MM3 (ref 0.1–0.9)
MONOCYTES NFR BLD AUTO: 15 % (ref 5–12)
NEUTROPHILS NFR BLD AUTO: 4.05 10*3/MM3 (ref 1.7–7)
NEUTROPHILS NFR BLD AUTO: 58.2 % (ref 42.7–76)
NRBC BLD AUTO-RTO: 0 /100 WBC (ref 0–0.2)
PLATELET # BLD AUTO: 155 10*3/MM3 (ref 140–450)
PMV BLD AUTO: 12.2 FL (ref 6–12)
POTASSIUM SERPL-SCNC: 5.6 MMOL/L (ref 3.5–5.2)
PROT SERPL-MCNC: 7.1 G/DL (ref 6–8.5)
RBC # BLD AUTO: 3.67 10*6/MM3 (ref 3.77–5.28)
SODIUM SERPL-SCNC: 138 MMOL/L (ref 136–145)
WBC NRBC COR # BLD: 6.95 10*3/MM3 (ref 3.4–10.8)

## 2022-03-07 PROCEDURE — 83735 ASSAY OF MAGNESIUM: CPT | Performed by: INTERNAL MEDICINE

## 2022-03-07 PROCEDURE — 93005 ELECTROCARDIOGRAM TRACING: CPT | Performed by: INTERNAL MEDICINE

## 2022-03-07 PROCEDURE — 25010000002 HEPARIN (PORCINE) PER 1000 UNITS: Performed by: NURSE PRACTITIONER

## 2022-03-07 PROCEDURE — 97535 SELF CARE MNGMENT TRAINING: CPT

## 2022-03-07 PROCEDURE — 97116 GAIT TRAINING THERAPY: CPT

## 2022-03-07 PROCEDURE — 85025 COMPLETE CBC W/AUTO DIFF WBC: CPT | Performed by: INTERNAL MEDICINE

## 2022-03-07 PROCEDURE — 25010000002 LORAZEPAM PER 2 MG: Performed by: INTERNAL MEDICINE

## 2022-03-07 PROCEDURE — 80053 COMPREHEN METABOLIC PANEL: CPT | Performed by: INTERNAL MEDICINE

## 2022-03-07 PROCEDURE — 97110 THERAPEUTIC EXERCISES: CPT

## 2022-03-07 PROCEDURE — 25010000002 MAGNESIUM SULFATE 2 GM/50ML SOLUTION

## 2022-03-07 PROCEDURE — 99233 SBSQ HOSP IP/OBS HIGH 50: CPT | Performed by: INTERNAL MEDICINE

## 2022-03-07 RX ORDER — LORAZEPAM 2 MG/ML
1 INJECTION INTRAMUSCULAR EVERY 4 HOURS PRN
Status: DISCONTINUED | OUTPATIENT
Start: 2022-03-07 | End: 2022-03-11

## 2022-03-07 RX ADMIN — Medication 10 ML: at 20:09

## 2022-03-07 RX ADMIN — SODIUM ZIRCONIUM CYCLOSILICATE 10 G: 10 POWDER, FOR SUSPENSION ORAL at 18:05

## 2022-03-07 RX ADMIN — METOPROLOL SUCCINATE 25 MG: 25 TABLET, EXTENDED RELEASE ORAL at 09:34

## 2022-03-07 RX ADMIN — OFLOXACIN 1 DROP: 3 SOLUTION/ DROPS OPHTHALMIC at 20:08

## 2022-03-07 RX ADMIN — HEPARIN SODIUM 5000 UNITS: 5000 INJECTION, SOLUTION INTRAVENOUS; SUBCUTANEOUS at 20:08

## 2022-03-07 RX ADMIN — GABAPENTIN 300 MG: 300 CAPSULE ORAL at 20:08

## 2022-03-07 RX ADMIN — LEVOTHYROXINE SODIUM 50 MCG: 50 TABLET ORAL at 05:11

## 2022-03-07 RX ADMIN — FAMOTIDINE 20 MG: 20 TABLET, FILM COATED ORAL at 09:34

## 2022-03-07 RX ADMIN — LORAZEPAM 1 MG: 2 INJECTION INTRAMUSCULAR; INTRAVENOUS at 21:00

## 2022-03-07 RX ADMIN — ATORVASTATIN CALCIUM 80 MG: 40 TABLET, FILM COATED ORAL at 20:08

## 2022-03-07 RX ADMIN — ACETAMINOPHEN 650 MG: 325 TABLET ORAL at 13:27

## 2022-03-07 RX ADMIN — ASPIRIN 81 MG 81 MG: 81 TABLET ORAL at 20:08

## 2022-03-07 RX ADMIN — HEPARIN SODIUM 5000 UNITS: 5000 INJECTION, SOLUTION INTRAVENOUS; SUBCUTANEOUS at 09:34

## 2022-03-07 RX ADMIN — DONEPEZIL HYDROCHLORIDE 5 MG: 5 TABLET, FILM COATED ORAL at 20:08

## 2022-03-07 RX ADMIN — CETIRIZINE HYDROCHLORIDE 5 MG: 10 TABLET, FILM COATED ORAL at 09:34

## 2022-03-07 RX ADMIN — FUROSEMIDE 20 MG: 20 TABLET ORAL at 09:33

## 2022-03-07 RX ADMIN — ROPINIROLE HYDROCHLORIDE 0.5 MG: 0.5 TABLET, FILM COATED ORAL at 20:08

## 2022-03-07 RX ADMIN — Medication 325 MG: at 09:33

## 2022-03-07 RX ADMIN — OFLOXACIN 1 DROP: 3 SOLUTION/ DROPS OPHTHALMIC at 18:05

## 2022-03-07 RX ADMIN — NYSTATIN 500000 UNITS: 100000 SUSPENSION ORAL at 20:09

## 2022-03-07 RX ADMIN — POLYETHYLENE GLYCOL 3350 17 G: 17 POWDER, FOR SOLUTION ORAL at 09:33

## 2022-03-07 RX ADMIN — OFLOXACIN 1 DROP: 3 SOLUTION/ DROPS OPHTHALMIC at 09:33

## 2022-03-07 RX ADMIN — NYSTATIN 500000 UNITS: 100000 SUSPENSION ORAL at 18:05

## 2022-03-07 RX ADMIN — ERYTHROMYCIN 1 APPLICATION: 5 OINTMENT OPHTHALMIC at 20:08

## 2022-03-07 RX ADMIN — NYSTATIN 500000 UNITS: 100000 SUSPENSION ORAL at 09:34

## 2022-03-07 NOTE — THERAPY TREATMENT NOTE
Patient Name: Scott Diego  : 4/10/1930    MRN: [id number]                              Today's Date: 3/7/2022       Admit Date: 3/3/2022    Visit Dx:     ICD-10-CM ICD-9-CM   1. CARYN (acute kidney injury) (HCC)  N17.9 584.9   2. Elevated serum creatinine  R79.89 790.99   3. Normocytic anemia  D64.9 285.9   4. Impaired functional mobility, balance, gait, and endurance  Z74.09 V49.89     Patient Active Problem List   Diagnosis   • Idiopathic peripheral neuropathy   • Neck pain   • Mild cognitive impairment   • Anemia   • Hyponatremia   • Hypertension   • Dehydration   • History of CVA (cerebrovascular accident)   • Dementia (HCC)   • Left bundle branch block   • Orthostatic dizziness   • Chronic systolic heart failure (HCC)   • Normocytic anemia   • CARYN (acute kidney injury) (HCC)   • Dyspnea   • Hypoxia   • Hypothyroid   • Pericardial effusion   • Acute respiratory failure with hypoxia (HCC)   • Right lower lobe pneumonia   • Ventricular ectopy   • Elevated serum creatinine     Past Medical History:   Diagnosis Date   • Congestive heart failure (HCC)    • Malignant neoplasm (HCC)    • Stroke (HCC)     mini stroke   • Systolic heart failure (HCC) 2017    Chronic/compensated (EF 25%)     Past Surgical History:   Procedure Laterality Date   • CHOLECYSTECTOMY     • EYE SURGERY     • HYSTERECTOMY        General Information     Row Name 22          Physical Therapy Time and Intention    Document Type therapy note (daily note)  -AS     Mode of Treatment physical therapy  -AS     Row Name 22          General Information    Patient Profile Reviewed yes  -AS     Existing Precautions/Restrictions fall  -AS     Barriers to Rehab medically complex;cognitive status;previous functional deficit  -AS     Row Name 22          Cognition    Orientation Status (Cognition) oriented to;person;disoriented to;place;situation;time  -AS     Row Name 22          Safety Issues, Functional  Mobility    Safety Issues Affecting Function (Mobility) ability to follow commands;awareness of need for assistance;insight into deficits/self-awareness;safety precaution awareness;safety precautions follow-through/compliance  -AS     Impairments Affecting Function (Mobility) balance;coordination;visual/perceptual;endurance/activity tolerance;strength;cognition  -AS     Cognitive Impairments, Mobility Safety/Performance attention;awareness, need for assistance;insight into deficits/self-awareness;safety precaution awareness;judgment;safety precaution follow-through  -AS           User Key  (r) = Recorded By, (t) = Taken By, (c) = Cosigned By    Initials Name Provider Type    AS Jessie Fleming PTA Physical Therapy Assistant               Mobility     Row Name 03/07/22 0935          Bed Mobility    Supine-Sit [place] (Bed Mobility) supervision  -AS     Assistive Device (Bed Mobility) head of bed elevated;bed rails  -AS     Comment, (Bed Mobility) cues for sequencing and assist for lines  -AS     Row Name 03/07/22 0935          Transfers    Comment, (Transfers) cues to push up from bed, increased assist needed for commode  -AS     Row Name 03/07/22 0935          Bed-Chair Transfer    Bed-Chair [place] (Transfers) verbal cues;minimum assist (75% patient effort);1 person assist  -AS     Assistive Device (Bed-Chair Transfers) cane, straight  -AS     Comment, (Bed-Chair Transfer) cues for hand placement  -AS     Row Name 03/07/22 0935          Sit-Stand Transfer    Sit-Stand [place] (Transfers) minimum assist (75% patient effort);1 person assist  -AS     Assistive Device (Sit-Stand Transfers) cane, straight  -AS     Comment, (Sit-Stand Transfer) increased assist needed to stand from commode in bathroom do to lower surface  -AS     Row Name 03/07/22 0935          Gait/Stairs (Locomotion)    [place] Level (Gait) minimum assist (75% patient effort);1 person assist;verbal cues  -AS     Assistive  Device (Gait) cane, straight  -AS     Distance in Feet (Gait) 120  -AS     Deviations/Abnormal Patterns (Gait) bilateral deviations;base of support, narrow;gait speed decreased  -AS     Bilateral Gait Deviations forward flexed posture;weight shift ability decreased  -AS     Comment, (Gait/Stairs) patient ambulated 120 feet with min assist x1, cues for cane placement and use, occassionally patient lifting cane off floor and not using correctly. Unsteady gait noticed with min assist to correct. Patient with decreased safety awareness with all activity. Distance limited by weakness, impaired balance and decreased safety awareness.  -AS           User Key  (r) = Recorded By, (t) = Taken By, (c) = Cosigned By    Initials Name Provider Type    AS Jessie Fleming PTA Physical Therapy Assistant               Obj/Interventions     Row Name 03/07/22 0940          Motor Skills    Therapeutic Exercise shoulder;knee;ankle  -AS     Row Name 03/07/22 0940          Shoulder (Therapeutic Exercise)    Shoulder (Therapeutic Exercise) AROM (active range of motion)  -AS     Shoulder AROM (Therapeutic Exercise) bilateral;flexion;extension;aBduction;aDduction;sitting;10 repetitions  bicep curls  -AS     Row Name 03/07/22 0940          Knee (Therapeutic Exercise)    Knee (Therapeutic Exercise) strengthening exercise  -AS     Knee Strengthening (Therapeutic Exercise) bilateral;marching while seated;LAQ (long arc quad);sitting;10 repetitions  -AS     Row Name 03/07/22 0940          Ankle (Therapeutic Exercise)    Ankle (Therapeutic Exercise) AROM (active range of motion)  -AS     Ankle AROM (Therapeutic Exercise) bilateral;dorsiflexion;plantarflexion;sitting;10 repetitions  -AS     Row Name 03/07/22 0940          Balance    Dynamic Standing Balance minimal assist;1-person assist  -AS           User Key  (r) = Recorded By, (t) = Taken By, (c) = Cosigned By    Initials Name Provider Type    AS Jessie Fleming PTA Physical Therapy  Assistant               Goals/Plan    No documentation.                Clinical Impression     Row Name 03/07/22 0941          Pain    Pretreatment Pain Rating 0/10 - no pain  -AS     Posttreatment Pain Rating 0/10 - no pain  -AS     [facility] Name 03/07/22 0941          Plan of Care Review    Plan of Care Reviewed With patient  -AS     Progress no change  -AS     Outcome Evaluation patient ambulated 120 feet with min assist x1, cues for cane placement and use, occassionally patient lifting cane off floor and not using correctly. Unsteady gait noticed with min assist to correct. Patient with decreased safety awareness with all activity. Distance limited by weakness, impaired balance and decreased safety awareness.  -AS     [facility] Name 03/07/22 0941          Positioning and Restraints    Pre-Treatment Position in bed  -AS     Post Treatment Position chair  -AS     In Chair reclined;call light within reach;encouraged to call for assist;exit alarm on;waffle cushion;legs elevated;notified nsg  -AS           User Key  (r) = Recorded By, (t) = Taken By, (c) = Cosigned By    Initials Name Provider Type    AS Jessie Fleming PTA Physical Therapy Assistant               Outcome Measures     [facility] Name 03/07/22 0942          How much help from another person do you currently need...    Turning from your back to your side while in flat bed without using bedrails? 4  -AS     Moving from lying on back to sitting on the side of a flat bed without bedrails? 4  -AS     Moving to and from a bed to a chair (including a wheelchair)? 3  -AS     Standing up from a chair using your arms (e.g., wheelchair, bedside chair)? 3  -AS     Climbing 3-5 steps with a railing? 2  -AS     To walk in hospital room? 3  -AS     AM-PAC 6 Clicks Score (PT) 19  -AS     [facility] Name 03/07/22 0942          Functional Assessment    Outcome Measure Options AM-PAC 6 Clicks Basic Mobility (PT)  -AS           User Key  (r) = Recorded By, (t) = Taken By, (c) = Cosigned By     Initials Name Provider Type    AS Jessie Fleming PTA Physical Therapy Assistant                             Physical Therapy Education                 Title: PT OT SLP Therapies (In Progress)     Topic: Physical Therapy (In Progress)     Point: Mobility training (In Progress)     Learning Progress Summary           Patient Acceptance, E, NR by AS at 3/7/2022 0942    Acceptance, E,TB, NR by EDMOND at 3/5/2022 0806    Eager, E, NR by  at 3/4/2022 1149                   Point: Home exercise program (In Progress)     Learning Progress Summary           Patient Acceptance, E, NR by AS at 3/7/2022 0942    Acceptance, E,TB, NR by EDMOND at 3/5/2022 0806                   Point: Body mechanics (In Progress)     Learning Progress Summary           Patient Acceptance, E, NR by AS at 3/7/2022 0942    Acceptance, E,TB, NR by EDMOND at 3/5/2022 0806    Eager, E, NR by  at 3/4/2022 1149                   Point: Precautions (In Progress)     Learning Progress Summary           Patient Acceptance, E, NR by AS at 3/7/2022 0942    Acceptance, E,TB, NR by EDMOND at 3/5/2022 0806    Eager, E, NR by  at 3/4/2022 1149                               User Key     Initials Effective Dates Name Provider Type Discipline     06/16/21 -  Priya Monzon PT Physical Therapist PT    AS 06/16/21 -  Jessie Fleming PTA Physical Therapy Assistant PT     12/29/21 -  Hannah Anderson RN Registered Nurse Nurse              PT Recommendation and Plan     Plan of Care Reviewed With: patient  Progress: no change  Outcome Evaluation: patient ambulated 120 feet with min assist x1, cues for cane placement and use, occassionally patient lifting cane off floor and not using correctly. Unsteady gait noticed with min assist to correct. Patient with decreased safety awareness with all activity. Distance limited by weakness, impaired balance and decreased safety awareness.     Time Calculation:    PT Charges     Row Name 03/07/22 0942             Time  Calculation    Start Time 0909  -AS      PT Received On 03/07/22  -AS      PT Goal Re-Cert Due Date 03/14/22  -AS              Timed Charges    31446 - PT Therapeutic Exercise Minutes 10  -AS      32842 - Gait Training Minutes  14  -AS              Total Minutes    Timed Charges Total Minutes 24  -AS       Total Minutes 24  -AS            User Key  (r) = Recorded By, (t) = Taken By, (c) = Cosigned By    Initials Name Provider Type    AS Jessie Fleming PTA Physical Therapy Assistant              Therapy Charges for Today     Code Description Service Date Service Provider Modifiers Qty    48308240429 HC PT THER PROC EA 15 MIN 3/7/2022 Jessie Fleming, ZACH GP 1    81122144201 HC GAIT TRAINING EA 15 MIN 3/7/2022 Jessie Fleming, ZACH GP 1          PT G-Codes  Outcome Measure Options: AM-PAC 6 Clicks Basic Mobility (PT)  AM-PAC 6 Clicks Score (PT): 19  AM-PAC 6 Clicks Score (OT): 17    Jessie Fleming PTA  3/7/2022

## 2022-03-07 NOTE — CASE MANAGEMENT/SOCIAL WORK
"Continued Stay Note  [facility]     Patient Name: Scott Diego  MRN: [id number]  Today's Date: 3/7/2022    Admit Date: 3/3/2022     Discharge Plan     Row Name 03/07/22 1546       Plan    Plan SNF    Patient/Family in Agreement with Plan yes    Plan Comments Called spencer Dayron over the phone.  Dayron unfortunately has not looked at the list provided by Kristine last week, but did mention that he thought about [facility] and \"[facility]\".  Referral has already been sent to [facility].  Called and left a  for [facility].  At this time, patient is requiring some medical management related to her anxiety.  CM will continue to follow.    Final Discharge Disposition Code 30 - still a patient               Discharge Codes    No documentation.               Expected Discharge Date and Time     Expected Discharge Date Expected Discharge Time    Mar 11, 2022             Francine Rose RN    "

## 2022-03-07 NOTE — THERAPY TREATMENT NOTE
Patient Name: Scott Diego  : 4/10/1930    MRN: [id number]                              Today's Date: 3/7/2022       Admit Date: 3/3/2022    Visit Dx:     ICD-10-CM ICD-9-CM   1. CARYN (acute kidney injury) (HCC)  N17.9 584.9   2. Elevated serum creatinine  R79.89 790.99   3. Normocytic anemia  D64.9 285.9   4. Impaired functional mobility, balance, gait, and endurance  Z74.09 V49.89     Patient Active Problem List   Diagnosis   • Idiopathic peripheral neuropathy   • Neck pain   • Mild cognitive impairment   • Anemia   • Hyponatremia   • Hypertension   • Dehydration   • History of CVA (cerebrovascular accident)   • Dementia (HCC)   • Left bundle branch block   • Orthostatic dizziness   • Chronic systolic heart failure (HCC)   • Normocytic anemia   • CARYN (acute kidney injury) (HCC)   • Dyspnea   • Hypoxia   • Hypothyroid   • Pericardial effusion   • Acute respiratory failure with hypoxia (HCC)   • Right lower lobe pneumonia   • Ventricular ectopy   • Elevated serum creatinine     Past Medical History:   Diagnosis Date   • Congestive heart failure (HCC)    • Malignant neoplasm (HCC)    • Stroke (HCC)     mini stroke   • Systolic heart failure (HCC) 2017    Chronic/compensated (EF 25%)     Past Surgical History:   Procedure Laterality Date   • CHOLECYSTECTOMY     • EYE SURGERY     • HYSTERECTOMY        General Information     Row Name 22 153          OT Time and Intention    Document Type therapy note (daily note)  -AN     Mode of Treatment occupational therapy  -AN     Row Name 22 153          General Information    Patient Profile Reviewed yes  -AN     Existing Precautions/Restrictions fall  -AN     Barriers to Rehab medically complex;cognitive status;previous functional deficit  -AN     Row Name 22 436          Cognition    Orientation Status (Cognition) oriented to;person;disoriented to;place;situation;time  -AN     Row Name 22 153          Safety Issues, Functional Mobility     Safety Issues Affecting Function (Mobility) ability to follow commands;awareness of need for assistance;insight into deficits/self-awareness;judgment;problem-solving;safety precaution awareness;safety precautions follow-through/compliance;sequencing abilities  -AN     Impairments Affecting Function (Mobility) balance;coordination;visual/perceptual;endurance/activity tolerance;strength;cognition  -AN     Cognitive Impairments, Mobility Safety/Performance awareness, need for assistance;insight into deficits/self-awareness;judgment;problem-solving/reasoning;safety precaution awareness;safety precaution follow-through;sequencing abilities  -AN     Comment, Safety Issues/Impairments (Mobility) continuous cues for safety awareness and RW  -AN           User Key  (r) = Recorded By, (t) = Taken By, (c) = Cosigned By    Initials Name Provider Type    Elissa Atkins OT Occupational Therapist                 Mobility/ADL's     Row Name 03/07/22 1531          Bed Mobility    Comment, (Bed Mobility) in bathroom upon arrival  -AN     Row Name 03/07/22 1531          Transfers    Transfers sit-stand transfer;toilet transfer;stand-sit transfer  -AN     Comment, (Transfers) cues for hand placement and transfer technique  -AN     Sit-Stand Humble (Transfers) minimum assist (75% patient effort);1 person assist;verbal cues  -AN     Stand-Sit Humble (Transfers) minimum assist (75% patient effort);1 person assist;verbal cues;nonverbal cues (demo/gesture)  -AN     Humble Level (Toilet Transfer) minimum assist (75% patient effort);verbal cues;1 person assist  -AN     Assistive Device (Toilet Transfer) commode;grab bars/safety frame;walker, front-wheeled  -AN     Row Name 03/07/22 1531          Sit-Stand Transfer    Assistive Device (Sit-Stand Transfers) walker, front-wheeled  -AN     Comment, (Sit-Stand Transfer) cues for to use grab bars due to low surface  -AN     Row Name 03/07/22 1531          Toilet Transfer     Type (Toilet Transfer) sit-stand  -AN     Comment, (Toilet Transfer) pt observed with both hands in the toilet, feces on hands requiring assistance and clean up  -AN     Row Name 03/07/22 1531          Functional Mobility    Functional Mobility- Ind. Level contact guard assist;verbal cues required  -AN     Functional Mobility- Device rolling walker  -AN     Functional Mobility-Distance (Feet) --  household distance  -AN     Functional Mobility- Comment cues for visual scanning, safety awareness, and RW navigation  -AN     Row Name 03/07/22 1531          Activities of Daily Living    BADL Assessment/Intervention toileting;grooming  -AN     Row Name 03/07/22 1531          Grooming Assessment/Training    Sterling Level (Grooming) wash face, hands;verbal cues;contact guard assist  -AN     Position (Grooming) sink side;supported standing  -AN     Row Name 03/07/22 1531          Toileting Assessment/Training    Sterling Level (Toileting) adjust/manage clothing;change pad/brief;perform perineal hygiene;minimum assist (75% patient effort);verbal cues  -AN     Assistive Devices (Toileting) commode  -AN     Position (Toileting) supported standing;supported sitting  -AN     Row Name 03/07/22 1531          Stand-Sit Transfer    Assistive Device (Stand-Sit Transfers) walker, front-wheeled  -AN     Comment, (Stand-Sit Transfer) cues for hand placement  -AN           User Key  (r) = Recorded By, (t) = Taken By, (c) = Cosigned By    Initials Name Provider Type    AN Elissa Bhagat OT Occupational Therapist               Obj/Interventions     Row Name 03/07/22 1540          Balance    Balance Assessment sitting static balance;sitting dynamic balance;sit to stand dynamic balance;standing static balance;standing dynamic balance  -AN     Static Sitting Balance supervision  -AN     Dynamic Sitting Balance supervision  -AN     Position, Sitting Balance sitting in chair  sitting on toilet  -AN     Sit to Stand Dynamic Balance  minimal assist  -AN     Static Standing Balance contact guard;verbal cues  -AN     Dynamic Standing Balance verbal cues;minimal assist  -AN     Position/Device Used, Standing Balance walker, rolling  -AN     Balance Interventions standing;sit to stand;supported;static;dynamic;minimal challenge;occupation based/functional task  -AN           User Key  (r) = Recorded By, (t) = Taken By, (c) = Cosigned By    Initials Name Provider Type    AN Elissa Bhagat OT Occupational Therapist               Goals/Plan    No documentation.                Clinical Impression     Row Name 03/07/22 1542          Pain Assessment    Pretreatment Pain Rating 0/10 - no pain  -AN     Posttreatment Pain Rating 0/10 - no pain  -AN     Pre/Posttreatment Pain Comment asymptomatic  -AN     Pain Intervention(s) Repositioned;Ambulation/increased activity  -AN     Row Name 03/07/22 1542          Plan of Care Review    Plan of Care Reviewed With patient;son  -AN     Progress no change  -AN     Outcome Evaluation Pt sitting on commode upon arrival. Pt performed toileting with Min A, STS with Min A from low surface, and ambulated using RW with Min A and cues for RW navigation and safety. Sink side grooming performed with CGA for balance. OT changes rec SNF at [facility].  -AN     Row Name 03/07/22 1542          Therapy Plan Review/Discharge Plan (OT)    Anticipated Discharge Disposition (OT) skilled nursing facility  -AN     Row Name 03/07/22 1542          Vital Signs    Pre Systolic BP Rehab --  VSS  -AN     O2 Delivery Pre Treatment room air  -AN     O2 Delivery Intra Treatment room air  -AN     O2 Delivery Post Treatment room air  -AN     Pre Patient Position Sitting  -AN     Intra Patient Position Standing  -AN     Post Patient Position Sitting  -AN     Row Name 03/07/22 1542          Positioning and Restraints    Pre-Treatment Position bathroom  -AN     Post Treatment Position chair  -AN     In Chair notified nsg;reclined;call light within  reach;encouraged to call for assist;exit alarm on;with family/caregiver;waffle cushion;legs elevated  -AN           User Key  (r) = Recorded By, (t) = Taken By, (c) = Cosigned By    Initials Name Provider Type    Elissa Atkins OT Occupational Therapist               Outcome Measures     Row Name 03/07/22 1544          How much help from another is currently needed...    Putting on and taking off regular lower body clothing? 3  -AN     Bathing (including washing, rinsing, and drying) 2  -AN     Toileting (which includes using toilet bed pan or urinal) 3  -AN     Putting on and taking off regular upper body clothing 3  -AN     Taking care of personal grooming (such as brushing teeth) 3  -AN     Eating meals 3  -AN     AM-PAC 6 Clicks Score (OT) 17  -AN     Row Name 03/07/22 0942 03/07/22 0800       How much help from another person do you currently need...    Turning from your back to your side while in flat bed without using bedrails? 4  -AS 4  -MH    Moving from lying on back to sitting on the side of a flat bed without bedrails? 4  -AS 4  -MH    Moving to and from a bed to a chair (including a wheelchair)? 3  -AS 3  -MH    Standing up from a chair using your arms (e.g., wheelchair, bedside chair)? 3  -AS 3  -MH    Climbing 3-5 steps with a railing? 2  -AS 2  -MH    To walk in hospital room? 3  -AS 3  -MH    AM-PAC 6 Clicks Score (PT) 19  -AS 19  -MH    Row Name 03/07/22 1544 03/07/22 0942       Functional Assessment    Outcome Measure Options AM-PAC 6 Clicks Daily Activity (OT)  -AN AM-PAC 6 Clicks Basic Mobility (PT)  -AS          User Key  (r) = Recorded By, (t) = Taken By, (c) = Cosigned By    Initials Name Provider Type    AS Jessie Fleming PTA Physical Therapy Assistant    Sarkis Flanagan, RN Registered Nurse    Elissa Atkins OT Occupational Therapist                Occupational Therapy Education                 Title: PT OT SLP Therapies (In Progress)     Topic: Occupational Therapy  (In Progress)     Point: ADL training (Done)     Description:   Instruct learner(s) on proper safety adaptation and remediation techniques during self care or transfers.   Instruct in proper use of assistive devices.              Learning Progress Summary           Patient Acceptance, E, VU,NR by AN at 3/7/2022 1544    Acceptance, E,TB, NR by EDMOND at 3/5/2022 0806    Acceptance, E,TB,D, VU,DU,NR by KF at 3/4/2022 0903   Family Acceptance, E, VU,NR by AN at 3/7/2022 1544                   Point: Home exercise program (In Progress)     Description:   Instruct learner(s) on appropriate technique for monitoring, assisting and/or progressing therapeutic exercises/activities.              Learning Progress Summary           Patient Acceptance, E,TB, NR by EDMOND at 3/5/2022 0806                   Point: Precautions (Done)     Description:   Instruct learner(s) on prescribed precautions during self-care and functional transfers.              Learning Progress Summary           Patient Acceptance, E, VU,NR by AN at 3/7/2022 1544    Acceptance, E,TB, NR by EDMOND at 3/5/2022 0806    Acceptance, E,TB,D, VU,DU,NR by KF at 3/4/2022 0903   Family Acceptance, E, VU,NR by AN at 3/7/2022 1544                   Point: Body mechanics (Done)     Description:   Instruct learner(s) on proper positioning and spine alignment during self-care, functional mobility activities and/or exercises.              Learning Progress Summary           Patient Acceptance, E, VU,NR by AN at 3/7/2022 1544    Acceptance, E,TB, NR by EDMOND at 3/5/2022 0806    Acceptance, E,TB,D, VU,DU,NR by KF at 3/4/2022 0903   Family Acceptance, E, VU,NR by AN at 3/7/2022 1544                               User Key     Initials Effective Dates Name Provider Type Discipline     06/16/21 -  Theresa Rader OT Occupational Therapist OT    JENNIFER 09/21/21 -  Elissa Bhagat OT Occupational Therapist OT    EDMOND 12/29/21 -  Hannah Anderson RN Registered Nurse Nurse              OT  Recommendation and Plan     Plan of Care Review  Plan of Care Reviewed With: patient, son  Progress: no change  Outcome Evaluation: Pt sitting on commode upon arrival. Pt performed toileting with Min A, STS with Min A from low surface, and ambulated using RW with Min A and cues for RW navigation and safety. Sink side grooming performed with CGA for balance. OT changes rec SNF at [facility].     Time Calculation:    Time Calculation- OT     Row Name 03/07/22 1545 03/07/22 0942          Time Calculation- OT    OT Start Time 1445  -AN --     OT Received On 03/07/22  -AN --     OT Goal Re-Cert Due Date 03/14/22  -AN --            Timed Charges    91267 - Gait Training Minutes  -- 14  -AS     91502 - OT Self Care/Mgmt Minutes 18  -AN --            Total Minutes    Timed Charges Total Minutes 18  -AN 14  -AS      Total Minutes 18  -AN 14  -AS           User Key  (r) = Recorded By, (t) = Taken By, (c) = Cosigned By    Initials Name Provider Type    AS Jessie Fleming, ZACH Physical Therapy Assistant    Elissa Atkins OT Occupational Therapist              Therapy Charges for Today     Code Description Service Date Service Provider Modifiers Qty    [id number] HC OT SELF CARE/MGMT/TRAIN EA 15 MIN 3/7/2022 Elissa Bhagat OT GO 1               Elissa Bhagat OT  3/7/2022

## 2022-03-07 NOTE — PAYOR COMM NOTE
"Scott Diego (89 y.o. Female)             Date of Birth   04/10/1930    Social Security Number       Address   [address] [place] [id number]    Home Phone   [phone]    MRN   [id number]       Spiritism   None    Marital Status                               Admission Date   3/3/22    Admission Type   Emergency    Admitting Provider   Lisette Pacheco MD    Attending Provider   Lisette Pacheco MD    Department, Room/Bed   [address], S446/1       Discharge Date       Discharge Disposition       Discharge Destination                               Attending Provider: Lisette Pacheco MD    Allergies: Augmentin [Amoxicillin-pot Clavulanate], Claritin [Loratadine], Keflex [Cephalexin], Sulfa Antibiotics    Isolation: None   Infection: None   Code Status: CPR   Advance Care Planning Activity    Ht: 165.1 cm (65\")   Wt: 52.3 kg (115 lb 3.2 oz)    Admission Cmt: None   Principal Problem: None                Active Insurance as of 3/3/2022     Primary Coverage     Payor Plan Insurance Group Employer/Plan Group    ANTHEM MEDICARE REPLACEMENT [facility] MEDICARE ADVANTAGE [id number]     Payor Plan Address Payor Plan Phone Number Payor Plan Fax Number Effective Dates    [address] [phone]  3/1/2022 - None Entered    [facility] [address]       Subscriber Name Subscriber Birth Date Member ID       SCOTT DIEGO 4/10/1930 [id number]                 Emergency Contacts      (Rel.) Home Phone Work Phone Mobile Phone    VAUGHN GASPAR (Son) [phone] -- [phone]            Insurance Information                ANTHEM MEDICARE REPLACEMENT/ANTHEM MEDICARE ADVANTAGE Phone: [phone]    Subscriber: Scott Diego Subscriber#: [id number]    Group#: [id number] Precert#: --             History & Physical      Day, Tova NAIR MD at 22 0355              [facility] Medicine Services  HISTORY AND PHYSICAL    Patient Name: Scott Diego  : " 4/10/1930  MRN: [id number]  Primary Care Physician: Yolande Escobar MD  Date of admission: 3/3/2022    Subjective   Subjective     Chief Complaint:  Panic attacks    HPI:  Scott Diego is a 89 y.o. female with history of hypertension, chronic systolic CHF, mild cognitive impairment, CVA, chronic hyponatremia, presents to the ED with hypoxia and panic attacks.  Per the patient's son she has been experiencing panic attacks for over the last week.  Her   on 2022 and since then she has been living with her son.  He reports that since this time her memory has gotten worse and she has physically declined as well.  He reports that she does not drink or eat much food and has been sleeping more than usual.  She has increased weakness and now is having difficulty getting from bed to chair.  He checked her oxygen saturation at home today and it was 86%.  He put her on her home oxygen and brought her to the ED for evaluation.  Son is interested in placement because he does not feel that he can adequately care for her at home.  No fevers, cough, chest pain, vomiting, diarrhea, or any other complaints at this time.  CT of the head shows no acute intracranial abnormality, moderate volume loss, moderate chronic small vessel ischemic changes.  Chest x-ray shows mild diffuse pulmonary interstitial disease pattern but fairly similar to multiple prior studies.  Pertinent labs include BUN 21, creatinine 1.48.  Patient was given 1 liter of saline in the ED, and is being admitted to the Hospitalist for further evaluation and management.    COVID Details:        Symptoms: [x] NONE [] Fever []  Cough [] Shortness of breath [] Change in taste or smell  The patient qualifies to receive the vaccine, but they have not yet received it.    Review of Systems   Constitutional: Positive for appetite change and fatigue. Negative for chills and fever.   HENT: Negative.    Eyes: Negative.    Respiratory: Negative.    Cardiovascular:  Negative.    Gastrointestinal: Positive for abdominal pain. Negative for abdominal distention, diarrhea, nausea and vomiting.   Endocrine: Negative.    Genitourinary: Negative.    Musculoskeletal: Negative.    Skin: Negative.    Allergic/Immunologic: Negative.    Neurological: Positive for weakness (generalized). Negative for headaches.   Hematological: Negative.    Psychiatric/Behavioral: The patient is nervous/anxious.         All other systems reviewed and are negative.     Personal History     Past Medical History:   Diagnosis Date   • Congestive heart failure (HCC)    • Malignant neoplasm (HCC)    • Stroke (HCC)     mini stroke   • Systolic heart failure (HCC) 9/25/2017    Chronic/compensated (EF 25%)       Past Surgical History:   Procedure Laterality Date   • CHOLECYSTECTOMY     • EYE SURGERY     • HYSTERECTOMY         Family History:  family history includes Cancer in her brother, mother, and sister; No Known Problems in her father. Otherwise pertinent FHx was reviewed and unremarkable.     Social History:  reports that she has never smoked. She has never used smokeless tobacco. She reports that she does not drink alcohol and does not use drugs.  Social History     Social History Narrative    Caffeine Intake:0-1  servings per day    Patient lives at her son's home       Medications:  ALPRAZolam, Olopatadine HCl, clopidogrel, cycloSPORINE, donepezil, fish oil, furosemide, gabapentin, levothyroxine, meclizine, metoprolol succinate XL, nystatin, rOPINIRole, and vitamin C    Allergies   Allergen Reactions   • Augmentin [Amoxicillin-Pot Clavulanate] Nausea And Vomiting   • Claritin [Loratadine] Unknown (See Comments)     Doesn't remember   • Keflex [Cephalexin] Nausea And Vomiting   • Sulfa Antibiotics Other (See Comments)     Does not remember       Objective   Objective     Vital Signs:   Temp:  [97.8 °F (36.6 °C)] 97.8 °F (36.6 °C)  Heart Rate:  [89] 89  Resp:  [18] 18  BP: (105-129)/(61-64) 105/64    Physical  Exam   Constitutional: Awake, alert, resting in bed  Eyes: PERRLA, sclerae anicteric, no conjunctival injection  HENT: NCAT, mucous membranes moist  Neck: Supple, no thyromegaly, no lymphadenopathy, trachea midline  Respiratory: Clear to auscultation bilaterally, nonlabored respirations   Cardiovascular: RRR, no murmurs, rubs, or gallops, palpable pedal pulses bilaterally  Gastrointestinal: Positive bowel sounds, soft, nontender, nondistended  Musculoskeletal: No bilateral ankle edema, no clubbing or cyanosis to extremities  Psychiatric: Appropriate affect, cooperative  Neurologic: Oriented to self, strength symmetric in all extremities, Cranial Nerves grossly intact to confrontation, speech clear  Skin: No rashes       Result Review:  I have personally reviewed the results from the time of this admission to 03/03/22 3:55 AM EST and agree with these findings:  [x]  Laboratory  []  Microbiology  [x]  Radiology  []  EKG/Telemetry   []  Cardiology/Vascular   []  Pathology  []  Old records  []  Other:  Most notable findings include:       LAB RESULTS:      Lab 03/02/22 2038   WBC 11.17*   HEMOGLOBIN 11.3*   HEMATOCRIT 34.8   PLATELETS 143   NEUTROS ABS 7.79*   IMMATURE GRANS (ABS) 0.02   LYMPHS ABS 1.54   MONOS ABS 1.23*   EOS ABS 0.51*   MCV 92.1   CRP <0.30   LACTATE 1.0         Lab 03/03/22  0230 03/02/22 2038   SODIUM  --  132*   POTASSIUM  --  5.1   CHLORIDE  --  102   CO2  --  22.0   ANION GAP  --  8.0   BUN  --  21   CREATININE  --  1.48*   EGFR  --  33.3*   GLUCOSE  --  100*   CALCIUM  --  9.2   MAGNESIUM 1.6*  --    PHOSPHORUS 3.6  --    TSH 1.440  --          Lab 03/02/22 2038   TOTAL PROTEIN 7.6   ALBUMIN 3.60   GLOBULIN 4.0   ALT (SGPT) 10   AST (SGOT) 20   BILIRUBIN 0.3   ALK PHOS 44         Lab 03/02/22 2038   PROBNP 2,861.0*   TROPONIN T 0.011                   Microbiology Results (last 10 days)     ** No results found for the last 240 hours. **          CT Head Without Contrast    Result Date:  3/3/2022  EXAMINATION: CT HEAD WO CONTRAST DATE: 3/3/2022 1:49 AM  INDICATION: Confusion.  COMPARISON: None available.  TECHNIQUE: Thin section noncontrast axial images were obtained through the head. Coronal reformatted images were created.  CT dose lowering techniques were used, to include: automated exposure control, adjustment for patient size, and or use of iterative  reconstruction FINDINGS: No acute intracranial hemorrhage. Prominent bifrontal CSF spaces, greater on the left, are likely related to volume loss and represents prominent subarachnoid space. Subdural hygromas could've a similar appearance, however, cortical veins are seen coursing through this prominent CSF density space, for instance (axial series 3 image 35). No acute territorial infarct. No intracranial mass or mass effect. Mild burden of low attenuation in the white matter is nonspecific, but likely reflects sequelae of chronic microvascular ischemia.  Moderate diffuse parenchymal volume loss. No hydrocephalus. Basal cisterns are patent. Atherosclerotic calcifications of both carotid siphons. Bilateral ocular lens surgery. Trace mucosal thickening in the ethmoid air cells. Mastoid air cells are clear. Calvarium is intact.     Impression: 1.  No acute intracranial abnormality. 2.  Moderate volume loss. Mild chronic small vessel ischemic changes. Electronically signed by:  Og Rajan DO  3/3/2022 12:12 AM Mountain Time    XR Chest 1 View    Result Date: 3/2/2022  EXAMINATION: XR CHEST 1 VW-  INDICATION: SOA triage protocol  COMPARISON: 12/6/2019  FINDINGS: Heart is normal in size. Vasculature is upper limits of normal. There is a mild diffuse, finely reticular interstitial disease pattern the lungs, present on prior studies and probably stable allowing for differences in film technique. Similar pattern is present back to at least 2018. No lung consolidation effusion or pneumothorax is seen.       Impression: Mild diffuse pulmonary interstitial  disease pattern, but fairly similar to multiple prior studies. Findings could reflect chronic lung disease, less likely mild interstitial edema or early changes of viral syndrome. No significant focal lung disease is seen.    This report was finalized on 3/2/2022 9:51 PM by Dr. Jan Alvarez MD.        Results for orders placed during the hospital encounter of 06/13/18    Adult Transthoracic Echo Complete W/ Cont if Necessary Per Protocol    Interpretation Summary  · Moderate aortic valve regurgitation is present.  · Mild mitral valve regurgitation is present  · Mild tricuspid valve regurgitation is present.  · There is a moderate (1-2cm) circumferential pericardial effusion.  · Left ventricular systolic function is severely decreased. Estimated EF = 20%.  · There is mild right atrial diastolic collapse from the pericardial effusion. The transmitral Doppler show some variation with inspiration. Clinical correlation suggested in this patient i.e. as the patient hypotensive could also consider CT scan of the chest to look at the pericardial effusion      Assessment/Plan   Assessment & Plan       Hyponatremia    Hypertension    History of CVA (cerebrovascular accident)    Dementia (HCC)    Chronic systolic heart failure (HCC)    Hypothyroid    Elevated serum creatinine    Scott Diego is a 89 y.o. female with history of hypertension, chronic systolic CHF, mild cognitive impairment, CVA, chronic hyponatremia, presents to the ED with hypoxia and panic attacks.      Assessment and Plan:    Elevated creatinine  --Baseline creatinine 1.1-1.3  --Creatinine 1.48 today  --Was given 1 L saline in the ED  --Urine studies  --IV fluids  --hold lasix  --BMP in the a.m.    Hyponatremia  --Urine sodium and urine osmolality  --Serum osmolality  --Was given a liter of saline in the ED  --BMP in the a.m.    Chronic systolic HF  --Chest x-ray shows mild diffuse pulmonary interstitial disease pattern but fairly similar to multiple prior  studies.  --Strict I's and O's  --Daily weight  --Metoprolol    HTN  --Metoprolol    Hypothyroidism  --Levothyroxine    Debility  Generalized weakness  Dementia  History of CVA  --Fall precautions  --Aricept  --consult case management  --consult pt/ot in the am    DVT prophylaxis:  Heparin    CODE STATUS:     Level Of Support Discussed With: Next of Kin (If No Surrogate)  Code Status (Patient has no pulse and is not breathing): CPR (Attempt to Resuscitate)  Medical Interventions (Patient has pulse or is breathing): Full Support      This note has been completed as part of a split-shared workflow.   Signature: Electronically signed by Nata Ayala, VASILE, 03/03/22, 5:04 AM EST.       Attending   Admission Attestation       I have seen and examined the patient, performing an independent face-to-face diagnostic evaluation with plan of care reviewed and developed with the advanced practice clinician (APC).      Brief Summary Statement:   Scott Diego is a 89 y.o. female  Who lives with her son in [place] following the recent death of her  in January.  Son states since this time, pt has gone down hill.  Poor po intake.  Unable to take care of self.  Has a lot of anxiety.  Worsening memory.  Son notes she has O2 at night and he checked her sats at home and they were 86 today.  Son brings pt in as he feels he cannot safely take care of her.   Remainder of detailed HPI is as noted by APC and has been reviewed and/or edited by me for completeness.    Attending Physical Exam:   performed the above exam; no changes to the above    Brief Assessment/Plan :  See detailed assessment and plan developed with APC which I have reviewed and/or edited for completeness.  89 y/o female here w/ frailty, dehydration, electrolyte derangement;  1. Elevated creatinine/dehydration;  -gentle IVF's  2. Hypomagnesemia;  -electrolyte replacement  3. Anemia,   -improved from baseline   4.frailty/debility  -case mgmt for possible NH  placement      Admission Status: I believe that this patient meets  INPATIENT status due to  Arf, hypomag.  I feel patient’s risk for adverse outcomes and need for care warrant INPATIENT evaluation and I predict the patient’s care encounter to likely last beyond 2 midnights.    Electronically signed by Tova Vaughan MD, 03/03/22, 5:24 AM NIYA Vaughan MD  03/03/22                          Electronically signed by Tova Vaughan MD at 03/03/22 0588       Vital Signs (last day)     Date/Time Temp Temp src Pulse Resp BP Patient Position SpO2    03/07/22 1100 -- -- 77 -- -- -- --    03/07/22 1000 -- -- 87 -- -- -- --    03/07/22 0900 -- -- 82 -- -- -- 96    03/07/22 0700 -- -- 59 -- -- -- 97    03/07/22 0651 98.3 (36.8) Oral 78 18 123/64 Lying 98    03/07/22 0600 -- -- 60 -- -- -- 96    03/07/22 0500 -- -- 66 -- -- -- --    03/07/22 0400 -- -- 88 -- -- -- --    03/07/22 0300 -- -- 61 -- -- -- --    03/07/22 0200 -- -- 63 -- -- -- --    03/07/22 0100 -- -- 66 -- -- -- --    03/07/22 0000 -- -- 75 -- -- -- --    03/06/22 2300 -- -- 85 -- -- -- --    03/06/22 2200 -- -- 111 -- -- -- --    03/06/22 2100 -- -- 104 -- -- -- --    03/06/22 2000 -- -- 105 -- -- -- --    03/06/22 1900 -- -- 108 -- -- -- --    03/06/22 1841 98 (36.7) Oral 90 18 158/85 Lying --    03/06/22 1800 -- -- 94 -- -- -- --    03/06/22 1600 -- -- 79 -- -- -- --    03/06/22 1500 -- -- 85 -- -- -- --    03/06/22 1400 -- -- 84 -- -- -- --    03/06/22 1200 -- -- 72 -- -- -- --    03/06/22 1019 -- -- 87 -- 127/93 -- --    03/06/22 1000 -- -- 96 -- -- -- --    03/06/22 0800 -- -- 77 -- -- -- --    03/06/22 0700 -- -- 90 -- -- -- --    03/06/22 0600 -- -- 63 -- -- -- --    03/06/22 0500 -- -- 85 -- -- -- --    03/06/22 0400 -- -- 80 -- -- -- --    03/06/22 0300 -- -- 72 -- -- -- --    03/06/22 0200 -- -- 79 -- -- -- --    03/06/22 0100 -- -- 77 -- -- -- --    03/06/22 0000 -- -- 96 -- -- -- --            Current Facility-Administered Medications    Medication Dose Route Frequency Provider Last Rate Last Admin   • acetaminophen (TYLENOL) tablet 650 mg  650 mg Oral Q4H PRN Nata Ayala APRN   650 mg at 03/03/22 1649    Or   • acetaminophen (TYLENOL) 160 MG/5ML solution 650 mg  650 mg Oral Q4H PRN Nata Ayala APRN        Or   • acetaminophen (TYLENOL) suppository 650 mg  650 mg Rectal Q4H PRN Nata Ayala APRN       • aspirin chewable tablet 81 mg  81 mg Oral Nightly Jaron Oliver MD   81 mg at 03/06/22 2050   • atorvastatin (LIPITOR) tablet 80 mg  80 mg Oral Nightly Jaron Oliver MD   80 mg at 03/06/22 2050   • cetirizine (zyrTEC) tablet 5 mg  5 mg Oral Daily Jaron Oliver MD   5 mg at 03/07/22 0934   • donepezil (ARICEPT) tablet 5 mg  5 mg Oral Nightly Nata Ayala APRN   5 mg at 03/06/22 2050   • erythromycin (ROMYCIN) ophthalmic ointment 1 application  1 application Both Eyes Nightly Jaron Oliver MD   1 application at 03/06/22 2053   • famotidine (PEPCID) tablet 20 mg  20 mg Oral Daily Jaron Oliver MD   20 mg at 03/07/22 0934   • ferrous sulfate tablet 325 mg  325 mg Oral Daily Jaron Oliver MD   325 mg at 03/07/22 0933   • furosemide (LASIX) tablet 20 mg  20 mg Oral Daily Lisette Pacheco MD   20 mg at 03/07/22 0933   • gabapentin (NEURONTIN) capsule 300 mg  300 mg Oral Nightly Jaron Oliver MD   300 mg at 03/06/22 2050   • heparin (porcine) 5000 UNIT/ML injection 5,000 Units  5,000 Units Subcutaneous Q12H Nata Ayala APRN   5,000 Units at 03/07/22 0934   • levothyroxine (SYNTHROID, LEVOTHROID) tablet 50 mcg  50 mcg Oral Q AM Nata Ayala APRN   50 mcg at 03/07/22 0511   • LORazepam (ATIVAN) injection 1 mg  1 mg Intravenous Q4H PRN Lisette Pacheco MD       • magnesium sulfate 4 gram infusion - Mg less than or equal to 1mg/dL  4 g Intravenous PRN Arie Andrade, PharmD        Or   • magnesium sulfate 3 gram infusion (1gm x 3) - Mg 1.1 - 1.5 mg/dL  1 g Intravenous PRN Raymond,  Arie PERES, PharmD        Or   • Magnesium Sulfate 2 gram infusion- Mg 1.6 - 1.9 mg/dL  2 g Intravenous PRN Arie Andrade, Sami 25 mL/hr at 03/06/22 1017 2 g at 03/06/22 1017   • meclizine (ANTIVERT) tablet 12.5 mg  12.5 mg Oral TID PRN Nata Ayala APRN       • metoprolol succinate XL (TOPROL-XL) 24 hr tablet 25 mg  25 mg Oral Daily Nata Ayala APRN   25 mg at 03/07/22 0934   • nystatin (MYCOSTATIN) 100,000 unit/mL suspension 500,000 Units  5 mL Swish & Swallow 4x Daily Jaron Oliver MD   500,000 Units at 03/07/22 0934   • ofloxacin (OCUFLOX) 0.3 % ophthalmic solution 1 drop  1 drop Both Eyes TID Jaron Oliver MD   1 drop at 03/07/22 0933   • polyethylene glycol (MIRALAX) packet 17 g  17 g Oral Daily Lisette Pacheco MD   17 g at 03/07/22 0933   • polyvinyl alcohol (LIQUIFILM) 1.4 % ophthalmic solution 1 drop  1 drop Both Eyes Q3H PRN Jaron Oliver MD       • potassium chloride (MICRO-K) CR capsule 40 mEq  40 mEq Oral PRN Lisette Pacheco MD        Or   • potassium chloride (KLOR-CON) packet 40 mEq  40 mEq Oral PRN Lisette Pacheco MD        Or   • potassium chloride 10 mEq in 100 mL IVPB  10 mEq Intravenous Q1H PRN Lisette Pacheco MD       • rOPINIRole (REQUIP) tablet 0.5 mg  0.5 mg Oral Nightly Nata Ayala APRN   0.5 mg at 03/06/22 2050   • sodium chloride 0.9 % flush 10 mL  10 mL Intravenous PRN Doug Monroe MD       • sodium chloride 0.9 % flush 10 mL  10 mL Intravenous Q12H Nata Ayala APRN   10 mL at 03/06/22 2053   • sodium chloride 0.9 % flush 10 mL  10 mL Intravenous PRN Nata Ayala APRN       • sodium zirconium cyclosilicate (LOKELMA) pack 10 g  10 g Oral Once Lisette Pacheco MD         Lab Results (last 48 hours)     Procedure Component Value Units Date/Time    Comprehensive Metabolic Panel [[id number]]  (Abnormal) Collected: 03/07/22 0457    Specimen: Blood Updated: 03/07/22 0627     Glucose 98 mg/dL      BUN 14 mg/dL      Creatinine  1.23 mg/dL      Sodium 138 mmol/L      Potassium 5.6 mmol/L      Chloride 105 mmol/L      CO2 25.0 mmol/L      Calcium 9.5 mg/dL      Total Protein 7.1 g/dL      Albumin 3.20 g/dL      ALT (SGPT) 8 U/L      AST (SGOT) 18 U/L      Alkaline Phosphatase 42 U/L      Total Bilirubin 0.3 mg/dL      Globulin 3.9 gm/dL      Comment: Calculated Result        A/G Ratio 0.8 g/dL      BUN/Creatinine Ratio 11.4     Anion Gap 8.0 mmol/L      eGFR 41.6 mL/min/1.73      Comment: National Kidney Foundation and American Society of Nephrology (ASN) Task Force recommended calculation based on the Chronic Kidney Disease Epidemiology Collaboration (CKD-EPI) equation refit without adjustment for race.       Narrative:      GFR Normal >60  Chronic Kidney Disease <60  Kidney Failure <15      Magnesium [[id number]]  (Normal) Collected: 03/07/22 0457    Specimen: Blood Updated: 03/07/22 0627     Magnesium 1.9 mg/dL     CBC & Differential [[id number]]  (Abnormal) Collected: 03/07/22 0457    Specimen: Blood Updated: 03/07/22 0552    Narrative:      The following orders were created for panel order CBC & Differential.  Procedure                               Abnormality         Status                     ---------                               -----------         ------                     CBC Auto Differential[[id number]]        Abnormal            Final result                 Please view results for these tests on the individual orders.    CBC Auto Differential [[id number]]  (Abnormal) Collected: 03/07/22 0457    Specimen: Blood Updated: 03/07/22 0552     WBC 6.95 10*3/mm3      RBC 3.67 10*6/mm3      Hemoglobin 10.9 g/dL      Hematocrit 34.7 %      MCV 94.6 fL      MCH 29.7 pg      MCHC 31.4 g/dL      RDW 13.9 %      RDW-SD 48.4 fl      MPV 12.2 fL      Platelets 155 10*3/mm3      Neutrophil % 58.2 %      Lymphocyte % 22.3 %      Monocyte % 15.0 %      Eosinophil % 3.3 %      Basophil % 0.9 %      Immature Grans % 0.3 %      Neutrophils, Absolute  4.05 10*3/mm3      Lymphocytes, Absolute 1.55 10*3/mm3      Monocytes, Absolute 1.04 10*3/mm3      Eosinophils, Absolute 0.23 10*3/mm3      Basophils, Absolute 0.06 10*3/mm3      Immature Grans, Absolute 0.02 10*3/mm3      nRBC 0.0 /100 WBC     Magnesium [[id number]]  (Normal) Collected: 03/06/22 0756    Specimen: Blood Updated: 03/06/22 0827     Magnesium 1.7 mg/dL           Imaging Results (Last 48 Hours)     ** No results found for the last 48 hours. **          Orders (last 48 hrs)      Start     Ordered    03/08/22 0600  CBC & Differential  Morning Draw         03/07/22 1130    03/08/22 0600  Comprehensive Metabolic Panel  Morning Draw         03/07/22 1130    03/07/22 1228  Inpatient Admission  Once         03/07/22 1228    03/07/22 1215  sodium zirconium cyclosilicate (LOKELMA) pack 10 g  Once         03/07/22 1126    03/07/22 1126  LORazepam (ATIVAN) injection 1 mg  Every 4 Hours PRN         03/07/22 1126    03/07/22 0900  furosemide (LASIX) tablet 20 mg  Daily         03/06/22 0916    03/07/22 0600  CBC & Differential  Morning Draw         03/06/22 0916    03/07/22 0600  Comprehensive Metabolic Panel  Morning Draw         03/06/22 0916    03/07/22 0600  Magnesium  Morning Draw         03/06/22 1501    03/07/22 0600  CBC Auto Differential  PROCEDURE ONCE         03/06/22 2205    03/07/22 0500  ECG 12 Lead  Tomorrow AM         03/06/22 1505    03/06/22 2300  haloperidol lactate (HALDOL) injection 1 mg  Once         03/06/22 2210    03/06/22 1600  polyethylene glycol (MIRALAX) packet 17 g  Daily         03/06/22 1505    03/06/22 1504  QUEtiapine (SEROquel) tablet 25 mg  Nightly PRN,   Status:  Discontinued         03/06/22 1505    03/06/22 0720  Magnesium  Once         03/06/22 0720    03/05/22 2330  haloperidol lactate (HALDOL) injection 1 mg  Once         03/05/22 2237    03/05/22 2045  haloperidol lactate (HALDOL) injection 0.5 mg  Once         03/05/22 1959 03/04/22 1800  Dietary Nutrition Supplements  "Boost Plus; strawberry  Daily With Lunch & Dinner       03/04/22 1412    03/04/22 1435  magnesium sulfate 4 gram infusion - Mg less than or equal to 1mg/dL  As Needed        \"Or\" Linked Group Details    03/04/22 1436    03/04/22 1435  magnesium sulfate 3 gram infusion (1gm x 3) - Mg 1.1 - 1.5 mg/dL  As Needed        \"Or\" Linked Group Details    03/04/22 1436    03/04/22 1435  Magnesium Sulfate 2 gram infusion- Mg 1.6 - 1.9 mg/dL  As Needed        \"Or\" Linked Group Details    03/04/22 1436    03/04/22 1148  Patient Currently On Electrolyte Replacement Protocol - Please Refer to MAR for Protocol Details  Misc Nursing Order (Specify)  Daily      Comments: Patient Currently On Electrolyte Replacement Protocol - Please Refer to MAR for Protocol Details    03/04/22 1147    03/04/22 1147  potassium chloride (MICRO-K) CR capsule 40 mEq  As Needed        \"Or\" Linked Group Details    03/04/22 1147    03/04/22 1147  potassium chloride (KLOR-CON) packet 40 mEq  As Needed        \"Or\" Linked Group Details    03/04/22 1147    03/04/22 1147  potassium chloride 10 mEq in 100 mL IVPB  Every 1 Hour PRN        \"Or\" Linked Group Details    03/04/22 1147    03/03/22 2100  donepezil (ARICEPT) tablet 5 mg  Nightly         03/03/22 0840    03/03/22 2100  rOPINIRole (REQUIP) tablet 0.5 mg  Nightly         03/03/22 0840    03/03/22 2100  gabapentin (NEURONTIN) capsule 300 mg  Nightly         03/03/22 1713    03/03/22 2100  aspirin chewable tablet 81 mg  Nightly         03/03/22 1715    03/03/22 2100  atorvastatin (LIPITOR) tablet 80 mg  Nightly         03/03/22 1715    03/03/22 2100  erythromycin (ROMYCIN) ophthalmic ointment 1 application  Nightly         03/03/22 1715    03/03/22 2100  ofloxacin (OCUFLOX) 0.3 % ophthalmic solution 1 drop  3 Times Daily         03/03/22 1715 03/03/22 1815  ferrous sulfate tablet 325 mg  Daily         03/03/22 1715 03/03/22 1815  cetirizine (zyrTEC) tablet 5 mg  Daily         03/03/22 1715    03/03/22 " "1800  famotidine (PEPCID) tablet 20 mg  Daily         03/03/22 1715    03/03/22 1714  polyvinyl alcohol (LIQUIFILM) 1.4 % ophthalmic solution 1 drop  Every 3 Hours PRN         03/03/22 1715    03/03/22 1347  nystatin (MYCOSTATIN) 100,000 unit/mL suspension 500,000 Units  4 Times Daily         03/03/22 1345    03/03/22 1200  levothyroxine (SYNTHROID, LEVOTHROID) tablet 50 mcg  Every Early Morning         03/03/22 0840    03/03/22 1200  metoprolol succinate XL (TOPROL-XL) 24 hr tablet 25 mg  Daily         03/03/22 0840    03/03/22 1200  Vital Signs  Every 4 Hours       03/03/22 0840    03/03/22 1200  heparin (porcine) 5000 UNIT/ML injection 5,000 Units  Every 12 Hours Scheduled         03/03/22 0840    03/03/22 1000  Incentive Spirometry  Every 4 Hours While Awake       03/03/22 0840    03/03/22 0900  sodium chloride 0.9 % flush 10 mL  Every 12 Hours Scheduled         03/03/22 0840    03/03/22 0841  Daily Weights  Daily       03/03/22 0840    03/03/22 0840  acetaminophen (TYLENOL) tablet 650 mg  Every 4 Hours PRN        \"Or\" Linked Group Details    03/03/22 0840    03/03/22 0840  acetaminophen (TYLENOL) 160 MG/5ML solution 650 mg  Every 4 Hours PRN        \"Or\" Linked Group Details    03/03/22 0840    03/03/22 0840  acetaminophen (TYLENOL) suppository 650 mg  Every 4 Hours PRN        \"Or\" Linked Group Details    03/03/22 0840    03/03/22 0840  meclizine (ANTIVERT) tablet 12.5 mg  3 Times Daily PRN         03/03/22 0840    03/03/22 0840  ALPRAZolam (XANAX) tablet 0.25 mg  2 Times Daily PRN,   Status:  Discontinued         03/03/22 0840    03/03/22 0840  sodium chloride 0.9 % flush 10 mL  As Needed         03/03/22 0840    03/02/22 2026  sodium chloride 0.9 % flush 10 mL  As Needed         03/02/22 2027    Unscheduled  Up With Assistance  As Needed       03/03/22 0840    Unscheduled  Magnesium  As Needed       03/04/22 1147    Unscheduled  Potassium  As Needed       03/04/22 1147    --  lisinopril (PRINIVIL,ZESTRIL) 10 " MG tablet  Daily         22 1106    --  atorvastatin (LIPITOR) 80 MG tablet  Nightly         22 1106    --  aspirin 81 MG chewable tablet  Nightly         22 1106    --  Glycerin-Polysorbate 80 (REFRESH DRY EYE THERAPY OP)  As Needed         22 1523    --  ofloxacin (OCUFLOX) 0.3 % ophthalmic solution  3 Times Daily         22 1523    --  famotidine (PEPCID) 20 MG tablet  Daily         22 1523    --  fexofenadine (ALLEGRA) 180 MG tablet  Daily         22 1523    --  erythromycin (ROMYCIN) 5 MG/GM ophthalmic ointment  Nightly         22 165    --  carboxymethylcellulose (REFRESH PLUS) 0.5 % solution  3 Times Daily PRN         22 165    --  ferrous sulfate 325 (65 FE) MG tablet  Daily         22 165    --  gabapentin (NEURONTIN) 300 MG capsule  Nightly         22 0901                 Physician Progress Notes (last 48 hours)      Lisette Pacheco MD at 22 1125              [facility] Medicine Services  PROGRESS NOTE    Patient Name: Scott Diego  : 4/10/1930  MRN: [id number]    Date of Admission: 3/3/2022  Primary Care Physician: Yolande Escobar MD    Subjective   Subjective     CC:  CARYN/CHF/weakness    HPI:  Resting in bed. VSS. Oriented only to self     ROS:  uto-dementia        Objective   Objective     Vital Signs:   Temp:  [98 °F (36.7 °C)-98.3 °F (36.8 °C)] 98.3 °F (36.8 °C)  Heart Rate:  [] 87  Resp:  [18] 18  BP: (123-158)/(64-85) 123/64  Flow (L/min):  [2] 2     Physical Exam:  Constitutional: No acute distress, resting in bed  HENT: NCAT, mucous membranes moist  Respiratory: Clear to auscultation bilaterally, respiratory effort normal on RA-2L   Cardiovascular: on tele, rhythm sinus, pulse 90s  Skin: No rashes noted to exposed loose fragile skin   MSK: no significant edema noted        Results Reviewed:  LAB RESULTS:      Lab 22  0457 22  0511 22  0514 22  0944 22  2039    WBC 6.95 5.16 5.91 8.01 11.17*   HEMOGLOBIN 10.9* 10.0* 10.2* 10.2* 11.3*   HEMATOCRIT 34.7 31.2* 32.6* 31.8* 34.8   PLATELETS 155 130* 141 129* 143   NEUTROS ABS 4.05 2.58  --  4.51 7.79*   IMMATURE GRANS (ABS) 0.02 0.01  --  0.02 0.02   LYMPHS ABS 1.55 1.34  --  1.76 1.54   MONOS ABS 1.04* 0.80  --  1.09* 1.23*   EOS ABS 0.23 0.37  --  0.56* 0.51*   MCV 94.6 92.9 94.5 92.7 92.1   CRP  --   --   --   --  <0.30   LACTATE  --   --   --   --  1.0         Lab 03/07/22 0457 03/06/22  0756 03/05/22  0511 03/04/22  0514 03/03/22  0944 03/03/22  0230 03/02/22 2038   SODIUM 138  --  137 138 139  --  132*   POTASSIUM 5.6*  --  4.9 5.1 5.5*  --  5.1   CHLORIDE 105  --  110* 110* 111*  --  102   CO2 25.0  --  21.0* 22.0 22.0  --  22.0   ANION GAP 8.0  --  6.0 6.0 6.0  --  8.0   BUN 14  --  12 15 18  --  21   CREATININE 1.23*  --  1.08* 1.17* 1.26*  --  1.48*   EGFR 41.6*  --  48.6* 44.1* 40.4*  --  33.3*   GLUCOSE 98  --  88 87 88  --  100*   CALCIUM 9.5  --  9.0 8.7 8.8  --  9.2   MAGNESIUM 1.9 1.7  --  1.8  --  1.6*  --    PHOSPHORUS  --   --   --   --   --  3.6  --    TSH  --   --   --   --   --  1.440  --          Lab 03/07/22 0457 03/05/22  0511 03/02/22  2038   TOTAL PROTEIN 7.1 6.4 7.6   ALBUMIN 3.20* 3.00* 3.60   GLOBULIN 3.9 3.4 4.0   ALT (SGPT) 8 7 10   AST (SGOT) 18 15 20   BILIRUBIN 0.3 0.3 0.3   ALK PHOS 42 38* 44         Lab 03/02/22 2038   PROBNP 2,861.0*   TROPONIN T 0.011                 Brief Urine Lab Results  (Last result in the past 365 days)      Color   Clarity   Blood   Leuk Est   Nitrite   Protein   CREAT   Urine HCG        03/03/22 0843             54.9               Microbiology Results Abnormal     Procedure Component Value - Date/Time    COVID PRE-OP / PRE-PROCEDURE SCREENING ORDER (NO ISOLATION) - Swab, Nasopharynx [[id number]]  (Normal) Collected: 03/03/22 0546    Lab Status: Final result Specimen: Swab from Nasopharynx Updated: 03/03/22 0652    Narrative:      The following orders were  created for panel order COVID PRE-OP / PRE-PROCEDURE SCREENING ORDER (NO ISOLATION) - Swab, Nasopharynx.  Procedure                               Abnormality         Status                     ---------                               -----------         ------                     COVID-19, FLU A/B, RSV P...[[id number]]  Normal              Final result                 Please view results for these tests on the individual orders.    COVID-19, FLU A/B, RSV PCR - Swab, Nasopharynx [[id number]]  (Normal) Collected: 03/03/22 0546    Lab Status: Final result Specimen: Swab from Nasopharynx Updated: 03/03/22 0652     COVID19 Not Detected     Influenza A PCR Not Detected     Influenza B PCR Not Detected     RSV, PCR Not Detected    Narrative:      Fact sheet for providers: https://www.fda.gov/media/567663/download    Fact sheet for patients: https://www.fda.gov/media/264047/download    Test performed by PCR.          No radiology results from the last 24 hrs    Results for orders placed during the hospital encounter of 03/03/22    Adult Transthoracic Echo Complete W/ Cont if Necessary Per Protocol    Interpretation Summary  · Estimated left ventricular EF = 30%  · Moderate mitral valve regurgitation is present.  · There is calcification of the aortic valve.  · Estimated right ventricular systolic pressure from tricuspid regurgitation is mildly elevated (35-45 mmHg).      I have reviewed the medications:  Scheduled Meds:aspirin, 81 mg, Oral, Nightly  atorvastatin, 80 mg, Oral, Nightly  cetirizine, 5 mg, Oral, Daily  donepezil, 5 mg, Oral, Nightly  erythromycin, 1 application, Both Eyes, Nightly  famotidine, 20 mg, Oral, Daily  ferrous sulfate, 325 mg, Oral, Daily  furosemide, 20 mg, Oral, Daily  gabapentin, 300 mg, Oral, Nightly  heparin (porcine), 5,000 Units, Subcutaneous, Q12H  levothyroxine, 50 mcg, Oral, Q AM  metoprolol succinate XL, 25 mg, Oral, Daily  nystatin, 5 mL, Swish & Swallow, 4x Daily  ofloxacin, 1 drop, Both  Eyes, TID  polyethylene glycol, 17 g, Oral, Daily  rOPINIRole, 0.5 mg, Oral, Nightly  sodium chloride, 10 mL, Intravenous, Q12H      Continuous Infusions:   PRN Meds:.•  acetaminophen **OR** acetaminophen **OR** acetaminophen  •  ALPRAZolam  •  magnesium sulfate **OR** magnesium sulfate in D5W 1g/100mL (PREMIX) **OR** magnesium sulfate  •  meclizine  •  polyvinyl alcohol  •  potassium chloride **OR** potassium chloride **OR** potassium chloride  •  sodium chloride  •  sodium chloride    Assessment/Plan   Assessment & Plan     Active Hospital Problems    Diagnosis  POA   • Elevated serum creatinine [R79.89]  Yes   • Hypothyroid [E03.9]  Yes   • Chronic systolic heart failure (HCC) [I50.22]  Yes   • CARYN (acute kidney injury) (HCC) [N17.9]  Yes   • History of CVA (cerebrovascular accident) [Z86.73]  Not Applicable   • Dementia (HCC) [F03.90]  Yes   • Hypertension [I10]  Yes   • Hyponatremia [E87.1]  Yes      Resolved Hospital Problems   No resolved problems to display.        Brief Hospital Course to date:  Scott Diego is a 89 y.o. female  Who lives with her son in [place] following the recent death of her  in January.  Son states since this time, pt has gone down hill.  Poor po intake.  Unable to take care of self.  Has a lot of anxiety.  Worsening memory.  Son notes she has O2 at night and he checked her sats at home and they were 86 today.  Son brings pt in as he feels he cannot safely take care of her.     CARYN  --Baseline creatinine 1.1-1.3  --Creatinine 1.48 on admit  --holding lasix.  Better with IVF. Had stopped IVF and resumed home oral lasix 3/7. Cr slightly up at 1.23 today- monitor.      Hyponatremia  --resolved with IVF     Chronic systolic HF  --Chest x-ray shows mild diffuse pulmonary interstitial disease pattern but fairly similar to multiple prior studies.  --patient's last ECHO from 2018 reviewed with EF 20%. Patient denies having repeat ECHO since then but seems unsure. Repeat Echo this  admission-EF 30%.   --I have d/w cardiology during stay. She has seen Dr. Aragon in the past and they have asked that she be set up with a clinic follow up instead of seeing her inpatient given not much change since last ECHO.  This will need to be set up at discharge.    --Strict I's and O's  --Continue Daily weight  --Metoprolol.       Possible Thrush  --nystatin swish and swallow    Agitation with h/o Dementia  Prolonged QTC  --was given intermittent haldol without effect, seroquel added 3/6 which did not work  -- today- hold further QTC prolonging agents and have stopped seroquel/haldol (one time dose), added prn ativan 2mg IV for q4h prn dosing for agtiation- monitor (have stopped prn xanax while on ativan)     HTN  --Metoprolol     Hyperkalemia  --lokelma x1 again today     Hypothyroidism  --Levothyroxine     Debility  Generalized weakness  Dementia  History of CVA  --Fall precautions  --Aricept  --pt/ot following     Bereavement  -- passed away 2022    Son was updated at bedside 3/5    DVT prophylaxis:  Medical DVT prophylaxis orders are present.       AM-PAC 6 Clicks Score (PT): 19 (22 0942)    Disposition: I expect the patient to be discharged pending above- will need placement.    CODE STATUS:   Code Status and Medical Interventions:   Ordered at: 22 0503     Level Of Support Discussed With:    Next of Kin (If No Surrogate)     Code Status (Patient has no pulse and is not breathing):    CPR (Attempt to Resuscitate)     Medical Interventions (Patient has pulse or is breathing):    Full Support       Lisette Pacheco MD  22                Electronically signed by Lisette Pacheco MD at 22 1130     Lisette Pacheco MD at 22 0914              [facility] Medicine Services  PROGRESS NOTE    Patient Name: Scott Diego  : 4/10/1930  MRN: [id number]    Date of Admission: 3/3/2022  Primary Care Physician: Yolande Escobar  MD    Subjective   Subjective     CC:  CARYN/CHF/weakness    HPI:  Resting in bed. VSS. No family present. Pleasant per nursing reports. On 2L    ROS:  uto-sleeping        Objective   Objective     Vital Signs:   Temp:  [96.1 °F (35.6 °C)-98.2 °F (36.8 °C)] 96.1 °F (35.6 °C)  Heart Rate:  [63-98] 90  Resp:  [18] 18  BP: (125-135)/(73-76) 135/76  Flow (L/min):  [2] 2     Physical Exam:  Constitutional: No acute distress, resting in bed  HENT: NCAT, mucous membranes moist  Respiratory: Clear to auscultation bilaterally, respiratory effort normal on RA-2L   Cardiovascular: on tele, rhythm sinus, pulse 90s  Skin: No rashes noted to exposed loose fragile skin   MSK: no significant edema noted        Results Reviewed:  LAB RESULTS:      Lab 03/05/22  0511 03/04/22  0514 03/03/22  0944 03/02/22 2038   WBC 5.16 5.91 8.01 11.17*   HEMOGLOBIN 10.0* 10.2* 10.2* 11.3*   HEMATOCRIT 31.2* 32.6* 31.8* 34.8   PLATELETS 130* 141 129* 143   NEUTROS ABS 2.58  --  4.51 7.79*   IMMATURE GRANS (ABS) 0.01  --  0.02 0.02   LYMPHS ABS 1.34  --  1.76 1.54   MONOS ABS 0.80  --  1.09* 1.23*   EOS ABS 0.37  --  0.56* 0.51*   MCV 92.9 94.5 92.7 92.1   CRP  --   --   --  <0.30   LACTATE  --   --   --  1.0         Lab 03/06/22  0756 03/05/22  0511 03/04/22  0514 03/03/22  0944 03/03/22  0230 03/02/22 2038   SODIUM  --  137 138 139  --  132*   POTASSIUM  --  4.9 5.1 5.5*  --  5.1   CHLORIDE  --  110* 110* 111*  --  102   CO2  --  21.0* 22.0 22.0  --  22.0   ANION GAP  --  6.0 6.0 6.0  --  8.0   BUN  --  12 15 18  --  21   CREATININE  --  1.08* 1.17* 1.26*  --  1.48*   EGFR  --  48.6* 44.1* 40.4*  --  33.3*   GLUCOSE  --  88 87 88  --  100*   CALCIUM  --  9.0 8.7 8.8  --  9.2   MAGNESIUM 1.7  --  1.8  --  1.6*  --    PHOSPHORUS  --   --   --   --  3.6  --    TSH  --   --   --   --  1.440  --          Lab 03/05/22  0511 03/02/22 2038   TOTAL PROTEIN 6.4 7.6   ALBUMIN 3.00* 3.60   GLOBULIN 3.4 4.0   ALT (SGPT) 7 10   AST (SGOT) 15 20   BILIRUBIN 0.3  0.3   ALK PHOS 38* 44         Lab 03/02/22 2038   PROBNP 2,861.0*   TROPONIN T 0.011                 Brief Urine Lab Results  (Last result in the past 365 days)      Color   Clarity   Blood   Leuk Est   Nitrite   Protein   CREAT   Urine HCG        03/03/22 0843             54.9               Microbiology Results Abnormal     Procedure Component Value - Date/Time    COVID PRE-OP / PRE-PROCEDURE SCREENING ORDER (NO ISOLATION) - Swab, Nasopharynx [[id number]]  (Normal) Collected: 03/03/22 0546    Lab Status: Final result Specimen: Swab from Nasopharynx Updated: 03/03/22 0652    Narrative:      The following orders were created for panel order COVID PRE-OP / PRE-PROCEDURE SCREENING ORDER (NO ISOLATION) - Swab, Nasopharynx.  Procedure                               Abnormality         Status                     ---------                               -----------         ------                     COVID-19, FLU A/B, RSV P...[[id number]]  Normal              Final result                 Please view results for these tests on the individual orders.    COVID-19, FLU A/B, RSV PCR - Swab, Nasopharynx [[id number]]  (Normal) Collected: 03/03/22 0546    Lab Status: Final result Specimen: Swab from Nasopharynx Updated: 03/03/22 0652     COVID19 Not Detected     Influenza A PCR Not Detected     Influenza B PCR Not Detected     RSV, PCR Not Detected    Narrative:      Fact sheet for providers: https://www.fda.gov/media/729726/download    Fact sheet for patients: https://www.fda.gov/media/823863/download    Test performed by PCR.          Adult Transthoracic Echo Complete W/ Cont if Necessary Per Protocol    Result Date: 3/4/2022  · Estimated left ventricular EF = 30% · Moderate mitral valve regurgitation is present. · There is calcification of the aortic valve. · Estimated right ventricular systolic pressure from tricuspid regurgitation is mildly elevated (35-45 mmHg).        Results for orders placed during the hospital encounter of  03/03/22    Adult Transthoracic Echo Complete W/ Cont if Necessary Per Protocol    Interpretation Summary  · Estimated left ventricular EF = 30%  · Moderate mitral valve regurgitation is present.  · There is calcification of the aortic valve.  · Estimated right ventricular systolic pressure from tricuspid regurgitation is mildly elevated (35-45 mmHg).      I have reviewed the medications:  Scheduled Meds:aspirin, 81 mg, Oral, Nightly  atorvastatin, 80 mg, Oral, Nightly  cetirizine, 5 mg, Oral, Daily  donepezil, 5 mg, Oral, Nightly  erythromycin, 1 application, Both Eyes, Nightly  famotidine, 20 mg, Oral, Daily  ferrous sulfate, 325 mg, Oral, Daily  gabapentin, 300 mg, Oral, Nightly  heparin (porcine), 5,000 Units, Subcutaneous, Q12H  levothyroxine, 50 mcg, Oral, Q AM  metoprolol succinate XL, 25 mg, Oral, Daily  nystatin, 5 mL, Swish & Swallow, 4x Daily  ofloxacin, 1 drop, Both Eyes, TID  rOPINIRole, 0.5 mg, Oral, Nightly  sodium chloride, 10 mL, Intravenous, Q12H      Continuous Infusions:   PRN Meds:.•  acetaminophen **OR** acetaminophen **OR** acetaminophen  •  ALPRAZolam  •  magnesium sulfate **OR** magnesium sulfate in D5W 1g/100mL (PREMIX) **OR** magnesium sulfate  •  meclizine  •  polyvinyl alcohol  •  potassium chloride **OR** potassium chloride **OR** potassium chloride  •  sodium chloride  •  sodium chloride    Assessment/Plan   Assessment & Plan     Active Hospital Problems    Diagnosis  POA   • Elevated serum creatinine [R79.89]  Yes   • Hypothyroid [E03.9]  Yes   • Chronic systolic heart failure (HCC) [I50.22]  Yes   • CARYN (acute kidney injury) (HCC) [N17.9]  Yes   • History of CVA (cerebrovascular accident) [Z86.73]  Not Applicable   • Dementia (HCC) [F03.90]  Yes   • Hypertension [I10]  Yes   • Hyponatremia [E87.1]  Yes      Resolved Hospital Problems   No resolved problems to display.        Brief Hospital Course to date:  Scott Diego is a 89 y.o. female  Who lives with her son in [place]  following the recent death of her  in January.  Son states since this time, pt has gone down hill.  Poor po intake.  Unable to take care of self.  Has a lot of anxiety.  Worsening memory.  Son notes she has O2 at night and he checked her sats at home and they were 86 today.  Son brings pt in as he feels he cannot safely take care of her.     CARYN  --Baseline creatinine 1.1-1.3  --Creatinine 1.48 on admit  --holding lasix.  Better with IVF. Now 1.08, back to her baseline.      Hyponatremia  --resolved with IVF     Chronic systolic HF  --Chest x-ray shows mild diffuse pulmonary interstitial disease pattern but fairly similar to multiple prior studies.  --patient's last ECHO from 2018 reviewed with EF 20%. Patient denies having repeat ECHO since then but seems unsure. Repeat Echo this admission-EF 30%.   --I have d/w cardiology during stay. She has seen Dr. Aragon in the past and they have asked that she be set up with a clinic follow up instead of seeing her inpatient given not much change since last ECHO.  This will need to be set up at discharge.    --Strict I's and O's  --Continue Daily weight  --Metoprolol.  Holding lasix currently d/t CARYN, which has resolved.  Resume lasix soon? She currently appears euvolemic. Will place for tomorrow AM- watch kidney fxn closely      Possible Thrush  --nystatin swish and swallow     HTN  --Metoprolol     Hyperkalemia  --lokelma x1     Hypothyroidism  --Levothyroxine     Debility  Generalized weakness  Dementia  History of CVA  --Fall precautions  --Aricept  --pt/ot following     Bereavement  -- passed away January 2022    Son was updated at bedside 3/5    DVT prophylaxis:  Medical DVT prophylaxis orders are present.       AM-PAC 6 Clicks Score (PT): 19 (03/05/22 0800)    Disposition: I expect the patient to be discharged pending above- will need placement.    CODE STATUS:   Code Status and Medical Interventions:   Ordered at: 03/03/22 0507     Level Of Support  Discussed With:    Next of Kin (If No Surrogate)     Code Status (Patient has no pulse and is not breathing):    CPR (Attempt to Resuscitate)     Medical Interventions (Patient has pulse or is breathing):    Full Support       Lisette Pacheco MD  03/06/22                Electronically signed by Lisette Pacheco MD at 03/06/22 0917       Consult Notes (last 48 hours)  Notes from 03/05/22 1242 through 03/07/22 1242   No notes of this type exist for this encounter.

## 2022-03-07 NOTE — PLAN OF CARE
Goal Outcome Evaluation:  Plan of Care Reviewed With: patient, son        Progress: no change  Outcome Evaluation: Pt sitting on commode upon arrival. Pt performed toileting with Min A, STS with Min A from low surface, and ambulated using RW with Min A and cues for RW navigation and safety. Sink side grooming performed with CGA for balance. OT changes rec SNF at [place].

## 2022-03-07 NOTE — PLAN OF CARE
Goal Outcome Evaluation:  Plan of Care Reviewed With: patient        Progress: no change  Outcome Evaluation: patient ambulated 120 feet with min assist x1, cues for cane placement and use, occassionally patient lifting cane off floor and not using correctly. Unsteady gait noticed with min assist to correct. Patient with decreased safety awareness with all activity. Distance limited by weakness, impaired balance and decreased safety awareness.

## 2022-03-07 NOTE — PROGRESS NOTES
[facility] Medicine Services  PROGRESS NOTE    Patient Name: Scott Diego  : 4/10/1930  MRN: [id number]    Date of Admission: 3/3/2022  Primary Care Physician: Yolande Escobar MD    Subjective   Subjective     CC:  CARYN/CHF/weakness    HPI:  Resting in bed. VSS. Oriented only to self     ROS:  uto-dementia        Objective   Objective     Vital Signs:   Temp:  [98 °F (36.7 °C)-98.3 °F (36.8 °C)] 98.3 °F (36.8 °C)  Heart Rate:  [] 87  Resp:  [18] 18  BP: (123-158)/(64-85) 123/64  Flow (L/min):  [2] 2     Physical Exam:  Constitutional: No acute distress, resting in bed  HENT: NCAT, mucous membranes moist  Respiratory: Clear to auscultation bilaterally, respiratory effort normal on RA-2L   Cardiovascular: on tele, rhythm sinus, pulse 90s  Skin: No rashes noted to exposed loose fragile skin   MSK: no significant edema noted        Results Reviewed:  LAB RESULTS:      Lab 22  0511 22  0514 22  0944 22   WBC 6.95 5.16 5.91 8.01 11.17*   HEMOGLOBIN 10.9* 10.0* 10.2* 10.2* 11.3*   HEMATOCRIT 34.7 31.2* 32.6* 31.8* 34.8   PLATELETS 155 130* 141 129* 143   NEUTROS ABS 4.05 2.58  --  4.51 7.79*   IMMATURE GRANS (ABS) 0.02 0.01  --  0.02 0.02   LYMPHS ABS 1.55 1.34  --  1.76 1.54   MONOS ABS 1.04* 0.80  --  1.09* 1.23*   EOS ABS 0.23 0.37  --  0.56* 0.51*   MCV 94.6 92.9 94.5 92.7 92.1   CRP  --   --   --   --  <0.30   LACTATE  --   --   --   --  1.0         Lab 22  0756 22  0511 22  0514 22  0944 22  0230 22   SODIUM 138  --  137 138 139  --  132*   POTASSIUM 5.6*  --  4.9 5.1 5.5*  --  5.1   CHLORIDE 105  --  110* 110* 111*  --  102   CO2 25.0  --  21.0* 22.0 22.0  --  22.0   ANION GAP 8.0  --  6.0 6.0 6.0  --  8.0   BUN 14  --  12 15 18  --  21   CREATININE 1.23*  --  1.08* 1.17* 1.26*  --  1.48*   EGFR 41.6*  --  48.6* 44.1* 40.4*  --  33.3*   GLUCOSE 98  --  88 87 88  --  100*    CALCIUM 9.5  --  9.0 8.7 8.8  --  9.2   MAGNESIUM 1.9 1.7  --  1.8  --  1.6*  --    PHOSPHORUS  --   --   --   --   --  3.6  --    TSH  --   --   --   --   --  1.440  --          Lab 03/07/22  0457 03/05/22  0511 03/02/22 2038   TOTAL PROTEIN 7.1 6.4 7.6   ALBUMIN 3.20* 3.00* 3.60   GLOBULIN 3.9 3.4 4.0   ALT (SGPT) 8 7 10   AST (SGOT) 18 15 20   BILIRUBIN 0.3 0.3 0.3   ALK PHOS 42 38* 44         Lab 03/02/22 2038   PROBNP 2,861.0*   TROPONIN T 0.011                 Brief Urine Lab Results  (Last result in the past 365 days)      Color   Clarity   Blood   Leuk Est   Nitrite   Protein   CREAT   Urine HCG        03/03/22 0843             54.9               Microbiology Results Abnormal     Procedure Component Value - Date/Time    COVID PRE-OP / PRE-PROCEDURE SCREENING ORDER (NO ISOLATION) - Swab, Nasopharynx [[id number]]  (Normal) Collected: 03/03/22 0546    Lab Status: Final result Specimen: Swab from Nasopharynx Updated: 03/03/22 0652    Narrative:      The following orders were created for panel order COVID PRE-OP / PRE-PROCEDURE SCREENING ORDER (NO ISOLATION) - Swab, Nasopharynx.  Procedure                               Abnormality         Status                     ---------                               -----------         ------                     COVID-19, FLU A/B, RSV P...[[id number]]  Normal              Final result                 Please view results for these tests on the individual orders.    COVID-19, FLU A/B, RSV PCR - Swab, Nasopharynx [[id number]]  (Normal) Collected: 03/03/22 0546    Lab Status: Final result Specimen: Swab from Nasopharynx Updated: 03/03/22 0652     COVID19 Not Detected     Influenza A PCR Not Detected     Influenza B PCR Not Detected     RSV, PCR Not Detected    Narrative:      Fact sheet for providers: https://www.fda.gov/media/947146/download    Fact sheet for patients: https://www.fda.gov/media/921485/download    Test performed by PCR.          No radiology results from the  last 24 hrs    Results for orders placed during the hospital encounter of 03/03/22    Adult Transthoracic Echo Complete W/ Cont if Necessary Per Protocol    Interpretation Summary  · Estimated left ventricular EF = 30%  · Moderate mitral valve regurgitation is present.  · There is calcification of the aortic valve.  · Estimated right ventricular systolic pressure from tricuspid regurgitation is mildly elevated (35-45 mmHg).      I have reviewed the medications:  Scheduled Meds:aspirin, 81 mg, Oral, Nightly  atorvastatin, 80 mg, Oral, Nightly  cetirizine, 5 mg, Oral, Daily  donepezil, 5 mg, Oral, Nightly  erythromycin, 1 application, Both Eyes, Nightly  famotidine, 20 mg, Oral, Daily  ferrous sulfate, 325 mg, Oral, Daily  furosemide, 20 mg, Oral, Daily  gabapentin, 300 mg, Oral, Nightly  heparin (porcine), 5,000 Units, Subcutaneous, Q12H  levothyroxine, 50 mcg, Oral, Q AM  metoprolol succinate XL, 25 mg, Oral, Daily  nystatin, 5 mL, Swish & Swallow, 4x Daily  ofloxacin, 1 drop, Both Eyes, TID  polyethylene glycol, 17 g, Oral, Daily  rOPINIRole, 0.5 mg, Oral, Nightly  sodium chloride, 10 mL, Intravenous, Q12H      Continuous Infusions:   PRN Meds:.•  acetaminophen **OR** acetaminophen **OR** acetaminophen  •  ALPRAZolam  •  magnesium sulfate **OR** magnesium sulfate in D5W 1g/100mL (PREMIX) **OR** magnesium sulfate  •  meclizine  •  polyvinyl alcohol  •  potassium chloride **OR** potassium chloride **OR** potassium chloride  •  sodium chloride  •  sodium chloride    Assessment/Plan   Assessment & Plan     Active Hospital Problems    Diagnosis  POA   • Elevated serum creatinine [R79.89]  Yes   • Hypothyroid [E03.9]  Yes   • Chronic systolic heart failure (HCC) [I50.22]  Yes   • CARYN (acute kidney injury) (HCC) [N17.9]  Yes   • History of CVA (cerebrovascular accident) [Z86.73]  Not Applicable   • Dementia (HCC) [F03.90]  Yes   • Hypertension [I10]  Yes   • Hyponatremia [E87.1]  Yes      Resolved Hospital Problems   No  resolved problems to display.        Brief Hospital Course to date:  Scott Diego is a 89 y.o. female  Who lives with her son in [place] following the recent death of her  in January.  Son states since this time, pt has gone down hill.  Poor po intake.  Unable to take care of self.  Has a lot of anxiety.  Worsening memory.  Son notes she has O2 at night and he checked her sats at home and they were 86 today.  Son brings pt in as he feels he cannot safely take care of her.     CARYN  --Baseline creatinine 1.1-1.3  --Creatinine 1.48 on admit  --holding lasix.  Better with IVF. Had stopped IVF and resumed home oral lasix 3/7. Cr slightly up at 1.23 today- monitor.      Hyponatremia  --resolved with IVF     Chronic systolic HF  --Chest x-ray shows mild diffuse pulmonary interstitial disease pattern but fairly similar to multiple prior studies.  --patient's last ECHO from 2018 reviewed with EF 20%. Patient denies having repeat ECHO since then but seems unsure. Repeat Echo this admission-EF 30%.   --I have d/w cardiology during stay. She has seen Dr. Aragon in the past and they have asked that she be set up with a clinic follow up instead of seeing her inpatient given not much change since last ECHO.  This will need to be set up at discharge.    --Strict I's and O's  --Continue Daily weight  --Metoprolol.       Possible Thrush  --nystatin swish and swallow    Agitation with h/o Dementia  Prolonged QTC  --was given intermittent haldol without effect, seroquel added 3/6 which did not work  -- today- hold further QTC prolonging agents and have stopped seroquel/haldol (one time dose), added prn ativan 2mg IV for q4h prn dosing for agtiation- monitor (have stopped prn xanax while on ativan)     HTN  --Metoprolol     Hyperkalemia  --lokelma x1 again today     Hypothyroidism  --Levothyroxine     Debility  Generalized weakness  Dementia  History of CVA  --Fall precautions  --Aricept  --pt/ot  following     Bereavement  -- passed away January 2022    Son was updated at bedside 3/5    DVT prophylaxis:  Medical DVT prophylaxis orders are present.       AM-PAC 6 Clicks Score (PT): 19 (03/07/22 0942)    Disposition: I expect the patient to be discharged pending above- will need placement.    CODE STATUS:   Code Status and Medical Interventions:   Ordered at: 03/03/22 0504     Level Of Support Discussed With:    Next of Kin (If No Surrogate)     Code Status (Patient has no pulse and is not breathing):    CPR (Attempt to Resuscitate)     Medical Interventions (Patient has pulse or is breathing):    Full Support       Lisette Pacheco MD  03/07/22

## 2022-03-08 LAB
ALBUMIN SERPL-MCNC: 3.3 G/DL (ref 3.5–5.2)
ALBUMIN/GLOB SERPL: 0.7 G/DL
ALP SERPL-CCNC: 46 U/L (ref 39–117)
ALT SERPL W P-5'-P-CCNC: 10 U/L (ref 1–33)
ANION GAP SERPL CALCULATED.3IONS-SCNC: 14 MMOL/L (ref 5–15)
AST SERPL-CCNC: 23 U/L (ref 1–32)
BASOPHILS # BLD AUTO: 0.06 10*3/MM3 (ref 0–0.2)
BASOPHILS NFR BLD AUTO: 0.7 % (ref 0–1.5)
BILIRUB SERPL-MCNC: 0.4 MG/DL (ref 0–1.2)
BUN SERPL-MCNC: 16 MG/DL (ref 8–23)
BUN/CREAT SERPL: 12.5 (ref 7–25)
CALCIUM SPEC-SCNC: 9.6 MG/DL (ref 8.2–9.6)
CHLORIDE SERPL-SCNC: 101 MMOL/L (ref 98–107)
CO2 SERPL-SCNC: 19 MMOL/L (ref 22–29)
CREAT SERPL-MCNC: 1.28 MG/DL (ref 0.57–1)
DEPRECATED RDW RBC AUTO: 47.8 FL (ref 37–54)
EGFRCR SERPLBLD CKD-EPI 2021: 39.6 ML/MIN/1.73
EOSINOPHIL # BLD AUTO: 0.17 10*3/MM3 (ref 0–0.4)
EOSINOPHIL NFR BLD AUTO: 2.1 % (ref 0.3–6.2)
ERYTHROCYTE [DISTWIDTH] IN BLOOD BY AUTOMATED COUNT: 14.2 % (ref 12.3–15.4)
GLOBULIN UR ELPH-MCNC: 4.6 GM/DL
GLUCOSE SERPL-MCNC: 72 MG/DL (ref 65–99)
HCT VFR BLD AUTO: 38.4 % (ref 34–46.6)
HGB BLD-MCNC: 12.1 G/DL (ref 12–15.9)
IMM GRANULOCYTES # BLD AUTO: 0.02 10*3/MM3 (ref 0–0.05)
IMM GRANULOCYTES NFR BLD AUTO: 0.2 % (ref 0–0.5)
LYMPHOCYTES # BLD AUTO: 1.38 10*3/MM3 (ref 0.7–3.1)
LYMPHOCYTES NFR BLD AUTO: 16.9 % (ref 19.6–45.3)
MCH RBC QN AUTO: 29.4 PG (ref 26.6–33)
MCHC RBC AUTO-ENTMCNC: 31.5 G/DL (ref 31.5–35.7)
MCV RBC AUTO: 93.2 FL (ref 79–97)
MONOCYTES # BLD AUTO: 1.21 10*3/MM3 (ref 0.1–0.9)
MONOCYTES NFR BLD AUTO: 14.8 % (ref 5–12)
NEUTROPHILS NFR BLD AUTO: 5.33 10*3/MM3 (ref 1.7–7)
NEUTROPHILS NFR BLD AUTO: 65.3 % (ref 42.7–76)
NRBC BLD AUTO-RTO: 0 /100 WBC (ref 0–0.2)
PLATELET # BLD AUTO: 166 10*3/MM3 (ref 140–450)
PMV BLD AUTO: 12.7 FL (ref 6–12)
POTASSIUM SERPL-SCNC: 5.3 MMOL/L (ref 3.5–5.2)
PROT SERPL-MCNC: 7.9 G/DL (ref 6–8.5)
QT INTERVAL: 472 MS
QTC INTERVAL: 544 MS
RBC # BLD AUTO: 4.12 10*6/MM3 (ref 3.77–5.28)
SODIUM SERPL-SCNC: 134 MMOL/L (ref 136–145)
WBC NRBC COR # BLD: 8.17 10*3/MM3 (ref 3.4–10.8)

## 2022-03-08 PROCEDURE — 97116 GAIT TRAINING THERAPY: CPT

## 2022-03-08 PROCEDURE — 97110 THERAPEUTIC EXERCISES: CPT

## 2022-03-08 PROCEDURE — 80053 COMPREHEN METABOLIC PANEL: CPT | Performed by: INTERNAL MEDICINE

## 2022-03-08 PROCEDURE — 25010000002 HEPARIN (PORCINE) PER 1000 UNITS: Performed by: NURSE PRACTITIONER

## 2022-03-08 PROCEDURE — 99233 SBSQ HOSP IP/OBS HIGH 50: CPT | Performed by: HOSPITALIST

## 2022-03-08 PROCEDURE — 97530 THERAPEUTIC ACTIVITIES: CPT

## 2022-03-08 PROCEDURE — 85025 COMPLETE CBC W/AUTO DIFF WBC: CPT | Performed by: INTERNAL MEDICINE

## 2022-03-08 PROCEDURE — 25010000002 LORAZEPAM PER 2 MG: Performed by: INTERNAL MEDICINE

## 2022-03-08 RX ORDER — FUROSEMIDE 20 MG/1
20 TABLET ORAL
Status: DISCONTINUED | OUTPATIENT
Start: 2022-03-09 | End: 2022-03-17 | Stop reason: HOSPADM

## 2022-03-08 RX ADMIN — LORAZEPAM 1 MG: 2 INJECTION INTRAMUSCULAR; INTRAVENOUS at 20:07

## 2022-03-08 RX ADMIN — HEPARIN SODIUM 5000 UNITS: 5000 INJECTION, SOLUTION INTRAVENOUS; SUBCUTANEOUS at 20:05

## 2022-03-08 RX ADMIN — METOPROLOL SUCCINATE 25 MG: 25 TABLET, EXTENDED RELEASE ORAL at 10:51

## 2022-03-08 RX ADMIN — HEPARIN SODIUM 5000 UNITS: 5000 INJECTION, SOLUTION INTRAVENOUS; SUBCUTANEOUS at 10:51

## 2022-03-08 RX ADMIN — LEVOTHYROXINE SODIUM 50 MCG: 50 TABLET ORAL at 06:09

## 2022-03-08 RX ADMIN — GABAPENTIN 300 MG: 300 CAPSULE ORAL at 20:06

## 2022-03-08 RX ADMIN — ASPIRIN 81 MG 81 MG: 81 TABLET ORAL at 20:06

## 2022-03-08 RX ADMIN — OFLOXACIN 1 DROP: 3 SOLUTION/ DROPS OPHTHALMIC at 10:53

## 2022-03-08 RX ADMIN — DONEPEZIL HYDROCHLORIDE 5 MG: 5 TABLET, FILM COATED ORAL at 20:06

## 2022-03-08 RX ADMIN — FAMOTIDINE 20 MG: 20 TABLET, FILM COATED ORAL at 10:52

## 2022-03-08 RX ADMIN — OFLOXACIN 1 DROP: 3 SOLUTION/ DROPS OPHTHALMIC at 20:06

## 2022-03-08 RX ADMIN — CETIRIZINE HYDROCHLORIDE 5 MG: 10 TABLET, FILM COATED ORAL at 10:51

## 2022-03-08 RX ADMIN — NYSTATIN 500000 UNITS: 100000 SUSPENSION ORAL at 10:51

## 2022-03-08 RX ADMIN — SODIUM ZIRCONIUM CYCLOSILICATE 10 G: 10 POWDER, FOR SUSPENSION ORAL at 10:52

## 2022-03-08 RX ADMIN — Medication 10 ML: at 20:07

## 2022-03-08 RX ADMIN — POLYVINYL ALCOHOL 1 DROP: 14 SOLUTION/ DROPS OPHTHALMIC at 18:40

## 2022-03-08 RX ADMIN — Medication 10 ML: at 10:53

## 2022-03-08 RX ADMIN — ERYTHROMYCIN 1 APPLICATION: 5 OINTMENT OPHTHALMIC at 20:06

## 2022-03-08 RX ADMIN — NYSTATIN 500000 UNITS: 100000 SUSPENSION ORAL at 20:06

## 2022-03-08 RX ADMIN — NYSTATIN 500000 UNITS: 100000 SUSPENSION ORAL at 18:40

## 2022-03-08 RX ADMIN — POLYETHYLENE GLYCOL 3350 17 G: 17 POWDER, FOR SOLUTION ORAL at 10:52

## 2022-03-08 RX ADMIN — Medication 325 MG: at 10:51

## 2022-03-08 RX ADMIN — OFLOXACIN 1 DROP: 3 SOLUTION/ DROPS OPHTHALMIC at 18:40

## 2022-03-08 RX ADMIN — NYSTATIN 500000 UNITS: 100000 SUSPENSION ORAL at 14:10

## 2022-03-08 RX ADMIN — ROPINIROLE HYDROCHLORIDE 0.5 MG: 0.5 TABLET, FILM COATED ORAL at 20:06

## 2022-03-08 RX ADMIN — ATORVASTATIN CALCIUM 80 MG: 40 TABLET, FILM COATED ORAL at 20:06

## 2022-03-08 NOTE — THERAPY TREATMENT NOTE
Patient Name: Scott Diego  : 4/10/1930    MRN: [id number]                              Today's Date: 3/8/2022       Admit Date: 3/3/2022    Visit Dx:     ICD-10-CM ICD-9-CM   1. CARYN (acute kidney injury) (HCC)  N17.9 584.9   2. Elevated serum creatinine  R79.89 790.99   3. Normocytic anemia  D64.9 285.9   4. Impaired functional mobility, balance, gait, and endurance  Z74.09 V49.89     Patient Active Problem List   Diagnosis   • Idiopathic peripheral neuropathy   • Neck pain   • Mild cognitive impairment   • Anemia   • Hyponatremia   • Hypertension   • Dehydration   • History of CVA (cerebrovascular accident)   • Dementia (HCC)   • Left bundle branch block   • Orthostatic dizziness   • Chronic systolic heart failure (HCC)   • Normocytic anemia   • CARYN (acute kidney injury) (HCC)   • Dyspnea   • Hypoxia   • Hypothyroid   • Pericardial effusion   • Acute respiratory failure with hypoxia (HCC)   • Right lower lobe pneumonia   • Ventricular ectopy   • Elevated serum creatinine     Past Medical History:   Diagnosis Date   • Congestive heart failure (HCC)    • Malignant neoplasm (HCC)    • Stroke (HCC)     mini stroke   • Systolic heart failure (HCC) 2017    Chronic/compensated (EF 25%)     Past Surgical History:   Procedure Laterality Date   • CHOLECYSTECTOMY     • EYE SURGERY     • HYSTERECTOMY        General Information     Row Name 22 1435          OT Time and Intention    Document Type therapy note (daily note)  -KF     Mode of Treatment occupational therapy  -     Row Name 22 1435          General Information    Patient Profile Reviewed yes  -KF     Existing Precautions/Restrictions fall  -KF     Barriers to Rehab cognitive status;medically complex  -KF     Row Name 22 1435          Cognition    Orientation Status (Cognition) oriented to;person;disoriented to;place;situation;time  -KF     Row Name 22 1435          Safety Issues, Functional Mobility    Safety Issues Affecting  Function (Mobility) awareness of need for assistance;insight into deficits/self-awareness;impulsivity;safety precaution awareness  -KF     Impairments Affecting Function (Mobility) balance;coordination;visual/perceptual;endurance/activity tolerance;strength;cognition  -KF     Cognitive Impairments, Mobility Safety/Performance awareness, need for assistance;insight into deficits/self-awareness;impulsivity;judgment  -KF           User Key  (r) = Recorded By, (t) = Taken By, (c) = Cosigned By    Initials Name Provider Type    KF Theresa Rader, OT Occupational Therapist                 Mobility/ADL's     Row Name 03/08/22 1435          Bed Mobility    Bed Mobility scooting/bridging;supine-sit  -KF     Scooting/Bridging [place] (Bed Mobility) supervision;verbal cues;nonverbal cues (demo/gesture)  -     Supine-Sit [place] (Bed Mobility) supervision  -     Bed Mobility, Safety Issues cognitive deficits limit understanding  -KF     Assistive Device (Bed Mobility) head of bed elevated;bed rails  -     Comment, (Bed Mobility) cues for seq  -     Row Name 03/08/22 1435          Transfers    Transfers sit-stand transfer;bed-chair transfer  -     Comment, (Transfers) cues for HP and seq, tends to push FWW away and demonstrates difficulty with sequencing as well as visually scanning  -     Bed-Chair [place] (Transfers) verbal cues;minimum assist (75% patient effort)  -     Assistive Device (Bed-Chair Transfers) walker, front-wheeled  -     Sit-Stand [place] (Transfers) standby assist  -     Row Name 03/08/22 1435          Sit-Stand Transfer    Assistive Device (Sit-Stand Transfers) walker, front-wheeled  -     Comment, (Sit-Stand Transfer) STS x3 reps  -     Row Name 03/08/22 1435          Functional Mobility    Functional Mobility- Ind. Level contact guard assist;verbal cues required  -     Functional Mobility- Device rolling walker  -     Functional Mobility-Distance (Feet) 6   -KF     Functional Mobility- Safety Issues sequencing ability decreased;weight-shifting ability decreased  -KF     Functional Mobility- Comment cues for visual scanning and safety awareness  -KF     Row Name 03/08/22 1435          Activities of Daily Living    BADL Assessment/Intervention grooming  -KF     Row Name 03/08/22 1435          Grooming Assessment/Training    [place] Level (Grooming) wash face, hands;set up  -KF     Position (Grooming) edge of bed sitting  -KF           User Key  (r) = Recorded By, (t) = Taken By, (c) = Cosigned By    Initials Name Provider Type    Theresa Solares OT Occupational Therapist               Obj/Interventions     Row Name 03/08/22 1440          Shoulder (Therapeutic Exercise)    Shoulder (Therapeutic Exercise) AROM (active range of motion)  -KF     Shoulder AROM (Therapeutic Exercise) bilateral;flexion;extension;horizontal aBduction/aDduction;10 repetitions;sitting  -KF     Row Name 03/08/22 1440          Balance    Balance Assessment sitting static balance;sitting dynamic balance;standing static balance;standing dynamic balance  -KF     Static Sitting Balance supervision  -KF     Dynamic Sitting Balance supervision  -KF     Position, Sitting Balance unsupported;sitting edge of bed  -KF     Static Standing Balance contact guard;verbal cues  -KF     Dynamic Standing Balance verbal cues;minimal assist  -KF     Position/Device Used, Standing Balance walker, front-wheeled  -KF     Balance Interventions sitting;standing;sit to stand;supported;minimal challenge;weight shifting activity;UE activity with balance activity  -KF           User Key  (r) = Recorded By, (t) = Taken By, (c) = Cosigned By    Initials Name Provider Type    Theresa Solares OT Occupational Therapist               Goals/Plan    No documentation.                Clinical Impression     Row Name 03/08/22 1441          Pain Assessment    Pretreatment Pain Rating 0/10 - no pain  -KF     Posttreatment  Pain Rating 0/10 - no pain  -KF     Pre/Posttreatment Pain Comment tolerated  -KF     Pain Intervention(s) Repositioned;Ambulation/increased activity  -KF     Row Name 03/08/22 1441          Plan of Care Review    Plan of Care Reviewed With patient;family  -KF     Progress improving  -KF     Outcome Evaluation Pt requires cues for seq and safety awareness with use of FWW, SBA STS, participated well with BUE ther ex from chair, cont IPOT per POC  -KF     Row Name 03/08/22 1441          Therapy Assessment/Plan (OT)    Rehab Potential (OT) good, to achieve stated therapy goals  -KF     Criteria for Skilled Therapeutic Interventions Met (OT) yes;meets criteria;skilled treatment is necessary  -KF     Therapy Frequency (OT) daily  -KF     Row Name 03/08/22 1441          Therapy Plan Review/Discharge Plan (OT)    Anticipated Discharge Disposition (OT) [facility] nursing facility  -     Row Name 03/08/22 1441          Vital Signs    Pre Systolic BP Rehab --  RN cleared VSS  -KF     Posttreatment Heart Rate (beats/min) 107  -KF     Pre SpO2 (%) 94  -KF     O2 Delivery Pre Treatment room air  -KF     Post SpO2 (%) 96  -KF     O2 Delivery Post Treatment room air  -KF     Pre Patient Position Supine  -KF     Intra Patient Position Standing  -KF     Post Patient Position Sitting  -KF     Rest Breaks  1  -KF     Row Name 03/08/22 1441          Positioning and Restraints    Pre-Treatment Position in bed  -KF     Post Treatment Position chair  -KF     In Chair notified nsg;reclined;sitting;call light within reach;encouraged to call for assist;exit alarm on;waffle cushion;with family/caregiver;legs elevated  -KF           User Key  (r) = Recorded By, (t) = Taken By, (c) = Cosigned By    Initials Name Provider Type    Theresa Solares, OT Occupational Therapist               Outcome Measures     Row Name 03/08/22 1444          How much help from another is currently needed...    Putting on and taking off regular lower body  clothing? 3  -KF     Bathing (including washing, rinsing, and drying) 3  -KF     Toileting (which includes using toilet bed pan or urinal) 3  -KF     Putting on and taking off regular upper body clothing 3  -KF     Taking care of personal grooming (such as brushing teeth) 3  -KF     Eating meals 4  -KF     AM-PAC 6 Clicks Score (OT) 19  -KF     Row Name 03/08/22 1046          How much help from another person do you currently need...    Turning from your back to your side while in flat bed without using bedrails? 4  -EJ     Moving from lying on back to sitting on the side of a flat bed without bedrails? 4  -EJ     Moving to and from a bed to a chair (including a wheelchair)? 3  -EJ     Standing up from a chair using your arms (e.g., wheelchair, bedside chair)? 3  -EJ     Climbing 3-5 steps with a railing? 2  -EJ     To walk in hospital room? 3  -EJ     AM-PAC 6 Clicks Score (PT) 19  -EJ     Row Name 03/08/22 1444 03/08/22 1046       Functional Assessment    Outcome Measure Options AM-PAC 6 Clicks Daily Activity (OT)  -KF AM-PAC 6 Clicks Basic Mobility (PT)  -EJ          User Key  (r) = Recorded By, (t) = Taken By, (c) = Cosigned By    Initials Name Provider Type    EJ Kristina Brady, PT Physical Therapist    Theresa Solares, OT Occupational Therapist                Occupational Therapy Education                 Title: PT OT SLP Therapies (In Progress)     Topic: Occupational Therapy (Done)     Point: ADL training (Done)     Description:   Instruct learner(s) on proper safety adaptation and remediation techniques during self care or transfers.   Instruct in proper use of assistive devices.              Learning Progress Summary           Patient Acceptance, E,TB,D, NR,VU by KF at 3/8/2022 1444    Acceptance, E, VU,NR by JENNIFER at 3/7/2022 1544    Acceptance, E,TB, NR by EDMOND at 3/5/2022 0806    Acceptance, E,TB,D, VU,DU,NR by KF at 3/4/2022 0903   Family Acceptance, E,TB,D, NR,VU by KF at 3/8/2022 1444     Acceptance, E, VU,NR by AN at 3/7/2022 1544                   Point: Home exercise program (Done)     Description:   Instruct learner(s) on appropriate technique for monitoring, assisting and/or progressing therapeutic exercises/activities.              Learning Progress Summary           Patient Acceptance, E,TB,D, NR,VU by KF at 3/8/2022 1444    Acceptance, E,TB, NR by EDMOND at 3/5/2022 0806   Family Acceptance, E,TB,D, NR,VU by KF at 3/8/2022 1444                   Point: Precautions (Done)     Description:   Instruct learner(s) on prescribed precautions during self-care and functional transfers.              Learning Progress Summary           Patient Acceptance, E,TB,D, NR,VU by KF at 3/8/2022 1444    Acceptance, E, VU,NR by AN at 3/7/2022 1544    Acceptance, E,TB, NR by EDMOND at 3/5/2022 0806    Acceptance, E,TB,D, VU,DU,NR by  at 3/4/2022 0903   Family Acceptance, E,TB,D, NR,VU by KF at 3/8/2022 1444    Acceptance, E, VU,NR by AN at 3/7/2022 1544                   Point: Body mechanics (Done)     Description:   Instruct learner(s) on proper positioning and spine alignment during self-care, functional mobility activities and/or exercises.              Learning Progress Summary           Patient Acceptance, E,TB,D, NR,VU by  at 3/8/2022 1444    Acceptance, E, VU,NR by AN at 3/7/2022 1544    Acceptance, E,TB, NR by EDMOND at 3/5/2022 0806    Acceptance, E,TB,D, VU,DU,NR by  at 3/4/2022 0903   Family Acceptance, E,TB,D, NR,VU by  at 3/8/2022 1444    Acceptance, E, VU,NR by AN at 3/7/2022 1544                               User Key     Initials Effective Dates Name Provider Type Discipline     06/16/21 -  Theresa Rader OT Occupational Therapist OT    JENNIFER 09/21/21 -  Elissa Bhagat OT Occupational Therapist OT    EDMOND 12/29/21 -  Hannah Anderson RN Registered Nurse Nurse              OT Recommendation and Plan  Planned Therapy Interventions (OT): BADL retraining, adaptive equipment training, activity tolerance  training, patient/caregiver education/training, ROM/therapeutic exercise, occupation/activity based interventions, cognitive/visual perception retraining, strengthening exercise, transfer/mobility retraining, functional balance retraining  Therapy Frequency (OT): daily  Plan of Care Review  Plan of Care Reviewed With: patient, family  Progress: improving  Outcome Evaluation: Pt requires cues for seq and safety awareness with use of FWW, SBA STS, participated well with BUE ther ex from chair, cont IPOT per POC     Time Calculation:    Time Calculation- OT     Row Name 03/08/22 1414 03/08/22 1047          Time Calculation- OT    OT Start Time 1414  -KF --     OT Received On 03/08/22  -KF --            Timed Charges    18195 - OT Therapeutic Exercise Minutes 7  -KF --     40189 - Gait Training Minutes  -- 11  -EJ     48917 - OT Therapeutic Activity Minutes 10  -KF --            Total Minutes    Timed Charges Total Minutes 17  -KF 11  -EJ      Total Minutes 17  -KF 11  -EJ           User Key  (r) = Recorded By, (t) = Taken By, (c) = Cosigned By    Initials Name Provider Type    EJ Kristina Brady, PT Physical Therapist    KF Theresa Rader, OT Occupational Therapist              Therapy Charges for Today     Code Description Service Date Service Provider Modifiers Qty    [id number] HC OT THERAPEUTIC ACT EA 15 MIN 3/8/2022 Theresa Rader OT GO 1               Theresa Rader OT  3/8/2022

## 2022-03-08 NOTE — CASE MANAGEMENT/SOCIAL WORK
Continued Stay Note  [facility]     Patient Name: Scott Diego  MRN: [id number]  Today's Date: 3/8/2022    Admit Date: 3/3/2022     Discharge Plan     Row Name 03/08/22 1106       Plan    Plan SNF    Plan Comments I reviewed 's note from 3/7/22. Spoke with Lenora/INÉS, she did not receive fax. Faxed DCP. Spoke with Citlali/VICENTE, passing on to nursing today for review. Spoke with Leslie/PM she is checking on bed status. I LVM for Mary Lou/[facility]. I told all facilities that pt would come in for skilled nursing with possibility of transitioning to LTC. No other needs voiced or identified. CM will continue to follow.    Final Discharge Disposition Code 30 - still a patient               Discharge Codes    No documentation.               Expected Discharge Date and Time     Expected Discharge Date Expected Discharge Time    Mar 11, 2022             Etta Rucker RN

## 2022-03-08 NOTE — THERAPY TREATMENT NOTE
Patient Name: Scott Diego  : 4/10/1930    MRN: [id number]                              Today's Date: 3/8/2022       Admit Date: 3/3/2022    Visit Dx:     ICD-10-CM ICD-9-CM   1. CARYN (acute kidney injury) (HCC)  N17.9 584.9   2. Elevated serum creatinine  R79.89 790.99   3. Normocytic anemia  D64.9 285.9   4. Impaired functional mobility, balance, gait, and endurance  Z74.09 V49.89     Patient Active Problem List   Diagnosis   • Idiopathic peripheral neuropathy   • Neck pain   • Mild cognitive impairment   • Anemia   • Hyponatremia   • Hypertension   • Dehydration   • History of CVA (cerebrovascular accident)   • Dementia (HCC)   • Left bundle branch block   • Orthostatic dizziness   • Chronic systolic heart failure (HCC)   • Normocytic anemia   • CARYN (acute kidney injury) (HCC)   • Dyspnea   • Hypoxia   • Hypothyroid   • Pericardial effusion   • Acute respiratory failure with hypoxia (HCC)   • Right lower lobe pneumonia   • Ventricular ectopy   • Elevated serum creatinine     Past Medical History:   Diagnosis Date   • Congestive heart failure (HCC)    • Malignant neoplasm (HCC)    • Stroke (HCC)     mini stroke   • Systolic heart failure (HCC) 2017    Chronic/compensated (EF 25%)     Past Surgical History:   Procedure Laterality Date   • CHOLECYSTECTOMY     • EYE SURGERY     • HYSTERECTOMY        General Information     Row Name 22 1035          Physical Therapy Time and Intention    Document Type therapy note (daily note)  -EJ     Mode of Treatment physical therapy  -EJ     Row Name 22 1035          General Information    Patient Profile Reviewed yes  -EJ     Existing Precautions/Restrictions fall  -EJ     Row Name 22 1035          Cognition    Orientation Status (Cognition) oriented to;person;disoriented to;place;situation;time  -EJ     Row Name 22 1035          Safety Issues, Functional Mobility    Safety Issues Affecting Function (Mobility) awareness of need for  assistance;insight into deficits/self-awareness;safety precautions follow-through/compliance;safety precaution awareness  -EJ     Impairments Affecting Function (Mobility) balance;coordination;visual/perceptual;endurance/activity tolerance;strength;cognition  -EJ     Cognitive Impairments, Mobility Safety/Performance awareness, need for assistance;insight into deficits/self-awareness;problem-solving/reasoning;safety precaution awareness  -EJ     Comment, Safety Issues/Impairments (Mobility) Cues for use of cane, safety awareness.  -EJ           User Key  (r) = Recorded By, (t) = Taken By, (c) = Cosigned By    Initials Name Provider Type    EJ Kristina Brady PT Physical Therapist               Mobility     Row Name 03/08/22 1036          Bed Mobility    Comment, (Bed Mobility) sitting EOB with PCT  -EJ     Row Name 03/08/22 1036          Sit-Stand Transfer    Sit-Stand [place] (Transfers) standby assist  -EJ     Assistive Device (Sit-Stand Transfers) --  stands with PCT, PT near without cane. Pt placed belt around pt, cued pt to hold to cane.  -EJ     Row Name 03/08/22 1036          Gait/Stairs (Locomotion)    [place] Level (Gait) minimum assist (75% patient effort);1 person assist;verbal cues  -EJ     Assistive Device (Gait) cane, straight  -EJ     Distance in Feet (Gait) 160  -EJ     Deviations/Abnormal Patterns (Gait) bilateral deviations;base of support, narrow;gait speed decreased  -EJ     Bilateral Gait Deviations forward flexed posture;weight shift ability decreased  -EJ     Comment, (Gait/Stairs) requires cues for cane placement and use as initially pt lifted cane off floor. Pt demos significant improvement, but had 1 signifcant LOB with reaction including lifting cane in the air. PT assisted pt to regain balance, cued to keep cane on ground when losing balance. RWx may be better suited for pt.  -EJ           User Key  (r) = Recorded By, (t) = Taken By, (c) = Cosigned By    Initials Name  Provider Type     Kristina Brady PT Physical Therapist               Obj/Interventions     Row Name 03/08/22 1040          Motor Skills    Therapeutic Exercise shoulder;hip;knee;ankle;other (see comments)  -     Row Name 03/08/22 1040          Shoulder (Therapeutic Exercise)    Shoulder (Therapeutic Exercise) AROM (active range of motion)  -     Shoulder AROM (Therapeutic Exercise) bilateral;flexion;extension;aBduction;aDduction;10 repetitions  -     Row Name 03/08/22 1040          Hip (Therapeutic Exercise)    Hip (Therapeutic Exercise) strengthening exercise  -     Hip Strengthening (Therapeutic Exercise) bilateral;aBduction;aDduction;10 repetitions  -     Row Name 03/08/22 1040          Knee (Therapeutic Exercise)    Knee Strengthening (Therapeutic Exercise) bilateral;marching while seated;LAQ (long arc quad);SLR (straight leg raise);heel slides;10 repetitions  -     Row Name 03/08/22 1040          Ankle (Therapeutic Exercise)    Ankle AROM (Therapeutic Exercise) bilateral;dorsiflexion;plantarflexion;10 repetitions  -[facility] Name 03/08/22 1040          Elbow/Forearm (Therapeutic Exercise)    Elbow/Forearm (Therapeutic Exercise) strengthening exercise  -     Elbow/Forearm Strengthening (Therapeutic Exercise) bilateral;flexion;extension;10 repetitions  -     Row Name 03/08/22 1040          Hand (Therapeutic Exercise)    Hand (Therapeutic Exercise) isometric exercise  -     Hand Isometric Exercise (Therapeutic Exercise) bilateral;10 repetitions;3 second hold  cylindrical  isometric  -           User Key  (r) = Recorded By, (t) = Taken By, (c) = Cosigned By    Initials Name Provider Type     Kristina Brady PT Physical Therapist               Goals/Plan    No documentation.                Clinical Impression     Row Name 03/08/22 1042          Pain    Pretreatment Pain Rating 0/10 - no pain  -EJ     Posttreatment Pain Rating 0/10 - no pain  -EJ     Pain Intervention(s)  Ambulation/increased activity;Repositioned  -EJ     Additional Documentation Pain Scale: Numbers Pre/Post-Treatment (Group)  -EJ     Row Name 03/08/22 1042          Plan of Care Review    Plan of Care Reviewed With patient  -EJ     Progress no change  -EJ     Outcome Evaluation Slight improvement in ambulation distance compared to last visit. Pt able to improve technique with cueing for use of cane, however has 1 signifcant LOB with MIN A to recover. Good participation in ther ex.  -EJ     Row Name 03/08/22 1042          Vital Signs    Pretreatment Heart Rate (beats/min) 93  -EJ     Posttreatment Heart Rate (beats/min) 102  -EJ     O2 Delivery Pre Treatment room air  -EJ     O2 Delivery Intra Treatment room air  -EJ     O2 Delivery Post Treatment room air  -EJ     Pre Patient Position Sitting  -EJ     Intra Patient Position Standing  -EJ     Post Patient Position Sitting  -EJ     Row Name 03/08/22 1042          Positioning and Restraints    Pre-Treatment Position sitting in chair/recliner  -EJ     Post Treatment Position chair  -EJ     In Chair notified nsg;reclined;call light within reach;encouraged to call for assist;exit alarm on;with family/caregiver;waffle cushion;legs elevated  -EJ           User Key  (r) = Recorded By, (t) = Taken By, (c) = Cosigned By    Initials Name Provider Type    EJ Kristina Brady, PT Physical Therapist               Outcome Measures     Row Name 03/08/22 1046          How much help from another person do you currently need...    Turning from your back to your side while in flat bed without using bedrails? 4  -EJ     Moving from lying on back to sitting on the side of a flat bed without bedrails? 4  -EJ     Moving to and from a bed to a chair (including a wheelchair)? 3  -EJ     Standing up from a chair using your arms (e.g., wheelchair, bedside chair)? 3  -EJ     Climbing 3-5 steps with a railing? 2  -EJ     To walk in hospital room? 3  -EJ     AM-PAC 6 Clicks Score (PT) 19  -EJ      Row Name 03/08/22 1046          Functional Assessment    Outcome Measure Options AM-PAC 6 Clicks Basic Mobility (PT)  -EJ           User Key  (r) = Recorded By, (t) = Taken By, (c) = Cosigned By    Initials Name Provider Type    EJ Kristina Brady, PT Physical Therapist                             Physical Therapy Education                 Title: PT OT SLP Therapies (In Progress)     Topic: Physical Therapy (In Progress)     Point: Mobility training (In Progress)     Learning Progress Summary           Patient Acceptance, E, NR by HOSEA at 3/8/2022 1047    Acceptance, E, NR by AS at 3/7/2022 0942    Acceptance, E,TB, NR by EDMOND at 3/5/2022 0806    Eager, E, NR by  at 3/4/2022 1149                   Point: Home exercise program (In Progress)     Learning Progress Summary           Patient Acceptance, E, NR by HOSEA at 3/8/2022 1047    Acceptance, E, NR by AS at 3/7/2022 0942    Acceptance, E,TB, NR by EDMOND at 3/5/2022 0806                   Point: Body mechanics (In Progress)     Learning Progress Summary           Patient Acceptance, E, NR by HOSEA at 3/8/2022 1047    Acceptance, E, NR by AS at 3/7/2022 0942    Acceptance, E,TB, NR by EDMOND at 3/5/2022 0806    Eager, E, NR by  at 3/4/2022 1149                   Point: Precautions (In Progress)     Learning Progress Summary           Patient Acceptance, E, NR by HOSEA at 3/8/2022 1047    Acceptance, E, NR by AS at 3/7/2022 0942    Acceptance, E,TB, NR by EDMOND at 3/5/2022 0806    Eager, E, NR by  at 3/4/2022 1149                               User Key     Initials Effective Dates Name Provider Type Discipline     06/16/21 -  Kristina Brady PT Physical Therapist PT    SJ 06/16/21 -  Priya Monzon PT Physical Therapist PT    AS 06/16/21 -  Jessie Fleming, PTA Physical Therapy Assistant PT    EDMOND 12/29/21 -  Hannah Anderson, RN Registered Nurse Nurse              PT Recommendation and Plan     Plan of Care Reviewed With: patient  Progress: no change  Outcome  Evaluation: Slight improvement in ambulation distance compared to last visit. Pt able to improve technique with cueing for use of cane, however has 1 signifcant LOB with MIN A to recover. Good participation in ther ex.     Time Calculation:    PT Charges     Row Name 03/08/22 1047             Time Calculation    Start Time 1008  -EJ      PT Received On 03/08/22  -EJ      PT Goal Re-Cert Due Date 03/14/22  -EJ              Time Calculation- PT    Total Timed Code Minutes- PT 23 minute(s)  -EJ              Timed Charges    68034 - PT Therapeutic Exercise Minutes 12  -EJ      04085 - Gait Training Minutes  11  -EJ              Total Minutes    Timed Charges Total Minutes 23  -EJ       Total Minutes 23  -EJ            User Key  (r) = Recorded By, (t) = Taken By, (c) = Cosigned By    Initials Name Provider Type    EJ Kristina Brady, PT Physical Therapist              Therapy Charges for Today     Code Description Service Date Service Provider Modifiers Qty    49865924273 HC PT THER PROC EA 15 MIN 3/8/2022 Kristina Brady, PT GP 1    07901873619 HC GAIT TRAINING EA 15 MIN 3/8/2022 Kristina Brady, PT GP 1          PT G-Codes  Outcome Measure Options: AM-PAC 6 Clicks Basic Mobility (PT)  AM-PAC 6 Clicks Score (PT): 19  AM-PAC 6 Clicks Score (OT): 17    Kristina Simmons PT  3/8/2022

## 2022-03-08 NOTE — PLAN OF CARE
Goal Outcome Evaluation:  Plan of Care Reviewed With: patient        Progress: no change  Outcome Evaluation: Slight improvement in ambulation distance compared to last visit. Pt able to improve technique with cueing for use of cane, however has 1 signifcant LOB with MIN A to recover. Good participation in ther ex.

## 2022-03-08 NOTE — PLAN OF CARE
Goal Outcome Evaluation:  Plan of Care Reviewed With: patient    VSS on 2L NC. No voiced complaints of pain. Alert to self.  Voiced complaints of anxiety and nervousness. PRN Ativan 1mg given , rested well throughout the night. Continuing POC.

## 2022-03-08 NOTE — PLAN OF CARE
Goal Outcome Evaluation:  Plan of Care Reviewed With: patient, family        Progress: improving  Outcome Evaluation: Pt requires cues for seq and safety awareness with use of FWW, SBA STS, participated well with HORTENSIA ther ex from chair, cont IPOT per POC

## 2022-03-08 NOTE — DISCHARGE PLACEMENT REQUEST
"JU Long, RN  Case Management [phone]    Scott Diego (89 y.o. Female)             Date of Birth   04/10/1930    Social Security Number       Address   [facility] [address] [facility] [address]    Home Phone   [phone]    MRN   [id number]       Mandaen   None    Marital Status                               Admission Date   3/3/22    Admission Type   Emergency    Admitting Provider   Jan Martins MD    Attending Provider   Jan Martins MD    Department, Room/Bed   [facility] 4G, S446/1       Discharge Date       Discharge Disposition       Discharge Destination                               Attending Provider: Jan Martins MD    Allergies: Augmentin [Amoxicillin-pot Clavulanate], Claritin [Loratadine], Keflex [Cephalexin], Sulfa Antibiotics    Isolation: None   Infection: None   Code Status: CPR   Advance Care Planning Activity    Ht: 165.1 cm (65\")   Wt: 54.3 kg (119 lb 12.8 oz)    Admission Cmt: None   Principal Problem: None                Active Insurance as of 3/3/2022     Primary Coverage     Payor Plan Insurance Group Employer/Plan Group    ANTHEM MEDICARE REPLACEMENT ANTHEM MEDICARE ADVANTAGE [id number]     Payor Plan Address Payor Plan Phone Number Payor Plan Fax Number Effective Dates    [phone]  3/1/2022 - None Entered    [facility] [address]       Subscriber Name Subscriber Birth Date Member ID       SCOTT DIEGO 4/10/1930 [id number]                 Emergency Contacts      (Rel.) Home Phone Work Phone Mobile Phone    VAUGHN GASPAR (Son) [phone] -- [phone]               Physician Progress Notes (most recent note)      Jan Martins MD at 22 0806              [facility] Medicine Services  PROGRESS NOTE    Patient Name: Scott Diego  : 4/10/1930  MRN: [id number]    Date of Admission: 3/3/2022  Primary Care Physician: Yolande Escobar MD    Subjective   Subjective "     CC:  CARYN/CHF/weakness    HPI:  Up in bed. Oriented to self. No SOA. No chest pain. Denies any pain.    ROS:  BARAK  Objective   Objective     Vital Signs:   Temp:  [98.4 °F (36.9 °C)] 98.4 °F (36.9 °C)  Heart Rate:  [] 90  Resp:  [18] 18  BP: (143)/(72) 143/72  Flow (L/min):  [2] 2     Physical Exam:  NAD, alert  OP clear, dry MM  Neck supple  No LAD  RRR  CTAB  +BS, ND, NT, soft  MARTÍNEZ  No edema of LE    Results Reviewed:  LAB RESULTS:      Lab 03/08/22 0320 03/07/22 0457 03/05/22  0511 03/04/22  0514 03/03/22  0944 03/02/22 2038   WBC 8.17 6.95 5.16 5.91 8.01 11.17*   HEMOGLOBIN 12.1 10.9* 10.0* 10.2* 10.2* 11.3*   HEMATOCRIT 38.4 34.7 31.2* 32.6* 31.8* 34.8   PLATELETS 166 155 130* 141 129* 143   NEUTROS ABS 5.33 4.05 2.58  --  4.51 7.79*   IMMATURE GRANS (ABS) 0.02 0.02 0.01  --  0.02 0.02   LYMPHS ABS 1.38 1.55 1.34  --  1.76 1.54   MONOS ABS 1.21* 1.04* 0.80  --  1.09* 1.23*   EOS ABS 0.17 0.23 0.37  --  0.56* 0.51*   MCV 93.2 94.6 92.9 94.5 92.7 92.1   CRP  --   --   --   --   --  <0.30   LACTATE  --   --   --   --   --  1.0         Lab 03/08/22 0320 03/07/22 0457 03/06/22  0756 03/05/22  0511 03/04/22  0514 03/03/22  0944 03/03/22  0230   SODIUM 134* 138  --  137 138 139  --    POTASSIUM 5.3* 5.6*  --  4.9 5.1 5.5*  --    CHLORIDE 101 105  --  110* 110* 111*  --    CO2 19.0* 25.0  --  21.0* 22.0 22.0  --    ANION GAP 14.0 8.0  --  6.0 6.0 6.0  --    BUN 16 14  --  12 15 18  --    CREATININE 1.28* 1.23*  --  1.08* 1.17* 1.26*  --    EGFR 39.6* 41.6*  --  48.6* 44.1* 40.4*  --    GLUCOSE 72 98  --  88 87 88  --    CALCIUM 9.6 9.5  --  9.0 8.7 8.8  --    MAGNESIUM  --  1.9 1.7  --  1.8  --  1.6*   PHOSPHORUS  --   --   --   --   --   --  3.6   TSH  --   --   --   --   --   --  1.440         Lab 03/08/22  0320 03/07/22  0457 03/05/22  0511 03/02/22  2038   TOTAL PROTEIN 7.9 7.1 6.4 7.6   ALBUMIN 3.30* 3.20* 3.00* 3.60   GLOBULIN 4.6 3.9 3.4 4.0   ALT (SGPT) 10 8 7 10   AST (SGOT) 23 18 15 20    BILIRUBIN 0.4 0.3 0.3 0.3   ALK PHOS 46 42 38* 44         Lab 03/02/22 2038   PROBNP 2,861.0*   TROPONIN T 0.011                 Brief Urine Lab Results  (Last result in the past 365 days)      Color   Clarity   Blood   Leuk Est   Nitrite   Protein   CREAT   Urine HCG        03/03/22 0843             54.9               Microbiology Results Abnormal     Procedure Component Value - Date/Time    COVID PRE-OP / PRE-PROCEDURE SCREENING ORDER (NO ISOLATION) - Swab, Nasopharynx [[id number]]  (Normal) Collected: 03/03/22 0546    Lab Status: Final result Specimen: Swab from Nasopharynx Updated: 03/03/22 0652    Narrative:      The following orders were created for panel order COVID PRE-OP / PRE-PROCEDURE SCREENING ORDER (NO ISOLATION) - Swab, Nasopharynx.  Procedure                               Abnormality         Status                     ---------                               -----------         ------                     COVID-19, FLU A/B, RSV P...[[id number]]  Normal              Final result                 Please view results for these tests on the individual orders.    COVID-19, FLU A/B, RSV PCR - Swab, Nasopharynx [[id number]]  (Normal) Collected: 03/03/22 0546    Lab Status: Final result Specimen: Swab from Nasopharynx Updated: 03/03/22 0652     COVID19 Not Detected     Influenza A PCR Not Detected     Influenza B PCR Not Detected     RSV, PCR Not Detected    Narrative:      Fact sheet for providers: https://www.fda.gov/media/750020/download    Fact sheet for patients: https://www.fda.gov/media/241216/download    Test performed by PCR.          No radiology results from the last 24 hrs    Results for orders placed during the hospital encounter of 03/03/22    Adult Transthoracic Echo Complete W/ Cont if Necessary Per Protocol    Interpretation Summary  · Estimated left ventricular EF = 30%  · Moderate mitral valve regurgitation is present.  · There is calcification of the aortic valve.  · Estimated right  ventricular systolic pressure from tricuspid regurgitation is mildly elevated (35-45 mmHg).      I have reviewed the medications:  Scheduled Meds:aspirin, 81 mg, Oral, Nightly  atorvastatin, 80 mg, Oral, Nightly  cetirizine, 5 mg, Oral, Daily  donepezil, 5 mg, Oral, Nightly  erythromycin, 1 application, Both Eyes, Nightly  famotidine, 20 mg, Oral, Daily  ferrous sulfate, 325 mg, Oral, Daily  [START ON 3/9/2022] furosemide, 20 mg, Oral, Q48H  gabapentin, 300 mg, Oral, Nightly  heparin (porcine), 5,000 Units, Subcutaneous, Q12H  levothyroxine, 50 mcg, Oral, Q AM  metoprolol succinate XL, 25 mg, Oral, Daily  nystatin, 5 mL, Swish & Swallow, 4x Daily  ofloxacin, 1 drop, Both Eyes, TID  polyethylene glycol, 17 g, Oral, Daily  rOPINIRole, 0.5 mg, Oral, Nightly  sodium chloride, 10 mL, Intravenous, Q12H  sodium zirconium cyclosilicate, 10 g, Oral, Once      Continuous Infusions:   PRN Meds:.•  acetaminophen **OR** acetaminophen **OR** acetaminophen  •  LORazepam  •  magnesium sulfate **OR** magnesium sulfate in D5W 1g/100mL (PREMIX) **OR** magnesium sulfate  •  meclizine  •  polyvinyl alcohol  •  potassium chloride **OR** potassium chloride **OR** potassium chloride  •  sodium chloride  •  sodium chloride    Assessment/Plan   Assessment & Plan     Active Hospital Problems    Diagnosis  POA   • Elevated serum creatinine [R79.89]  Yes   • Hypothyroid [E03.9]  Yes   • Chronic systolic heart failure (HCC) [I50.22]  Yes   • CARYN (acute kidney injury) (HCC) [N17.9]  Yes   • History of CVA (cerebrovascular accident) [Z86.73]  Not Applicable   • Dementia (HCC) [F03.90]  Yes   • Hypertension [I10]  Yes   • Hyponatremia [E87.1]  Yes      Resolved Hospital Problems   No resolved problems to display.        Brief Hospital Course to date:  Scott Diego is a 89 y.o. female  Who lives with her son in [place] following the recent death of her  in January.  Son states since this time, pt has gone down hill.  Poor po intake.   Unable to take care of self.  Has a lot of anxiety.  Worsening memory.  Son notes she has O2 at night and he checked her sats at home and they were 86 today.  Son brings pt in as he feels he cannot safely take care of her.     CARYN  -resolved, change lasix to every other day only     Hyponatremia  -resolved     Chronic systolic HF  -CXR with mild pulmonary interstitial disease  -ECHO with EF 20-30%  -lasix to every other day only, appears dry and no edema  -outpatient follow up with [facility]  -BB     Possible Thrush  -nystatin swish and swallow    Agitation with h/o Dementia  Prolonged QTC  -monitor, limit seroquel/haldol  -prn ativan for agitation    HTN  -metoprolol     Hyperkalemia  -given lokelma again, 3/8     Hypothyroidism  -Levothyroxine     Debility  Generalized weakness  Dementia  History of CVA  -PT/OT/fall precautions  -aricept     Bereavement  - passed away 2022    No family at bedside this AM    DVT prophylaxis:  Medical DVT prophylaxis orders are present.       AM-PAC 6 Clicks Score (PT): 19 (22 0942)    Disposition: I expect the patient to be discharged to rehab.    CODE STATUS:   Code Status and Medical Interventions:   Ordered at: 22 0504     Level Of Support Discussed With:    Next of Kin (If No Surrogate)     Code Status (Patient has no pulse and is not breathing):    CPR (Attempt to Resuscitate)     Medical Interventions (Patient has pulse or is breathing):    Full Support       Jan Martins MD  22                Electronically signed by Jan Martins MD at 22 0809          Physical Therapy Notes (most recent note)      Kristina Brady PT at 22 1048  Version 1 of 1         Patient Name: Scott Diego  : 4/10/1930    MRN: [id number]                              Today's Date: 3/8/2022       Admit Date: 3/3/2022    Visit Dx:     ICD-10-CM ICD-9-CM   1. CARYN (acute kidney injury) (HCC)  N17.9 584.9   2. Elevated serum  creatinine  R79.89 790.99   3. Normocytic anemia  D64.9 285.9   4. Impaired functional mobility, balance, gait, and endurance  Z74.09 V49.89     Patient Active Problem List   Diagnosis   • Idiopathic peripheral neuropathy   • Neck pain   • Mild cognitive impairment   • Anemia   • Hyponatremia   • Hypertension   • Dehydration   • History of CVA (cerebrovascular accident)   • Dementia (HCC)   • Left bundle branch block   • Orthostatic dizziness   • Chronic systolic heart failure (HCC)   • Normocytic anemia   • CARYN (acute kidney injury) (HCC)   • Dyspnea   • Hypoxia   • Hypothyroid   • Pericardial effusion   • Acute respiratory failure with hypoxia (HCC)   • Right lower lobe pneumonia   • Ventricular ectopy   • Elevated serum creatinine     Past Medical History:   Diagnosis Date   • Congestive heart failure (HCC)    • Malignant neoplasm (HCC)    • Stroke (HCC)     mini stroke   • Systolic heart failure (HCC) 9/25/2017    Chronic/compensated (EF 25%)     Past Surgical History:   Procedure Laterality Date   • CHOLECYSTECTOMY     • EYE SURGERY     • HYSTERECTOMY        General Information     Row Name 03/08/22 1035          Physical Therapy Time and Intention    Document Type therapy note (daily note)  -EJ     Mode of Treatment physical therapy  -EJ     Row Name 03/08/22 1035          General Information    Patient Profile Reviewed yes  -EJ     Existing Precautions/Restrictions fall  -EJ     Row Name 03/08/22 1035          Cognition    Orientation Status (Cognition) oriented to;person;disoriented to;place;situation;time  -EJ     Row Name 03/08/22 1035          Safety Issues, Functional Mobility    Safety Issues Affecting Function (Mobility) awareness of need for assistance;insight into deficits/self-awareness;safety precautions follow-through/compliance;safety precaution awareness  -EJ     Impairments Affecting Function (Mobility) balance;coordination;visual/perceptual;endurance/activity tolerance;strength;cognition  -EJ      Cognitive Impairments, Mobility Safety/Performance awareness, need for assistance;insight into deficits/self-awareness;problem-solving/reasoning;safety precaution awareness  -     Comment, Safety Issues/Impairments (Mobility) Cues for use of cane, safety awareness.  -           User Key  (r) = Recorded By, (t) = Taken By, (c) = Cosigned By    Initials Name Provider Type     Kristina Brady PT Physical Therapist               Mobility     Row Name 03/08/22 1036          Bed Mobility    Comment, (Bed Mobility) sitting EOB with PCT  -     Row Name 03/08/22 1036          Sit-Stand Transfer    Sit-Stand [place] (Transfers) standby assist  -EJ     Assistive Device (Sit-Stand Transfers) --  stands with PCT, PT near without cane. Pt placed belt around pt, cued pt to hold to cane.  -     Row Name 03/08/22 1036          Gait/Stairs (Locomotion)    [place] Level (Gait) minimum assist (75% patient effort);1 person assist;verbal cues  -EJ     Assistive Device (Gait) cane, straight  -EJ     Distance in Feet (Gait) 160  -EJ     Deviations/Abnormal Patterns (Gait) bilateral deviations;base of support, narrow;gait speed decreased  -EJ     Bilateral Gait Deviations forward flexed posture;weight shift ability decreased  -EJ     Comment, (Gait/Stairs) requires cues for cane placement and use as initially pt lifted cane off floor. Pt demos significant improvement, but had 1 signifcant LOB with reaction including lifting cane in the air. PT assisted pt to regain balance, cued to keep cane on ground when losing balance. RWx may be better suited for pt.  -           User Key  (r) = Recorded By, (t) = Taken By, (c) = Cosigned By    Initials Name Provider Type     Kristina Brady PT Physical Therapist               Obj/Interventions     Row Name 03/08/22 1040          Motor Skills    Therapeutic Exercise shoulder;hip;knee;ankle;other (see comments)  -     Row Name 03/08/22 1040          Shoulder  (Therapeutic Exercise)    Shoulder (Therapeutic Exercise) AROM (active range of motion)  -EJ     Shoulder AROM (Therapeutic Exercise) bilateral;flexion;extension;aBduction;aDduction;10 repetitions  -EJ     Row Name 03/08/22 1040          Hip (Therapeutic Exercise)    Hip (Therapeutic Exercise) strengthening exercise  -EJ     Hip Strengthening (Therapeutic Exercise) bilateral;aBduction;aDduction;10 repetitions  -EJ     Row Name 03/08/22 1040          Knee (Therapeutic Exercise)    Knee Strengthening (Therapeutic Exercise) bilateral;marching while seated;LAQ (long arc quad);SLR (straight leg raise);heel slides;10 repetitions  -EJ     Row Name 03/08/22 1040          Ankle (Therapeutic Exercise)    Ankle AROM (Therapeutic Exercise) bilateral;dorsiflexion;plantarflexion;10 repetitions  -EJ     Row Name 03/08/22 1040          Elbow/Forearm (Therapeutic Exercise)    Elbow/Forearm (Therapeutic Exercise) strengthening exercise  -EJ     Elbow/Forearm Strengthening (Therapeutic Exercise) bilateral;flexion;extension;10 repetitions  -EJ     Row Name 03/08/22 1040          Hand (Therapeutic Exercise)    Hand (Therapeutic Exercise) isometric exercise  -EJ     Hand Isometric Exercise (Therapeutic Exercise) bilateral;10 repetitions;3 second hold  cylindrical  isometric  -EJ           User Key  (r) = Recorded By, (t) = Taken By, (c) = Cosigned By    Initials Name Provider Type    EJ Kristina Brady PT Physical Therapist               Goals/Plan    No documentation.                Clinical Impression     Row Name 03/08/22 1042          Pain    Pretreatment Pain Rating 0/10 - no pain  -EJ     Posttreatment Pain Rating 0/10 - no pain  -EJ     Pain Intervention(s) Ambulation/increased activity;Repositioned  -EJ     Additional Documentation Pain Scale: Numbers Pre/Post-Treatment (Group)  -     Row Name 03/08/22 1042          Plan of Care Review    Plan of Care Reviewed With patient  -EJ     Progress no change  -     Outcome  Evaluation Slight improvement in ambulation distance compared to last visit. Pt able to improve technique with cueing for use of cane, however has 1 signifcant LOB with MIN A to recover. Good participation in ther ex.  -EJ     Row Name 03/08/22 1042          Vital Signs    Pretreatment Heart Rate (beats/min) 93  -EJ     Posttreatment Heart Rate (beats/min) 102  -EJ     O2 Delivery Pre Treatment room air  -EJ     O2 Delivery Intra Treatment room air  -EJ     O2 Delivery Post Treatment room air  -EJ     Pre Patient Position Sitting  -EJ     Intra Patient Position Standing  -EJ     Post Patient Position Sitting  -EJ     Row Name 03/08/22 1042          Positioning and Restraints    Pre-Treatment Position sitting in chair/recliner  -EJ     Post Treatment Position chair  -EJ     In Chair notified nsg;reclined;call light within reach;encouraged to call for assist;exit alarm on;with family/caregiver;waffle cushion;legs elevated  -EJ           User Key  (r) = Recorded By, (t) = Taken By, (c) = Cosigned By    Initials Name Provider Type    Kristina Sullivan PT Physical Therapist               Outcome Measures     Row Name 03/08/22 1046          How much help from another person do you currently need...    Turning from your back to your side while in flat bed without using bedrails? 4  -EJ     Moving from lying on back to sitting on the side of a flat bed without bedrails? 4  -EJ     Moving to and from a bed to a chair (including a wheelchair)? 3  -EJ     Standing up from a chair using your arms (e.g., wheelchair, bedside chair)? 3  -EJ     Climbing 3-5 steps with a railing? 2  -EJ     To walk in hospital room? 3  -EJ     AM-PAC 6 Clicks Score (PT) 19  -EJ     Row Name 03/08/22 1046          Functional Assessment    Outcome Measure Options AM-PAC 6 Clicks Basic Mobility (PT)  -EJ           User Key  (r) = Recorded By, (t) = Taken By, (c) = Cosigned By    Initials Name Provider Type    Kristina Sullivan PT Physical  Therapist                             Physical Therapy Education                 Title: PT OT SLP Therapies (In Progress)     Topic: Physical Therapy (In Progress)     Point: Mobility training (In Progress)     Learning Progress Summary           Patient Acceptance, E, NR by HOSEA at 3/8/2022 1047    Acceptance, E, NR by AS at 3/7/2022 0942    Acceptance, E,TB, NR by EDMOND at 3/5/2022 0806    Eager, E, NR by  at 3/4/2022 1149                   Point: Home exercise program (In Progress)     Learning Progress Summary           Patient Acceptance, E, NR by HOSEA at 3/8/2022 1047    Acceptance, E, NR by AS at 3/7/2022 0942    Acceptance, E,TB, NR by EDMOND at 3/5/2022 0806                   Point: Body mechanics (In Progress)     Learning Progress Summary           Patient Acceptance, E, NR by HOSEA at 3/8/2022 1047    Acceptance, E, NR by AS at 3/7/2022 0942    Acceptance, E,TB, NR by EDMOND at 3/5/2022 0806    Eager, E, NR by  at 3/4/2022 1149                   Point: Precautions (In Progress)     Learning Progress Summary           Patient Acceptance, E, NR by HOSEA at 3/8/2022 1047    Acceptance, E, NR by AS at 3/7/2022 0942    Acceptance, E,TB, NR by EDMOND at 3/5/2022 0806    Eager, E, NR by  at 3/4/2022 1149                               User Key     Initials Effective Dates Name Provider Type Discipline     06/16/21 -  Kristina Brady, PT Physical Therapist PT     06/16/21 -  Priya Monzon PT Physical Therapist PT    AS 06/16/21 -  Jessie Fleming, PTA Physical Therapy Assistant PT     12/29/21 -  Hannah Anderson, RN Registered Nurse Nurse              PT Recommendation and Plan     Plan of Care Reviewed With: patient  Progress: no change  Outcome Evaluation: Slight improvement in ambulation distance compared to last visit. Pt able to improve technique with cueing for use of cane, however has 1 signifcant LOB with MIN A to recover. Good participation in ther ex.     Time Calculation:    PT Charges     Row Name 03/08/22  1047             Time Calculation    Start Time 1008  -EJ      PT Received On 22  -EJ      PT Goal Re-Cert Due Date 22  -EJ              Time Calculation- PT    Total Timed Code Minutes- PT 23 minute(s)  -EJ              Timed Charges    22734 - PT Therapeutic Exercise Minutes 12  -EJ      26854 - Gait Training Minutes  11  -EJ              Total Minutes    Timed Charges Total Minutes 23  -EJ       Total Minutes 23  -EJ            User Key  (r) = Recorded By, (t) = Taken By, (c) = Cosigned By    Initials Name Provider Type    EJ Kristina Brady PT Physical Therapist              Therapy Charges for Today     Code Description Service Date Service Provider Modifiers Qty    90294198127 HC PT THER PROC EA 15 MIN 3/8/2022 Kristina Brady, PT GP 1    77844397212 HC GAIT TRAINING EA 15 MIN 3/8/2022 Kristina Brady PT GP 1          PT G-Codes  Outcome Measure Options: AM-PAC 6 Clicks Basic Mobility (PT)  AM-PAC 6 Clicks Score (PT): 19  AM-PAC 6 Clicks Score (OT): 17    Kristina Simmons PT  3/8/2022      Electronically signed by Kristina Brady PT at 22 1048          Occupational Therapy Notes (most recent note)      Elissa Bhagat, OT at 22 1445          Patient Name: Scott Diego  : 4/10/1930    MRN: [id number]                              Today's Date: 3/7/2022       Admit Date: 3/3/2022    Visit Dx:     ICD-10-CM ICD-9-CM   1. CARYN (acute kidney injury) (HCC)  N17.9 584.9   2. Elevated serum creatinine  R79.89 790.99   3. Normocytic anemia  D64.9 285.9   4. Impaired functional mobility, balance, gait, and endurance  Z74.09 V49.89     Patient Active Problem List   Diagnosis   • Idiopathic peripheral neuropathy   • Neck pain   • Mild cognitive impairment   • Anemia   • Hyponatremia   • Hypertension   • Dehydration   • History of CVA (cerebrovascular accident)   • Dementia ([facility])   • Left bundle branch block   • Orthostatic dizziness   • Chronic systolic heart failure (HCC)    • Normocytic anemia   • CARYN (acute kidney injury) (HCC)   • Dyspnea   • Hypoxia   • Hypothyroid   • Pericardial effusion   • Acute respiratory failure with hypoxia (HCC)   • Right lower lobe pneumonia   • Ventricular ectopy   • Elevated serum creatinine     Past Medical History:   Diagnosis Date   • Congestive heart failure (HCC)    • Malignant neoplasm (HCC)    • Stroke (HCC)     mini stroke   • Systolic heart failure (HCC) 9/25/2017    Chronic/compensated (EF 25%)     Past Surgical History:   Procedure Laterality Date   • CHOLECYSTECTOMY     • EYE SURGERY     • HYSTERECTOMY        General Information     Row Name 03/07/22 1530          OT Time and Intention    Document Type therapy note (daily note)  -AN     Mode of Treatment occupational therapy  -AN     Row Name 03/07/22 1530          General Information    Patient Profile Reviewed yes  -AN     Existing Precautions/Restrictions fall  -AN     Barriers to Rehab medically complex;cognitive status;previous functional deficit  -AN     Row Name 03/07/22 1530          Cognition    Orientation Status (Cognition) oriented to;person;disoriented to;place;situation;time  -AN     Row Name 03/07/22 1530          Safety Issues, Functional Mobility    Safety Issues Affecting Function (Mobility) ability to follow commands;awareness of need for assistance;insight into deficits/self-awareness;judgment;problem-solving;safety precaution awareness;safety precautions follow-through/compliance;sequencing abilities  -AN     Impairments Affecting Function (Mobility) balance;coordination;visual/perceptual;endurance/activity tolerance;strength;cognition  -AN     Cognitive Impairments, Mobility Safety/Performance awareness, need for assistance;insight into deficits/self-awareness;judgment;problem-solving/reasoning;safety precaution awareness;safety precaution follow-through;sequencing abilities  -AN     Comment, Safety Issues/Impairments (Mobility) continuous cues for safety awareness and  RW  -AN           User Key  (r) = Recorded By, (t) = Taken By, (c) = Cosigned By    Initials Name Provider Type    Elissa Atkins OT Occupational Therapist                 Mobility/ADL's     Row Name 03/07/22 1531          Bed Mobility    Comment, (Bed Mobility) in bathroom upon arrival  -AN     Row Name 03/07/22 1531          Transfers    Transfers sit-stand transfer;toilet transfer;stand-sit transfer  -AN     Comment, (Transfers) cues for hand placement and transfer technique  -AN     Sit-Stand [place] (Transfers) minimum assist (75% patient effort);1 person assist;verbal cues  -AN     Stand-Sit [place] (Transfers) minimum assist (75% patient effort);1 person assist;verbal cues;nonverbal cues (demo/gesture)  -AN     [place] [place] Level (Toilet Transfer) minimum assist (75% patient effort);verbal cues;1 person assist  -AN     Assistive Device (Toilet Transfer) commode;grab bars/safety frame;walker, front-wheeled  -AN     Row Name 03/07/22 1531          Sit-Stand Transfer    Assistive Device (Sit-Stand Transfers) walker, front-wheeled  -AN     Comment, (Sit-Stand Transfer) cues for to use grab bars due to low surface  -AN     Row Name 03/07/22 1531          Toilet Transfer    Type (Toilet Transfer) sit-stand  -AN     Comment, (Toilet Transfer) pt observed with both hands in the toilet, feces on hands requiring assistance and clean up  -AN     Row Name 03/07/22 1531          Functional Mobility    Functional Mobility- Ind. Level contact guard assist;verbal cues required  -AN     Functional Mobility- Device rolling walker  -AN     Functional Mobility-Distance (Feet) --  household distance  -AN     Functional Mobility- Comment cues for visual scanning, safety awareness, and RW navigation  -AN     Row Name 03/07/22 1531          Activities of Daily Living    BADL Assessment/Intervention toileting;grooming  -AN     Row Name 03/07/22 1531          Grooming Assessment/Training    [place] Level  (Grooming) wash face, hands;verbal cues;contact guard assist  -AN     Position (Grooming) sink side;supported standing  -AN     Row Name 03/07/22 1531          Toileting Assessment/Training    [place] Level (Toileting) adjust/manage clothing;change pad/brief;perform perineal hygiene;minimum assist (75% patient effort);verbal cues  -AN     Assistive Devices (Toileting) commode  -AN     Position (Toileting) supported standing;supported sitting  -AN     Row Name 03/07/22 1531          Stand-Sit Transfer    Assistive Device (Stand-Sit Transfers) walker, front-wheeled  -AN     Comment, (Stand-Sit Transfer) cues for hand placement  -AN           User Key  (r) = Recorded By, (t) = Taken By, (c) = Cosigned By    Initials Name Provider Type    Elissa Atkins OT Occupational Therapist               Obj/Interventions     Row Name 03/07/22 1540          Balance    Balance Assessment sitting static balance;sitting dynamic balance;sit to stand dynamic balance;standing static balance;standing dynamic balance  -AN     Static Sitting Balance supervision  -AN     Dynamic Sitting Balance supervision  -AN     Position, Sitting Balance sitting in chair  sitting on toilet  -AN     Sit to Stand Dynamic Balance minimal assist  -AN     Static Standing Balance contact guard;verbal cues  -AN     Dynamic Standing Balance verbal cues;minimal assist  -AN     Position/Device Used, Standing Balance walker, rolling  -AN     Balance Interventions standing;sit to stand;supported;static;dynamic;minimal challenge;occupation based/functional task  -AN           User Key  (r) = Recorded By, (t) = Taken By, (c) = Cosigned By    Initials Name Provider Type    Elissa Atkins OT Occupational Therapist               Goals/Plan    No documentation.                Clinical Impression     Row Name 03/07/22 1542          Pain Assessment    Pretreatment Pain Rating 0/10 - no pain  -AN     Posttreatment Pain Rating 0/10 - no pain  -AN      Pre/Posttreatment Pain Comment asymptomatic  -AN     Pain Intervention(s) Repositioned;Ambulation/increased activity  -AN     Row Name 03/07/22 1542          Plan of Care Review    Plan of Care Reviewed With patient;son  -AN     Progress no change  -AN     Outcome Evaluation Pt sitting on commode upon arrival. Pt performed toileting with Min A, STS with Min A from low surface, and ambulated using RW with Min A and cues for RW navigation and safety. Sink side grooming performed with CGA for balance. OT changes rec SNF at [place].  -AN     Row Name 03/07/22 1542          Therapy Plan Review/Discharge Plan (OT)    Anticipated Discharge Disposition (OT) skilled nursing facility  -AN     Row Name 03/07/22 1542          Vital Signs    Pre Systolic BP Rehab --  VSS  -AN     O2 Delivery Pre Treatment room air  -AN     O2 Delivery Intra Treatment room air  -AN     O2 Delivery Post Treatment room air  -AN     Pre Patient Position Sitting  -AN     Intra Patient Position Standing  -AN     Post Patient Position Sitting  -AN     Row Name 03/07/22 1542          Positioning and Restraints    Pre-Treatment Position bathroom  -AN     Post Treatment Position chair  -AN     In Chair notified nsg;reclined;call light within reach;encouraged to call for assist;exit alarm on;with family/caregiver;waffle cushion;legs elevated  -AN           User Key  (r) = Recorded By, (t) = Taken By, (c) = Cosigned By    Initials Name Provider Type    Elissa Atkins OT Occupational Therapist               Outcome Measures     Row Name 03/07/22 1544          How much help from another is currently needed...    Putting on and taking off regular lower body clothing? 3  -AN     Bathing (including washing, rinsing, and drying) 2  -AN     Toileting (which includes using toilet bed pan or urinal) 3  -AN     Putting on and taking off regular upper body clothing 3  -AN     Taking care of personal grooming (such as brushing teeth) 3  -AN     Eating meals 3  -AN      AM-PAC 6 Clicks Score (OT) 17  -AN     Row Name 03/07/22 0942 03/07/22 0800       How much help from another person do you currently need...    Turning from your back to your side while in flat bed without using bedrails? 4  -AS 4  -MH    Moving from lying on back to sitting on the side of a flat bed without bedrails? 4  -AS 4  -MH    Moving to and from a bed to a chair (including a wheelchair)? 3  -AS 3  -MH    Standing up from a chair using your arms (e.g., wheelchair, bedside chair)? 3  -AS 3  -MH    Climbing 3-5 steps with a railing? 2  -AS 2  -MH    To walk in hospital room? 3  -AS 3  -MH    AM-PAC 6 Clicks Score (PT) 19  -AS 19  -MH    Row Name 03/07/22 1544 03/07/22 0942       Functional Assessment    Outcome Measure Options AM-PAC 6 Clicks Daily Activity (OT)  -AN AM-PAC 6 Clicks Basic Mobility (PT)  -AS          User Key  (r) = Recorded By, (t) = Taken By, (c) = Cosigned By    Initials Name Provider Type    AS Jessie Fleming, PTA Physical Therapy Assistant    Sarkis Flanagan, RN Registered Nurse    Elissa Atkins OT Occupational Therapist                Occupational Therapy Education                 Title: PT OT SLP Therapies (In Progress)     Topic: Occupational Therapy (In Progress)     Point: ADL training (Done)     Description:   Instruct learner(s) on proper safety adaptation and remediation techniques during self care or transfers.   Instruct in proper use of assistive devices.              Learning Progress Summary           Patient Acceptance, E, VU,NR by AN at 3/7/2022 1544    Acceptance, E,TB, NR by EDMOND at 3/5/2022 0806    Acceptance, E,TB,D, VU,DU,NR by KF at 3/4/2022 0903   Family Acceptance, E, VU,NR by JENNIFER at 3/7/2022 1544                   Point: Home exercise program (In Progress)     Description:   Instruct learner(s) on appropriate technique for monitoring, assisting and/or progressing therapeutic exercises/activities.              Learning Progress Summary            Patient Acceptance, E,TB, NR by EDMOND at 3/5/2022 0806                   Point: Precautions (Done)     Description:   Instruct learner(s) on prescribed precautions during self-care and functional transfers.              Learning Progress Summary           Patient Acceptance, E, VU,NR by AN at 3/7/2022 1544    Acceptance, E,TB, NR by EDMOND at 3/5/2022 0806    Acceptance, E,TB,D, VU,DU,NR by  at 3/4/2022 0903   Family Acceptance, E, VU,NR by AN at 3/7/2022 1544                   Point: Body mechanics (Done)     Description:   Instruct learner(s) on proper positioning and spine alignment during self-care, functional mobility activities and/or exercises.              Learning Progress Summary           Patient Acceptance, E, VU,NR by AN at 3/7/2022 1544    Acceptance, E,TB, NR by EDMOND at 3/5/2022 0806    Acceptance, E,TB,D, VU,DU,NR by KF at 3/4/2022 0903   Family Acceptance, E, VU,NR by AN at 3/7/2022 1544                               User Key     Initials Effective Dates Name Provider Type Discipline     06/16/21 -  Theresa Rader, OT Occupational Therapist OT     09/21/21 -  Elissa Bhagat, OT Occupational Therapist OT     12/29/21 -  Hannah Anderson RN Registered Nurse Nurse              OT Recommendation and Plan     Plan of Care Review  Plan of Care Reviewed With: patient, son  Progress: no change  Outcome Evaluation: Pt sitting on commode upon arrival. Pt performed toileting with Min A, STS with Min A from low surface, and ambulated using RW with Min A and cues for RW navigation and safety. Sink side grooming performed with CGA for balance. OT changes rec SNF at [place].     Time Calculation:    Time Calculation- OT     Row Name 03/07/22 1545 03/07/22 0942          Time Calculation- OT    OT Start Time 1445  -AN --     OT Received On 03/07/22  -AN --     OT Goal Re-Cert Due Date 03/14/22  -AN --            Timed Charges    36225 - Gait Training Minutes  -- 14  -AS     20685 - OT Self Care/Mgmt Minutes 18  -AN  --            Total Minutes    Timed Charges Total Minutes 18  -AN 14  -AS      Total Minutes 18  -AN 14  -AS           User Key  (r) = Recorded By, (t) = Taken By, (c) = Cosigned By    Initials Name Provider Type    AS Jessie Fleming PTA Physical Therapy Assistant    Elissa Atkins OT Occupational Therapist              Therapy Charges for Today     Code Description Service Date Service Provider Modifiers Qty    [id number] HC OT SELF CARE/MGMT/TRAIN EA 15 MIN 3/7/2022 Elissa Bhagat OT GO 1               Elissa Bhagat OT  3/7/2022    Electronically signed by Elissa Bhagat OT at 03/07/22 8861

## 2022-03-08 NOTE — PROGRESS NOTES
[facility] Medicine Services  PROGRESS NOTE    Patient Name: Scott Diego  : 4/10/1930  MRN: [id number]    Date of Admission: 3/3/2022  Primary Care Physician: Yolande Escobar MD    Subjective   Subjective     CC:  CARYN/CHF/weakness    HPI:  Up in bed. Oriented to self. No SOA. No chest pain. Denies any pain.    ROS:  BARAK  Objective   Objective     Vital Signs:   Temp:  [98.4 °F (36.9 °C)] 98.4 °F (36.9 °C)  Heart Rate:  [] 90  Resp:  [18] 18  BP: (143)/(72) 143/72  Flow (L/min):  [2] 2     Physical Exam:  NAD, alert  OP clear, dry MM  Neck supple  No LAD  RRR  CTAB  +BS, ND, NT, soft  MARTÍNEZ  No edema of LE    Results Reviewed:  LAB RESULTS:      Lab 22  0511 22  0514 22  0944 22   WBC 8.17 6.95 5.16 5.91 8.01 11.17*   HEMOGLOBIN 12.1 10.9* 10.0* 10.2* 10.2* 11.3*   HEMATOCRIT 38.4 34.7 31.2* 32.6* 31.8* 34.8   PLATELETS 166 155 130* 141 129* 143   NEUTROS ABS 5.33 4.05 2.58  --  4.51 7.79*   IMMATURE GRANS (ABS) 0.02 0.02 0.01  --  0.02 0.02   LYMPHS ABS 1.38 1.55 1.34  --  1.76 1.54   MONOS ABS 1.21* 1.04* 0.80  --  1.09* 1.23*   EOS ABS 0.17 0.23 0.37  --  0.56* 0.51*   MCV 93.2 94.6 92.9 94.5 92.7 92.1   CRP  --   --   --   --   --  <0.30   LACTATE  --   --   --   --   --  1.0         Lab 22  03222  0756 22  0511 22  0514 22  0944 22  0230   SODIUM 134* 138  --  137 138 139  --    POTASSIUM 5.3* 5.6*  --  4.9 5.1 5.5*  --    CHLORIDE 101 105  --  110* 110* 111*  --    CO2 19.0* 25.0  --  21.0* 22.0 22.0  --    ANION GAP 14.0 8.0  --  6.0 6.0 6.0  --    BUN 16 14  --  12 15 18  --    CREATININE 1.28* 1.23*  --  1.08* 1.17* 1.26*  --    EGFR 39.6* 41.6*  --  48.6* 44.1* 40.4*  --    GLUCOSE 72 98  --  88 87 88  --    CALCIUM 9.6 9.5  --  9.0 8.7 8.8  --    MAGNESIUM  --  1.9 1.7  --  1.8  --  1.6*   PHOSPHORUS  --   --   --   --   --   --  3.6   TSH  --   --   --    --   --   --  1.440         Lab 03/08/22  0320 03/07/22  0457 03/05/22  0511 03/02/22 2038   TOTAL PROTEIN 7.9 7.1 6.4 7.6   ALBUMIN 3.30* 3.20* 3.00* 3.60   GLOBULIN 4.6 3.9 3.4 4.0   ALT (SGPT) 10 8 7 10   AST (SGOT) 23 18 15 20   BILIRUBIN 0.4 0.3 0.3 0.3   ALK PHOS 46 42 38* 44         Lab 03/02/22 2038   PROBNP 2,861.0*   TROPONIN T 0.011                 Brief Urine Lab Results  (Last result in the past 365 days)      Color   Clarity   Blood   Leuk Est   Nitrite   Protein   CREAT   Urine HCG        03/03/22 0843             54.9               Microbiology Results Abnormal     Procedure Component Value - Date/Time    COVID PRE-OP / PRE-PROCEDURE SCREENING ORDER (NO ISOLATION) - Swab, Nasopharynx [[id number]]  (Normal) Collected: 03/03/22 0546    Lab Status: Final result Specimen: Swab from Nasopharynx Updated: 03/03/22 0652    Narrative:      The following orders were created for panel order COVID PRE-OP / PRE-PROCEDURE SCREENING ORDER (NO ISOLATION) - Swab, Nasopharynx.  Procedure                               Abnormality         Status                     ---------                               -----------         ------                     COVID-19, FLU A/B, RSV P...[[id number]]  Normal              Final result                 Please view results for these tests on the individual orders.    COVID-19, FLU A/B, RSV PCR - Swab, Nasopharynx [[id number]]  (Normal) Collected: 03/03/22 0546    Lab Status: Final result Specimen: Swab from Nasopharynx Updated: 03/03/22 0652     COVID19 Not Detected     Influenza A PCR Not Detected     Influenza B PCR Not Detected     RSV, PCR Not Detected    Narrative:      Fact sheet for providers: https://www.fda.gov/media/171616/download    Fact sheet for patients: https://www.fda.gov/media/969823/download    Test performed by PCR.          No radiology results from the last 24 hrs    Results for orders placed during the hospital encounter of 03/03/22    Adult Transthoracic Echo  Complete W/ Cont if Necessary Per Protocol    Interpretation Summary  · Estimated left ventricular EF = 30%  · Moderate mitral valve regurgitation is present.  · There is calcification of the aortic valve.  · Estimated right ventricular systolic pressure from tricuspid regurgitation is mildly elevated (35-45 mmHg).      I have reviewed the medications:  Scheduled Meds:aspirin, 81 mg, Oral, Nightly  atorvastatin, 80 mg, Oral, Nightly  cetirizine, 5 mg, Oral, Daily  donepezil, 5 mg, Oral, Nightly  erythromycin, 1 application, Both Eyes, Nightly  famotidine, 20 mg, Oral, Daily  ferrous sulfate, 325 mg, Oral, Daily  [START ON 3/9/2022] furosemide, 20 mg, Oral, Q48H  gabapentin, 300 mg, Oral, Nightly  heparin (porcine), 5,000 Units, Subcutaneous, Q12H  levothyroxine, 50 mcg, Oral, Q AM  metoprolol succinate XL, 25 mg, Oral, Daily  nystatin, 5 mL, Swish & Swallow, 4x Daily  ofloxacin, 1 drop, Both Eyes, TID  polyethylene glycol, 17 g, Oral, Daily  rOPINIRole, 0.5 mg, Oral, Nightly  sodium chloride, 10 mL, Intravenous, Q12H  sodium zirconium cyclosilicate, 10 g, Oral, Once      Continuous Infusions:   PRN Meds:.•  acetaminophen **OR** acetaminophen **OR** acetaminophen  •  LORazepam  •  magnesium sulfate **OR** magnesium sulfate in D5W 1g/100mL (PREMIX) **OR** magnesium sulfate  •  meclizine  •  polyvinyl alcohol  •  potassium chloride **OR** potassium chloride **OR** potassium chloride  •  sodium chloride  •  sodium chloride    Assessment/Plan   Assessment & Plan     Active Hospital Problems    Diagnosis  POA   • Elevated serum creatinine [R79.89]  Yes   • Hypothyroid [E03.9]  Yes   • Chronic systolic heart failure (HCC) [I50.22]  Yes   • CARYN (acute kidney injury) (HCC) [N17.9]  Yes   • History of CVA (cerebrovascular accident) [Z86.73]  Not Applicable   • Dementia (HCC) [F03.90]  Yes   • Hypertension [I10]  Yes   • Hyponatremia [E87.1]  Yes      Resolved Hospital Problems   No resolved problems to display.        Brief  Hospital Course to date:  Soctt Diego is a 89 y.o. female  Who lives with her son in [place] following the recent death of her  in January.  Son states since this time, pt has gone down hill.  Poor po intake.  Unable to take care of self.  Has a lot of anxiety.  Worsening memory.  Son notes she has O2 at night and he checked her sats at home and they were 86 today.  Son brings pt in as he feels he cannot safely take care of her.     CARYN  -resolved, change lasix to every other day only     Hyponatremia  -resolved     Chronic systolic HF  -CXR with mild pulmonary interstitial disease  -ECHO with EF 20-30%  -lasix to every other day only, appears dry and no edema  -outpatient follow up with [facility]  -BB     Possible Thrush  -nystatin swish and swallow    Agitation with h/o Dementia  Prolonged QTC  -monitor, limit seroquel/haldol  -prn ativan for agitation    HTN  -metoprolol     Hyperkalemia  -given lokelma again, 3/8     Hypothyroidism  -Levothyroxine     Debility  Generalized weakness  Dementia  History of CVA  -PT/OT/fall precautions  -aricept     Bereavement  - passed away January 2022    No family at bedside this AM    DVT prophylaxis:  Medical DVT prophylaxis orders are present.       AM-PAC 6 Clicks Score (PT): 19 (03/07/22 0941)    Disposition: I expect the patient to be discharged to rehab.    CODE STATUS:   Code Status and Medical Interventions:   Ordered at: 03/03/22 0905     Level Of Support Discussed With:    Next of Kin (If No Surrogate)     Code Status (Patient has no pulse and is not breathing):    CPR (Attempt to Resuscitate)     Medical Interventions (Patient has pulse or is breathing):    Full Support       Jan Martins MD  03/08/22

## 2022-03-09 LAB
ANION GAP SERPL CALCULATED.3IONS-SCNC: 12 MMOL/L (ref 5–15)
BUN SERPL-MCNC: 20 MG/DL (ref 8–23)
BUN/CREAT SERPL: 13.2 (ref 7–25)
CALCIUM SPEC-SCNC: 9.1 MG/DL (ref 8.2–9.6)
CHLORIDE SERPL-SCNC: 101 MMOL/L (ref 98–107)
CO2 SERPL-SCNC: 24 MMOL/L (ref 22–29)
CREAT SERPL-MCNC: 1.51 MG/DL (ref 0.57–1)
EGFRCR SERPLBLD CKD-EPI 2021: 32.5 ML/MIN/1.73
GLUCOSE SERPL-MCNC: 90 MG/DL (ref 65–99)
POTASSIUM SERPL-SCNC: 4.6 MMOL/L (ref 3.5–5.2)
SODIUM SERPL-SCNC: 137 MMOL/L (ref 136–145)

## 2022-03-09 PROCEDURE — 80048 BASIC METABOLIC PNL TOTAL CA: CPT | Performed by: HOSPITALIST

## 2022-03-09 PROCEDURE — 99232 SBSQ HOSP IP/OBS MODERATE 35: CPT | Performed by: HOSPITALIST

## 2022-03-09 PROCEDURE — 25010000002 LORAZEPAM PER 2 MG: Performed by: INTERNAL MEDICINE

## 2022-03-09 PROCEDURE — 97116 GAIT TRAINING THERAPY: CPT

## 2022-03-09 PROCEDURE — 25010000002 HEPARIN (PORCINE) PER 1000 UNITS: Performed by: NURSE PRACTITIONER

## 2022-03-09 PROCEDURE — 92610 EVALUATE SWALLOWING FUNCTION: CPT

## 2022-03-09 RX ORDER — LORAZEPAM 0.5 MG/1
0.25 TABLET ORAL ONCE
Status: COMPLETED | OUTPATIENT
Start: 2022-03-09 | End: 2022-03-09

## 2022-03-09 RX ADMIN — NYSTATIN 500000 UNITS: 100000 SUSPENSION ORAL at 18:00

## 2022-03-09 RX ADMIN — DONEPEZIL HYDROCHLORIDE 5 MG: 5 TABLET, FILM COATED ORAL at 20:08

## 2022-03-09 RX ADMIN — LEVOTHYROXINE SODIUM 50 MCG: 50 TABLET ORAL at 06:44

## 2022-03-09 RX ADMIN — ATORVASTATIN CALCIUM 80 MG: 40 TABLET, FILM COATED ORAL at 20:07

## 2022-03-09 RX ADMIN — ASPIRIN 81 MG 81 MG: 81 TABLET ORAL at 20:08

## 2022-03-09 RX ADMIN — FUROSEMIDE 20 MG: 20 TABLET ORAL at 09:42

## 2022-03-09 RX ADMIN — ERYTHROMYCIN 1 APPLICATION: 5 OINTMENT OPHTHALMIC at 20:08

## 2022-03-09 RX ADMIN — NYSTATIN 500000 UNITS: 100000 SUSPENSION ORAL at 14:37

## 2022-03-09 RX ADMIN — HEPARIN SODIUM 5000 UNITS: 5000 INJECTION, SOLUTION INTRAVENOUS; SUBCUTANEOUS at 09:41

## 2022-03-09 RX ADMIN — ROPINIROLE HYDROCHLORIDE 0.5 MG: 0.5 TABLET, FILM COATED ORAL at 20:08

## 2022-03-09 RX ADMIN — OFLOXACIN 1 DROP: 3 SOLUTION/ DROPS OPHTHALMIC at 20:08

## 2022-03-09 RX ADMIN — OFLOXACIN 1 DROP: 3 SOLUTION/ DROPS OPHTHALMIC at 09:41

## 2022-03-09 RX ADMIN — METOPROLOL SUCCINATE 25 MG: 25 TABLET, EXTENDED RELEASE ORAL at 09:42

## 2022-03-09 RX ADMIN — OFLOXACIN 1 DROP: 3 SOLUTION/ DROPS OPHTHALMIC at 18:00

## 2022-03-09 RX ADMIN — Medication 10 ML: at 14:37

## 2022-03-09 RX ADMIN — Medication 325 MG: at 09:42

## 2022-03-09 RX ADMIN — NYSTATIN 500000 UNITS: 100000 SUSPENSION ORAL at 20:08

## 2022-03-09 RX ADMIN — CETIRIZINE HYDROCHLORIDE 5 MG: 10 TABLET, FILM COATED ORAL at 09:42

## 2022-03-09 RX ADMIN — Medication 10 ML: at 20:09

## 2022-03-09 RX ADMIN — HEPARIN SODIUM 5000 UNITS: 5000 INJECTION, SOLUTION INTRAVENOUS; SUBCUTANEOUS at 20:09

## 2022-03-09 RX ADMIN — LORAZEPAM 0.25 MG: 0.5 TABLET ORAL at 18:00

## 2022-03-09 RX ADMIN — FAMOTIDINE 20 MG: 20 TABLET, FILM COATED ORAL at 09:42

## 2022-03-09 RX ADMIN — POLYETHYLENE GLYCOL 3350 17 G: 17 POWDER, FOR SOLUTION ORAL at 09:41

## 2022-03-09 RX ADMIN — LORAZEPAM 1 MG: 2 INJECTION INTRAMUSCULAR; INTRAVENOUS at 20:09

## 2022-03-09 RX ADMIN — NYSTATIN 500000 UNITS: 100000 SUSPENSION ORAL at 09:42

## 2022-03-09 RX ADMIN — GABAPENTIN 300 MG: 300 CAPSULE ORAL at 20:08

## 2022-03-09 NOTE — PLAN OF CARE
Goal Outcome Evaluation:  Plan of Care Reviewed With: patient        Progress: no change  Outcome Evaluation: Pt's progress limited by not feeling well this date. Ambulates in room only, with 1 sitting rest. RN notified pt reports not feeling well. VSS.

## 2022-03-09 NOTE — PROGRESS NOTES
[facility] Medicine Services  PROGRESS NOTE    Patient Name: Scott Diego  : 4/10/1930  MRN: [id number]    Date of Admission: 3/3/2022  Primary Care Physician: Yolande Escobar MD    Subjective   Subjective     CC:  CARYN/CHF/weakness    HPI:  Up in bed. Oriented to self. Notes chest soreness. Tolerating PO. Denies bowel/bladder problems.    ROS:  BARAK  Objective   Objective     Vital Signs:   Temp:  [96 °F (35.6 °C)-98 °F (36.7 °C)] 96 °F (35.6 °C)  Heart Rate:  [] 70  Resp:  [18] 18  BP: (106-132)/(58-71) 106/58     Physical Exam:  NAD, alert  OP clear, MMM  Neck supple  No LAD  RRR  CTAB  +BS, ND, NT, soft  MARTÍNEZ  No edema of LE    Results Reviewed:  LAB RESULTS:      Lab 22  0320 227 22  0511 22  0514 22  0944 22   WBC 8.17 6.95 5.16 5.91 8.01 11.17*   HEMOGLOBIN 12.1 10.9* 10.0* 10.2* 10.2* 11.3*   HEMATOCRIT 38.4 34.7 31.2* 32.6* 31.8* 34.8   PLATELETS 166 155 130* 141 129* 143   NEUTROS ABS 5.33 4.05 2.58  --  4.51 7.79*   IMMATURE GRANS (ABS) 0.02 0.02 0.01  --  0.02 0.02   LYMPHS ABS 1.38 1.55 1.34  --  1.76 1.54   MONOS ABS 1.21* 1.04* 0.80  --  1.09* 1.23*   EOS ABS 0.17 0.23 0.37  --  0.56* 0.51*   MCV 93.2 94.6 92.9 94.5 92.7 92.1   CRP  --   --   --   --   --  <0.30   LACTATE  --   --   --   --   --  1.0         Lab 22  0338 22  0320 22  0457 22  0756 22  0511 22  0514 22  0944 22  0230   SODIUM 137 134* 138  --  137 138   < >  --    POTASSIUM 4.6 5.3* 5.6*  --  4.9 5.1   < >  --    CHLORIDE 101 101 105  --  110* 110*   < >  --    CO2 24.0 19.0* 25.0  --  21.0* 22.0   < >  --    ANION GAP 12.0 14.0 8.0  --  6.0 6.0   < >  --    BUN 20 16 14  --  12 15   < >  --    CREATININE 1.51* 1.28* 1.23*  --  1.08* 1.17*   < >  --    EGFR 32.5* 39.6* 41.6*  --  48.6* 44.1*   < >  --    GLUCOSE 90 72 98  --  88 87   < >  --    CALCIUM 9.1 9.6 9.5  --  9.0 8.7   < >  --    MAGNESIUM  --    --  1.9 1.7  --  1.8  --  1.6*   PHOSPHORUS  --   --   --   --   --   --   --  3.6   TSH  --   --   --   --   --   --   --  1.440    < > = values in this interval not displayed.         Lab 03/08/22  0320 03/07/22  0457 03/05/22  0511 03/02/22 2038   TOTAL PROTEIN 7.9 7.1 6.4 7.6   ALBUMIN 3.30* 3.20* 3.00* 3.60   GLOBULIN 4.6 3.9 3.4 4.0   ALT (SGPT) 10 8 7 10   AST (SGOT) 23 18 15 20   BILIRUBIN 0.4 0.3 0.3 0.3   ALK PHOS 46 42 38* 44         Lab 03/02/22 2038   PROBNP 2,861.0*   TROPONIN T 0.011                 Brief Urine Lab Results  (Last result in the past 365 days)      Color   Clarity   Blood   Leuk Est   Nitrite   Protein   CREAT   Urine HCG        03/03/22 0843             54.9               Microbiology Results Abnormal     Procedure Component Value - Date/Time    COVID PRE-OP / PRE-PROCEDURE SCREENING ORDER (NO ISOLATION) - Swab, Nasopharynx [[id number]]  (Normal) Collected: 03/03/22 0546    Lab Status: Final result Specimen: Swab from Nasopharynx Updated: 03/03/22 0652    Narrative:      The following orders were created for panel order COVID PRE-OP / PRE-PROCEDURE SCREENING ORDER (NO ISOLATION) - Swab, Nasopharynx.  Procedure                               Abnormality         Status                     ---------                               -----------         ------                     COVID-19, FLU A/B, RSV P...[[id number]]  Normal              Final result                 Please view results for these tests on the individual orders.    COVID-19, FLU A/B, RSV PCR - Swab, Nasopharynx [[id number]]  (Normal) Collected: 03/03/22 0546    Lab Status: Final result Specimen: Swab from Nasopharynx Updated: 03/03/22 0652     COVID19 Not Detected     Influenza A PCR Not Detected     Influenza B PCR Not Detected     RSV, PCR Not Detected    Narrative:      Fact sheet for providers: https://www.fda.gov/media/307449/download    Fact sheet for patients: https://www.fda.gov/media/770792/download    Test performed by  PCR.          No radiology results from the last 24 hrs    Results for orders placed during the hospital encounter of 03/03/22    Adult Transthoracic Echo Complete W/ Cont if Necessary Per Protocol    Interpretation Summary  · Estimated left ventricular EF = 30%  · Moderate mitral valve regurgitation is present.  · There is calcification of the aortic valve.  · Estimated right ventricular systolic pressure from tricuspid regurgitation is mildly elevated (35-45 mmHg).      I have reviewed the medications:  Scheduled Meds:aspirin, 81 mg, Oral, Nightly  atorvastatin, 80 mg, Oral, Nightly  cetirizine, 5 mg, Oral, Daily  donepezil, 5 mg, Oral, Nightly  erythromycin, 1 application, Both Eyes, Nightly  famotidine, 20 mg, Oral, Daily  ferrous sulfate, 325 mg, Oral, Daily  furosemide, 20 mg, Oral, Q48H  gabapentin, 300 mg, Oral, Nightly  heparin (porcine), 5,000 Units, Subcutaneous, Q12H  levothyroxine, 50 mcg, Oral, Q AM  metoprolol succinate XL, 25 mg, Oral, Daily  nystatin, 5 mL, Swish & Swallow, 4x Daily  ofloxacin, 1 drop, Both Eyes, TID  polyethylene glycol, 17 g, Oral, Daily  rOPINIRole, 0.5 mg, Oral, Nightly  sodium chloride, 10 mL, Intravenous, Q12H      Continuous Infusions:   PRN Meds:.•  acetaminophen **OR** acetaminophen **OR** acetaminophen  •  LORazepam  •  magnesium sulfate **OR** magnesium sulfate in D5W 1g/100mL (PREMIX) **OR** magnesium sulfate  •  meclizine  •  polyvinyl alcohol  •  potassium chloride **OR** potassium chloride **OR** potassium chloride  •  sodium chloride  •  sodium chloride    Assessment/Plan   Assessment & Plan     Active Hospital Problems    Diagnosis  POA   • Elevated serum creatinine [R79.89]  Yes   • Hypothyroid [E03.9]  Yes   • Chronic systolic heart failure (HCC) [I50.22]  Yes   • CARYN (acute kidney injury) (HCC) [N17.9]  Yes   • History of CVA (cerebrovascular accident) [Z86.73]  Not Applicable   • Dementia (HCC) [F03.90]  Yes   • Hypertension [I10]  Yes   • Hyponatremia [E87.1]   Yes      Resolved Hospital Problems   No resolved problems to display.        Brief Hospital Course to date:  Scott Diego is a 89 y.o. female  Who lives with her son in [place] following the recent death of her  in January.  Son states since this time, pt has gone down hill.  Poor po intake.  Unable to take care of self.  Has a lot of anxiety.  Worsening memory.  Son notes she has O2 at night and he checked her sats at home and they were 86 today.  Son brings pt in as he feels he cannot safely take care of her.     CARYN  -resolved, but mild bump in creatinine today, monitor     Hyponatremia  -resolved     Chronic systolic HF  -CXR with mild pulmonary interstitial disease  -ECHO with EF 20-30%  -changed lasix to every other day only  -outpatient follow up with [facility]  -BB     Possible Thrush  -nystatin swish and swallow    Agitation with h/o Dementia  Prolonged QTC  -monitor, limit seroquel/haldol  -prn ativan for agitation    HTN  -metoprolol     Hyperkalemia  -given lokelma again, 3/8  -improved, monitor     Hypothyroidism  -Levothyroxine     Debility  Generalized weakness  Dementia  History of CVA  -PT/OT/fall precautions  -aricept     Bereavement  - passed away January 2022    No family at bedside this AM    DVT prophylaxis:  Medical DVT prophylaxis orders are present.       AM-PAC 6 Clicks Score (PT): 19 (03/08/22 1046)    Disposition: I expect the patient to be discharged to rehab.    CODE STATUS:   Code Status and Medical Interventions:   Ordered at: 03/03/22 0504     Level Of Support Discussed With:    Next of Kin (If No Surrogate)     Code Status (Patient has no pulse and is not breathing):    CPR (Attempt to Resuscitate)     Medical Interventions (Patient has pulse or is breathing):    Full Support       Jan Martins MD  03/09/22

## 2022-03-09 NOTE — PLAN OF CARE
Problem: Adult Inpatient Plan of Care  Goal: Plan of Care Review  Outcome: Ongoing, Progressing  Flowsheets (Taken 3/9/2022 1140)  Plan of Care Reviewed With: patient   Goal Outcome Evaluation:  Plan of Care Reviewed With: patient         SLP evaluation completed. Will address dysphagia in tx. Please see note for further details and recommendations.

## 2022-03-09 NOTE — THERAPY TREATMENT NOTE
Patient Name: Scott Diego  : 4/10/1930    MRN: [id number]                              Today's Date: 3/9/2022       Admit Date: 3/3/2022    Visit Dx:     ICD-10-CM ICD-9-CM   1. CARYN (acute kidney injury) (HCC)  N17.9 584.9   2. Elevated serum creatinine  R79.89 790.99   3. Normocytic anemia  D64.9 285.9   4. Impaired functional mobility, balance, gait, and endurance  Z74.09 V49.89     Patient Active Problem List   Diagnosis   • Idiopathic peripheral neuropathy   • Neck pain   • Mild cognitive impairment   • Anemia   • Hyponatremia   • Hypertension   • Dehydration   • History of CVA (cerebrovascular accident)   • Dementia (HCC)   • Left bundle branch block   • Orthostatic dizziness   • Chronic systolic heart failure (HCC)   • Normocytic anemia   • CARYN (acute kidney injury) (HCC)   • Dyspnea   • Hypoxia   • Hypothyroid   • Pericardial effusion   • Acute respiratory failure with hypoxia (HCC)   • Right lower lobe pneumonia   • Ventricular ectopy   • Elevated serum creatinine     Past Medical History:   Diagnosis Date   • Congestive heart failure (HCC)    • Malignant neoplasm (HCC)    • Stroke (HCC)     mini stroke   • Systolic heart failure (HCC) 2017    Chronic/compensated (EF 25%)     Past Surgical History:   Procedure Laterality Date   • CHOLECYSTECTOMY     • EYE SURGERY     • HYSTERECTOMY        General Information     Row Name 22 1112          Physical Therapy Time and Intention    Document Type therapy note (daily note)  -EJ     Mode of Treatment physical therapy  -EJ     Row Name 22 1112          General Information    Patient Profile Reviewed yes  -EJ     Existing Precautions/Restrictions fall  -EJ     Barriers to Rehab cognitive status;medically complex  -EJ     Row Name 22 1112          Living Environment    People in Home child(chong), adult;grandchild(chong)  -EJ     Row Name 22 1112          Cognition    Orientation Status (Cognition) oriented to;person;disoriented  to;place;situation;time  -EJ     Row Name 03/09/22 1112          Safety Issues, Functional Mobility    Safety Issues Affecting Function (Mobility) awareness of need for assistance;insight into deficits/self-awareness;impulsivity;safety precaution awareness  -EJ     Impairments Affecting Function (Mobility) balance;coordination;visual/perceptual;endurance/activity tolerance;strength;cognition  -EJ     Comment, Safety Issues/Impairments (Mobility) cues for use of cane, safety awareness. Pt reports not feeling well. VSS. RN notified.  -EJ           User Key  (r) = Recorded By, (t) = Taken By, (c) = Cosigned By    Initials Name Provider Type    Kristina Sullivan PT Physical Therapist               Mobility     Row Name 03/09/22 1113          Sit-Stand Transfer    Sit-Stand [place] (Transfers) contact guard  -EJ     Assistive Device (Sit-Stand Transfers) walker, front-wheeled  -EJ     Comment, (Sit-Stand Transfer) Pt attempts to t/f by scooting out toward leg rest. needed cueing to wait for PT to put leg rest down.  -EJ     Row Name 03/09/22 1113          Gait/Stairs (Locomotion)    [place] Level (Gait) minimum assist (75% patient effort);1 person assist;verbal cues  -EJ     Assistive Device (Gait) cane, straight  -EJ     Distance in Feet (Gait) 15+35  -EJ     Bilateral Gait Deviations forward flexed posture;weight shift ability decreased  -EJ     Comment, (Gait/Stairs) Pt limited with progression with use of AD 2/2 not feeling well, limiting ambulation practice.increased time needed for all mobilization.  -EJ           User Key  (r) = Recorded By, (t) = Taken By, (c) = Cosigned By    Initials Name Provider Type    Kristina Sullivan PT Physical Therapist               Obj/Interventions    No documentation.                Goals/Plan    No documentation.                Clinical Impression     Row Name 03/09/22 1118          Pain    Pretreatment Pain Rating 0/10 - no pain  -EJ     Posttreatment Pain  Rating 0/10 - no pain  -EJ     Pain Intervention(s) Repositioned;Ambulation/increased activity  -EJ     Row Name 03/09/22 1118          Plan of Care Review    Plan of Care Reviewed With patient  -EJ     Progress no change  -EJ     Outcome Evaluation Pt's progress limited by not feeling well this date. Ambulates in room only, with 1 sitting rest. RN notified pt reports not feeling well. VSS.  -EJ     Row Name 03/09/22 1118          Vital Signs    Pretreatment Heart Rate (beats/min) 87  -EJ     Intratreatment Heart Rate (beats/min) 91  -EJ     Posttreatment Heart Rate (beats/min) 92  -EJ     Row Name 03/09/22 1118          Positioning and Restraints    Pre-Treatment Position sitting in chair/recliner  -EJ     Post Treatment Position chair  -EJ     In Chair notified nsg;reclined;call light within reach;encouraged to call for assist;exit alarm on;waffle cushion  -EJ           User Key  (r) = Recorded By, (t) = Taken By, (c) = Cosigned By    Initials Name Provider Type    Kristina Sullivan PT Physical Therapist               Outcome Measures     Row Name 03/09/22 1121          How much help from another person do you currently need...    Turning from your back to your side while in flat bed without using bedrails? 4  -EJ     Moving from lying on back to sitting on the side of a flat bed without bedrails? 4  -EJ     Moving to and from a bed to a chair (including a wheelchair)? 3  -EJ     Standing up from a chair using your arms (e.g., wheelchair, bedside chair)? 3  -EJ     Climbing 3-5 steps with a railing? 3  -EJ     To walk in hospital room? 3  -EJ     AM-PAC 6 Clicks Score (PT) 20  -EJ     Row Name 03/09/22 1121          Functional Assessment    Outcome Measure Options AM-PAC 6 Clicks Basic Mobility (PT)  -EJ           User Key  (r) = Recorded By, (t) = Taken By, (c) = Cosigned By    Initials Name Provider Type    Kristina Sullivan PT Physical Therapist                             Physical Therapy Education                  Title: PT OT SLP Therapies (In Progress)     Topic: Physical Therapy (In Progress)     Point: Mobility training (In Progress)     Learning Progress Summary           Patient Acceptance, E, NR by EJ at 3/9/2022 1122    Acceptance, E, NR by EJ at 3/8/2022 1047    Acceptance, E, NR by AS at 3/7/2022 0942    Acceptance, E,TB, NR by EDMOND at 3/5/2022 0806    Eager, E, NR by  at 3/4/2022 1149                   Point: Home exercise program (In Progress)     Learning Progress Summary           Patient Acceptance, E, NR by EJ at 3/9/2022 1122    Acceptance, E, NR by EJ at 3/8/2022 1047    Acceptance, E, NR by AS at 3/7/2022 0942    Acceptance, E,TB, NR by EDMOND at 3/5/2022 0806                   Point: Body mechanics (In Progress)     Learning Progress Summary           Patient Acceptance, E, NR by EJ at 3/9/2022 1122    Acceptance, E, NR by EJ at 3/8/2022 1047    Acceptance, E, NR by AS at 3/7/2022 0942    Acceptance, E,TB, NR by EDMOND at 3/5/2022 0806    Eager, E, NR by  at 3/4/2022 1149                   Point: Precautions (In Progress)     Learning Progress Summary           Patient Acceptance, E, NR by HOSEA at 3/9/2022 1122    Acceptance, E, NR by EJ at 3/8/2022 1047    Acceptance, E, NR by AS at 3/7/2022 0942    Acceptance, E,TB, NR by EDMOND at 3/5/2022 0806    Eager, E, NR by  at 3/4/2022 1149                               User Key     Initials Effective Dates Name Provider Type Discipline    EJ 06/16/21 -  Kristina Brady PT Physical Therapist PT    SJ 06/16/21 -  Priya Monzon PT Physical Therapist PT    AS 06/16/21 -  Jessie Fleming, PTA Physical Therapy Assistant PT     12/29/21 -  Hannah Anderson RN Registered Nurse Nurse              PT Recommendation and Plan     Plan of Care Reviewed With: patient  Progress: no change  Outcome Evaluation: Pt's progress limited by not feeling well this date. Ambulates in room only, with 1 sitting rest. RN notified pt reports not feeling well. VSS.     Time  Calculation:    PT Charges     Row Name 03/09/22 1122             Time Calculation    Start Time 1006  -EJ      PT Received On 03/09/22  -EJ      PT Goal Re-Cert Due Date 03/19/22  -EJ              Time Calculation- PT    Total Timed Code Minutes- PT 16 minute(s)  -EJ              Timed Charges    61624 - Gait Training Minutes  11  -EJ      36950 - PT Therapeutic Activity Minutes 5  -EJ              Total Minutes    Timed Charges Total Minutes 16  -EJ       Total Minutes 16  -EJ            User Key  (r) = Recorded By, (t) = Taken By, (c) = Cosigned By    Initials Name Provider Type    EJ Kristina Brady, PT Physical Therapist              Therapy Charges for Today     Code Description Service Date Service Provider Modifiers Qty    40584517751 HC PT THER PROC EA 15 MIN 3/8/2022 Kristina Brady, PT GP 1    79206303640 HC GAIT TRAINING EA 15 MIN 3/8/2022 Kristina Brady, PT GP 1    92202847486 HC GAIT TRAINING EA 15 MIN 3/9/2022 Kristina Brady, PT GP 1          PT G-Codes  Outcome Measure Options: AM-PAC 6 Clicks Basic Mobility (PT)  AM-PAC 6 Clicks Score (PT): 20  AM-PAC 6 Clicks Score (OT): 19    Kristina Simmons PT  3/9/2022

## 2022-03-09 NOTE — THERAPY EVALUATION
Acute Care - Speech Language Pathology   Swallow Initial Evaluation [facility]   Clinical Swallow Evaluation       Patient Name: Scott Diego  : 4/10/1930  MRN: [id number]  Today's Date: 3/9/2022               Admit Date: 3/3/2022    Visit Dx:     ICD-10-CM ICD-9-CM   1. CARYN (acute kidney injury) (HCC)  N17.9 584.9   2. Elevated serum creatinine  R79.89 790.99   3. Normocytic anemia  D64.9 285.9   4. Impaired functional mobility, balance, gait, and endurance  Z74.09 V49.89   5. Oral phase dysphagia  R13.11 787.21     Patient Active Problem List   Diagnosis   • Idiopathic peripheral neuropathy   • Neck pain   • Mild cognitive impairment   • Anemia   • Hyponatremia   • Hypertension   • Dehydration   • History of CVA (cerebrovascular accident)   • Dementia (HCC)   • Left bundle branch block   • Orthostatic dizziness   • Chronic systolic heart failure (HCC)   • Normocytic anemia   • CARYN (acute kidney injury) (HCC)   • Dyspnea   • Hypoxia   • Hypothyroid   • Pericardial effusion   • Acute respiratory failure with hypoxia (HCC)   • Right lower lobe pneumonia   • Ventricular ectopy   • Elevated serum creatinine     Past Medical History:   Diagnosis Date   • Congestive heart failure (HCC)    • Malignant neoplasm (HCC)    • Stroke (HCC)     mini stroke   • Systolic heart failure (HCC) 2017    Chronic/compensated (EF 25%)     Past Surgical History:   Procedure Laterality Date   • CHOLECYSTECTOMY     • EYE SURGERY     • HYSTERECTOMY         SLP Recommendation and Plan  SLP Swallowing Diagnosis: oral dysphagia (22)  SLP Diet Recommendation: mechanical soft with no mixed consistencies, thin liquids (22)  Recommended Precautions and Strategies: upright posture during/after eating, alternate between small bites of food and sips of liquid (22)  SLP Rec. for Method of Medication Administration: as tolerated (22)     Monitor for Signs of Aspiration: yes, notify SLP if any  "concerns (03/09/22 0900)     Swallow Criteria for Skilled Therapeutic Interventions Met: demonstrates skilled criteria (03/09/22 0900)  Anticipated Discharge Disposition (SLP): unknown, anticipate therapy at next level of care (03/09/22 0900)  Rehab Potential/Prognosis, Swallowing: good, to achieve stated therapy goals (03/09/22 0900)  Therapy Frequency (Swallow): 5 days per week (03/09/22 0900)  Predicted Duration Therapy Intervention (Days): until discharge (03/09/22 0900)                                  Plan of Care Reviewed With: patient      SWALLOW EVALUATION (last 72 hours)     SLP Adult Swallow Evaluation     Row Name 03/09/22 0900                   Rehab Evaluation    Document Type evaluation  -DV        Subjective Information no complaints  -DV        Patient Observations alert;cooperative  -DV        Patient/Family/Caregiver Comments/Observations no family present  -DV        Patient Effort good  -DV        Symptoms Noted During/After Treatment none  -DV                  General Information    Patient Profile Reviewed yes  -DV        Pertinent History Of Current Problem CARYN, CHF, generalized weakness, poor PO intake, functional decline. SLP consulted 2' \"pt choked w/ mes\" per RN.  -DV        Current Method of Nutrition other (see comments)  GI soft solids, thin liquids  -DV        Precautions/Limitations, Vision WFL with corrective lenses;for purposes of eval  -DV        Precautions/Limitations, Hearing WFL;for purposes of eval  -DV        Prior Level of Function-Communication unknown  -DV        Prior Level of Function-Swallowing other (see comments)  pt endorses difficulty masticating  -DV        Plans/Goals Discussed with patient;agreed upon  -DV        Barriers to Rehab cognitive status;previous functional deficit  -DV        Patient's Goals for Discharge patient did not state  -DV                  Pain    Additional Documentation Pain Scale: Numbers Pre/Post-Treatment (Group)  -DV                  " Pain Scale: Numbers Pre/Post-Treatment    Pretreatment Pain Rating 0/10 - no pain  -DV        Posttreatment Pain Rating 0/10 - no pain  -DV                  Oral Motor Structure and Function    Dentition Assessment upper dentures/partial in place;lower dentures/partial in place  -DV        Secretion Management WNL/WFL  -DV        Mucosal Quality moist, healthy  -DV        Volitional Cough weak  -DV                  Oral Musculature and Cranial Nerve Assessment    Oral Motor General Assessment generalized oral motor weakness  -DV                  General Eating/Swallowing Observations    Respiratory Support Currently in Use room air  -DV        Eating/Swallowing Skills self-fed  -DV        Positioning During Eating upright 90 degree;upright in chair  -DV        Utensils Used spoon;cup  -DV        Consistencies Trialed soft textures;thin liquids  -DV                  Respiratory    Respiratory Status WFL  -DV                  Clinical Swallow Eval    Oral Prep Phase impaired  -DV        Oral Residue impaired  -DV        Pharyngeal Phase no overt signs/symptoms of pharyngeal impairment  -DV        Clinical Swallow Evaluation Summary CSE completed this am. Pt upright in chair w/ breakfast tray. No overt s/sx aspiration with any solids or liquids. Pt did however exhibit prolonged mastication and difficulty w/ oral clearance w/ solids. Pt endorses difficulty masticating at baseline and also reported her dentures are ill-fitting. Recommend mechanical soft textures at this time w/ SLP to follow up to monitor for diet tolerance and assess need for further adjustment.  -DV                  Oral Prep Concerns    Oral Prep Concerns prolonged mastication  -DV                  Oral Residue Concerns    Oral Residue Concerns diffuse residue throughout oral cavity  -DV        Diffuse Residue Throughout Oral Cavity regular consistencies  -DV                  SLP Evaluation Clinical Impression    SLP Swallowing Diagnosis oral  dysphagia  -DV        Functional Impact risk of aspiration/pneumonia  -DV        Rehab Potential/Prognosis, Swallowing good, to achieve stated therapy goals  -DV        Swallow Criteria for Skilled Therapeutic Interventions Met demonstrates skilled criteria  -DV                  Recommendations    Therapy Frequency (Swallow) 5 days per week  -DV        Predicted Duration Therapy Intervention (Days) until discharge  -DV        SLP Diet Recommendation mechanical soft with no mixed consistencies;thin liquids  -DV        Recommended Precautions and Strategies upright posture during/after eating;alternate between small bites of food and sips of liquid  -DV        Oral Care Recommendations Oral Care BID/PRN  -DV        SLP Rec. for Method of Medication Administration as tolerated  -DV        Monitor for Signs of Aspiration yes;notify SLP if any concerns  -DV        Anticipated Discharge Disposition (SLP) unknown;anticipate therapy at next level of care  -DV              User Key  (r) = Recorded By, (t) = Taken By, (c) = Cosigned By    Initials Name Effective Dates    DV Julissa Stephens, MS CCC-SLP 06/16/21 -                 EDUCATION  The patient has been educated in the following areas:   Dysphagia (Swallowing Impairment) Modified Diet Instruction.        SLP GOALS     Row Name 03/09/22 0900             Oral Nutrition/Hydration Goal 1 (SLP)    Oral Nutrition/Hydration Goal 1, SLP LTG: Pt will demonstrate functional swallow for least restrictive diet.  -DV      Time Frame (Oral Nutrition/Hydration Goal 1, SLP) by discharge  -DV              Oral Nutrition/Hydration Goal 2 (SLP)    Oral Nutrition/Hydration Goal 2, SLP Pt will tolerate mechanical soft diet with thin liquids with no s/sx aspiration and adequate oral clearance with 100% accuracy.  -DV      Time Frame (Oral Nutrition/Hydration Goal 2, SLP) short term goal (STG)  -DV              Lingual Strengthening Goal 1 (SLP)    Activity (Lingual Strengthening Goal 1, SLP)  increase lingual tone/sensation/control/coordination/movement  -DV      Increase Lingual Tone/Sensation/Control/Coordination/Movement lingual movement exercises  -DV      [place]/Accuracy (Lingual Strengthening Goal 1, SLP) with minimal cues (75-90% accuracy)  -DV            User Key  (r) = Recorded By, (t) = Taken By, (c) = Cosigned By    Initials Name Provider Type    Julissa Mukherjee MS CCC-SLP Speech and Language Pathologist                   Time Calculation:    Time Calculation- SLP     Row Name 03/09/22 1142             Time Calculation- SLP    SLP Start Time 0900  -DV      SLP Received On 03/09/22  -DV              Untimed Charges    SLP Eval/Re-eval  ST Eval Oral Pharyng Swallow - 43020  -DV      [id number] Eval Oral Pharyng Swallow Minutes 45  -DV              Total Minutes    Untimed Charges Total Minutes 45  -DV       Total Minutes 45  -DV            User Key  (r) = Recorded By, (t) = Taken By, (c) = Cosigned By    Initials Name Provider Type    Julissa Mukherjee MS CCC-SLP Speech and Language Pathologist                Therapy Charges for Today     Code Description Service Date Service Provider Modifiers Qty    [id number]  ST EVAL ORAL PHARYNG SWALLOW 3 3/9/2022 Julissa Stephens MS CCC-SLP GN 1               Julissa Stephens MS CCC-[place]  3/9/2022

## 2022-03-10 LAB
ANION GAP SERPL CALCULATED.3IONS-SCNC: 12 MMOL/L (ref 5–15)
BUN SERPL-MCNC: 25 MG/DL (ref 8–23)
BUN/CREAT SERPL: 15.4 (ref 7–25)
CALCIUM SPEC-SCNC: 9.6 MG/DL (ref 8.2–9.6)
CHLORIDE SERPL-SCNC: 99 MMOL/L (ref 98–107)
CO2 SERPL-SCNC: 27 MMOL/L (ref 22–29)
CREAT SERPL-MCNC: 1.62 MG/DL (ref 0.57–1)
EGFRCR SERPLBLD CKD-EPI 2021: 29.9 ML/MIN/1.73
GLUCOSE SERPL-MCNC: 101 MG/DL (ref 65–99)
POTASSIUM SERPL-SCNC: 4.5 MMOL/L (ref 3.5–5.2)
SODIUM SERPL-SCNC: 138 MMOL/L (ref 136–145)

## 2022-03-10 PROCEDURE — 25010000002 HEPARIN (PORCINE) PER 1000 UNITS: Performed by: NURSE PRACTITIONER

## 2022-03-10 PROCEDURE — 97530 THERAPEUTIC ACTIVITIES: CPT

## 2022-03-10 PROCEDURE — 25010000002 LORAZEPAM PER 2 MG: Performed by: INTERNAL MEDICINE

## 2022-03-10 PROCEDURE — 97535 SELF CARE MNGMENT TRAINING: CPT

## 2022-03-10 PROCEDURE — 80048 BASIC METABOLIC PNL TOTAL CA: CPT | Performed by: HOSPITALIST

## 2022-03-10 PROCEDURE — 92526 ORAL FUNCTION THERAPY: CPT

## 2022-03-10 PROCEDURE — 99232 SBSQ HOSP IP/OBS MODERATE 35: CPT | Performed by: NURSE PRACTITIONER

## 2022-03-10 RX ORDER — SODIUM CHLORIDE 9 MG/ML
75 INJECTION, SOLUTION INTRAVENOUS CONTINUOUS
Status: ACTIVE | OUTPATIENT
Start: 2022-03-10 | End: 2022-03-11

## 2022-03-10 RX ADMIN — POLYETHYLENE GLYCOL 3350 17 G: 17 POWDER, FOR SOLUTION ORAL at 09:00

## 2022-03-10 RX ADMIN — Medication 325 MG: at 08:59

## 2022-03-10 RX ADMIN — OFLOXACIN 1 DROP: 3 SOLUTION/ DROPS OPHTHALMIC at 15:38

## 2022-03-10 RX ADMIN — HEPARIN SODIUM 5000 UNITS: 5000 INJECTION, SOLUTION INTRAVENOUS; SUBCUTANEOUS at 08:59

## 2022-03-10 RX ADMIN — ACETAMINOPHEN 650 MG: 325 TABLET ORAL at 19:50

## 2022-03-10 RX ADMIN — NYSTATIN 500000 UNITS: 100000 SUSPENSION ORAL at 12:01

## 2022-03-10 RX ADMIN — Medication 10 ML: at 12:01

## 2022-03-10 RX ADMIN — METOPROLOL SUCCINATE 25 MG: 25 TABLET, EXTENDED RELEASE ORAL at 09:00

## 2022-03-10 RX ADMIN — ATORVASTATIN CALCIUM 80 MG: 40 TABLET, FILM COATED ORAL at 19:51

## 2022-03-10 RX ADMIN — NYSTATIN 500000 UNITS: 100000 SUSPENSION ORAL at 19:11

## 2022-03-10 RX ADMIN — LEVOTHYROXINE SODIUM 50 MCG: 50 TABLET ORAL at 05:13

## 2022-03-10 RX ADMIN — ROPINIROLE HYDROCHLORIDE 0.5 MG: 0.5 TABLET, FILM COATED ORAL at 19:51

## 2022-03-10 RX ADMIN — ASPIRIN 81 MG 81 MG: 81 TABLET ORAL at 19:52

## 2022-03-10 RX ADMIN — ERYTHROMYCIN 1 APPLICATION: 5 OINTMENT OPHTHALMIC at 19:52

## 2022-03-10 RX ADMIN — OFLOXACIN 1 DROP: 3 SOLUTION/ DROPS OPHTHALMIC at 08:59

## 2022-03-10 RX ADMIN — LORAZEPAM 1 MG: 2 INJECTION INTRAMUSCULAR; INTRAVENOUS at 19:20

## 2022-03-10 RX ADMIN — CETIRIZINE HYDROCHLORIDE 5 MG: 10 TABLET, FILM COATED ORAL at 08:59

## 2022-03-10 RX ADMIN — NYSTATIN 500000 UNITS: 100000 SUSPENSION ORAL at 09:00

## 2022-03-10 RX ADMIN — OFLOXACIN 1 DROP: 3 SOLUTION/ DROPS OPHTHALMIC at 19:53

## 2022-03-10 RX ADMIN — DONEPEZIL HYDROCHLORIDE 5 MG: 5 TABLET, FILM COATED ORAL at 19:51

## 2022-03-10 RX ADMIN — SODIUM CHLORIDE 75 ML/HR: 9 INJECTION, SOLUTION INTRAVENOUS at 15:38

## 2022-03-10 RX ADMIN — GABAPENTIN 300 MG: 300 CAPSULE ORAL at 19:50

## 2022-03-10 RX ADMIN — FAMOTIDINE 20 MG: 20 TABLET, FILM COATED ORAL at 08:59

## 2022-03-10 RX ADMIN — Medication 10 ML: at 19:53

## 2022-03-10 RX ADMIN — HEPARIN SODIUM 5000 UNITS: 5000 INJECTION, SOLUTION INTRAVENOUS; SUBCUTANEOUS at 19:52

## 2022-03-10 NOTE — THERAPY DISCHARGE NOTE
Acute Care - Speech Language Pathology   Swallow Treatment Note/Discharge  [place]     Patient Name: Scott Diego  : 4/10/1930  MRN: [id number]  Today's Date: 3/10/2022               Admit Date: 3/3/2022    Visit Dx:    ICD-10-CM ICD-9-CM   1. CARYN (acute kidney injury) (HCC)  N17.9 584.9   2. Elevated serum creatinine  R79.89 790.99   3. Normocytic anemia  D64.9 285.9   4. Impaired functional mobility, balance, gait, and endurance  Z74.09 V49.89   5. Oral phase dysphagia  R13.11 787.21     Patient Active Problem List   Diagnosis   • Idiopathic peripheral neuropathy   • Neck pain   • Mild cognitive impairment   • Anemia   • Hyponatremia   • Hypertension   • Dehydration   • History of CVA (cerebrovascular accident)   • Dementia (HCC)   • Left bundle branch block   • Orthostatic dizziness   • Chronic systolic heart failure (HCC)   • Normocytic anemia   • CARYN (acute kidney injury) (HCC)   • Dyspnea   • Hypoxia   • Hypothyroid   • Pericardial effusion   • Acute respiratory failure with hypoxia (HCC)   • Right lower lobe pneumonia   • Ventricular ectopy   • Elevated serum creatinine     Past Medical History:   Diagnosis Date   • Congestive heart failure (HCC)    • Malignant neoplasm (HCC)    • Stroke (HCC)     mini stroke   • Systolic heart failure (HCC) 2017    Chronic/compensated (EF 25%)     Past Surgical History:   Procedure Laterality Date   • CHOLECYSTECTOMY     • EYE SURGERY     • HYSTERECTOMY         SLP Recommendation and Plan                                                                Plan of Care Reviewed With: patient, family (03/10/22 1328)  Progress: improving (03/10/22 1328)    SWALLOW EVALUATION (last 72 hours)     SLP Adult Swallow Evaluation     Row Name 03/10/22 1300 22 0900                Rehab Evaluation    Document Type discharge treatment  -AW evaluation  -DV       Subjective Information no complaints  -AW no complaints  -DV       Patient Observations alert;cooperative  -AW  "alert;cooperative  -DV       Patient/Family/Caregiver Comments/Observations son at bedside  -AW no family present  -DV       Patient Effort good  -AW good  -DV       Symptoms Noted During/After Treatment none  -AW none  -DV                General Information    Patient Profile Reviewed -- yes  -DV       Pertinent History Of Current Problem -- CARYN, CHF, generalized weakness, poor PO intake, functional decline. SLP consulted 2' \"pt choked w/ mes\" per RN.  -DV       Current Method of Nutrition -- other (see comments)  GI soft solids, thin liquids  -DV       Precautions/Limitations, Vision -- WFL with corrective lenses;for purposes of eval  -DV       Precautions/Limitations, Hearing -- WFL;for purposes of eval  -DV       Prior Level of Function-Communication -- unknown  -DV       Prior Level of Function-Swallowing -- other (see comments)  pt endorses difficulty masticating  -DV       Plans/Goals Discussed with -- patient;agreed upon  -DV       Barriers to Rehab -- cognitive status;previous functional deficit  -DV       Patient's Goals for Discharge -- patient did not state  -DV                Pain    Additional Documentation -- Pain Scale: Numbers Pre/Post-Treatment (Group)  -DV                Pain Scale: Numbers Pre/Post-Treatment    Pretreatment Pain Rating -- 0/10 - no pain  -DV       Posttreatment Pain Rating -- 0/10 - no pain  -DV                Oral Motor Structure and Function    Dentition Assessment -- upper dentures/partial in place;lower dentures/partial in place  -DV       Secretion Management -- WNL/WFL  -DV       Mucosal Quality -- moist, healthy  -DV       Volitional Cough -- weak  -DV                Oral Musculature and Cranial Nerve Assessment    Oral Motor General Assessment -- generalized oral motor weakness  -DV                General Eating/Swallowing Observations    Respiratory Support Currently in Use -- room air  -DV       Eating/Swallowing Skills -- self-fed  -DV       Positioning During Eating " -- upright 90 degree;upright in chair  -DV       Utensils Used -- spoon;cup  -DV       Consistencies Trialed -- soft textures;thin liquids  -DV                Respiratory    Respiratory Status -- WFL  -DV                Clinical Swallow Eval    Oral Prep Phase -- impaired  -DV       Oral Residue -- impaired  -DV       Pharyngeal Phase -- no overt signs/symptoms of pharyngeal impairment  -DV       Clinical Swallow Evaluation Summary -- CSE completed this am. Pt upright in chair w/ breakfast tray. No overt s/sx aspiration with any solids or liquids. Pt did however exhibit prolonged mastication and difficulty w/ oral clearance w/ solids. Pt endorses difficulty masticating at baseline and also reported her dentures are ill-fitting. Recommend mechanical soft textures at this time w/ SLP to follow up to monitor for diet tolerance and assess need for further adjustment.  -DV                Oral Prep Concerns    Oral Prep Concerns -- prolonged mastication  -DV                Oral Residue Concerns    Oral Residue Concerns -- diffuse residue throughout oral cavity  -DV       Diffuse Residue Throughout Oral Cavity -- regular consistencies  -DV                SLP Evaluation Clinical Impression    SLP Swallowing Diagnosis -- oral dysphagia  -DV       Functional Impact -- risk of aspiration/pneumonia  -DV       Rehab Potential/Prognosis, Swallowing -- good, to achieve stated therapy goals  -DV       Swallow Criteria for Skilled Therapeutic Interventions Met -- demonstrates skilled criteria  -DV                Recommendations    Therapy Frequency (Swallow) -- 5 days per week  -DV       Predicted Duration Therapy Intervention (Days) -- until discharge  -DV       SLP Diet Recommendation -- mechanical soft with no mixed consistencies;thin liquids  -DV       Recommended Precautions and Strategies -- upright posture during/after eating;alternate between small bites of food and sips of liquid  -DV       Oral Care Recommendations -- Oral  Care BID/PRN  -DV       SLP Rec. for Method of Medication Administration -- as tolerated  -DV       Monitor for Signs of Aspiration -- yes;notify SLP if any concerns  -DV       Anticipated Discharge Disposition (SLP) -- unknown;anticipate therapy at next level of care  -DV             User Key  (r) = Recorded By, (t) = Taken By, (c) = Cosigned By    Initials Name Effective Dates    AW Jessie Laboy, MS CCC-SLP 06/16/21 -     DV Kat Julissa, MS CCC-SLP 06/16/21 -                 EDUCATION  The patient has been educated in the following areas:   Dysphagia (Swallowing Impairment).         SLP GOALS     Row Name 03/10/22 1300 03/09/22 0900          Oral Nutrition/Hydration Goal 1 (SLP)    Oral Nutrition/Hydration Goal 1, SLP -- LTG: Pt will demonstrate functional swallow for least restrictive diet.  -DV     Time Frame (Oral Nutrition/Hydration Goal 1, SLP) -- by discharge  -DV     Progress/Outcomes (Oral Nutrition/Hydration Goal 1, SLP) goal met  -AW --            Oral Nutrition/Hydration Goal 2 (SLP)    Oral Nutrition/Hydration Goal 2, SLP -- Pt will tolerate mechanical soft diet with thin liquids with no s/sx aspiration and adequate oral clearance with 100% accuracy.  -DV     Time Frame (Oral Nutrition/Hydration Goal 2, SLP) -- short term goal (STG)  -DV     Progress/Outcomes (Oral Nutrition/Hydration Goal 2, SLP) goal met  no further issues - pt does not like mech soft and wants regular  -AW --            Lingual Strengthening Goal 1 (SLP)    Activity (Lingual Strengthening Goal 1, SLP) -- increase lingual tone/sensation/control/coordination/movement  -DV     Increase Lingual Tone/Sensation/Control/Coordination/Movement -- lingual movement exercises  -DV     [place]/Accuracy (Lingual Strengthening Goal 1, SLP) -- with minimal cues (75-90% accuracy)  -DV     Progress/Outcomes (Lingual Strengthening Goal 1, SLP) goal revised this date;goal no longer appropriate  -AW --           User Key  (r) = Recorded By, (t)  = Taken By, (c) = Cosigned By    Initials Name Provider Type    Jessie Pearce MS CCC-SLP Speech and Language Pathologist    Julissa Mukherjee MS CCC-SLP Speech and Language Pathologist                     Time Calculation:    Time Calculation- SLP     Row Name 03/10/22 1329             Time Calculation- SLP    SLP Start Time 1300  -AW      SLP Stop Time 1330  -AW      SLP Time Calculation (min) 30 min  -AW      SLP Received On 03/10/22  -            User Key  (r) = Recorded By, (t) = Taken By, (c) = Cosigned By    Initials Name Provider Type    DENIS Jessie Laboy MS CCC-SLP Speech and Language Pathologist                Therapy Charges for Today     Code Description Service Date Service Provider Modifiers Qty    [id number] HC ST TREATMENT SWALLOW 2 3/10/2022 Jessie Laboy MS CCC-SLP GN 1            Patient was not wearing a face mask and did not exhibit coughing during this therapy encounter.  Procedure performed was not aerosolizing, did not involve close contact (within 6 feet for at least 15 minutes or longer), and did not involve contact with infectious secretions or specimens.  Therapist used appropriate personal protective equipment including gloves, standard procedure mask and eye protection.  Appropriate PPE was worn during the entire therapy session.  Hand hygiene was completed before and after therapy session.            MS NOY Woodson  3/10/2022

## 2022-03-10 NOTE — THERAPY TREATMENT NOTE
Patient Name: Scott Diego  : 4/10/1930    MRN: [id number]                              Today's Date: 3/10/2022       Admit Date: 3/3/2022    Visit Dx:     ICD-10-CM ICD-9-CM   1. CARYN (acute kidney injury) (HCC)  N17.9 584.9   2. Elevated serum creatinine  R79.89 790.99   3. Normocytic anemia  D64.9 285.9   4. Impaired functional mobility, balance, gait, and endurance  Z74.09 V49.89   5. Oral phase dysphagia  R13.11 787.21     Patient Active Problem List   Diagnosis   • Idiopathic peripheral neuropathy   • Neck pain   • Mild cognitive impairment   • Anemia   • Hyponatremia   • Hypertension   • Dehydration   • History of CVA (cerebrovascular accident)   • Dementia (HCC)   • Left bundle branch block   • Orthostatic dizziness   • Chronic systolic heart failure (HCC)   • Normocytic anemia   • CARYN (acute kidney injury) (HCC)   • Dyspnea   • Hypoxia   • Hypothyroid   • Pericardial effusion   • Acute respiratory failure with hypoxia (HCC)   • Right lower lobe pneumonia   • Ventricular ectopy   • Elevated serum creatinine     Past Medical History:   Diagnosis Date   • Congestive heart failure (HCC)    • Malignant neoplasm (HCC)    • Stroke (HCC)     mini stroke   • Systolic heart failure (HCC) 2017    Chronic/compensated (EF 25%)     Past Surgical History:   Procedure Laterality Date   • CHOLECYSTECTOMY     • EYE SURGERY     • HYSTERECTOMY        General Information     Row Name 03/10/22 1408          OT Time and Intention    Document Type therapy note (daily note)  -KF     Mode of Treatment occupational therapy  -KF     Row Name 03/10/22 1408          General Information    Patient Profile Reviewed yes  -KF     Existing Precautions/Restrictions fall  confusion  -KF     Barriers to Rehab cognitive status;previous functional deficit;medically complex  -KF     Row Name 03/10/22 1406          Cognition    Orientation Status (Cognition) oriented to;person;disoriented to;place;situation;time  -KF     Row Name 03/10/22  1408          Safety Issues, Functional Mobility    Safety Issues Affecting Function (Mobility) awareness of need for assistance;insight into deficits/self-awareness;safety precaution awareness  -KF     Impairments Affecting Function (Mobility) balance;coordination;visual/perceptual;endurance/activity tolerance;strength;cognition  -KF     Cognitive Impairments, Mobility Safety/Performance attention;awareness, need for assistance;insight into deficits/self-awareness;judgment;problem-solving/reasoning;safety precaution awareness;sequencing abilities  -KF           User Key  (r) = Recorded By, (t) = Taken By, (c) = Cosigned By    Initials Name Provider Type    KF Theresa Rader OT Occupational Therapist                 Mobility/ADL's     Row Name 03/10/22 1409          Bed Mobility    Comment, (Bed Mobility) [facility] upon arrival  -KF     Row Name 03/10/22 1409          Transfers    Transfers stand-sit transfer;bed-chair transfer;chair-bed transfer;toilet transfer  -KF     Bed-Chair [place] (Transfers) verbal cues;contact guard  -KF     Chair-Bed [place] (Transfers) contact guard;verbal cues  -KF     Sit-Stand [place] (Transfers) contact guard;verbal cues  -KF     Stand-Sit [place] (Transfers) contact guard;1 person assist;verbal cues  -KF     [place] Level (Toilet Transfer) contact guard;1 person assist  -KF     Assistive Device (Toilet Transfer) commode, bedside without drop arms  -KF     Row Name 03/10/22 1409          Bed-Chair Transfer    Comment, (Bed-Chair Transfer) throughout trial of HHA and gait belt only with no LOB  -KF     Row Name 03/10/22 1409          Sit-Stand Transfer    Comment, (Sit-Stand Transfer) HHA  -KF     Row Name 03/10/22 1409          Toilet Transfer    Type (Toilet Transfer) stand pivot/stand step  -KF     Comment, (Toilet Transfer) HHA to [facility] due to urgency  -KF     Row Name 03/10/22 1409          Functional Mobility    Functional Mobility- Ind. Level contact guard  assist;verbal cues required  -KF     Functional Mobility- Device --  HHA  -KF     Functional Mobility-Distance (Feet) 30  -KF     Functional Mobility- Safety Issues weight-shifting ability decreased;sequencing ability decreased  -KF     Functional Mobility- Comment due to COG trial HHA to progress standing tolerance and balance  -KF     Row Name 03/10/22 1409          Activities of Daily Living    BADL Assessment/Intervention grooming;toileting  -KF     Row Name 03/10/22 1409          Grooming Assessment/Training    [place] Level (Grooming) hair care, combing/brushing;wash face, hands;contact guard assist  -KF     Position (Grooming) sink side;unsupported standing  -KF     Row Name 03/10/22 1409          Toileting Assessment/Training    [place] Level (Toileting) adjust/manage clothing;perform perineal hygiene;contact guard assist;verbal cues  -KF     Assistive Devices (Toileting) commode, bedside without drop arms  -KF     Position (Toileting) supported standing;supported sitting  -KF           User Key  (r) = Recorded By, (t) = Taken By, (c) = Cosigned By    Initials Name Provider Type    Theresa Solares OT Occupational Therapist               Obj/Interventions     Row Name 03/10/22 1414          Balance    Balance Assessment sitting static balance;sitting dynamic balance;standing static balance;standing dynamic balance  -KF     Static Sitting Balance supervision  -KF     Dynamic Sitting Balance supervision  -KF     Position, Sitting Balance unsupported  -KF     Dynamic Standing Balance contact guard  -KF     Position/Device Used, Standing Balance supported  HHA  -KF     Balance Interventions standing;sit to stand;minimal challenge;occupation based/functional task;weight shifting activity  -KF     Comment, Balance no LOB, intermittent HHA provided for balance in standing  -KF           User Key  (r) = Recorded By, (t) = Taken By, (c) = Cosigned By    Initials Name Provider Type    HERNAN Rader  Theresa PERES, OT Occupational Therapist               Goals/Plan    No documentation.                Clinical Impression     Row Name 03/10/22 1415          Pain Assessment    Pretreatment Pain Rating 0/10 - no pain  -KF     Posttreatment Pain Rating 0/10 - no pain  -KF     Pre/Posttreatment Pain Comment tolerated  -KF     Pain Intervention(s) Repositioned;Ambulation/increased activity  -KF     Row Name 03/10/22 1415          Plan of Care Review    Plan of Care Reviewed With patient  -KF     Progress improving  -KF     Outcome Evaluation Pt completed 30ft HHA, CGA STS, completed grooming standing sink side with SBA, [place] [facility] cont IPOT per POC  -KF     Row Name 03/10/22 1415          Therapy Assessment/Plan (OT)    Rehab Potential (OT) good, to achieve stated therapy goals  -KF     Criteria for Skilled Therapeutic Interventions Met (OT) yes;meets criteria;skilled treatment is necessary  -KF     Therapy Frequency (OT) daily  -KF     Row Name 03/10/22 1415          Therapy Plan Review/Discharge Plan (OT)    Anticipated Discharge Disposition (OT) skilled nursing facility  -KF     Row Name 03/10/22 1415          Vital Signs    Pre Systolic BP Rehab --  RN cleared VSS  -KF     O2 Delivery Pre Treatment room air  -KF     Pre Patient Position Sitting  -KF     Intra Patient Position Standing  -KF     Post Patient Position Sitting  -KF     Rest Breaks  1  -KF     Row Name 03/10/22 1415          Positioning and Restraints    Pre-Treatment Position sitting in chair/recliner  -KF     Post Treatment Position chair  -KF     In Chair notified nsg;reclined;sitting;call light within reach;encouraged to call for assist;exit alarm on;waffle cushion;legs elevated;patient within staff view  -KF           User Key  (r) = Recorded By, (t) = Taken By, (c) = Cosigned By    Initials Name Provider Type    KF Theresa Rader, OT Occupational Therapist               Outcome Measures     Row Name 03/10/22 1419          How much help  from another is currently needed...    Putting on and taking off regular lower body clothing? 3  -KF     Bathing (including washing, rinsing, and drying) 3  -KF     Toileting (which includes using toilet bed pan or urinal) 3  -KF     Putting on and taking off regular upper body clothing 3  -KF     Taking care of personal grooming (such as brushing teeth) 3  -KF     Eating meals 4  -KF     AM-PAC 6 Clicks Score (OT) 19  -KF     Row Name 03/10/22 1419          Functional Assessment    Outcome Measure Options AM-PAC 6 Clicks Daily Activity (OT)  -KF           User Key  (r) = Recorded By, (t) = Taken By, (c) = Cosigned By    Initials Name Provider Type    KF Theresa Rader OT Occupational Therapist                Occupational Therapy Education                 Title: PT OT SLP Therapies (In Progress)     Topic: Occupational Therapy (Done)     Point: ADL training (Done)     Description:   Instruct learner(s) on proper safety adaptation and remediation techniques during self care or transfers.   Instruct in proper use of assistive devices.              Learning Progress Summary           Patient Acceptance, E,TB,D, VU,DU,NR by  at 3/10/2022 1419    Acceptance, E,TB,D, NR,VU by  at 3/8/2022 1444    Acceptance, E, VU,NR by AN at 3/7/2022 1544    Acceptance, E,TB, NR by EDMOND at 3/5/2022 0806    Acceptance, E,TB,D, VU,DU,NR by KF at 3/4/2022 0903   Family Acceptance, E,TB,D, NR,VU by  at 3/8/2022 1444    Acceptance, E, VU,NR by AN at 3/7/2022 1544                   Point: Home exercise program (Done)     Description:   Instruct learner(s) on appropriate technique for monitoring, assisting and/or progressing therapeutic exercises/activities.              Learning Progress Summary           Patient Acceptance, E,TB,D, VU,DU,NR by  at 3/10/2022 1419    Acceptance, E,TB,D, NR,VU by  at 3/8/2022 1444    Acceptance, E,TB, NR by EDMOND at 3/5/2022 0806   Family Acceptance, E,TB,D, NR,VU by  at 3/8/2022 1444                    Point: Precautions (Done)     Description:   Instruct learner(s) on prescribed precautions during self-care and functional transfers.              Learning Progress Summary           Patient Acceptance, E,TB,D, VU,DU,NR by  at 3/10/2022 1419    Acceptance, E,TB,D, NR,VU by KF at 3/8/2022 1444    Acceptance, E, VU,NR by AN at 3/7/2022 1544    Acceptance, E,TB, NR by EDMOND at 3/5/2022 0806    Acceptance, E,TB,D, VU,DU,NR by KF at 3/4/2022 0903   Family Acceptance, E,TB,D, NR,VU by KF at 3/8/2022 1444    Acceptance, E, VU,NR by AN at 3/7/2022 1544                   Point: Body mechanics (Done)     Description:   Instruct learner(s) on proper positioning and spine alignment during self-care, functional mobility activities and/or exercises.              Learning Progress Summary           Patient Acceptance, E,TB,D, VU,DU,NR by  at 3/10/2022 1419    Acceptance, E,TB,D, NR,VU by KF at 3/8/2022 1444    Acceptance, E, VU,NR by AN at 3/7/2022 1544    Acceptance, E,TB, NR by EDMOND at 3/5/2022 0806    Acceptance, E,TB,D, VU,DU,NR by  at 3/4/2022 0903   Family Acceptance, E,TB,D, NR,VU by  at 3/8/2022 1444    Acceptance, E, VU,NR by AN at 3/7/2022 1544                               User Key     Initials Effective Dates Name Provider Type Discipline     06/16/21 -  Theresa Rader OT Occupational Therapist OT    AN 09/21/21 -  Elissa Bhagat OT Occupational Therapist OT    EDMOND 12/29/21 -  Hannah Anderson RN Registered Nurse Nurse              OT Recommendation and Plan  Planned Therapy Interventions (OT): BADL retraining, adaptive equipment training, activity tolerance training, patient/caregiver education/training, ROM/therapeutic exercise, occupation/activity based interventions, cognitive/visual perception retraining, strengthening exercise, transfer/mobility retraining, functional balance retraining  Therapy Frequency (OT): daily  Plan of Care Review  Plan of Care Reviewed With: patient  Progress:  improving  Outcome Evaluation: Pt completed 30ft HHA, CGA STS, completed grooming standing sink side with SBA, CGA toileting at BS, cont IPOT per POC     Time Calculation:    Time Calculation- OT     Row Name 03/10/22 1419 03/10/22 1150          Time Calculation- OT    OT Start Time --  -KF 1150  -KF     OT Received On --  -KF 03/10/22  -KF            Timed Charges    81605 - OT Therapeutic Activity Minutes --  -KF 13  -KF     29077 - OT Self Care/Mgmt Minutes --  -KF 10  -KF            Total Minutes    Timed Charges Total Minutes --  -KF 23  -KF      Total Minutes --  -KF 23  -KF           User Key  (r) = Recorded By, (t) = Taken By, (c) = Cosigned By    Initials Name Provider Type    Theresa Solares OT Occupational Therapist              Therapy Charges for Today     Code Description Service Date Service Provider Modifiers Qty    25602318146 HC OT SELF CARE/MGMT/TRAIN EA 15 MIN 3/10/2022 Theresa Rader OT GO 1    14073564706 HC OT THERAPEUTIC ACT EA 15 MIN 3/10/2022 Theresa Rader OT GO 1               Theresa Rader OT  3/10/2022

## 2022-03-10 NOTE — PROGRESS NOTES
"    [facility] Medicine Services  PROGRESS NOTE    Patient Name: Scott Diego  : 4/10/1930  MRN: [id number]    Date of Admission: 3/3/2022  Primary Care Physician: Yolande Escobar MD    Subjective   Subjective     CC:  CARYN/CHF/weakness    HPI:  Patient seen resting up in the chair in no acute distress.  Dozing.  Awakens to stimuli.  Very frail.  Oriented to self only.  Thinks it is  when asked where she is she states \"I just do not know where I am\".  Appears very frail and weak.  Denies any specific complaints.  No new issues per staff.    ROS:  BARAK due to baseline dementia    Objective   Objective     Vital Signs:   Temp:  [96.5 °F (35.8 °C)-96.8 °F (36 °C)] 96.5 °F (35.8 °C)  Heart Rate:  [71-98] 93  Resp:  [18] 18  BP: (105-144)/(54-78) 105/54     Physical Exam:  Constitutional: No acute distress, mostly sleeping.  Resting up in the chair.  Awakens to stimuli briefly.  No visitors at bedside.  HENT: NCAT, mucous membranes moist  Respiratory: Clear to auscultation bilaterally, respiratory effort normal   Cardiovascular: RRR, no murmurs, rubs, or gallops  Gastrointestinal: Positive bowel sounds, soft, nontender, nondistended.  Thin habitus.  Musculoskeletal: No bilateral ankle edema  Psychiatric: Appropriate affect, cooperative and calm  Neurologic: Oriented to self only at baseline, strength symmetric in all extremities but generally weak, Cranial Nerves grossly intact to confrontation, speech clear but confused.  Skin: No rashes      Results Reviewed:  LAB RESULTS:      Lab 22  0320 22  0457 22  0511 22  0514   WBC 8.17 6.95 5.16 5.91   HEMOGLOBIN 12.1 10.9* 10.0* 10.2*   HEMATOCRIT 38.4 34.7 31.2* 32.6*   PLATELETS 166 155 130* 141   NEUTROS ABS 5.33 4.05 2.58  --    IMMATURE GRANS (ABS) 0.02 0.02 0.01  --    LYMPHS ABS 1.38 1.55 1.34  --    MONOS ABS 1.21* 1.04* 0.80  --    EOS ABS 0.17 0.23 0.37  --    MCV 93.2 94.6 92.9 94.5         Lab 03/10/22  0721 " 03/09/22  0338 03/08/22  0320 03/07/22 0457 03/06/22  0756 03/05/22  0511 03/04/22  0514   SODIUM 138 137 134* 138  --  137 138   POTASSIUM 4.5 4.6 5.3* 5.6*  --  4.9 5.1   CHLORIDE 99 101 101 105  --  110* 110*   CO2 27.0 24.0 19.0* 25.0  --  21.0* 22.0   ANION GAP 12.0 12.0 14.0 8.0  --  6.0 6.0   BUN 25* 20 16 14  --  12 15   CREATININE 1.62* 1.51* 1.28* 1.23*  --  1.08* 1.17*   EGFR 29.9* 32.5* 39.6* 41.6*  --  48.6* 44.1*   GLUCOSE 101* 90 72 98  --  88 87   CALCIUM 9.6 9.1 9.6 9.5  --  9.0 8.7   MAGNESIUM  --   --   --  1.9 1.7  --  1.8         Lab 03/08/22  0320 03/07/22  0457 03/05/22  0511   TOTAL PROTEIN 7.9 7.1 6.4   ALBUMIN 3.30* 3.20* 3.00*   GLOBULIN 4.6 3.9 3.4   ALT (SGPT) 10 8 7   AST (SGOT) 23 18 15   BILIRUBIN 0.4 0.3 0.3   ALK PHOS 46 42 38*                     Brief Urine Lab Results  (Last result in the past 365 days)      Color   Clarity   Blood   Leuk Est   Nitrite   Protein   CREAT   Urine HCG        03/03/22 0843             54.9               Microbiology Results Abnormal     Procedure Component Value - Date/Time    COVID PRE-OP / PRE-PROCEDURE SCREENING ORDER (NO ISOLATION) - Swab, Nasopharynx [[id number]]  (Normal) Collected: 03/03/22 0546    Lab Status: Final result Specimen: Swab from Nasopharynx Updated: 03/03/22 0652    Narrative:      The following orders were created for panel order COVID PRE-OP / PRE-PROCEDURE SCREENING ORDER (NO ISOLATION) - Swab, Nasopharynx.  Procedure                               Abnormality         Status                     ---------                               -----------         ------                     COVID-19, FLU A/B, RSV P...[[id number]]  Normal              Final result                 Please view results for these tests on the individual orders.    COVID-19, FLU A/B, RSV PCR - Swab, Nasopharynx [[id number]]  (Normal) Collected: 03/03/22 0546    Lab Status: Final result Specimen: Swab from Nasopharynx Updated: 03/03/22 0652     COVID19 Not  Detected     Influenza A PCR Not Detected     Influenza B PCR Not Detected     RSV, PCR Not Detected    Narrative:      Fact sheet for providers: https://www.fda.gov/media/286824/download    Fact sheet for patients: https://www.fda.gov/media/182486/download    Test performed by PCR.          No radiology results from the last 24 hrs    Results for orders placed during the hospital encounter of 03/03/22    Adult Transthoracic Echo Complete W/ Cont if Necessary Per Protocol    Interpretation Summary  · Estimated left ventricular EF = 30%  · Moderate mitral valve regurgitation is present.  · There is calcification of the aortic valve.  · Estimated right ventricular systolic pressure from tricuspid regurgitation is mildly elevated (35-45 mmHg).      I have reviewed the medications:  Scheduled Meds:aspirin, 81 mg, Oral, Nightly  atorvastatin, 80 mg, Oral, Nightly  cetirizine, 5 mg, Oral, Daily  donepezil, 5 mg, Oral, Nightly  erythromycin, 1 application, Both Eyes, Nightly  famotidine, 20 mg, Oral, Daily  ferrous sulfate, 325 mg, Oral, Daily  furosemide, 20 mg, Oral, Q48H  gabapentin, 300 mg, Oral, Nightly  heparin (porcine), 5,000 Units, Subcutaneous, Q12H  levothyroxine, 50 mcg, Oral, Q AM  metoprolol succinate XL, 25 mg, Oral, Daily  nystatin, 5 mL, Swish & Swallow, 4x Daily  ofloxacin, 1 drop, Both Eyes, TID  polyethylene glycol, 17 g, Oral, Daily  rOPINIRole, 0.5 mg, Oral, Nightly  sodium chloride, 10 mL, Intravenous, Q12H      Continuous Infusions:sodium chloride, 75 mL/hr      PRN Meds:.•  acetaminophen **OR** acetaminophen **OR** acetaminophen  •  LORazepam  •  magnesium sulfate **OR** magnesium sulfate in D5W 1g/100mL (PREMIX) **OR** magnesium sulfate  •  meclizine  •  polyvinyl alcohol  •  potassium chloride **OR** potassium chloride **OR** potassium chloride  •  sodium chloride  •  sodium chloride    Assessment/Plan   Assessment & Plan     Active Hospital Problems    Diagnosis  POA   • Elevated serum  creatinine [R79.89]  Yes   • Hypothyroid [E03.9]  Yes   • Chronic systolic heart failure (HCC) [I50.22]  Yes   • CARYN (acute kidney injury) (HCC) [N17.9]  Yes   • History of CVA (cerebrovascular accident) [Z86.73]  Not Applicable   • Dementia (HCC) [F03.90]  Yes   • Hypertension [I10]  Yes   • Hyponatremia [E87.1]  Yes      Resolved Hospital Problems   No resolved problems to display.        Brief Hospital Course to date:  Scott Diego is a 89 y.o. female  Who lives with her son in [place] following the recent death of her  in January.  Son states since this time, pt has gone down hill.  Poor po intake.  Unable to take care of self.  Has a lot of anxiety.  Worsening memory.  Son notes she has O2 at night and he checked her sats at home and they were 86 today.  Son brings pt in as he feels he cannot safely take care of her.       This patient's problems and plans were partially entered by my partner and updated as appropriate by me 03/10/22.    Assessment/plan:  Patient is new to me today    CARYN  -Initially resolved, but mild bump in creatinine yesterday.  Today up further at 1.62.  Likely due to patient with poor oral intake.  --We will encourage p.o. intake.  Give gentle IV fluids x12 hours overnight.    --Reassess a.m. labs.   --monitor     Hyponatremia  -resolved     Chronic systolic HF  -CXR with mild pulmonary interstitial disease  -ECHO with EF 20-30%  -changed lasix to every other day only.  Monitor renal function.  -outpatient follow up with [facility]  -BB     Possible Thrush  -Continue nystatin swish and swallow    Agitation with h/o Dementia  Prolonged QTC  - limit seroquel/haldol  -prn ativan for agitation  --Currently drowsy resting up in chair with no agitation.  Monitor.  --Awaiting placement.    HTN  -metoprolol.  Stable     Hyperkalemia  -given lokelma again, 3/8  -improved, potassium today 4.5.  Monitor     Hypothyroidism  -Levothyroxine     Debility  Generalized  weakness  Dementia  History of CVA  -PT/OT/fall precautions  -aricept  --CM following for disposition.  Planning for placement once medically ready.     Bereavement  - passed away January 2022    No family at bedside today.    DVT prophylaxis:  Medical DVT prophylaxis orders are present.       AM-PAC 6 Clicks Score (PT): 20 (03/09/22 1121)    Disposition: I expect the patient to be discharged to rehab.  Has received a bed at [facility] awaiting insurance preserved.  Ambulance has been arranged tentatively for Monday 3/14 at 1515 p.m.    CODE STATUS:   Code Status and Medical Interventions:   Ordered at: 03/03/22 0504     Level Of Support Discussed With:    Next of Kin (If No Surrogate)     Code Status (Patient has no pulse and is not breathing):    CPR (Attempt to Resuscitate)     Medical Interventions (Patient has pulse or is breathing):    Full Support       Gia Diaz, APRN  03/10/22

## 2022-03-10 NOTE — PAYOR COMM NOTE
"Scott Diego (89 y.o. Female)             Date of Birth   04/10/1930    Social Security Number       Address   [address] [facility] [address]    Home Phone   [phone]    MRN   [id number]       Faith   None    Marital Status                               Admission Date   3/3/22    Admission Type   Emergency    Admitting Provider   Jan Martins MD    Attending Provider   Jan Martins MD    Department, Room/Bed   [address], S448/1       Discharge Date       Discharge Disposition       Discharge Destination                               Attending Provider: Jan Martins MD    Allergies: Augmentin [Amoxicillin-pot Clavulanate], Claritin [Loratadine], Keflex [Cephalexin], Sulfa Antibiotics    Isolation: None   Infection: None   Code Status: CPR   Advance Care Planning Activity    Ht: 165.1 cm (65\")   Wt: 51.3 kg (113 lb 1.6 oz)    Admission Cmt: None   Principal Problem: None                Active Insurance as of 3/3/2022     Primary Coverage     Payor Plan Insurance Group Employer/Plan Group    ANTHEM MEDICARE REPLACEMENT ANTHEM MEDICARE ADVANTAGE [id number]     Payor Plan Address Payor Plan Phone Number Payor Plan Fax Number Effective Dates    [address] [phone]  3/1/2022 - None Entered    [facility] [id number]       Subscriber Name Subscriber Birth Date Member ID       SCOTT DIEGO 4/10/1930 [id number]                 Emergency Contacts      (Rel.) Home Phone Work Phone Mobile Phone    VAUGHN GASPAR (Son) [phone] -- [phone]            Insurance Information                ANTHEM MEDICARE REPLACEMENT/ANTHEM MEDICARE ADVANTAGE Phone: [phone]    Subscriber: Scott Diego Subscriber#: [id number]    Group#: [id number] Precert#: [id number]             History & Physical      Day, Tova NAIR MD at 03/03/22 0355              [facility] Medicine Services  HISTORY AND PHYSICAL    Patient Name: Scott PERES" Johnathon  : 4/10/1930  MRN: [id number]  Primary Care Physician: Yolande Escobar MD  Date of admission: 3/3/2022    Subjective   Subjective     Chief Complaint:  Panic attacks    HPI:  Scott Diego is a 89 y.o. female with history of hypertension, chronic systolic CHF, mild cognitive impairment, CVA, chronic hyponatremia, presents to the ED with hypoxia and panic attacks.  Per the patient's son she has been experiencing panic attacks for over the last week.  Her   on 2022 and since then she has been living with her son.  He reports that since this time her memory has gotten worse and she has physically declined as well.  He reports that she does not drink or eat much food and has been sleeping more than usual.  She has increased weakness and now is having difficulty getting from bed to chair.  He checked her oxygen saturation at home today and it was 86%.  He put her on her home oxygen and brought her to the ED for evaluation.  Son is interested in placement because he does not feel that he can adequately care for her at home.  No fevers, cough, chest pain, vomiting, diarrhea, or any other complaints at this time.  CT of the head shows no acute intracranial abnormality, moderate volume loss, moderate chronic small vessel ischemic changes.  Chest x-ray shows mild diffuse pulmonary interstitial disease pattern but fairly similar to multiple prior studies.  Pertinent labs include BUN 21, creatinine 1.48.  Patient was given 1 liter of saline in the ED, and is being admitted to the Hospitalist for further evaluation and management.    COVID Details:        Symptoms: [x] NONE [] Fever []  Cough [] Shortness of breath [] Change in taste or smell  The patient qualifies to receive the vaccine, but they have not yet received it.    Review of Systems   Constitutional: Positive for appetite change and fatigue. Negative for chills and fever.   HENT: Negative.    Eyes: Negative.    Respiratory: Negative.     Cardiovascular: Negative.    Gastrointestinal: Positive for abdominal pain. Negative for abdominal distention, diarrhea, nausea and vomiting.   Endocrine: Negative.    Genitourinary: Negative.    Musculoskeletal: Negative.    Skin: Negative.    Allergic/Immunologic: Negative.    Neurological: Positive for weakness (generalized). Negative for headaches.   Hematological: Negative.    Psychiatric/Behavioral: The patient is nervous/anxious.         All other systems reviewed and are negative.     Personal History     Past Medical History:   Diagnosis Date   • Congestive heart failure (HCC)    • Malignant neoplasm (HCC)    • Stroke (HCC)     mini stroke   • Systolic heart failure (HCC) 9/25/2017    Chronic/compensated (EF 25%)       Past Surgical History:   Procedure Laterality Date   • CHOLECYSTECTOMY     • EYE SURGERY     • HYSTERECTOMY         Family History:  family history includes Cancer in her brother, mother, and sister; No Known Problems in her father. Otherwise pertinent FHx was reviewed and unremarkable.     Social History:  reports that she has never smoked. She has never used smokeless tobacco. She reports that she does not drink alcohol and does not use drugs.  Social History     Social History Narrative    Caffeine Intake:0-1  servings per day    Patient lives at her son's home       Medications:  ALPRAZolam, Olopatadine HCl, clopidogrel, cycloSPORINE, donepezil, fish oil, furosemide, gabapentin, levothyroxine, meclizine, metoprolol succinate XL, nystatin, rOPINIRole, and vitamin C    Allergies   Allergen Reactions   • Augmentin [Amoxicillin-Pot Clavulanate] Nausea And Vomiting   • Claritin [Loratadine] Unknown (See Comments)     Doesn't remember   • Keflex [Cephalexin] Nausea And Vomiting   • Sulfa Antibiotics Other (See Comments)     Does not remember       Objective   Objective     Vital Signs:   Temp:  [97.8 °F (36.6 °C)] 97.8 °F (36.6 °C)  Heart Rate:  [89] 89  Resp:  [18] 18  BP: (105-129)/(61-64)  105/64    Physical Exam   Constitutional: Awake, alert, resting in bed  Eyes: PERRLA, sclerae anicteric, no conjunctival injection  HENT: NCAT, mucous membranes moist  Neck: Supple, no thyromegaly, no lymphadenopathy, trachea midline  Respiratory: Clear to auscultation bilaterally, nonlabored respirations   Cardiovascular: RRR, no murmurs, rubs, or gallops, palpable pedal pulses bilaterally  Gastrointestinal: Positive bowel sounds, soft, nontender, nondistended  Musculoskeletal: No bilateral ankle edema, no clubbing or cyanosis to extremities  Psychiatric: Appropriate affect, cooperative  Neurologic: Oriented to self, strength symmetric in all extremities, Cranial Nerves grossly intact to confrontation, speech clear  Skin: No rashes       Result Review:  I have personally reviewed the results from the time of this admission to 03/03/22 3:55 AM EST and agree with these findings:  [x]  Laboratory  []  Microbiology  [x]  Radiology  []  EKG/Telemetry   []  Cardiology/Vascular   []  Pathology  []  Old records  []  Other:  Most notable findings include:       LAB RESULTS:      Lab 03/02/22 2038   WBC 11.17*   HEMOGLOBIN 11.3*   HEMATOCRIT 34.8   PLATELETS 143   NEUTROS ABS 7.79*   IMMATURE GRANS (ABS) 0.02   LYMPHS ABS 1.54   MONOS ABS 1.23*   EOS ABS 0.51*   MCV 92.1   CRP <0.30   LACTATE 1.0         Lab 03/03/22  0230 03/02/22 2038   SODIUM  --  132*   POTASSIUM  --  5.1   CHLORIDE  --  102   CO2  --  22.0   ANION GAP  --  8.0   BUN  --  21   CREATININE  --  1.48*   EGFR  --  33.3*   GLUCOSE  --  100*   CALCIUM  --  9.2   MAGNESIUM 1.6*  --    PHOSPHORUS 3.6  --    TSH 1.440  --          Lab 03/02/22 2038   TOTAL PROTEIN 7.6   ALBUMIN 3.60   GLOBULIN 4.0   ALT (SGPT) 10   AST (SGOT) 20   BILIRUBIN 0.3   ALK PHOS 44         Lab 03/02/22 2038   PROBNP 2,861.0*   TROPONIN T 0.011                   Microbiology Results (last 10 days)     ** No results found for the last 240 hours. **          CT Head Without  Contrast    Result Date: 3/3/2022  EXAMINATION: CT HEAD WO CONTRAST DATE: 3/3/2022 1:49 AM  INDICATION: Confusion.  COMPARISON: None available.  TECHNIQUE: Thin section noncontrast axial images were obtained through the head. Coronal reformatted images were created.  CT dose lowering techniques were used, to include: automated exposure control, adjustment for patient size, and or use of iterative  reconstruction FINDINGS: No acute intracranial hemorrhage. Prominent bifrontal CSF spaces, greater on the left, are likely related to volume loss and represents prominent subarachnoid space. Subdural hygromas could've a similar appearance, however, cortical veins are seen coursing through this prominent CSF density space, for instance (axial series 3 image 35). No acute territorial infarct. No intracranial mass or mass effect. Mild burden of low attenuation in the white matter is nonspecific, but likely reflects sequelae of chronic microvascular ischemia.  Moderate diffuse parenchymal volume loss. No hydrocephalus. Basal cisterns are patent. Atherosclerotic calcifications of both carotid siphons. Bilateral ocular lens surgery. Trace mucosal thickening in the ethmoid air cells. Mastoid air cells are clear. Calvarium is intact.     Impression: 1.  No acute intracranial abnormality. 2.  Moderate volume loss. Mild chronic small vessel ischemic changes. Electronically signed by:  Og Rajan DO  3/3/2022 12:12 AM Mountain Time    XR Chest 1 View    Result Date: 3/2/2022  EXAMINATION: XR CHEST 1 VW-  INDICATION: SOA triage protocol  COMPARISON: 12/6/2019  FINDINGS: Heart is normal in size. Vasculature is upper limits of normal. There is a mild diffuse, finely reticular interstitial disease pattern the lungs, present on prior studies and probably stable allowing for differences in film technique. Similar pattern is present back to at least 2018. No lung consolidation effusion or pneumothorax is seen.       Impression: Mild  diffuse pulmonary interstitial disease pattern, but fairly similar to multiple prior studies. Findings could reflect chronic lung disease, less likely mild interstitial edema or early changes of viral syndrome. No significant focal lung disease is seen.    This report was finalized on 3/2/2022 9:51 PM by Dr. Jan Alvarez MD.        Results for orders placed during the hospital encounter of 06/13/18    Adult Transthoracic Echo Complete W/ Cont if Necessary Per Protocol    Interpretation Summary  · Moderate aortic valve regurgitation is present.  · Mild mitral valve regurgitation is present  · Mild tricuspid valve regurgitation is present.  · There is a moderate (1-2cm) circumferential pericardial effusion.  · Left ventricular systolic function is severely decreased. Estimated EF = 20%.  · There is mild right atrial diastolic collapse from the pericardial effusion. The transmitral Doppler show some variation with inspiration. Clinical correlation suggested in this patient i.e. as the patient hypotensive could also consider CT scan of the chest to look at the pericardial effusion      Assessment/Plan   Assessment & Plan       Hyponatremia    Hypertension    History of CVA (cerebrovascular accident)    Dementia (HCC)    Chronic systolic heart failure (HCC)    Hypothyroid    Elevated serum creatinine    Scott Diego is a 89 y.o. female with history of hypertension, chronic systolic CHF, mild cognitive impairment, CVA, chronic hyponatremia, presents to the ED with hypoxia and panic attacks.      Assessment and Plan:    Elevated creatinine  --Baseline creatinine 1.1-1.3  --Creatinine 1.48 today  --Was given 1 L saline in the ED  --Urine studies  --IV fluids  --hold lasix  --BMP in the a.m.    Hyponatremia  --Urine sodium and urine osmolality  --Serum osmolality  --Was given a liter of saline in the ED  --BMP in the a.m.    Chronic systolic HF  --Chest x-ray shows mild diffuse pulmonary interstitial disease pattern but  fairly similar to multiple prior studies.  --Strict I's and O's  --Daily weight  --Metoprolol    HTN  --Metoprolol    Hypothyroidism  --Levothyroxine    Debility  Generalized weakness  Dementia  History of CVA  --Fall precautions  --Aricept  --consult case management  --consult pt/ot in the am    DVT prophylaxis:  Heparin    CODE STATUS:     Level Of Support Discussed With: Next of Kin (If No Surrogate)  Code Status (Patient has no pulse and is not breathing): CPR (Attempt to Resuscitate)  Medical Interventions (Patient has pulse or is breathing): Full Support      This note has been completed as part of a split-shared workflow.   Signature: Electronically signed by Nata Ayala, VASILE, 03/03/22, 5:04 AM EST.       Attending   Admission Attestation       I have seen and examined the patient, performing an independent face-to-face diagnostic evaluation with plan of care reviewed and developed with the advanced practice clinician (APC).      Brief Summary Statement:   Scott Diego is a 89 y.o. female  Who lives with her son in [place] following the recent death of her  in January.  Son states since this time, pt has gone down hill.  Poor po intake.  Unable to take care of self.  Has a lot of anxiety.  Worsening memory.  Son notes she has O2 at night and he checked her sats at home and they were 86 today.  Son brings pt in as he feels he cannot safely take care of her.   Remainder of detailed HPI is as noted by APC and has been reviewed and/or edited by me for completeness.    Attending Physical Exam:   performed the above exam; no changes to the above    Brief Assessment/Plan :  See detailed assessment and plan developed with APC which I have reviewed and/or edited for completeness.  89 y/o female here w/ frailty, dehydration, electrolyte derangement;  1. Elevated creatinine/dehydration;  -gentle IVF's  2. Hypomagnesemia;  -electrolyte replacement  3. Anemia,   -improved from baseline    4.frailty/debility  -case mgmt for possible NH placement      Admission Status: I believe that this patient meets  INPATIENT status due to  Arf, hypomag.  I feel patient’s risk for adverse outcomes and need for care warrant INPATIENT evaluation and I predict the patient’s care encounter to likely last beyond 2 midnights.    Electronically signed by Tova Vaughan MD, 03/03/22, 5:24 AM NIYA Vaughan MD  03/03/22                          Electronically signed by Tova Vaughan MD at 03/03/22 0524       Vital Signs (last day)     Date/Time Temp Temp src Pulse Resp BP Patient Position SpO2    03/10/22 0900 -- -- 85 -- -- -- --    03/10/22 0700 -- -- 85 -- -- -- --    03/10/22 0654 96.5 (35.8) Oral 71 18 105/54 Lying --    03/09/22 1944 96.8 (36) Oral 98 18 144/78 Lying 95    03/09/22 1800 -- -- 91 -- -- -- --    03/09/22 1700 -- -- 89 -- -- -- --    03/09/22 1600 -- -- 94 -- -- -- --    03/09/22 1500 -- -- 83 -- -- -- --    03/09/22 1400 -- -- 102 -- -- -- --    03/09/22 1300 -- -- 105 -- -- -- --    03/09/22 1200 -- -- 96 -- -- -- --    03/09/22 1100 -- -- 72 -- -- -- --    03/09/22 1000 -- -- 84 -- -- -- --    03/09/22 0900 -- -- 90 -- -- -- --    03/09/22 0800 -- -- 84 -- -- -- --    03/09/22 0700 -- -- 70 -- -- -- --    03/09/22 0642 96 (35.6) Oral 67 18 106/58 Lying --    03/09/22 0500 -- -- 69 -- -- -- --    03/09/22 0300 -- -- 74 -- -- -- --    03/09/22 0100 -- -- 80 -- -- -- --            Current Facility-Administered Medications   Medication Dose Route Frequency Provider Last Rate Last Admin   • acetaminophen (TYLENOL) tablet 650 mg  650 mg Oral Q4H PRN Nata Ayala APRN   650 mg at 03/07/22 1327    Or   • acetaminophen (TYLENOL) 160 MG/5ML solution 650 mg  650 mg Oral Q4H PRN Nata Ayala APRN        Or   • acetaminophen (TYLENOL) suppository 650 mg  650 mg Rectal Q4H PRN Nata Ayala APRN       • aspirin chewable tablet 81 mg  81 mg Oral Nightly Jaron Oliver MD   81  mg at 03/09/22 2008   • atorvastatin (LIPITOR) tablet 80 mg  80 mg Oral Nightly Jaron Oliver MD   80 mg at 03/09/22 2007   • cetirizine (zyrTEC) tablet 5 mg  5 mg Oral Daily Jaron Oliver MD   5 mg at 03/10/22 0859   • donepezil (ARICEPT) tablet 5 mg  5 mg Oral Nightly Nata Ayala APRN   5 mg at 03/09/22 2008   • erythromycin (ROMYCIN) ophthalmic ointment 1 application  1 application Both Eyes Nightly Jaron Oliver MD   1 application at 03/09/22 2008   • famotidine (PEPCID) tablet 20 mg  20 mg Oral Daily Jaron Oliver MD   20 mg at 03/10/22 0859   • ferrous sulfate tablet 325 mg  325 mg Oral Daily Jaron Oliver MD   325 mg at 03/10/22 0859   • furosemide (LASIX) tablet 20 mg  20 mg Oral Q48H Jan Martins MD   20 mg at 03/09/22 0942   • gabapentin (NEURONTIN) capsule 300 mg  300 mg Oral Nightly Jaron Oliver MD   300 mg at 03/09/22 2008   • heparin (porcine) 5000 UNIT/ML injection 5,000 Units  5,000 Units Subcutaneous Q12H Nata Ayala APRN   5,000 Units at 03/10/22 0859   • levothyroxine (SYNTHROID, LEVOTHROID) tablet 50 mcg  50 mcg Oral Q AM Nata Ayala APRN   50 mcg at 03/10/22 0513   • LORazepam (ATIVAN) injection 1 mg  1 mg Intravenous Q4H PRN Lisette Pacheco MD   1 mg at 03/09/22 2009   • magnesium sulfate 4 gram infusion - Mg less than or equal to 1mg/dL  4 g Intravenous PRN Arie Andrade, PharmD        Or   • magnesium sulfate 3 gram infusion (1gm x 3) - Mg 1.1 - 1.5 mg/dL  1 g Intravenous PRN Arie Andrade, PharmD        Or   • Magnesium Sulfate 2 gram infusion- Mg 1.6 - 1.9 mg/dL  2 g Intravenous PRN Arie Andrade, PharmD 25 mL/hr at 03/06/22 1017 2 g at 03/06/22 1017   • meclizine (ANTIVERT) tablet 12.5 mg  12.5 mg Oral TID PRN Nata Ayala APRN       • metoprolol succinate XL (TOPROL-XL) 24 hr tablet 25 mg  25 mg Oral Daily Nata Ayala APRN   25 mg at 03/10/22 0900   • nystatin (MYCOSTATIN) 100,000 unit/mL suspension 500,000  Units  5 mL Swish & Swallow 4x Daily Jaron Oliver MD   500,000 Units at 03/10/22 0900   • ofloxacin (OCUFLOX) 0.3 % ophthalmic solution 1 drop  1 drop Both Eyes TID Jaron Oliver MD   1 drop at 03/10/22 0859   • polyethylene glycol (MIRALAX) packet 17 g  17 g Oral Daily Lisette Pacheco MD   17 g at 03/10/22 0900   • polyvinyl alcohol (LIQUIFILM) 1.4 % ophthalmic solution 1 drop  1 drop Both Eyes Q3H PRN Jaron Oliver MD   1 drop at 03/08/22 1840   • potassium chloride (MICRO-K) CR capsule 40 mEq  40 mEq Oral PRN Lisette Pacheco MD        Or   • potassium chloride (KLOR-CON) packet 40 mEq  40 mEq Oral PRN Lisette Pacheco MD        Or   • potassium chloride 10 mEq in 100 mL IVPB  10 mEq Intravenous Q1H PRN iLsette Pacheco MD       • rOPINIRole (REQUIP) tablet 0.5 mg  0.5 mg Oral Nightly Nata Ayala, APRN   0.5 mg at 03/09/22 2008   • sodium chloride 0.9 % flush 10 mL  10 mL Intravenous PRN Doug Monroe MD       • sodium chloride 0.9 % flush 10 mL  10 mL Intravenous Q12H Nata Ayala, APRN   10 mL at 03/09/22 2009   • sodium chloride 0.9 % flush 10 mL  10 mL Intravenous PRN Nata Ayala APRN           Lab Results (last 24 hours)     Procedure Component Value Units Date/Time    Basic Metabolic Panel [[id number]]  (Abnormal) Collected: 03/10/22 0721    Specimen: Blood Updated: 03/10/22 0833     Glucose 101 mg/dL      BUN 25 mg/dL      Creatinine 1.62 mg/dL      Sodium 138 mmol/L      Potassium 4.5 mmol/L      Chloride 99 mmol/L      CO2 27.0 mmol/L      Calcium 9.6 mg/dL      BUN/Creatinine Ratio 15.4     Anion Gap 12.0 mmol/L      eGFR 29.9 mL/min/1.73      Comment: National Kidney Foundation and American Society of Nephrology (ASN) Task Force recommended calculation based on the Chronic Kidney Disease Epidemiology Collaboration (CKD-EPI) equation refit without adjustment for race.       Narrative:      GFR Normal >60  Chronic Kidney Disease <60  Kidney Failure <15       "    Imaging Results (Last 24 Hours)     ** No results found for the last 24 hours. **        Orders (last 24 hrs)      Start     Ordered    03/10/22 0600  Basic Metabolic Panel  Morning Draw         03/09/22 0938    03/09/22 1700  LORazepam (ATIVAN) tablet 0.25 mg  Once         03/09/22 1614    03/09/22 1125  Diet Dysphagia; IV - Mechanical Soft No Mixed Consistencies; Thin; GI Soft  Diet Effective Now        Comments: No acidic foods    03/09/22 1125    03/09/22 0900  furosemide (LASIX) tablet 20 mg  Every 48 Hours         03/08/22 0804    03/07/22 1126  LORazepam (ATIVAN) injection 1 mg  Every 4 Hours PRN         03/07/22 1126    03/06/22 1600  polyethylene glycol (MIRALAX) packet 17 g  Daily         03/06/22 1505    03/04/22 1800  Dietary Nutrition Supplements Boost Plus; strawberry  Daily With Lunch & Dinner       03/04/22 1412    03/04/22 1435  magnesium sulfate 4 gram infusion - Mg less than or equal to 1mg/dL  As Needed        \"Or\" Linked Group Details    03/04/22 1436    03/04/22 1435  magnesium sulfate 3 gram infusion (1gm x 3) - Mg 1.1 - 1.5 mg/dL  As Needed        \"Or\" Linked Group Details    03/04/22 1436    03/04/22 1435  Magnesium Sulfate 2 gram infusion- Mg 1.6 - 1.9 mg/dL  As Needed        \"Or\" Linked Group Details    03/04/22 1436    03/04/22 1148  Patient Currently On Electrolyte Replacement Protocol - Please Refer to MAR for Protocol Details  Misc Nursing Order (Specify)  Daily      Comments: Patient Currently On Electrolyte Replacement Protocol - Please Refer to MAR for Protocol Details    03/04/22 1147    03/04/22 1147  potassium chloride (MICRO-K) CR capsule 40 mEq  As Needed        \"Or\" Linked Group Details    03/04/22 1147    03/04/22 1147  potassium chloride (KLOR-CON) packet 40 mEq  As Needed        \"Or\" Linked Group Details    03/04/22 1147    03/04/22 1147  potassium chloride 10 mEq in 100 mL IVPB  Every 1 Hour PRN        \"Or\" Linked Group Details    03/04/22 1147    03/03/22 2100  " "donepezil (ARICEPT) tablet 5 mg  Nightly         03/03/22 0840    03/03/22 2100  rOPINIRole (REQUIP) tablet 0.5 mg  Nightly         03/03/22 0840    03/03/22 2100  gabapentin (NEURONTIN) capsule 300 mg  Nightly         03/03/22 1713    03/03/22 2100  aspirin chewable tablet 81 mg  Nightly         03/03/22 1715    03/03/22 2100  atorvastatin (LIPITOR) tablet 80 mg  Nightly         03/03/22 1715    03/03/22 2100  erythromycin (ROMYCIN) ophthalmic ointment 1 application  Nightly         03/03/22 1715    03/03/22 2100  ofloxacin (OCUFLOX) 0.3 % ophthalmic solution 1 drop  3 Times Daily         03/03/22 1715    03/03/22 1815  ferrous sulfate tablet 325 mg  Daily         03/03/22 1715    03/03/22 1815  cetirizine (zyrTEC) tablet 5 mg  Daily         03/03/22 1715    03/03/22 1800  famotidine (PEPCID) tablet 20 mg  Daily         03/03/22 1715    03/03/22 1714  polyvinyl alcohol (LIQUIFILM) 1.4 % ophthalmic solution 1 drop  Every 3 Hours PRN         03/03/22 1715    03/03/22 1347  nystatin (MYCOSTATIN) 100,000 unit/mL suspension 500,000 Units  4 Times Daily         03/03/22 1345    03/03/22 1200  levothyroxine (SYNTHROID, LEVOTHROID) tablet 50 mcg  Every Early Morning         03/03/22 0840    03/03/22 1200  metoprolol succinate XL (TOPROL-XL) 24 hr tablet 25 mg  Daily         03/03/22 0840    03/03/22 1200  Vital Signs  Every 4 Hours       03/03/22 0840    03/03/22 1200  heparin (porcine) 5000 UNIT/ML injection 5,000 Units  Every 12 Hours Scheduled         03/03/22 0840    03/03/22 1000  Incentive Spirometry  Every 4 Hours While Awake       03/03/22 0840    03/03/22 0900  sodium chloride 0.9 % flush 10 mL  Every 12 Hours Scheduled         03/03/22 0840    03/03/22 0841  Daily Weights  Daily       03/03/22 0840    03/03/22 0840  acetaminophen (TYLENOL) tablet 650 mg  Every 4 Hours PRN        \"Or\" Linked Group Details    03/03/22 0840    03/03/22 0840  acetaminophen (TYLENOL) 160 MG/5ML solution 650 mg  Every 4 Hours PRN      " "  \"Or\" Linked Group Details    22 0840    22 0840  acetaminophen (TYLENOL) suppository 650 mg  Every 4 Hours PRN        \"Or\" Linked Group Details    22 0840    22 0840  meclizine (ANTIVERT) tablet 12.5 mg  3 Times Daily PRN         22 0840    22 0840  sodium chloride 0.9 % flush 10 mL  As Needed         22 0840    22  sodium chloride 0.9 % flush 10 mL  As Needed         22    Unscheduled  Up With Assistance  As Needed       22 0840    Unscheduled  Magnesium  As Needed       22 114    Unscheduled  Potassium  As Needed       22 1147    --  lisinopril (PRINIVIL,ZESTRIL) 10 MG tablet  Daily         22 1106    --  atorvastatin (LIPITOR) 80 MG tablet  Nightly         22 1106    --  aspirin 81 MG chewable tablet  Nightly         22 1106    --  Glycerin-Polysorbate 80 (REFRESH DRY EYE THERAPY OP)  As Needed         22 1523    --  ofloxacin (OCUFLOX) 0.3 % ophthalmic solution  3 Times Daily         22 1523    --  famotidine (PEPCID) 20 MG tablet  Daily         22 1523    --  fexofenadine (ALLEGRA) 180 MG tablet  Daily         22 1523    --  erythromycin (ROMYCIN) 5 MG/GM ophthalmic ointment  Nightly         22 1654    --  carboxymethylcellulose (REFRESH PLUS) 0.5 % solution  3 Times Daily PRN         22 1655    --  ferrous sulfate 325 (65 FE) MG tablet  Daily         22 1659    --  gabapentin (NEURONTIN) 300 MG capsule  Nightly         22 0901                Physician Progress Notes (last 24 hours)  Notes from 22 1044 through 03/10/22 1044   No notes of this type exist for this encounter.          [facility] Medicine Services  PROGRESS NOTE     Patient Name: Scott Diego  : 4/10/1930  MRN: [id number]     Date of Admission: 3/3/2022  Primary Care Physician: Yolande Escobar MD        Subjective      Subjective "      CC:  CARYN/CHF/weakness     HPI:  Up in bed. Oriented to self. Notes chest soreness. Tolerating PO. Denies bowel/bladder problems.     ROS:  BARAK        Objective      Objective      Vital Signs:   Temp:  [96 °F (35.6 °C)-98 °F (36.7 °C)] 96 °F (35.6 °C)  Heart Rate:  [] 70  Resp:  [18] 18  BP: (106-132)/(58-71) 106/58     Physical Exam:  NAD, alert  OP clear, MMM  Neck supple  No LAD  RRR  CTAB  +BS, ND, NT, soft  MARTÍNEZ  No edema of LE     Results Reviewed:  LAB RESULTS:               Lab 03/08/22 0320 03/07/22 0457 03/05/22  0511 03/04/22  0514 03/03/22  0944 03/02/22 2038   WBC 8.17 6.95 5.16 5.91 8.01 11.17*   HEMOGLOBIN 12.1 10.9* 10.0* 10.2* 10.2* 11.3*   HEMATOCRIT 38.4 34.7 31.2* 32.6* 31.8* 34.8   PLATELETS 166 155 130* 141 129* 143   NEUTROS ABS 5.33 4.05 2.58  --  4.51 7.79*   IMMATURE GRANS (ABS) 0.02 0.02 0.01  --  0.02 0.02   LYMPHS ABS 1.38 1.55 1.34  --  1.76 1.54   MONOS ABS 1.21* 1.04* 0.80  --  1.09* 1.23*   EOS ABS 0.17 0.23 0.37  --  0.56* 0.51*   MCV 93.2 94.6 92.9 94.5 92.7 92.1   CRP  --   --   --   --   --  <0.30   LACTATE  --   --   --   --   --  1.0                     Lab 03/09/22  0338 03/08/22  0320 03/07/22  0457 03/06/22  0756 03/05/22  0511 03/04/22  0514 03/03/22  0944 03/03/22  0230   SODIUM 137 134* 138  --  137 138   < >  --    POTASSIUM 4.6 5.3* 5.6*  --  4.9 5.1   < >  --    CHLORIDE 101 101 105  --  110* 110*   < >  --    CO2 24.0 19.0* 25.0  --  21.0* 22.0   < >  --    ANION GAP 12.0 14.0 8.0  --  6.0 6.0   < >  --    BUN 20 16 14  --  12 15   < >  --    CREATININE 1.51* 1.28* 1.23*  --  1.08* 1.17*   < >  --    EGFR 32.5* 39.6* 41.6*  --  48.6* 44.1*   < >  --    GLUCOSE 90 72 98  --  88 87   < >  --    CALCIUM 9.1 9.6 9.5  --  9.0 8.7   < >  --    MAGNESIUM  --   --  1.9 1.7  --  1.8  --  1.6*   PHOSPHORUS  --   --   --   --   --   --   --  3.6   TSH  --   --   --   --   --   --   --  1.440    < > = values in this interval not displayed.                 Lab  03/08/22  0320 03/07/22  0457 03/05/22  0511 03/02/22 2038   TOTAL PROTEIN 7.9 7.1 6.4 7.6   ALBUMIN 3.30* 3.20* 3.00* 3.60   GLOBULIN 4.6 3.9 3.4 4.0   ALT (SGPT) 10 8 7 10   AST (SGOT) 23 18 15 20   BILIRUBIN 0.4 0.3 0.3 0.3   ALK PHOS 46 42 38* 44              Lab 03/02/22 2038   PROBNP 2,861.0*   TROPONIN T 0.011                                        Brief Urine Lab Results  (Last result in the past 365 days)       Color   Clarity   Blood   Leuk Est   Nitrite   Protein   CREAT   Urine HCG         03/03/22 0843                         54.9                               Microbiology Results Abnormal      Procedure Component Value - Date/Time     COVID PRE-OP / PRE-PROCEDURE SCREENING ORDER (NO ISOLATION) - Swab, Nasopharynx [[id number]]  (Normal) Collected: 03/03/22 0546     Lab Status: Final result Specimen: Swab from Nasopharynx Updated: 03/03/22 0652     Narrative:       The following orders were created for panel order COVID PRE-OP / PRE-PROCEDURE SCREENING ORDER (NO ISOLATION) - Swab, Nasopharynx.  Procedure                               Abnormality         Status                     ---------                               -----------         ------                     COVID-19, FLU A/B, RSV P...[[id number]]  Normal              Final result                  Please view results for these tests on the individual orders.     COVID-19, FLU A/B, RSV PCR - Swab, Nasopharynx [[id number]]  (Normal) Collected: 03/03/22 0546     Lab Status: Final result Specimen: Swab from Nasopharynx Updated: 03/03/22 0652       COVID19 Not Detected       Influenza A PCR Not Detected       Influenza B PCR Not Detected       RSV, PCR Not Detected     Narrative:       Fact sheet for providers: https://www.fda.gov/media/894238/download    Fact sheet for patients: https://www.fda.gov/media/086807/download     Test performed by PCR.             Radiology results from the last 24 hours:   No radiology results from the last 24 hrs         Results for orders placed during the hospital encounter of 03/03/22     Adult Transthoracic Echo Complete W/ Cont if Necessary Per Protocol     Interpretation Summary  · Estimated left ventricular EF = 30%  · Moderate mitral valve regurgitation is present.  · There is calcification of the aortic valve.  · Estimated right ventricular systolic pressure from tricuspid regurgitation is mildly elevated (35-45 mmHg).        I have reviewed the medications:  Scheduled Meds:aspirin, 81 mg, Oral, Nightly  atorvastatin, 80 mg, Oral, Nightly  cetirizine, 5 mg, Oral, Daily  donepezil, 5 mg, Oral, Nightly  erythromycin, 1 application, Both Eyes, Nightly  famotidine, 20 mg, Oral, Daily  ferrous sulfate, 325 mg, Oral, Daily  furosemide, 20 mg, Oral, Q48H  gabapentin, 300 mg, Oral, Nightly  heparin (porcine), 5,000 Units, Subcutaneous, Q12H  levothyroxine, 50 mcg, Oral, Q AM  metoprolol succinate XL, 25 mg, Oral, Daily  nystatin, 5 mL, Swish & Swallow, 4x Daily  ofloxacin, 1 drop, Both Eyes, TID  polyethylene glycol, 17 g, Oral, Daily  rOPINIRole, 0.5 mg, Oral, Nightly  sodium chloride, 10 mL, Intravenous, Q12H        Continuous Infusions:   PRN Meds:.•  acetaminophen **OR** acetaminophen **OR** acetaminophen  •  LORazepam  •  magnesium sulfate **OR** magnesium sulfate in D5W 1g/100mL (PREMIX) **OR** magnesium sulfate  •  meclizine  •  polyvinyl alcohol  •  potassium chloride **OR** potassium chloride **OR** potassium chloride  •  sodium chloride  •  sodium chloride           Assessment/Plan      Assessment & Plan            Active Hospital Problems     Diagnosis   POA   • Elevated serum creatinine [R79.89]   Yes   • Hypothyroid [E03.9]   Yes   • Chronic systolic heart failure (HCC) [I50.22]   Yes   • CARYN (acute kidney injury) (HCC) [N17.9]   Yes   • History of CVA (cerebrovascular accident) [Z86.73]   Not Applicable   • Dementia (HCC) [F03.90]   Yes   • Hypertension [I10]   Yes   • Hyponatremia [E87.1]   Yes       Resolved  Hospital Problems   No resolved problems to display.         Brief Hospital Course to date:  Scott Diego is a 89 y.o. female  Who lives with her son in [place] following the recent death of her  in January.  Son states since this time, pt has gone down hill.  Poor po intake.  Unable to take care of self.  Has a lot of anxiety.  Worsening memory.  Son notes she has O2 at night and he checked her sats at home and they were 86 today.  Son brings pt in as he feels he cannot safely take care of her.      CARYN  -resolved, but mild bump in creatinine today, monitor     Hyponatremia  -resolved     Chronic systolic HF  -CXR with mild pulmonary interstitial disease  -ECHO with EF 20-30%  -changed lasix to every other day only  -outpatient follow up with [facility]  -BB     Possible Thrush  -nystatin swish and swallow     Agitation with h/o Dementia  Prolonged QTC  -monitor, limit seroquel/haldol  -prn ativan for agitation     HTN  -metoprolol     Hyperkalemia  -given lokelma again, 3/8  -improved, monitor     Hypothyroidism  -Levothyroxine     Debility  Generalized weakness  Dementia  History of CVA  -PT/OT/fall precautions  -aricept     Bereavement  - passed away January 2022     No family at bedside this AM     DVT prophylaxis:  Medical DVT prophylaxis orders are present.         AM-PAC 6 Clicks Score (PT): 19 (03/08/22 1046)     Disposition: I expect the patient to be discharged to rehab.     CODE STATUS:       Code Status and Medical Interventions:   Ordered at: 03/03/22 0504     Level Of Support Discussed With:     Next of Kin (If No Surrogate)     Code Status (Patient has no pulse and is not breathing):     CPR (Attempt to Resuscitate)     Medical Interventions (Patient has pulse or is breathing):     Full Support         Jan Martins MD  03/09/22                        Consult Notes (last 24 hours)  Notes from 03/09/22 1044 through 03/10/22 1044   No notes of this type exist for this  encounter.

## 2022-03-10 NOTE — PLAN OF CARE
Goal Outcome Evaluation:  Plan of Care Reviewed With: patient        Progress: improving  Outcome Evaluation: Pt completed 30ft HHA, CGA STS, completed grooming standing sink side with SBA, CGA toileting at [facility], cont IPOT per POC

## 2022-03-10 NOTE — PLAN OF CARE
Problem: Adult Inpatient Plan of Care  Goal: Plan of Care Review  Outcome: Ongoing, Progressing  Flowsheets (Taken 3/10/2022 1329)  Progress: improving  Plan of Care Reviewed With:   patient   family   Goal Outcome Evaluation:  Plan of Care Reviewed With: patient, family        Progress: improving   SLP treatment completed. Will sign-off dysphagia. Please see note for further details and recommendations.

## 2022-03-10 NOTE — PLAN OF CARE
Goal Outcome Evaluation:  Plan of Care Reviewed With: patient        Progress: improving  Outcome Evaluation: Pt ambulates increased distance in tony, but requires MIN A via HHA, cueing for safety.

## 2022-03-10 NOTE — NURSING NOTE
Cardiology Navigator Note      Patient Name:  Scott Diego  :  4/10/1930  DOS:  03/10/22    Active Hospital Problems    Diagnosis    • Elevated serum creatinine    • Hypothyroid    • Chronic systolic heart failure (HCC)    • CARYN (acute kidney injury) (HCC)    • History of CVA (cerebrovascular accident)    • Dementia (HCC)    • Hypertension    • Hyponatremia        Consult has been received.  Medical records have been reviewed. Patient to be scheduled follow up 3-5 days post discharge.    Echo Results:  · Estimated left ventricular EF = 30%  · Moderate mitral valve regurgitation is present.  · There is calcification of the aortic valve.  · Estimated right ventricular systolic pressure from tricuspid regurgitation is mildly elevated (35-45 mmHg).        Heart Failure Quality Measures    ACE I, ARB, ARNI (if EF <40%)     CARYN    Evidence-based Beta Blocker (EF<40%)    (Bisoprolol, Carvedilol, Metoprolol Succinate)  Metoprolol succinate    Aldosterone Antagonist (EF <40%)  CARYN

## 2022-03-11 LAB
ANION GAP SERPL CALCULATED.3IONS-SCNC: 7 MMOL/L (ref 5–15)
BUN SERPL-MCNC: 26 MG/DL (ref 8–23)
BUN/CREAT SERPL: 15.9 (ref 7–25)
CALCIUM SPEC-SCNC: 9.4 MG/DL (ref 8.2–9.6)
CHLORIDE SERPL-SCNC: 101 MMOL/L (ref 98–107)
CO2 SERPL-SCNC: 28 MMOL/L (ref 22–29)
CREAT SERPL-MCNC: 1.64 MG/DL (ref 0.57–1)
DEPRECATED RDW RBC AUTO: 50.1 FL (ref 37–54)
EGFRCR SERPLBLD CKD-EPI 2021: 29.4 ML/MIN/1.73
ERYTHROCYTE [DISTWIDTH] IN BLOOD BY AUTOMATED COUNT: 14.4 % (ref 12.3–15.4)
GLUCOSE SERPL-MCNC: 93 MG/DL (ref 65–99)
HCT VFR BLD AUTO: 37.3 % (ref 34–46.6)
HGB BLD-MCNC: 11.7 G/DL (ref 12–15.9)
MCH RBC QN AUTO: 29.7 PG (ref 26.6–33)
MCHC RBC AUTO-ENTMCNC: 31.4 G/DL (ref 31.5–35.7)
MCV RBC AUTO: 94.7 FL (ref 79–97)
PLATELET # BLD AUTO: 196 10*3/MM3 (ref 140–450)
PMV BLD AUTO: 11.9 FL (ref 6–12)
POTASSIUM SERPL-SCNC: 4.8 MMOL/L (ref 3.5–5.2)
RBC # BLD AUTO: 3.94 10*6/MM3 (ref 3.77–5.28)
SODIUM SERPL-SCNC: 136 MMOL/L (ref 136–145)
WBC NRBC COR # BLD: 7.79 10*3/MM3 (ref 3.4–10.8)

## 2022-03-11 PROCEDURE — 80048 BASIC METABOLIC PNL TOTAL CA: CPT | Performed by: NURSE PRACTITIONER

## 2022-03-11 PROCEDURE — 25010000002 HEPARIN (PORCINE) PER 1000 UNITS: Performed by: NURSE PRACTITIONER

## 2022-03-11 PROCEDURE — 25010000002 LORAZEPAM PER 2 MG: Performed by: NURSE PRACTITIONER

## 2022-03-11 PROCEDURE — 99232 SBSQ HOSP IP/OBS MODERATE 35: CPT | Performed by: NURSE PRACTITIONER

## 2022-03-11 PROCEDURE — 85027 COMPLETE CBC AUTOMATED: CPT | Performed by: NURSE PRACTITIONER

## 2022-03-11 RX ORDER — ALPRAZOLAM 0.25 MG/1
0.25 TABLET ORAL 3 TIMES DAILY PRN
Status: DISCONTINUED | OUTPATIENT
Start: 2022-03-11 | End: 2022-03-17 | Stop reason: HOSPADM

## 2022-03-11 RX ORDER — SODIUM CHLORIDE 9 MG/ML
50 INJECTION, SOLUTION INTRAVENOUS CONTINUOUS
Status: DISCONTINUED | OUTPATIENT
Start: 2022-03-11 | End: 2022-03-12

## 2022-03-11 RX ORDER — SODIUM CHLORIDE 9 MG/ML
50 INJECTION, SOLUTION INTRAVENOUS CONTINUOUS
Status: DISCONTINUED | OUTPATIENT
Start: 2022-03-11 | End: 2022-03-11

## 2022-03-11 RX ORDER — ALPRAZOLAM 0.25 MG/1
0.25 TABLET ORAL NIGHTLY
Status: DISCONTINUED | OUTPATIENT
Start: 2022-03-11 | End: 2022-03-15

## 2022-03-11 RX ORDER — LORAZEPAM 2 MG/ML
0.25 INJECTION INTRAMUSCULAR ONCE
Status: COMPLETED | OUTPATIENT
Start: 2022-03-11 | End: 2022-03-11

## 2022-03-11 RX ADMIN — Medication 10 ML: at 10:30

## 2022-03-11 RX ADMIN — ALPRAZOLAM 0.25 MG: 0.25 TABLET ORAL at 20:39

## 2022-03-11 RX ADMIN — METOPROLOL SUCCINATE 25 MG: 25 TABLET, EXTENDED RELEASE ORAL at 10:29

## 2022-03-11 RX ADMIN — FAMOTIDINE 20 MG: 20 TABLET, FILM COATED ORAL at 10:29

## 2022-03-11 RX ADMIN — NYSTATIN 500000 UNITS: 100000 SUSPENSION ORAL at 15:18

## 2022-03-11 RX ADMIN — GABAPENTIN 300 MG: 300 CAPSULE ORAL at 20:07

## 2022-03-11 RX ADMIN — LORAZEPAM 0.25 MG: 2 INJECTION INTRAMUSCULAR; INTRAVENOUS at 22:24

## 2022-03-11 RX ADMIN — OFLOXACIN 1 DROP: 3 SOLUTION/ DROPS OPHTHALMIC at 15:18

## 2022-03-11 RX ADMIN — Medication 10 ML: at 20:08

## 2022-03-11 RX ADMIN — NYSTATIN 500000 UNITS: 100000 SUSPENSION ORAL at 10:29

## 2022-03-11 RX ADMIN — Medication 325 MG: at 10:30

## 2022-03-11 RX ADMIN — DONEPEZIL HYDROCHLORIDE 5 MG: 5 TABLET, FILM COATED ORAL at 20:08

## 2022-03-11 RX ADMIN — LEVOTHYROXINE SODIUM 50 MCG: 50 TABLET ORAL at 10:30

## 2022-03-11 RX ADMIN — ALPRAZOLAM 0.25 MG: 0.25 TABLET ORAL at 18:20

## 2022-03-11 RX ADMIN — FUROSEMIDE 20 MG: 20 TABLET ORAL at 10:30

## 2022-03-11 RX ADMIN — HEPARIN SODIUM 5000 UNITS: 5000 INJECTION, SOLUTION INTRAVENOUS; SUBCUTANEOUS at 20:08

## 2022-03-11 RX ADMIN — CETIRIZINE HYDROCHLORIDE 5 MG: 10 TABLET, FILM COATED ORAL at 10:30

## 2022-03-11 RX ADMIN — ATORVASTATIN CALCIUM 80 MG: 40 TABLET, FILM COATED ORAL at 20:08

## 2022-03-11 RX ADMIN — SODIUM CHLORIDE 50 ML/HR: 9 INJECTION, SOLUTION INTRAVENOUS at 15:17

## 2022-03-11 RX ADMIN — NYSTATIN 500000 UNITS: 100000 SUSPENSION ORAL at 20:07

## 2022-03-11 RX ADMIN — ASPIRIN 81 MG 81 MG: 81 TABLET ORAL at 20:08

## 2022-03-11 RX ADMIN — OFLOXACIN 1 DROP: 3 SOLUTION/ DROPS OPHTHALMIC at 20:07

## 2022-03-11 RX ADMIN — POLYETHYLENE GLYCOL 3350 17 G: 17 POWDER, FOR SOLUTION ORAL at 10:30

## 2022-03-11 RX ADMIN — SODIUM CHLORIDE 50 ML/HR: 9 INJECTION, SOLUTION INTRAVENOUS at 10:31

## 2022-03-11 RX ADMIN — OFLOXACIN 1 DROP: 3 SOLUTION/ DROPS OPHTHALMIC at 10:30

## 2022-03-11 RX ADMIN — ERYTHROMYCIN 1 APPLICATION: 5 OINTMENT OPHTHALMIC at 20:07

## 2022-03-11 RX ADMIN — ROPINIROLE HYDROCHLORIDE 0.5 MG: 0.5 TABLET, FILM COATED ORAL at 20:08

## 2022-03-11 RX ADMIN — POLYVINYL ALCOHOL 1 DROP: 14 SOLUTION/ DROPS OPHTHALMIC at 20:07

## 2022-03-11 RX ADMIN — HEPARIN SODIUM 5000 UNITS: 5000 INJECTION, SOLUTION INTRAVENOUS; SUBCUTANEOUS at 10:29

## 2022-03-11 NOTE — PLAN OF CARE
Goal Outcome Evaluation:      Visited with pt and family at bedside. PT was agreeable but slightly confused during conversation. PT stated she belonged to a [employer] in her hometown. Both pt and family were appreciative of  support.

## 2022-03-11 NOTE — PROGRESS NOTES
[facility] Medicine Services  PROGRESS NOTE    Patient Name: Scott Diego  : 4/10/1930  MRN: [id number]    Date of Admission: 3/3/2022  Primary Care Physician: Yolande Escobar MD    Subjective   Subjective     CC:  CARYN/CHF/weakness    HPI:  Patient seen resting back in bed awake and alert.  Oriented to self only at baseline.  Completely really go home.  No other complaints.  Minimal interaction otherwise.  Spoke with nurse.  IV fluids reordered due to poor oral intake.    ROS:  BARAK due to baseline dementia    Objective   Objective     Vital Signs:   Temp:  [97.6 °F (36.4 °C)] 97.6 °F (36.4 °C)  Heart Rate:  [] 63  Resp:  [18] 18  BP: (125-143)/(66-92) 125/66     Physical Exam:  Constitutional: No acute distress, resting back in bed.  Awake.  No visitors at bedside.  Frail and chronically ill-appearing elderly female  HENT: NCAT, mucous membranes moist  Respiratory: Clear to auscultation bilaterally, respiratory effort normal   Cardiovascular: RRR, no murmurs, rubs, or gallops  Gastrointestinal: Positive bowel sounds, soft, nontender, nondistended.  Thin habitus.  Musculoskeletal: No bilateral ankle edema.  MARTÍNEZ spontaneously.  Psychiatric: Appropriate affect, cooperative and calm  Neurologic: Oriented to self only at baseline, strength symmetric in all extremities but generally weak, Cranial Nerves grossly intact to confrontation, speech clear but confused.  Skin: No rashes      Results Reviewed:  LAB RESULTS:      Lab 22  0715 22  0320 22  0457 22  0511   WBC 7.79 8.17 6.95 5.16   HEMOGLOBIN 11.7* 12.1 10.9* 10.0*   HEMATOCRIT 37.3 38.4 34.7 31.2*   PLATELETS 196 166 155 130*   NEUTROS ABS  --  5.33 4.05 2.58   IMMATURE GRANS (ABS)  --  0.02 0.02 0.01   LYMPHS ABS  --  1.38 1.55 1.34   MONOS ABS  --  1.21* 1.04* 0.80   EOS ABS  --  0.17 0.23 0.37   MCV 94.7 93.2 94.6 92.9         Lab 22  0715 03/10/22  0721 22  0338 22  0320  03/07/22  0457 03/06/22  0756   SODIUM 136 138 137 134* 138  --    POTASSIUM 4.8 4.5 4.6 5.3* 5.6*  --    CHLORIDE 101 99 101 101 105  --    CO2 28.0 27.0 24.0 19.0* 25.0  --    ANION GAP 7.0 12.0 12.0 14.0 8.0  --    BUN 26* 25* 20 16 14  --    CREATININE 1.64* 1.62* 1.51* 1.28* 1.23*  --    EGFR 29.4* 29.9* 32.5* 39.6* 41.6*  --    GLUCOSE 93 101* 90 72 98  --    CALCIUM 9.4 9.6 9.1 9.6 9.5  --    MAGNESIUM  --   --   --   --  1.9 1.7         Lab 03/08/22  0320 03/07/22  0457 03/05/22  0511   TOTAL PROTEIN 7.9 7.1 6.4   ALBUMIN 3.30* 3.20* 3.00*   GLOBULIN 4.6 3.9 3.4   ALT (SGPT) 10 8 7   AST (SGOT) 23 18 15   BILIRUBIN 0.4 0.3 0.3   ALK PHOS 46 42 38*                     Brief Urine Lab Results  (Last result in the past 365 days)      Color   Clarity   Blood   Leuk Est   Nitrite   Protein   CREAT   Urine HCG        03/03/22 0843             54.9               Microbiology Results Abnormal     Procedure Component Value - Date/Time    COVID PRE-OP / PRE-PROCEDURE SCREENING ORDER (NO ISOLATION) - Swab, Nasopharynx [[id number]]  (Normal) Collected: 03/03/22 0546    Lab Status: Final result Specimen: Swab from Nasopharynx Updated: 03/03/22 0652    Narrative:      The following orders were created for panel order COVID PRE-OP / PRE-PROCEDURE SCREENING ORDER (NO ISOLATION) - Swab, Nasopharynx.  Procedure                               Abnormality         Status                     ---------                               -----------         ------                     COVID-19, FLU A/B, RSV P...[[id number]]  Normal              Final result                 Please view results for these tests on the individual orders.    COVID-19, FLU A/B, RSV PCR - Swab, Nasopharynx [[id number]]  (Normal) Collected: 03/03/22 0546    Lab Status: Final result Specimen: Swab from Nasopharynx Updated: 03/03/22 0652     COVID19 Not Detected     Influenza A PCR Not Detected     Influenza B PCR Not Detected     RSV, PCR Not Detected    Narrative:       Fact sheet for providers: https://www.fda.gov/media/567098/download    Fact sheet for patients: https://www.fda.gov/media/117095/download    Test performed by PCR.          No radiology results from the last 24 hrs    Results for orders placed during the hospital encounter of 03/03/22    Adult Transthoracic Echo Complete W/ Cont if Necessary Per Protocol    Interpretation Summary  · Estimated left ventricular EF = 30%  · Moderate mitral valve regurgitation is present.  · There is calcification of the aortic valve.  · Estimated right ventricular systolic pressure from tricuspid regurgitation is mildly elevated (35-45 mmHg).      I have reviewed the medications:  Scheduled Meds:aspirin, 81 mg, Oral, Nightly  atorvastatin, 80 mg, Oral, Nightly  cetirizine, 5 mg, Oral, Daily  donepezil, 5 mg, Oral, Nightly  erythromycin, 1 application, Both Eyes, Nightly  famotidine, 20 mg, Oral, Daily  ferrous sulfate, 325 mg, Oral, Daily  furosemide, 20 mg, Oral, Q48H  gabapentin, 300 mg, Oral, Nightly  heparin (porcine), 5,000 Units, Subcutaneous, Q12H  levothyroxine, 50 mcg, Oral, Q AM  metoprolol succinate XL, 25 mg, Oral, Daily  nystatin, 5 mL, Swish & Swallow, 4x Daily  ofloxacin, 1 drop, Both Eyes, TID  polyethylene glycol, 17 g, Oral, Daily  rOPINIRole, 0.5 mg, Oral, Nightly  sodium chloride, 10 mL, Intravenous, Q12H      Continuous Infusions:sodium chloride, 50 mL/hr, Last Rate: 50 mL/hr (03/11/22 1031)      PRN Meds:.•  acetaminophen **OR** acetaminophen **OR** acetaminophen  •  magnesium sulfate **OR** magnesium sulfate in D5W 1g/100mL (PREMIX) **OR** magnesium sulfate  •  meclizine  •  polyvinyl alcohol  •  potassium chloride **OR** potassium chloride **OR** potassium chloride  •  sodium chloride  •  sodium chloride    Assessment/Plan   Assessment & Plan     Active Hospital Problems    Diagnosis  POA   • Elevated serum creatinine [R79.89]  Yes   • Hypothyroid [E03.9]  Yes   • Chronic systolic heart failure (HCC) [I50.22]   Yes   • CARYN (acute kidney injury) (HCC) [N17.9]  Yes   • History of CVA (cerebrovascular accident) [Z86.73]  Not Applicable   • Dementia (HCC) [F03.90]  Yes   • Hypertension [I10]  Yes   • Hyponatremia [E87.1]  Yes      Resolved Hospital Problems   No resolved problems to display.        Brief Hospital Course to date:  Scott Diego is a 89 y.o. female  Who lives with her son in [place] following the recent death of her  in January.  Son states since this time, pt has gone down hill.  Poor po intake.  Unable to take care of self.  Has a lot of anxiety.  Worsening memory.  Son notes she has O2 at night and he checked her sats at home and they were 86 today.  Son brings pt in as he feels he cannot safely take care of her.       This patient's problems and plans were partially entered by my partner and updated as appropriate by me 03/11/22.    Assessment/plan:    CARYN  -Initially resolved, but mild bump in creatinine yesterday.  Yesterday creatinine 1.62, today up slightly again to 1.64 after 12 hours of IV fluids overnight.  Poor oral intake.   --We will encourage p.o. intake.  Due to decreased intake will resume IV fluids at 50 cc an hour for now and monitor.  --Reassess a.m. labs.   --monitor     Hyponatremia  -resolved     Chronic systolic HF  -CXR with mild pulmonary interstitial disease  -ECHO with EF 20-30%  -changed lasix to every other day only.  Monitor renal function.  -outpatient follow up with [facility]  -BB     Possible Thrush  -Continue nystatin swish and swallow    Agitation with h/o Dementia  Prolonged QTC  - limit seroquel/haldol  -prn ativan for agitation  --Currently resting back in bed with no agitation.  Complaining of being very cold.  Monitor.  --placement.  Now has a bed at [facility] with transportation arranged for 1515 p.m. on Monday 3/14.    HTN  -metoprolol.  Stable     Hyperkalemia  -given lokelma again, 3/8  -improved, potassium today 4.5.   Monitor     Hypothyroidism  -Levothyroxine     Debility/failure to thrive  Generalized weakness  Dementia  History of CVA  -PT/OT/fall precautions  -aricept  --CM following for disposition.  Has a bed at [facility] for Monday 3/14 as below.  --Consulting palliative care today for goals of care.     Bereavement  - passed away January 2022    No family at bedside today.      Daily Care Communication  Due to current limited visitation policies, an attempt will be made daily to update patient's identified best point-of-contact(s)   Contact: Noah   Relation: Son   Time of communication: 10:45 AM   Notes (if applicable): Called and spoke with patient's son Noah via phone.  Long discussion regarding patient's labs, worsening renal function, poor oral intake and general failure to thrive.  Discussed CODE STATUS in relation to patient's progressive debility and age and associated frailty.  He was very pleasant and understanding.  After discussion he agrees to no CPR at this time.  Also discussed palliative care consult.  He is in agreement.  Consulting today.         DVT prophylaxis:  Medical DVT prophylaxis orders are present.       AM-PAC 6 Clicks Score (PT): 20 (03/11/22 1029)    Disposition: I expect the patient to be discharged to rehab.  Has received a bed at [facility] awaiting insurance preserved.  Ambulance has been arranged tentatively for Monday 3/14 at 1515 p.m.    CODE STATUS:   Code Status and Medical Interventions:   Ordered at: 03/11/22 1052     Level Of Support Discussed With:    Next of Kin (If No Surrogate)     Code Status (Patient has no pulse and is not breathing):    No CPR (Do Not Attempt to Resuscitate)     Medical Interventions (Patient has pulse or is breathing):    Full Support     Comments:    son Noah Nielsen Hannah Diaz, APRN  03/11/22

## 2022-03-11 NOTE — PLAN OF CARE
"Goal Outcome Evaluation:  Plan of Care Reviewed With: patient        Progress: no change       Pt rested until 1030. Pt alert, but oriented to self only. Pleasantly confused. Pt has complained of \"smothering\" and anxiety. O2 sats maintained >95% on RA. 2LNC applied intermittently for pt comfort. Palliative consulted today. Poor PO intake. IVF for 12 hrs again @ 50mL/hr.   "

## 2022-03-11 NOTE — PLAN OF CARE
Goal Outcome Evaluation:  Plan of Care Reviewed With: patient, son        Progress: no change  Outcome Evaluation: New palliative consult for assistance with GOC.  Pt. sitting up in bed pleasantly confused with son Dayron at BS.  Palliative APRNs saw pt. this afternoon. Pt. very pleasantly confused at times.  Pt. was eating lunch at time of palliative nursing visit.  Pt. denied pain, dyspnea, nausea and other symptoms.  Per nursing report, pt. was anxious and dyspneic this morning.  Pt. did not demonstrate any visible signs of discomfort during visit.  Patient's   in .  She stated she loved him very much.  She relayed how he was a childhood friend that she looked up at the age of 89 on facebook.  The couple was  two years ago.  Per son (Dayron), pt. has been declining since the death of her .  Plan for pt. to ro to [facility] for rehab with palliative care following.  Palliative care to follow for support, POC and symptom management.    1300 Palliative IDT meeting: MAURICE Dockery RN, CHPN; ZHANG Patel RN, CHKHUSHBU; VASILE Alicia; VASILE Chacon; GUILLAUME Sarah, ; ARIA Álvarez RN, CHPN; KRZYSZTOF Padilla RN, CHPN      Problem: Palliative Care  Goal: Enhanced Quality of Life  Intervention: Promote Advance Care Planning  Flowsheets (Taken 3/11/2022 1414)  Life Transition/Adjustment:   palliative care discussed   palliative care initiated

## 2022-03-11 NOTE — CONSULTS
Palliative Care Initial Consult   Attending Physician: Jan Martins MD  Referring Provider: VASILE Diaz    Reason for Referral:  assistance with clarification of goals of care    Code Status:   Code Status and Medical Interventions:   Ordered at: 22 1052     Level Of Support Discussed With:    Next of Kin (If No Surrogate)     Code Status (Patient has no pulse and is not breathing):    No CPR (Do Not Attempt to Resuscitate)     Medical Interventions (Patient has pulse or is breathing):    Full Support     Comments:    spencer Zamora      Advanced Directives: Advance Directive Status: Patient does not have advance directive   Family/Support: spencer Sinha listed as NOK on chart; recently  22  Goals of Care: TBD.    HPI:   89 yo female, recently moved in with her son after her   in January of this year, with PMH significant for HTN, chronic systolic CHF, mild cognitive impairment, prior CVA, chronic hyponatremia who presented to [facility] ED on 3/8 r/t hypoxia and panic attacks. Upon admission son also report decreased appetite and increased debility. Work-up this admission revealed elevated creatinine, hyponatremia. Hospital course complicated by agitation with h/o delerium. Planning for rehab with anticipated discharge date of 3/14 to [facility]. Palliative Care consulted to assist with GOC and ACP in the context of complex medical decision making. Patient is now and DNR/DNI per primary team.     ROS:   Denies pain, SOA, nausea; +anxiety; patient reports overall comfort and denies needs at this time; son reports patient with anxiety and most recently requiring xanax TID; he also reports pt with constant c/o being hot and cold all throughout the day. Unable to complete full ROS r/t pt AMS    Past Medical History:   Diagnosis Date   • Congestive heart failure (HCC)    • Malignant neoplasm (HCC)    • Stroke (HCC)     mini stroke   • Systolic heart failure (HCC) 2017    Chronic/compensated (EF  "25%)     Past Surgical History:   Procedure Laterality Date   • CHOLECYSTECTOMY     • EYE SURGERY     • HYSTERECTOMY       Social History     Socioeconomic History   • Marital status:    Tobacco Use   • Smoking status: Never Smoker   • Smokeless tobacco: Never Used   Substance and Sexual Activity   • Alcohol use: No   • Drug use: No   • Sexual activity: Defer     Family History   Problem Relation Age of Onset   • Cancer Mother    • No Known Problems Father    • Cancer Sister    • Cancer Brother        Allergies   Allergen Reactions   • Augmentin [Amoxicillin-Pot Clavulanate] Nausea And Vomiting   • Claritin [Loratadine] Unknown (See Comments)     Doesn't remember   • Keflex [Cephalexin] Nausea And Vomiting   • Sulfa Antibiotics Other (See Comments)     Does not remember       Current medication reviewed for route, type, dose and frequency and are current per MAR at time of dictation.    Palliative Performance Scale Score:  40%    /66 (BP Location: Left arm, Patient Position: Lying)   Pulse 63   Temp 97.6 °F (36.4 °C) (Oral)   Resp 18   Ht 165.1 cm (65\")   Wt 53.1 kg (117 lb)   SpO2 95%   BMI 19.47 kg/m²     Intake/Output Summary (Last 24 hours) at 3/11/2022 1227  Last data filed at 3/11/2022 1029  Gross per 24 hour   Intake 119 ml   Output --   Net 119 ml       Physical Exam:    General Appearance:    Alert, appearing younger than state age, cooperative, NAD, eating lunch   HEENT:    NC/AT, EOMI, anicteric, MMM, face relaxed   Neck:   supple, trachea midline, no JVD   Lungs:     CTA bilat, diminished in bases; respirations regular, even and unlabored; RR 16 on exam    Heart:    RRR, normal S1 and S2, no M/R/G   Abdomen:     Normal bowel sounds, soft, non-tender, non-distended   G/U:   Deferred   MSK/Extremities:   Wasting, no edema   Pulses:   Pulses palpable and equal bilaterally   Skin:   Warm, dry   Neurologic:   A/Ox1 self only, cooperative, MARTÍNEZ, pleasantly confused   Psych:   Calm, " joking/laughing         Labs:   Results from last 7 days   Lab Units 03/11/22  0715   WBC 10*3/mm3 7.79   HEMOGLOBIN g/dL 11.7*   HEMATOCRIT % 37.3   PLATELETS 10*3/mm3 196     Results from last 7 days   Lab Units 03/11/22  0715   SODIUM mmol/L 136   POTASSIUM mmol/L 4.8   CHLORIDE mmol/L 101   CO2 mmol/L 28.0   BUN mg/dL 26*   CREATININE mg/dL 1.64*   GLUCOSE mg/dL 93   CALCIUM mg/dL 9.4     Results from last 7 days   Lab Units 03/11/22  0715 03/09/22  0338 03/08/22  0320   SODIUM mmol/L 136   < > 134*   POTASSIUM mmol/L 4.8   < > 5.3*   CHLORIDE mmol/L 101   < > 101   CO2 mmol/L 28.0   < > 19.0*   BUN mg/dL 26*   < > 16   CREATININE mg/dL 1.64*   < > 1.28*   CALCIUM mg/dL 9.4   < > 9.6   BILIRUBIN mg/dL  --   --  0.4   ALK PHOS U/L  --   --  46   ALT (SGPT) U/L  --   --  10   AST (SGOT) U/L  --   --  23   GLUCOSE mg/dL 93   < > 72    < > = values in this interval not displayed.     Imaging Results (Last 72 Hours)     ** No results found for the last 72 hours. **              Diagnostics: Reviewed    A:   Patient Active Problem List   Diagnosis   • Idiopathic peripheral neuropathy   • Neck pain   • Mild cognitive impairment   • Anemia   • Hyponatremia   • Hypertension   • Dehydration   • History of CVA (cerebrovascular accident)   • Dementia ([facility])   • Left bundle branch block   • Orthostatic dizziness   • Chronic systolic heart failure ([facility])   • Normocytic anemia   • CARYN (acute kidney injury) ([facility])   • Dyspnea   • Hypoxia   • Hypothyroid   • Pericardial effusion   • Acute respiratory failure with hypoxia ([facility])   • Right lower lobe pneumonia   • Ventricular ectopy   • Elevated serum creatinine         S/S:   1. AMS  -baseline per son report    2. Debility  -PT/OT following    3. GOC  -DNR/DNI  -Planning for SNF  -Son would like palliative care to follow at [facility] [place] [facility]    P:  Introduced palliative services and reviewed patient current condition, treatment options and GOC. He reports current plan is for pt to go to rehab at  "[facility] and \"we will see how it goes\". He is interested in Palliative care continuing to follow along as he seems aware that patient is at risk for additional and going decline. He is very appreciative of consult and information. Recommend palliative care referral at time of discharge to SNF. Medication adjustments as above. Patient has been taking xanax for years per sons report and it has been effective. Plan to transition back to po xanax in preparation for DC. Will schedule nightly. Discussed palliative care will continue to follow and adjust as needed. Patient may need higher dosing of xanax as pt is currently tolerating ativan 1mg. Thank you for this consult and allowing us to participate in patient's plan of care. Palliative Care Team will continue to follow patient.     Time spent:60 minutes spent reviewing medical and medication records, assessing and examining patient, discussing with family, answering questions, providing some guidance about a plan and documentation of care, and coordinating care with other healthcare members, with > 50% time spent face to face.     Julissa Felder, APRN  3/11/2022      "

## 2022-03-12 LAB
ANION GAP SERPL CALCULATED.3IONS-SCNC: 9 MMOL/L (ref 5–15)
BUN SERPL-MCNC: 26 MG/DL (ref 8–23)
BUN/CREAT SERPL: 19.5 (ref 7–25)
CALCIUM SPEC-SCNC: 9 MG/DL (ref 8.2–9.6)
CHLORIDE SERPL-SCNC: 104 MMOL/L (ref 98–107)
CO2 SERPL-SCNC: 25 MMOL/L (ref 22–29)
CREAT SERPL-MCNC: 1.33 MG/DL (ref 0.57–1)
EGFRCR SERPLBLD CKD-EPI 2021: 37.9 ML/MIN/1.73
GLUCOSE SERPL-MCNC: 93 MG/DL (ref 65–99)
POTASSIUM SERPL-SCNC: 4.9 MMOL/L (ref 3.5–5.2)
SODIUM SERPL-SCNC: 138 MMOL/L (ref 136–145)

## 2022-03-12 PROCEDURE — 25010000002 HEPARIN (PORCINE) PER 1000 UNITS: Performed by: NURSE PRACTITIONER

## 2022-03-12 PROCEDURE — 97116 GAIT TRAINING THERAPY: CPT

## 2022-03-12 PROCEDURE — 97530 THERAPEUTIC ACTIVITIES: CPT

## 2022-03-12 PROCEDURE — 25010000002 LORAZEPAM PER 2 MG: Performed by: NURSE PRACTITIONER

## 2022-03-12 PROCEDURE — 99232 SBSQ HOSP IP/OBS MODERATE 35: CPT | Performed by: NURSE PRACTITIONER

## 2022-03-12 PROCEDURE — 97110 THERAPEUTIC EXERCISES: CPT

## 2022-03-12 PROCEDURE — 80048 BASIC METABOLIC PNL TOTAL CA: CPT | Performed by: NURSE PRACTITIONER

## 2022-03-12 RX ORDER — SODIUM CHLORIDE 9 MG/ML
50 INJECTION, SOLUTION INTRAVENOUS CONTINUOUS
Status: ACTIVE | OUTPATIENT
Start: 2022-03-12 | End: 2022-03-12

## 2022-03-12 RX ORDER — LORAZEPAM 2 MG/ML
0.25 INJECTION INTRAMUSCULAR ONCE
Status: COMPLETED | OUTPATIENT
Start: 2022-03-12 | End: 2022-03-12

## 2022-03-12 RX ADMIN — NYSTATIN 500000 UNITS: 100000 SUSPENSION ORAL at 17:16

## 2022-03-12 RX ADMIN — ASPIRIN 81 MG 81 MG: 81 TABLET ORAL at 20:08

## 2022-03-12 RX ADMIN — Medication 10 ML: at 20:07

## 2022-03-12 RX ADMIN — NYSTATIN 500000 UNITS: 100000 SUSPENSION ORAL at 09:57

## 2022-03-12 RX ADMIN — SODIUM CHLORIDE 50 ML/HR: 9 INJECTION, SOLUTION INTRAVENOUS at 11:09

## 2022-03-12 RX ADMIN — NYSTATIN 500000 UNITS: 100000 SUSPENSION ORAL at 11:52

## 2022-03-12 RX ADMIN — ROPINIROLE HYDROCHLORIDE 0.5 MG: 0.5 TABLET, FILM COATED ORAL at 20:06

## 2022-03-12 RX ADMIN — HEPARIN SODIUM 5000 UNITS: 5000 INJECTION, SOLUTION INTRAVENOUS; SUBCUTANEOUS at 09:57

## 2022-03-12 RX ADMIN — ALPRAZOLAM 0.25 MG: 0.25 TABLET ORAL at 20:09

## 2022-03-12 RX ADMIN — FAMOTIDINE 20 MG: 20 TABLET, FILM COATED ORAL at 09:57

## 2022-03-12 RX ADMIN — CETIRIZINE HYDROCHLORIDE 5 MG: 10 TABLET, FILM COATED ORAL at 09:57

## 2022-03-12 RX ADMIN — LORAZEPAM 0.25 MG: 2 INJECTION INTRAMUSCULAR; INTRAVENOUS at 22:07

## 2022-03-12 RX ADMIN — HEPARIN SODIUM 5000 UNITS: 5000 INJECTION, SOLUTION INTRAVENOUS; SUBCUTANEOUS at 20:07

## 2022-03-12 RX ADMIN — OFLOXACIN 1 DROP: 3 SOLUTION/ DROPS OPHTHALMIC at 14:52

## 2022-03-12 RX ADMIN — Medication 10 ML: at 09:57

## 2022-03-12 RX ADMIN — NYSTATIN 500000 UNITS: 100000 SUSPENSION ORAL at 20:07

## 2022-03-12 RX ADMIN — GABAPENTIN 300 MG: 300 CAPSULE ORAL at 20:08

## 2022-03-12 RX ADMIN — ERYTHROMYCIN 1 APPLICATION: 5 OINTMENT OPHTHALMIC at 20:12

## 2022-03-12 RX ADMIN — Medication 325 MG: at 09:57

## 2022-03-12 RX ADMIN — OFLOXACIN 1 DROP: 3 SOLUTION/ DROPS OPHTHALMIC at 20:08

## 2022-03-12 RX ADMIN — ALPRAZOLAM 0.25 MG: 0.25 TABLET ORAL at 14:52

## 2022-03-12 RX ADMIN — LEVOTHYROXINE SODIUM 50 MCG: 50 TABLET ORAL at 05:34

## 2022-03-12 RX ADMIN — OFLOXACIN 1 DROP: 3 SOLUTION/ DROPS OPHTHALMIC at 09:56

## 2022-03-12 RX ADMIN — ATORVASTATIN CALCIUM 80 MG: 40 TABLET, FILM COATED ORAL at 20:07

## 2022-03-12 RX ADMIN — DONEPEZIL HYDROCHLORIDE 5 MG: 5 TABLET, FILM COATED ORAL at 20:07

## 2022-03-12 RX ADMIN — METOPROLOL SUCCINATE 25 MG: 25 TABLET, EXTENDED RELEASE ORAL at 09:57

## 2022-03-12 NOTE — PLAN OF CARE
Goal Outcome Evaluation:  Plan of Care Reviewed With: patient        Progress: improving  Outcome Evaluation: patient ambulated 180 feet with Min assist x1, RUE support with occasional use of handrail in hallway on the left. Patient is easily distracted and needs cues to focus on attention to task, several standing rest breaks talking to people in hallway and c/o weakness/SOA. Unsteady gait with occasional LOB needing assist to correct. Completed HEP.

## 2022-03-12 NOTE — PLAN OF CARE
Problem: Adult Inpatient Plan of Care  Goal: Plan of Care Review  Outcome: Ongoing, Progressing  Flowsheets (Taken 3/11/2022 1414 by Nata Patel RN)  Plan of Care Reviewed With:   patient   son  Goal: Patient-Specific Goal (Individualized)  Outcome: Ongoing, Progressing  Goal: Absence of Hospital-Acquired Illness or Injury  Outcome: Ongoing, Progressing  Intervention: Identify and Manage Fall Risk  Recent Flowsheet Documentation  Taken 3/12/2022 0220 by Lorrie Isidro RN  Safety Promotion/Fall Prevention:   activity supervised   assistive device/personal items within reach   clutter free environment maintained   fall prevention program maintained   lighting adjusted   nonskid shoes/slippers when out of bed   safety round/check completed   room organization consistent  Taken 3/12/2022 0034 by Lorrie Isidro RN  Safety Promotion/Fall Prevention:   activity supervised   assistive device/personal items within reach   clutter free environment maintained   fall prevention program maintained   lighting adjusted   nonskid shoes/slippers when out of bed   room organization consistent   safety round/check completed  Taken 3/11/2022 2224 by Lorrie Isidro RN  Safety Promotion/Fall Prevention:   activity supervised   assistive device/personal items within reach   clutter free environment maintained   fall prevention program maintained   lighting adjusted   nonskid shoes/slippers when out of bed   room organization consistent   safety round/check completed  Taken 3/11/2022 2008 by Lorrie Isidro RN  Safety Promotion/Fall Prevention:   activity supervised   assistive device/personal items within reach   clutter free environment maintained   fall prevention program maintained   lighting adjusted   nonskid shoes/slippers when out of bed   safety round/check completed   room organization consistent  Intervention: Prevent Skin Injury  Recent Flowsheet Documentation  Taken 3/12/2022 0220 by Lorrie Isidro  RN  Body Position: position changed independently  Skin Protection:   adhesive use limited   incontinence pads utilized   tubing/devices free from skin contact  Taken 3/12/2022 0034 by Lorrie Isidro RN  Body Position: position changed independently  Skin Protection:   adhesive use limited   incontinence pads utilized   tubing/devices free from skin contact  Taken 3/11/2022 2224 by Lorrie Isidro RN  Body Position: position changed independently  Skin Protection:   adhesive use limited   tubing/devices free from skin contact   incontinence pads utilized  Taken 3/11/2022 2008 by Lorrie Isidro RN  Body Position:   side-lying   sitting up in bed  Skin Protection:   adhesive use limited   incontinence pads utilized   tubing/devices free from skin contact  Intervention: Prevent Infection  Recent Flowsheet Documentation  Taken 3/12/2022 0220 by Lorrie Iisdro RN  Infection Prevention:   environmental surveillance performed   single patient room provided   rest/sleep promoted  Taken 3/12/2022 0034 by Lorrie Isidro RN  Infection Prevention:   environmental surveillance performed   rest/sleep promoted   single patient room provided  Taken 3/11/2022 2224 by Lorrie Isidro RN  Infection Prevention:   single patient room provided   rest/sleep promoted   environmental surveillance performed  Taken 3/11/2022 2008 by Lorrie Isidro RN  Infection Prevention:   single patient room provided   rest/sleep promoted   environmental surveillance performed  Goal: Optimal Comfort and Wellbeing  Outcome: Ongoing, Progressing  Intervention: Provide Person-Centered Care  Recent Flowsheet Documentation  Taken 3/12/2022 0220 by Lorrie Isidro RN  Trust Relationship/Rapport:   care explained   choices provided   questions encouraged   questions answered   thoughts/feelings acknowledged  Taken 3/11/2022 2224 by Lorrie Isidro RN  Trust Relationship/Rapport:   care explained   choices provided   questions  answered   questions encouraged   thoughts/feelings acknowledged  Taken 3/11/2022 2008 by Lorrie Isidro RN  Trust Relationship/Rapport:   care explained   choices provided   thoughts/feelings acknowledged   questions encouraged   questions answered  Goal: Readiness for Transition of Care  Outcome: Ongoing, Progressing   Goal Outcome Evaluation:               Pt has been pleasantly confused tonight. Has been getting out of bed, one time does of ativian given. Pt called son twice last night. Bed alarm on. NSR-ST on monitor. Pt now resting in bed. VSS.

## 2022-03-12 NOTE — PROGRESS NOTES
[facility]  PROGRESS NOTE    Patient Name: Scott Diego  : 4/10/1930  MRN: [id number]    Date of Admission: 3/3/2022  Primary Care Physician: Yolande Escobar MD    Subjective   Subjective     CC:  CARYN/CHF/weakness    HPI:  Patient seen resting up in chair awake and alert.  No acute distress.  Eating breakfast.  Smiling and much more interactive and conversant today.  Nurse reports she did have a bad night and was very agitated and restless.  No new issues.  Awaiting placement.    ROS:  Denies pain.  BARAK due to baseline dementia    Objective   Objective     Vital Signs:   Temp:  [96.3 °F (35.7 °C)] 96.3 °F (35.7 °C)  Heart Rate:  [] 89  Resp:  [18] 18  BP: (127-137)/(71-94) 127/71     Physical Exam:  Constitutional: No acute distress, resting up in the chair.  Awake.  No visitors at bedside.  Frail and chronically ill-appearing elderly female  HENT: NCAT, mucous membranes moist  Respiratory: Clear to auscultation bilaterally, respiratory effort normal on room air.  Cardiovascular: RRR, no murmurs, rubs, or gallops  Gastrointestinal: Positive bowel sounds, soft, nontender, nondistended.  Thin habitus.  Musculoskeletal: No bilateral ankle edema.  MARTÍNEZ spontaneously.  Psychiatric: Appropriate affect, cooperative and calm  Neurologic: Oriented to self only at baseline, strength symmetric in all extremities but generally weak, Cranial Nerves grossly intact to confrontation, speech clear but confused.  Much more interactive today.  Skin: No rashes      Results Reviewed:  LAB RESULTS:      Lab 22  0715 22  0320 22  0457   WBC 7.79 8.17 6.95   HEMOGLOBIN 11.7* 12.1 10.9*   HEMATOCRIT 37.3 38.4 34.7   PLATELETS 196 166 155   NEUTROS ABS  --  5.33 4.05   IMMATURE GRANS (ABS)  --  0.02 0.02   LYMPHS ABS  --  1.38 1.55   MONOS ABS  --  1.21* 1.04*   EOS ABS  --  0.17 0.23   MCV 94.7 93.2 94.6         Lab 22  0535 22  0715 03/10/22  0721  03/09/22  0338 03/08/22  0320 03/07/22  0457 03/07/22  0457 03/06/22  0756   SODIUM 138 136 138 137 134*   < > 138  --    POTASSIUM 4.9 4.8 4.5 4.6 5.3*   < > 5.6*  --    CHLORIDE 104 101 99 101 101   < > 105  --    CO2 25.0 28.0 27.0 24.0 19.0*   < > 25.0  --    ANION GAP 9.0 7.0 12.0 12.0 14.0   < > 8.0  --    BUN 26* 26* 25* 20 16   < > 14  --    CREATININE 1.33* 1.64* 1.62* 1.51* 1.28*   < > 1.23*  --    EGFR 37.9* 29.4* 29.9* 32.5* 39.6*   < > 41.6*  --    GLUCOSE 93 93 101* 90 72   < > 98  --    CALCIUM 9.0 9.4 9.6 9.1 9.6   < > 9.5  --    MAGNESIUM  --   --   --   --   --   --  1.9 1.7    < > = values in this interval not displayed.         Lab 03/08/22  0320 03/07/22  0457   TOTAL PROTEIN 7.9 7.1   ALBUMIN 3.30* 3.20*   GLOBULIN 4.6 3.9   ALT (SGPT) 10 8   AST (SGOT) 23 18   BILIRUBIN 0.4 0.3   ALK PHOS 46 42                     Brief Urine Lab Results  (Last result in the past 365 days)      Color   Clarity   Blood   Leuk Est   Nitrite   Protein   CREAT   Urine HCG        03/03/22 0843             54.9               Microbiology Results Abnormal     Procedure Component Value - Date/Time    COVID PRE-OP / PRE-PROCEDURE SCREENING ORDER (NO ISOLATION) - Swab, Nasopharynx [[id number]]  (Normal) Collected: 03/03/22 0546    Lab Status: Final result Specimen: Swab from Nasopharynx Updated: 03/03/22 0652    Narrative:      The following orders were created for panel order COVID PRE-OP / PRE-PROCEDURE SCREENING ORDER (NO ISOLATION) - Swab, Nasopharynx.  Procedure                               Abnormality         Status                     ---------                               -----------         ------                     COVID-19, FLU A/B, RSV P...[[id number]]  Normal              Final result                 Please view results for these tests on the individual orders.    COVID-19, FLU A/B, RSV PCR - Swab, Nasopharynx [[id number]]  (Normal) Collected: 03/03/22 0546    Lab Status: Final result Specimen: Swab from  Nasopharynx Updated: 03/03/22 0652     COVID19 Not Detected     Influenza A PCR Not Detected     Influenza B PCR Not Detected     RSV, PCR Not Detected    Narrative:      Fact sheet for providers: https://www.fda.gov/media/846298/download    Fact sheet for patients: https://www.fda.gov/media/714388/download    Test performed by PCR.          No radiology results from the last 24 hrs    Results for orders placed during the hospital encounter of 03/03/22    Adult Transthoracic Echo Complete W/ Cont if Necessary Per Protocol    Interpretation Summary  · Estimated left ventricular EF = 30%  · Moderate mitral valve regurgitation is present.  · There is calcification of the aortic valve.  · Estimated right ventricular systolic pressure from tricuspid regurgitation is mildly elevated (35-45 mmHg).      I have reviewed the medications:  Scheduled Meds:ALPRAZolam, 0.25 mg, Oral, Nightly  aspirin, 81 mg, Oral, Nightly  atorvastatin, 80 mg, Oral, Nightly  cetirizine, 5 mg, Oral, Daily  donepezil, 5 mg, Oral, Nightly  erythromycin, 1 application, Both Eyes, Nightly  famotidine, 20 mg, Oral, Daily  ferrous sulfate, 325 mg, Oral, Daily  furosemide, 20 mg, Oral, Q48H  gabapentin, 300 mg, Oral, Nightly  heparin (porcine), 5,000 Units, Subcutaneous, Q12H  levothyroxine, 50 mcg, Oral, Q AM  metoprolol succinate XL, 25 mg, Oral, Daily  nystatin, 5 mL, Swish & Swallow, 4x Daily  ofloxacin, 1 drop, Both Eyes, TID  polyethylene glycol, 17 g, Oral, Daily  rOPINIRole, 0.5 mg, Oral, Nightly  sodium chloride, 10 mL, Intravenous, Q12H      Continuous Infusions:sodium chloride, 50 mL/hr, Last Rate: 50 mL/hr (03/12/22 1109)      PRN Meds:.•  acetaminophen **OR** acetaminophen **OR** acetaminophen  •  ALPRAZolam  •  magnesium sulfate **OR** magnesium sulfate in D5W 1g/100mL (PREMIX) **OR** magnesium sulfate  •  meclizine  •  polyvinyl alcohol  •  potassium chloride **OR** potassium chloride **OR** potassium chloride  •  sodium chloride  •   sodium chloride    Assessment/Plan   Assessment & Plan     Active Hospital Problems    Diagnosis  POA   • Elevated serum creatinine [R79.89]  Yes   • Hypothyroid [E03.9]  Yes   • Chronic systolic heart failure (HCC) [I50.22]  Yes   • CARYN (acute kidney injury) (HCC) [N17.9]  Yes   • History of CVA (cerebrovascular accident) [Z86.73]  Not Applicable   • Dementia (HCC) [F03.90]  Yes   • Hypertension [I10]  Yes   • Hyponatremia [E87.1]  Yes      Resolved Hospital Problems   No resolved problems to display.        Brief Hospital Course to date:  Scott Diego is a 89 y.o. female  Who lives with her son in [place] following the recent death of her  in January.  Son states since this time, pt has gone down hill.  Poor po intake.  Unable to take care of self.  Has a lot of anxiety.  Worsening memory.  Son notes she has O2 at night and he checked her sats at home and they were 86 today.  Son brings pt in as he feels he cannot safely take care of her.       This patient's problems and plans were partially entered by my partner and updated as appropriate by me 03/12/22.    Assessment/plan:    CARYN  -Initially resolved, but mild bump in creatinine yesterday.  Yesterday creatinine 1.62, today up slightly again to 1.64 after 12 hours of IV fluids overnight.  Poor oral intake.   --We will encourage p.o. intake.  Due to decreased intake will resume IV fluids at 50 cc an hour for now and monitor.  --Creatinine improved today on IV fluids overnight.  Currently 1.33.  Continue IV fluids through the evening.  DC in 12 hours.  Recheck a.m. labs.  --Not that she is more awake and alert and eating and drinking better this morning.  --monitor.  A.m. labs.     Hyponatremia  -resolved     Chronic systolic HF  -CXR with mild pulmonary interstitial disease  -ECHO with EF 20-30%  -changed lasix to every other day only.  Monitor renal function.  -outpatient follow up with [facility]  -BB     Possible Thrush  -Continue  nystatin swish and swallow    Agitation with h/o Dementia  Prolonged QTC  - limit seroquel/haldol  -prn ativan for agitation  --Currently resting back in bed with no agitation.  Complaining of being very cold.  Monitor.  --placement.  Now has a bed at [facility] with transportation arranged for 1515 p.m. on Monday 3/14.    HTN  -metoprolol.  Stable     Hyperkalemia  -given lokelma again, 3/8  -improved, potassium normal.     Hypothyroidism  -Levothyroxine     Debility/failure to thrive  Generalized weakness  Dementia  History of CVA  -PT/OT/fall precautions  -aricept  --CM following for disposition.  Has a bed at [facility] for Monday 3/14 as below.  --Consulted palliative care for goals of care.     Bereavement  - passed away January 2022    No family at bedside today.      DVT prophylaxis:  Medical DVT prophylaxis orders are present.       AM-PAC 6 Clicks Score (PT): 20 (03/12/22 1000)    Disposition: I expect the patient to be discharged to rehab.  Has received a bed at [facility] awaiting insurance preserved.  Ambulance has been arranged tentatively for Monday 3/14 at 1515 p.m.    CODE STATUS:   Code Status and Medical Interventions:   Ordered at: 03/11/22 1052     Level Of Support Discussed With:    Next of Kin (If No Surrogate)     Code Status (Patient has no pulse and is not breathing):    No CPR (Do Not Attempt to Resuscitate)     Medical Interventions (Patient has pulse or is breathing):    Full Support     Comments:    spencer Nielsen Hannah Diaz, APRN  03/12/22

## 2022-03-12 NOTE — PLAN OF CARE
Goal Outcome Evaluation:  Plan of Care Reviewed With: patient        Progress: no change       Pt only oriented to self. Alert, but pleasantly confused. Pt reports feeling like she is smothering and anxious. VSS on RA, Pt 95-98% on RA. NSR on monitor BBB and PVCS occasional.

## 2022-03-12 NOTE — THERAPY TREATMENT NOTE
Patient Name: Scott Diego  : 4/10/1930    MRN: [id number]                              Today's Date: 3/12/2022       Admit Date: 3/3/2022    Visit Dx:     ICD-10-CM ICD-9-CM   1. CARYN (acute kidney injury) (HCC)  N17.9 584.9   2. Elevated serum creatinine  R79.89 790.99   3. Normocytic anemia  D64.9 285.9   4. Impaired functional mobility, balance, gait, and endurance  Z74.09 V49.89   5. Oral phase dysphagia  R13.11 787.21     Patient Active Problem List   Diagnosis   • Idiopathic peripheral neuropathy   • Neck pain   • Mild cognitive impairment   • Anemia   • Hyponatremia   • Hypertension   • Dehydration   • History of CVA (cerebrovascular accident)   • Dementia (HCC)   • Left bundle branch block   • Orthostatic dizziness   • Chronic systolic heart failure (HCC)   • Normocytic anemia   • CARYN (acute kidney injury) (HCC)   • Dyspnea   • Hypoxia   • Hypothyroid   • Pericardial effusion   • Acute respiratory failure with hypoxia (HCC)   • Right lower lobe pneumonia   • Ventricular ectopy   • Elevated serum creatinine     Past Medical History:   Diagnosis Date   • Congestive heart failure (HCC)    • Malignant neoplasm (HCC)    • Stroke (HCC)     mini stroke   • Systolic heart failure (HCC) 2017    Chronic/compensated (EF 25%)     Past Surgical History:   Procedure Laterality Date   • CHOLECYSTECTOMY     • EYE SURGERY     • HYSTERECTOMY        General Information     Row Name 22 1445          Physical Therapy Time and Intention    Document Type therapy note (daily note)  -AS     Mode of Treatment physical therapy  -AS     Row Name 22 1445          General Information    Patient Profile Reviewed yes  -AS     Existing Precautions/Restrictions fall  -AS     Barriers to Rehab medically complex;previous functional deficit;cognitive status  -AS     Row Name 22 1445          Cognition    Orientation Status (Cognition) oriented to;person;disoriented to;place;situation;time  -AS     Row Name 22  1449          Safety Issues, Functional Mobility    Safety Issues Affecting Function (Mobility) safety precaution awareness;safety precautions follow-through/compliance;insight into deficits/self-awareness;awareness of need for assistance  -AS     Impairments Affecting Function (Mobility) balance;coordination;visual/perceptual;endurance/activity tolerance;strength;cognition  -AS     Cognitive Impairments, Mobility Safety/Performance attention;awareness, need for assistance;insight into deficits/self-awareness;safety precaution awareness;safety precaution follow-through;sequencing abilities  -AS     Comment, Safety Issues/Impairments (Mobility) patient is pleasantly confused, needs cues for safety awarenes with all activity  -AS           User Key  (r) = Recorded By, (t) = Taken By, (c) = Cosigned By    Initials Name Provider Type    AS Jessie Flemnig PTA Physical Therapy Assistant               Mobility     Row Name 03/12/22 1447          Bed Mobility    Comment, (Bed Mobility) sitting UIC pre/post tx  -AS     Row Name 03/12/22 1447          Transfers    Comment, (Transfers) cues to reach back when sitting on commode and recliner  -AS     Row Name 03/12/22 1447          Sit-Stand Transfer    Sit-Stand Pike (Transfers) verbal cues;contact guard;1 person assist  -AS     Assistive Device (Sit-Stand Transfers) other (see comments)  R UE support  -AS     Comment, (Sit-Stand Transfer) HHA on R  -AS     Row Name 03/12/22 1447          Gait/Stairs (Locomotion)    Pike Level (Gait) verbal cues;minimum assist (75% patient effort);1 person assist  -AS     Assistive Device (Gait) other (see comments)  R UE support, occassional use of left UE on handrail in hallway.  -AS     Distance in Feet (Gait) 180  -AS     Deviations/Abnormal Patterns (Gait) bilateral deviations;base of support, narrow;gait speed decreased  -AS     Bilateral Gait Deviations forward flexed posture;weight shift ability decreased  -AS      Comment, (Gait/Stairs) patient ambulated 180 feet with Min assist x1, RUE support with occasional use of handrail in hallway on the left. Patient is easily distracted and needs cues to focus on attention to task, several standing rest breaks talking to people in hallway and c/o weakness/SOA. Unsteady gait with occasional LOB needing assist to correct.  -AS           User Key  (r) = Recorded By, (t) = Taken By, (c) = Cosigned By    Initials Name Provider Type    AS Jessie Fleming PTA Physical Therapy Assistant               Obj/Interventions     Row Name 03/12/22 1452          Motor Skills    Therapeutic Exercise shoulder;knee;ankle  -AS     Row Name 03/12/22 1452          Shoulder (Therapeutic Exercise)    Shoulder (Therapeutic Exercise) AROM (active range of motion)  -AS     Shoulder AROM (Therapeutic Exercise) bilateral;extension;aBduction;flexion;aDduction;sitting;10 repetitions  bicep curls  -AS     Row Name 03/12/22 1452          Knee (Therapeutic Exercise)    Knee Strengthening (Therapeutic Exercise) bilateral;marching while seated;LAQ (long arc quad);sitting;10 repetitions  -AS     Row Name 03/12/22 1452          Ankle (Therapeutic Exercise)    Ankle (Therapeutic Exercise) AROM (active range of motion)  -AS     Ankle AROM (Therapeutic Exercise) bilateral;dorsiflexion;plantarflexion;sitting;10 repetitions  -AS     [facility] Name 03/12/22 1452          Balance    Dynamic Standing Balance verbal cues;minimal assist;1-person assist  -AS     Position/Device Used, Standing Balance supported;other (see comments)  R UE support  -AS           User Key  (r) = Recorded By, (t) = Taken By, (c) = Cosigned By    Initials Name Provider Type    AS Jessie Fleming PTA Physical Therapy Assistant               Goals/Plan    No documentation.                Clinical Impression     Row Name 03/12/22 1453          Pain    Pretreatment Pain Rating 0/10 - no pain  -AS     Posttreatment Pain Rating 0/10 - no pain  -AS     [facility]  Name 03/12/22 1453          Plan of Care Review    Plan of Care Reviewed With patient  -AS     Progress improving  -AS     Outcome Evaluation patient ambulated 180 feet with Min assist x1, RUE support with occasional use of handrail in hallway on the left. Patient is easily distracted and needs cues to focus on attention to task, several standing rest breaks talking to people in hallway and c/o weakness/SOA. Unsteady gait with occasional LOB needing assist to correct. Completed HEP.  -AS     Row Name 03/12/22 1453          Positioning and Restraints    Pre-Treatment Position sitting in chair/recliner  -AS     Post Treatment Position chair  -AS     In Chair reclined;call light within reach;encouraged to call for assist;exit alarm on;waffle cushion;with family/caregiver;legs elevated  -AS           User Key  (r) = Recorded By, (t) = Taken By, (c) = Cosigned By    Initials Name Provider Type    AS Jessie Fleming, ZACH Physical Therapy Assistant               Outcome Measures     Row Name 03/12/22 1453 03/12/22 1000       How much help from another person do you currently need...    Turning from your back to your side while in flat bed without using bedrails? 4  -AS 4  -KH    Moving from lying on back to sitting on the side of a flat bed without bedrails? 4  -AS 4  -KH    Moving to and from a bed to a chair (including a wheelchair)? 3  -AS 3  -KH    Standing up from a chair using your arms (e.g., wheelchair, bedside chair)? 3  -AS 3  -KH    Climbing 3-5 steps with a railing? 2  -AS 3  -KH    To walk in hospital room? 3  -AS 3  -KH    AM-PAC 6 Clicks Score (PT) 19  -AS 20  -KH    Row Name 03/12/22 0800          How much help from another person do you currently need...    Turning from your back to your side while in flat bed without using bedrails? 4  -KH     Moving from lying on back to sitting on the side of a flat bed without bedrails? 4  -KH     Moving to and from a bed to a chair (including a wheelchair)? 3  -KH      Standing up from a chair using your arms (e.g., wheelchair, bedside chair)? 3  -KH     Climbing 3-5 steps with a railing? 3  -KH     To walk in hospital room? 3  -KH     AM-PAC 6 Clicks Score (PT) 20  -KH     Row Name 03/12/22 1453          Functional Assessment    Outcome Measure Options AM-PAC 6 Clicks Basic Mobility (PT)  -AS           User Key  (r) = Recorded By, (t) = Taken By, (c) = Cosigned By    Initials Name Provider Type    AS Jessie Fleming, ZACH Physical Therapy Assistant    Stephanie Mathew RN Registered Nurse                             Physical Therapy Education                 Title: PT OT SLP Therapies (In Progress)     Topic: Physical Therapy (In Progress)     Point: Mobility training (In Progress)     Learning Progress Summary           Patient Acceptance, E, NR by AS at 3/12/2022 1453    Acceptance, E, NR by HOSEA at 3/10/2022 1536    Acceptance, E, NR by HOSEA at 3/9/2022 1122    Acceptance, E, NR by EJ at 3/8/2022 1047    Acceptance, E, NR by AS at 3/7/2022 0942    Acceptance, E,TB, NR by EDMOND at 3/5/2022 0806    Eager, E, NR by RYANN at 3/4/2022 1149                   Point: Home exercise program (In Progress)     Learning Progress Summary           Patient Acceptance, E, NR by AS at 3/12/2022 1453    Acceptance, E, NR by EJ at 3/10/2022 1536    Acceptance, E, NR by EJ at 3/9/2022 1122    Acceptance, E, NR by EJ at 3/8/2022 1047    Acceptance, E, NR by AS at 3/7/2022 0942    Acceptance, E,TB, NR by EDMOND at 3/5/2022 0806                   Point: Body mechanics (In Progress)     Learning Progress Summary           Patient Acceptance, E, NR by AS at 3/12/2022 1453    Acceptance, E, NR by EJ at 3/10/2022 1536    Acceptance, E, NR by EJ at 3/9/2022 1122    Acceptance, E, NR by EJ at 3/8/2022 1047    Acceptance, E, NR by AS at 3/7/2022 0942    Acceptance, E,TB, NR by EDMOND at 3/5/2022 0806    Eager, E, NR by RYANN at 3/4/2022 1149                   Point: Precautions (In Progress)     Learning Progress  Summary           Patient Acceptance, E, NR by AS at 3/12/2022 1453    Acceptance, E, NR by  at 3/10/2022 1536    Acceptance, E, NR by  at 3/9/2022 1122    Acceptance, E, NR by  at 3/8/2022 1047    Acceptance, E, NR by AS at 3/7/2022 0942    Acceptance, E,TB, NR by EDMOND at 3/5/2022 0806    Eager, E, NR by  at 3/4/2022 1149                               User Key     Initials Effective Dates Name Provider Type Discipline     06/16/21 -  Kristina Brady PT Physical Therapist PT     06/16/21 -  Priya Monzon PT Physical Therapist PT    AS 06/16/21 -  Jessie Fleming PTA Physical Therapy Assistant PT    EDMOND 12/29/21 -  Hannah Anderson RN Registered Nurse Nurse              PT Recommendation and Plan     Plan of Care Reviewed With: patient  Progress: improving  Outcome Evaluation: patient ambulated 180 feet with Min assist x1, RUE support with occasional use of handrail in hallway on the left. Patient is easily distracted and needs cues to focus on attention to task, several standing rest breaks talking to people in hallway and c/o weakness/SOA. Unsteady gait with occasional LOB needing assist to correct. Completed HEP.     Time Calculation:    PT Charges     Row Name 03/12/22 1454             Time Calculation    Start Time 1345  -AS      PT Received On 03/12/22  -AS      PT Goal Re-Cert Due Date 03/19/22  -AS              Timed Charges    06226 - PT Therapeutic Exercise Minutes 15  -AS      32645 - Gait Training Minutes  15  -AS      56062 - PT Therapeutic Activity Minutes 15  -AS              Total Minutes    Timed Charges Total Minutes 45  -AS       Total Minutes 45  -AS            User Key  (r) = Recorded By, (t) = Taken By, (c) = Cosigned By    Initials Name Provider Type    AS Jessie Fleming, ZACH Physical Therapy Assistant              Therapy Charges for Today     Code Description Service Date Service Provider Modifiers Qty    19573232168 HC PT THER PROC EA 15 MIN 3/12/2022 Jessie Fleming  Audrey, PTA GP 1    65594566967 HC GAIT TRAINING EA 15 MIN 3/12/2022 Jessie Fleming, PTA GP 1    40981793428 HC PT THERAPEUTIC ACT EA 15 MIN 3/12/2022 Jessie Fleming, PTA GP 1          PT G-Codes  Outcome Measure Options: AM-PAC 6 Clicks Basic Mobility (PT)  AM-PAC 6 Clicks Score (PT): 19  AM-PAC 6 Clicks Score (OT): 19    Jessie Fleming PTA  3/12/2022

## 2022-03-12 NOTE — PLAN OF CARE
Goal Outcome Evaluation:  Plan of Care Reviewed With: patient, family        Progress: improving  Outcome Evaluation: Pt completed functional mob with IV pole support 40ft Aron then no UE support next 40ft with Aron progressing towards moments CGA post seated rest break, tolerated standing BUE ther ex well, cont IPOT per POC

## 2022-03-13 LAB
ANION GAP SERPL CALCULATED.3IONS-SCNC: 8 MMOL/L (ref 5–15)
BASOPHILS # BLD AUTO: 0.1 10*3/MM3 (ref 0–0.2)
BASOPHILS NFR BLD AUTO: 1.3 % (ref 0–1.5)
BUN SERPL-MCNC: 22 MG/DL (ref 8–23)
BUN/CREAT SERPL: 13.2 (ref 7–25)
CALCIUM SPEC-SCNC: 9.1 MG/DL (ref 8.2–9.6)
CHLORIDE SERPL-SCNC: 107 MMOL/L (ref 98–107)
CO2 SERPL-SCNC: 24 MMOL/L (ref 22–29)
CREAT SERPL-MCNC: 1.67 MG/DL (ref 0.57–1)
DEPRECATED RDW RBC AUTO: 50.2 FL (ref 37–54)
EGFRCR SERPLBLD CKD-EPI 2021: 28.8 ML/MIN/1.73
EOSINOPHIL # BLD AUTO: 0.65 10*3/MM3 (ref 0–0.4)
EOSINOPHIL NFR BLD AUTO: 8.7 % (ref 0.3–6.2)
ERYTHROCYTE [DISTWIDTH] IN BLOOD BY AUTOMATED COUNT: 14.3 % (ref 12.3–15.4)
GLUCOSE SERPL-MCNC: 83 MG/DL (ref 65–99)
HCT VFR BLD AUTO: 30.2 % (ref 34–46.6)
HGB BLD-MCNC: 9.5 G/DL (ref 12–15.9)
IMM GRANULOCYTES # BLD AUTO: 0.03 10*3/MM3 (ref 0–0.05)
IMM GRANULOCYTES NFR BLD AUTO: 0.4 % (ref 0–0.5)
LYMPHOCYTES # BLD AUTO: 1.68 10*3/MM3 (ref 0.7–3.1)
LYMPHOCYTES NFR BLD AUTO: 22.6 % (ref 19.6–45.3)
MCH RBC QN AUTO: 30.2 PG (ref 26.6–33)
MCHC RBC AUTO-ENTMCNC: 31.5 G/DL (ref 31.5–35.7)
MCV RBC AUTO: 95.9 FL (ref 79–97)
MONOCYTES # BLD AUTO: 1.08 10*3/MM3 (ref 0.1–0.9)
MONOCYTES NFR BLD AUTO: 14.5 % (ref 5–12)
NEUTROPHILS NFR BLD AUTO: 3.89 10*3/MM3 (ref 1.7–7)
NEUTROPHILS NFR BLD AUTO: 52.5 % (ref 42.7–76)
NRBC BLD AUTO-RTO: 0 /100 WBC (ref 0–0.2)
PLATELET # BLD AUTO: 177 10*3/MM3 (ref 140–450)
PMV BLD AUTO: 12.3 FL (ref 6–12)
POTASSIUM SERPL-SCNC: 4.8 MMOL/L (ref 3.5–5.2)
RBC # BLD AUTO: 3.15 10*6/MM3 (ref 3.77–5.28)
SODIUM SERPL-SCNC: 139 MMOL/L (ref 136–145)
WBC NRBC COR # BLD: 7.43 10*3/MM3 (ref 3.4–10.8)

## 2022-03-13 PROCEDURE — 85025 COMPLETE CBC W/AUTO DIFF WBC: CPT | Performed by: NURSE PRACTITIONER

## 2022-03-13 PROCEDURE — 99232 SBSQ HOSP IP/OBS MODERATE 35: CPT | Performed by: HOSPITALIST

## 2022-03-13 PROCEDURE — 93005 ELECTROCARDIOGRAM TRACING: CPT | Performed by: INTERNAL MEDICINE

## 2022-03-13 PROCEDURE — 25010000002 LORAZEPAM PER 2 MG: Performed by: INTERNAL MEDICINE

## 2022-03-13 PROCEDURE — 80048 BASIC METABOLIC PNL TOTAL CA: CPT | Performed by: NURSE PRACTITIONER

## 2022-03-13 PROCEDURE — 25010000002 HEPARIN (PORCINE) PER 1000 UNITS: Performed by: NURSE PRACTITIONER

## 2022-03-13 RX ORDER — TEMAZEPAM 7.5 MG/1
7.5 CAPSULE ORAL ONCE
Status: COMPLETED | OUTPATIENT
Start: 2022-03-14 | End: 2022-03-13

## 2022-03-13 RX ORDER — LORAZEPAM 2 MG/ML
0.5 INJECTION INTRAMUSCULAR ONCE
Status: COMPLETED | OUTPATIENT
Start: 2022-03-13 | End: 2022-03-13

## 2022-03-13 RX ADMIN — ALPRAZOLAM 0.25 MG: 0.25 TABLET ORAL at 15:31

## 2022-03-13 RX ADMIN — CETIRIZINE HYDROCHLORIDE 5 MG: 10 TABLET, FILM COATED ORAL at 09:10

## 2022-03-13 RX ADMIN — POLYETHYLENE GLYCOL 3350 17 G: 17 POWDER, FOR SOLUTION ORAL at 09:09

## 2022-03-13 RX ADMIN — DONEPEZIL HYDROCHLORIDE 5 MG: 5 TABLET, FILM COATED ORAL at 20:19

## 2022-03-13 RX ADMIN — OFLOXACIN 1 DROP: 3 SOLUTION/ DROPS OPHTHALMIC at 20:19

## 2022-03-13 RX ADMIN — ATORVASTATIN CALCIUM 80 MG: 40 TABLET, FILM COATED ORAL at 20:19

## 2022-03-13 RX ADMIN — HEPARIN SODIUM 5000 UNITS: 5000 INJECTION, SOLUTION INTRAVENOUS; SUBCUTANEOUS at 20:19

## 2022-03-13 RX ADMIN — NYSTATIN 500000 UNITS: 100000 SUSPENSION ORAL at 12:21

## 2022-03-13 RX ADMIN — GABAPENTIN 300 MG: 300 CAPSULE ORAL at 20:19

## 2022-03-13 RX ADMIN — ALPRAZOLAM 0.25 MG: 0.25 TABLET ORAL at 20:19

## 2022-03-13 RX ADMIN — ERYTHROMYCIN 1 APPLICATION: 5 OINTMENT OPHTHALMIC at 20:19

## 2022-03-13 RX ADMIN — NYSTATIN 500000 UNITS: 100000 SUSPENSION ORAL at 09:10

## 2022-03-13 RX ADMIN — Medication 10 ML: at 09:13

## 2022-03-13 RX ADMIN — ASPIRIN 81 MG 81 MG: 81 TABLET ORAL at 20:19

## 2022-03-13 RX ADMIN — ROPINIROLE HYDROCHLORIDE 0.5 MG: 0.5 TABLET, FILM COATED ORAL at 20:19

## 2022-03-13 RX ADMIN — METOPROLOL SUCCINATE 25 MG: 25 TABLET, EXTENDED RELEASE ORAL at 09:10

## 2022-03-13 RX ADMIN — FUROSEMIDE 20 MG: 20 TABLET ORAL at 09:10

## 2022-03-13 RX ADMIN — HEPARIN SODIUM 5000 UNITS: 5000 INJECTION, SOLUTION INTRAVENOUS; SUBCUTANEOUS at 09:10

## 2022-03-13 RX ADMIN — POLYVINYL ALCOHOL 1 DROP: 14 SOLUTION/ DROPS OPHTHALMIC at 17:27

## 2022-03-13 RX ADMIN — OFLOXACIN 1 DROP: 3 SOLUTION/ DROPS OPHTHALMIC at 09:14

## 2022-03-13 RX ADMIN — OFLOXACIN 1 DROP: 3 SOLUTION/ DROPS OPHTHALMIC at 15:31

## 2022-03-13 RX ADMIN — LORAZEPAM 0.5 MG: 2 INJECTION INTRAMUSCULAR; INTRAVENOUS at 19:24

## 2022-03-13 RX ADMIN — Medication 325 MG: at 09:10

## 2022-03-13 RX ADMIN — LEVOTHYROXINE SODIUM 50 MCG: 50 TABLET ORAL at 06:42

## 2022-03-13 RX ADMIN — TEMAZEPAM 7.5 MG: 7.5 CAPSULE ORAL at 23:25

## 2022-03-13 RX ADMIN — NYSTATIN 500000 UNITS: 100000 SUSPENSION ORAL at 20:19

## 2022-03-13 RX ADMIN — Medication 10 ML: at 20:19

## 2022-03-13 RX ADMIN — NYSTATIN 500000 UNITS: 100000 SUSPENSION ORAL at 17:27

## 2022-03-13 RX ADMIN — FAMOTIDINE 20 MG: 20 TABLET, FILM COATED ORAL at 09:10

## 2022-03-13 NOTE — PLAN OF CARE
Goal Outcome Evaluation:         Pt complained of eye irritation, PRN given during shift. Increasingly more anxious at end of shift, provider notified and PRN ordered. CRISTINA, NATHANIEL.

## 2022-03-13 NOTE — PROGRESS NOTES
[facility] Medicine Services  PROGRESS NOTE    Patient Name: Scott Diego  : 4/10/1930  MRN: [id number]    Date of Admission: 3/3/2022  Primary Care Physician: Yolande Escobar MD    Subjective   Subjective     CC:  CARYN/CHF/weakness    HPI:  No issues. Up in chair. Appears to have completed entire breakfast tray. No f/c. No dyspnea.    ROS:  Denies pain.  BARAK due to baseline dementia    Objective   Objective     Vital Signs:   Temp:  [97.5 °F (36.4 °C)-97.9 °F (36.6 °C)] 97.5 °F (36.4 °C)  Heart Rate:  [] 62  Resp:  [18] 18  BP: (126-132)/(69-81) 126/69     Physical Exam:  NAD, alert  OP clear, MMM  Neck supple  No LAD  RRR  CTAB  +BS, ND, NT, soft  MARTÍNEZ  Normal affect  No rashes    Results Reviewed:  LAB RESULTS:      Lab 2215 22   WBC 7.43 7.79 8.17 6.95   HEMOGLOBIN 9.5* 11.7* 12.1 10.9*   HEMATOCRIT 30.2* 37.3 38.4 34.7   PLATELETS 177 196 166 155   NEUTROS ABS 3.89  --  5.33 4.05   IMMATURE GRANS (ABS) 0.03  --  0.02 0.02   LYMPHS ABS 1.68  --  1.38 1.55   MONOS ABS 1.08*  --  1.21* 1.04*   EOS ABS 0.65*  --  0.17 0.23   MCV 95.9 94.7 93.2 94.6         Lab 22  0535 22  0715 03/10/22  0721 228 22  0756   SODIUM 139 138 136 138 137   < > 138  --    POTASSIUM 4.8 4.9 4.8 4.5 4.6   < > 5.6*  --    CHLORIDE 107 104 101 99 101   < > 105  --    CO2 24.0 25.0 28.0 27.0 24.0   < > 25.0  --    ANION GAP 8.0 9.0 7.0 12.0 12.0   < > 8.0  --    BUN 22 26* 26* 25* 20   < > 14  --    CREATININE 1.67* 1.33* 1.64* 1.62* 1.51*   < > 1.23*  --    EGFR 28.8* 37.9* 29.4* 29.9* 32.5*   < > 41.6*  --    GLUCOSE 83 93 93 101* 90   < > 98  --    CALCIUM 9.1 9.0 9.4 9.6 9.1   < > 9.5  --    MAGNESIUM  --   --   --   --   --   --  1.9 1.7    < > = values in this interval not displayed.         Lab 22  0320 22  0457   TOTAL PROTEIN 7.9 7.1   ALBUMIN 3.30* 3.20*    GLOBULIN 4.6 3.9   ALT (SGPT) 10 8   AST (SGOT) 23 18   BILIRUBIN 0.4 0.3   ALK PHOS 46 42                     Brief Urine Lab Results  (Last result in the past 365 days)      Color   Clarity   Blood   Leuk Est   Nitrite   Protein   CREAT   Urine HCG        03/03/22 0843             54.9               Microbiology Results Abnormal     Procedure Component Value - Date/Time    COVID PRE-OP / PRE-PROCEDURE SCREENING ORDER (NO ISOLATION) - Swab, Nasopharynx [[id number]]  (Normal) Collected: 03/03/22 0546    Lab Status: Final result Specimen: Swab from Nasopharynx Updated: 03/03/22 0652    Narrative:      The following orders were created for panel order COVID PRE-OP / PRE-PROCEDURE SCREENING ORDER (NO ISOLATION) - Swab, Nasopharynx.  Procedure                               Abnormality         Status                     ---------                               -----------         ------                     COVID-19, FLU A/B, RSV P...[[id number]]  Normal              Final result                 Please view results for these tests on the individual orders.    COVID-19, FLU A/B, RSV PCR - Swab, Nasopharynx [[id number]]  (Normal) Collected: 03/03/22 0546    Lab Status: Final result Specimen: Swab from Nasopharynx Updated: 03/03/22 0652     COVID19 Not Detected     Influenza A PCR Not Detected     Influenza B PCR Not Detected     RSV, PCR Not Detected    Narrative:      Fact sheet for providers: https://www.fda.gov/media/419720/download    Fact sheet for patients: https://www.fda.gov/media/921442/download    Test performed by PCR.          No radiology results from the last 24 hrs    Results for orders placed during the hospital encounter of 03/03/22    Adult Transthoracic Echo Complete W/ Cont if Necessary Per Protocol    Interpretation Summary  · Estimated left ventricular EF = 30%  · Moderate mitral valve regurgitation is present.  · There is calcification of the aortic valve.  · Estimated right ventricular systolic  pressure from tricuspid regurgitation is mildly elevated (35-45 mmHg).      I have reviewed the medications:  Scheduled Meds:ALPRAZolam, 0.25 mg, Oral, Nightly  aspirin, 81 mg, Oral, Nightly  atorvastatin, 80 mg, Oral, Nightly  cetirizine, 5 mg, Oral, Daily  donepezil, 5 mg, Oral, Nightly  erythromycin, 1 application, Both Eyes, Nightly  famotidine, 20 mg, Oral, Daily  ferrous sulfate, 325 mg, Oral, Daily  furosemide, 20 mg, Oral, Q48H  gabapentin, 300 mg, Oral, Nightly  heparin (porcine), 5,000 Units, Subcutaneous, Q12H  levothyroxine, 50 mcg, Oral, Q AM  metoprolol succinate XL, 25 mg, Oral, Daily  nystatin, 5 mL, Swish & Swallow, 4x Daily  ofloxacin, 1 drop, Both Eyes, TID  polyethylene glycol, 17 g, Oral, Daily  rOPINIRole, 0.5 mg, Oral, Nightly  sodium chloride, 10 mL, Intravenous, Q12H      Continuous Infusions:   PRN Meds:.•  acetaminophen **OR** acetaminophen **OR** acetaminophen  •  ALPRAZolam  •  magnesium sulfate **OR** magnesium sulfate in D5W 1g/100mL (PREMIX) **OR** magnesium sulfate  •  meclizine  •  polyvinyl alcohol  •  potassium chloride **OR** potassium chloride **OR** potassium chloride  •  sodium chloride  •  sodium chloride    Assessment/Plan   Assessment & Plan     Active Hospital Problems    Diagnosis  POA   • Elevated serum creatinine [R79.89]  Yes   • Hypothyroid [E03.9]  Yes   • Chronic systolic heart failure (HCC) [I50.22]  Yes   • CARYN (acute kidney injury) (HCC) [N17.9]  Yes   • History of CVA (cerebrovascular accident) [Z86.73]  Not Applicable   • Dementia (HCC) [F03.90]  Yes   • Hypertension [I10]  Yes   • Hyponatremia [E87.1]  Yes      Resolved Hospital Problems   No resolved problems to display.        Brief Hospital Course to date:  Scott Diego is a 89 y.o. female  Who lives with her son in [place] following the recent death of her  in January.  Son states since this time, pt has gone down hill.  Poor po intake.  Unable to take care of self.  Has a lot of anxiety.   Worsening memory.  Son notes she has O2 at night and he checked her sats at home and they were 86 today.  Son brings pt in as he feels he cannot safely take care of her.       This patient's problems and plans were partially entered by my partner and updated as appropriate by me 03/13/22.    Assessment/plan:    CARYN  -better, likely due to poor PO intake, overall stable  -eating very well today  -repeat labs in AM     Hyponatremia  -resolved     Chronic systolic HF  -CXR with mild pulmonary interstitial disease  -ECHO with EF 20-30%  -has labile PO intake and low BMI, changed lasix to q48h only  -outpatient follow up with [facility]  -BB     Possible Thrush  -continue nystatin swish and swallow    Agitation with h/o Dementia  Prolonged QTC  -limit seroquel/haldol  -prn ativan for agitation  -Currently resting back in bed with no agitation.  Complaining of being very cold.  Monitor.  -placement, due 3/14    HTN  -BB     Hyperkalemia  -given lokelma again, 3/8  -improved, potassium normal.     Hypothyroidism  -Levothyroxine     Debility/failure to thrive  Generalized weakness  Dementia  History of CVA  -PT/OT/fall precautions  -aricept  -CM following for disposition.  Has a bed at [facility] for Monday 3/14 as below.  -Consulted palliative care for goals of care.     Bereavement  - passed away January 2022    No family at bedside today.      DVT prophylaxis:  Medical DVT prophylaxis orders are present.       AM-PAC 6 Clicks Score (PT): 19 (03/12/22 1453)    Disposition: I expect the patient to be discharged to rehab.  Has received a bed at [facility] awaiting insurance preserved.  Ambulance has been arranged tentatively for Monday 3/14 at 1515 p.m.    CODE STATUS:   Code Status and Medical Interventions:   Ordered at: 03/11/22 1052     Level Of Support Discussed With:    Next of Kin (If No Surrogate)     Code Status (Patient has no pulse and is not breathing):    No CPR (Do  Not Attempt to Resuscitate)     Medical Interventions (Patient has pulse or is breathing):    Full Support     Comments:    spencer Martins MD  03/13/22

## 2022-03-13 NOTE — PLAN OF CARE
Goal Outcome Evaluation:Pt with numerous anxiety episodes this shift. VSS on RA. Pt pending discharge to [facility] on Monday.

## 2022-03-14 LAB
ANION GAP SERPL CALCULATED.3IONS-SCNC: 11 MMOL/L (ref 5–15)
BUN SERPL-MCNC: 25 MG/DL (ref 8–23)
BUN/CREAT SERPL: 16.7 (ref 7–25)
CALCIUM SPEC-SCNC: 9.3 MG/DL (ref 8.2–9.6)
CHLORIDE SERPL-SCNC: 104 MMOL/L (ref 98–107)
CO2 SERPL-SCNC: 22 MMOL/L (ref 22–29)
CREAT SERPL-MCNC: 1.5 MG/DL (ref 0.57–1)
EGFRCR SERPLBLD CKD-EPI 2021: 32.8 ML/MIN/1.73
GLUCOSE SERPL-MCNC: 83 MG/DL (ref 65–99)
POTASSIUM SERPL-SCNC: 5.1 MMOL/L (ref 3.5–5.2)
SODIUM SERPL-SCNC: 137 MMOL/L (ref 136–145)

## 2022-03-14 PROCEDURE — 80048 BASIC METABOLIC PNL TOTAL CA: CPT | Performed by: HOSPITALIST

## 2022-03-14 PROCEDURE — 25010000002 HEPARIN (PORCINE) PER 1000 UNITS: Performed by: NURSE PRACTITIONER

## 2022-03-14 PROCEDURE — 99232 SBSQ HOSP IP/OBS MODERATE 35: CPT | Performed by: NURSE PRACTITIONER

## 2022-03-14 PROCEDURE — 97530 THERAPEUTIC ACTIVITIES: CPT

## 2022-03-14 RX ORDER — LORAZEPAM 0.5 MG/1
0.5 TABLET ORAL ONCE
Status: DISCONTINUED | OUTPATIENT
Start: 2022-03-15 | End: 2022-03-14

## 2022-03-14 RX ORDER — LORAZEPAM 0.5 MG/1
0.25 TABLET ORAL ONCE
Status: COMPLETED | OUTPATIENT
Start: 2022-03-15 | End: 2022-03-14

## 2022-03-14 RX ADMIN — CETIRIZINE HYDROCHLORIDE 5 MG: 10 TABLET, FILM COATED ORAL at 08:39

## 2022-03-14 RX ADMIN — HEPARIN SODIUM 5000 UNITS: 5000 INJECTION, SOLUTION INTRAVENOUS; SUBCUTANEOUS at 08:39

## 2022-03-14 RX ADMIN — NYSTATIN 500000 UNITS: 100000 SUSPENSION ORAL at 08:39

## 2022-03-14 RX ADMIN — Medication 10 ML: at 08:37

## 2022-03-14 RX ADMIN — ALPRAZOLAM 0.25 MG: 0.25 TABLET ORAL at 15:38

## 2022-03-14 RX ADMIN — METOPROLOL SUCCINATE 25 MG: 25 TABLET, EXTENDED RELEASE ORAL at 08:40

## 2022-03-14 RX ADMIN — NYSTATIN 500000 UNITS: 100000 SUSPENSION ORAL at 13:54

## 2022-03-14 RX ADMIN — ROPINIROLE HYDROCHLORIDE 0.5 MG: 0.5 TABLET, FILM COATED ORAL at 22:53

## 2022-03-14 RX ADMIN — ATORVASTATIN CALCIUM 80 MG: 40 TABLET, FILM COATED ORAL at 22:51

## 2022-03-14 RX ADMIN — ERYTHROMYCIN 1 APPLICATION: 5 OINTMENT OPHTHALMIC at 22:55

## 2022-03-14 RX ADMIN — LEVOTHYROXINE SODIUM 50 MCG: 50 TABLET ORAL at 08:40

## 2022-03-14 RX ADMIN — FAMOTIDINE 20 MG: 20 TABLET, FILM COATED ORAL at 08:39

## 2022-03-14 RX ADMIN — LORAZEPAM 0.25 MG: 0.5 TABLET ORAL at 23:54

## 2022-03-14 RX ADMIN — POLYETHYLENE GLYCOL 3350 17 G: 17 POWDER, FOR SOLUTION ORAL at 08:37

## 2022-03-14 RX ADMIN — ALPRAZOLAM 0.25 MG: 0.25 TABLET ORAL at 22:50

## 2022-03-14 RX ADMIN — HEPARIN SODIUM 5000 UNITS: 5000 INJECTION, SOLUTION INTRAVENOUS; SUBCUTANEOUS at 22:49

## 2022-03-14 RX ADMIN — Medication 325 MG: at 08:39

## 2022-03-14 RX ADMIN — GABAPENTIN 300 MG: 300 CAPSULE ORAL at 22:51

## 2022-03-14 RX ADMIN — ASPIRIN 81 MG 81 MG: 81 TABLET ORAL at 22:51

## 2022-03-14 RX ADMIN — OFLOXACIN 1 DROP: 3 SOLUTION/ DROPS OPHTHALMIC at 17:31

## 2022-03-14 RX ADMIN — OFLOXACIN 1 DROP: 3 SOLUTION/ DROPS OPHTHALMIC at 08:36

## 2022-03-14 RX ADMIN — NYSTATIN 500000 UNITS: 100000 SUSPENSION ORAL at 22:49

## 2022-03-14 RX ADMIN — DONEPEZIL HYDROCHLORIDE 5 MG: 5 TABLET, FILM COATED ORAL at 22:50

## 2022-03-14 RX ADMIN — OFLOXACIN 1 DROP: 3 SOLUTION/ DROPS OPHTHALMIC at 22:55

## 2022-03-14 RX ADMIN — NYSTATIN 500000 UNITS: 100000 SUSPENSION ORAL at 17:30

## 2022-03-14 NOTE — PROGRESS NOTES
[facility] Medicine Services  PROGRESS NOTE    Patient Name: Scott Diego  : 4/10/1930  MRN: [id number]    Date of Admission: 3/3/2022  Primary Care Physician: Yolande Escobar MD    Subjective   Subjective     CC:  CARYN/CHF/weakness    HPI:  Patient sitting up in bed.  Alert and oriented to person only.  Assisted patient with breakfast.    ROS:  Denies pain, nausea.  ROS limited due to dementia    Objective   Objective     Vital Signs:   Temp:  [98.3 °F (36.8 °C)] 98.3 °F (36.8 °C)  Heart Rate:  [] 103  Resp:  [18] 18  BP: (102-158)/(78-96) (P) 113/70     Physical Exam:  Constitutional: Awake, alert, frail appearing  HENT: NCAT, mucous membranes moist  Respiratory: Clear to auscultation bilaterally, nonlabored respirations   Cardiovascular: RRR, no murmurs, rubs, or gallops  Gastrointestinal: Positive bowel sounds, soft, nontender, nondistended  Musculoskeletal: No bilateral ankle edema  Psychiatric: Appropriate affect, cooperative during my visit  Neurologic: Oriented to person, no obvious focal deficit, speech clear  Skin: No rashes      Results Reviewed:  LAB RESULTS:      Lab 22  0448 22  0715 22  0320   WBC 7.43 7.79 8.17   HEMOGLOBIN 9.5* 11.7* 12.1   HEMATOCRIT 30.2* 37.3 38.4   PLATELETS 177 196 166   NEUTROS ABS 3.89  --  5.33   IMMATURE GRANS (ABS) 0.03  --  0.02   LYMPHS ABS 1.68  --  1.38   MONOS ABS 1.08*  --  1.21*   EOS ABS 0.65*  --  0.17   MCV 95.9 94.7 93.2         Lab 22  0512 22  0448 22  0535 22  0715 03/10/22  0721   SODIUM 137 139 138 136 138   POTASSIUM 5.1 4.8 4.9 4.8 4.5   CHLORIDE 104 107 104 101 99   CO2 22.0 24.0 25.0 28.0 27.0   ANION GAP 11.0 8.0 9.0 7.0 12.0   BUN 25* 22 26* 26* 25*   CREATININE 1.50* 1.67* 1.33* 1.64* 1.62*   EGFR 32.8* 28.8* 37.9* 29.4* 29.9*   GLUCOSE 83 83 93 93 101*   CALCIUM 9.3 9.1 9.0 9.4 9.6         Lab 22  0320   TOTAL PROTEIN 7.9   ALBUMIN 3.30*   GLOBULIN 4.6   ALT  (SGPT) 10   AST (SGOT) 23   BILIRUBIN 0.4   ALK PHOS 46                     Brief Urine Lab Results  (Last result in the past 365 days)      Color   Clarity   Blood   Leuk Est   Nitrite   Protein   CREAT   Urine HCG        03/03/22 0843             54.9               Microbiology Results Abnormal     Procedure Component Value - Date/Time    COVID PRE-OP / PRE-PROCEDURE SCREENING ORDER (NO ISOLATION) - Swab, Nasopharynx [[id number]]  (Normal) Collected: 03/03/22 0546    Lab Status: Final result Specimen: Swab from Nasopharynx Updated: 03/03/22 0652    Narrative:      The following orders were created for panel order COVID PRE-OP / PRE-PROCEDURE SCREENING ORDER (NO ISOLATION) - Swab, Nasopharynx.  Procedure                               Abnormality         Status                     ---------                               -----------         ------                     COVID-19, FLU A/B, RSV P...[[id number]]  Normal              Final result                 Please view results for these tests on the individual orders.    COVID-19, FLU A/B, RSV PCR - Swab, Nasopharynx [[id number]]  (Normal) Collected: 03/03/22 0546    Lab Status: Final result Specimen: Swab from Nasopharynx Updated: 03/03/22 0652     COVID19 Not Detected     Influenza A PCR Not Detected     Influenza B PCR Not Detected     RSV, PCR Not Detected    Narrative:      Fact sheet for providers: https://www.fda.gov/media/383437/download    Fact sheet for patients: https://www.fda.gov/media/113884/download    Test performed by PCR.          No radiology results from the last 24 hrs    Results for orders placed during the hospital encounter of 03/03/22    Adult Transthoracic Echo Complete W/ Cont if Necessary Per Protocol    Interpretation Summary  · Estimated left ventricular EF = 30%  · Moderate mitral valve regurgitation is present.  · There is calcification of the aortic valve.  · Estimated right ventricular systolic pressure from tricuspid regurgitation is  mildly elevated (35-45 mmHg).      I have reviewed the medications:  Scheduled Meds:ALPRAZolam, 0.25 mg, Oral, Nightly  aspirin, 81 mg, Oral, Nightly  atorvastatin, 80 mg, Oral, Nightly  cetirizine, 5 mg, Oral, Daily  donepezil, 5 mg, Oral, Nightly  erythromycin, 1 application, Both Eyes, Nightly  famotidine, 20 mg, Oral, Daily  ferrous sulfate, 325 mg, Oral, Daily  furosemide, 20 mg, Oral, Q48H  gabapentin, 300 mg, Oral, Nightly  heparin (porcine), 5,000 Units, Subcutaneous, Q12H  levothyroxine, 50 mcg, Oral, Q AM  metoprolol succinate XL, 25 mg, Oral, Daily  nystatin, 5 mL, Swish & Swallow, 4x Daily  ofloxacin, 1 drop, Both Eyes, TID  polyethylene glycol, 17 g, Oral, Daily  rOPINIRole, 0.5 mg, Oral, Nightly  sodium chloride, 10 mL, Intravenous, Q12H      Continuous Infusions:   PRN Meds:.•  acetaminophen **OR** acetaminophen **OR** acetaminophen  •  ALPRAZolam  •  magnesium sulfate **OR** magnesium sulfate in D5W 1g/100mL (PREMIX) **OR** magnesium sulfate  •  meclizine  •  polyvinyl alcohol  •  potassium chloride **OR** potassium chloride **OR** potassium chloride  •  sodium chloride  •  sodium chloride    Assessment/Plan   Assessment & Plan     Active Hospital Problems    Diagnosis  POA   • Elevated serum creatinine [R79.89]  Yes   • Hypothyroid [E03.9]  Yes   • Chronic systolic heart failure (HCC) [I50.22]  Yes   • CARYN (acute kidney injury) ([facility]) [N17.9]  Yes   • History of CVA (cerebrovascular accident) [Z86.73]  Not Applicable   • Dementia (HCC) [F03.90]  Yes   • Hypertension [I10]  Yes   • Hyponatremia [E87.1]  Yes      Resolved Hospital Problems   No resolved problems to display.     Brief Hospital Course to date:  Scott Diego is a 89 y.o. female  Who lives with her son in [place] following the recent death of her  in January.  Son states since this time, pt has gone down hill.  Poor po intake.  Unable to take care of self.  Has a lot of anxiety.  Worsening memory.  Son notes she has O2 at night  and he checked her sats at home and they were 86 on arrival. Son brings pt in as he feels he cannot safely take care of her.   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  Hyponatremia/hyperkalemia resolved.  Chest x-ray showed mild pulmonary interstitial disease.  Echo with EF 20 to 30%.  Plans to follow-up with [facility] at discharge.       This patient's problems and plans were partially entered by my partner and updated as appropriate by me 03/14/22.    Assessment/plan:    CARYN  -Improved likely due to to improve p.o. intake.     Hyponatremia  -resolved     Chronic systolic HF  -CXR with mild pulmonary interstitial disease  -ECHO with EF 20-30%  -has labile PO intake and low BMI, changed lasix to q48h only  -outpatient follow up with [facility]  -BB     Possible Thrush  -continue nystatin swish and swallow    Agitation with h/o Dementia  Prolonged QTC  -limit seroquel/haldol  -prn ativan for agitation  -placement, due 3/15    HTN  -BB     Hyperkalemia  -Received lokelma with improvement.      Hypothyroidism  -Levothyroxine     Debility/failure to thrive  Generalized weakness  Dementia  History of CVA  -PT/OT/fall precautions  -aricept  -CM following for disposition.  Has a bed at [facility] for Monday 3/15 as below.  -Consulted palliative care for goals of care.     Bereavement  - passed away January 2022      DVT prophylaxis:  Medical DVT prophylaxis orders are present.       AM-PAC 6 Clicks Score (PT): 19 (03/13/22 0830)    Disposition: I expect the patient to be discharged to rehab.  Has received a bed at [facility] awaiting insurance preserved.  Ambulance has been arranged tentatively for Tuesday 03/15 @ 2:45    CODE STATUS:   Code Status and Medical Interventions:   Ordered at: 03/11/22 1052     Level Of Support Discussed With:    Next of Kin (If No Surrogate)     Code Status (Patient has no pulse and is not  breathing):    No CPR (Do Not Attempt to Resuscitate)     Medical Interventions (Patient has pulse or is breathing):    Full Support     Comments:    spencer Mills, APRN  03/14/22

## 2022-03-14 NOTE — PLAN OF CARE
Goal Outcome Evaluation:  Plan of Care Reviewed With: patient        Progress: no change   VSS, on RA. PT extremely agitated and anxious at the start of shift. Staff and son unable to reorient. Pt continuously climbing out of chair and bed, hospitalist notified, PRNs given. Pt finally settled down and went to sleep at 0200. Scheduled discharge to [facility] today. Will continue to monitor.

## 2022-03-14 NOTE — PLAN OF CARE
Goal Outcome Evaluation:           Progress: no change  Outcome Evaluation: Pt extremely lethargic this date, deferred OOB this date. OT facilitated pt in supine to sit, side stepping to advance higher in the bed and sit to supine. SUP for bed mobility, CGA for STS. OT progress note/re-cert completed this date, goals reviewed and revised as appropriate. Continue to progress per current POC as able. Recommendation upon d/c for SNF.

## 2022-03-14 NOTE — PLAN OF CARE
Goal Outcome Evaluation:           Progress: no change  Outcome Evaluation: Pt was tentatively scheduled for discharge today to [facility]; delayed due to insurance approval, rescheduled for tomorrow. Referral for Palliative Care to follow at facility has been faxed to [facility].    1330 Palliative IDT meeting: MAURICE Dockery RN, PN; GUILLAUME Sarah DO; KRZYSZTOF Felder, APRN; KRZYSZTOF Padilla, RN, PN; JULIANN Sinha, [facility], [facility]; BRODIE Morales, RN, PN; KARTHIKEYAN Azul, RN

## 2022-03-14 NOTE — CASE MANAGEMENT/SOCIAL WORK
Continued Stay Note  [facility]     Patient Name: Scott Diego  MRN: [id number]  Today's Date: 3/14/2022    Admit Date: 3/3/2022     Discharge Plan     Row Name 03/14/22 1113       Plan    Plan [facility]    Plan Comments Discussed patient in MDR.  Patient is medically ready, and is becoming more agitated at times with nursing.  Called Maria Fernanda at [facility], who stated that patient's insurance precertification has not been approved yet.  Ambulance has been moved back to tomorrow 3/15 at 2:45 pm.  CM will continue to follow.    Final Discharge Disposition Code 03 - skilled nursing facility (SNF)               Discharge Codes    No documentation.               Expected Discharge Date and Time     Expected Discharge Date Expected Discharge Time    Mar 17, 2022             Francine Rose RN

## 2022-03-15 LAB
QT INTERVAL: 422 MS
QTC INTERVAL: 530 MS

## 2022-03-15 PROCEDURE — 25010000002 HEPARIN (PORCINE) PER 1000 UNITS: Performed by: NURSE PRACTITIONER

## 2022-03-15 PROCEDURE — 97116 GAIT TRAINING THERAPY: CPT

## 2022-03-15 PROCEDURE — 99232 SBSQ HOSP IP/OBS MODERATE 35: CPT | Performed by: NURSE PRACTITIONER

## 2022-03-15 RX ORDER — LORAZEPAM 0.5 MG/1
0.5 TABLET ORAL NIGHTLY
Status: DISCONTINUED | OUTPATIENT
Start: 2022-03-15 | End: 2022-03-17 | Stop reason: HOSPADM

## 2022-03-15 RX ADMIN — NYSTATIN 500000 UNITS: 100000 SUSPENSION ORAL at 18:09

## 2022-03-15 RX ADMIN — NYSTATIN 500000 UNITS: 100000 SUSPENSION ORAL at 21:12

## 2022-03-15 RX ADMIN — ASPIRIN 81 MG 81 MG: 81 TABLET ORAL at 21:12

## 2022-03-15 RX ADMIN — POLYETHYLENE GLYCOL 3350 17 G: 17 POWDER, FOR SOLUTION ORAL at 09:17

## 2022-03-15 RX ADMIN — LORAZEPAM 0.5 MG: 0.5 TABLET ORAL at 21:12

## 2022-03-15 RX ADMIN — NYSTATIN 500000 UNITS: 100000 SUSPENSION ORAL at 13:47

## 2022-03-15 RX ADMIN — ALPRAZOLAM 0.25 MG: 0.25 TABLET ORAL at 13:47

## 2022-03-15 RX ADMIN — HEPARIN SODIUM 5000 UNITS: 5000 INJECTION, SOLUTION INTRAVENOUS; SUBCUTANEOUS at 09:17

## 2022-03-15 RX ADMIN — FUROSEMIDE 20 MG: 20 TABLET ORAL at 09:18

## 2022-03-15 RX ADMIN — LEVOTHYROXINE SODIUM 50 MCG: 50 TABLET ORAL at 09:18

## 2022-03-15 RX ADMIN — OFLOXACIN 1 DROP: 3 SOLUTION/ DROPS OPHTHALMIC at 21:13

## 2022-03-15 RX ADMIN — NYSTATIN 500000 UNITS: 100000 SUSPENSION ORAL at 09:18

## 2022-03-15 RX ADMIN — HEPARIN SODIUM 5000 UNITS: 5000 INJECTION, SOLUTION INTRAVENOUS; SUBCUTANEOUS at 21:13

## 2022-03-15 RX ADMIN — ALPRAZOLAM 0.25 MG: 0.25 TABLET ORAL at 18:54

## 2022-03-15 RX ADMIN — FAMOTIDINE 20 MG: 20 TABLET, FILM COATED ORAL at 09:19

## 2022-03-15 RX ADMIN — ERYTHROMYCIN 1 APPLICATION: 5 OINTMENT OPHTHALMIC at 21:13

## 2022-03-15 RX ADMIN — OFLOXACIN 1 DROP: 3 SOLUTION/ DROPS OPHTHALMIC at 15:33

## 2022-03-15 RX ADMIN — Medication 325 MG: at 09:18

## 2022-03-15 RX ADMIN — ROPINIROLE HYDROCHLORIDE 0.5 MG: 0.5 TABLET, FILM COATED ORAL at 21:12

## 2022-03-15 RX ADMIN — OFLOXACIN 1 DROP: 3 SOLUTION/ DROPS OPHTHALMIC at 09:17

## 2022-03-15 RX ADMIN — ATORVASTATIN CALCIUM 80 MG: 40 TABLET, FILM COATED ORAL at 21:12

## 2022-03-15 RX ADMIN — GABAPENTIN 300 MG: 300 CAPSULE ORAL at 21:12

## 2022-03-15 RX ADMIN — CETIRIZINE HYDROCHLORIDE 5 MG: 10 TABLET, FILM COATED ORAL at 09:18

## 2022-03-15 RX ADMIN — DONEPEZIL HYDROCHLORIDE 5 MG: 5 TABLET, FILM COATED ORAL at 21:12

## 2022-03-15 RX ADMIN — METOPROLOL SUCCINATE 25 MG: 25 TABLET, EXTENDED RELEASE ORAL at 09:18

## 2022-03-15 NOTE — PLAN OF CARE
Goal Outcome Evaluation:  Plan of Care Reviewed With: patient        Progress: no change  Outcome Evaluation: Pt requires cueing for safety during all ambulation this date. She walked increased total distance, but needed more standing rest breaks. Increased time taken to don/doff shoes. MIN A given at turns.

## 2022-03-15 NOTE — PROGRESS NOTES
[facility] Medicine Services  PROGRESS NOTE    Patient Name: Scott Diego  : 4/10/1930  MRN: [id number]    Date of Admission: 3/3/2022  Primary Care Physician: Yolande Escobar MD    Subjective   Subjective     CC:  F/u CARYN, weakness, CHF    HPI:  Patient sitting up in the chair. Son is at bedside. Patient pleasantly intermittently confused. She has no complaints.    ROS:  Gen- No fevers, chills  CV- No chest pain, palpitations  Resp- No cough, dyspnea  GI- No N/V/D, abd pain    Objective   Objective     Vital Signs:   Temp:  [98 °F (36.7 °C)] 98 °F (36.7 °C)  Heart Rate:  [85] 85  BP: (113-129)/(71-94) 113/71     Physical Exam:  Constitutional: No acute distress, awake, alert  HENT: NCAT, mucous membranes moist  Respiratory: Clear to auscultation bilaterally, respiratory effort normal, on room air  Cardiovascular: RRR, no murmurs, rubs, or gallops  Gastrointestinal: Positive bowel sounds, soft, nontender, nondistended  Musculoskeletal: No bilateral ankle edema  Psychiatric: Appropriate affect, cooperative  Neurologic: Oriented to person, Cranial Nerves grossly intact to confrontation, speech clear  Skin: No rashes    Results Reviewed:  LAB RESULTS:      Lab 22  0448 22  0715   WBC 7.43 7.79   HEMOGLOBIN 9.5* 11.7*   HEMATOCRIT 30.2* 37.3   PLATELETS 177 196   NEUTROS ABS 3.89  --    IMMATURE GRANS (ABS) 0.03  --    LYMPHS ABS 1.68  --    MONOS ABS 1.08*  --    EOS ABS 0.65*  --    MCV 95.9 94.7         Lab 22  0512 22  0448 22  0535 22  0715 03/10/22  0721   SODIUM 137 139 138 136 138   POTASSIUM 5.1 4.8 4.9 4.8 4.5   CHLORIDE 104 107 104 101 99   CO2 22.0 24.0 25.0 28.0 27.0   ANION GAP 11.0 8.0 9.0 7.0 12.0   BUN 25* 22 26* 26* 25*   CREATININE 1.50* 1.67* 1.33* 1.64* 1.62*   EGFR 32.8* 28.8* 37.9* 29.4* 29.9*   GLUCOSE 83 83 93 93 101*   CALCIUM 9.3 9.1 9.0 9.4 9.6                         Brief Urine Lab Results  (Last result in the past 365  days)      Color   Clarity   Blood   Leuk Est   Nitrite   Protein   CREAT   Urine HCG        03/03/22 0843             54.9               Microbiology Results Abnormal     Procedure Component Value - Date/Time    COVID PRE-OP / PRE-PROCEDURE SCREENING ORDER (NO ISOLATION) - Swab, Nasopharynx [[id number]]  (Normal) Collected: 03/03/22 0546    Lab Status: Final result Specimen: Swab from Nasopharynx Updated: 03/03/22 0652    Narrative:      The following orders were created for panel order COVID PRE-OP / PRE-PROCEDURE SCREENING ORDER (NO ISOLATION) - Swab, Nasopharynx.  Procedure                               Abnormality         Status                     ---------                               -----------         ------                     COVID-19, FLU A/B, RSV P...[[id number]]  Normal              Final result                 Please view results for these tests on the individual orders.    COVID-19, FLU A/B, RSV PCR - Swab, Nasopharynx [[id number]]  (Normal) Collected: 03/03/22 0546    Lab Status: Final result Specimen: Swab from Nasopharynx Updated: 03/03/22 0652     COVID19 Not Detected     Influenza A PCR Not Detected     Influenza B PCR Not Detected     RSV, PCR Not Detected    Narrative:      Fact sheet for providers: https://www.fda.gov/media/973711/download    Fact sheet for patients: https://www.fda.gov/media/925755/download    Test performed by PCR.          No radiology results from the last 24 hrs    Results for orders placed during the hospital encounter of 03/03/22    Adult Transthoracic Echo Complete W/ Cont if Necessary Per Protocol    Interpretation Summary  · Estimated left ventricular EF = 30%  · Moderate mitral valve regurgitation is present.  · There is calcification of the aortic valve.  · Estimated right ventricular systolic pressure from tricuspid regurgitation is mildly elevated (35-45 mmHg).      I have reviewed the medications:  Scheduled Meds:aspirin, 81 mg, Oral, Nightly  atorvastatin, 80  mg, Oral, Nightly  cetirizine, 5 mg, Oral, Daily  donepezil, 5 mg, Oral, Nightly  erythromycin, 1 application, Both Eyes, Nightly  famotidine, 20 mg, Oral, Daily  ferrous sulfate, 325 mg, Oral, Daily  furosemide, 20 mg, Oral, Q48H  gabapentin, 300 mg, Oral, Nightly  heparin (porcine), 5,000 Units, Subcutaneous, Q12H  levothyroxine, 50 mcg, Oral, Q AM  LORazepam, 0.5 mg, Oral, Nightly  metoprolol succinate XL, 25 mg, Oral, Daily  nystatin, 5 mL, Swish & Swallow, 4x Daily  ofloxacin, 1 drop, Both Eyes, TID  polyethylene glycol, 17 g, Oral, Daily  rOPINIRole, 0.5 mg, Oral, Nightly  sodium chloride, 10 mL, Intravenous, Q12H      Continuous Infusions:   PRN Meds:.•  acetaminophen **OR** acetaminophen **OR** acetaminophen  •  ALPRAZolam  •  magnesium sulfate **OR** magnesium sulfate in D5W 1g/100mL (PREMIX) **OR** magnesium sulfate  •  meclizine  •  polyvinyl alcohol  •  potassium chloride **OR** potassium chloride **OR** potassium chloride  •  sodium chloride  •  sodium chloride    Assessment/Plan   Assessment & Plan     Active Hospital Problems    Diagnosis  POA   • Elevated serum creatinine [R79.89]  Yes   • Hypothyroid [E03.9]  Yes   • Chronic systolic heart failure (HCC) [I50.22]  Yes   • CARYN (acute kidney injury) (HCC) [N17.9]  Yes   • History of CVA (cerebrovascular accident) [Z86.73]  Not Applicable   • Dementia (HCC) [F03.90]  Yes   • Hypertension [I10]  Yes   • Hyponatremia [E87.1]  Yes      Resolved Hospital Problems   No resolved problems to display.        Brief Hospital Course to date:  Scott Diego is a 89 y.o. female  who lives with her son in [place] following the recent death of her  in January. Son states since this time, pt has gone downhill.  Poor po intake. Unable to take care of self.  Has a lot of anxiety.  Worsening memory.  Son notes she has O2 at night and he checked her sats at home and they were 86 on arrival. Son brings pt in as he feels he cannot safely take care of her.    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 Hyponatremia/hyperkalemia resolved.  Chest x-ray showed mild pulmonary interstitial disease.  Echo with EF 20 to 30%.  Plans to follow-up with [facility] at discharge.      This patient's problems and plans were partially entered by my partner and updated as appropriate by me 03/15/22.    Assessment/plan:     CARYN  -Improved likely due to to improve p.o. intake.  -AM BMP. Cr 1.5 today     Hyponatremia  -resolved     Chronic systolic HF  -CXR with mild pulmonary interstitial disease  -ECHO with EF 20-30%  -has labile PO intake and low BMI, changed lasix to q48h only  -outpatient follow up with [facility]  -BB. On ACE and BB at home.     Possible Thrush  -continue nystatin swish and swallow     Agitation with h/o Dementia  Prolonged QTC  -No seroquel/haldol given QTc  -prn ativan for agitation  -AM Mg     HTN  -BB     Hyperkalemia  -Received Lokelma with improvement.      Hypothyroidism  -Levothyroxine     Debility/failure to thrive  Generalized weakness  Dementia  History of CVA  -PT/OT/fall precautions  -Aricept  -CM following for disposition.  Has a bed at [facility] when insurance approves admission.   -Consulted palliative care for goals of care. Palliative will follow at [facility].     Bereavement  - passed away January 2022    DVT prophylaxis:  Medical DVT prophylaxis orders are present.       AM-PAC 6 Clicks Score (PT): 19 (03/13/22 0830)    Disposition: I expect the patient to be discharged tomorrow, 3/16/22. Did a [id number] with patient's insurance and rehab was approved.    CODE STATUS:   Code Status and Medical Interventions:   Ordered at: 03/11/22 1052     Level Of Support Discussed With:    Next of Kin (If No Surrogate)     Code Status (Patient has no pulse and is not breathing):    No CPR (Do Not Attempt to Resuscitate)     Medical Interventions (Patient has pulse or is  breathing):    Full Support     Comments:    spencer Ray, APRN  03/15/22

## 2022-03-15 NOTE — PLAN OF CARE
Goal Outcome Evaluation:  Plan of Care Reviewed With: patient           Outcome Evaluation: Patient had hard time getting off to sleep last night. She did finally fall off asleep. Vss,Ra,non-tele. Plan is for patient to discharge to [facility] via ems at 2:45.

## 2022-03-15 NOTE — PLAN OF CARE
Problem: Adult Inpatient Plan of Care  Goal: Plan of Care Review  Flowsheets (Taken 3/15/2022 1330)  Plan of Care Reviewed With: patient  Outcome Evaluation: Patient conversant, joking, laughing, easily engaging - no complaints of pain/nausea/dyspnea/anxiety.  Patient awaiting transfer to [facility] - referral made to [facility] for follow in the facility.    1330 Palliative IDT-Palliative Team members present:  GUILLAUME Sarah DO; KRZYSZTOF Felder APRN; [facility]; BRODIE Morales RN, CHPN; KARTHIKEYAN Azul RN, OCN; Hospice  KARTHIKEYAN Alberto W; Hospice Nurse Liaison KRZYSZTOF Padilla RN, CHPN     Problem: Palliative Care  Goal: Enhanced Quality of Life  Intervention: Optimize Psychosocial Wellbeing  Recent Flowsheet Documentation  Taken 3/15/2022 1148 by Mariposa Sinha, MSW  Supportive Measures: (symptom assessment)   active listening utilized   positive reinforcement provided   verbalization of feelings encouraged   other (see comments)

## 2022-03-15 NOTE — THERAPY TREATMENT NOTE
Patient Name: Scott Diego  : 4/10/1930    MRN: [id number]                              Today's Date: 3/15/2022       Admit Date: 3/3/2022    Visit Dx:     ICD-10-CM ICD-9-CM   1. CARYN (acute kidney injury) (HCC)  N17.9 584.9   2. Elevated serum creatinine  R79.89 790.99   3. Normocytic anemia  D64.9 285.9   4. Impaired functional mobility, balance, gait, and endurance  Z74.09 V49.89   5. Oral phase dysphagia  R13.11 787.21     Patient Active Problem List   Diagnosis   • Idiopathic peripheral neuropathy   • Neck pain   • Mild cognitive impairment   • Anemia   • Hyponatremia   • Hypertension   • Dehydration   • History of CVA (cerebrovascular accident)   • Dementia (HCC)   • Left bundle branch block   • Orthostatic dizziness   • Chronic systolic heart failure (HCC)   • Normocytic anemia   • CARYN (acute kidney injury) (HCC)   • Dyspnea   • Hypoxia   • Hypothyroid   • Pericardial effusion   • Acute respiratory failure with hypoxia (HCC)   • Right lower lobe pneumonia   • Ventricular ectopy   • Elevated serum creatinine     Past Medical History:   Diagnosis Date   • Congestive heart failure (HCC)    • Malignant neoplasm (HCC)    • Stroke (HCC)     mini stroke   • Systolic heart failure (HCC) 2017    Chronic/compensated (EF 25%)     Past Surgical History:   Procedure Laterality Date   • CHOLECYSTECTOMY     • EYE SURGERY     • HYSTERECTOMY        General Information     Row Name 03/15/22 162          Physical Therapy Time and Intention    Document Type therapy note (daily note)  -EJ     Mode of Treatment physical therapy  -EJ     Row Name 03/15/22 162          General Information    Patient Profile Reviewed yes  -EJ     Existing Precautions/Restrictions fall  -EJ     Row Name 03/15/22 162          Cognition    Orientation Status (Cognition) oriented to;person;disoriented to;place;situation;time  -EJ     Row Name 03/15/22 1620          Safety Issues, Functional Mobility    Safety Issues Affecting Function  (Mobility) insight into deficits/self-awareness;awareness of need for assistance;safety precaution awareness;safety precautions follow-through/compliance;judgment;impulsivity   -EJ     Impairments Affecting Function (Mobility) balance;coordination;visual/perceptual;endurance/activity tolerance;strength;cognition  -EJ     Cognitive Impairments, Mobility Safety/Performance attention;awareness, need for assistance;insight into deficits/self-awareness  -EJ     Comment, Safety Issues/Impairments (Mobility) patient is pleasantly confused, needs cues for safety awarenes with all activity  -EJ           User Key  (r) = Recorded By, (t) = Taken By, (c) = Cosigned By    Initials Name Provider Type    Kristina Sullivan PT Physical Therapist               Mobility     Row Name 03/15/22 1621          Sit-Stand Transfer    Sit-Stand [place] (Transfers) contact guard  -EJ     Row Name 03/15/22 1621          Gait/Stairs (Locomotion)    [place] Level (Gait) verbal cues;minimum assist (75% patient effort);1 person assist  -EJ     Assistive Device (Gait) walker, front-wheeled  -EJ     Distance in Feet (Gait) 60+150+140 with 2 standing rest breaks.  -EJ     Deviations/Abnormal Patterns (Gait) bilateral deviations;base of support, narrow;gait speed decreased  -EJ     Bilateral Gait Deviations forward flexed posture;weight shift ability decreased  -EJ     Comment, (Gait/Stairs) Pt very impulsive during gait when using RWx. Pt has forward pitch and walks quickly. Requires cueing to slow down, hold walker closely. During one instance needed cueing to keep walker on the ground.  -EJ           User Key  (r) = Recorded By, (t) = Taken By, (c) = Cosigned By    Initials Name Provider Type    Kristina Sullivan PT Physical Therapist               Obj/Interventions     Row Name 03/15/22 1621          Balance    Balance Assessment sitting dynamic balance  -EJ     Dynamic Sitting Balance supervision;other (see comments)  -EJ      Comment, Balance donned socks, shoes and doffed pre/post ambulation.  -EJ           User Key  (r) = Recorded By, (t) = Taken By, (c) = Cosigned By    Initials Name Provider Type    EJ Kristina Brady, PT Physical Therapist               Goals/Plan    No documentation.                Clinical Impression     Row Name 03/15/22 1624          Pain    Pretreatment Pain Rating 0/10 - no pain  -EJ     Posttreatment Pain Rating 0/10 - no pain  -EJ     Pain Intervention(s) Repositioned;Ambulation/increased activity  -EJ     Additional Documentation Pain Scale: Numbers Pre/Post-Treatment (Group)  -     Row Name 03/15/22 1624          Plan of Care Review    Plan of Care Reviewed With patient  -EJ     Progress no change  -EJ     Outcome Evaluation Pt requires cueing for safety during all ambulation this date. She walked increased total distance, but needed more standing rest breaks. Increased time taken to don/doff shoes. MIN A given at turns.  -     Row Name 03/15/22 1624          Therapy Assessment/Plan (PT)    Rehab Potential (PT) good, to achieve stated therapy goals  -EJ     Criteria for Skilled Interventions Met (PT) yes;skilled treatment is necessary  -     Row Name 03/15/22 1624          Vital Signs    Pre SpO2 (%) 95  -EJ     O2 Delivery Pre Treatment room air  -EJ     O2 Delivery Intra Treatment --  1L  -EJ     Post SpO2 (%) 99  -EJ     O2 Delivery Post Treatment supplemental O2  1L  -EJ     Pre Patient Position Supine  -EJ     Intra Patient Position Standing  -EJ     Post Patient Position Supine  -EJ     Row Name 03/15/22 1624          Positioning and Restraints    Pre-Treatment Position sitting in chair/recliner  -EJ     Post Treatment Position chair  -EJ     In Chair notified nsg;reclined;call light within reach;encouraged to call for assist;exit alarm on;waffle cushion;with family/caregiver  -EJ           User Key  (r) = Recorded By, (t) = Taken By, (c) = Cosigned By    Initials Name Provider Type    EJ  Kristina Brady PT Physical Therapist               Outcome Measures    No documentation.                              Physical Therapy Education                 Title: PT OT SLP Therapies (In Progress)     Topic: Physical Therapy (In Progress)     Point: Mobility training (In Progress)     Learning Progress Summary           Patient Acceptance, E, NR by AS at 3/12/2022 1453    Acceptance, E, NR by EJ at 3/10/2022 1536    Acceptance, E, NR by EJ at 3/9/2022 1122    Acceptance, E, NR by EJ at 3/8/2022 1047    Acceptance, E, NR by AS at 3/7/2022 0942    Acceptance, E,TB, NR by EDMOND at 3/5/2022 0806    Eager, E, NR by RYANN at 3/4/2022 1149                   Point: Home exercise program (In Progress)     Learning Progress Summary           Patient Acceptance, E, NR by AS at 3/12/2022 1453    Acceptance, E, NR by EJ at 3/10/2022 1536    Acceptance, E, NR by EJ at 3/9/2022 1122    Acceptance, E, NR by EJ at 3/8/2022 1047    Acceptance, E, NR by AS at 3/7/2022 0942    Acceptance, E,TB, NR by EDMOND at 3/5/2022 0806                   Point: Body mechanics (In Progress)     Learning Progress Summary           Patient Acceptance, E, NR by AS at 3/12/2022 1453    Acceptance, E, NR by EJ at 3/10/2022 1536    Acceptance, E, NR by EJ at 3/9/2022 1122    Acceptance, E, NR by EJ at 3/8/2022 1047    Acceptance, E, NR by AS at 3/7/2022 0942    Acceptance, E,TB, NR by EDMOND at 3/5/2022 0806    Eager, E, NR by RYANN at 3/4/2022 1149                   Point: Precautions (In Progress)     Learning Progress Summary           Patient Acceptance, E, NR by AS at 3/12/2022 1453    Acceptance, E, NR by EJ at 3/10/2022 1536    Acceptance, E, NR by EJ at 3/9/2022 1122    Acceptance, E, NR by EJ at 3/8/2022 1047    Acceptance, E, NR by AS at 3/7/2022 0942    Acceptance, E,TB, NR by EDMOND at 3/5/2022 0806    VIKA Gomez, NR by RYANN at 3/4/2022 1149                               User Key     Initials Effective Dates Name Provider Type Discipline    EJ 06/16/21 -   Kristina Brady, PT Physical Therapist PT    SJ 06/16/21 -  Priya Monzon PT Physical Therapist PT    AS 06/16/21 -  Jessie Fleming PTA Physical Therapy Assistant PT    EDMOND 12/29/21 -  Hannah Anderson RN Registered Nurse Nurse              PT Recommendation and Plan     Plan of Care Reviewed With: patient  Progress: no change  Outcome Evaluation: Pt requires cueing for safety during all ambulation this date. She walked increased total distance, but needed more standing rest breaks. Increased time taken to don/doff shoes. MIN A given at turns.     Time Calculation:    PT Charges     Row Name 03/15/22 1629             Time Calculation    Start Time 1548  -EJ      PT Received On 03/15/22  -EJ      PT Goal Re-Cert Due Date 03/19/22  -EJ              Time Calculation- PT    Total Timed Code Minutes- PT 26 minute(s)  -EJ              Timed Charges    90886 - Gait Training Minutes  24  -EJ      67099 - PT Therapeutic Activity Minutes 2  -EJ              Total Minutes    Timed Charges Total Minutes 26  -EJ       Total Minutes 26  -EJ            User Key  (r) = Recorded By, (t) = Taken By, (c) = Cosigned By    Initials Name Provider Type    EJ Kristina Brady, PT Physical Therapist              Therapy Charges for Today     Code Description Service Date Service Provider Modifiers Qty    [id number] HC GAIT TRAINING EA 15 MIN 3/15/2022 Kristina Brady, PT GP 2          PT G-Codes  Outcome Measure Options: AM-PAC 6 Clicks Daily Activity (OT)  AM-PAC 6 Clicks Score (PT): 19  AM-PAC 6 Clicks Score (OT): 19    Kristina Simmons PT  3/15/2022

## 2022-03-16 ENCOUNTER — APPOINTMENT (OUTPATIENT)
Dept: GENERAL RADIOLOGY | Facility: HOSPITAL | Age: 87
End: 2022-03-16

## 2022-03-16 PROBLEM — R79.89 ELEVATED SERUM CREATININE: Status: RESOLVED | Noted: 2022-03-03 | Resolved: 2022-03-16

## 2022-03-16 PROBLEM — N17.9 AKI (ACUTE KIDNEY INJURY): Status: RESOLVED | Noted: 2017-12-25 | Resolved: 2022-03-16

## 2022-03-16 PROBLEM — E87.1 HYPONATREMIA: Status: RESOLVED | Noted: 2017-09-24 | Resolved: 2022-03-16

## 2022-03-16 LAB
ANION GAP SERPL CALCULATED.3IONS-SCNC: 11 MMOL/L (ref 5–15)
B PARAPERT DNA SPEC QL NAA+PROBE: NOT DETECTED
B PERT DNA SPEC QL NAA+PROBE: NOT DETECTED
BUN SERPL-MCNC: 42 MG/DL (ref 8–23)
BUN/CREAT SERPL: 30.4 (ref 7–25)
C PNEUM DNA NPH QL NAA+NON-PROBE: NOT DETECTED
CALCIUM SPEC-SCNC: 9.8 MG/DL (ref 8.2–9.6)
CHLORIDE SERPL-SCNC: 102 MMOL/L (ref 98–107)
CO2 SERPL-SCNC: 25 MMOL/L (ref 22–29)
CREAT SERPL-MCNC: 1.38 MG/DL (ref 0.57–1)
EGFRCR SERPLBLD CKD-EPI 2021: 36.2 ML/MIN/1.73
FLUAV SUBTYP SPEC NAA+PROBE: NOT DETECTED
FLUBV RNA ISLT QL NAA+PROBE: NOT DETECTED
GLUCOSE SERPL-MCNC: 88 MG/DL (ref 65–99)
HADV DNA SPEC NAA+PROBE: NOT DETECTED
HCOV 229E RNA SPEC QL NAA+PROBE: NOT DETECTED
HCOV HKU1 RNA SPEC QL NAA+PROBE: NOT DETECTED
HCOV NL63 RNA SPEC QL NAA+PROBE: NOT DETECTED
HCOV OC43 RNA SPEC QL NAA+PROBE: NOT DETECTED
HMPV RNA NPH QL NAA+NON-PROBE: NOT DETECTED
HPIV1 RNA ISLT QL NAA+PROBE: NOT DETECTED
HPIV2 RNA SPEC QL NAA+PROBE: NOT DETECTED
HPIV3 RNA NPH QL NAA+PROBE: NOT DETECTED
HPIV4 P GENE NPH QL NAA+PROBE: NOT DETECTED
M PNEUMO IGG SER IA-ACNC: NOT DETECTED
MAGNESIUM SERPL-MCNC: 2 MG/DL (ref 1.7–2.3)
POTASSIUM SERPL-SCNC: 4.6 MMOL/L (ref 3.5–5.2)
RHINOVIRUS RNA SPEC NAA+PROBE: NOT DETECTED
RSV RNA NPH QL NAA+NON-PROBE: NOT DETECTED
SARS-COV-2 RNA NPH QL NAA+NON-PROBE: NOT DETECTED
SODIUM SERPL-SCNC: 138 MMOL/L (ref 136–145)

## 2022-03-16 PROCEDURE — 97110 THERAPEUTIC EXERCISES: CPT

## 2022-03-16 PROCEDURE — 73630 X-RAY EXAM OF FOOT: CPT

## 2022-03-16 PROCEDURE — 99232 SBSQ HOSP IP/OBS MODERATE 35: CPT | Performed by: NURSE PRACTITIONER

## 2022-03-16 PROCEDURE — 0202U NFCT DS 22 TRGT SARS-COV-2: CPT | Performed by: NURSE PRACTITIONER

## 2022-03-16 PROCEDURE — 83735 ASSAY OF MAGNESIUM: CPT | Performed by: NURSE PRACTITIONER

## 2022-03-16 PROCEDURE — 25010000002 HEPARIN (PORCINE) PER 1000 UNITS: Performed by: NURSE PRACTITIONER

## 2022-03-16 PROCEDURE — 80048 BASIC METABOLIC PNL TOTAL CA: CPT | Performed by: NURSE PRACTITIONER

## 2022-03-16 RX ORDER — SODIUM CHLORIDE, SODIUM LACTATE, POTASSIUM CHLORIDE, CALCIUM CHLORIDE 600; 310; 30; 20 MG/100ML; MG/100ML; MG/100ML; MG/100ML
50 INJECTION, SOLUTION INTRAVENOUS CONTINUOUS
Status: DISCONTINUED | OUTPATIENT
Start: 2022-03-16 | End: 2022-03-17 | Stop reason: HOSPADM

## 2022-03-16 RX ADMIN — LORAZEPAM 0.5 MG: 0.5 TABLET ORAL at 19:52

## 2022-03-16 RX ADMIN — NYSTATIN 500000 UNITS: 100000 SUSPENSION ORAL at 12:05

## 2022-03-16 RX ADMIN — NYSTATIN 500000 UNITS: 100000 SUSPENSION ORAL at 19:50

## 2022-03-16 RX ADMIN — POLYVINYL ALCOHOL 1 DROP: 14 SOLUTION/ DROPS OPHTHALMIC at 06:56

## 2022-03-16 RX ADMIN — FAMOTIDINE 20 MG: 20 TABLET, FILM COATED ORAL at 10:51

## 2022-03-16 RX ADMIN — CETIRIZINE HYDROCHLORIDE 5 MG: 10 TABLET, FILM COATED ORAL at 10:51

## 2022-03-16 RX ADMIN — ALPRAZOLAM 0.25 MG: 0.25 TABLET ORAL at 18:34

## 2022-03-16 RX ADMIN — METOPROLOL SUCCINATE 25 MG: 25 TABLET, EXTENDED RELEASE ORAL at 10:51

## 2022-03-16 RX ADMIN — DONEPEZIL HYDROCHLORIDE 5 MG: 5 TABLET, FILM COATED ORAL at 19:52

## 2022-03-16 RX ADMIN — ATORVASTATIN CALCIUM 80 MG: 40 TABLET, FILM COATED ORAL at 19:53

## 2022-03-16 RX ADMIN — ERYTHROMYCIN 1 APPLICATION: 5 OINTMENT OPHTHALMIC at 19:53

## 2022-03-16 RX ADMIN — SODIUM CHLORIDE, POTASSIUM CHLORIDE, SODIUM LACTATE AND CALCIUM CHLORIDE 50 ML/HR: 600; 310; 30; 20 INJECTION, SOLUTION INTRAVENOUS at 13:59

## 2022-03-16 RX ADMIN — OFLOXACIN 1 DROP: 3 SOLUTION/ DROPS OPHTHALMIC at 10:50

## 2022-03-16 RX ADMIN — Medication 325 MG: at 10:51

## 2022-03-16 RX ADMIN — OFLOXACIN 1 DROP: 3 SOLUTION/ DROPS OPHTHALMIC at 16:00

## 2022-03-16 RX ADMIN — GABAPENTIN 300 MG: 300 CAPSULE ORAL at 19:51

## 2022-03-16 RX ADMIN — POLYETHYLENE GLYCOL 3350 17 G: 17 POWDER, FOR SOLUTION ORAL at 10:51

## 2022-03-16 RX ADMIN — LEVOTHYROXINE SODIUM 50 MCG: 50 TABLET ORAL at 05:15

## 2022-03-16 RX ADMIN — ROPINIROLE HYDROCHLORIDE 0.5 MG: 0.5 TABLET, FILM COATED ORAL at 19:50

## 2022-03-16 RX ADMIN — HEPARIN SODIUM 5000 UNITS: 5000 INJECTION, SOLUTION INTRAVENOUS; SUBCUTANEOUS at 19:55

## 2022-03-16 RX ADMIN — OFLOXACIN 1 DROP: 3 SOLUTION/ DROPS OPHTHALMIC at 19:54

## 2022-03-16 RX ADMIN — ASPIRIN 81 MG 81 MG: 81 TABLET ORAL at 19:53

## 2022-03-16 RX ADMIN — HEPARIN SODIUM 5000 UNITS: 5000 INJECTION, SOLUTION INTRAVENOUS; SUBCUTANEOUS at 10:50

## 2022-03-16 RX ADMIN — ALPRAZOLAM 0.25 MG: 0.25 TABLET ORAL at 10:51

## 2022-03-16 NOTE — PLAN OF CARE
Goal Outcome Evaluation:  Plan of Care Reviewed With: patient        Progress: no change  Outcome Evaluation: Pt demo good effort w/ BUE ther ex. SUA for facial grooming. STS deferred d/t new L foot pain. Continue per current POC.

## 2022-03-16 NOTE — CASE MANAGEMENT/SOCIAL WORK
Case Management Discharge Note      Final Note: Patient will be going to [facility] tomorrow via [facility] at noon.  A covid test has been ordered.  Primary RN to call report to [phone] extension [phone]. EMS run sheet is on the chart.         Selected Continued Care - Admitted Since 3/3/2022     Destination Coordination complete.    Service Provider Selected Services Address Phone Fax Patient Preferred    [facility] [address] [phone] --       Internal Comment last updated by Etta Rucker RN 3/8/2022 105    3/8: Spoke with [facility]. Forwarding info to nursing today for review.                       Durable Medical Equipment    No services have been selected for the patient.              Dialysis/Infusion    No services have been selected for the patient.              Home Medical Care    No services have been selected for the patient.              Therapy    No services have been selected for the patient.              Community Resources    No services have been selected for the patient.              Community & DME    No services have been selected for the patient.                       Final Discharge Disposition Code: 03 - skilled nursing facility (SNF)

## 2022-03-16 NOTE — THERAPY TREATMENT NOTE
Patient Name: Scott Diego  : 4/10/1930    MRN: [id number]                              Today's Date: 3/16/2022       Admit Date: 3/3/2022    Visit Dx:     ICD-10-CM ICD-9-CM   1. CARYN (acute kidney injury) (HCC)  N17.9 584.9   2. Elevated serum creatinine  R79.89 790.99   3. Normocytic anemia  D64.9 285.9   4. Impaired functional mobility, balance, gait, and endurance  Z74.09 V49.89   5. Oral phase dysphagia  R13.11 787.21     Patient Active Problem List   Diagnosis   • Idiopathic peripheral neuropathy   • Neck pain   • Mild cognitive impairment   • Anemia   • Hypertension   • Dehydration   • History of CVA (cerebrovascular accident)   • Dementia (HCC)   • Left bundle branch block   • Orthostatic dizziness   • Chronic systolic heart failure (HCC)   • Normocytic anemia   • Dyspnea   • Hypoxia   • Hypothyroid   • Pericardial effusion   • Acute respiratory failure with hypoxia (HCC)   • Right lower lobe pneumonia   • Ventricular ectopy     Past Medical History:   Diagnosis Date   • Congestive heart failure (HCC)    • Malignant neoplasm (HCC)    • Stroke (HCC)     mini stroke   • Systolic heart failure (HCC) 2017    Chronic/compensated (EF 25%)     Past Surgical History:   Procedure Laterality Date   • CHOLECYSTECTOMY     • EYE SURGERY     • HYSTERECTOMY        General Information     Row Name 22 1526          OT Time and Intention    Document Type therapy note (daily note)  -MR     Mode of Treatment occupational therapy  -     Row Name 22 1526          General Information    Patient Profile Reviewed yes  -MR     Existing Precautions/Restrictions fall  -MR     Barriers to Rehab cognitive status;previous functional deficit  -MR     Row Name 22 1526          Cognition    Orientation Status (Cognition) oriented to;person;disoriented to;place;situation;time  -MR     Row Name 22 1526          Safety Issues, Functional Mobility    Safety Issues Affecting Function (Mobility) insight into  deficits/self-awareness;awareness of need for assistance;safety precaution awareness;safety precautions follow-through/compliance;judgment;impulsivity   -MR     Impairments Affecting Function (Mobility) balance;coordination;visual/perceptual;endurance/activity tolerance;strength;cognition  -MR     Cognitive Impairments, Mobility Safety/Performance attention;awareness, need for assistance;insight into deficits/self-awareness  -MR           User Key  (r) = Recorded By, (t) = Taken By, (c) = Cosigned By    Initials Name Provider Type    Mariposa Hughes OT Occupational Therapist                 Mobility/ADL's     Row Name 03/16/22 1528          Bed Mobility    Comment, (Bed Mobility) Pt received UIC and left UIC.  -MR     Row Name 03/16/22 1528          Transfers    Comment, (Transfers) STS deferred d/t R foot pain, hospitalist entered room during session and is requesting a foot x-ray.  -MR     Row Name 03/16/22 1528          Activities of Daily Living    BADL Assessment/Intervention grooming;lower body dressing  -MR     Row Name 03/16/22 1528          Lower Body Dressing Assessment/Training    [place] Level (Lower Body Dressing) don;socks;dependent (less than 25% patient effort)  -MR     Position (Lower Body Dressing) supported sitting  -MR     Comment, (Lower Body Dressing) DEP for doffing/donning R sock to assess pt's foot.  -MR     Row Name 03/16/22 1528          Grooming Assessment/Training    [place] Level (Grooming) wash face, hands;set up  -MR     Position (Grooming) supported sitting  -MR           User Key  (r) = Recorded By, (t) = Taken By, (c) = Cosigned By    Initials Name Provider Type    Mariposa Hughes OT Occupational Therapist               Obj/Interventions     Row Name 03/16/22 1530          Shoulder (Therapeutic Exercise)    Shoulder (Therapeutic Exercise) AROM (active range of motion)  -MR     Shoulder AROM (Therapeutic Exercise)  bilateral;flexion;extension;aBduction;aDduction;external rotation;internal rotation;scapular protraction;scapular retraction;sitting;10 repetitions  -MR     Row Name 03/16/22 1530          Elbow/Forearm (Therapeutic Exercise)    Elbow/Forearm (Therapeutic Exercise) AROM (active range of motion)  -MR     Elbow/Forearm AROM (Therapeutic Exercise) bilateral;flexion;extension;sitting;10 repetitions  -MR     Row Name 03/16/22 1530          Motor Skills    Therapeutic Exercise shoulder;elbow/forearm  -MR     Row Name 03/16/22 1530          Balance    Balance Assessment sitting static balance;sitting dynamic balance  -MR     Static Sitting Balance supervision  -MR     Dynamic Sitting Balance supervision  -MR     Position, Sitting Balance supported;sitting in chair  -MR           User Key  (r) = Recorded By, (t) = Taken By, (c) = Cosigned By    Initials Name Provider Type    Mariposa Hughes, OT Occupational Therapist               Goals/Plan    No documentation.                Clinical Impression     Row Name 03/16/22 1530          Pain Assessment    Pretreatment Pain Rating 4/10  -MR     Posttreatment Pain Rating 5/10  -MR     Pre/Posttreatment Pain Comment Pt reporting eye pain and L foot pain. RN aware and hospitalist entered room during session and requesting x-ray  -MR     Pain Intervention(s) Ambulation/increased activity;Repositioned  -MR     Row Name 03/16/22 1530          Plan of Care Review    Plan of Care Reviewed With patient  -MR     Progress no change  -MR     Outcome Evaluation Pt demo good effort w/ BUE ther ex. SUA for facial grooming. STS deferred d/t new L foot pain. Continue per current POC.  -MR     Row Name 03/16/22 1530          Therapy Plan Review/Discharge Plan (OT)    Anticipated Discharge Disposition (OT) skilled nursing facility  -MR     Row Name 03/16/22 1530          Vital Signs    Pre Systolic BP Rehab --  VSS. RN cleared pt for treatment.  -MR     O2 Delivery Pre Treatment room air  -MR      O2 Delivery Intra Treatment room air  -MR     O2 Delivery Post Treatment room air  -MR     Pre Patient Position Sitting  -MR     Intra Patient Position Sitting  -MR     Post Patient Position Sitting  -MR     Row Name 03/16/22 1530          Positioning and Restraints    Pre-Treatment Position sitting in chair/recliner  -MR     Post Treatment Position chair  -MR     In Chair notified nsg;reclined;sitting;call light within reach;encouraged to call for assist;exit alarm on;waffle cushion;with family/caregiver  -MR           User Key  (r) = Recorded By, (t) = Taken By, (c) = Cosigned By    Initials Name Provider Type    Mariposa Hughes, OT Occupational Therapist               Outcome Measures     Row Name 03/16/22 1534          How much help from another is currently needed...    Putting on and taking off regular lower body clothing? 3  -MR     Bathing (including washing, rinsing, and drying) 3  -MR     Toileting (which includes using toilet bed pan or urinal) 3  -MR     Putting on and taking off regular upper body clothing 3  -MR     Taking care of personal grooming (such as brushing teeth) 3  -MR     Eating meals 4  -MR     AM-PAC 6 Clicks Score (OT) 19  -MR     Row Name 03/16/22 0800          How much help from another person do you currently need...    Turning from your back to your side while in flat bed without using bedrails? 4  -MH     Moving from lying on back to sitting on the side of a flat bed without bedrails? 4  -MH     Moving to and from a bed to a chair (including a wheelchair)? 3  -MH     Standing up from a chair using your arms (e.g., wheelchair, bedside chair)? 3  -MH     Climbing 3-5 steps with a railing? 2  -MH     To walk in hospital room? 3  -MH     AM-PAC 6 Clicks Score (PT) 19  -MH     Row Name 03/16/22 1534          Functional Assessment    Outcome Measure Options AM-PAC 6 Clicks Daily Activity (OT)  -MR           User Key  (r) = Recorded By, (t) = Taken By, (c) = Cosigned By    Initials Name  Provider Type     Sarkis Gooden, RN Registered Nurse    Mariposa Hughes, OT Occupational Therapist                Occupational Therapy Education                 Title: PT OT SLP Therapies (In Progress)     Topic: Occupational Therapy (In Progress)     Point: ADL training (In Progress)     Description:   Instruct learner(s) on proper safety adaptation and remediation techniques during self care or transfers.   Instruct in proper use of assistive devices.              Learning Progress Summary           Patient Acceptance, E, NR by MR at 3/16/2022 1534    Acceptance, E, VU,DU by MR at 3/14/2022 1156    Acceptance, E,TB,D, NR,VU by  at 3/12/2022 1550    Acceptance, E,TB,D, VU,DU,NR by KF at 3/10/2022 1419    Acceptance, E,TB,D, NR,VU by KF at 3/8/2022 1444    Acceptance, E, VU,NR by AN at 3/7/2022 1544    Acceptance, E,TB, NR by EDMOND at 3/5/2022 0806    Acceptance, E,TB,D, VU,DU,NR by KF at 3/4/2022 0903   Family Acceptance, E,TB,D, NR,VU by KF at 3/12/2022 1550    Acceptance, E,TB,D, NR,VU by KF at 3/8/2022 1444    Acceptance, E, VU,NR by AN at 3/7/2022 1544                   Point: Home exercise program (In Progress)     Description:   Instruct learner(s) on appropriate technique for monitoring, assisting and/or progressing therapeutic exercises/activities.              Learning Progress Summary           Patient Acceptance, E, NR by MR at 3/16/2022 1534    Acceptance, E, VU,DU by MR at 3/14/2022 1156    Acceptance, E,TB,D, NR,VU by  at 3/12/2022 1550    Acceptance, E,TB,D, VU,DU,NR by KF at 3/10/2022 1419    Acceptance, E,TB,D, NR,VU by KF at 3/8/2022 1444    Acceptance, E,TB, NR by EDMOND at 3/5/2022 0806   Family Acceptance, E,TB,D, NR,VU by KF at 3/12/2022 1550    Acceptance, E,TB,D, NR,VU by KF at 3/8/2022 1444                   Point: Precautions (In Progress)     Description:   Instruct learner(s) on prescribed precautions during self-care and functional transfers.              Learning Progress Summary            Patient Acceptance, E, NR by MR at 3/16/2022 1534    Acceptance, E, VU,DU by MR at 3/14/2022 1156    Acceptance, E,TB,D, NR,VU by KF at 3/12/2022 1550    Acceptance, E,TB,D, VU,DU,NR by KF at 3/10/2022 1419    Acceptance, E,TB,D, NR,VU by KF at 3/8/2022 1444    Acceptance, E, VU,NR by AN at 3/7/2022 1544    Acceptance, E,TB, NR by EDMOND at 3/5/2022 0806    Acceptance, E,TB,D, VU,DU,NR by KF at 3/4/2022 0903   Family Acceptance, E,TB,D, NR,VU by KF at 3/12/2022 1550    Acceptance, E,TB,D, NR,VU by KF at 3/8/2022 1444    Acceptance, E, VU,NR by AN at 3/7/2022 1544                   Point: Body mechanics (In Progress)     Description:   Instruct learner(s) on proper positioning and spine alignment during self-care, functional mobility activities and/or exercises.              Learning Progress Summary           Patient Acceptance, E, NR by MR at 3/16/2022 1534    Acceptance, E, VU,DU by MR at 3/14/2022 1156    Acceptance, E,TB,D, NR,VU by KF at 3/12/2022 1550    Acceptance, E,TB,D, VU,DU,NR by KF at 3/10/2022 1419    Acceptance, E,TB,D, NR,VU by KF at 3/8/2022 1444    Acceptance, E, VU,NR by AN at 3/7/2022 1544    Acceptance, E,TB, NR by EDMOND at 3/5/2022 0806    Acceptance, E,TB,D, VU,DU,NR by KF at 3/4/2022 0903   Family Acceptance, E,TB,D, NR,VU by KF at 3/12/2022 1550    Acceptance, E,TB,D, NR,VU by KF at 3/8/2022 1444    Acceptance, E, VU,NR by AN at 3/7/2022 1544                               User Key     Initials Effective Dates Name Provider Type Discipline    KF 06/16/21 -  Theresa Rader OT Occupational Therapist OT    AN 09/21/21 -  Elissa Bhagat, OT Occupational Therapist OT    MR 10/06/21 -  Mariposa Zapata, OT Occupational Therapist OT    EDMOND 12/29/21 -  Hannah Anderson, SOHEILA Registered Nurse Nurse              OT Recommendation and Plan  Planned Therapy Interventions (OT): activity tolerance training, adaptive equipment training, BADL retraining, functional balance retraining, IADL retraining,  occupation/activity based interventions, ROM/therapeutic exercise, strengthening exercise, transfer/mobility retraining, patient/caregiver education/training  Therapy Frequency (OT): daily  Plan of Care Review  Plan of Care Reviewed With: patient  Progress: no change  Outcome Evaluation: Pt demo good effort w/ BUE ther ex. SUA for facial grooming. STS deferred d/t new L foot pain. Continue per current POC.     Time Calculation:    Time Calculation- OT     Row Name 03/16/22 1535             Time Calculation- OT    OT Start Time 1431  -MR      OT Received On 03/16/22  -MR      OT Goal Re-Cert Due Date 03/24/22  -MR              Timed Charges    90525 - OT Therapeutic Exercise Minutes 24  -MR      80651 - OT Self Care/Mgmt Minutes 5  -MR              Total Minutes    Timed Charges Total Minutes 29  -MR       Total Minutes 29  -MR            User Key  (r) = Recorded By, (t) = Taken By, (c) = Cosigned By    Initials Name Provider Type    Mary Hughes OT Occupational Therapist              Therapy Charges for Today     Code Description Service Date Service Provider Modifiers Qty    [id number]  OT THER PROC EA 15 MIN 3/16/2022 Mary Zapata OT GO 2               MARY ZAPATA OT  3/16/2022

## 2022-03-16 NOTE — PLAN OF CARE
Goal Outcome Evaluation:  Plan of Care Reviewed With: patient        Progress: no change  Outcome Evaluation: pt alert to self. vss. room air. no complaints of pain. pt is up with stand by. pt awake during the night despite the addition of ativan to her medication regimen.

## 2022-03-16 NOTE — DISCHARGE SUMMARY
[facility] Medicine Services  DISCHARGE SUMMARY    Patient Name: Scott Diego  : 4/10/1930  MRN: [id number]    Date of Admission: 3/3/2022 12:46 AM  Date of Discharge:  3/17/22  Primary Care Physician: Yolande Escobar MD    Consults     Date and Time Order Name Status Description    3/11/2022 10:53 AM Inpatient Palliative Care MD Consult Completed           Hospital Course     Presenting Problem:   Acute renal failure (ARF) (HCC) [N17.9]  CARYN (acute kidney injury) (HCC) [N17.9]    Active Hospital Problems    Diagnosis  POA   • Hypothyroid [E03.9]  Yes   • Chronic systolic heart failure (HCC) [I50.22]  Yes   • History of CVA (cerebrovascular accident) [Z86.73]  Not Applicable   • Dementia (HCC) [F03.90]  Yes   • Hypertension [I10]  Yes      Resolved Hospital Problems    Diagnosis Date Resolved POA   • Elevated serum creatinine [R79.89] 2022 Yes   • CARYN (acute kidney injury) (HCC) [N17.9] 2022 Yes   • Hyponatremia [E87.1] 2022 Yes          Hospital Course:  Scott Diego is a 89 y.o. female  who lives with her son in [place] following the recent death of her  in January. Son states since this time, pt has gone downhill.  Poor po intake. Unable to take care of self.  Has a lot of anxiety.  Worsening memory.  Son notes she has O2 at night and he checked her sats at home and they were 86 on arrival. Son brings pt in as he feels he cannot safely take care of her.   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 Hyponatremia/hyperkalemia resolved.  Chest x-ray showed mild pulmonary interstitial disease.  Echo with EF 20 to 30%.  Plans to follow-up with [facility]/heart valve clinic at discharge.      Assessment/plan:     CARYN  -Improved, likely due to to improved PO intake.  -Cr 1.38 today, improving. Looks dry on exam. BUN 42. Gentle IV fluids x 12 h.  -creatinine continues to improve, stable at 1.2 today. Encouraged oral fluid  hydration and recommend repeat renal panel in 1 week       Hyponatremia  -resolved     Chronic systolic HF  -CXR with mild pulmonary interstitial disease  -ECHO with EF 20-30%  -Has labile PO intake and low BMI, so Lasix changed q48h only  -Outpatient follow up with [facility]  -BB. On ACE and BB at home. ACE held d/t CARYN. Cont to hold at DC, defer to facility provider or PCP to restart if necessary. Patient's BP has been good on BB alone.     Possible Thrush  -Continue Nystatin swish and swallow     Agitation with h/o Dementia  Prolonged QTC  -No seroquel/haldol given because of QTc.  -PRN ativan for agitation, improved      HTN  -Beta-blocker with hold parameters. BP has been okay without ACE, but pt has known systolic HF. No record of CKD.     Hyperkalemia  -Received Lokelma with improvement.      Hypothyroidism  -Levothyroxine     Debility/failure to thrive  Generalized weakness  Dementia  History of CVA  -PT/OT/SLP  -Fall precautions  -Aricept  -Palliative care consulted for goals of care and will follow at [facility].  -Recommend outpatient consult to neurology after rehab to evaluate dementia medications. Son states it has been a while since patient has seen a neurologist.     Bereavement  - passed away January 2022.     Left foot pain  -X-ray negative for acute fracture, shows some soft tissue edema  -likely gouty attack, uric acid increased at 6.2  -Ice pack, elevation as needed  --no acute pain this morning, will start on Allopurinol daily and monitor     Discharge Follow Up Recommendations for outpatient labs/diagnostics:  --Follow up with facility PCP 1-2 days or per SNF protocol, repeat BMP to monitor renal function 1 week   --Follow up with PCP one week after discharge from SNF.  --Follow up with Dr. Aragon [facility] 6 weeks.  --Recommned evaluation by neurology outpatient. Referral made.    Day of Discharge     HPI:   Pleasantly confused, up in chair. No pain. No family in  room. Denies any pain in right foot today. Denies any f/c, n/v/d, soa or cp.     Review of Systems  All other systems negative     Vital Signs:   Temp:  [97.2 °F (36.2 °C)-97.9 °F (36.6 °C)] 97.2 °F (36.2 °C)  Heart Rate:  [88] 88  Resp:  [16-18] 16  BP: (113-132)/(63-94) 113/63      Physical Exam:  Constitutional: No acute distress, awake, alert up in chair   HENT: NCAT, mucous membranes moist  Respiratory: Clear to auscultation bilaterally, respiratory effort normal   Cardiovascular: RRR, no murmurs, rubs, or gallops  Gastrointestinal: Positive bowel sounds, soft, nontender, nondistended  Musculoskeletal: No bilateral ankle edema  Psychiatric: Appropriate affect, cooperative  Neurologic: Oriented x 3, strength symmetric in all extremities, Cranial Nerves grossly intact to confrontation, speech clear  Skin: right foot with blanchable erythema to dorsum, minimally tender. Right lateral great toe erythema, mildly tender. No swelling or increased warmth    Pertinent  and/or Most Recent Results     LAB RESULTS:      Lab 03/13/22  0448 03/11/22  0715   WBC 7.43 7.79   HEMOGLOBIN 9.5* 11.7*   HEMATOCRIT 30.2* 37.3   PLATELETS 177 196   NEUTROS ABS 3.89  --    IMMATURE GRANS (ABS) 0.03  --    LYMPHS ABS 1.68  --    MONOS ABS 1.08*  --    EOS ABS 0.65*  --    MCV 95.9 94.7         Lab 03/17/22  0523 03/16/22  0932 03/14/22  0512 03/13/22  0448 03/12/22  0535   SODIUM 138 138 137 139 138   POTASSIUM 4.5 4.6 5.1 4.8 4.9   CHLORIDE 106 102 104 107 104   CO2 22.0 25.0 22.0 24.0 25.0   ANION GAP 10.0 11.0 11.0 8.0 9.0   BUN 41* 42* 25* 22 26*   CREATININE 1.29* 1.38* 1.50* 1.67* 1.33*   EGFR 39.3* 36.2* 32.8* 28.8* 37.9*   GLUCOSE 119* 88 83 83 93   CALCIUM 9.2 9.8* 9.3 9.1 9.0   MAGNESIUM  --  2.0  --   --   --                          Brief Urine Lab Results  (Last result in the past 365 days)      Color   Clarity   Blood   Leuk Est   Nitrite   Protein   CREAT   Urine HCG        03/03/22 0843             54.9              Microbiology Results (last 10 days)     Procedure Component Value - Date/Time    Respiratory Panel PCR w/COVID-19(SARS-CoV-2) GINO/[facility]/TOMMY/PAD/COR/MAD/SUSAN In-House, NP Swab in UTM/VTM, 3-4 HR TAT - Swab, Nasopharynx [[id number]]  (Normal) Collected: 03/16/22 1057    Lab Status: Final result Specimen: Swab from Nasopharynx Updated: 03/16/22 1210     ADENOVIRUS, PCR Not Detected     Coronavirus 229E Not Detected     Coronavirus HKU1 Not Detected     Coronavirus NL63 Not Detected     Coronavirus OC43 Not Detected     COVID19 Not Detected     Human Metapneumovirus Not Detected     Human Rhinovirus/Enterovirus Not Detected     Influenza A PCR Not Detected     Influenza B PCR Not Detected     Parainfluenza Virus 1 Not Detected     Parainfluenza Virus 2 Not Detected     Parainfluenza Virus 3 Not Detected     Parainfluenza Virus 4 Not Detected     RSV, PCR Not Detected     Bordetella pertussis pcr Not Detected     Bordetella parapertussis PCR Not Detected     Chlamydophila pneumoniae PCR Not Detected     Mycoplasma pneumo by PCR Not Detected    Narrative:      In the setting of a positive respiratory panel with a viral infection PLUS a negative procalcitonin without other underlying concern for bacterial infection, consider observing off antibiotics or discontinuation of antibiotics and continue supportive care. If the respiratory panel is positive for atypical bacterial infection (Bordetella pertussis, Chlamydophila pneumoniae, or Mycoplasma pneumoniae), consider antibiotic de-escalation to target atypical bacterial infection.          XR Foot 3+ View Left    Result Date: 3/16/2022  DATE OF EXAM: 3/16/2022 3:46 PM  PROCEDURE: XR FOOT 3+ VW LEFT-  INDICATIONS: redness, extremely tender to palpation on medial aspect near arch; N17.9-Acute kidney failure, unspecified; R79.89-Other specified abnormal findings of blood chemistry; D64.9-Anemia, unspecified; Z74.09-Other reduced mobility; R13.11-Dysphagia, oral phase   COMPARISON: No comparisons available.  TECHNIQUE: A minimum of three routine standard radiographic views were obtained of the left foot.  FINDINGS: Cortical margins are grossly intact without evidence of acute fracture. There is moderate diffuse subcutaneous soft tissue edema. There is no evidence of subluxation or dislocation. Midfoot arthrosis changes are noted and there is also hallux valgus deformity present. No soft tissue gas or radiopaque soft tissue mass.      Cortical margins are grossly intact without evidence of acute fracture. There is moderate diffuse subcutaneous soft tissue edema. There is no evidence of subluxation or dislocation. Midfoot arthrosis changes are noted and there is also hallux valgus deformity present. No soft tissue gas or radiopaque soft tissue mass.  This report was finalized on 3/16/2022 4:34 PM by Saúl Benites.                Results for orders placed during the hospital encounter of 03/03/22    Adult Transthoracic Echo Complete W/ Cont if Necessary Per Protocol    Interpretation Summary  · Estimated left ventricular EF = 30%  · Moderate mitral valve regurgitation is present.  · There is calcification of the aortic valve.  · Estimated right ventricular systolic pressure from tricuspid regurgitation is mildly elevated (35-45 mmHg).      Discharge Details        Discharge Medications      New Medications      Instructions Start Date   acetaminophen 325 MG tablet  Commonly known as: TYLENOL   650 mg, Oral, Every 4 Hours PRN      allopurinol 100 MG tablet  Commonly known as: ZYLOPRIM   100 mg, Oral, Daily      cetirizine 5 MG tablet  Commonly known as: zyrTEC  Replaces: fexofenadine 180 MG tablet   5 mg, Oral, Daily      nystatin 100,000 unit/mL suspension  Commonly known as: MYCOSTATIN   500,000 Units, Swish & Swallow, 4 Times Daily      polyethylene glycol 17 g packet  Commonly known as: MIRALAX   17 g, Oral, Daily         Changes to Medications      Instructions Start Date    furosemide 20 MG tablet  Commonly known as: LASIX  What changed: when to take this   20 mg, Oral, Every 48 Hours      gabapentin 300 MG capsule  Commonly known as: NEURONTIN  What changed:   when to take this  Another medication with the same name was removed. Continue taking this medication, and follow the directions you see here.   300 mg, Oral, Nightly      metoprolol succinate XL 25 MG 24 hr tablet  Commonly known as: TOPROL-XL  What changed: additional instructions   25 mg, Oral, Daily, Hold for SBP < 110 or HR < 60.      rOPINIRole 0.5 MG tablet  Commonly known as: REQUIP  What changed: when to take this   0.5 mg, Oral, Nightly, Take 1 hour before bedtime.         Continue These Medications      Instructions Start Date   ALPRAZolam 0.25 MG tablet  Commonly known as: XANAX   0.25 mg, Oral, 3 Times Daily PRN      aspirin 81 MG chewable tablet   81 mg, Oral, Nightly      atorvastatin 80 MG tablet  Commonly known as: LIPITOR   80 mg, Oral, Nightly      carboxymethylcellulose 0.5 % solution  Commonly known as: REFRESH PLUS   3 Times Daily PRN      famotidine 20 MG tablet  Commonly known as: PEPCID   20 mg, Oral, Daily      ferrous sulfate 325 (65 FE) MG tablet   325 mg, Oral, Daily      levothyroxine 50 MCG tablet  Commonly known as: SYNTHROID, LEVOTHROID   50 mcg, Oral, Every Early Morning      ofloxacin 0.3 % ophthalmic solution  Commonly known as: OCUFLOX   1 drop, Both Eyes, 3 Times Daily, For 10 days, started 03-01-22      REFRESH DRY EYE THERAPY OP   Ophthalmic, As Needed         Stop These Medications    erythromycin 5 MG/GM ophthalmic ointment  Commonly known as: ROMYCIN     fexofenadine 180 MG tablet  Commonly known as: ALLEGRA  Replaced by: cetirizine 5 MG tablet     lisinopril 10 MG tablet  Commonly known as: PRINIVIL,ZESTRIL            Allergies   Allergen Reactions   • Augmentin [Amoxicillin-Pot Clavulanate] Nausea And Vomiting   • Claritin [Loratadine] Unknown (See Comments)     Doesn't remember   •  Keflex [Cephalexin] Nausea And Vomiting   • Sulfa Antibiotics Other (See Comments)     Does not remember         Discharge Disposition:  Long Term Care (DC - External)    Diet:  Hospital:  Diet Order   Procedures   • Diet Regular; Thin; GI Soft            CODE STATUS:    Code Status and Medical Interventions:   Ordered at: 03/11/22 1052     Level Of Support Discussed With:    Next of Kin (If No Surrogate)     Code Status (Patient has no pulse and is not breathing):    No CPR (Do Not Attempt to Resuscitate)     Medical Interventions (Patient has pulse or is breathing):    Full Support     Comments:    spencer Zamora       Future Appointments   Date Time Provider Department Center   3/17/2022 12:00 PM EMS 1 [facility]   3/22/2022  1:45 PM Chrissy Toribio, APRN [facility]                 VASILE Arnett  03/17/22      Time Spent on Discharge:  I spent  45  minutes on this discharge activity which included: face-to-face encounter with the patient, reviewing the data in the system, coordination of the care with the nursing staff as well as consultants, documentation, and entering orders.

## 2022-03-16 NOTE — PLAN OF CARE
Goal Outcome Evaluation:  Plan of Care Reviewed With: patient        Progress: no change  Outcome Evaluation: Pt cheerful, smiling, engaged, up in recliner; denied pain, any discomfort. Plan for discharge to [facility] tomorrow, transport at noon. Referral has been faxed to [facility] to follow at facility.    8140 Palliative IDT meeting: MAURICE Dockery RN, PN; GUILLAUME Sarah DO; KRZYSZTOF Felder, APRN; KRZYSZTOF Padilla, RN, KHUSHBU; JULIANN Sinha [facility]; BRODIE Morales, RN, PN; KARTHIKEYAN Azul, SOHEILA    Problem: Palliative Care  Goal: Enhanced Quality of Life  Outcome: Ongoing, Progressing  Intervention: Promote Advance Care Planning  Flowsheets (Taken 3/16/2022 9300)  Life Transition/Adjustment: (referral to [facility] to follow at facility; faxed) other (see comments)

## 2022-03-16 NOTE — PROGRESS NOTES
[facility]  PROGRESS NOTE    Patient Name: Scott Diego  : 4/10/1930  MRN: [id number]    Date of Admission: 3/3/2022  Primary Care Physician: Yolande Escobar MD    Subjective   Subjective     CC:  F/u CARYN, weakness, CHF    HPI:  Patient sitting up in the chair, talkative, pleasant, excited that she gets to go to rehab. Son is at the bedside. Patient complains of pain and redness in left foot. Son thinks his mom has had gout in the past but no record on chart. Will get x-ray of foot. No other complaints.    ROS:  Gen- No fevers, chills  CV- No chest pain, palpitations  Resp- No cough, dyspnea  GI- No N/V/D, abd pain      Objective   Objective     Vital Signs:   Temp:  [97.8 °F (36.6 °C)-98 °F (36.7 °C)] 98 °F (36.7 °C)  Resp:  [18] 18  BP: (107-129)/(51-83) 107/51     Physical Exam:  Constitutional: No acute distress, awake, alert  HENT: NCAT, mucous membranes moist  Respiratory: Clear to auscultation bilaterally, respiratory effort normal, room air  Cardiovascular: RRR, no murmurs, rubs, or gallops  Gastrointestinal: Positive bowel sounds, soft, nontender, nondistended  Musculoskeletal: No bilateral ankle edema, left foot erythematous, tender to palpation medially to hallux  Psychiatric: Appropriate affect, cooperative  Neurologic: Oriented x 3, strength symmetric in all extremities, Cranial Nerves grossly intact to confrontation, speech clear  Skin: No rashes    Results Reviewed:  LAB RESULTS:      Lab 22  0715   WBC 7.43 7.79   HEMOGLOBIN 9.5* 11.7*   HEMATOCRIT 30.2* 37.3   PLATELETS 177 196   NEUTROS ABS 3.89  --    IMMATURE GRANS (ABS) 0.03  --    LYMPHS ABS 1.68  --    MONOS ABS 1.08*  --    EOS ABS 0.65*  --    MCV 95.9 94.7         Lab 22  0932 22  0512 22  0448 22  0535 22  0715   SODIUM 138 137 139 138 136   POTASSIUM 4.6 5.1 4.8 4.9 4.8   CHLORIDE 102 104 107 104 101   CO2 25.0 22.0 24.0 25.0 28.0    ANION GAP 11.0 11.0 8.0 9.0 7.0   BUN 42* 25* 22 26* 26*   CREATININE 1.38* 1.50* 1.67* 1.33* 1.64*   EGFR 36.2* 32.8* 28.8* 37.9* 29.4*   GLUCOSE 88 83 83 93 93   CALCIUM 9.8* 9.3 9.1 9.0 9.4   MAGNESIUM 2.0  --   --   --   --                          Brief Urine Lab Results  (Last result in the past 365 days)      Color   Clarity   Blood   Leuk Est   Nitrite   Protein   CREAT   Urine HCG        03/03/22 0843             54.9               Microbiology Results Abnormal     Procedure Component Value - Date/Time    Respiratory Panel PCR w/COVID-19(SARS-CoV-2) [facility]TNA/TOMMY/PAD/[facility]/MAD/SUSAN In-House, NP Swab in UTM/VTM, 3-4 HR TAT - Swab, Nasopharynx [[id number]]  (Normal) Collected: 03/16/22 1057    Lab Status: Final result Specimen: Swab from Nasopharynx Updated: 03/16/22 1210     ADENOVIRUS, PCR Not Detected     Coronavirus 229E Not Detected     Coronavirus HKU1 Not Detected     Coronavirus NL63 Not Detected     Coronavirus OC43 Not Detected     COVID19 Not Detected     Human Metapneumovirus Not Detected     Human Rhinovirus/Enterovirus Not Detected     Influenza A PCR Not Detected     Influenza B PCR Not Detected     Parainfluenza Virus 1 Not Detected     Parainfluenza Virus 2 Not Detected     Parainfluenza Virus 3 Not Detected     Parainfluenza Virus 4 Not Detected     RSV, PCR Not Detected     Bordetella pertussis pcr Not Detected     Bordetella parapertussis PCR Not Detected     Chlamydophila pneumoniae PCR Not Detected     Mycoplasma pneumo by PCR Not Detected    Narrative:      In the setting of a positive respiratory panel with a viral infection PLUS a negative procalcitonin without other underlying concern for bacterial infection, consider observing off antibiotics or discontinuation of antibiotics and continue supportive care. If the respiratory panel is positive for atypical bacterial infection (Bordetella pertussis, Chlamydophila pneumoniae, or Mycoplasma pneumoniae), consider antibiotic de-escalation to  target atypical bacterial infection.    COVID PRE-OP / PRE-PROCEDURE SCREENING ORDER (NO ISOLATION) - Swab, Nasopharynx [[id number]]  (Normal) Collected: 03/03/22 0546    Lab Status: Final result Specimen: Swab from Nasopharynx Updated: 03/03/22 0652    Narrative:      The following orders were created for panel order COVID PRE-OP / PRE-PROCEDURE SCREENING ORDER (NO ISOLATION) - Swab, Nasopharynx.  Procedure                               Abnormality         Status                     ---------                               -----------         ------                     COVID-19, FLU A/B, RSV P...[[id number]]  Normal              Final result                 Please view results for these tests on the individual orders.    COVID-19, FLU A/B, RSV PCR - Swab, Nasopharynx [[id number]]  (Normal) Collected: 03/03/22 0546    Lab Status: Final result Specimen: Swab from Nasopharynx Updated: 03/03/22 0652     COVID19 Not Detected     Influenza A PCR Not Detected     Influenza B PCR Not Detected     RSV, PCR Not Detected    Narrative:      Fact sheet for providers: https://www.fda.gov/media/220510/download    Fact sheet for patients: https://www.fda.gov/media/972517/download    Test performed by PCR.          No radiology results from the last 24 hrs    Results for orders placed during the hospital encounter of 03/03/22    Adult Transthoracic Echo Complete W/ Cont if Necessary Per Protocol    Interpretation Summary  · Estimated left ventricular EF = 30%  · Moderate mitral valve regurgitation is present.  · There is calcification of the aortic valve.  · Estimated right ventricular systolic pressure from tricuspid regurgitation is mildly elevated (35-45 mmHg).      I have reviewed the medications:  Scheduled Meds:aspirin, 81 mg, Oral, Nightly  atorvastatin, 80 mg, Oral, Nightly  cetirizine, 5 mg, Oral, Daily  donepezil, 5 mg, Oral, Nightly  erythromycin, 1 application, Both Eyes, Nightly  famotidine, 20 mg, Oral, Daily  ferrous  sulfate, 325 mg, Oral, Daily  furosemide, 20 mg, Oral, Q48H  gabapentin, 300 mg, Oral, Nightly  heparin (porcine), 5,000 Units, Subcutaneous, Q12H  levothyroxine, 50 mcg, Oral, Q AM  LORazepam, 0.5 mg, Oral, Nightly  metoprolol succinate XL, 25 mg, Oral, Daily  nystatin, 5 mL, Swish & Swallow, 4x Daily  ofloxacin, 1 drop, Both Eyes, TID  polyethylene glycol, 17 g, Oral, Daily  rOPINIRole, 0.5 mg, Oral, Nightly  sodium chloride, 10 mL, Intravenous, Q12H      Continuous Infusions:   PRN Meds:.•  acetaminophen **OR** acetaminophen **OR** acetaminophen  •  ALPRAZolam  •  magnesium sulfate **OR** magnesium sulfate in D5W 1g/100mL (PREMIX) **OR** magnesium sulfate  •  meclizine  •  polyvinyl alcohol  •  potassium chloride **OR** potassium chloride **OR** potassium chloride  •  sodium chloride  •  sodium chloride    Assessment/Plan   Assessment & Plan     Active Hospital Problems    Diagnosis  POA   • Hypothyroid [E03.9]  Yes   • Chronic systolic heart failure (HCC) [I50.22]  Yes   • History of CVA (cerebrovascular accident) [Z86.73]  Not Applicable   • Dementia (HCC) [F03.90]  Yes   • Hypertension [I10]  Yes      Resolved Hospital Problems    Diagnosis Date Resolved POA   • Elevated serum creatinine [R79.89] 03/16/2022 Yes   • CARYN (acute kidney injury) ([facility]) [N17.9] 03/16/2022 Yes   • Hyponatremia [E87.1] 03/16/2022 Yes        Brief Hospital Course to date:  Scott Diego is a 89 y.o. female who lives with her son in [place] following the recent death of her  in January. Son states since this time, pt has gone downhill.  Poor po intake. Unable to take care of self.  Has a lot of anxiety.  Worsening memory.  Son notes she has O2 at night and he checked her sats at home and they were 86 on arrival. Son brings pt in as he feels he cannot safely take care of her.   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 Hyponatremia/hyperkalemia resolved.  Chest x-ray showed mild  pulmonary interstitial disease.  Echo with EF 20 to 30%.  Plans to follow-up with [facility] at discharge.      Assessment/plan:     CARYN  -Improved, likely due to to improved PO intake.  -Cr 1.38 today, improving. Looks dry on exam. BUN 42. Gentle IV fluids x 12 h.  -AM BMP      Hyponatremia  -resolved     Chronic systolic HF  -CXR with mild pulmonary interstitial disease  -ECHO with EF 20-30%  -Has labile PO intake and low BMI, so Lasix changed q48h only  -Outpatient follow up with [facility]  -BB. On ACE and BB at home. ACE held d/t CARYN. May resume at DC if Cr acceptable. Patient's BP has been good on BB alone.     Possible Thrush  -Continue Nystatin swish and swallow     Agitation with h/o Dementia  Prolonged QTC  -No seroquel/haldol given because of QTc.  -PRN ativan for agitation      HTN  -Beta-blocker with hold parameters. May resume ACE if Cr improved. BP has been okay without it, but pt has known systolic HF. No record of CKD.     Hyperkalemia  -Received Lokelma with improvement.      Hypothyroidism  -Levothyroxine     Debility/failure to thrive  Generalized weakness  Dementia  History of CVA  -PT/OT/SLP  -Fall precautions  -Aricept  -Palliative care consulted for goals of care and [place] [facility]  -Recommend outpatient consult to neurology after rehab to evaluate dementia medications. Son states it has been a while since patient has seen a neurologist.     Bereavement  - passed away January 2022.    Left foot pain  -X-ray negative for acute fracture, shows some soft tissue edema  -AM uric acid  -Ice pack, elevation as needed    DVT prophylaxis:  Medical DVT prophylaxis orders are present.       AM-PAC 6 Clicks Score (PT): 19 (03/16/22 0800)    Disposition: I expect the patient [facility]. Covid is negative.    CODE STATUS:   Code Status and Medical Interventions:   Ordered at: 03/11/22 9173      Level Of Support Discussed With:    Next of Kin (If No Surrogate)     Code Status (Patient has no pulse and is not breathing):    No CPR (Do Not Attempt to Resuscitate)     Medical Interventions (Patient has pulse or is breathing):    Full Support     Comments:    spencer Ray, APRN  03/16/22

## 2022-03-17 VITALS
BODY MASS INDEX: 20.74 KG/M2 | DIASTOLIC BLOOD PRESSURE: 63 MMHG | WEIGHT: 124.5 LBS | OXYGEN SATURATION: 93 % | HEIGHT: 65 IN | HEART RATE: 88 BPM | RESPIRATION RATE: 16 BRPM | SYSTOLIC BLOOD PRESSURE: 113 MMHG | TEMPERATURE: 97.2 F

## 2022-03-17 LAB
ANION GAP SERPL CALCULATED.3IONS-SCNC: 10 MMOL/L (ref 5–15)
BUN SERPL-MCNC: 41 MG/DL (ref 8–23)
BUN/CREAT SERPL: 31.8 (ref 7–25)
CALCIUM SPEC-SCNC: 9.2 MG/DL (ref 8.2–9.6)
CHLORIDE SERPL-SCNC: 106 MMOL/L (ref 98–107)
CO2 SERPL-SCNC: 22 MMOL/L (ref 22–29)
CREAT SERPL-MCNC: 1.29 MG/DL (ref 0.57–1)
EGFRCR SERPLBLD CKD-EPI 2021: 39.3 ML/MIN/1.73
GLUCOSE SERPL-MCNC: 119 MG/DL (ref 65–99)
POTASSIUM SERPL-SCNC: 4.5 MMOL/L (ref 3.5–5.2)
SODIUM SERPL-SCNC: 138 MMOL/L (ref 136–145)
URATE SERPL-MCNC: 6.2 MG/DL (ref 2.4–5.7)

## 2022-03-17 PROCEDURE — 80048 BASIC METABOLIC PNL TOTAL CA: CPT | Performed by: NURSE PRACTITIONER

## 2022-03-17 PROCEDURE — 99239 HOSP IP/OBS DSCHRG MGMT >30: CPT | Performed by: NURSE PRACTITIONER

## 2022-03-17 PROCEDURE — 25010000002 HEPARIN (PORCINE) PER 1000 UNITS: Performed by: NURSE PRACTITIONER

## 2022-03-17 PROCEDURE — 84550 ASSAY OF BLOOD/URIC ACID: CPT | Performed by: NURSE PRACTITIONER

## 2022-03-17 PROCEDURE — 63710000001 PREDNISONE PER 1 MG: Performed by: NURSE PRACTITIONER

## 2022-03-17 RX ORDER — ALPRAZOLAM 0.25 MG/1
0.25 TABLET ORAL 3 TIMES DAILY PRN
Qty: 12 TABLET | Refills: 0 | Status: SHIPPED | OUTPATIENT
Start: 2022-03-17 | End: 2022-03-24

## 2022-03-17 RX ORDER — FUROSEMIDE 20 MG/1
20 TABLET ORAL
Qty: 15 TABLET | Refills: 0
Start: 2022-03-17 | End: 2022-04-19

## 2022-03-17 RX ORDER — PREDNISONE 20 MG/1
40 TABLET ORAL
Status: DISCONTINUED | OUTPATIENT
Start: 2022-03-17 | End: 2022-03-17 | Stop reason: HOSPADM

## 2022-03-17 RX ORDER — ROPINIROLE 0.5 MG/1
0.5 TABLET, FILM COATED ORAL NIGHTLY
Start: 2022-03-17 | End: 2022-06-08

## 2022-03-17 RX ORDER — ALLOPURINOL 100 MG/1
100 TABLET ORAL DAILY
Status: DISCONTINUED | OUTPATIENT
Start: 2022-03-17 | End: 2022-03-17 | Stop reason: HOSPADM

## 2022-03-17 RX ORDER — GABAPENTIN 300 MG/1
300 CAPSULE ORAL NIGHTLY
Qty: 6 CAPSULE | Refills: 0 | Status: SHIPPED | OUTPATIENT
Start: 2022-03-17 | End: 2022-05-17 | Stop reason: SDUPTHER

## 2022-03-17 RX ORDER — ACETAMINOPHEN 325 MG/1
650 TABLET ORAL EVERY 4 HOURS PRN
Start: 2022-03-17

## 2022-03-17 RX ORDER — CETIRIZINE HYDROCHLORIDE 5 MG/1
5 TABLET ORAL DAILY
Start: 2022-03-17 | End: 2022-04-05

## 2022-03-17 RX ORDER — POLYETHYLENE GLYCOL 3350 17 G/17G
17 POWDER, FOR SOLUTION ORAL DAILY
Start: 2022-03-17 | End: 2022-06-08

## 2022-03-17 RX ORDER — ALLOPURINOL 100 MG/1
100 TABLET ORAL DAILY
Start: 2022-03-17 | End: 2022-05-25

## 2022-03-17 RX ORDER — COLCHICINE 0.6 MG/1
1.2 TABLET ORAL ONCE
Status: DISCONTINUED | OUTPATIENT
Start: 2022-03-17 | End: 2022-03-17

## 2022-03-17 RX ORDER — METOPROLOL SUCCINATE 25 MG/1
25 TABLET, EXTENDED RELEASE ORAL DAILY
Qty: 30 TABLET | Refills: 0
Start: 2022-03-17 | End: 2022-04-19

## 2022-03-17 RX ADMIN — CETIRIZINE HYDROCHLORIDE 5 MG: 10 TABLET, FILM COATED ORAL at 09:12

## 2022-03-17 RX ADMIN — PREDNISONE 40 MG: 20 TABLET ORAL at 11:18

## 2022-03-17 RX ADMIN — OFLOXACIN 1 DROP: 3 SOLUTION/ DROPS OPHTHALMIC at 09:12

## 2022-03-17 RX ADMIN — LEVOTHYROXINE SODIUM 50 MCG: 50 TABLET ORAL at 04:57

## 2022-03-17 RX ADMIN — NYSTATIN 500000 UNITS: 100000 SUSPENSION ORAL at 09:13

## 2022-03-17 RX ADMIN — FUROSEMIDE 20 MG: 20 TABLET ORAL at 09:13

## 2022-03-17 RX ADMIN — ALLOPURINOL 100 MG: 100 TABLET ORAL at 11:18

## 2022-03-17 RX ADMIN — Medication 325 MG: at 09:13

## 2022-03-17 RX ADMIN — FAMOTIDINE 20 MG: 20 TABLET, FILM COATED ORAL at 09:13

## 2022-03-17 RX ADMIN — POLYETHYLENE GLYCOL 3350 17 G: 17 POWDER, FOR SOLUTION ORAL at 09:12

## 2022-03-17 RX ADMIN — METOPROLOL SUCCINATE 25 MG: 25 TABLET, EXTENDED RELEASE ORAL at 09:13

## 2022-03-17 RX ADMIN — HEPARIN SODIUM 5000 UNITS: 5000 INJECTION, SOLUTION INTRAVENOUS; SUBCUTANEOUS at 09:13

## 2022-03-17 RX ADMIN — ALPRAZOLAM 0.25 MG: 0.25 TABLET ORAL at 00:50

## 2022-03-17 NOTE — PLAN OF CARE
Goal Outcome Evaluation:         Pt very restless and anxious throughout the night despite giving PRN meds, calling her son, and staying in the room with the patient for prolonged periods of time.  Pt pleasant and easily reoriented. Pt resting at this time. Continuing to monitor.

## 2022-03-21 NOTE — PROGRESS NOTES
"[facility]  Heart and Valve Center      Mode of Visit: Video  Location of patient: other: [facility]  You have chosen to receive care through a telehealth visit.  Does the patient consent to use a audio connection for your medical care today? Yes  The visit included an audio and video interaction only.  No technical issues occurred during this visit.     Chief Complaint  Establish Care and Congestive Heart Failure    History of Present Illness    Scott Diego is a 89 y.o. female with nonischemic cardiomyopathy/chronic systolic heart failure, orthostasis, dementia, hypothyroidism who presents today as a hospital telemedicine referral for HFrEF.  Patient admitted 3/3-3/17 with CARYN.  Patient recently suffered the loss of her  in January and has had worsening p.o. intake as well as increased anxiety and worsening memory.  Creatinine improved with improved p.o. intake and gentle IV fluids.  She did have hyponatremia and hyperkalemia that resolved.  Chest x-ray showed some mild pulmonary interstitial disease.  Echo showed EF of 30% (EF 20% in 2018).  Due to labile p.o. intake her Lasix was changed to every 48 hours.  ACE inhibitor held secondary to CARYN. She was discharged to [facility]. Her son, Noah, is present.  She reports that her \"brain hurts\" and here \"nerves\" bother her. Her son reports that she has declined over the last couple of days with her breathing and activity level. He is not sure if it is related to her nerves. Denies edema. She notes abdominal bloating but says she is constipated. She says abdomen is soft. Spoke with nurse and she reports that lungs were clear and that she appears anxious during episodes of shortness of breath. No swelling noted.  We reviewed her medications and she currently is taking her Lasix every day, lisinopril and metoprolol tartrate    Objective     Vital Signs:   Vitals:    03/22/22 1413   BP: 118/74   Pulse: 85   Resp: 18   Temp: " "97.4 °F (36.3 °C)   SpO2: 95%   Weight: 52.6 kg (116 lb)   Height: 165.1 cm (65\")     Body mass index is 19.3 kg/m².    Virtual Visit Physical Exam  Physical Exam  Constitutional:       Appearance: Normal appearance.   HENT:      Head: Normocephalic.   Pulmonary:      Effort: Pulmonary effort is normal. No respiratory distress.   Musculoskeletal:      Right lower leg: Edema (trace) present.      Left lower leg: Edema (trace) present.   Neurological:      Mental Status: She is alert and oriented to person, place, and time.   Psychiatric:         Mood and Affect: Mood normal.         Behavior: Behavior normal.         Thought Content: Thought content normal.           Result Review :   Adult Transthoracic Echo Complete W/ Cont if Necessary Per Protocol (03/04/2022 16:07)  Adult Transthoracic Echo Complete W/ Cont if Necessary Per Protocol (06/14/2018 11:09)  ECG 12 Lead (03/13/2022 20:31)  Uric Acid (03/17/2022 05:23)  Basic Metabolic Panel (03/17/2022 05:23)  Respiratory Panel PCR w/COVID-19(SARS-CoV-2) [facility]/TOMMY/PAD/[facility]/MAD/SUSAN In-House, NP Swab in UTM/VTM, 3-4 HR TAT - Swab, Nasopharynx (03/16/2022 10:57)  Magnesium (03/16/2022 09:32)  Hospital records reviewed as well as SNF records              Assessment and Plan {CC Problem List  Visit Diagnosis  ROS  Review (Popup)  Health Maintenance  Quality  BestPractice  Medications  SmartSets  SnapShot Encounters  Media :23}   1. Chronic systolic heart failure (HCC)  Difficult to discern symptoms. Possibly secondary to anxiety. She currently is on lasix daily, despite discharge on lasix QOD. She is also on lisinopril even though this was stopped in the hospital.  Recommended a BMP and BNP.  She also was on metoprolol tartrate, I have advised him to change this back to her metoprolol succinate.  We will see patient in clinic next week.    2. Primary hypertension  Appears to be well controlled.  Prone to hypotension.  Monitor closely    3. CARYN (acute kidney " injury) ([facility])  Recommend a BMP next week      ADDENDUM: After speaking to the nurse about medication discrepancies, I called her son.  He tells me they just discharged her from the facility.  He plans to follow-up with her PCP this week and have labs done.  We will have her follow-up next week, but I advised him to call me if her shortness of breath worsen      Spent a total of 40 min in discussion with patient, son and nurse    Follow Up {Instructions Charge Capture  Follow-up Communications :23}   No follow-ups on file.    Dictated Utilizing Dragon Dictation

## 2022-03-22 ENCOUNTER — TELEMEDICINE (OUTPATIENT)
Dept: CARDIOLOGY | Facility: HOSPITAL | Age: 87
End: 2022-03-22

## 2022-03-22 ENCOUNTER — TELEPHONE (OUTPATIENT)
Dept: INTERNAL MEDICINE | Facility: CLINIC | Age: 87
End: 2022-03-22

## 2022-03-22 VITALS
DIASTOLIC BLOOD PRESSURE: 74 MMHG | SYSTOLIC BLOOD PRESSURE: 118 MMHG | RESPIRATION RATE: 18 BRPM | BODY MASS INDEX: 19.33 KG/M2 | HEART RATE: 85 BPM | WEIGHT: 116 LBS | OXYGEN SATURATION: 95 % | TEMPERATURE: 97.4 F | HEIGHT: 65 IN

## 2022-03-22 DIAGNOSIS — I50.22 CHRONIC SYSTOLIC HEART FAILURE: Primary | ICD-10-CM

## 2022-03-22 DIAGNOSIS — N17.9 AKI (ACUTE KIDNEY INJURY): ICD-10-CM

## 2022-03-22 DIAGNOSIS — I10 PRIMARY HYPERTENSION: ICD-10-CM

## 2022-03-22 PROCEDURE — 99443 PR PHYS/QHP TELEPHONE EVALUATION 21-30 MIN: CPT | Performed by: NURSE PRACTITIONER

## 2022-03-22 RX ORDER — LISINOPRIL 10 MG/1
10 TABLET ORAL DAILY
COMMUNITY
End: 2022-05-05

## 2022-03-22 NOTE — TELEPHONE ENCOUNTER
Caller: CHRISTINA    Relationship to patient: N[facility]SE [facility]    Best call back number: [phone]    New or established patient?  [] New  [x] Established    Date of discharge: 3-22-22    [facility] from: [facility]    Diagnosis/Symptoms: CONGESTIVE HEART FAILURE     Length of stay (If applicable): 3-17-22 TO 3-22-22

## 2022-03-23 ENCOUNTER — TRANSCRIBE ORDERS (OUTPATIENT)
Dept: HOME HEALTH SERVICES | Facility: HOME HEALTHCARE | Age: 87
End: 2022-03-23

## 2022-03-23 ENCOUNTER — READMISSION MANAGEMENT (OUTPATIENT)
Dept: CALL CENTER | Facility: HOSPITAL | Age: 87
End: 2022-03-23

## 2022-03-23 ENCOUNTER — TRANSITIONAL CARE MANAGEMENT TELEPHONE ENCOUNTER (OUTPATIENT)
Dept: CALL CENTER | Facility: HOSPITAL | Age: 87
End: 2022-03-23

## 2022-03-23 ENCOUNTER — HOME HEALTH ADMISSION (OUTPATIENT)
Dept: HOME HEALTH SERVICES | Facility: HOME HEALTHCARE | Age: 87
End: 2022-03-23

## 2022-03-23 DIAGNOSIS — N17.9 ACUTE RENAL FAILURE, UNSPECIFIED ACUTE RENAL FAILURE TYPE: Primary | ICD-10-CM

## 2022-03-23 NOTE — OUTREACH NOTE
Prep Survey    Flowsheet Row Responses   [facility] facility patient discharged from? Non-[facility]   Is LACE score < 7 ? Non-BH Discharge   Emergency Room discharge w/ pulse ox? No   Eligibility [facility]   Date of Admission 03/17/22   Date of Discharge 03/22/22   Discharge diagnosis CHF   Does the patient have one of the following disease processes/diagnoses(primary or secondary)? CHF   Prep survey completed? Yes          GIOVANNI NAIR - Registered Nurse

## 2022-03-23 NOTE — OUTREACH NOTE
Call Center TCM Note    Flowsheet Row Responses   [facility] patient discharged from? Non-  [[facility]]   Does the patient have one of the following disease processes/diagnoses(primary or secondary)? CHF   TCM attempt successful? Yes   Call start time 1616   Call end time 1620   Discharge diagnosis CHF   Person spoke with today (if not patient) and relationship spencer Zamora   Meds reviewed with patient/caregiver? Yes   Is the patient having any side effects they believe may be caused by any medication additions or changes? No   Does the patient have all medications ordered at discharge? N/A   Is the patient taking all medications as directed (includes completed medication regime)? Yes   Medication comments restarted Nystatin, Allopurinol.  son reports some questions about restarting Lisinopril--this RN does not have rehab discharge instuctions to verify  Son provided dosing this am--encouraged to monitor her BP and take readings to f/u appt tomorrow to determine if medication should be continued.    Does the patient have a primary care provider?  Yes   Does the patient have an appointment with their PCP within 7 days of discharge? Yes   Comments regarding PCP Hospital PCP FOLLOW UP APPOINTMENT IS 3/24/22@1000am   Has the patient kept scheduled appointments due by today? N/A   What is the Home health agency?  Awaiting a call from    Has home health visited the patient within 72 hours of discharge? Unsure   Did the patient receive a copy of their discharge instructions? Yes   What is the patient's perception of their health status since discharge? Improving  [Patient has been out to get nails trimmed today--son denies any issues or concerns r/t symptom mgmt.  This Rn encouraged to monitor for s/s CHF exacerbation and monitor her daily weights.  ]   Is the patient/caregiver able to teach back signs and symptoms related to disease process for when to call PCP? Yes   Is the patient/caregiver able to  teach back signs and symptoms related to disease process for when to call 911? Yes   TCM call completed? Yes          Anjali Sexton RN    3/23/2022, 16:25 EDT

## 2022-03-24 ENCOUNTER — OFFICE VISIT (OUTPATIENT)
Dept: INTERNAL MEDICINE | Facility: CLINIC | Age: 87
End: 2022-03-24

## 2022-03-24 ENCOUNTER — LAB (OUTPATIENT)
Dept: LAB | Facility: HOSPITAL | Age: 87
End: 2022-03-24

## 2022-03-24 ENCOUNTER — HOME CARE VISIT (OUTPATIENT)
Dept: HOME HEALTH SERVICES | Facility: HOME HEALTHCARE | Age: 87
End: 2022-03-24

## 2022-03-24 VITALS
WEIGHT: 118 LBS | BODY MASS INDEX: 20.14 KG/M2 | HEART RATE: 64 BPM | DIASTOLIC BLOOD PRESSURE: 64 MMHG | TEMPERATURE: 97.2 F | SYSTOLIC BLOOD PRESSURE: 108 MMHG | HEIGHT: 64 IN | RESPIRATION RATE: 20 BRPM

## 2022-03-24 VITALS
BODY MASS INDEX: 19.66 KG/M2 | TEMPERATURE: 97 F | DIASTOLIC BLOOD PRESSURE: 70 MMHG | HEIGHT: 65 IN | SYSTOLIC BLOOD PRESSURE: 110 MMHG | OXYGEN SATURATION: 98 % | WEIGHT: 118 LBS | HEART RATE: 77 BPM

## 2022-03-24 DIAGNOSIS — D64.9 ANEMIA, UNSPECIFIED TYPE: ICD-10-CM

## 2022-03-24 DIAGNOSIS — E03.9 ACQUIRED HYPOTHYROIDISM: ICD-10-CM

## 2022-03-24 DIAGNOSIS — M10.9 GOUT OF LEFT FOOT, UNSPECIFIED CAUSE, UNSPECIFIED CHRONICITY: ICD-10-CM

## 2022-03-24 DIAGNOSIS — N17.9 AKI (ACUTE KIDNEY INJURY): Primary | ICD-10-CM

## 2022-03-24 DIAGNOSIS — E87.5 HYPERKALEMIA: ICD-10-CM

## 2022-03-24 DIAGNOSIS — I50.22 CHRONIC SYSTOLIC HEART FAILURE: ICD-10-CM

## 2022-03-24 DIAGNOSIS — N17.9 AKI (ACUTE KIDNEY INJURY): ICD-10-CM

## 2022-03-24 DIAGNOSIS — M20.11 VALGUS DEFORMITY OF BOTH GREAT TOES: ICD-10-CM

## 2022-03-24 DIAGNOSIS — F03.90 DEMENTIA WITHOUT BEHAVIORAL DISTURBANCE, UNSPECIFIED DEMENTIA TYPE: ICD-10-CM

## 2022-03-24 DIAGNOSIS — M20.12 VALGUS DEFORMITY OF BOTH GREAT TOES: ICD-10-CM

## 2022-03-24 DIAGNOSIS — K59.00 CONSTIPATION, UNSPECIFIED CONSTIPATION TYPE: ICD-10-CM

## 2022-03-24 LAB
DEPRECATED RDW RBC AUTO: 46.2 FL (ref 37–54)
ERYTHROCYTE [DISTWIDTH] IN BLOOD BY AUTOMATED COUNT: 13.9 % (ref 12.3–15.4)
HCT VFR BLD AUTO: 33.4 % (ref 34–46.6)
HGB BLD-MCNC: 10.7 G/DL (ref 12–15.9)
MCH RBC QN AUTO: 29.6 PG (ref 26.6–33)
MCHC RBC AUTO-ENTMCNC: 32 G/DL (ref 31.5–35.7)
MCV RBC AUTO: 92.3 FL (ref 79–97)
PLATELET # BLD AUTO: 256 10*3/MM3 (ref 140–450)
PMV BLD AUTO: 13.1 FL (ref 6–12)
RBC # BLD AUTO: 3.62 10*6/MM3 (ref 3.77–5.28)
WBC NRBC COR # BLD: 11.65 10*3/MM3 (ref 3.4–10.8)

## 2022-03-24 PROCEDURE — 80048 BASIC METABOLIC PNL TOTAL CA: CPT

## 2022-03-24 PROCEDURE — 84466 ASSAY OF TRANSFERRIN: CPT

## 2022-03-24 PROCEDURE — 83540 ASSAY OF IRON: CPT

## 2022-03-24 PROCEDURE — 82728 ASSAY OF FERRITIN: CPT

## 2022-03-24 PROCEDURE — 85027 COMPLETE CBC AUTOMATED: CPT

## 2022-03-24 PROCEDURE — 1111F DSCHRG MED/CURRENT MED MERGE: CPT | Performed by: STUDENT IN AN ORGANIZED HEALTH CARE EDUCATION/TRAINING PROGRAM

## 2022-03-24 PROCEDURE — G0299 HHS/HOSPICE OF RN EA 15 MIN: HCPCS

## 2022-03-24 PROCEDURE — 36415 COLL VENOUS BLD VENIPUNCTURE: CPT

## 2022-03-24 PROCEDURE — 99495 TRANSJ CARE MGMT MOD F2F 14D: CPT | Performed by: STUDENT IN AN ORGANIZED HEALTH CARE EDUCATION/TRAINING PROGRAM

## 2022-03-24 RX ORDER — BISACODYL 5 MG/1
5 TABLET, COATED ORAL EVERY 8 HOURS PRN
COMMUNITY
Start: 2022-03-23 | End: 2022-03-24

## 2022-03-24 RX ORDER — CITALOPRAM 10 MG/1
10 TABLET ORAL DAILY
Qty: 30 TABLET | Refills: 3 | Status: SHIPPED | OUTPATIENT
Start: 2022-03-24 | End: 2022-06-21

## 2022-03-24 RX ORDER — GABAPENTIN 100 MG/1
100 CAPSULE ORAL EVERY MORNING
COMMUNITY
Start: 2021-12-29 | End: 2022-05-17 | Stop reason: SDUPTHER

## 2022-03-24 RX ORDER — BISACODYL 10 MG
10 SUPPOSITORY, RECTAL RECTAL DAILY PRN
Qty: 15 SUPPOSITORY | Refills: 1 | Status: ON HOLD | OUTPATIENT
Start: 2022-03-24 | End: 2022-09-10 | Stop reason: SDUPTHER

## 2022-03-24 RX ORDER — ALPRAZOLAM 0.25 MG/1
0.25 TABLET ORAL 2 TIMES DAILY PRN
COMMUNITY
End: 2022-06-08 | Stop reason: SDUPTHER

## 2022-03-24 NOTE — PROGRESS NOTES
Transitional Care Management     Date: 2022      Patient Name: Scott Diego  : 4/10/1930   MRN: [id number]     Chief Complaint:    Chief Complaint   Patient presents with   • Follow-up     Discharge from hospital on 3/22/22   • Constipation   • Abdominal Pain   • Toe Pain       History of Present Illness: Scott Diego is a 89 y.o. female who presents to clinic today for transitional care management. She has been doing well since discharge and is now at home being cared for by her son who is present. He reports having HH coming to help tomorrow. She has been eating ok, sometimes hindered by dentures. She has constipation and urinary incontinence. Bilateral feet hurt on the bottom of the toe and the 1st MTP.     I reviewed and discussed the details of that call along with the discharge summary, hospital problems, inpatient lab results, inpatient diagnostic studies, and consultation reports with Scott.     Current outpatient and discharge medications have been reconciled for the patient.  Reviewed by: Yolande Escobar MD      Date of TCM Phone Call 3/23/2022   [facility]   Date of Admission 3/17/2022   Date of Discharge 3/22/2022     Risk for Readmission (LACE) Score: 14 (3/17/2022  6:00 AM)      Course During Hospital Stay:   Ms. Chavez is a 89-year-old female with dementia and chronic heart failure reduced ejection fraction who was admitted to [facility] on 2022 after recently moving in with her son in [place] following the death of her  in 2022.  Her health has deteriorated since that time and she was noted to have poor p.o. intake and worsening memory.  She was noted to have an CARYN with no history of chronic kidney disease known.  She was hyponatremic and hyperkalemic which have resolved and her creatinine has trended down and is now only mildly elevated.  Echo during hospitalization showed an EF of 30%. She was discharged to [facility] and is now home living her with son.     Pending diagnostic testing: None      The following portions of the patient's history were reviewed and updated as appropriate: allergies, current medications, past family history, past medical history, past social history, past surgical history and problem list.    Subjective     Past Medical History:   Past Medical History:   Diagnosis Date   • Congestive heart failure (HCC)    • Malignant neoplasm (HCC)    • Stroke (HCC)     mini stroke   • Systolic heart failure (HCC) 9/25/2017    Chronic/compensated (EF 25%)       Past Surgical History:   Past Surgical History:   Procedure Laterality Date   • CHOLECYSTECTOMY     • EYE SURGERY     • HYSTERECTOMY         Family History:   Family History   Problem Relation Age of Onset   • Cancer Mother    • No Known Problems Father    • Cancer Sister    • Cancer Brother        Social History:   Social History     Socioeconomic History   • Marital status:    Tobacco Use   • Smoking status: Never Smoker   • Smokeless tobacco: Never Used   Substance and Sexual Activity   • Alcohol use: No   • Drug use: No   • Sexual activity: Defer       Medications:     Current Outpatient Medications:   •  acetaminophen (TYLENOL) 325 MG tablet, Take 2 tablets by mouth Every 4 (Four) Hours As Needed for Mild Pain ., Disp: , Rfl:   •  allopurinol (ZYLOPRIM) 100 MG tablet, Take 1 tablet by mouth Daily., Disp: , Rfl:   •  ALPRAZolam (XANAX) 0.25 MG tablet, Take 1 tablet by mouth 3 (Three) Times a Day As Needed for Anxiety., Disp: 12 tablet, Rfl: 0  •  aspirin 81 MG chewable tablet, Chew 81 mg Every Night., Disp: , Rfl:   •  atorvastatin (LIPITOR) 80 MG tablet, Take 80 mg by mouth Every Night., Disp: , Rfl:   •  carboxymethylcellulose (REFRESH PLUS) 0.5 % solution, 3 (Three) Times a Day As Needed for Dry Eyes., Disp: , Rfl:   •  famotidine (PEPCID) 20 MG tablet, Take 20 mg by mouth Daily., Disp: , Rfl:   •  ferrous sulfate 325 (65 FE) MG tablet,  Take 325 mg by mouth Daily., Disp: , Rfl:   •  furosemide (LASIX) 20 MG tablet, Take 1 tablet by mouth Every Other Day. (Patient taking differently: Take 20 mg by mouth Daily.), Disp: 15 tablet, Rfl: 0  •  gabapentin (NEURONTIN) 100 MG capsule, Take 100 mg by mouth Every Morning., Disp: , Rfl:   •  gabapentin (NEURONTIN) 300 MG capsule, Take 1 capsule by mouth Every Night., Disp: 6 capsule, Rfl: 0  •  levothyroxine (SYNTHROID, LEVOTHROID) 50 MCG tablet, Take 50 mcg by mouth Every Morning., Disp: , Rfl:   •  lisinopril (PRINIVIL,ZESTRIL) 10 MG tablet, Take 10 mg by mouth Daily., Disp: , Rfl:   •  metoprolol succinate XL (TOPROL-XL) 25 MG 24 hr tablet, Take 1 tablet by mouth Daily. Hold for SBP < 110 or HR < 60., Disp: 30 tablet, Rfl: 0  •  metoprolol tartrate (LOPRESSOR) 25 MG tablet, Take 25 mg by mouth 2 (Two) Times a Day., Disp: , Rfl:   •  nystatin (MYCOSTATIN) 100,000 unit/mL suspension, SWISH AND SWALLOW 5 ML THREE TIMES A DAY FOR 7 DAYS, Disp: , Rfl:   •  rOPINIRole (REQUIP) 0.5 MG tablet, Take 1 tablet by mouth Every Night. Take 1 hour before bedtime., Disp: , Rfl:   •  bisacodyl (Dulcolax) 10 MG suppository, Insert 1 suppository into the rectum Daily As Needed for Constipation., Disp: 15 suppository, Rfl: 1  •  cetirizine (zyrTEC) 5 MG tablet, Take 1 tablet by mouth Daily., Disp: , Rfl:   •  citalopram (CeleXA) 10 MG tablet, Take 1 tablet by mouth Daily., Disp: 30 tablet, Rfl: 3  •  Glycerin-Polysorbate 80 (REFRESH DRY EYE THERAPY OP), Apply  to eye(s) as directed by provider As Needed., Disp: , Rfl:   •  ofloxacin (OCUFLOX) 0.3 % ophthalmic solution, Administer 1 drop to both eyes 3 (Three) Times a Day. For 10 days, started 03-01-22, Disp: , Rfl:   •  polyethylene glycol (MIRALAX) 17 g packet, Take 17 g by mouth Daily., Disp: , Rfl:     Allergies:   Allergies   Allergen Reactions   • Augmentin [Amoxicillin-Pot Clavulanate] Nausea And Vomiting   • Claritin [Loratadine] Unknown (See Comments)     Doesn't  "remember   • Keflex [Cephalexin] Nausea And Vomiting   • Sulfa Antibiotics Other (See Comments)     Does not remember       Objective     Physical Exam:   Vital Signs:   Vitals:    03/24/22 0956   BP: 110/70   Pulse: 77   Temp: 97 °F (36.1 °C)   SpO2: 98%   Weight: 53.5 kg (118 lb)   Height: 165.1 cm (65\")   PainSc:   1     Body mass index is 19.64 kg/m².     Physical Exam  Vitals and nursing note reviewed.   Constitutional:       Appearance: Normal appearance.   Cardiovascular:      Rate and Rhythm: Normal rate and regular rhythm.      Heart sounds: Normal heart sounds.   Pulmonary:      Effort: Pulmonary effort is normal.      Breath sounds: Normal breath sounds. No wheezing, rhonchi or rales.   Musculoskeletal:      Comments: Hallux valgus noted. No erythema or tenderness over 1st MTP.    Skin:     General: Skin is warm and dry.   Neurological:      Mental Status: She is alert.   Psychiatric:         Mood and Affect: Mood normal.         Behavior: Behavior normal.       Assessment / Plan      Assessment/Plan:   Diagnoses and all orders for this visit:    1. CARYN (acute kidney injury) (HCC) (Primary)  Creatinine improved throughout hospitalization and was 1.29 on discharge. She had hyponatremia and hyperkalemia which both improved throughout hospitalization. ACE-I was held during hospitalization with normal blood pressure despite this change. She was discharged without ACE and Lasix 20 mg was changed to q48 hrs. Repeat BMP ordered to assess renal function.     2. Chronic systolic heart failure (HCC)  Echo inpatient demonstrated EF 20-30%. Lasix 20 mg daily. ACE-I held due to CARYN. Also on metoprolol succ 25 mg daily, ASA 81 mg, atorvastatin 80 mg daily    3. Acquired hypothyroidism  TSH on 03/03/2022 was 1.44 . Continue Levothyroxine 50 mcg daily.     4. Dementia without behavioral disturbance, unspecified dementia type (HCC)  Worsening since the death of her  and she now has poor PO intake. Was discharged " from the hospital to [facility] long term care. Due to Qtc of 530 on 03/13/2022, unable to treat agitation with Seroquel or Haldol. She was given Ativan while inpatient. Xanax is a home medication which I have a lot of reservations about giving due to her frailty. Discussed this with family and for now will start Celexa 10 mg daily and begin weaning Xanax. Recommended using Xanax 0.25 mg BID for now with plan to continue to wean down over the next few months. Referral to neurology for assistance with other medication management for Dementia.     5. Anemia, unspecified type  Likely anemia of chronic disease. Iron supplement may be contributing to constipation. Checking iron level. If normal, will stop supplement.   -     CBC (No Diff); Future  -     Iron Profile; Future  -     Ferritin; Future    6. Gout of left foot, unspecified cause, unspecified chronicity  Pain in left foot with inflammation noted while inpatient with normal x-ray. Uric Acid noted to be 6.2. Allopurinol started without any prophylactic medication which can precipitate a gout flare. Exam not consistent with gout today but it may have improved since hospitalization. Pain may be due to hallux valgus rather than gout as it seems to be chronic (hard to really assess due to dementia though). Would like podiatry's opinion.   -     Ambulatory Referral to Podiatry    7. Constipation, unspecified constipation type  If iron panel and ferritin are consistent with anemia f chronic disease, will stop iron supplement. Recommended continuing miralax and trying Dulcolax suppository. Constipation may be causing worsening urinary incontinence. Also consider enema.     8. Valgus deformity of both great toes  -     Ambulatory Referral to Podiatry    Follow Up:   Return for Follow up after labs.    Time:   I spent approximately 45 minutes providing clinical care for this patient; including review of patient's chart and provider documentation  (specifically H&P, discharge summary, and other pertinent documents as well as labs and imaging pertaining to hospitalization), face to face time spent with patient in examination room (obtaining history, performing physical exam, discussing diagnosis and management options), medication reconciliation and management, placing orders, and completing patient documentation.     Addendum (03/25/2022): Labs reviewed. BMP with elevated creatinine of 1.7 (previous 1 week ago was 1.3) and a potassium of 5.7 (up from 4.5). Will see if patient can come back in and have this repeated stat with EKG. Iron level and ferritin are normal consistent with anemia of chronic disease. Will stop iron supplement and will also check B12 and folate.     Yolande Escobar MD  [facility]

## 2022-03-24 NOTE — HOME HEALTH
Pt is a 89 yo white female admitted to [facility] for services of SN/PT due to recent hospitalization 3/2-3/17 for AKF/CARYN, poor intake and dehydration and then pt went to [place] for rehab and came home until 3/22/22. Pt has a hx: Hypothyroidism, CHF, CVA, Dementia and HTN. Pt lives at home with her son and ambulates with a cane. Pt carried it a lot. SN instructed CG on pt weighing every morning after getting out of bed, emptying bladder, then weigh and wear same amount of clothing. Keep a record of weights, and notify SN if > 2 lbs in a day or 5 lbs in a week so MD can be called. SN went over pt medication list with son.

## 2022-03-24 NOTE — TELEPHONE ENCOUNTER
Caller: DWAIN    Relationship:     Best call back number: [phone]    What orders are you requesting (i.e. lab or imaging): PHYSICAL THERAPY    In what timeframe would the patient need to come in: ASAP    Where will you receive your lab/imaging services: IN HOME    Additional notes: DWAIN NEEDS EXTENSION FOR PHYSICAL THERAPY UNTIL Tuesday. PLEASE ADVISE

## 2022-03-25 ENCOUNTER — TELEPHONE (OUTPATIENT)
Dept: INTERNAL MEDICINE | Facility: CLINIC | Age: 87
End: 2022-03-25

## 2022-03-25 ENCOUNTER — LAB (OUTPATIENT)
Dept: LAB | Facility: HOSPITAL | Age: 87
End: 2022-03-25

## 2022-03-25 DIAGNOSIS — D64.9 ANEMIA, UNSPECIFIED TYPE: ICD-10-CM

## 2022-03-25 DIAGNOSIS — E87.5 HYPERKALEMIA: ICD-10-CM

## 2022-03-25 LAB
ANION GAP SERPL CALCULATED.3IONS-SCNC: 10 MMOL/L (ref 5–15)
ANION GAP SERPL CALCULATED.3IONS-SCNC: 19.6 MMOL/L (ref 5–15)
BUN SERPL-MCNC: 47 MG/DL (ref 8–23)
BUN SERPL-MCNC: 51 MG/DL (ref 8–23)
BUN/CREAT SERPL: 27.6 (ref 7–25)
BUN/CREAT SERPL: 29 (ref 7–25)
CALCIUM SPEC-SCNC: 9.8 MG/DL (ref 8.2–9.6)
CALCIUM SPEC-SCNC: 9.8 MG/DL (ref 8.2–9.6)
CHLORIDE SERPL-SCNC: 101 MMOL/L (ref 98–107)
CHLORIDE SERPL-SCNC: 99 MMOL/L (ref 98–107)
CO2 SERPL-SCNC: 23 MMOL/L (ref 22–29)
CO2 SERPL-SCNC: 23.4 MMOL/L (ref 22–29)
CREAT SERPL-MCNC: 1.7 MG/DL (ref 0.57–1)
CREAT SERPL-MCNC: 1.76 MG/DL (ref 0.57–1)
EGFRCR SERPLBLD CKD-EPI 2021: 27 ML/MIN/1.73
EGFRCR SERPLBLD CKD-EPI 2021: 28.2 ML/MIN/1.73
FERRITIN SERPL-MCNC: 228 NG/ML (ref 13–150)
FOLATE SERPL-MCNC: 8.46 NG/ML (ref 4.78–24.2)
GLUCOSE SERPL-MCNC: 95 MG/DL (ref 65–99)
GLUCOSE SERPL-MCNC: 98 MG/DL (ref 65–99)
IRON 24H UR-MRATE: 96 MCG/DL (ref 37–145)
IRON SATN MFR SERPL: 24 % (ref 20–50)
POTASSIUM SERPL-SCNC: 5.6 MMOL/L (ref 3.5–5.2)
POTASSIUM SERPL-SCNC: 5.7 MMOL/L (ref 3.5–5.2)
SODIUM SERPL-SCNC: 134 MMOL/L (ref 136–145)
SODIUM SERPL-SCNC: 142 MMOL/L (ref 136–145)
TIBC SERPL-MCNC: 401 MCG/DL (ref 298–536)
TRANSFERRIN SERPL-MCNC: 269 MG/DL (ref 200–360)
VIT B12 BLD-MCNC: 790 PG/ML (ref 211–946)

## 2022-03-25 PROCEDURE — 80048 BASIC METABOLIC PNL TOTAL CA: CPT

## 2022-03-25 PROCEDURE — 36415 COLL VENOUS BLD VENIPUNCTURE: CPT

## 2022-03-25 PROCEDURE — 82607 VITAMIN B-12: CPT

## 2022-03-25 PROCEDURE — 82746 ASSAY OF FOLIC ACID SERUM: CPT

## 2022-03-25 NOTE — TELEPHONE ENCOUNTER
Repeat BMP with very slight improvement in creatinine and potassium to 1.7 and 5.6. Called and spoke to Ms. Chavez's POA, or son Noah. We discussed risks of high potassium like arrhythmia. Because of her dementia, hospitalizations are extremely hard for her and delirium complicates the time. She has mentioned to her son that she would just like to stay home and not go back to the hospital if she can avoid it. With shared decision making, Ms. Chavez will not go to the hospital to be evaluated at this time. She will hold Lisinopril and move taking Lasix to every other day as her labs are consistent with hypovolemia. She will also avoid potassium rich foods if possible. Repeat BMP will be obtained on Monday. If at any point she has worsening shortness of breath, chest pain, and palpitations, her son will take her to the ER for evaluation. I will check in at some point over the weekend to see how she is doing.     Barb Escobar MD

## 2022-03-28 ENCOUNTER — HOME CARE VISIT (OUTPATIENT)
Dept: HOME HEALTH SERVICES | Facility: HOME HEALTHCARE | Age: 87
End: 2022-03-28

## 2022-03-28 VITALS
HEART RATE: 75 BPM | RESPIRATION RATE: 16 BRPM | DIASTOLIC BLOOD PRESSURE: 56 MMHG | TEMPERATURE: 97 F | OXYGEN SATURATION: 99 % | SYSTOLIC BLOOD PRESSURE: 100 MMHG

## 2022-03-28 PROCEDURE — G0495 RN CARE TRAIN/EDU IN HH: HCPCS

## 2022-03-29 ENCOUNTER — HOME CARE VISIT (OUTPATIENT)
Dept: HOME HEALTH SERVICES | Facility: HOME HEALTHCARE | Age: 87
End: 2022-03-29

## 2022-03-29 VITALS
SYSTOLIC BLOOD PRESSURE: 122 MMHG | DIASTOLIC BLOOD PRESSURE: 54 MMHG | OXYGEN SATURATION: 97 % | TEMPERATURE: 97.9 F | RESPIRATION RATE: 18 BRPM | HEART RATE: 57 BPM

## 2022-03-29 PROCEDURE — G0151 HHCP-SERV OF PT,EA 15 MIN: HCPCS

## 2022-03-29 NOTE — HOME HEALTH
PMH: Pt is a 89 yo white female admitted to [facility] for services of SN/PT due to recent hospitalization 3/2-3/17 for AKF/CARYN, poor intake and dehydration and then pt went to [place] for rehab and came home until 3/22/22. Pt has a hx: Hypothyroidism, CHF, CVA, Dementia and HTN. Pt lives at home with her son and ambulates with a cane per their report. However, pt did not have cane with her throughout PT evaluation.    Pt reports using oxygen as needed during the day, is supposed to be using it all night.  c/o eyes, tinnitis, dizzy/light headedness. Was able to open eyes better after using artificial tears.  Pt recently moved in with son (earlier this year),  passed recently, pt having difficutly caring for herself.   Pt c/o dizziness throughout various activities. Does have drop in BP from sitting to standing. Pt known to have difficulty taking in adequate hydration PO intake. Encouraged use of marks on water bottle with times for when she needs to have reached each line.  PT eval completed and pt has impaired balance and activity tolerance and would benefit from skilled PT to address deficits 1w4.

## 2022-03-31 ENCOUNTER — TELEPHONE (OUTPATIENT)
Dept: INTERNAL MEDICINE | Facility: CLINIC | Age: 87
End: 2022-03-31

## 2022-03-31 ENCOUNTER — LAB (OUTPATIENT)
Dept: LAB | Facility: HOSPITAL | Age: 87
End: 2022-03-31

## 2022-03-31 DIAGNOSIS — N17.9 AKI (ACUTE KIDNEY INJURY): Primary | ICD-10-CM

## 2022-03-31 DIAGNOSIS — N17.9 AKI (ACUTE KIDNEY INJURY): ICD-10-CM

## 2022-03-31 PROCEDURE — 80048 BASIC METABOLIC PNL TOTAL CA: CPT

## 2022-03-31 PROCEDURE — 36415 COLL VENOUS BLD VENIPUNCTURE: CPT

## 2022-03-31 NOTE — TELEPHONE ENCOUNTER
Spoke with pt's son regarding the lab order is ready and they can get it on their convenience. Verbalized understanding.

## 2022-03-31 NOTE — TELEPHONE ENCOUNTER
----- Message from Yolande Escobar MD sent at 3/31/2022  7:13 AM EDT -----  Regarding: Labs  Please call Ms. Chavez's son to see how she is doing and let him know that there are labs to repeat her kidney function and potassium level in for him to get whenever is convenient.     KZ

## 2022-04-01 DIAGNOSIS — N17.9 AKI (ACUTE KIDNEY INJURY): Primary | ICD-10-CM

## 2022-04-01 LAB
ANION GAP SERPL CALCULATED.3IONS-SCNC: 10.1 MMOL/L (ref 5–15)
BUN SERPL-MCNC: 21 MG/DL (ref 8–23)
BUN/CREAT SERPL: 13.7 (ref 7–25)
CALCIUM SPEC-SCNC: 9.4 MG/DL (ref 8.2–9.6)
CHLORIDE SERPL-SCNC: 97 MMOL/L (ref 98–107)
CO2 SERPL-SCNC: 24.9 MMOL/L (ref 22–29)
CREAT SERPL-MCNC: 1.53 MG/DL (ref 0.57–1)
EGFRCR SERPLBLD CKD-EPI 2021: 32 ML/MIN/1.73
GLUCOSE SERPL-MCNC: 108 MG/DL (ref 65–99)
POTASSIUM SERPL-SCNC: 4.3 MMOL/L (ref 3.5–5.2)
SODIUM SERPL-SCNC: 132 MMOL/L (ref 136–145)

## 2022-04-04 ENCOUNTER — HOME CARE VISIT (OUTPATIENT)
Dept: HOME HEALTH SERVICES | Facility: HOME HEALTHCARE | Age: 87
End: 2022-04-04

## 2022-04-04 VITALS — OXYGEN SATURATION: 93 % | DIASTOLIC BLOOD PRESSURE: 60 MMHG | SYSTOLIC BLOOD PRESSURE: 88 MMHG | HEART RATE: 76 BPM

## 2022-04-04 PROCEDURE — G0495 RN CARE TRAIN/EDU IN HH: HCPCS

## 2022-04-04 NOTE — HOME HEALTH
patient seen for follow of cardia issues. Vital signs stable, Lungs wheezes in all lobes, Skin turgor sluggish, cap refill normal. C/o constipation and is taking a stool softener suggested to take miralax for constipation. Encouraged to drink more fluis for bowels. Very pleasant lady . Bowels sound actives. Wears glasses. Instructed patient after b/p taken standing 88/60. . Educated patient to stand slowly and drink more water.Will continue to monitor cardiac status, weights and hydration status.

## 2022-04-05 ENCOUNTER — OFFICE VISIT (OUTPATIENT)
Dept: INTERNAL MEDICINE | Facility: CLINIC | Age: 87
End: 2022-04-05

## 2022-04-05 VITALS
SYSTOLIC BLOOD PRESSURE: 100 MMHG | DIASTOLIC BLOOD PRESSURE: 60 MMHG | TEMPERATURE: 96.4 F | HEART RATE: 72 BPM | OXYGEN SATURATION: 92 %

## 2022-04-05 DIAGNOSIS — J30.1 SEASONAL ALLERGIC RHINITIS DUE TO POLLEN: ICD-10-CM

## 2022-04-05 DIAGNOSIS — J96.11 CHRONIC RESPIRATORY FAILURE WITH HYPOXIA: ICD-10-CM

## 2022-04-05 DIAGNOSIS — Z71.89 GOALS OF CARE, COUNSELING/DISCUSSION: ICD-10-CM

## 2022-04-05 DIAGNOSIS — N18.32 STAGE 3B CHRONIC KIDNEY DISEASE: Primary | ICD-10-CM

## 2022-04-05 PROCEDURE — 99214 OFFICE O/P EST MOD 30 MIN: CPT | Performed by: STUDENT IN AN ORGANIZED HEALTH CARE EDUCATION/TRAINING PROGRAM

## 2022-04-05 RX ORDER — ASPIRIN 81 MG/1
81 TABLET, DELAYED RELEASE ORAL DAILY
COMMUNITY
Start: 2022-03-22

## 2022-04-05 NOTE — TELEPHONE ENCOUNTER
nystatin (MYCOSTATIN) 100,000 unit/mL suspension     Last office visit    4/5/22  Next office visit    6/6/22    Lab completed in past 6 months? Yes  Labs completed in past year? Yes    Last refill Date: 3/22/22  Quantity:           N/A  Prescribed by Historical    Pharmacy:      [id number] - [place], [place] - [address] - [phone]  - [phone]       Please review pended refill request for any changes needed on refills or quantities.  Thank you!

## 2022-04-05 NOTE — PROGRESS NOTES
Internal Medicine Established Office Visit      Date: 2022     Patient Name: Scott Diego  : 4/10/1930   MRN: [id number]     Chief Complaint:    Chief Complaint   Patient presents with   • Follow-up   • Cough   • Tinnitus   • Headache       History of Present Illness: Scott Diego is a 89 y.o. female who presents to clinic today for follow up. She has been doing ok since her last appointment. Her creatinine and potassium were high. After discussing with her son, we opted to hold lisinopril and move lasix to every other day dosing. Repeat potassium and creatinine had improved. Lisinopril was restarted yesterday.     She reports having a fullness in her left ear, constant tinnitus (which has happened in the past), and congestion. She has been taking Allegra. She has also used Afrin for no more than 3 days in a row.     Subjective     I have reviewed and the following portions of the patient's history were updated as appropriate: past family history, past medical history, past social history, past surgical history and problem list.     Medications:     Current Outpatient Medications:   •  acetaminophen (TYLENOL) 325 MG tablet, Take 2 tablets by mouth Every 4 (Four) Hours As Needed for Mild Pain ., Disp: , Rfl:   •  allopurinol (ZYLOPRIM) 100 MG tablet, Take 1 tablet by mouth Daily., Disp: , Rfl:   •  ALPRAZolam (XANAX) 0.25 MG tablet, Take 0.25 mg by mouth 2 (Two) Times a Day As Needed., Disp: , Rfl:   •  atorvastatin (LIPITOR) 80 MG tablet, Take 80 mg by mouth Every Night., Disp: , Rfl:   •  bisacodyl (Dulcolax) 10 MG suppository, Insert 1 suppository into the rectum Daily As Needed for Constipation., Disp: 15 suppository, Rfl: 1  •  carboxymethylcellulose (REFRESH PLUS) 0.5 % solution, 3 (Three) Times a Day As Needed for Dry Eyes., Disp: , Rfl:   •  citalopram (CeleXA) 10 MG tablet, Take 1 tablet by mouth Daily., Disp: 30 tablet, Rfl: 3  •  famotidine (PEPCID) 20 MG tablet, Take 20 mg by mouth  Daily., Disp: , Rfl:   •  fexofenadine (ALLEGRA) 180 MG tablet, Take 180 mg by mouth Daily., Disp: , Rfl:   •  furosemide (LASIX) 20 MG tablet, Take 1 tablet by mouth Every Other Day. (Patient taking differently: Take 20 mg by mouth Daily.), Disp: 15 tablet, Rfl: 0  •  gabapentin (NEURONTIN) 300 MG capsule, Take 1 capsule by mouth Every Night., Disp: 6 capsule, Rfl: 0  •  Glycerin-Polysorbate 80 (REFRESH DRY EYE THERAPY OP), Apply  to eye(s) as directed by provider As Needed., Disp: , Rfl:   •  levothyroxine (SYNTHROID, LEVOTHROID) 50 MCG tablet, Take 50 mcg by mouth Every Morning., Disp: , Rfl:   •  lisinopril (PRINIVIL,ZESTRIL) 10 MG tablet, Take 10 mg by mouth Daily., Disp: , Rfl:   •  metoprolol succinate XL (TOPROL-XL) 25 MG 24 hr tablet, Take 1 tablet by mouth Daily. Hold for SBP < 110 or HR < 60., Disp: 30 tablet, Rfl: 0  •  nystatin (MYCOSTATIN) 100,000 unit/mL suspension, SWISH AND SWALLOW 5 ML THREE TIMES A DAY FOR 7 DAYS, Disp: , Rfl:   •  ofloxacin (OCUFLOX) 0.3 % ophthalmic solution, Administer 1 drop to both eyes 3 (Three) Times a Day. For 10 days, started 03-01-22, Disp: , Rfl:   •  polyethylene glycol (MIRALAX) 17 g packet, Take 17 g by mouth Daily., Disp: , Rfl:   •  rOPINIRole (REQUIP) 0.5 MG tablet, Take 1 tablet by mouth Every Night. Take 1 hour before bedtime., Disp: , Rfl:   •  SM Aspirin Adult Low Strength 81 MG EC tablet, Take 81 mg by mouth Daily., Disp: , Rfl:   •  gabapentin (NEURONTIN) 100 MG capsule, Take 100 mg by mouth Every Morning., Disp: , Rfl:   •  metoprolol tartrate (LOPRESSOR) 25 MG tablet, Take 25 mg by mouth 2 (Two) Times a Day., Disp: , Rfl:     Allergies:   Allergies   Allergen Reactions   • Augmentin [Amoxicillin-Pot Clavulanate] Nausea And Vomiting     Makes her sick at her stomach   • Claritin [Loratadine] Unknown (See Comments)     Doesn't remember   • Keflex [Cephalexin] Nausea And Vomiting     Makes pt sick at her stomach   • Sulfa Antibiotics Other (See Comments)      Does not remember       Objective     Physical Exam:   Vital Signs:   Vitals:    04/05/22 0940   BP: 100/60   Pulse: 72   Temp: 96.4 °F (35.8 °C)   SpO2: 92%   PainSc:   2     There is no height or weight on file to calculate BMI.     Physical Exam  Vitals and nursing note reviewed.   Constitutional:       Appearance: Normal appearance.   HENT:      Mouth/Throat:      Mouth: Mucous membranes are moist.      Pharynx: Posterior oropharyngeal erythema present.   Cardiovascular:      Rate and Rhythm: Normal rate and regular rhythm.      Heart sounds: Normal heart sounds.   Pulmonary:      Effort: Pulmonary effort is normal.      Breath sounds: Normal breath sounds. No wheezing, rhonchi or rales.   Skin:     General: Skin is warm and dry.   Neurological:      Mental Status: She is alert.   Psychiatric:         Mood and Affect: Mood normal.         Behavior: Behavior normal.       Assessment / Plan      Assessment/Plan:   Diagnoses and all orders for this visit:    1. Stage 3b chronic kidney disease (HCC) (Primary)  Creatinine and potassium improved after holding lisinopril and moved Lasix to every other day dosing. Creatinine and potassium improved so lisinopril was restarted. Lasix remains at every other day dosing. Continue lisinopril 10 mg daily. Weight is stable. Recheck BMP on 04/07.     2. Chronic respiratory failure with hypoxia (HCC)  O2 noted to be in the mid 70s on arrival. Improved to 92% after sitting. Encouraged O2 use with activity.     3. Seasonal allergic rhinitis due to pollen  Continue Allegra. Limit Afrin to no more than 3 days in a row. Add Flonase.      4. Goals of Care Discussion   Patient wishes to continue care for any reversible conditions. She would not want CPR or intubation should she go into cardiac arrest. Her son who is her POA is in agreement with this. DNR/DNI order placed on chart. She would want all feasible and reasonable medical care leading up to this.     Follow Up:   Return in  about 2 months (around 6/5/2022) for Recheck, 30 minute visit .    Time:  I spent approximately 20 minutes providing clinical care for this patient; including review of patient's chart and provider documentation, face to face time spent with patient in examination room (obtaining history, performing physical exam, discussing diagnosis and management options), placing orders, and completing patient documentation.     Yolande Escobar MD  [facility]

## 2022-04-06 NOTE — PROGRESS NOTES
[facility]  Heart and Valve Center      Chief Complaint  Congestive Heart Failure and Follow-up    History of Present Illness    Scott Diego is a 89 y.o. female with nonischemic cardiomyopathy/chronic systolic heart failure, orthostasis, dementia, hypothyroidism who presents today to follow up on chronic systolic heart failure.   Patient admitted 3/3-3/17 with CARYN.  Patient recently suffered the loss of her  in January and has had worsening p.o. intake as well as increased anxiety and worsening memory.  Creatinine improved with improved p.o. intake and gentle IV fluids.  She did have hyponatremia and hyperkalemia that resolved.  Chest x-ray showed some mild pulmonary interstitial disease.  Echo showed EF of 30% (EF 20% in 2018).  Due to labile p.o. intake her Lasix was changed to every 48 hours.  ACE inhibitor held secondary to CARYN. She was discharged to [facility] and we had a telemedicine appointment last week.  She noted some shortness of breath, but difficult to tell via telemedicine and with underlying anxiety.  There are discrepancies on her list at the skilled nursing facility and it appears that she was being given her lisinopril 10 mg and Lasix 20 mg daily.  I called her son to discuss and patient was being discharged that afternoon.  She followed up with her PCP and labs showed a increase in her creatinine up to 1.7 and a potassium of 5.6.  Her lisinopril was stopped and Lasix was changed to every other day and creatinine improved to 1.5, potassium 4.3. Her lisinopril was resumed Monday. Her son reports that last night she complained of chest pain when she was laying down. She also has some epigastric discomfort that occurs at rest. She does not remember this. She denies shortness of breath or chest pain. eMost history obtained from son due to dementia.  He reports that her appetite and memory seems to have improved since she has been home.  He reports that she has  "steadily gained some weight since the hospital because she is eating and drinking better.  He has not witnessed her having any significant shortness of breath    Objective     Vital Signs:   Vitals:    04/07/22 0921   BP: 126/60   BP Location: Left arm   Patient Position: Sitting   Cuff Size: Adult   Pulse: 75   Resp: 20   Temp: 96.1 °F (35.6 °C)   TempSrc: Temporal   SpO2: 99%   Weight: 55.8 kg (123 lb 2 oz)   Height: 165.1 cm (65\")     Body mass index is 20.49 kg/m².    Physical Exam  Vitals reviewed.   Constitutional:       Appearance: Normal appearance.   HENT:      Head: Normocephalic.   Neck:      Vascular: No carotid bruit.   Cardiovascular:      Rate and Rhythm: Normal rate and regular rhythm.  Extrasystoles are present.     Pulses: Normal pulses.      Heart sounds: Normal heart sounds, S1 normal and S2 normal. No murmur heard.  Pulmonary:      Effort: Pulmonary effort is normal. No respiratory distress.      Breath sounds: Normal breath sounds.   Chest:      Chest wall: No tenderness.   Abdominal:      General: Abdomen is flat.      Palpations: Abdomen is soft.   Musculoskeletal:      Cervical back: Neck supple.      Right lower leg: No edema.      Left lower leg: No edema.   Skin:     General: Skin is warm and dry.   Neurological:      General: No focal deficit present.      Mental Status: She is alert and oriented to person, place, and time. Mental status is at baseline.   Psychiatric:         Mood and Affect: Mood normal.         Behavior: Behavior normal.         Thought Content: Thought content normal.           Result Review :   Adult Transthoracic Echo Complete W/ Cont if Necessary Per Protocol (03/04/2022 16:07)  Adult Transthoracic Echo Complete W/ Cont if Necessary Per Protocol (06/14/2018 11:09)  ECG 12 Lead (03/13/2022 20:31)  Uric Acid (03/17/2022 05:23)  Basic Metabolic Panel (03/17/2022 05:23)  Basic metabolic panel (03/25/2022 12:20)  Basic metabolic panel (03/24/2022 10:48)  CBC (No Diff) " (03/24/2022 10:48)  Iron Profile (03/24/2022 10:48)  Vitamin B12 (03/25/2022 12:20)  Folate (03/25/2022 12:20)  Ferritin (03/24/2022 10:48)  Basic metabolic panel (03/31/2022 11:27)  EKG today shows NSR with 1st degree AVB, occasional PVCs, LAD, LBBBB              Assessment and Plan {CC Problem List  Visit Diagnosis  ROS  Review (Popup)  Health Maintenance  Quality  BestPractice  Medications  SmartSets  SnapShot Encounters  Media :23}   1. Chest pain  Atypical symptoms. She actually denies chest pain and doesn't remember this. EKG stable    2. Chronic systolic heart failure (HCC)  Appears euvolemic  Continue lasix QOD. Repeat labs today to determine whether we can keep her on lisinopril. Continue metoprolol succinate  Heart failure education provided today including signs and symptoms and when to call. Provided HF Zones to son  Will get her back in with Dr. Aragon     3. Primary hypertension  Well controlled    4. CARYN (acute kidney injury) (HCC)  In the setting of decreased PO intake. Seems to be eating and drinking much better  BMP today     Goals of care discussed. Her son reports that she has advanced care plan on file with PCP    Follow Up {Instructions Charge Capture  Follow-up Communications :23}   Return in 4 weeks (on 5/5/2022) for HF, Office follow up.    Dictated Utilizing Dragon Dictation

## 2022-04-07 ENCOUNTER — HOSPITAL ENCOUNTER (OUTPATIENT)
Dept: CARDIOLOGY | Facility: HOSPITAL | Age: 87
Discharge: HOME | End: 2022-04-07

## 2022-04-07 ENCOUNTER — OFFICE VISIT (OUTPATIENT)
Dept: CARDIOLOGY | Facility: HOSPITAL | Age: 87
End: 2022-04-07

## 2022-04-07 ENCOUNTER — HOME CARE VISIT (OUTPATIENT)
Dept: HOME HEALTH SERVICES | Facility: HOME HEALTHCARE | Age: 87
End: 2022-04-07

## 2022-04-07 ENCOUNTER — LAB (OUTPATIENT)
Dept: LAB | Facility: HOSPITAL | Age: 87
End: 2022-04-07

## 2022-04-07 VITALS
OXYGEN SATURATION: 99 % | TEMPERATURE: 96.1 F | HEART RATE: 75 BPM | SYSTOLIC BLOOD PRESSURE: 126 MMHG | BODY MASS INDEX: 20.51 KG/M2 | RESPIRATION RATE: 20 BRPM | WEIGHT: 123.13 LBS | HEIGHT: 65 IN | DIASTOLIC BLOOD PRESSURE: 60 MMHG

## 2022-04-07 VITALS
HEART RATE: 71 BPM | SYSTOLIC BLOOD PRESSURE: 112 MMHG | OXYGEN SATURATION: 100 % | RESPIRATION RATE: 100 BRPM | DIASTOLIC BLOOD PRESSURE: 74 MMHG | TEMPERATURE: 97.4 F

## 2022-04-07 DIAGNOSIS — N17.9 AKI (ACUTE KIDNEY INJURY): ICD-10-CM

## 2022-04-07 DIAGNOSIS — I10 PRIMARY HYPERTENSION: ICD-10-CM

## 2022-04-07 DIAGNOSIS — R07.9 CHEST PAIN, UNSPECIFIED TYPE: ICD-10-CM

## 2022-04-07 DIAGNOSIS — R07.9 CHEST PAIN, UNSPECIFIED TYPE: Primary | ICD-10-CM

## 2022-04-07 DIAGNOSIS — I50.22 CHRONIC SYSTOLIC HEART FAILURE: ICD-10-CM

## 2022-04-07 LAB
ANION GAP SERPL CALCULATED.3IONS-SCNC: 9.3 MMOL/L (ref 5–15)
BUN SERPL-MCNC: 24 MG/DL (ref 8–23)
BUN/CREAT SERPL: 17.3 (ref 7–25)
CALCIUM SPEC-SCNC: 9.2 MG/DL (ref 8.2–9.6)
CHLORIDE SERPL-SCNC: 97 MMOL/L (ref 98–107)
CO2 SERPL-SCNC: 23.7 MMOL/L (ref 22–29)
CREAT SERPL-MCNC: 1.39 MG/DL (ref 0.57–1)
EGFRCR SERPLBLD CKD-EPI 2021: 35.9 ML/MIN/1.73
GLUCOSE SERPL-MCNC: 80 MG/DL (ref 65–99)
POTASSIUM SERPL-SCNC: 4 MMOL/L (ref 3.5–5.2)
QT INTERVAL: 520 MS
QTC INTERVAL: 545 MS
SODIUM SERPL-SCNC: 130 MMOL/L (ref 136–145)

## 2022-04-07 PROCEDURE — 93005 ELECTROCARDIOGRAM TRACING: CPT | Performed by: NURSE PRACTITIONER

## 2022-04-07 PROCEDURE — 93010 ELECTROCARDIOGRAM REPORT: CPT | Performed by: INTERNAL MEDICINE

## 2022-04-07 PROCEDURE — 36415 COLL VENOUS BLD VENIPUNCTURE: CPT

## 2022-04-07 PROCEDURE — 80048 BASIC METABOLIC PNL TOTAL CA: CPT

## 2022-04-07 PROCEDURE — 99214 OFFICE O/P EST MOD 30 MIN: CPT | Performed by: NURSE PRACTITIONER

## 2022-04-07 PROCEDURE — G0151 HHCP-SERV OF PT,EA 15 MIN: HCPCS

## 2022-04-07 NOTE — HOME HEALTH
Pt rec'd for home care PT routine visit.  New reports:  Med changes:  Falls:    Focus of treatment today included providing written HEP for standing and seated BLE strengthening exercises.  Pt is appropriate to be seen by PTA for subsequent routine visits to progress strength, balance, endurance and activity tolerance per PT POC.  Recommend adding sidestepping and stepping A/P/Laterally to next treatment invention

## 2022-04-08 ENCOUNTER — TELEPHONE (OUTPATIENT)
Dept: CARDIOLOGY | Facility: HOSPITAL | Age: 87
End: 2022-04-08

## 2022-04-08 DIAGNOSIS — E87.1 HYPONATREMIA: Primary | ICD-10-CM

## 2022-04-08 NOTE — TELEPHONE ENCOUNTER
Reviewed labs. Patient's son already talked to Dr. Escobar's office.  Labs stable except for her sodium is trending down.  She has repeat labs next week.  I called her son to confirm how much fluid she was drinking.  He reports that she only drinks coffee and drinks about 20 ounces of coffee a day.  She drinks no water.  I have encouraged him to decrease her decaf coffee to 2 cups (16 oz )or less and to drink an additional 2 cups of water. He verbalized understanding with no further questions or concerns

## 2022-04-11 PROCEDURE — G0180 MD CERTIFICATION HHA PATIENT: HCPCS | Performed by: STUDENT IN AN ORGANIZED HEALTH CARE EDUCATION/TRAINING PROGRAM

## 2022-04-13 ENCOUNTER — LAB (OUTPATIENT)
Dept: LAB | Facility: HOSPITAL | Age: 87
End: 2022-04-13

## 2022-04-13 ENCOUNTER — HOME CARE VISIT (OUTPATIENT)
Dept: HOME HEALTH SERVICES | Facility: HOME HEALTHCARE | Age: 87
End: 2022-04-13

## 2022-04-13 VITALS
DIASTOLIC BLOOD PRESSURE: 68 MMHG | HEART RATE: 78 BPM | OXYGEN SATURATION: 97 % | SYSTOLIC BLOOD PRESSURE: 118 MMHG | RESPIRATION RATE: 16 BRPM | TEMPERATURE: 97 F

## 2022-04-13 VITALS
TEMPERATURE: 97.6 F | RESPIRATION RATE: 18 BRPM | SYSTOLIC BLOOD PRESSURE: 122 MMHG | DIASTOLIC BLOOD PRESSURE: 60 MMHG | OXYGEN SATURATION: 96 % | HEART RATE: 68 BPM

## 2022-04-13 DIAGNOSIS — E87.1 HYPONATREMIA: ICD-10-CM

## 2022-04-13 PROCEDURE — 80048 BASIC METABOLIC PNL TOTAL CA: CPT

## 2022-04-13 PROCEDURE — G0495 RN CARE TRAIN/EDU IN HH: HCPCS

## 2022-04-13 PROCEDURE — 36415 COLL VENOUS BLD VENIPUNCTURE: CPT

## 2022-04-13 PROCEDURE — G0151 HHCP-SERV OF PT,EA 15 MIN: HCPCS

## 2022-04-13 NOTE — HOME HEALTH
Pt rec'd for PT reassessment prior to anticipated d/c visit next week.  Pt has not yet demonstrated compliance with HEP. Edu provided to pt and her son re: daily compliance at a certain time of day to make it routine may be the most successful. PT suggested after breakfast.   Overall, her SpO2 remains improved during and post activity compared to at point of PT evaluation and she is demonstrating improved strength and tolerance to activity/intervention.

## 2022-04-13 NOTE — HOME HEALTH
Sharon richardson. Son is weighing pt daily and monitoring for wt gains.   Wts are steady.  Denies falls.  Pt continues to have poor cognition regarding memory.  She currently is complaining of itching and burning in her eyes.  NO redenss or drainage noted.  Son took her to eye doctor and will take her again next week.  Currently using OTC eye drops with help from ehr son.     Plan to see next week for prediscahrge plan.

## 2022-04-14 LAB
ANION GAP SERPL CALCULATED.3IONS-SCNC: 9.2 MMOL/L (ref 5–15)
BUN SERPL-MCNC: 26 MG/DL (ref 8–23)
BUN/CREAT SERPL: 16 (ref 7–25)
CALCIUM SPEC-SCNC: 9.5 MG/DL (ref 8.2–9.6)
CHLORIDE SERPL-SCNC: 102 MMOL/L (ref 98–107)
CO2 SERPL-SCNC: 22.8 MMOL/L (ref 22–29)
CREAT SERPL-MCNC: 1.63 MG/DL (ref 0.57–1)
EGFRCR SERPLBLD CKD-EPI 2021: 29.5 ML/MIN/1.73
GLUCOSE SERPL-MCNC: 75 MG/DL (ref 65–99)
POTASSIUM SERPL-SCNC: 4.8 MMOL/L (ref 3.5–5.2)
SODIUM SERPL-SCNC: 134 MMOL/L (ref 136–145)

## 2022-04-19 RX ORDER — FUROSEMIDE 20 MG/1
TABLET ORAL
Qty: 90 TABLET | Refills: 0 | Status: SHIPPED | OUTPATIENT
Start: 2022-04-19 | End: 2022-05-05 | Stop reason: SDUPTHER

## 2022-04-19 RX ORDER — METOPROLOL SUCCINATE 25 MG/1
TABLET, EXTENDED RELEASE ORAL
Qty: 90 TABLET | Refills: 0 | Status: SHIPPED | OUTPATIENT
Start: 2022-04-19 | End: 2022-07-21 | Stop reason: SDUPTHER

## 2022-04-20 ENCOUNTER — HOME CARE VISIT (OUTPATIENT)
Dept: HOME HEALTH SERVICES | Facility: HOME HEALTHCARE | Age: 87
End: 2022-04-20

## 2022-04-20 VITALS
SYSTOLIC BLOOD PRESSURE: 110 MMHG | OXYGEN SATURATION: 95 % | RESPIRATION RATE: 14 BRPM | DIASTOLIC BLOOD PRESSURE: 60 MMHG | HEART RATE: 65 BPM | TEMPERATURE: 96.2 F

## 2022-04-20 PROCEDURE — G0300 HHS/HOSPICE OF LPN EA 15 MIN: HCPCS

## 2022-04-20 PROCEDURE — G0151 HHCP-SERV OF PT,EA 15 MIN: HCPCS

## 2022-04-20 NOTE — HOME HEALTH
Pt seen today for a post dischare visit, Pt noted with 5 lb loss since hospitalazation. Pt states she feels good, but is having dry eyes and using eye drops to moisten. Pt caregiver educated on medication, hydration and to call MD if any s/s of dehydration noted. RN will plan for discharge.

## 2022-04-21 VITALS
TEMPERATURE: 97.7 F | OXYGEN SATURATION: 97 % | RESPIRATION RATE: 18 BRPM | DIASTOLIC BLOOD PRESSURE: 80 MMHG | SYSTOLIC BLOOD PRESSURE: 132 MMHG | HEART RATE: 75 BPM

## 2022-04-21 NOTE — HOME HEALTH
Pt rec'd for PT discipline d/c.  She has been provided HEP for BLE strengthening and pt and family have been encouraged to do this daily. They have not yet incorporated it into their daily routine. PT reinforced the importance of increasing daily activity and also improving hydration. Pt needs significant cues and reminders from family.  Pt's son verbalized understanding of all education.

## 2022-04-22 NOTE — TELEPHONE ENCOUNTER
Caller: VAUGHN GASPAR    Relationship to patient: Emergency Contact    Best call back number: [phone]    What is the call regarding: PATIENT'S SON STATES THAT SHE HAS DROPPED FROM 121  IN THE LAST WEEK AND WANTED TO LET DR PERALTA KNOW.

## 2022-04-25 ENCOUNTER — HOME CARE VISIT (OUTPATIENT)
Dept: HOME HEALTH SERVICES | Facility: HOME HEALTHCARE | Age: 87
End: 2022-04-25

## 2022-04-25 VITALS
HEART RATE: 78 BPM | RESPIRATION RATE: 16 BRPM | TEMPERATURE: 97 F | OXYGEN SATURATION: 96 % | DIASTOLIC BLOOD PRESSURE: 56 MMHG | SYSTOLIC BLOOD PRESSURE: 118 MMHG

## 2022-04-25 PROCEDURE — G0495 RN CARE TRAIN/EDU IN HH: HCPCS

## 2022-05-04 ENCOUNTER — TELEPHONE (OUTPATIENT)
Dept: CARDIOLOGY | Facility: HOSPITAL | Age: 87
End: 2022-05-04

## 2022-05-04 NOTE — TELEPHONE ENCOUNTER
----- Message from Jessie Milan CMA sent at 5/4/2022  8:41 AM EDT -----  Regarding: FW: Weight loss    ----- Message -----  From: Faina Virgen MA  Sent: 5/3/2022   3:49 PM EDT  To: Jessie Milan CMA  Subject: RE: Weight loss                                  Pt's son stated that she has lose 14 lbs in 3 weeks. Not eating as much but can get her to drink some fluids. States the weight loss is in a three week period. 124.6 lbs on 4/16/22 now 110.8 lbs. Just called to give you this information before appointment on 5/5/22.   ----- Message -----  From: Jessie Milan CMA  Sent: 5/3/2022   2:23 PM EDT  To: Faina Virgen MA  Subject: Weight loss                                      Patient's son called stating that he was to call if his mother had any weight loss. She has gone from 124 to 110. Please call to speak with son at [phone].

## 2022-05-04 NOTE — PROGRESS NOTES
"[facility]  Heart and Valve Center      Chief Complaint  Congestive Heart Failure and Follow-up    History of Present Illness    Scott Diego is a 89 y.o. female with nonischemic cardiomyopathy/chronic systolic heart failure, orthostasis, dementia, hypothyroidism and recent CARYN who presents today to follow up on chronic systolic heart failure.  Patient son reports that she has lost 14 pounds the past 3 weeks.  He reports she is not eating much but she will drink some fluids.  He called yesterday and she was advised to hold her lisinopril and Lasix until her meeting today.  She reports that she is not doing very well.  History limited by dementia.  She reports worsening weakness over the last 2 to 3 days and feels very \"woozy\".  She reports she is very dizzy when she goes to get up.  Her son reports that she gets up very quickly.  She is had no recent falls.  She is only drinking about a bottle of water a day.  He reports that she is lost weight her dentures do not fit very well and so she has trouble eating.  She also has some issues with chronic thrush.  She reports some shortness of breath, but she says \"not much\".  No lower extremity edema.    Objective     Vital Signs:   Vitals:    05/05/22 0948   BP: 143/66   BP Location: Left arm   Patient Position: Sitting   Cuff Size: Adult   Pulse: 62   Resp: 15   Temp: 97 °F (36.1 °C)   TempSrc: Temporal   SpO2: 94%   Weight: 50.9 kg (112 lb 4 oz)   Height: 165.1 cm (65\")     Body mass index is 18.68 kg/m².    Physical Exam  Vitals reviewed.   Constitutional:       Appearance: Normal appearance.   HENT:      Head: Normocephalic.   Neck:      Vascular: No carotid bruit.   Cardiovascular:      Rate and Rhythm: Normal rate and regular rhythm.  Extrasystoles are present.     Pulses: Normal pulses.      Heart sounds: Normal heart sounds, S1 normal and S2 normal. No murmur heard.  Pulmonary:      Effort: Pulmonary effort is normal. No respiratory distress. "      Breath sounds: Normal breath sounds.   Chest:      Chest wall: No tenderness.   Abdominal:      General: Abdomen is flat.      Palpations: Abdomen is soft.   Musculoskeletal:      Cervical back: Neck supple.      Right lower leg: No edema.      Left lower leg: No edema.   Skin:     General: Skin is warm and dry.   Neurological:      General: No focal deficit present.      Mental Status: She is alert and oriented to person, place, and time. Mental status is at baseline.   Psychiatric:         Mood and Affect: Mood normal.         Behavior: Behavior normal.         Thought Content: Thought content normal.           Result Review :   Adult Transthoracic Echo Complete W/ Cont if Necessary Per Protocol (03/04/2022 16:07)  Adult Transthoracic Echo Complete W/ Cont if Necessary Per Protocol (06/14/2018 11:09)  Basic metabolic panel (04/13/2022 11:42)  Basic metabolic panel (04/07/2022 10:50)                Assessment and Plan {CC Problem List  Visit Diagnosis  ROS  Review (Popup)  Health Maintenance  Quality  BestPractice  Medications  SmartSets  SnapShot Encounters  Media :23}   1.  Chronic systolic heart failure (HCC)  Appears euvolemic  Continue lasix QOD. Repeat labs today. Likely will keep her off lisinopril or reduce dose due to orthostatic lightheadedness  Continue metoprolol succinate    2. CKD IIIb  Has upcoming appt with renal  Check BMP today due to recent appetite loss    3.  Appetite loss/weight loss  I am very concerned about her appetite and recent weight loss.  She is high risk for hospital readmission.  We discussed dietary interventions and she tells me that she will start drinking her strawberry boost again, which she did enjoy at 1 time.  Encouraged her to drink 2-3 a day.  We had a goals of care discussion.  She tells me she does not want any hospital or rehab stays and does not think she would want any artificial nutrition.  Would recommend palliative/hospice consult.    4. Dementia  without behavioral disturbance  Dementia exacerbates when she is hospitalized or at skilled nursing facilities.  Goals would be that patient will remain at home with her son  Hospice/palliative care consult    5. Goals of care discussion  Discussed goals of care with patient and with son.  He currently is caring for her and his grandchildren.  I believe she does best when she is at home with him, however, he needs help with her care.  Overall goal would be to improve her quality of life while she is at home with him.  He reports palliative was consulted in the hospital but he never heard anything back.  He is interested in hospice consult.  If they do elect for hospice care, advised that he could cancel upcoming appointments with nephrology and cardiology           Follow Up {Instructions Charge Capture  Follow-up Communications :23}   Return if symptoms worsen or fail to improve.    Dictated Utilizing Dragon Dictation

## 2022-05-05 ENCOUNTER — LAB (OUTPATIENT)
Dept: LAB | Facility: HOSPITAL | Age: 87
End: 2022-05-05

## 2022-05-05 ENCOUNTER — TELEPHONE (OUTPATIENT)
Dept: CARDIOLOGY | Facility: HOSPITAL | Age: 87
End: 2022-05-05

## 2022-05-05 ENCOUNTER — OFFICE VISIT (OUTPATIENT)
Dept: CARDIOLOGY | Facility: HOSPITAL | Age: 87
End: 2022-05-05

## 2022-05-05 VITALS
WEIGHT: 112.25 LBS | DIASTOLIC BLOOD PRESSURE: 66 MMHG | RESPIRATION RATE: 15 BRPM | HEART RATE: 62 BPM | TEMPERATURE: 97 F | BODY MASS INDEX: 18.7 KG/M2 | SYSTOLIC BLOOD PRESSURE: 143 MMHG | HEIGHT: 65 IN | OXYGEN SATURATION: 94 %

## 2022-05-05 DIAGNOSIS — N18.32 STAGE 3B CHRONIC KIDNEY DISEASE: ICD-10-CM

## 2022-05-05 DIAGNOSIS — R63.0 LOSS OF APPETITE: ICD-10-CM

## 2022-05-05 DIAGNOSIS — Z71.89 GOALS OF CARE, COUNSELING/DISCUSSION: ICD-10-CM

## 2022-05-05 DIAGNOSIS — I50.22 CHRONIC SYSTOLIC HEART FAILURE: Primary | ICD-10-CM

## 2022-05-05 DIAGNOSIS — F03.90 DEMENTIA WITHOUT BEHAVIORAL DISTURBANCE, UNSPECIFIED DEMENTIA TYPE: ICD-10-CM

## 2022-05-05 DIAGNOSIS — I50.22 CHRONIC SYSTOLIC HEART FAILURE: ICD-10-CM

## 2022-05-05 DIAGNOSIS — N17.9 AKI (ACUTE KIDNEY INJURY): Primary | ICD-10-CM

## 2022-05-05 LAB
ANION GAP SERPL CALCULATED.3IONS-SCNC: 13.1 MMOL/L (ref 5–15)
BUN SERPL-MCNC: 36 MG/DL (ref 8–23)
BUN/CREAT SERPL: 17.9 (ref 7–25)
CALCIUM SPEC-SCNC: 9.4 MG/DL (ref 8.2–9.6)
CHLORIDE SERPL-SCNC: 90 MMOL/L (ref 98–107)
CO2 SERPL-SCNC: 21.9 MMOL/L (ref 22–29)
CREAT SERPL-MCNC: 2.01 MG/DL (ref 0.57–1)
EGFRCR SERPLBLD CKD-EPI 2021: 22.9 ML/MIN/1.73
GLUCOSE SERPL-MCNC: 90 MG/DL (ref 65–99)
POTASSIUM SERPL-SCNC: 4.3 MMOL/L (ref 3.5–5.2)
SODIUM SERPL-SCNC: 125 MMOL/L (ref 136–145)

## 2022-05-05 PROCEDURE — 80048 BASIC METABOLIC PNL TOTAL CA: CPT

## 2022-05-05 PROCEDURE — 36415 COLL VENOUS BLD VENIPUNCTURE: CPT

## 2022-05-05 PROCEDURE — 99214 OFFICE O/P EST MOD 30 MIN: CPT | Performed by: NURSE PRACTITIONER

## 2022-05-05 RX ORDER — FUROSEMIDE 20 MG/1
20 TABLET ORAL DAILY PRN
Qty: 90 TABLET | Refills: 0 | Status: ON HOLD
Start: 2022-05-05 | End: 2022-10-18 | Stop reason: SDUPTHER

## 2022-05-05 NOTE — TELEPHONE ENCOUNTER
Results for orders placed or performed in visit on 05/05/22   Basic Metabolic Panel    Specimen: Blood   Result Value Ref Range    Glucose 90 65 - 99 mg/dL    BUN 36 (H) 8 - 23 mg/dL    Creatinine 2.01 (H) 0.57 - 1.00 mg/dL    Sodium 125 (L) 136 - 145 mmol/L    Potassium 4.3 3.5 - 5.2 mmol/L    Chloride 90 (L) 98 - 107 mmol/L    CO2 21.9 (L) 22.0 - 29.0 mmol/L    Calcium 9.4 8.2 - 9.6 mg/dL    BUN/Creatinine Ratio 17.9 7.0 - 25.0    Anion Gap 13.1 5.0 - 15.0 mmol/L    eGFR 22.9 (L) >60.0 mL/min/1.73     Called son with lab results.  CARYN and hyponatremia noted.  Advised to stop lisinopril and to stop Lasix (keep PRN for shortness of breath or swelling). Advised to increase fluids.  Patient to go to the ED with any increased confusion.  Repeat labs in 1 week.  He verbalized understanding with no further questions or concerns

## 2022-05-16 DIAGNOSIS — F03.90 DEMENTIA WITHOUT BEHAVIORAL DISTURBANCE, UNSPECIFIED DEMENTIA TYPE: ICD-10-CM

## 2022-05-16 RX ORDER — FEXOFENADINE HCL 180 MG/1
180 TABLET ORAL DAILY
Qty: 90 TABLET | Refills: 0 | Status: CANCELLED | OUTPATIENT
Start: 2022-05-16

## 2022-05-16 RX ORDER — GABAPENTIN 300 MG/1
300 CAPSULE ORAL NIGHTLY
Qty: 6 CAPSULE | Refills: 0 | Status: CANCELLED | OUTPATIENT
Start: 2022-05-16

## 2022-05-16 NOTE — TELEPHONE ENCOUNTER
Spoke with son and he states she takes for neuropathy in her legs.  Has been on for 10 years with out any problems.

## 2022-05-16 NOTE — TELEPHONE ENCOUNTER
Last Office Visit: 03/24/2022  Next Office Visit:06/06/22    Labs completed in past 6 months? yes  Labs completed in past year? yes    Last Refill Date: 03/17/22 for gabapentin  Quantity:6  Refills:0    Allegra filled by historical provider on 03/24/2022    Pharmacy:     Please review pended refill request for any changes needed on refills or quantities. Thank you!

## 2022-05-16 NOTE — TELEPHONE ENCOUNTER
Caller: Scott Diego    Relationship: Self    Best call back number: [phone]     Requested Prescriptions:   Requested Prescriptions     Pending Prescriptions Disp Refills   • gabapentin (NEURONTIN) 300 MG capsule 6 capsule 0     Sig: Take 1 capsule by mouth Every Night.   • fexofenadine (ALLEGRA) 180 MG tablet       Sig: Take 1 tablet by mouth Daily.        Pharmacy where request should be sent: [id number] - [place] [address] - [phone] [facility] [phone] FX     Additional details provided by patient: PATIENT IS REQUESTING NEW PRESCRIPTION ON ABOVE MEDICATIONS. PATIENT IS OUT OF REFILLS    Does the patient have less than a 3 day supply:  [] Yes  [x] No    Kamran Parker   05/16/22 10:40 EDT

## 2022-05-17 DIAGNOSIS — J30.1 SEASONAL ALLERGIC RHINITIS DUE TO POLLEN: ICD-10-CM

## 2022-05-17 DIAGNOSIS — F03.90 DEMENTIA WITHOUT BEHAVIORAL DISTURBANCE, UNSPECIFIED DEMENTIA TYPE: ICD-10-CM

## 2022-05-17 DIAGNOSIS — G62.9 NEUROPATHY: Primary | ICD-10-CM

## 2022-05-17 RX ORDER — GABAPENTIN 300 MG/1
300 CAPSULE ORAL NIGHTLY
Qty: 90 CAPSULE | Refills: 1 | Status: SHIPPED | OUTPATIENT
Start: 2022-05-17 | End: 2022-07-21 | Stop reason: SDUPTHER

## 2022-05-17 RX ORDER — GABAPENTIN 100 MG/1
100 CAPSULE ORAL EVERY MORNING
Qty: 90 CAPSULE | Refills: 1 | Status: SHIPPED | OUTPATIENT
Start: 2022-05-17 | End: 2022-07-07 | Stop reason: HOSPADM

## 2022-05-17 RX ORDER — FEXOFENADINE HCL 180 MG/1
180 TABLET ORAL DAILY
Qty: 90 TABLET | Refills: 1 | Status: SHIPPED | OUTPATIENT
Start: 2022-05-17 | End: 2022-07-21 | Stop reason: SDUPTHER

## 2022-05-18 ENCOUNTER — TELEPHONE (OUTPATIENT)
Dept: CARDIOLOGY | Facility: HOSPITAL | Age: 87
End: 2022-05-18

## 2022-05-18 ENCOUNTER — LAB (OUTPATIENT)
Dept: LAB | Facility: HOSPITAL | Age: 87
End: 2022-05-18

## 2022-05-18 DIAGNOSIS — N17.9 AKI (ACUTE KIDNEY INJURY): ICD-10-CM

## 2022-05-18 PROCEDURE — 36415 COLL VENOUS BLD VENIPUNCTURE: CPT

## 2022-05-18 PROCEDURE — 80048 BASIC METABOLIC PNL TOTAL CA: CPT

## 2022-05-18 NOTE — TELEPHONE ENCOUNTER
----- Message from Faina Virgen MA sent at 5/17/2022  4:43 PM EDT -----  Son said he will get that completed    ----- Message -----  From: Chrissy Toribio APRN  Sent: 5/17/2022   4:20 PM EDT  To: Faina Virgen MA    Would you please contact her son and ask him to have her have her labs (kidney function) repeated this week?    Thanks!  Chrissy

## 2022-05-19 LAB
ANION GAP SERPL CALCULATED.3IONS-SCNC: 13.8 MMOL/L (ref 5–15)
BUN SERPL-MCNC: 22 MG/DL (ref 8–23)
BUN/CREAT SERPL: 14.2 (ref 7–25)
CALCIUM SPEC-SCNC: 9.4 MG/DL (ref 8.2–9.6)
CHLORIDE SERPL-SCNC: 91 MMOL/L (ref 98–107)
CO2 SERPL-SCNC: 22.2 MMOL/L (ref 22–29)
CREAT SERPL-MCNC: 1.55 MG/DL (ref 0.57–1)
EGFRCR SERPLBLD CKD-EPI 2021: 31.3 ML/MIN/1.73
GLUCOSE SERPL-MCNC: 89 MG/DL (ref 65–99)
POTASSIUM SERPL-SCNC: 4.2 MMOL/L (ref 3.5–5.2)
SODIUM SERPL-SCNC: 127 MMOL/L (ref 136–145)

## 2022-05-25 ENCOUNTER — OFFICE VISIT (OUTPATIENT)
Dept: INTERNAL MEDICINE | Facility: CLINIC | Age: 87
End: 2022-05-25

## 2022-05-25 VITALS
OXYGEN SATURATION: 98 % | WEIGHT: 113 LBS | SYSTOLIC BLOOD PRESSURE: 110 MMHG | DIASTOLIC BLOOD PRESSURE: 60 MMHG | HEART RATE: 77 BPM | BODY MASS INDEX: 18.8 KG/M2 | TEMPERATURE: 96.9 F

## 2022-05-25 DIAGNOSIS — N30.00 ACUTE CYSTITIS WITHOUT HEMATURIA: Primary | ICD-10-CM

## 2022-05-25 LAB
BILIRUB BLD-MCNC: NEGATIVE MG/DL
CLARITY, POC: ABNORMAL
COLOR UR: YELLOW
EXPIRATION DATE: ABNORMAL
GLUCOSE UR STRIP-MCNC: NEGATIVE MG/DL
KETONES UR QL: NEGATIVE
LEUKOCYTE EST, POC: ABNORMAL
Lab: ABNORMAL
NITRITE UR-MCNC: NEGATIVE MG/ML
PH UR: 6 [PH] (ref 5–8)
PROT UR STRIP-MCNC: NEGATIVE MG/DL
RBC # UR STRIP: NEGATIVE /UL
SP GR UR: 1.01 (ref 1–1.03)
UROBILINOGEN UR QL: NORMAL

## 2022-05-25 PROCEDURE — 81003 URINALYSIS AUTO W/O SCOPE: CPT | Performed by: STUDENT IN AN ORGANIZED HEALTH CARE EDUCATION/TRAINING PROGRAM

## 2022-05-25 PROCEDURE — 99214 OFFICE O/P EST MOD 30 MIN: CPT | Performed by: STUDENT IN AN ORGANIZED HEALTH CARE EDUCATION/TRAINING PROGRAM

## 2022-05-25 RX ORDER — CEFDINIR 300 MG/1
300 CAPSULE ORAL DAILY
Qty: 5 CAPSULE | Refills: 0 | Status: SHIPPED | OUTPATIENT
Start: 2022-05-25 | End: 2022-06-08

## 2022-05-25 NOTE — PROGRESS NOTES
Internal Medicine Acute Visit     Patient Name: Scott Diego  : 4/10/1930   MRN: [id number]     Chief Complaint:    Chief Complaint   Patient presents with   • Memory Loss   • Abdominal Pain   • Cough       History of Present Illness: Scott Diego is a 89 y.o. female who presents for increasing confusion. Her son is with her for this visit and reports she has been staying up at night, talking to a lamp, and has been more agitated. She is mostly sleeping during the day. This has only been occurring for the past 3-4 days. She denies urinary symptoms. Her only symptoms is nausea without vomiting. No diarrhea. She has been taking all medication as prescribed.     She was recently evaluated for hospice and did not qualify. Palliative care was consulted and will assess soon.     Subjective     I have reviewed and the following portions of the patient's history were updated as appropriate: past family history, past medical history, past social history, past surgical history and problem list.    Allergies:   Allergies   Allergen Reactions   • Augmentin [Amoxicillin-Pot Clavulanate] Nausea And Vomiting     Makes her sick at her stomach   • Claritin [Loratadine] Unknown (See Comments)     Doesn't remember   • Keflex [Cephalexin] Nausea And Vomiting     Makes pt sick at her stomach   • Sulfa Antibiotics Other (See Comments)     Does not remember       Objective     Physical Exam:  Vital Signs:   Vitals:    22 1140   BP: 110/60   Pulse: 77   Temp: 96.9 °F (36.1 °C)   SpO2: 98%   Weight: 51.3 kg (113 lb)   PainSc:   2     Body mass index is 18.8 kg/m².    Physical Exam  Vitals and nursing note reviewed.   Cardiovascular:      Rate and Rhythm: Normal rate and regular rhythm.      Heart sounds: Normal heart sounds.   Pulmonary:      Effort: Pulmonary effort is normal.      Breath sounds: Normal breath sounds. No wheezing, rhonchi or rales.   Abdominal:      General: Abdomen is flat. Bowel sounds are  normal.      Palpations: Abdomen is soft.   Skin:     General: Skin is warm and dry.   Neurological:      Mental Status: She is alert.         Assessment / Plan      Assessment/Plan:   Diagnoses and all orders for this visit:    1. Acute cystitis without hematuria (Primary)  Confusion thought to be related to UTI as POC U/A is consistent and this is a common presentation for previous UTIs as reported by her son.  Will treat with Cefdinir 300 mg daily (dose reduced from BID to once daily due to low CrCl). Urine culture ordered. Should confusion persist, will check BMP and TSH.     Follow Up:   Return for Next scheduled follow up.    Time:   I spent approximately 20 minutes providing clinical care for this patient; including review of patient's chart and provider documentation, face to face time spent with patient in examination room (obtaining history, performing physical exam, discussing diagnosis and management options), placing orders, and completing patient documentation.     Yolande Escobar MD  [facility]

## 2022-06-02 NOTE — PROGRESS NOTES
[facility] Cardiology  Consultation H&P  Scott Diego  4/10/1930  [address]   [facility] [address]     VISIT DATE:  06/03/22    PCP: Yolande Escobar MD  [address] [place] [address]    IDENTIFICATION: A 89 y.o. female   January 2022. Owner of [employer].    PROBLEM LIST:  1. Nonischemic cardiomyopathy/chronic systolic congestive heart failure  1. 6/18 2D echo: LVEF = 20%.  Mild TR.  Mild MR.  Moderate AR.  Mild RA diastolic collapse from pericardial effusion.  There is a moderate 1-2 cm circumferential pericardial effusion.  2. 3/22 2D echo: LVEF = 30%.  Moderate MR.  Calcification of aortic valve.  RVSP due to TR mildly elevated (35-45)  2. History of CVA  1. 6/18 14-day Holter monitor: Min 57, AVG 84, max 240.  SVE 2.3%.  VE 15.3%.  39 runs of VT - fastest 18 beats at 240 bpm; longest 17 beats at 131 BPM.  1 SVT episode - 8 beats at 213 bpm.  3. CAD  1. 6/18 coronary calcifications appreciated on CT chest.  4. Hypertension   5. hyperlipidemia  6. Orthostasis  7. CKD stage III  1. 5/22 CR 2.01  2. 5/22 CR 1.55  8. Hypothyroidism  9. Dementia  10. Appetite/weight loss  1. 3/22 [facility] admission for poor p.o. intake, CARYN, hyperkalemia, and chronic systolic HF.  11. Surgical  1. CCX  2. Eye surgery  3. Hysterectomy      CC:  Chief Complaint   Patient presents with   • Paroxysmal atrial fibrillation        Allergies  Allergies   Allergen Reactions   • Augmentin [Amoxicillin-Pot Clavulanate] Nausea And Vomiting     Makes her sick at her stomach   • Claritin [Loratadine] Unknown (See Comments)     Doesn't remember   • Keflex [Cephalexin] Nausea And Vomiting     Makes pt sick at her stomach   • Sulfa Antibiotics Other (See Comments)     Does not remember       Current Medications    Current Outpatient Medications:   •  acetaminophen (TYLENOL) 325 MG tablet, Take 2 tablets by mouth Every 4 (Four) Hours As Needed for Mild Pain ., Disp: , Rfl:   •  ALPRAZolam (XANAX) 0.25 MG tablet, Take 0.25  mg by mouth 2 (Two) Times a Day As Needed., Disp: , Rfl:   •  Artificial Tear Ointment (artificial tears) ophthalmic ointment, Administer 1 application to both eyes Daily., Disp: , Rfl:   •  atorvastatin (LIPITOR) 80 MG tablet, Take 80 mg by mouth Every Night., Disp: , Rfl:   •  bisacodyl (Dulcolax) 10 MG suppository, Insert 1 suppository into the rectum Daily As Needed for Constipation., Disp: 15 suppository, Rfl: 1  •  carboxymethylcellulose (REFRESH PLUS) 0.5 % solution, 3 (Three) Times a Day As Needed for Dry Eyes., Disp: , Rfl:   •  cefdinir (OMNICEF) 300 MG capsule, Take 1 capsule by mouth Daily., Disp: 5 capsule, Rfl: 0  •  citalopram (CeleXA) 10 MG tablet, Take 1 tablet by mouth Daily., Disp: 30 tablet, Rfl: 3  •  famotidine (PEPCID) 20 MG tablet, Take 20 mg by mouth Daily., Disp: , Rfl:   •  fexofenadine (ALLEGRA) 180 MG tablet, Take 1 tablet by mouth Daily., Disp: 90 tablet, Rfl: 1  •  gabapentin (NEURONTIN) 100 MG capsule, Take 1 capsule by mouth Every Morning., Disp: 90 capsule, Rfl: 1  •  gabapentin (NEURONTIN) 300 MG capsule, Take 1 capsule by mouth Every Night., Disp: 90 capsule, Rfl: 1  •  Glycerin-Polysorbate 80 (REFRESH DRY EYE THERAPY OP), Apply  to eye(s) as directed by provider As Needed., Disp: , Rfl:   •  levothyroxine (SYNTHROID, LEVOTHROID) 50 MCG tablet, Take 50 mcg by mouth Every Morning., Disp: , Rfl:   •  metoprolol succinate XL (TOPROL-XL) 25 MG 24 hr tablet, TAKE 1 TABLET BY MOUTH EVERY DAY, Disp: 90 tablet, Rfl: 0  •  nystatin (MYCOSTATIN) 100,000 unit/mL suspension, swish and swallow 5 MLS BY MOUTH FOUR TIMES A DAY, Disp: 60 mL, Rfl: 0  •  ofloxacin (OCUFLOX) 0.3 % ophthalmic solution, Administer 1 drop to both eyes 3 (Three) Times a Day. For 10 days, started 03-01-22, Disp: , Rfl:   •  polyethylene glycol (MIRALAX) 17 g packet, Take 17 g by mouth Daily., Disp: , Rfl:   •  rOPINIRole (REQUIP) 0.5 MG tablet, Take 1 tablet by mouth Every Night. Take 1 hour before bedtime., Disp: , Rfl:  "  •  SM Aspirin Adult Low Strength 81 MG EC tablet, Take 81 mg by mouth Daily., Disp: , Rfl:   •  furosemide (LASIX) 20 MG tablet, Take 1 tablet by mouth Daily As Needed (increased shortness of breath, swelling)., Disp: 90 tablet, Rfl: 0     History of Present Illness  HPI  Scott Diego is a 89 y.o. year old female with the above mentioned PMH who presents for consult from [facility] for evaluation of systolic congestive heart failure     History today is provided by the son who is her primary caretaker.  He states that she is currently living with him as means to provide proper care for her multiple health issues.  Per son's report, the patient was discharged after the hospitalization in 3/22/2022 for decreased p.o. intake, CARYN, and systolic congestive heart failure exacerbation.  The son reported that upon discharge, the patient went to [facility] where she had a successful rehabilitation.  After discharge from [facility], the son reports that she has been doing well regarding her cardiovascular status.  The son reports that the patient has not had any significant worsening of her shortness of breath/lower extremity edema.  The son reports that she has not required any p.o. Lasix (prescribed as needed for evidence of fluid overload) in the last 3 weeks-since discharge from rehab.  The son also reports that she is soon to be scheduled with Dr. Benites (nephrologist) for her baseline CKD.  (Upon recent BMP evaluation there was an improvement of her renal indices from early to mid May).  The son also reports that he has appreciated mild to moderate fluctuations the patient's weight over the last 2 to 3 weeks.    Today, the son reports that she is a \"couch surfer\".  He states that she has had 4 falls within the last month that he attributes to orthostatic issues.    Upon lab evaluations, patients' creatinine appears to be improved since last check in 5/18/2022 but the patient's hyponatremia has " "not improved as time passed.      ROS  Review of Systems   Cardiovascular:        Orthostatic blood pressures   All other systems reviewed and are negative.      SOCIAL HX  Social History     Socioeconomic History   • Marital status:    Tobacco Use   • Smoking status: Never Smoker   • Smokeless tobacco: Never Used   Substance and Sexual Activity   • Alcohol use: No   • Drug use: No   • Sexual activity: Defer       FAMILY HX  Family History   Problem Relation Age of Onset   • Cancer Mother    • No Known Problems Father    • Cancer Sister    • Cancer Brother        Vitals:    06/03/22 1102   BP: 110/80   BP Location: Right arm   Patient Position: Sitting   Pulse: 70   Weight: 52.6 kg (116 lb)   Height: 165.1 cm (65\")     Body mass index is 19.3 kg/m².     PHYSICAL EXAMINATION:  Vitals and nursing note reviewed.   Constitutional:       General: Awake. Not in acute distress.     Appearance: Healthy appearance. Well-developed and not in distress.   Eyes:      General: No scleral icterus.     Conjunctiva/sclera: Conjunctivae normal.      Pupils: Pupils are equal, round, and reactive to light.   HENT:      Head: Normocephalic and atraumatic.      Nose: Nose normal.    Mouth/Throat:      Pharynx: Uvula midline.   Neck:      Vascular: No carotid bruit.      Trachea: No tracheal deviation.   Pulmonary:      Effort: Pulmonary effort is normal.      Breath sounds: Normal breath sounds. No wheezing. No rhonchi. No rales.   Cardiovascular:      Normal rate. Regular rhythm. Normal S1. Normal S2.      Murmurs: There is a grade 1/6 systolic murmur.      No gallop. No click. No rub.   Pulses:     Intact distal pulses.   Edema:     Peripheral edema absent.   Abdominal:      General: Bowel sounds are normal.      Palpations: Abdomen is soft. There is no abdominal mass.      Tenderness: There is no abdominal tenderness.   Musculoskeletal:         General: No tenderness.      Extremities: No clubbing present.     Cervical back: " Normal range of motion and neck supple. Skin:     General: Skin is warm and dry. There is no cyanosis.      Findings: No erythema or rash.   Neurological:      Mental Status: Alert and oriented to person, place and time. Mental status is at baseline.   Psychiatric:         Attention and Perception: Attention normal.         Mood and Affect: Mood normal.         Speech: Speech normal.         Behavior: Behavior normal. Behavior is cooperative.         Cognition and Memory: Cognition is impaired. Memory is impaired.         Diagnostic Data:  Procedures  No results found for: CHLPL, TRIG, HDL, LDLDIRECT  Lab Results   Component Value Date    GLUCOSE 89 05/18/2022    BUN 22 05/18/2022    CREATININE 1.55 (H) 05/18/2022     (L) 05/18/2022    K 4.2 05/18/2022    CL 91 (L) 05/18/2022    CO2 22.2 05/18/2022     No results found for: HGBA1C  Lab Results   Component Value Date    WBC 11.65 (H) 03/24/2022    HGB 10.7 (L) 03/24/2022    HCT 33.4 (L) 03/24/2022     03/24/2022       ASSESSMENT:   Diagnosis Plan   1. Acute hyponatremia  Basic Metabolic Panel   2. Anemia of chronic disease  CBC & Differential   3. Chronic systolic heart failure (HCC)     4. Primary hypertension     5. Stage 3b chronic kidney disease (HCC)     6. Loss of appetite     7. Mixed hyperlipidemia     8. Coronary artery disease involving native coronary artery of native heart without angina pectoris         PLAN:  1.  Hyponatremia - patient's most recent labs revealing sodium of 127.  Will instruct patient to have CBC and CMP drawn today to assess for current electrolytes/cell count.  2.  Anemia of chronic disease-patient will have CBC to assess for blood counts today.  3.  Chronic systolic congestive heart failure-patient's overall symptomatology appears to be stable despite fluctuations in weight.  Patient has been off of p.o. diuretics for the last 3 weeks without any significant functional decline in her ALMANZAR/fluid status.  We will consider  diuretic administration pending repeat lab evaluations today.   4.  Hypertension-patient's in clinic blood pressure appreciated 110/80.  Son reports history of orthostatic issues.  We will carefully diurese if indicated.  5.  Stage III CKD- patient is scheduled to have follow-up with [facility]rology [facility] in the coming weeks.  6.  Loss of appetite- son encouraged to increase patient's p.o. intake as means to maintain/prevent weight loss due to calorie deficit.  7.  Hyperlipidemia- no recent lab evaluations per our records.  Patient to continue atorvastatin 80 daily.  8.  CAD - coronary calcifications appreciated on previous CT of chest.  No anginal/atypical anginal symptom appreciated at this time-we will defer ischemic evaluation.  Patient to continue aspirin and statin therapy.      [facility] Mcintyre*, thank you for referring Ms. Diego for evaluation.  I have forwarded my electronically generated recommendations to you for review.  Please do not hesitate to call with any questions.    Scribe: Sid Virk PA-C for Dr. Jose Antonio Aragon M.D. FACC    Jose Antonio Aragon MD, FACC

## 2022-06-03 ENCOUNTER — OFFICE VISIT (OUTPATIENT)
Dept: CARDIOLOGY | Facility: CLINIC | Age: 87
End: 2022-06-03

## 2022-06-03 ENCOUNTER — LAB (OUTPATIENT)
Dept: LAB | Facility: HOSPITAL | Age: 87
End: 2022-06-03

## 2022-06-03 VITALS
HEART RATE: 70 BPM | BODY MASS INDEX: 19.33 KG/M2 | HEIGHT: 65 IN | WEIGHT: 116 LBS | DIASTOLIC BLOOD PRESSURE: 80 MMHG | SYSTOLIC BLOOD PRESSURE: 110 MMHG

## 2022-06-03 DIAGNOSIS — N18.32 STAGE 3B CHRONIC KIDNEY DISEASE: ICD-10-CM

## 2022-06-03 DIAGNOSIS — E87.1 ACUTE HYPONATREMIA: Primary | ICD-10-CM

## 2022-06-03 DIAGNOSIS — I10 PRIMARY HYPERTENSION: ICD-10-CM

## 2022-06-03 DIAGNOSIS — E87.1 ACUTE HYPONATREMIA: ICD-10-CM

## 2022-06-03 DIAGNOSIS — E78.2 MIXED HYPERLIPIDEMIA: ICD-10-CM

## 2022-06-03 DIAGNOSIS — R63.0 LOSS OF APPETITE: ICD-10-CM

## 2022-06-03 DIAGNOSIS — I50.22 CHRONIC SYSTOLIC HEART FAILURE: ICD-10-CM

## 2022-06-03 DIAGNOSIS — D50.9 IRON DEFICIENCY ANEMIA, UNSPECIFIED IRON DEFICIENCY ANEMIA TYPE: ICD-10-CM

## 2022-06-03 DIAGNOSIS — I25.10 CORONARY ARTERY DISEASE INVOLVING NATIVE CORONARY ARTERY OF NATIVE HEART WITHOUT ANGINA PECTORIS: ICD-10-CM

## 2022-06-03 LAB
ANION GAP SERPL CALCULATED.3IONS-SCNC: 9.2 MMOL/L (ref 5–15)
BASOPHILS # BLD AUTO: 0.09 10*3/MM3 (ref 0–0.2)
BASOPHILS NFR BLD AUTO: 1.7 % (ref 0–1.5)
BUN SERPL-MCNC: 15 MG/DL (ref 8–23)
BUN/CREAT SERPL: 10.7 (ref 7–25)
CALCIUM SPEC-SCNC: 9.4 MG/DL (ref 8.2–9.6)
CHLORIDE SERPL-SCNC: 94 MMOL/L (ref 98–107)
CO2 SERPL-SCNC: 23.8 MMOL/L (ref 22–29)
CREAT SERPL-MCNC: 1.4 MG/DL (ref 0.57–1)
DEPRECATED RDW RBC AUTO: 40.6 FL (ref 37–54)
EGFRCR SERPLBLD CKD-EPI 2021: 35.4 ML/MIN/1.73
EOSINOPHIL # BLD AUTO: 0.28 10*3/MM3 (ref 0–0.4)
EOSINOPHIL NFR BLD AUTO: 5.3 % (ref 0.3–6.2)
ERYTHROCYTE [DISTWIDTH] IN BLOOD BY AUTOMATED COUNT: 12.5 % (ref 12.3–15.4)
GLUCOSE SERPL-MCNC: 77 MG/DL (ref 65–99)
HCT VFR BLD AUTO: 31.5 % (ref 34–46.6)
HGB BLD-MCNC: 10.6 G/DL (ref 12–15.9)
IMM GRANULOCYTES # BLD AUTO: 0.01 10*3/MM3 (ref 0–0.05)
IMM GRANULOCYTES NFR BLD AUTO: 0.2 % (ref 0–0.5)
LYMPHOCYTES # BLD AUTO: 1.28 10*3/MM3 (ref 0.7–3.1)
LYMPHOCYTES NFR BLD AUTO: 24.2 % (ref 19.6–45.3)
MCH RBC QN AUTO: 30.5 PG (ref 26.6–33)
MCHC RBC AUTO-ENTMCNC: 33.7 G/DL (ref 31.5–35.7)
MCV RBC AUTO: 90.8 FL (ref 79–97)
MONOCYTES # BLD AUTO: 0.85 10*3/MM3 (ref 0.1–0.9)
MONOCYTES NFR BLD AUTO: 16 % (ref 5–12)
NEUTROPHILS NFR BLD AUTO: 2.79 10*3/MM3 (ref 1.7–7)
NEUTROPHILS NFR BLD AUTO: 52.6 % (ref 42.7–76)
NRBC BLD AUTO-RTO: 0 /100 WBC (ref 0–0.2)
PLATELET # BLD AUTO: 203 10*3/MM3 (ref 140–450)
PMV BLD AUTO: 12.1 FL (ref 6–12)
POTASSIUM SERPL-SCNC: 4.6 MMOL/L (ref 3.5–5.2)
RBC # BLD AUTO: 3.47 10*6/MM3 (ref 3.77–5.28)
SODIUM SERPL-SCNC: 127 MMOL/L (ref 136–145)
WBC NRBC COR # BLD: 5.3 10*3/MM3 (ref 3.4–10.8)

## 2022-06-03 PROCEDURE — 85025 COMPLETE CBC W/AUTO DIFF WBC: CPT

## 2022-06-03 PROCEDURE — 99214 OFFICE O/P EST MOD 30 MIN: CPT | Performed by: INTERNAL MEDICINE

## 2022-06-03 PROCEDURE — 80048 BASIC METABOLIC PNL TOTAL CA: CPT

## 2022-06-03 PROCEDURE — 36415 COLL VENOUS BLD VENIPUNCTURE: CPT

## 2022-06-08 ENCOUNTER — OFFICE VISIT (OUTPATIENT)
Dept: NEUROLOGY | Facility: CLINIC | Age: 87
End: 2022-06-08

## 2022-06-08 VITALS
WEIGHT: 113 LBS | SYSTOLIC BLOOD PRESSURE: 102 MMHG | HEART RATE: 74 BPM | HEIGHT: 65 IN | DIASTOLIC BLOOD PRESSURE: 62 MMHG | BODY MASS INDEX: 18.83 KG/M2 | OXYGEN SATURATION: 98 %

## 2022-06-08 DIAGNOSIS — G30.1 LATE ONSET ALZHEIMER'S DEMENTIA WITH BEHAVIORAL DISTURBANCE: Primary | ICD-10-CM

## 2022-06-08 DIAGNOSIS — F02.818 LATE ONSET ALZHEIMER'S DEMENTIA WITH BEHAVIORAL DISTURBANCE: Primary | ICD-10-CM

## 2022-06-08 DIAGNOSIS — R94.6 ABNORMAL THYROID FUNCTION TEST: ICD-10-CM

## 2022-06-08 DIAGNOSIS — E87.1 HYPONATREMIA: ICD-10-CM

## 2022-06-08 PROCEDURE — 99215 OFFICE O/P EST HI 40 MIN: CPT | Performed by: NURSE PRACTITIONER

## 2022-06-08 RX ORDER — MEMANTINE HYDROCHLORIDE 5 MG/1
TABLET ORAL
Qty: 180 TABLET | Refills: 1 | Status: SHIPPED | OUTPATIENT
Start: 2022-06-08 | End: 2022-07-27 | Stop reason: SDUPTHER

## 2022-06-08 RX ORDER — ALPRAZOLAM 0.25 MG/1
0.25 TABLET ORAL 3 TIMES DAILY PRN
Qty: 90 TABLET | Refills: 1 | Status: ON HOLD | OUTPATIENT
Start: 2022-06-08 | End: 2022-09-05

## 2022-06-08 RX ORDER — DOCUSATE SODIUM 100 MG/1
CAPSULE, LIQUID FILLED ORAL
COMMUNITY
Start: 2022-05-20 | End: 2022-06-08

## 2022-06-08 NOTE — PROGRESS NOTES
Subjective:     Patient ID: Scott Diego is a 89 y.o. female.    CC:   Chief Complaint   Patient presents with   • Dementia       HPI:   History of Present Illness     Scott Diego is a 89 y.o. female who comes to clinic today for evaluation of Dementia . She has noted symptoms since at least 2016 marked initially by forgetfulness , repetitiveness  and word-finding difficulties . This has gradually worsened  over time. Additional symptoms have included impairments in in essentially all spheres of cognition. There have been associated  symptoms of anxiety , depression  and agitation . She notes  impairments in ADL's. Her family  manages her medications  and finances. She is no longer driving . She is currently residing at home with her son.     Today 6/8/2022-This is a 89-year-old female who presents for initial neurological evaluation of dementia referred by her primary care provider in 03/2022. She is also followed by cardiology closely.    She is here for evaluation of memory. She is currently not on any cognitive enhancing medications. She does have chronic heart failure. She currently lives at home with her son who is her primary caregiver. Unfortunately her memory has worsened since the death of her  recently.    Her QTc interval is 530, so she cannot be treated with Haldol or Seroquel. She was given Xanax at home to be used as needed for agitation. She has recently started Celexa 10 mg daily for anxiety by PCP. She was referred to our clinic for additional treatment options.     She did previously see Dr. Bobo Spence, neurology, in 03/2016, and 08/2016, and at the time was having difficulties with memory issues, and at that time was diagnosed with mild cognitive impairment.     She does follow with cardiology, Dr. Jose Antonio Aragon, for chronic congestive heart failure.     The patient is accompanied by her son who contributes to her history. She denies noticing any changes in her memory or  noticing trouble remembering things. The son reports that the patient's younger brother, who is approximately 4 years younger than her, is in a memory care facility as well as her father had difficulty with memory. The patient lives with her son who fills up her pill planner for her. She has had memory issues since 2016, which have worsened over time.     She was on Aricept, but no longer takes it. The son states 3 years ago the patient got on Facebook, and found the daughter of an old boyfriend in [place], and asked her if she knew Romie Diego who was an old boyfriend, and she said yes, it was her dad. She got in touch with him, got , and moved to [place] for 2.5 years until he passed away in 01/2022. It was during this time she was taken off of Aricept, but her son is uncertain why.    She denies significant anxiety or worrying. She denies any trouble with depression. The son states that her physicians wanted to reduce her Xanax down from 3 per day to 2 per day, and then reduce it further. She is currently taking 2 Xanax per day. Since the reduction in the Xanax, the son states she has recently been more agitated.     The patient does not drive. Her highest level of education was high school. 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    Her son makes her meals. He states most of the time he purchases her meals because she wants to eat hamburgers from [employer] or [employer] for all 3 meals per day. She eats Honey Buns in the morning. She drinks coffee with cream and sugar. Previously she had a poor appetite so now he tries to let her eat whatever she likes.    She does experience hallucinations- seeing people that are not there and talks to them.     The patient complains of eye trouble, and the son states she has had her eyes checked, and was prescribed several different medications for her eyes. She is blinking often today in the clinic. The son denies a history of glaucoma. She  has a history of cataract surgery.    She has an appointment with Dr. Escobar, internal medicine, on 06/16/2022.    Her son states she fell between the couch, and the table. She also fell off the porch. She does have neuropathy with burning, numbness, and tingling. She takes gabapentin 300 mg at bedtime, and 100 mg in the morning, but has not been taking the 100 mg in the morning because the son thought it might cause more confusion.      The patient does not use an assistive walking device, but does hold onto furniture when ambulating.    The following portions of the patient's history were reviewed and updated as appropriate: allergies, current medications, past family history, past medical history, past social history, past surgical history and problem list.    Past Medical History:   Diagnosis Date   • Congestive heart failure (HCC)    • Malignant neoplasm (HCC)    • Stroke (HCC)     mini stroke   • Systolic heart failure (HCC) 09/25/2017    Chronic/compensated (EF 25%)       Past Surgical History:   Procedure Laterality Date   • CHOLECYSTECTOMY     • EYE SURGERY     • HYSTERECTOMY         Social History     Socioeconomic History   • Marital status:    Tobacco Use   • Smoking status: Never Smoker   • Smokeless tobacco: Never Used   Vaping Use   • Vaping Use: Never used   Substance and Sexual Activity   • Alcohol use: No   • Drug use: No   • Sexual activity: Defer       Family History   Problem Relation Age of Onset   • Cancer Mother    • No Known Problems Father    • Cancer Sister    • Cancer Brother    • Parkinsonism Brother           Current Outpatient Medications:   •  acetaminophen (TYLENOL) 325 MG tablet, Take 2 tablets by mouth Every 4 (Four) Hours As Needed for Mild Pain ., Disp: , Rfl:   •  ALPRAZolam (XANAX) 0.25 MG tablet, Take 1 tablet by mouth 3 (Three) Times a Day As Needed for Anxiety (and agitation)., Disp: 90 tablet, Rfl: 1  •  Artificial Tear Ointment (artificial tears) ophthalmic  ointment, Administer 1 application to both eyes Daily., Disp: , Rfl:   •  atorvastatin (LIPITOR) 80 MG tablet, Take 80 mg by mouth Every Night., Disp: , Rfl:   •  bisacodyl (Dulcolax) 10 MG suppository, Insert 1 suppository into the rectum Daily As Needed for Constipation., Disp: 15 suppository, Rfl: 1  •  carboxymethylcellulose (REFRESH PLUS) 0.5 % solution, 3 (Three) Times a Day As Needed for Dry Eyes., Disp: , Rfl:   •  citalopram (CeleXA) 10 MG tablet, Take 1 tablet by mouth Daily., Disp: 30 tablet, Rfl: 3  •  famotidine (PEPCID) 20 MG tablet, Take 20 mg by mouth Daily., Disp: , Rfl:   •  fexofenadine (ALLEGRA) 180 MG tablet, Take 1 tablet by mouth Daily., Disp: 90 tablet, Rfl: 1  •  furosemide (LASIX) 20 MG tablet, Take 1 tablet by mouth Daily As Needed (increased shortness of breath, swelling)., Disp: 90 tablet, Rfl: 0  •  gabapentin (NEURONTIN) 100 MG capsule, Take 1 capsule by mouth Every Morning., Disp: 90 capsule, Rfl: 1  •  gabapentin (NEURONTIN) 300 MG capsule, Take 1 capsule by mouth Every Night., Disp: 90 capsule, Rfl: 1  •  Glycerin-Polysorbate 80 (REFRESH DRY EYE THERAPY OP), Apply  to eye(s) as directed by provider As Needed., Disp: , Rfl:   •  levothyroxine (SYNTHROID, LEVOTHROID) 50 MCG tablet, Take 50 mcg by mouth Every Morning., Disp: , Rfl:   •  memantine (NAMENDA) 5 MG tablet, Take 1 tablet daily for 4 weeks then 1 tablet twice a day and continue, Disp: 180 tablet, Rfl: 1  •  metoprolol succinate XL (TOPROL-XL) 25 MG 24 hr tablet, TAKE 1 TABLET BY MOUTH EVERY DAY, Disp: 90 tablet, Rfl: 0  •  nystatin (MYCOSTATIN) 100,000 unit/mL suspension, swish and swallow 5 MLS BY MOUTH FOUR TIMES A DAY, Disp: 60 mL, Rfl: 0  •  ofloxacin (OCUFLOX) 0.3 % ophthalmic solution, Administer 1 drop to both eyes 3 (Three) Times a Day. For 10 days, started 03-01-22, Disp: , Rfl:   •  SM Aspirin Adult Low Strength 81 MG EC tablet, Take 81 mg by mouth Daily., Disp: , Rfl:      Review of Systems   Constitutional:  "Positive for activity change, appetite change and fatigue. Negative for chills, fever and unexpected weight change.   HENT: Negative for dental problem, ear pain, hearing loss, mouth sores, nosebleeds, postnasal drip, rhinorrhea, sore throat, tinnitus and trouble swallowing.    Eyes: Positive for photophobia. Negative for pain, discharge, itching and visual disturbance.   Respiratory: Negative for cough, chest tightness, shortness of breath and wheezing.    Cardiovascular: Negative for chest pain, palpitations and leg swelling.   Gastrointestinal: Negative for abdominal pain, blood in stool, constipation, diarrhea, nausea, rectal pain and vomiting.   Endocrine: Negative for cold intolerance and heat intolerance.   Genitourinary: Negative for dysuria, frequency, hematuria and urgency.   Musculoskeletal: Positive for gait problem. Negative for arthralgias, back pain, joint swelling, myalgias, neck pain and neck stiffness.   Skin: Negative for rash and wound.   Allergic/Immunologic: Positive for environmental allergies. Negative for food allergies.   Neurological: Positive for numbness. Negative for dizziness, tremors, seizures, syncope, speech difficulty, weakness, light-headedness and headaches.   Hematological: Negative for adenopathy. Does not bruise/bleed easily.   Psychiatric/Behavioral: Positive for agitation, behavioral problems, confusion, decreased concentration, dysphoric mood and hallucinations. Negative for sleep disturbance and suicidal ideas. The patient is nervous/anxious.    All other systems reviewed and are negative.       Objective:  /62   Pulse 74   Ht 165.1 cm (65\")   Wt 51.3 kg (113 lb)   SpO2 98%   BMI 18.80 kg/m²     Neurologic Exam     Mental Status   Oriented to person.   Disoriented to place.   Disoriented to time.   Registration: recalls 3 of 3 objects. Recall of objects at 5 minutes: 0.   Attention: decreased. Concentration: decreased.   Speech: speech is normal   Level of " consciousness: alert  Knowledge: poor.     Cranial Nerves   Cranial nerves II through XII intact.     CN II   Visual acuity: decreased (wearing glasses, some blinking of eyes throughout visit, chronic per son)    Motor Exam   Muscle bulk: normal  Overall muscle tone: normal    Strength   Strength 5/5 throughout.     Sensory Exam   Light touch normal.   Right leg vibration: decreased from ankle  Left leg vibration: decreased from ankle  Proprioception normal.   Pinprick normal.     Gait, Coordination, and Reflexes     Gait  Gait: (wide based, no assistive device, guarded)    Coordination   Finger to nose coordination: normal    Tremor   Resting tremor: absent  Intention tremor: absent  Action tremor: absent    Reflexes   Right brachioradialis: 2+  Left brachioradialis: 2+  Right biceps: 2+  Left biceps: 2+  Right patellar: 1+  Left patellar: 1+  Right achilles: 1+  Left achilles: 1+  Right : 2+  Left : 2+  Right plantar: normal  Left plantar: normal  Right ankle clonus: absent  Left ankle clonus: absent      Physical Exam  Constitutional:       Appearance: Normal appearance.   Cardiovascular:      Rate and Rhythm: Normal rate and regular rhythm.      Heart sounds: S1 normal and S2 normal.   Pulmonary:      Effort: Pulmonary effort is normal.      Breath sounds: Normal breath sounds.   Neurological:      Mental Status: She is alert.      Coordination: Finger-Nose-Finger Test normal.      Deep Tendon Reflexes: Strength normal.      Reflex Scores:       Bicep reflexes are 2+ on the right side and 2+ on the left side.       Brachioradialis reflexes are 2+ on the right side and 2+ on the left side.       Patellar reflexes are 1+ on the right side and 1+ on the left side.       Achilles reflexes are 1+ on the right side and 1+ on the left side.  Psychiatric:         Mood and Affect: Mood is anxious.         Speech: Speech normal.         Behavior: Behavior is slowed.         Thought Content: Thought content normal.          Cognition and Memory: She exhibits impaired recent memory.         Judgment: Judgment is inappropriate.     MMSE score today is a 12 out of 30 at baseline with 0 out of 3 for word recall.     Results: 3/3/2022 CT of the head without contrast showed no acute intracranial abnormalities and did show moderate volume loss with mild chronic small vessel ischemic changes.  I reviewed the imaging today in clinic with the patient and her son and agree with radiology interpretation.    She did previously have an MRI of the brain without contrast on 2/27/2016 and this did show extensive chronic small vessel ischemic changes with moderate global volume loss but no acute intracranial abnormalities and this was stable compared to imaging from 4/23/2015.    Most recent creatinine level on 6/3/2022 was 1.4 with sodium chronically low at 127. CBC with differential showing hemoglobin 10.6, and hematocrit 31.5 with anemia of chronic disease. Her vitamin B12 level was normal at 790, and folate level normal at 8.46 with PCP in 03/2022. TSH normal at 1.440, and free T4 slightly low at 0.81 previously.     Assessment/Plan:       Diagnoses and all orders for this visit:    1. 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  Dispense: 90 tablet; Refill: 1    2. Hyponatremia  -     Basic Metabolic Panel; Future    3. Abnormal thyroid function test  -     TSH; Future  -     T4, free; Future           Moderate Alzheimer's disease with behavioral disturbance.    1. Alzheimer's dementia  She is completely unaware of her memory difficulties, but her son is assisting with all of her care and is aware of her diagnosis, and the treatment plan.  During visit today, I also contacted Dr. Jose Antonio Aragon, her cardiologist, as well as Yolande Escobar, her primary care provider, in regards to her low sodium along with citalopram since the son felt they may be weaning this. Dr. Aragon recommended weaning the citalopram over 2 weeks, and rechecking BMP in 1 month, and Dr. Escobar agreed with this plan, and requested I provide the patient and son with these instructions, and recommendations. We will have her take citalopram 10 mg one-half tablet daily for 1 week, and then one-half tablet on Monday, Wednesday, and Friday the next week and then discontinue. She will have BMP along with free T4 and TSH rechecked on 7/6/2022 at any outpatient [facility] lab, and son is aware of the recommendations. When she is off the citalopram, she will start the memantine 5 mg daily for 4 weeks, and then twice per day thereafter for memory. We are going to follow up in 3 months in clinic for further evaluation, and treatment.   - Continue Xanax 0.25 mg up to 3 times a day as needed.  - Her son will schedule another appointment with the eye doctor.    Total time of visit today was 50 minutes including review of PCP, and cardiology notes as well as obtaining history from the patient, and her son who is with her today as well as discussing diagnosis, and plan of care moving forward.    Reviewed medications, potential side effects and signs and symptoms to report. Discussed risk versus benefits of treatment plan with patient and/or family-including medications, labs and radiology that may be ordered. Addressed questions and concerns during visit. Patient and/or family verbalized understanding and agree with plan.    AS THE PROVIDER, I PERSONALLY WORE PPE DURING ENTIRE FACE TO FACE ENCOUNTER IN CLINIC WITH THE PATIENT. PATIENT ALSO WORE PPE DURING ENTIRE FACE TO FACE ENCOUNTER EXCEPT FOR A MAX OF 30 SECONDS DURING NEUROLOGICAL EVALUATION OF CRANIAL NERVES AND THEN MASK WAS PLACED BACK OVER  PATIENT FACE FOR REMAINDER OF VISIT. I WASHED MY HANDS BEFORE AND AFTER VISIT.    As part of this patient's treatment plan I am prescribing controlled substances. The patient has been made aware of appropriate use of such medications, including potential risk of somnolence, limited ability to drive and/or work safely, and potential for dependence or overdose. It has also been made clear that these medications are for use by the patient only, without concomitant use of alcohol or other substances unless prescribed. Keep out of reach of children.  [facility] report has been reviewed. If this is going to be prescribed long term, [facility] Controlled Substance Agreement Contract has also been read and signed by patient and myself.    During this visit the following were done:  Labs Reviewed [x]    Labs Ordered [x]    Radiology Reports Reviewed [x]    Radiology Ordered []    PCP Records Reviewed [x]    Referring Provider Records Reviewed [x]    ER Records Reviewed [x]    Hospital Records Reviewed [x]    History Obtained From Family [x]  son  Radiology Images Reviewed []    Other Reviewed [x] cardiology   Records Requested []      Transcribed from ambient dictation for VASILE Rowe by Leona Sheridan.  06/08/22   16:25 EDT    Patient verbalized consent to the visit recording.  I have personally performed the services described in this document as transcribed by the above individual, and it is both accurate and complete.  VASILE Rowe  6/9/2022  07:18 EDT

## 2022-06-08 NOTE — PATIENT INSTRUCTIONS
Take citalopram 10mg 1/2 tablet daily for 1 week then 1/2 tablet mon, wed, frid x 1 week and then stop.    Recheck labs at any [facility] lab on Wednesday 7/6/2022.    Once off citalopram, would add the memantine (namenda) 5mg 1 pill every day for 4 weeks then 1 pill twice a day and continue for memory.

## 2022-06-10 ENCOUNTER — HOSPITAL ENCOUNTER (EMERGENCY)
Facility: HOSPITAL | Age: 87
Discharge: HOME | End: 2022-06-10
Attending: EMERGENCY MEDICINE | Admitting: EMERGENCY MEDICINE

## 2022-06-10 ENCOUNTER — APPOINTMENT (OUTPATIENT)
Dept: GENERAL RADIOLOGY | Facility: HOSPITAL | Age: 87
End: 2022-06-10

## 2022-06-10 VITALS
WEIGHT: 114 LBS | OXYGEN SATURATION: 96 % | TEMPERATURE: 98.1 F | RESPIRATION RATE: 18 BRPM | HEART RATE: 71 BPM | HEIGHT: 64 IN | BODY MASS INDEX: 19.46 KG/M2 | DIASTOLIC BLOOD PRESSURE: 90 MMHG | SYSTOLIC BLOOD PRESSURE: 141 MMHG

## 2022-06-10 DIAGNOSIS — Z86.79 HISTORY OF CHF (CONGESTIVE HEART FAILURE): ICD-10-CM

## 2022-06-10 DIAGNOSIS — N39.0 ACUTE UTI: ICD-10-CM

## 2022-06-10 DIAGNOSIS — I49.3 FREQUENT PVCS: ICD-10-CM

## 2022-06-10 DIAGNOSIS — R07.89 ATYPICAL CHEST PAIN: Primary | ICD-10-CM

## 2022-06-10 DIAGNOSIS — Z86.73 HISTORY OF TIA (TRANSIENT ISCHEMIC ATTACK): ICD-10-CM

## 2022-06-10 DIAGNOSIS — Z86.59 HISTORY OF DEMENTIA: ICD-10-CM

## 2022-06-10 DIAGNOSIS — N18.9 CHRONIC KIDNEY DISEASE, UNSPECIFIED CKD STAGE: ICD-10-CM

## 2022-06-10 LAB
ALBUMIN SERPL-MCNC: 3.8 G/DL (ref 3.5–5.2)
ALBUMIN/GLOB SERPL: 0.9 G/DL
ALP SERPL-CCNC: 40 U/L (ref 39–117)
ALT SERPL W P-5'-P-CCNC: 12 U/L (ref 1–33)
ANION GAP SERPL CALCULATED.3IONS-SCNC: 9 MMOL/L (ref 5–15)
AST SERPL-CCNC: 25 U/L (ref 1–32)
BACTERIA UR QL AUTO: ABNORMAL /HPF
BASOPHILS # BLD AUTO: 0.08 10*3/MM3 (ref 0–0.2)
BASOPHILS NFR BLD AUTO: 1 % (ref 0–1.5)
BILIRUB SERPL-MCNC: 0.3 MG/DL (ref 0–1.2)
BILIRUB UR QL STRIP: NEGATIVE
BUN SERPL-MCNC: 15 MG/DL (ref 8–23)
BUN/CREAT SERPL: 10.3 (ref 7–25)
CALCIUM SPEC-SCNC: 9.3 MG/DL (ref 8.2–9.6)
CHLORIDE SERPL-SCNC: 99 MMOL/L (ref 98–107)
CLARITY UR: ABNORMAL
CO2 SERPL-SCNC: 26 MMOL/L (ref 22–29)
COLOR UR: YELLOW
CREAT SERPL-MCNC: 1.45 MG/DL (ref 0.57–1)
CRP SERPL-MCNC: <0.3 MG/DL (ref 0–0.5)
D DIMER PPP FEU-MCNC: 0.43 MCGFEU/ML (ref 0.01–0.5)
D-LACTATE SERPL-SCNC: 0.7 MMOL/L (ref 0.5–2)
DEPRECATED RDW RBC AUTO: 46.4 FL (ref 37–54)
EGFRCR SERPLBLD CKD-EPI 2021: 33.9 ML/MIN/1.73
EOSINOPHIL # BLD AUTO: 0.26 10*3/MM3 (ref 0–0.4)
EOSINOPHIL NFR BLD AUTO: 3.3 % (ref 0.3–6.2)
ERYTHROCYTE [DISTWIDTH] IN BLOOD BY AUTOMATED COUNT: 13.2 % (ref 12.3–15.4)
FLUAV SUBTYP SPEC NAA+PROBE: NOT DETECTED
FLUBV RNA ISLT QL NAA+PROBE: NOT DETECTED
GLOBULIN UR ELPH-MCNC: 4.1 GM/DL
GLUCOSE SERPL-MCNC: 78 MG/DL (ref 65–99)
GLUCOSE UR STRIP-MCNC: NEGATIVE MG/DL
HCT VFR BLD AUTO: 34.6 % (ref 34–46.6)
HGB BLD-MCNC: 11.1 G/DL (ref 12–15.9)
HGB UR QL STRIP.AUTO: NEGATIVE
HOLD SPECIMEN: NORMAL
HYALINE CASTS UR QL AUTO: ABNORMAL /LPF
IMM GRANULOCYTES # BLD AUTO: 0.02 10*3/MM3 (ref 0–0.05)
IMM GRANULOCYTES NFR BLD AUTO: 0.3 % (ref 0–0.5)
KETONES UR QL STRIP: NEGATIVE
LDH SERPL-CCNC: 175 U/L (ref 135–214)
LEUKOCYTE ESTERASE UR QL STRIP.AUTO: ABNORMAL
LYMPHOCYTES # BLD AUTO: 1.55 10*3/MM3 (ref 0.7–3.1)
LYMPHOCYTES NFR BLD AUTO: 19.9 % (ref 19.6–45.3)
MCH RBC QN AUTO: 30.7 PG (ref 26.6–33)
MCHC RBC AUTO-ENTMCNC: 32.1 G/DL (ref 31.5–35.7)
MCV RBC AUTO: 95.8 FL (ref 79–97)
MONOCYTES # BLD AUTO: 0.99 10*3/MM3 (ref 0.1–0.9)
MONOCYTES NFR BLD AUTO: 12.7 % (ref 5–12)
NEUTROPHILS NFR BLD AUTO: 4.9 10*3/MM3 (ref 1.7–7)
NEUTROPHILS NFR BLD AUTO: 62.8 % (ref 42.7–76)
NITRITE UR QL STRIP: NEGATIVE
NRBC BLD AUTO-RTO: 0 /100 WBC (ref 0–0.2)
NT-PROBNP SERPL-MCNC: 3824 PG/ML (ref 0–1800)
PH UR STRIP.AUTO: 6.5 [PH] (ref 5–8)
PLAT MORPH BLD: NORMAL
PLATELET # BLD AUTO: 200 10*3/MM3 (ref 140–450)
PMV BLD AUTO: 11.2 FL (ref 6–12)
POTASSIUM SERPL-SCNC: 4.6 MMOL/L (ref 3.5–5.2)
PROCALCITONIN SERPL-MCNC: 0.05 NG/ML (ref 0–0.25)
PROT SERPL-MCNC: 7.9 G/DL (ref 6–8.5)
PROT UR QL STRIP: NEGATIVE
RBC # BLD AUTO: 3.61 10*6/MM3 (ref 3.77–5.28)
RBC # UR STRIP: ABNORMAL /HPF
RBC MORPH BLD: NORMAL
REF LAB TEST METHOD: ABNORMAL
SARS-COV-2 RNA PNL SPEC NAA+PROBE: NOT DETECTED
SODIUM SERPL-SCNC: 134 MMOL/L (ref 136–145)
SP GR UR STRIP: 1.01 (ref 1–1.03)
SQUAMOUS #/AREA URNS HPF: ABNORMAL /HPF
TROPONIN T SERPL-MCNC: 0.01 NG/ML (ref 0–0.03)
TROPONIN T SERPL-MCNC: 0.02 NG/ML (ref 0–0.03)
TSH SERPL DL<=0.05 MIU/L-ACNC: 1.01 UIU/ML (ref 0.27–4.2)
UROBILINOGEN UR QL STRIP: ABNORMAL
WBC # UR STRIP: ABNORMAL /HPF
WBC MORPH BLD: NORMAL
WBC NRBC COR # BLD: 7.8 10*3/MM3 (ref 3.4–10.8)
WHOLE BLOOD HOLD COAG: NORMAL
WHOLE BLOOD HOLD SPECIMEN: NORMAL

## 2022-06-10 PROCEDURE — 84484 ASSAY OF TROPONIN QUANT: CPT | Performed by: PHYSICIAN ASSISTANT

## 2022-06-10 PROCEDURE — 71045 X-RAY EXAM CHEST 1 VIEW: CPT

## 2022-06-10 PROCEDURE — 84145 PROCALCITONIN (PCT): CPT | Performed by: PHYSICIAN ASSISTANT

## 2022-06-10 PROCEDURE — 84443 ASSAY THYROID STIM HORMONE: CPT | Performed by: PHYSICIAN ASSISTANT

## 2022-06-10 PROCEDURE — 93005 ELECTROCARDIOGRAM TRACING: CPT | Performed by: PHYSICIAN ASSISTANT

## 2022-06-10 PROCEDURE — 81001 URINALYSIS AUTO W/SCOPE: CPT | Performed by: PHYSICIAN ASSISTANT

## 2022-06-10 PROCEDURE — 83605 ASSAY OF LACTIC ACID: CPT | Performed by: PHYSICIAN ASSISTANT

## 2022-06-10 PROCEDURE — 93005 ELECTROCARDIOGRAM TRACING: CPT

## 2022-06-10 PROCEDURE — 86140 C-REACTIVE PROTEIN: CPT | Performed by: PHYSICIAN ASSISTANT

## 2022-06-10 PROCEDURE — 87086 URINE CULTURE/COLONY COUNT: CPT | Performed by: PHYSICIAN ASSISTANT

## 2022-06-10 PROCEDURE — 80053 COMPREHEN METABOLIC PANEL: CPT

## 2022-06-10 PROCEDURE — 83615 LACTATE (LD) (LDH) ENZYME: CPT | Performed by: PHYSICIAN ASSISTANT

## 2022-06-10 PROCEDURE — 85025 COMPLETE CBC W/AUTO DIFF WBC: CPT

## 2022-06-10 PROCEDURE — 87636 SARSCOV2 & INF A&B AMP PRB: CPT | Performed by: PHYSICIAN ASSISTANT

## 2022-06-10 PROCEDURE — 93005 ELECTROCARDIOGRAM TRACING: CPT | Performed by: EMERGENCY MEDICINE

## 2022-06-10 PROCEDURE — 85379 FIBRIN DEGRADATION QUANT: CPT | Performed by: PHYSICIAN ASSISTANT

## 2022-06-10 PROCEDURE — 83880 ASSAY OF NATRIURETIC PEPTIDE: CPT

## 2022-06-10 PROCEDURE — 63710000001 ONDANSETRON ODT 4 MG TABLET DISPERSIBLE: Performed by: PHYSICIAN ASSISTANT

## 2022-06-10 PROCEDURE — 85007 BL SMEAR W/DIFF WBC COUNT: CPT

## 2022-06-10 PROCEDURE — 99284 EMERGENCY DEPT VISIT MOD MDM: CPT

## 2022-06-10 PROCEDURE — 84484 ASSAY OF TROPONIN QUANT: CPT

## 2022-06-10 RX ORDER — ONDANSETRON 4 MG/1
4 TABLET, ORALLY DISINTEGRATING ORAL ONCE
Status: COMPLETED | OUTPATIENT
Start: 2022-06-10 | End: 2022-06-10

## 2022-06-10 RX ORDER — SODIUM CHLORIDE 0.9 % (FLUSH) 0.9 %
10 SYRINGE (ML) INJECTION AS NEEDED
Status: DISCONTINUED | OUTPATIENT
Start: 2022-06-10 | End: 2022-06-11 | Stop reason: HOSPADM

## 2022-06-10 RX ORDER — GRANULES FOR ORAL 3 G/1
3 POWDER ORAL ONCE
Status: COMPLETED | OUTPATIENT
Start: 2022-06-10 | End: 2022-06-10

## 2022-06-10 RX ADMIN — GRANULES FOR ORAL SOLUTION 3 G: 3 POWDER ORAL at 22:42

## 2022-06-10 RX ADMIN — ONDANSETRON 4 MG: 4 TABLET, ORALLY DISINTEGRATING ORAL at 22:44

## 2022-06-10 NOTE — TELEPHONE ENCOUNTER
Caller: FERNY GASPAR    Relationship to patient: SON     Best call back number: [phone]    Chief complaint: CHEST IS HEAVY, TROUBLE BREATHING AND HEADACHE     Patient directed to call 911 or go to their nearest emergency room.     Patient verbalized understanding: [x] Yes  [] No  If no, why?    Additional notes: PATIENT'S SON STATES PATIENT BEGAN COMPLAINING OF CHEST BEING HEAVY, TROUBLE BREATHING AND A HEADACHE TODAY, HOWEVER, SYMPTOMS BEGAN YESTERDAY. PATIENT'S SON STATES HE WILL BE TAKING THE PATIENT TO [facility] ED FOR MEDICAL TREATMENT.

## 2022-06-10 NOTE — TELEPHONE ENCOUNTER
Called and  confirmed with son that he was taking her to ED for evaluation.  States he is on his way to ED now.

## 2022-06-10 NOTE — ED PROVIDER NOTES
"Subjective   This is a 89-year-old female that presents the ER with onset of chest discomfort that started last night.  Patient lives with her son and grandson.  Patient does have a history of dementia as well as mini stroke, systolic heart failure, history of hypothyroidism, hyperlipidemia, and history of cancer.  Patient says that she has had a generalized headache for the last week as well as some fatigue and anorexia.  Patient also has had a nonproductive cough, most notably at night.  She has not had a fever or chills or body aches.  She has longstanding history of constipation, but she had a normal bowel movement last evening after using a suppository.  Patient denies any nausea or vomiting.  She had a recent UTI according to her son, but she denies any dysuria, urgency, or frequency.  Patient reported some chest heaviness to the substernal region without radiation that started last evening.  She says it has been fairly constant.  She feels like it is \"hard to breathe\" at times.  She denies any pedal edema to lower extremities.  She does not utilize any Lasix according to son.  This was discontinued and patient has not had any recurrent pedal edema after stopping the Lasix.  She does fluid restriction.  Patient is followed by Dr. Aragon, cardiology.  Last echocardiogram was performed in March, 2022.  EF was 30% and patient had moderate MR.  Patient denies any recent long trips in the car or airplane.  She is sedentary.  Son denies any known sick contacts.  Patient has no personal history of COVID-19.  She is up-to-date on all 3 Pfizer vaccines, including the booster.  Son denies any recent new medication changes.  No other concerns at this time.    Son later says during the history that patient drinks 3 to 4 cups of coffee a day.      History provided by:  Patient and relative  Chest Pain  Pain location:  Substernal area  Pain quality: tightness    Pain quality comment:  \"Heaviness\"  Pain radiates to:  Does " not radiate  Duration:  2 days  Timing:  Constant  Progression:  Unchanged  Chronicity:  New  Context comment:  Pt reports 1 wk h/o HA.  She also has had increased cough, NP, worse at night.  Chest tightness and SOA since last night. Pt denies n/v/d.  Positive malaise/fatigue.    Relieved by:  Nothing  Worsened by:  Nothing  Ineffective treatments:  None tried  Associated symptoms: anorexia, cough (NP cough), fatigue, headache (generalized HA), shortness of breath and weakness (generalized weakness)    Associated symptoms: no abdominal pain, no anxiety, no back pain, no claudication, no diaphoresis, no dizziness, no fever, no heartburn, no lower extremity edema, no nausea, no near-syncope, no numbness, no orthopnea, no palpitations and no vomiting    Risk factors comment:  History of CHF, Peripheral Neuropathy, Hyperlipidemia, CKD.  Recent UTI.  Pt denies any recurrent symptoms.      Review of Systems   Constitutional: Positive for activity change and fatigue. Negative for appetite change, chills, diaphoresis and fever.   HENT: Negative for congestion, ear pain, postnasal drip, rhinorrhea, sinus pressure, sinus pain, sneezing and sore throat.         No personal h/o Covid-19.  UTD on Pfizer vaccines x 3.   Respiratory: Positive for cough (NP cough) and shortness of breath.    Cardiovascular: Positive for chest pain (SS chest tightness). Negative for palpitations, orthopnea, claudication, leg swelling and near-syncope.   Gastrointestinal: Positive for anorexia and constipation (Normal BM last pm after suppository.). Negative for abdominal pain, diarrhea, heartburn, nausea and vomiting.   Genitourinary: Negative.  Negative for dysuria, flank pain, frequency and urgency.   Musculoskeletal: Negative.  Negative for back pain.   Neurological: Positive for weakness (generalized weakness) and headaches (generalized HA). Negative for dizziness and numbness.   All other systems reviewed and are negative.      Past Medical  History:   Diagnosis Date   • Congestive heart failure (HCC)    • Malignant neoplasm (HCC)    • Stroke (HCC)     mini stroke   • Systolic heart failure (HCC) 09/25/2017    Chronic/compensated (EF 25%)       Allergies   Allergen Reactions   • Augmentin [Amoxicillin-Pot Clavulanate] Nausea And Vomiting     Makes her sick at her stomach   • Claritin [Loratadine] Unknown (See Comments)     Doesn't remember   • Keflex [Cephalexin] Nausea And Vomiting     Makes pt sick at her stomach   • Sulfa Antibiotics Other (See Comments)     Does not remember       Past Surgical History:   Procedure Laterality Date   • CHOLECYSTECTOMY     • EYE SURGERY     • HYSTERECTOMY         Family History   Problem Relation Age of Onset   • Cancer Mother    • No Known Problems Father    • Cancer Sister    • Cancer Brother    • Parkinsonism Brother        Social History     Socioeconomic History   • Marital status:    Tobacco Use   • Smoking status: Never Smoker   • Smokeless tobacco: Never Used   Vaping Use   • Vaping Use: Never used   Substance and Sexual Activity   • Alcohol use: No   • Drug use: No   • Sexual activity: Defer           Objective   Physical Exam  Vitals and nursing note reviewed.   Constitutional:       General: She is not in acute distress.     Appearance: Normal appearance. She is well-developed and normal weight. She is not ill-appearing, toxic-appearing or diaphoretic.      Comments: Pleasant, elderly female.  No acute sign of pain or distress.   HENT:      Head: Normocephalic and atraumatic.      Right Ear: Tympanic membrane normal. Decreased hearing noted. Tympanic membrane is not erythematous or retracted.      Left Ear: Tympanic membrane normal. Decreased hearing noted. Tympanic membrane is not erythematous or retracted.      Ears:      Comments: Decreased auditory acuity.  Bilateral TMs are clear.     Nose: Nose normal. No congestion or rhinorrhea.      Right Sinus: No maxillary sinus tenderness or frontal sinus  tenderness.      Left Sinus: No maxillary sinus tenderness or frontal sinus tenderness.      Comments: No nasal congestion or rhinorrhea.  No frontal or maxillary sinus tenderness.     Mouth/Throat:      Mouth: Mucous membranes are moist.      Pharynx: Oropharynx is clear. No oropharyngeal exudate or posterior oropharyngeal erythema.      Comments: Oral mucous membranes are moist.  Eyes:      Extraocular Movements: Extraocular movements intact.      Conjunctiva/sclera: Conjunctivae normal.      Pupils: Pupils are equal, round, and reactive to light.   Cardiovascular:      Rate and Rhythm: Normal rate and regular rhythm.  Extrasystoles are present.     Pulses: Normal pulses.      Heart sounds: Normal heart sounds.      Comments: Regular rate and rhythm.  Occasional ectopy.  No pedal edema to lower extremities.  Pulmonary:      Effort: Pulmonary effort is normal. No tachypnea or accessory muscle usage.      Breath sounds: Normal breath sounds. No wheezing, rhonchi or rales.      Comments: Lungs are clear to auscultation bilaterally.  No cough noted on exam.  Abdominal:      General: Bowel sounds are normal.      Palpations: Abdomen is soft.   Musculoskeletal:         General: Normal range of motion.      Cervical back: Normal range of motion and neck supple.      Right lower leg: No edema.      Left lower leg: No edema.   Skin:     General: Skin is warm and dry.   Neurological:      General: No focal deficit present.      Mental Status: She is alert.      Cranial Nerves: Cranial nerves are intact.      Sensory: Sensation is intact.      Motor: Motor function is intact.      Coordination: Coordination is intact.      Comments: Neuro intact and nonfocal.         Procedures           ED Course  ED Course as of 06/10/22 2228   Fri Telly 10, 2022   1920 Echo was from 3/22 and EF was 30% and patient had Moderate MR. [FC]   2016 EKG shows sinus rhythm with frequent PVCs.  CBC was within normal limits.  White blood cell count  of 7.80 with mildly elevated monocytes on the differential.  Chemistries reveal BUN and creatinine 15 and 1.45.  Last creatinine approximately 1 month ago was 2.01.  Initial troponin 0.017.  BNP is 3824.  Lactic acid is 0.7.  Awaiting TSH and D-dimer results.  Heart score is 4. [FC]   2142 Patient denies any shortness of breath or chest pain during the ER work-up.  Repeat 2-hour EKG reveals sinus rhythm with frequent PVCs.  Repeat 2-hour troponin was 0.015.  Patient is adamant about being discharged home and request to leave as soon as possible.  Son is agreeable with this.  Patient is established with cardiology, Dr. Aragon, and I advised them to follow-up closely early next week for recheck.  We will refer patient to the chest pain clinic.  Urine culture is in process with history of UTIs in the past.  After reviewing epic, patient has grown out pansensitive morganella morganii.   Recommend close PCP follow-up for recheck on UTI.  Encourage fluids.  COVID-19 and influenza testing was negative.  Return to the ER if worsening symptoms. [FC]   2227 After discussion with hospital pharmacist, we will treat patient with one-time dose of fosfomycin here in the ER for UTI.  Patient has allergies to penicillin and Keflex and due to chronic kidney disease, we do not recommend Bactrim or Macrobid. [FC]      ED Course User Index  [FC] Jaqueline High PA-C           Recent Results (from the past 24 hour(s))   Comprehensive Metabolic Panel    Collection Time: 06/10/22  6:05 PM    Specimen: Blood   Result Value Ref Range    Glucose 78 65 - 99 mg/dL    BUN 15 8 - 23 mg/dL    Creatinine 1.45 (H) 0.57 - 1.00 mg/dL    Sodium 134 (L) 136 - 145 mmol/L    Potassium 4.6 3.5 - 5.2 mmol/L    Chloride 99 98 - 107 mmol/L    CO2 26.0 22.0 - 29.0 mmol/L    Calcium 9.3 8.2 - 9.6 mg/dL    Total Protein 7.9 6.0 - 8.5 g/dL    Albumin 3.80 3.50 - 5.20 g/dL    ALT (SGPT) 12 1 - 33 U/L    AST (SGOT) 25 1 - 32 U/L    Alkaline Phosphatase 40 39 - 117  U/L    Total Bilirubin 0.3 0.0 - 1.2 mg/dL    Globulin 4.1 gm/dL    A/G Ratio 0.9 g/dL    BUN/Creatinine Ratio 10.3 7.0 - 25.0    Anion Gap 9.0 5.0 - 15.0 mmol/L    eGFR 33.9 (L) >60.0 mL/min/1.73   BNP    Collection Time: 06/10/22  6:05 PM    Specimen: Blood   Result Value Ref Range    proBNP 3,824.0 (H) 0.0 - 1,800.0 pg/mL   Troponin    Collection Time: 06/10/22  6:05 PM    Specimen: Blood   Result Value Ref Range    Troponin T 0.017 0.000 - 0.030 ng/mL   Green Top (Gel)    Collection Time: 06/10/22  6:05 PM   Result Value Ref Range    Extra Tube Hold for add-ons.    Lavender Top    Collection Time: 06/10/22  6:05 PM   Result Value Ref Range    Extra Tube hold for add-on    Gold Top - SST    Collection Time: 06/10/22  6:05 PM   Result Value Ref Range    Extra Tube Hold for add-ons.    Gray Top    Collection Time: 06/10/22  6:05 PM   Result Value Ref Range    Extra Tube Hold for add-ons.    Light Blue Top    Collection Time: 06/10/22  6:05 PM   Result Value Ref Range    Extra Tube Hold for add-ons.    CBC Auto Differential    Collection Time: 06/10/22  6:05 PM    Specimen: Blood   Result Value Ref Range    WBC 7.80 3.40 - 10.80 10*3/mm3    RBC 3.61 (L) 3.77 - 5.28 10*6/mm3    Hemoglobin 11.1 (L) 12.0 - 15.9 g/dL    Hematocrit 34.6 34.0 - 46.6 %    MCV 95.8 79.0 - 97.0 fL    MCH 30.7 26.6 - 33.0 pg    MCHC 32.1 31.5 - 35.7 g/dL    RDW 13.2 12.3 - 15.4 %    RDW-SD 46.4 37.0 - 54.0 fl    MPV 11.2 6.0 - 12.0 fL    Platelets 200 140 - 450 10*3/mm3    Neutrophil % 62.8 42.7 - 76.0 %    Lymphocyte % 19.9 19.6 - 45.3 %    Monocyte % 12.7 (H) 5.0 - 12.0 %    Eosinophil % 3.3 0.3 - 6.2 %    Basophil % 1.0 0.0 - 1.5 %    Immature Grans % 0.3 0.0 - 0.5 %    Neutrophils, Absolute 4.90 1.70 - 7.00 10*3/mm3    Lymphocytes, Absolute 1.55 0.70 - 3.10 10*3/mm3    Monocytes, Absolute 0.99 (H) 0.10 - 0.90 10*3/mm3    Eosinophils, Absolute 0.26 0.00 - 0.40 10*3/mm3    Basophils, Absolute 0.08 0.00 - 0.20 10*3/mm3    Immature Grans,  Absolute 0.02 0.00 - 0.05 10*3/mm3    nRBC 0.0 0.0 - 0.2 /100 WBC   Scan Slide    Collection Time: 06/10/22  6:05 PM    Specimen: Blood   Result Value Ref Range    RBC Morphology Normal Normal    WBC Morphology Normal Normal    Platelet Morphology Normal Normal   Procalcitonin    Collection Time: 06/10/22  6:05 PM    Specimen: Blood   Result Value Ref Range    Procalcitonin 0.05 0.00 - 0.25 ng/mL   Lactate Dehydrogenase    Collection Time: 06/10/22  6:05 PM    Specimen: Blood   Result Value Ref Range     135 - 214 U/L   Lactic Acid, Plasma    Collection Time: 06/10/22  6:05 PM    Specimen: Blood   Result Value Ref Range    Lactate 0.7 0.5 - 2.0 mmol/L   C-reactive Protein    Collection Time: 06/10/22  6:05 PM    Specimen: Blood   Result Value Ref Range    C-Reactive Protein <0.30 0.00 - 0.50 mg/dL   D-dimer, Quantitative    Collection Time: 06/10/22  6:05 PM    Specimen: Blood   Result Value Ref Range    D-Dimer, Quantitative 0.43 0.01 - 0.50 MCGFEU/mL   TSH    Collection Time: 06/10/22  6:05 PM    Specimen: Blood   Result Value Ref Range    TSH 1.010 0.270 - 4.200 uIU/mL   COVID-19 and FLU A/B PCR - Swab, Nasopharynx    Collection Time: 06/10/22  7:30 PM    Specimen: Nasopharynx; Swab   Result Value Ref Range    COVID19 Not Detected Not Detected - Ref. Range    Influenza A PCR Not Detected Not Detected    Influenza B PCR Not Detected Not Detected   Urinalysis With Microscopic If Indicated (No Culture) - Urine, Clean Catch    Collection Time: 06/10/22  7:40 PM    Specimen: Urine, Clean Catch   Result Value Ref Range    Color, UA Yellow Yellow, Straw    Appearance, UA Cloudy (A) Clear    pH, UA 6.5 5.0 - 8.0    Specific Gravity, UA 1.015 1.001 - 1.030    Glucose, UA Negative Negative    Ketones, UA Negative Negative    Bilirubin, UA Negative Negative    Blood, UA Negative Negative    Protein, UA Negative Negative    Leuk Esterase, UA Moderate (2+) (A) Negative    Nitrite, UA Negative Negative    Urobilinogen,  UA 1.0 E.U./dL 0.2 - 1.0 E.U./dL   Urinalysis, Microscopic Only - Urine, Clean Catch    Collection Time: 06/10/22  7:40 PM    Specimen: Urine, Clean Catch   Result Value Ref Range    RBC, UA 0-2 None Seen, 0-2 /HPF    WBC, UA 31-50 (A) None Seen, 0-2 /HPF    Bacteria, UA 4+ (A) None Seen, Trace /HPF    Squamous Epithelial Cells, UA 3-6 (A) None Seen, 0-2 /HPF    Hyaline Casts, UA 0-6 0 - 6 /LPF    Methodology Automated Microscopy    Troponin    Collection Time: 06/10/22  8:18 PM    Specimen: Blood   Result Value Ref Range    Troponin T 0.015 0.000 - 0.030 ng/mL     Note: In addition to lab results from this visit, the labs listed above may include labs taken at another facility or during a different encounter within the last 24 hours. Please correlate lab times with ED admission and discharge times for further clarification of the services performed during this visit.    XR Chest 1 View   Final Result   No acute cardiopulmonary abnormality.       This report was finalized on 6/10/2022 6:35 PM by Nely Bauman MD.            Vitals:    06/10/22 1759 06/10/22 1903 06/10/22 2028 06/10/22 2136   BP:  125/71  141/90   BP Location:       Patient Position:    Lying   Pulse:   67 71   Resp:       Temp: 98.1 °F (36.7 °C)      TempSrc: Oral      SpO2:   96% 96%   Weight:       Height:         Medications   sodium chloride 0.9 % flush 10 mL (has no administration in time range)   fosfomycin (MONUROL) packet 3 g (has no administration in time range)   ondansetron ODT (ZOFRAN-ODT) disintegrating tablet 4 mg (has no administration in time range)     ECG/EMG Results (last 24 hours)     Procedure Component Value Units Date/Time    ECG 12 Lead [[id number]] Collected: 06/10/22 1757     Updated: 06/10/22 1802        ECG 12 Lead   Preliminary Result         ECG 12 Lead   Preliminary Result                                                 HEART Score (for prediction of 6-week risk of major adverse cardiac event) reviewed and/or performed as  part of the patient evaluation and treatment planning process.  The result associated with this review/performance is: 4       MDM    Final diagnoses:   Atypical chest pain   Frequent PVCs   Acute UTI   History of CHF (congestive heart failure)   Chronic kidney disease, unspecified CKD stage   History of TIA (transient ischemic attack)   History of dementia       ED Disposition  ED Disposition     ED Disposition   Discharge    Condition   Stable    Comment   --             [facility] CARDIOLOGY  [address]  [address]  [facility] [id number] [phone]  Schedule an appointment as soon as possible for a visit in 2 days  Close follow-up for recheck on atypical chest pain and PVCs    Jose Antonio Aragon MD  [address]  [address]  [place] [address]  [phone]    Call in 2 days  Call Monday for close recheck    Yolande Escobar MD  [address] [facility] [id number] [phone]    Schedule an appointment as soon as possible for a visit in 2 days  Close PCP follow-up for recheck on urine culture results    [facility] Emergency Department  [address] [address] [phone]    If symptoms worsen         Medication List      No changes were made to your prescriptions during this visit.          Jaqueline High PA-C  06/10/22 4995

## 2022-06-11 NOTE — DISCHARGE INSTRUCTIONS
ER evaluation reveals stable cardiac work-up.  Troponins x2 sets are negative.  EKGs x2 reveal sinus rhythm but frequent PVCs.  We printed out information on this.  Recommend patient to decrease caffeine intake.  Recommend close follow-up at the chest pain clinic and also call routine cardiologist, , early next week for recheck.  Urinalysis revealed acute UTI.  We treated with one-time dose of fosfomycin.  Recommend close PCP follow-up for recheck.  Increase water intake.  Continue with all other current medical management.  Return to the ER if worsening symptoms.

## 2022-06-12 LAB
BACTERIA SPEC AEROBE CULT: NORMAL
QT INTERVAL: 468 MS
QT INTERVAL: 476 MS
QTC INTERVAL: 517 MS
QTC INTERVAL: 533 MS

## 2022-06-14 ENCOUNTER — OFFICE VISIT (OUTPATIENT)
Dept: INTERNAL MEDICINE | Facility: CLINIC | Age: 87
End: 2022-06-14

## 2022-06-14 VITALS
HEART RATE: 52 BPM | TEMPERATURE: 96.9 F | BODY MASS INDEX: 19.74 KG/M2 | WEIGHT: 115 LBS | SYSTOLIC BLOOD PRESSURE: 114 MMHG | OXYGEN SATURATION: 98 % | DIASTOLIC BLOOD PRESSURE: 52 MMHG

## 2022-06-14 DIAGNOSIS — R42 ORTHOSTATIC DIZZINESS: ICD-10-CM

## 2022-06-14 DIAGNOSIS — I50.22 CHRONIC SYSTOLIC HEART FAILURE: Primary | ICD-10-CM

## 2022-06-14 PROCEDURE — 99214 OFFICE O/P EST MOD 30 MIN: CPT | Performed by: STUDENT IN AN ORGANIZED HEALTH CARE EDUCATION/TRAINING PROGRAM

## 2022-06-14 NOTE — PROGRESS NOTES
"[facility]      Chief Complaint  Congestive Heart Failure, Chest Pain, and Follow-up    History of Present Illness    Scott Diego is a 89 y.o. female with nonischemic cardiomyopathy/chronic systolic heart failure, orthostasis, dementia, hypothyroidism and recent CARYN who presents today to follow up on chronic systolic heart failure and chest pain at the request of Jaqueline High PA-C.  Patient presented to the ED on 6/10 with acute chest pain.  EKG showed frequent PVCs, stable from previous.  Labs stable and creatinine had improved.  proBNP was 3824.  She did have a recent UTI and culture showed pansensitive Morganella morganii.  She was treated with a 1 time dose of fosfomycin in the ED. Chest pain has resolved, she wonders if she had an anxiety attack. No further chest pain but does note some intermittent shortness of breath that is brief and sometimes associated with anxiety.  Denies any orthopnea or PND.  No recent weight gain.  No lower extremity edema.  At her last visit goals of care was discussed and hospice was consulted, but she was told she was not a candidate    Objective     Vital Signs:   Vitals:    06/15/22 1523   BP: 127/64   BP Location: Left arm   Patient Position: Sitting   Cuff Size: Adult   Pulse: 84   Resp: 16   Temp: 96.9 °F (36.1 °C)   TempSrc: Temporal   SpO2: 96%   Weight: 52.3 kg (115 lb 4 oz)   Height: 162.6 cm (64\")     Body mass index is 19.78 kg/m².    Physical Exam  Vitals reviewed.   Constitutional:       Appearance: Normal appearance.   HENT:      Head: Normocephalic.   Neck:      Vascular: No carotid bruit.   Cardiovascular:      Rate and Rhythm: Normal rate and regular rhythm. Frequent extrasystoles are present.     Pulses: Normal pulses.      Heart sounds: Normal heart sounds, S1 normal and S2 normal. No murmur heard.  Pulmonary:      Effort: Pulmonary effort is normal. No respiratory distress.      Breath sounds: Rales present.      " Comments: Fine bibasilar rales  Chest:      Chest wall: No tenderness.   Abdominal:      General: Abdomen is flat.      Palpations: Abdomen is soft.   Musculoskeletal:      Cervical back: Neck supple.      Right lower leg: No edema.      Left lower leg: No edema.   Skin:     General: Skin is warm and dry.   Neurological:      General: No focal deficit present.      Mental Status: She is alert and oriented to person, place, and time. Mental status is at baseline.   Psychiatric:         Mood and Affect: Mood normal.         Behavior: Behavior normal.         Thought Content: Thought content normal.           Result Review :   Adult Transthoracic Echo Complete W/ Cont if Necessary Per Protocol (03/04/2022 16:07)  Troponin (06/10/2022 20:18)  Urine Culture - Urine, Urine, Clean Catch (06/10/2022 19:40)  Urinalysis, Microscopic Only - Urine, Clean Catch (06/10/2022 19:40)  Urinalysis With Microscopic If Indicated (No Culture) - Urine, Clean Catch (06/10/2022 19:40)  COVID PRE-OP / PRE-PROCEDURE SCREENING ORDER (NO ISOLATION) - Swab, Nasopharynx (06/10/2022 19:30)  TSH (06/10/2022 18:05)  D-dimer, Quantitative (06/10/2022 18:05)  C-reactive Protein (06/10/2022 18:05)  Lactic Acid, Plasma (06/10/2022 18:05)  Lactate Dehydrogenase (06/10/2022 18:05)  Procalcitonin (06/10/2022 18:05)  Troponin (06/10/2022 18:05)  BNP (06/10/2022 18:05)                  Assessment and Plan {CC Problem List  Visit Diagnosis  ROS  Review (Popup)  Health Maintenance  Quality  BestPractice  Medications  SmartSets  SnapShot Encounters  Media :23}   1.  Chronic systolic heart failure (HCC)  She has some fine bibasilar rales and elevated proBNP.  I advised her son to give her the Lasix for 3 days and then go back to as needed.  If intermittent dyspnea continues could consider dosing 3 times a week  Continue metoprolol succinate  No ACE/ARB due to renal insufficiency and orthostatic hypotension    2. CKD IIIb  Recent creatinine  stable    3.  Chest pain  Chest pain in the ED was atypical.  Troponin stable.  No EKG changes.  Currently no recurrent symptoms.  Recommend no further ischemic evaluation considering age, symptoms and goals of care    4.  Goals of care discussion  We again discussed goals of care.  She did not qualify for hospice.  She does currently have palliative care and she is going to see them tomorrow.  Her son tells me that they will continue to reassess her candidacy for hospice    5. PVCs  She is asymptomatic with PVCS  Continue metoprolol         Follow Up {Instructions Charge Capture  Follow-up Communications :23}   Return in about 1 week (around 6/22/2022) for Monitor results, Video Visit.    Dictated Utilizing Dragon Dictation

## 2022-06-14 NOTE — PROGRESS NOTES
Internal Medicine Established Office Visit      Date: 2022     Patient Name: Scott Diego  : 4/10/1930   MRN: [id number]     Chief Complaint:    Chief Complaint   Patient presents with   • Follow-up   • Hospital Follow Up Visit   • Shortness of Breath       History of Present Illness: Scott Diego is a 89 y.o. female who presents to clinic today for follow up. She went to the ED last weekend or chest pressure. All labs, imaging, and EKGs were not concerning including troponins. BNP was up from ~2000 to ~3000. She has an appointment with cardiology tomorrow. She reports no chest pain at this time. Does feel some shortness of breath with exertion. Not drinking much fluids at home and her son keeps water available to her by placing water bottles around where she typically sits in the home (by her chair in the living room, by her bed, and at her seat in the dining room). Her son notes she has been getting a little more anxious lately. Eating well. Overall she reports feeling ok and does not have complaints today.     Subjective     I have reviewed and the following portions of the patient's history were updated as appropriate: past family history, past medical history, past social history, past surgical history and problem list.     Medications:     Current Outpatient Medications:   •  acetaminophen (TYLENOL) 325 MG tablet, Take 2 tablets by mouth Every 4 (Four) Hours As Needed for Mild Pain ., Disp: , Rfl:   •  ALPRAZolam (XANAX) 0.25 MG tablet, Take 1 tablet by mouth 3 (Three) Times a Day As Needed for Anxiety (and agitation)., Disp: 90 tablet, Rfl: 1  •  Artificial Tear Ointment (artificial tears) ophthalmic ointment, Administer 1 application to both eyes Daily., Disp: , Rfl:   •  atorvastatin (LIPITOR) 80 MG tablet, Take 80 mg by mouth Every Night., Disp: , Rfl:   •  bisacodyl (Dulcolax) 10 MG suppository, Insert 1 suppository into the rectum Daily As Needed for Constipation., Disp: 15  suppository, Rfl: 1  •  carboxymethylcellulose (REFRESH PLUS) 0.5 % solution, 3 (Three) Times a Day As Needed for Dry Eyes., Disp: , Rfl:   •  citalopram (CeleXA) 10 MG tablet, Take 1 tablet by mouth Daily., Disp: 30 tablet, Rfl: 3  •  famotidine (PEPCID) 20 MG tablet, Take 20 mg by mouth Daily., Disp: , Rfl:   •  fexofenadine (ALLEGRA) 180 MG tablet, Take 1 tablet by mouth Daily., Disp: 90 tablet, Rfl: 1  •  furosemide (LASIX) 20 MG tablet, Take 1 tablet by mouth Daily As Needed (increased shortness of breath, swelling)., Disp: 90 tablet, Rfl: 0  •  gabapentin (NEURONTIN) 100 MG capsule, Take 1 capsule by mouth Every Morning., Disp: 90 capsule, Rfl: 1  •  gabapentin (NEURONTIN) 300 MG capsule, Take 1 capsule by mouth Every Night., Disp: 90 capsule, Rfl: 1  •  Glycerin-Polysorbate 80 (REFRESH DRY EYE THERAPY OP), Apply  to eye(s) as directed by provider As Needed., Disp: , Rfl:   •  levothyroxine (SYNTHROID, LEVOTHROID) 50 MCG tablet, Take 50 mcg by mouth Every Morning., Disp: , Rfl:   •  memantine (NAMENDA) 5 MG tablet, Take 1 tablet daily for 4 weeks then 1 tablet twice a day and continue, Disp: 180 tablet, Rfl: 1  •  metoprolol succinate XL (TOPROL-XL) 25 MG 24 hr tablet, TAKE 1 TABLET BY MOUTH EVERY DAY, Disp: 90 tablet, Rfl: 0  •  nystatin (MYCOSTATIN) 100,000 unit/mL suspension, swish and swallow 5 MLS BY MOUTH FOUR TIMES A DAY, Disp: 60 mL, Rfl: 0  •  ofloxacin (OCUFLOX) 0.3 % ophthalmic solution, Administer 1 drop to both eyes 3 (Three) Times a Day. For 10 days, started 03-01-22, Disp: , Rfl:   •  SM Aspirin Adult Low Strength 81 MG EC tablet, Take 81 mg by mouth Daily., Disp: , Rfl:     Allergies:   Allergies   Allergen Reactions   • Augmentin [Amoxicillin-Pot Clavulanate] Nausea And Vomiting     Makes her sick at her stomach   • Claritin [Loratadine] Unknown (See Comments)     Doesn't remember   • Keflex [Cephalexin] Nausea And Vomiting     Makes pt sick at her stomach   • Sulfa Antibiotics Other (See  Comments)     Does not remember       Objective     Physical Exam:   Vital Signs:   Vitals:    06/14/22 1210   BP: 114/52   Pulse: 52   Temp: 96.9 °F (36.1 °C)   SpO2: 98%   Weight: 52.2 kg (115 lb)   PainSc: 0-No pain     Body mass index is 19.74 kg/m².     Physical Exam  Vitals and nursing note reviewed.   Constitutional:       General: She is not in acute distress.     Appearance: She is not ill-appearing or toxic-appearing.   Cardiovascular:      Rate and Rhythm: Normal rate and regular rhythm.   Pulmonary:      Effort: Pulmonary effort is normal. No respiratory distress.      Breath sounds: No wheezing, rhonchi or rales.   Neurological:      Mental Status: She is alert.   Psychiatric:         Attention and Perception: Attention normal.         Mood and Affect: Mood normal.         Behavior: Behavior is cooperative.         Cognition and Memory: Cognition is impaired. Memory is impaired.       Assessment / Plan      Assessment/Plan:   Diagnoses and all orders for this visit:    1. Chronic systolic heart failure (HCC) (Primary)  - She reports ALMANZAR and BNP up. CXR with no pulm edema.   - Keep follow up appointment with cardiology tomorrow.   - Fine balance between diuresis and dehydration.   - Reviewed labs, imaging, and EKGs from recent ER visit.     2. Orthostatic dizziness  - Encouraged more fluid intake.     3. Hyponatremia   - Sodium improved from 127 to 134. Would not expect this type of improvement with decompensated heart failure.     4. CKD   - Kidney function stable.     Follow Up:   Return in about 4 weeks (around 7/12/2022) for New provider, may need to go to [facility], [facility] or [facility] location. .    Time:  I spent approximately 25 minutes providing clinical care for this patient; including review of patient's chart and provider documentation, face to face time spent with patient in examination room (obtaining history, performing physical exam, discussing diagnosis and management options), placing  orders, and completing patient documentation.     Yolande Escobar MD  [facility] [place] [facility] [place]

## 2022-06-15 ENCOUNTER — OFFICE VISIT (OUTPATIENT)
Dept: CARDIOLOGY | Facility: HOSPITAL | Age: 87
End: 2022-06-15

## 2022-06-15 VITALS
DIASTOLIC BLOOD PRESSURE: 64 MMHG | HEIGHT: 64 IN | HEART RATE: 84 BPM | SYSTOLIC BLOOD PRESSURE: 127 MMHG | WEIGHT: 115.25 LBS | BODY MASS INDEX: 19.68 KG/M2 | OXYGEN SATURATION: 96 % | TEMPERATURE: 96.9 F | RESPIRATION RATE: 16 BRPM

## 2022-06-15 DIAGNOSIS — I50.22 CHRONIC SYSTOLIC HEART FAILURE: Primary | ICD-10-CM

## 2022-06-15 DIAGNOSIS — Z71.89 GOALS OF CARE, COUNSELING/DISCUSSION: ICD-10-CM

## 2022-06-15 DIAGNOSIS — N18.32 STAGE 3B CHRONIC KIDNEY DISEASE: ICD-10-CM

## 2022-06-15 DIAGNOSIS — R07.9 CHEST PAIN, UNSPECIFIED TYPE: ICD-10-CM

## 2022-06-15 DIAGNOSIS — I49.3 PVC'S (PREMATURE VENTRICULAR CONTRACTIONS): ICD-10-CM

## 2022-06-15 PROCEDURE — 99214 OFFICE O/P EST MOD 30 MIN: CPT | Performed by: NURSE PRACTITIONER

## 2022-06-20 NOTE — PROGRESS NOTES
"[facility]  [facility]      Mode of Visit: Video   Location of patient: home  You have chosen to receive care through a telehealth visit.  Does the patient consent to use a video/audio connection for your medical care today? Yes  The visit included audio and video interaction. No technical issues occurred during this visit.     Chief Complaint  Follow-up and Congestive Heart Failure    History of Present Illness    Scott Diego is a 89 y.o. female with nonischemic cardiomyopathy/chronic systolic heart failure, orthostasis, dementia, hypothyroidism and CKD who presents today as a telemedicine follow up on chronic systolic heart failure.  Patient recently in the ED with acute chest pain.  Troponins normal and EKG stable.  She was treated for a UTI.  proBNP was elevated and she did have some rales on exam and therefore she was advised take her Lasix for 3 days and then continue as needed.     She reports that she has been nauseated all day. Denies diarrhea. Reports constipation, which is chronic for her. Denies abdominal pain or bloating. She complains of intermittent headache and ear ringing. She does have some dizziness and dry mouth. Denies shortness of breath. No swelling. She is followed by palliative care       Objective     Vital Signs:   Vitals:    06/21/22 1449   BP: 134/88   Pulse: 71   Weight: 51.8 kg (114 lb 3.2 oz)   Height: 162.6 cm (64\")     Body mass index is 19.6 kg/m².    Virtual Visit Physical Exam  Physical Exam  Constitutional:       Appearance: Normal appearance. She is ill-appearing.   HENT:      Head: Normocephalic.   Pulmonary:      Effort: Pulmonary effort is normal. No respiratory distress.   Neurological:      Mental Status: She is alert and oriented to person, place, and time.   Psychiatric:         Mood and Affect: Mood normal.         Behavior: Behavior normal.         Thought Content: Thought content normal.              Result Review  Data Reviewed:{ " Labs  Result Review  Imaging  Med Tab  Media :23}   Comprehensive Metabolic Panel (06/10/2022 18:05)  BNP (06/10/2022 18:05)  Adult Transthoracic Echo Complete W/ Cont if Necessary Per Protocol (03/04/2022 16:07)               Assessment and Plan {CC Problem List  Visit Diagnosis  ROS  Review (Popup)  Health Maintenance  Quality  BestPractice  Medications  SmartSets  SnapShot Encounters  Media :23}   1. Chronic systolic heart failure (HCC)  Stable symptoms  Continue lasix PRN. Avoid daily use due to recent CARYN, hyponatremia and lack of appetite  Discuss s/s and when to call    2. Stage 3b chronic kidney disease (HCC)  Last creatinine stable  Monitored by PCP    3. Primary hypertension  Appears well controlled  Monitor for hypotension    Continue to follow up with palliative care due to various symptoms that are non-cardiac. She was denied hospice because they said she was not a candidate          Follow Up {Instructions Charge Capture  Follow-up Communications :23}   Return in about 3 months (around 9/21/2022) for HF, Office follow up.    Patient was given instructions and counseling regarding her condition or for health maintenance advice. Please see specific information pulled into the AVS if appropriate.  Advised to call the [facility] with any questions, concerns, or worsening symptoms.    Dictated Utilizing Dragon Dictation

## 2022-06-21 ENCOUNTER — TELEMEDICINE (OUTPATIENT)
Dept: CARDIOLOGY | Facility: HOSPITAL | Age: 87
End: 2022-06-21

## 2022-06-21 VITALS
WEIGHT: 114.2 LBS | DIASTOLIC BLOOD PRESSURE: 88 MMHG | BODY MASS INDEX: 19.5 KG/M2 | HEIGHT: 64 IN | SYSTOLIC BLOOD PRESSURE: 134 MMHG | HEART RATE: 71 BPM

## 2022-06-21 DIAGNOSIS — I10 PRIMARY HYPERTENSION: ICD-10-CM

## 2022-06-21 DIAGNOSIS — N18.32 STAGE 3B CHRONIC KIDNEY DISEASE: ICD-10-CM

## 2022-06-21 DIAGNOSIS — I50.22 CHRONIC SYSTOLIC HEART FAILURE: Primary | ICD-10-CM

## 2022-06-21 PROCEDURE — 99214 OFFICE O/P EST MOD 30 MIN: CPT | Performed by: NURSE PRACTITIONER

## 2022-06-21 RX ORDER — ONDANSETRON 4 MG/1
4 TABLET, FILM COATED ORAL EVERY 8 HOURS PRN
COMMUNITY
End: 2022-06-30

## 2022-06-30 NOTE — TELEPHONE ENCOUNTER
I went ahead and called her son Noah Sinha by phone & spoke with him directly since this was an urgent message received after clinic was closed-he states she is having intermittent sharp pains in bilateral temples. This has been happening but has increased in frequency. It comes and goes a few seconds at a time. No history of migraines. Saw urgent care-they did not see temple swelling.    We have not evaluated her for migraines or headaches.  I am sorry to hear that she is having this issue.  Due to her cardiac history she cannot have typical migraine medications.     I sent in Zofran 4mg meltable under tongue to take every 8 hours as needed for nausea, if she needs this to take- went ahead and sent to the pharmacy. She could also take a very small dose of Benadryl which is diphenhydramine and she could take the children's dosing which is 12.5mg to help with the headache every 6-8 hours as needed-can cause drowsiness so use great caution- as well as acetaminophen 325 mg 2 tablets by mouth every 6 hours as needed for pain but not to take more than 4 doses in a 24-hour period.      If she is not improving with these medications, would recommend they go to ER for further evaluation. Reviewed s&s of stroke and when to proceed to ER.    She had a CT head recently, we could certainly pursue an MRI brain if she is not improving. I can order a few inflammatory lab markers for which patient/son can have drawn at any outpatient lab. Patient declines additional neuroimaging.    Son will call us if she is not improving. Her verbalized understanding and agrees with plan.     If symptoms persist, consider covid 19 testing too since sometimes severe headaches have been an early sign of this.     Thanks, VASILE Gordon

## 2022-06-30 NOTE — TELEPHONE ENCOUNTER
Provider: FORTINO  Caller: VAUGHN GASPAR  Relationship to Patient: SON  Pharmacy: [id number]  Phone Number: [phone]  Reason for Call: PATIENT SON TELEPHONED TO ADVISE PATIENT IS EXPERIENCING MIGRAINE, SHARP SHOOTING PAINS IN HER TEMPLES ALONG WITH RINGING IN HER LEFT EAR; NAUSEA.    PATIENT WAS IN IMMEDIATE CARE TODAY; SON WANTED TO TAKE TO E.R., BUT PATIENT REFUSED.    I DID ADVISE PATIENT SON IF THIS WORSENS PLEASE TAKE PATIENT TO E.R.    PLEASE REVIEW & CALL PATIENT SON RE: NEXT STEPS.    THANK YOU.

## 2022-07-02 ENCOUNTER — APPOINTMENT (OUTPATIENT)
Dept: CT IMAGING | Facility: HOSPITAL | Age: 87
End: 2022-07-02

## 2022-07-02 ENCOUNTER — HOSPITAL ENCOUNTER (EMERGENCY)
Facility: HOSPITAL | Age: 87
Discharge: HOME | End: 2022-07-02
Attending: EMERGENCY MEDICINE | Admitting: EMERGENCY MEDICINE

## 2022-07-02 VITALS
BODY MASS INDEX: 19.16 KG/M2 | SYSTOLIC BLOOD PRESSURE: 132 MMHG | WEIGHT: 115 LBS | RESPIRATION RATE: 16 BRPM | DIASTOLIC BLOOD PRESSURE: 67 MMHG | HEART RATE: 82 BPM | HEIGHT: 65 IN | OXYGEN SATURATION: 97 % | TEMPERATURE: 98.3 F

## 2022-07-02 DIAGNOSIS — H93.13 TINNITUS OF BOTH EARS: Primary | ICD-10-CM

## 2022-07-02 DIAGNOSIS — R11.0 NAUSEA: ICD-10-CM

## 2022-07-02 DIAGNOSIS — R51.9 NONINTRACTABLE HEADACHE, UNSPECIFIED CHRONICITY PATTERN, UNSPECIFIED HEADACHE TYPE: ICD-10-CM

## 2022-07-02 LAB
ALBUMIN SERPL-MCNC: 3.4 G/DL (ref 3.5–5.2)
ALBUMIN/GLOB SERPL: 0.9 G/DL
ALP SERPL-CCNC: 40 U/L (ref 39–117)
ALT SERPL W P-5'-P-CCNC: 9 U/L (ref 1–33)
ANION GAP SERPL CALCULATED.3IONS-SCNC: 8 MMOL/L (ref 5–15)
AST SERPL-CCNC: 19 U/L (ref 1–32)
BACTERIA UR QL AUTO: ABNORMAL /HPF
BASOPHILS # BLD AUTO: 0.07 10*3/MM3 (ref 0–0.2)
BASOPHILS NFR BLD AUTO: 0.8 % (ref 0–1.5)
BILIRUB SERPL-MCNC: 0.2 MG/DL (ref 0–1.2)
BILIRUB UR QL STRIP: NEGATIVE
BUN SERPL-MCNC: 17 MG/DL (ref 8–23)
BUN/CREAT SERPL: 12.1 (ref 7–25)
CALCIUM SPEC-SCNC: 9.2 MG/DL (ref 8.2–9.6)
CHLORIDE SERPL-SCNC: 102 MMOL/L (ref 98–107)
CLARITY UR: ABNORMAL
CO2 SERPL-SCNC: 26 MMOL/L (ref 22–29)
COLOR UR: YELLOW
CREAT SERPL-MCNC: 1.41 MG/DL (ref 0.57–1)
DEPRECATED RDW RBC AUTO: 45 FL (ref 37–54)
EGFRCR SERPLBLD CKD-EPI 2021: 35.1 ML/MIN/1.73
EOSINOPHIL # BLD AUTO: 0.27 10*3/MM3 (ref 0–0.4)
EOSINOPHIL NFR BLD AUTO: 3.3 % (ref 0.3–6.2)
ERYTHROCYTE [DISTWIDTH] IN BLOOD BY AUTOMATED COUNT: 13.2 % (ref 12.3–15.4)
GLOBULIN UR ELPH-MCNC: 4 GM/DL
GLUCOSE SERPL-MCNC: 95 MG/DL (ref 65–99)
GLUCOSE UR STRIP-MCNC: NEGATIVE MG/DL
HCT VFR BLD AUTO: 33.9 % (ref 34–46.6)
HGB BLD-MCNC: 11 G/DL (ref 12–15.9)
HGB UR QL STRIP.AUTO: NEGATIVE
HYALINE CASTS UR QL AUTO: ABNORMAL /LPF
IMM GRANULOCYTES # BLD AUTO: 0.02 10*3/MM3 (ref 0–0.05)
IMM GRANULOCYTES NFR BLD AUTO: 0.2 % (ref 0–0.5)
KETONES UR QL STRIP: NEGATIVE
LEUKOCYTE ESTERASE UR QL STRIP.AUTO: ABNORMAL
LIPASE SERPL-CCNC: 27 U/L (ref 13–60)
LYMPHOCYTES # BLD AUTO: 1.69 10*3/MM3 (ref 0.7–3.1)
LYMPHOCYTES NFR BLD AUTO: 20.5 % (ref 19.6–45.3)
MCH RBC QN AUTO: 30.5 PG (ref 26.6–33)
MCHC RBC AUTO-ENTMCNC: 32.4 G/DL (ref 31.5–35.7)
MCV RBC AUTO: 93.9 FL (ref 79–97)
MONOCYTES # BLD AUTO: 0.94 10*3/MM3 (ref 0.1–0.9)
MONOCYTES NFR BLD AUTO: 11.4 % (ref 5–12)
NEUTROPHILS NFR BLD AUTO: 5.25 10*3/MM3 (ref 1.7–7)
NEUTROPHILS NFR BLD AUTO: 63.8 % (ref 42.7–76)
NITRITE UR QL STRIP: NEGATIVE
NRBC BLD AUTO-RTO: 0 /100 WBC (ref 0–0.2)
PH UR STRIP.AUTO: 6.5 [PH] (ref 5–8)
PLATELET # BLD AUTO: 225 10*3/MM3 (ref 140–450)
PMV BLD AUTO: 11.1 FL (ref 6–12)
POTASSIUM SERPL-SCNC: 4.4 MMOL/L (ref 3.5–5.2)
PROT SERPL-MCNC: 7.4 G/DL (ref 6–8.5)
PROT UR QL STRIP: NEGATIVE
RBC # BLD AUTO: 3.61 10*6/MM3 (ref 3.77–5.28)
RBC # UR STRIP: ABNORMAL /HPF
REF LAB TEST METHOD: ABNORMAL
SODIUM SERPL-SCNC: 136 MMOL/L (ref 136–145)
SP GR UR STRIP: 1.02 (ref 1–1.03)
SQUAMOUS #/AREA URNS HPF: ABNORMAL /HPF
UROBILINOGEN UR QL STRIP: ABNORMAL
WBC # UR STRIP: ABNORMAL /HPF
WBC NRBC COR # BLD: 8.24 10*3/MM3 (ref 3.4–10.8)

## 2022-07-02 PROCEDURE — 99283 EMERGENCY DEPT VISIT LOW MDM: CPT

## 2022-07-02 PROCEDURE — 81001 URINALYSIS AUTO W/SCOPE: CPT | Performed by: NURSE PRACTITIONER

## 2022-07-02 PROCEDURE — 70450 CT HEAD/BRAIN W/O DYE: CPT

## 2022-07-02 PROCEDURE — 85025 COMPLETE CBC W/AUTO DIFF WBC: CPT | Performed by: NURSE PRACTITIONER

## 2022-07-02 PROCEDURE — 80053 COMPREHEN METABOLIC PANEL: CPT | Performed by: NURSE PRACTITIONER

## 2022-07-02 PROCEDURE — 87086 URINE CULTURE/COLONY COUNT: CPT | Performed by: NURSE PRACTITIONER

## 2022-07-02 PROCEDURE — 83690 ASSAY OF LIPASE: CPT | Performed by: NURSE PRACTITIONER

## 2022-07-02 RX ORDER — SODIUM CHLORIDE 0.9 % (FLUSH) 0.9 %
10 SYRINGE (ML) INJECTION AS NEEDED
Status: DISCONTINUED | OUTPATIENT
Start: 2022-07-02 | End: 2022-07-03 | Stop reason: HOSPADM

## 2022-07-03 NOTE — DISCHARGE INSTRUCTIONS
We are going to culture out your urine.  We will contact you if you need an antibiotic.    Follow-up with neurology.  Follow-up with ENT.

## 2022-07-03 NOTE — ED PROVIDER NOTES
Subjective   Scott Diego is a 89 yr old female that presents to the ER with c/o ringing in her ears.  Patient complains of daily headaches, nausea.  She reports that her headaches in her bilateral temple region.  She complains of ringing in her ears for months.  She reports the left is greater than the right.  On Thursday the patient was seen at the urgent treatment center.  Patient was suggested to go to neurology.  She went to neurology on Friday.  They suggest that she take Benadryl and Tylenol.  If no improvement come to the ER.  Patient advises no worse but it is there.      History provided by:  Patient   used: No    Headache  Pain location:  L temporal and R temporal  Radiates to:  Does not radiate  Severity at highest:  Unable to specify  Timing:  Constant  Relieved by:  Nothing  Associated symptoms: nausea    Associated symptoms: no abdominal pain, no blurred vision, no congestion, no dizziness, no ear pain, no fever, no neck pain, no tingling, no visual change and no vomiting        Review of Systems   Constitutional: Negative for chills and fever.   HENT: Positive for tinnitus. Negative for congestion, ear discharge and ear pain.    Eyes: Negative for blurred vision and visual disturbance.   Respiratory: Negative for shortness of breath.    Cardiovascular: Negative for chest pain.   Gastrointestinal: Positive for nausea. Negative for abdominal pain and vomiting.   Musculoskeletal: Negative for neck pain.   Neurological: Positive for headaches. Negative for dizziness.   All other systems reviewed and are negative.      Past Medical History:   Diagnosis Date   • Congestive heart failure (HCC)    • Malignant neoplasm (HCC)    • Stroke (HCC)     mini stroke   • Systolic heart failure (HCC) 09/25/2017    Chronic/compensated (EF 25%)       Allergies   Allergen Reactions   • Augmentin [Amoxicillin-Pot Clavulanate] Nausea And Vomiting     Makes her sick at her stomach   • Claritin  [Loratadine] Unknown (See Comments)     Doesn't remember   • Keflex [Cephalexin] Nausea And Vomiting     Makes pt sick at her stomach   • Sulfa Antibiotics Other (See Comments)     Does not remember       Past Surgical History:   Procedure Laterality Date   • CHOLECYSTECTOMY     • EYE SURGERY     • HYSTERECTOMY         Family History   Problem Relation Age of Onset   • Cancer Mother    • No Known Problems Father    • Cancer Sister    • Cancer Brother    • Parkinsonism Brother        Social History     Socioeconomic History   • Marital status:    Tobacco Use   • Smoking status: Never Smoker   • Smokeless tobacco: Never Used   Vaping Use   • Vaping Use: Never used   Substance and Sexual Activity   • Alcohol use: No   • Drug use: No   • Sexual activity: Defer           Objective   Physical Exam  Vitals and nursing note reviewed.   Constitutional:       General: She is not in acute distress.     Appearance: Normal appearance. She is well-developed. She is not ill-appearing or toxic-appearing.   HENT:      Head: Normocephalic and atraumatic.      Right Ear: Tympanic membrane, ear canal and external ear normal.      Left Ear: Tympanic membrane, ear canal and external ear normal.      Nose: Nose normal.      Mouth/Throat:      Mouth: Mucous membranes are moist.   Eyes:      General: Lids are normal.      Extraocular Movements: Extraocular movements intact.      Conjunctiva/sclera: Conjunctivae normal.   Neck:      Trachea: Trachea normal.   Cardiovascular:      Rate and Rhythm: Regular rhythm.      Pulses: Normal pulses.      Heart sounds: Normal heart sounds.   Pulmonary:      Effort: Pulmonary effort is normal. No respiratory distress.      Breath sounds: Normal breath sounds. No decreased breath sounds, wheezing, rhonchi or rales.   Abdominal:      General: Bowel sounds are normal.      Palpations: Abdomen is soft.      Tenderness: There is no abdominal tenderness.   Musculoskeletal:         General: Normal range  of motion.      Cervical back: Full passive range of motion without pain and normal range of motion.   Skin:     General: Skin is warm and dry.      Findings: No rash.   Neurological:      Mental Status: She is alert and oriented to person, place, and time.      Cranial Nerves: No cranial nerve deficit.   Psychiatric:         Speech: Speech normal.         Behavior: Behavior normal. Behavior is cooperative.         Procedures           ED Course  ED Course as of 07/02/22 2219   Sat Jul 02, 2022 2138 WBC, UA(!): 13-20 [KG]   2138 Bacteria, UA(!): 4+  Patient was seen here on [address] at this time had 4+ bacteria white blood cells were 30-50 and the culture was unremarkable.  We will hold off on treatment at this time.  We will culture the urine.  Patient will be discharged home.  Patient to follow-up with neuro ENT and PCP as discussed. [KG]   2138 Leukocytes, UA(!): Moderate (2+) [KG]   2138 Creatinine(!): 1.41 [KG]      ED Course User Index  [KG] Mitra Faustin, VASILE           Recent Results (from the past 24 hour(s))   Comprehensive Metabolic Panel    Collection Time: 07/02/22  8:32 PM    Specimen: Blood   Result Value Ref Range    Glucose 95 65 - 99 mg/dL    BUN 17 8 - 23 mg/dL    Creatinine 1.41 (H) 0.57 - 1.00 mg/dL    Sodium 136 136 - 145 mmol/L    Potassium 4.4 3.5 - 5.2 mmol/L    Chloride 102 98 - 107 mmol/L    CO2 26.0 22.0 - 29.0 mmol/L    Calcium 9.2 8.2 - 9.6 mg/dL    Total Protein 7.4 6.0 - 8.5 g/dL    Albumin 3.40 (L) 3.50 - 5.20 g/dL    ALT (SGPT) 9 1 - 33 U/L    AST (SGOT) 19 1 - 32 U/L    Alkaline Phosphatase 40 39 - 117 U/L    Total Bilirubin 0.2 0.0 - 1.2 mg/dL    Globulin 4.0 gm/dL    A/G Ratio 0.9 g/dL    BUN/Creatinine Ratio 12.1 7.0 - 25.0    Anion Gap 8.0 5.0 - 15.0 mmol/L    eGFR 35.1 (L) >60.0 mL/min/1.73   Lipase    Collection Time: 07/02/22  8:32 PM    Specimen: Blood   Result Value Ref Range    Lipase 27 13 - 60 U/L   CBC Auto Differential    Collection Time: 07/02/22  8:32 PM     Specimen: Blood   Result Value Ref Range    WBC 8.24 3.40 - 10.80 10*3/mm3    RBC 3.61 (L) 3.77 - 5.28 10*6/mm3    Hemoglobin 11.0 (L) 12.0 - 15.9 g/dL    Hematocrit 33.9 (L) 34.0 - 46.6 %    MCV 93.9 79.0 - 97.0 fL    MCH 30.5 26.6 - 33.0 pg    MCHC 32.4 31.5 - 35.7 g/dL    RDW 13.2 12.3 - 15.4 %    RDW-SD 45.0 37.0 - 54.0 fl    MPV 11.1 6.0 - 12.0 fL    Platelets 225 140 - 450 10*3/mm3    Neutrophil % 63.8 42.7 - 76.0 %    Lymphocyte % 20.5 19.6 - 45.3 %    Monocyte % 11.4 5.0 - 12.0 %    Eosinophil % 3.3 0.3 - 6.2 %    Basophil % 0.8 0.0 - 1.5 %    Immature Grans % 0.2 0.0 - 0.5 %    Neutrophils, Absolute 5.25 1.70 - 7.00 10*3/mm3    Lymphocytes, Absolute 1.69 0.70 - 3.10 10*3/mm3    Monocytes, Absolute 0.94 (H) 0.10 - 0.90 10*3/mm3    Eosinophils, Absolute 0.27 0.00 - 0.40 10*3/mm3    Basophils, Absolute 0.07 0.00 - 0.20 10*3/mm3    Immature Grans, Absolute 0.02 0.00 - 0.05 10*3/mm3    nRBC 0.0 0.0 - 0.2 /100 WBC   Urinalysis With Microscopic If Indicated (No Culture) - Urine, Clean Catch    Collection Time: 07/02/22  9:08 PM    Specimen: Urine, Clean Catch   Result Value Ref Range    Color, UA Yellow Yellow, Straw    Appearance, UA Cloudy (A) Clear    pH, UA 6.5 5.0 - 8.0    Specific Gravity, UA 1.016 1.001 - 1.030    Glucose, UA Negative Negative    Ketones, UA Negative Negative    Bilirubin, UA Negative Negative    Blood, UA Negative Negative    Protein, UA Negative Negative    Leuk Esterase, UA Moderate (2+) (A) Negative    Nitrite, UA Negative Negative    Urobilinogen, UA 1.0 E.U./dL 0.2 - 1.0 E.U./dL   Urinalysis, Microscopic Only - Urine, Clean Catch    Collection Time: 07/02/22  9:08 PM    Specimen: Urine, Clean Catch   Result Value Ref Range    RBC, UA 0-2 None Seen, 0-2 /HPF    WBC, UA 13-20 (A) None Seen, 0-2 /HPF    Bacteria, UA 4+ (A) None Seen, Trace /HPF    Squamous Epithelial Cells, UA 3-6 (A) None Seen, 0-2 /HPF    Hyaline Casts, UA 0-6 0 - 6 /LPF    Methodology Automated Microscopy      Note:  In addition to lab results from this visit, the labs listed above may include labs taken at another facility or during a different encounter within the last 24 hours. Please correlate lab times with ED admission and discharge times for further clarification of the services performed during this visit.    CT Head Without Contrast   Final Result   1. No acute intracranial process. No change from 3/3/2022. MRI is more sensitive for the detection of acute nonhemorrhagic infarct.   2. Mild changes small vessel ischemic disease of indeterminate age, presumably mostly chronic. Volume loss. Atherosclerosis.             Electronically signed by:  Aleksandar Morton M.D.     7/2/2022 7:12 PM Mountain Time        Vitals:    07/02/22 2110 07/02/22 2111 07/02/22 2130 07/02/22 2147   BP: 135/87  132/67    BP Location:       Patient Position:       Pulse:    82   Resp:       Temp:       TempSrc:       SpO2: 95% 96% 97%    Weight:       Height:         Medications   sodium chloride 0.9 % flush 10 mL (has no administration in time range)     ECG/EMG Results (last 24 hours)     ** No results found for the last 24 hours. **        No orders to display                                       MDM    Final diagnoses:   Tinnitus of both ears   Nonintractable headache, unspecified chronicity pattern, unspecified headache type   Nausea       ED Disposition  ED Disposition     ED Disposition   Discharge    Condition   Stable    Comment   --             Ngozi Davis, DO  [address] [id number]  [phone]          Gary Hicks MD  [address]  [address]  [facility] [id number] [phone]               Medication List      No changes were made to your prescriptions during this visit.          Mitra Faustin, VASILE  07/02/22 8861

## 2022-07-04 LAB — BACTERIA SPEC AEROBE CULT: NO GROWTH

## 2022-07-07 ENCOUNTER — PATIENT ROUNDING (BHMG ONLY) (OUTPATIENT)
Dept: FAMILY MEDICINE CLINIC | Facility: CLINIC | Age: 87
End: 2022-07-07

## 2022-07-07 ENCOUNTER — OFFICE VISIT (OUTPATIENT)
Dept: FAMILY MEDICINE CLINIC | Facility: CLINIC | Age: 87
End: 2022-07-07

## 2022-07-07 VITALS
HEART RATE: 86 BPM | BODY MASS INDEX: 19.16 KG/M2 | DIASTOLIC BLOOD PRESSURE: 60 MMHG | WEIGHT: 115 LBS | SYSTOLIC BLOOD PRESSURE: 100 MMHG | OXYGEN SATURATION: 95 % | HEIGHT: 65 IN

## 2022-07-07 DIAGNOSIS — G30.1 LATE ONSET ALZHEIMER'S DEMENTIA WITH BEHAVIORAL DISTURBANCE: ICD-10-CM

## 2022-07-07 DIAGNOSIS — E03.9 ACQUIRED HYPOTHYROIDISM: ICD-10-CM

## 2022-07-07 DIAGNOSIS — H93.12 TINNITUS OF LEFT EAR: ICD-10-CM

## 2022-07-07 DIAGNOSIS — B37.0 ORAL THRUSH: ICD-10-CM

## 2022-07-07 DIAGNOSIS — I10 PRIMARY HYPERTENSION: ICD-10-CM

## 2022-07-07 DIAGNOSIS — M1A.9XX0 CHRONIC GOUT INVOLVING TOE WITHOUT TOPHUS, UNSPECIFIED CAUSE, UNSPECIFIED LATERALITY: ICD-10-CM

## 2022-07-07 DIAGNOSIS — I50.22 CHRONIC SYSTOLIC HEART FAILURE: ICD-10-CM

## 2022-07-07 DIAGNOSIS — Z00.00 ENCOUNTER FOR MEDICAL EXAMINATION TO ESTABLISH CARE: Primary | ICD-10-CM

## 2022-07-07 DIAGNOSIS — F02.818 LATE ONSET ALZHEIMER'S DEMENTIA WITH BEHAVIORAL DISTURBANCE: ICD-10-CM

## 2022-07-07 DIAGNOSIS — H91.93 BILATERAL HEARING LOSS, UNSPECIFIED HEARING LOSS TYPE: ICD-10-CM

## 2022-07-07 DIAGNOSIS — G44.229 CHRONIC TENSION-TYPE HEADACHE, NOT INTRACTABLE: ICD-10-CM

## 2022-07-07 PROCEDURE — 99214 OFFICE O/P EST MOD 30 MIN: CPT | Performed by: STUDENT IN AN ORGANIZED HEALTH CARE EDUCATION/TRAINING PROGRAM

## 2022-07-07 RX ORDER — ALLOPURINOL 100 MG/1
100 TABLET ORAL NIGHTLY
Qty: 30 TABLET | Refills: 2 | Status: ON HOLD | OUTPATIENT
Start: 2022-07-07 | End: 2022-09-05

## 2022-07-07 RX ORDER — ALLOPURINOL 100 MG/1
100 TABLET ORAL NIGHTLY
COMMUNITY
End: 2022-07-07

## 2022-07-07 NOTE — PROGRESS NOTES
New Patient Office Visit      Patient Name: Scott Diego  : 4/10/1930   MRN: [id number]   Care Team: Patient Care Team:  Ngozi Davis DO as PCP - General (Internal Medicine)  Jose Antonio Aragon MD as Consulting Physician (Cardiology)    Chief Complaint:    Chief Complaint   Patient presents with   • Establish Care   • Headache     Sharp pain in temple       • Nausea   • Tinnitus     All the time        History of Present Illness: Scott Diego is a 89 y.o. female who is here today to establish care.  She presents with her son who helps with history.     Follows with Neurology, [facility]AS[facility] Gordon for dementia. Lives with son, requires assistance 24 hours for ADLs. Sometimes forgets family members names. Taking Memantine 5mg daily - recently started this and feels it has been somewhat helpful. Taking Xanax twice daily which helps with mood stability and outbursts. Son is concerned with low PO intake, she is not interested in much food or water which has been unchanged over the past year.    Hypothyroid - compliant with synthroid  50mcg daily    Tinnitus, L>R. Gradually worsening over the past few years. Has become more prominent. admits to bilateral hearing loss. No hearing aids.     Recurrent headaches and nausea - improves some with zofran. Follows with neurology. She has very little water intake, less than 16oz per day    Chronic systolic heart failure, HTN, CAD - following with [facility] cardiology. She takes lasix prn leg swelling, weight gain, orthopnea. Feeling well today. Compliant with ASA81, statin, metoprolol.      Follows with [facility] for foot care     Subjective      Review of Systems:   Review of Systems - See HPI    Past Medical History:   Past Medical History:   Diagnosis Date   • Anxiety    • Congestive heart failure (HCC)    • Coronary artery disease    • Depression    • Hyperlipidemia    • Malignant neoplasm (HCC)    • Stroke (HCC)     mini stroke   • Systolic  heart failure (HCC) 09/25/2017    Chronic/compensated (EF 25%)       Past Surgical History:   Past Surgical History:   Procedure Laterality Date   • CARDIAC CATHETERIZATION     • CHOLECYSTECTOMY     • EYE SURGERY     • HYSTERECTOMY         Family History:   Family History   Problem Relation Age of Onset   • Cancer Mother    • No Known Problems Father    • Cancer Sister    • Cancer Brother    • Parkinsonism Brother        Social History:   Social History     Socioeconomic History   • Marital status:    Tobacco Use   • Smoking status: Never Smoker   • Smokeless tobacco: Never Used   Vaping Use   • Vaping Use: Never used   Substance and Sexual Activity   • Alcohol use: Never   • Drug use: Never   • Sexual activity: Defer       Tobacco History:   Social History     Tobacco Use   Smoking Status Never Smoker   Smokeless Tobacco Never Used       Medications:     Current Outpatient Medications:   •  acetaminophen (TYLENOL) 325 MG tablet, Take 2 tablets by mouth Every 4 (Four) Hours As Needed for Mild Pain ., Disp: , Rfl:   •  allopurinol (ZYLOPRIM) 100 MG tablet, Take 1 tablet by mouth Every Night., Disp: 30 tablet, Rfl: 2  •  ALPRAZolam (XANAX) 0.25 MG tablet, Take 1 tablet by mouth 3 (Three) Times a Day As Needed for Anxiety (and agitation)., Disp: 90 tablet, Rfl: 1  •  Artificial Tear Ointment (artificial tears) ophthalmic ointment, Administer 1 application to both eyes Daily., Disp: , Rfl:   •  atorvastatin (LIPITOR) 80 MG tablet, Take 80 mg by mouth Every Night., Disp: , Rfl:   •  bisacodyl (Dulcolax) 10 MG suppository, Insert 1 suppository into the rectum Daily As Needed for Constipation., Disp: 15 suppository, Rfl: 1  •  carboxymethylcellulose (REFRESH PLUS) 0.5 % solution, 3 (Three) Times a Day As Needed for Dry Eyes., Disp: , Rfl:   •  famotidine (PEPCID) 20 MG tablet, Take 20 mg by mouth Daily., Disp: , Rfl:   •  fexofenadine (ALLEGRA) 180 MG tablet, Take 1 tablet by mouth Daily., Disp: 90 tablet, Rfl: 1  •  " furosemide (LASIX) 20 MG tablet, Take 1 tablet by mouth Daily As Needed (increased shortness of breath, swelling)., Disp: 90 tablet, Rfl: 0  •  gabapentin (NEURONTIN) 300 MG capsule, Take 1 capsule by mouth Every Night., Disp: 90 capsule, Rfl: 1  •  Glycerin-Polysorbate 80 (REFRESH DRY EYE THERAPY OP), Apply  to eye(s) as directed by provider As Needed., Disp: , Rfl:   •  levothyroxine (SYNTHROID, LEVOTHROID) 50 MCG tablet, Take 50 mcg by mouth Every Morning., Disp: , Rfl:   •  memantine (NAMENDA) 5 MG tablet, Take 1 tablet daily for 4 weeks then 1 tablet twice a day and continue, Disp: 180 tablet, Rfl: 1  •  metoprolol succinate XL (TOPROL-XL) 25 MG 24 hr tablet, TAKE 1 TABLET BY MOUTH EVERY DAY, Disp: 90 tablet, Rfl: 0  •  nystatin (MYCOSTATIN) 100,000 unit/mL suspension, Swish and swallow 5mLs by mouth 4 times daily, Disp: 60 mL, Rfl: 0  •  ofloxacin (OCUFLOX) 0.3 % ophthalmic solution, Administer 1 drop to both eyes 3 (Three) Times a Day. For 10 days, started 03-01-22, Disp: , Rfl:   •  ondansetron ODT (Zofran ODT) 4 MG disintegrating tablet, Place 1 tablet on the tongue Every 8 (Eight) Hours As Needed for Nausea or Vomiting., Disp: 20 tablet, Rfl: 0  •  SM Aspirin Adult Low Strength 81 MG EC tablet, Take 81 mg by mouth Daily., Disp: , Rfl:     Allergies:   Allergies   Allergen Reactions   • Augmentin [Amoxicillin-Pot Clavulanate] Nausea And Vomiting     Makes her sick at her stomach   • Claritin [Loratadine] Unknown (See Comments)     Doesn't remember   • Keflex [Cephalexin] Nausea And Vomiting     Makes pt sick at her stomach   • Sulfa Antibiotics Other (See Comments)     Does not remember       Objective     Physical Exam:  Vital Signs:   Vitals:    07/07/22 0951   BP: 100/60   Pulse: 86   SpO2: 95%   Weight: 52.2 kg (115 lb)   Height: 163.8 cm (64.5\")     Body mass index is 19.43 kg/m².     Physical Exam  Vitals reviewed.   Constitutional:       Appearance: Normal appearance.   HENT:      Mouth/Throat:      " Comments: Mild thrush present on tongue  Cardiovascular:      Rate and Rhythm: Normal rate.      Pulses: Normal pulses.   Pulmonary:      Effort: Pulmonary effort is normal. No respiratory distress.   Skin:     General: Skin is warm and dry.   Neurological:      General: No focal deficit present.      Mental Status: She is alert. Mental status is at baseline.      Comments: Tangential, difficulty following conversation and responding appropriately due to forgetfulness    Psychiatric:         Mood and Affect: Mood normal.         Behavior: Behavior normal.         Judgment: Judgment normal.         Assessment / Plan      Assessment/Plan:   Problems Addressed This Visit  Diagnoses and all orders for this visit:    1. Encounter for medical examination to establish care (Primary)  Discussed importance of preventative care including vaccinations, age appropriate cancer screening, routine lab work, healthy diet, and active lifestyle.    2. Late onset Alzheimer's dementia with behavioral disturbance (HCC)  Follows with Neurology, [facility] Gordon. Lives with son and requires 24 hour assist. Reports bilateral hearing loss, referred to ENT to discuss tinnitus and hearing loss. If she could benefit from hearing aids this may help with confusion.  Some improvement with memantine     3. Tinnitus of left ear  4. Bilateral hearing loss, unspecified hearing loss type  -     Ambulatory Referral to ENT (Otolaryngology)  Suspect tinnitus is due to sensorineural hearing loss. Will refer to ENT. She would likely benefit from hearing aid.    5. Acquired hypothyroidism  continue synthroid 50 mcg daily. TSH wnl 6/2022    6. Primary hypertension  Well controlled with current metoprolol     7. Chronic systolic heart failure (HCC)  Appears euvolemic today. Taking lasix 20mg as needed for bilateral LE edema, orthopnea, or weight gain. Following with cardiology.     8. Chronic gout involving toe without tophus, unspecified cause,  unspecified laterality  -     allopurinol (ZYLOPRIM) 100 MG tablet; Take 1 tablet by mouth Every Night.  Dispense: 30 tablet; Refill: 2    9. Oral thrush  -     nystatin (MYCOSTATIN) 100,000 unit/mL suspension; Swish and swallow 5mLs by mouth 4 times daily  Dispense: 60 mL; Refill: 0    10. Headaches, tension type  She reports history of recurrent headaches over the past several months-years but then states they are no longer bothersome, son states she complains often of headaches but patient unable to recall. Poor historian but she denies active headache. Discussed the importance of avoiding NSAIDs with renal insufficiency. Son and patient would like to avoid additional medications if possible. We discussed the importance of adequate hydration as her poor PO intake may be contributing to headaches. She would like to try increasing fluid intake first and will let me know if headaches persist. She also has appointment with neurology later this month and plans to discuss if still bothersome.        Plan of care reviewed with patient at the conclusion of today's visit. Education was provided regarding diagnosis and management.  Patient verbalizes understanding of and agreement with management plan.      Follow Up:   Return in about 3 months (around 10/7/2022) for Recheck - needs to have 30 minute appointments.          DO GONZALO Fink RD  [facility]  [address]  [facility] [phone]  Fax [phone]  Phone [phone]

## 2022-07-21 DIAGNOSIS — F02.818 LATE ONSET ALZHEIMER'S DEMENTIA WITH BEHAVIORAL DISTURBANCE: ICD-10-CM

## 2022-07-21 DIAGNOSIS — G62.9 NEUROPATHY: ICD-10-CM

## 2022-07-21 DIAGNOSIS — G30.1 LATE ONSET ALZHEIMER'S DEMENTIA WITH BEHAVIORAL DISTURBANCE: ICD-10-CM

## 2022-07-21 DIAGNOSIS — J30.1 SEASONAL ALLERGIC RHINITIS DUE TO POLLEN: ICD-10-CM

## 2022-07-21 RX ORDER — FEXOFENADINE HYDROCHLORIDE 60 MG/1
60 TABLET, FILM COATED ORAL DAILY
Qty: 30 TABLET | Refills: 5 | Status: SHIPPED | OUTPATIENT
Start: 2022-07-21

## 2022-07-21 RX ORDER — ALPRAZOLAM 0.25 MG/1
0.25 TABLET ORAL 3 TIMES DAILY PRN
Qty: 90 TABLET | Refills: 1 | OUTPATIENT
Start: 2022-07-21

## 2022-07-21 RX ORDER — METOPROLOL SUCCINATE 25 MG/1
25 TABLET, EXTENDED RELEASE ORAL DAILY
Qty: 30 TABLET | Refills: 5 | Status: SHIPPED | OUTPATIENT
Start: 2022-07-21 | End: 2023-01-09

## 2022-07-21 RX ORDER — GABAPENTIN 300 MG/1
300 CAPSULE ORAL NIGHTLY
Qty: 90 CAPSULE | Refills: 0 | Status: SHIPPED | OUTPATIENT
Start: 2022-07-21 | End: 2022-10-24

## 2022-07-21 RX ORDER — LEVOTHYROXINE SODIUM 0.05 MG/1
50 TABLET ORAL
Qty: 30 TABLET | Refills: 5 | Status: SHIPPED | OUTPATIENT
Start: 2022-07-21

## 2022-07-21 NOTE — TELEPHONE ENCOUNTER
Rx Refill Note  Requested Prescriptions     Pending Prescriptions Disp Refills   • gabapentin (NEURONTIN) 300 MG capsule 90 capsule 1     Sig: Take 1 capsule by mouth Every Night.   • metoprolol succinate XL (TOPROL-XL) 25 MG 24 hr tablet 90 tablet 0     Sig: Take 1 tablet by mouth Daily.   • levothyroxine (SYNTHROID, LEVOTHROID) 50 MCG tablet       Sig: Take 1 tablet by mouth Every Morning.   • ALPRAZolam (XANAX) 0.25 MG tablet 90 tablet 1     Sig: Take 1 tablet by mouth 3 (Three) Times a Day As Needed for Anxiety (and agitation).   • fexofenadine (ALLEGRA) 180 MG tablet 90 tablet 1     Sig: Take 1 tablet by mouth Daily.      Last office visit with prescribing clinician: 7/7/2022      Next office visit with prescribing clinician: 10/7/2022            Anastacio Alcantar MA  07/21/22, 14:16 EDT

## 2022-07-21 NOTE — TELEPHONE ENCOUNTER
Caller: VAUGHN GASPAR    Relationship: Emergency Contact    Best call back number: [phone]    Requested Prescriptions:   Requested Prescriptions     Pending Prescriptions Disp Refills   • gabapentin (NEURONTIN) 300 MG capsule 90 capsule 1     Sig: Take 1 capsule by mouth Every Night.   • metoprolol succinate XL (TOPROL-XL) 25 MG 24 hr tablet 90 tablet 0     Sig: Take 1 tablet by mouth Daily.    SM GENTLE 5MG (LAXATIVE)  fexofenadine (ALLEGRA) 180 MG tablet     AND  ALPRAZolam (XANAX) 0.25 MG tablet       Pharmacy where request should be sent:    [facility] [id number] [place] [id number]  [phone] [facility] [phone] FX        Does the patient have less than a 3 day supply:  [x] Yes  [] No    Milla Wheeler Rep   07/21/22 13:34 EDT

## 2022-07-22 LAB
ALBUMIN SERPL-MCNC: 3.7 G/DL (ref 3.5–5.2)
ALBUMIN/GLOB SERPL: 0.9 G/DL
ALP SERPL-CCNC: 44 U/L (ref 39–117)
ALT SERPL W P-5'-P-CCNC: 11 U/L (ref 1–33)
ANION GAP SERPL CALCULATED.3IONS-SCNC: 6 MMOL/L (ref 5–15)
AST SERPL-CCNC: 24 U/L (ref 1–32)
BASOPHILS # BLD AUTO: 0.08 10*3/MM3 (ref 0–0.2)
BASOPHILS NFR BLD AUTO: 1.4 % (ref 0–1.5)
BILIRUB SERPL-MCNC: 0.3 MG/DL (ref 0–1.2)
BUN SERPL-MCNC: 16 MG/DL (ref 8–23)
BUN/CREAT SERPL: 11.8 (ref 7–25)
CALCIUM SPEC-SCNC: 9.3 MG/DL (ref 8.2–9.6)
CHLORIDE SERPL-SCNC: 103 MMOL/L (ref 98–107)
CO2 SERPL-SCNC: 29 MMOL/L (ref 22–29)
CREAT SERPL-MCNC: 1.36 MG/DL (ref 0.57–1)
D-LACTATE SERPL-SCNC: 1.2 MMOL/L (ref 0.5–2)
DEPRECATED RDW RBC AUTO: 47.1 FL (ref 37–54)
EGFRCR SERPLBLD CKD-EPI 2021: 36.6 ML/MIN/1.73
EOSINOPHIL # BLD AUTO: 0.35 10*3/MM3 (ref 0–0.4)
EOSINOPHIL NFR BLD AUTO: 5.9 % (ref 0.3–6.2)
ERYTHROCYTE [DISTWIDTH] IN BLOOD BY AUTOMATED COUNT: 13.7 % (ref 12.3–15.4)
GLOBULIN UR ELPH-MCNC: 3.9 GM/DL
GLUCOSE SERPL-MCNC: 97 MG/DL (ref 65–99)
HCT VFR BLD AUTO: 34.6 % (ref 34–46.6)
HGB BLD-MCNC: 11.1 G/DL (ref 12–15.9)
HOLD SPECIMEN: NORMAL
HOLD SPECIMEN: NORMAL
IMM GRANULOCYTES # BLD AUTO: 0.01 10*3/MM3 (ref 0–0.05)
IMM GRANULOCYTES NFR BLD AUTO: 0.2 % (ref 0–0.5)
LIPASE SERPL-CCNC: 38 U/L (ref 13–60)
LYMPHOCYTES # BLD AUTO: 1.48 10*3/MM3 (ref 0.7–3.1)
LYMPHOCYTES NFR BLD AUTO: 25 % (ref 19.6–45.3)
MCH RBC QN AUTO: 30.4 PG (ref 26.6–33)
MCHC RBC AUTO-ENTMCNC: 32.1 G/DL (ref 31.5–35.7)
MCV RBC AUTO: 94.8 FL (ref 79–97)
MONOCYTES # BLD AUTO: 0.86 10*3/MM3 (ref 0.1–0.9)
MONOCYTES NFR BLD AUTO: 14.6 % (ref 5–12)
NEUTROPHILS NFR BLD AUTO: 3.13 10*3/MM3 (ref 1.7–7)
NEUTROPHILS NFR BLD AUTO: 52.9 % (ref 42.7–76)
NRBC BLD AUTO-RTO: 0 /100 WBC (ref 0–0.2)
PLATELET # BLD AUTO: 227 10*3/MM3 (ref 140–450)
PMV BLD AUTO: 11.2 FL (ref 6–12)
POTASSIUM SERPL-SCNC: 4.8 MMOL/L (ref 3.5–5.2)
PROT SERPL-MCNC: 7.6 G/DL (ref 6–8.5)
RBC # BLD AUTO: 3.65 10*6/MM3 (ref 3.77–5.28)
SODIUM SERPL-SCNC: 138 MMOL/L (ref 136–145)
WBC NRBC COR # BLD: 5.91 10*3/MM3 (ref 3.4–10.8)
WHOLE BLOOD HOLD COAG: NORMAL
WHOLE BLOOD HOLD SPECIMEN: NORMAL

## 2022-07-22 PROCEDURE — 85025 COMPLETE CBC W/AUTO DIFF WBC: CPT

## 2022-07-22 PROCEDURE — 80053 COMPREHEN METABOLIC PANEL: CPT

## 2022-07-22 PROCEDURE — 99283 EMERGENCY DEPT VISIT LOW MDM: CPT

## 2022-07-22 PROCEDURE — 83690 ASSAY OF LIPASE: CPT

## 2022-07-22 PROCEDURE — 83605 ASSAY OF LACTIC ACID: CPT

## 2022-07-22 RX ORDER — SODIUM CHLORIDE 9 MG/ML
10 INJECTION INTRAVENOUS AS NEEDED
Status: DISCONTINUED | OUTPATIENT
Start: 2022-07-22 | End: 2022-07-23 | Stop reason: HOSPADM

## 2022-07-23 ENCOUNTER — APPOINTMENT (OUTPATIENT)
Dept: CT IMAGING | Facility: HOSPITAL | Age: 87
End: 2022-07-23

## 2022-07-23 ENCOUNTER — HOSPITAL ENCOUNTER (EMERGENCY)
Facility: HOSPITAL | Age: 87
Discharge: HOME | End: 2022-07-23
Attending: EMERGENCY MEDICINE | Admitting: EMERGENCY MEDICINE

## 2022-07-23 VITALS
RESPIRATION RATE: 16 BRPM | BODY MASS INDEX: 19.63 KG/M2 | TEMPERATURE: 98.4 F | SYSTOLIC BLOOD PRESSURE: 133 MMHG | OXYGEN SATURATION: 98 % | WEIGHT: 115 LBS | DIASTOLIC BLOOD PRESSURE: 67 MMHG | HEIGHT: 64 IN | HEART RATE: 86 BPM

## 2022-07-23 DIAGNOSIS — R11.0 NAUSEA: ICD-10-CM

## 2022-07-23 DIAGNOSIS — H93.13 TINNITUS OF BOTH EARS: ICD-10-CM

## 2022-07-23 DIAGNOSIS — N39.0 ACUTE UTI: ICD-10-CM

## 2022-07-23 DIAGNOSIS — K59.09 OTHER CONSTIPATION: Primary | ICD-10-CM

## 2022-07-23 DIAGNOSIS — R51.9 GENERALIZED HEADACHE: ICD-10-CM

## 2022-07-23 LAB
BACTERIA UR QL AUTO: ABNORMAL /HPF
BILIRUB UR QL STRIP: NEGATIVE
CLARITY UR: ABNORMAL
COLOR UR: YELLOW
GLUCOSE UR STRIP-MCNC: NEGATIVE MG/DL
HGB UR QL STRIP.AUTO: NEGATIVE
HOLD SPECIMEN: NORMAL
HYALINE CASTS UR QL AUTO: ABNORMAL /LPF
KETONES UR QL STRIP: ABNORMAL
LEUKOCYTE ESTERASE UR QL STRIP.AUTO: ABNORMAL
NITRITE UR QL STRIP: POSITIVE
PH UR STRIP.AUTO: 6.5 [PH] (ref 5–8)
PROT UR QL STRIP: ABNORMAL
RBC # UR STRIP: ABNORMAL /HPF
REF LAB TEST METHOD: ABNORMAL
SP GR UR STRIP: 1.02 (ref 1–1.03)
SQUAMOUS #/AREA URNS HPF: ABNORMAL /HPF
UROBILINOGEN UR QL STRIP: ABNORMAL
WBC # UR STRIP: ABNORMAL /HPF

## 2022-07-23 PROCEDURE — 87147 CULTURE TYPE IMMUNOLOGIC: CPT | Performed by: PHYSICIAN ASSISTANT

## 2022-07-23 PROCEDURE — 25010000002 CEFTRIAXONE PER 250 MG: Performed by: PHYSICIAN ASSISTANT

## 2022-07-23 PROCEDURE — 74176 CT ABD & PELVIS W/O CONTRAST: CPT

## 2022-07-23 PROCEDURE — 96365 THER/PROPH/DIAG IV INF INIT: CPT

## 2022-07-23 PROCEDURE — 70450 CT HEAD/BRAIN W/O DYE: CPT

## 2022-07-23 PROCEDURE — 36415 COLL VENOUS BLD VENIPUNCTURE: CPT

## 2022-07-23 PROCEDURE — 81001 URINALYSIS AUTO W/SCOPE: CPT | Performed by: EMERGENCY MEDICINE

## 2022-07-23 PROCEDURE — 87086 URINE CULTURE/COLONY COUNT: CPT | Performed by: PHYSICIAN ASSISTANT

## 2022-07-23 PROCEDURE — 87186 SC STD MICRODIL/AGAR DIL: CPT | Performed by: PHYSICIAN ASSISTANT

## 2022-07-23 RX ORDER — CEFDINIR 300 MG/1
300 CAPSULE ORAL 2 TIMES DAILY
Qty: 14 CAPSULE | Refills: 0 | Status: SHIPPED | OUTPATIENT
Start: 2022-07-23 | End: 2022-07-31

## 2022-07-23 RX ORDER — POLYETHYLENE GLYCOL 3350 17 G/17G
17 POWDER, FOR SOLUTION ORAL DAILY
Qty: 10 PACKET | Refills: 0 | Status: SHIPPED | OUTPATIENT
Start: 2022-07-23 | End: 2022-08-02

## 2022-07-23 RX ADMIN — SODIUM CHLORIDE 1000 ML: 9 INJECTION, SOLUTION INTRAVENOUS at 03:30

## 2022-07-23 NOTE — ED PROVIDER NOTES
[place]    EMERGENCY DEPARTMENT ENCOUNTER      Pt Name: Scott Diego  MRN: [id number]  YOB: 1930  Date of evaluation: 7/22/2022  Provider: TOSIN Toussaint    CHIEF COMPLAINT       Chief Complaint   Patient presents with   • Nausea         HISTORY OF PRESENT ILLNESS  (Location/Symptom, Timing/Onset, Context/Setting, Quality, Duration, Modifying Factors, Severity.)   Scott Diego is a 89 y.o. female who presents to the emergency department with complaints of nausea and upper abdominal pain. Per patient and son who is present at bedside, patient has been dealing with nausea daily for the last month. Son reports that she had been only taking Zofran daily but has had to increase Zofran to twice daily over the last several days. Son reports that he has continued with zofran as well as Mylanta for patients symptoms but they do not seem to be getting any better. Patient denies any vomiting. She shares she has had some constipation but is on a bowel regimen with a laxative AM&PM in addition to a stool softener. She denies any urinary complaints. She does report symptom of a headache and ringing in her ears which son shares she has a follow up appointment with Neurology to address. Son shares he believes his mother as dehydrated because despite his attempts for her to drink water, she only drinks her coffee.     HPI   Nursing notes were reviewed.    REVIEW OF SYSTEMS    (2-9 systems for level 4, 10 or more for level 5)   Review of Systems   Constitutional: Positive for activity change and appetite change. Negative for chills and fever.   Gastrointestinal: Positive for abdominal pain, constipation and nausea. Negative for diarrhea and vomiting.        All systems reviewed and negative except for those discussed in HPI.   PAST MEDICAL HISTORY     Past Medical History:   Diagnosis Date   • Anxiety    • Congestive heart failure (HCC)    • Coronary artery disease    • Depression    • Hyperlipidemia    •  Malignant neoplasm (HCC)    • Stroke (HCC)     mini stroke   • Systolic heart failure (HCC) 09/25/2017    Chronic/compensated (EF 25%)         SURGICAL HISTORY       Past Surgical History:   Procedure Laterality Date   • CARDIAC CATHETERIZATION     • CHOLECYSTECTOMY     • EYE SURGERY     • HYSTERECTOMY           CURRENT MEDICATIONS     No current facility-administered medications for this encounter.    Current Outpatient Medications:   •  acetaminophen (TYLENOL) 325 MG tablet, Take 2 tablets by mouth Every 4 (Four) Hours As Needed for Mild Pain ., Disp: , Rfl:   •  allopurinol (ZYLOPRIM) 100 MG tablet, Take 1 tablet by mouth Every Night., Disp: 30 tablet, Rfl: 2  •  ALPRAZolam (XANAX) 0.25 MG tablet, Take 1 tablet by mouth 3 (Three) Times a Day As Needed for Anxiety (and agitation)., Disp: 90 tablet, Rfl: 1  •  Artificial Tear Ointment (artificial tears) ophthalmic ointment, Administer 1 application to both eyes Daily., Disp: , Rfl:   •  atorvastatin (LIPITOR) 80 MG tablet, Take 80 mg by mouth Every Night., Disp: , Rfl:   •  bisacodyl (Dulcolax) 10 MG suppository, Insert 1 suppository into the rectum Daily As Needed for Constipation., Disp: 15 suppository, Rfl: 1  •  carboxymethylcellulose (REFRESH PLUS) 0.5 % solution, 3 (Three) Times a Day As Needed for Dry Eyes., Disp: , Rfl:   •  cefdinir (OMNICEF) 300 MG capsule, Take 1 capsule by mouth 2 (Two) Times a Day for 7 days., Disp: 14 capsule, Rfl: 0  •  famotidine (PEPCID) 20 MG tablet, Take 20 mg by mouth Daily., Disp: , Rfl:   •  fexofenadine (ALLEGRA) 60 MG tablet, Take 1 tablet by mouth Daily., Disp: 30 tablet, Rfl: 5  •  furosemide (LASIX) 20 MG tablet, Take 1 tablet by mouth Daily As Needed (increased shortness of breath, swelling)., Disp: 90 tablet, Rfl: 0  •  gabapentin (NEURONTIN) 300 MG capsule, Take 1 capsule by mouth Every Night., Disp: 90 capsule, Rfl: 0  •  Glycerin-Polysorbate 80 (REFRESH DRY EYE THERAPY OP), Apply  to eye(s) as directed by provider As  Needed., Disp: , Rfl:   •  levothyroxine (SYNTHROID, LEVOTHROID) 50 MCG tablet, Take 1 tablet by mouth Every Morning., Disp: 30 tablet, Rfl: 5  •  memantine (NAMENDA) 5 MG tablet, Take 1 tablet daily for 4 weeks then 1 tablet twice a day and continue, Disp: 180 tablet, Rfl: 1  •  metoprolol succinate XL (TOPROL-XL) 25 MG 24 hr tablet, Take 1 tablet by mouth Daily., Disp: 30 tablet, Rfl: 5  •  nystatin (MYCOSTATIN) 100,000 unit/mL suspension, Swish and swallow 5mLs by mouth 4 times daily, Disp: 60 mL, Rfl: 0  •  ofloxacin (OCUFLOX) 0.3 % ophthalmic solution, Administer 1 drop to both eyes 3 (Three) Times a Day. For 10 days, started 03-01-22, Disp: , Rfl:   •  ondansetron ODT (Zofran ODT) 4 MG disintegrating tablet, Place 1 tablet on the tongue Every 8 (Eight) Hours As Needed for Nausea or Vomiting., Disp: 20 tablet, Rfl: 0  •  polyethylene glycol (MIRALAX) 17 g packet, Take 17 g by mouth Daily for 10 days., Disp: 10 packet, Rfl: 0  •  SM Aspirin Adult Low Strength 81 MG EC tablet, Take 81 mg by mouth Daily., Disp: , Rfl:     ALLERGIES     Augmentin [amoxicillin-pot clavulanate], Claritin [loratadine], Keflex [cephalexin], and Sulfa antibiotics    FAMILY HISTORY       Family History   Problem Relation Age of Onset   • Cancer Mother    • No Known Problems Father    • Cancer Sister    • Cancer Brother    • Parkinsonism Brother           SOCIAL HISTORY       Social History     Socioeconomic History   • Marital status:    Tobacco Use   • Smoking status: Never Smoker   • Smokeless tobacco: Never Used   Vaping Use   • Vaping Use: Never used   Substance and Sexual Activity   • Alcohol use: Never   • Drug use: Never   • Sexual activity: Defer         PHYSICAL EXAM    (up to 7 for level 4, 8 or more for level 5)   Physical Exam  Vitals and nursing note reviewed.   Constitutional:       General: She is not in acute distress.     Appearance: Normal appearance. She is well-developed. She is not ill-appearing or  toxic-appearing.   HENT:      Head: Normocephalic and atraumatic.      Nose: Nose normal.      Mouth/Throat:      Mouth: Mucous membranes are dry.   Eyes:      Extraocular Movements: Extraocular movements intact.   Cardiovascular:      Rate and Rhythm: Normal rate and regular rhythm.   Pulmonary:      Effort: Pulmonary effort is normal. No respiratory distress.      Breath sounds: Normal breath sounds.   Abdominal:      General: There is no distension.      Palpations: Abdomen is soft.      Tenderness: There is no abdominal tenderness.   Musculoskeletal:         General: Normal range of motion.      Cervical back: Normal range of motion.   Skin:     General: Skin is warm and dry.   Neurological:      General: No focal deficit present.      Mental Status: She is alert.   Psychiatric:         Behavior: Behavior normal.         Thought Content: Thought content normal.         Judgment: Judgment normal.          DIAGNOSTIC RESULTS     EKG: All EKGs are interpreted by the Emergency Department Physician who either signs or Co-signs this chart in the absence of a cardiologist.    No orders to display       RADIOLOGY:   Non-plain film images such as CT, Ultrasound and MRI are read by the radiologist. Plain radiographic images are visualized and preliminarily interpreted by the emergency physician with the below findings:      [x] Radiologist's Report Reviewed:  CT Abdomen Pelvis Without Contrast   Final Result      1. No evidence of acute disease in the abdomen or pelvis, accounting for the limitations of the noncontrast technique.   2. Large colonic stool burden.   3. Cardiomegaly with trace pericardial effusion.   4. Additional chronic and postoperative findings as described above.      Electronically signed by:  Sergio Angeles M.D.     7/23/2022 1:48 AM Mountain Time      CT Head Without Contrast   Final Result   1. No acute intracranial process. No change from 7/2/2022.   2. Mild changes small vessel ischemic disease of  indeterminate age, presumably mostly chronic. Volume loss. Atherosclerosis.                Electronically signed by:  Aleksandar Morton M.D.     7/23/2022 1:47 AM Mountain Time            ED BEDSIDE ULTRASOUND:   Performed by ED Physician - none    LABS:    I have reviewed and interpreted all of the currently available lab results from this visit (if applicable):  Results for orders placed or performed during the hospital encounter of 07/23/22   Urine Culture - Urine, Urine, Clean Catch    Specimen: Urine, Clean Catch   Result Value Ref Range    Urine Culture >100,000 CFU/mL Staphylococcus aureus (A)    Comprehensive Metabolic Panel    Specimen: Blood   Result Value Ref Range    Glucose 97 65 - 99 mg/dL    BUN 16 8 - 23 mg/dL    Creatinine 1.36 (H) 0.57 - 1.00 mg/dL    Sodium 138 136 - 145 mmol/L    Potassium 4.8 3.5 - 5.2 mmol/L    Chloride 103 98 - 107 mmol/L    CO2 29.0 22.0 - 29.0 mmol/L    Calcium 9.3 8.2 - 9.6 mg/dL    Total Protein 7.6 6.0 - 8.5 g/dL    Albumin 3.70 3.50 - 5.20 g/dL    ALT (SGPT) 11 1 - 33 U/L    AST (SGOT) 24 1 - 32 U/L    Alkaline Phosphatase 44 39 - 117 U/L    Total Bilirubin 0.3 0.0 - 1.2 mg/dL    Globulin 3.9 gm/dL    A/G Ratio 0.9 g/dL    BUN/Creatinine Ratio 11.8 7.0 - 25.0    Anion Gap 6.0 5.0 - 15.0 mmol/L    eGFR 36.6 (L) >60.0 mL/min/1.73   Lipase    Specimen: Blood   Result Value Ref Range    Lipase 38 13 - 60 U/L   Urinalysis With Microscopic If Indicated (No Culture) - Urine, Clean Catch    Specimen: Urine, Clean Catch   Result Value Ref Range    Color, UA Yellow Yellow, Straw    Appearance, UA Turbid (A) Clear    pH, UA 6.5 5.0 - 8.0    Specific Gravity, UA 1.021 1.001 - 1.030    Glucose, UA Negative Negative    Ketones, UA Trace (A) Negative    Bilirubin, UA Negative Negative    Blood, UA Negative Negative    Protein, UA Trace (A) Negative    Leuk Esterase, UA Large (3+) (A) Negative    Nitrite, UA Positive (A) Negative    Urobilinogen, UA 1.0 E.U./dL 0.2 - 1.0 E.U./dL    Lactic Acid, Plasma    Specimen: Blood   Result Value Ref Range    Lactate 1.2 0.5 - 2.0 mmol/L   CBC Auto Differential    Specimen: Blood   Result Value Ref Range    WBC 5.91 3.40 - 10.80 10*3/mm3    RBC 3.65 (L) 3.77 - 5.28 10*6/mm3    Hemoglobin 11.1 (L) 12.0 - 15.9 g/dL    Hematocrit 34.6 34.0 - 46.6 %    MCV 94.8 79.0 - 97.0 fL    MCH 30.4 26.6 - 33.0 pg    MCHC 32.1 31.5 - 35.7 g/dL    RDW 13.7 12.3 - 15.4 %    RDW-SD 47.1 37.0 - 54.0 fl    MPV 11.2 6.0 - 12.0 fL    Platelets 227 140 - 450 10*3/mm3    Neutrophil % 52.9 42.7 - 76.0 %    Lymphocyte % 25.0 19.6 - 45.3 %    Monocyte % 14.6 (H) 5.0 - 12.0 %    Eosinophil % 5.9 0.3 - 6.2 %    Basophil % 1.4 0.0 - 1.5 %    Immature Grans % 0.2 0.0 - 0.5 %    Neutrophils, Absolute 3.13 1.70 - 7.00 10*3/mm3    Lymphocytes, Absolute 1.48 0.70 - 3.10 10*3/mm3    Monocytes, Absolute 0.86 0.10 - 0.90 10*3/mm3    Eosinophils, Absolute 0.35 0.00 - 0.40 10*3/mm3    Basophils, Absolute 0.08 0.00 - 0.20 10*3/mm3    Immature Grans, Absolute 0.01 0.00 - 0.05 10*3/mm3    nRBC 0.0 0.0 - 0.2 /100 WBC   Urinalysis, Microscopic Only - Urine, Clean Catch    Specimen: Urine, Clean Catch   Result Value Ref Range    RBC, UA 0-2 None Seen, 0-2 /HPF    WBC, UA Too Numerous to Count (A) None Seen, 0-2 /HPF    Bacteria, UA 4+ (A) None Seen, Trace /HPF    Squamous Epithelial Cells, UA 21-30 (A) None Seen, 0-2 /HPF    Hyaline Casts, UA 0-6 0 - 6 /LPF    Methodology Automated Microscopy    Green Top (Gel)   Result Value Ref Range    Extra Tube Hold for add-ons.    Lavender Top   Result Value Ref Range    Extra Tube hold for add-on    Gold Top - SST   Result Value Ref Range    Extra Tube Hold for add-ons.    Gray Top   Result Value Ref Range    Extra Tube Hold for add-ons.    Light Blue Top   Result Value Ref Range    Extra Tube Hold for add-ons.         All other labs were within normal range or not returned as of this dictation.      EMERGENCY DEPARTMENT COURSE and DIFFERENTIAL DIAGNOSIS/MDM:    Vitals:    Vitals:    07/23/22 0400 07/23/22 0429 07/23/22 0430 07/23/22 0530   BP: 138/94  133/65 133/67   Pulse: 86      Resp:       Temp:       TempSrc:       SpO2: 96% 98% 98% 98%   Weight:       Height:           ED Course as of 07/24/22 1329   Sat Jul 23, 2022   0521 In summary this is a pleasant 89 year old female who presents to the ER with complaints of nausea and abdominal pain. No acute or emergent findings demonstrated on physical exam. Abdominal exam without peritoneal signs. No evidence of acute abdomen at this time. Well appearing. Low suspicion for acute hepatobiliary disease (includng acute cholecystitis), acute pancreatitis, PUD (including perforation), acute infectious processes (pneumonia, hepatitis, pyelonephritis), acute appendicitis, bowel obstruction or viscus perforation. Presentation not consistent with other acute, emergent causes of abdominal pain at this time. Labs obtained and reviewed demonstrate no acute or emergent abnormalities.  UA findings consistent with UTI. Initial dose of antibiotics given in ED with Rx provided for continued outpatient treatment. CT-scan of the abdomen and pelvis demonstrates no evidence of acute disease in the abdomen or pelvis with a large colonic stool burden. Patient also with complaints of headache and ringing in her ears. CT head shows no acute intracranial process. No change from 7/2/2022. At time of discharge disposition patient is afebrile, nontoxic appearing, vital signs stable and able to maintain O2 sats of 98% on room air. Patient will be discharged home with symptomatic care and outpatient follow up.  [JG]      ED Course User Index  [JG] Isiaas Crespo PA       [facility]  Number of Diagnoses or Management Options  Acute UTI: new, needed workup  Generalized headache: new, needed workup  Nausea: new, needed workup  Other constipation: new, needed workup  Tinnitus of both ears: new, needed workup     Amount and/or Complexity of Data Reviewed  Clinical  lab tests: reviewed  Tests in the radiology section of CPT®: reviewed    Risk of Complications, Morbidity, and/or Mortality  Presenting problems: moderate  Diagnostic procedures: moderate  Management options: moderate    Patient Progress  Patient progress: stable       I had a discussion with the patient/family regarding diagnosis, diagnostic results, treatment plan, and medications.  The patient/family indicated understanding of these instructions.  I spent adequate time at the bedside preceding discharge necessary to personally discuss the aftercare instructions, giving patient education, providing explanations of the results of our evaluations/findings, and my decision making to assure that the patient/family understand the plan of care.  Time was allotted to answer questions at that time and throughout the ED course.  Emphasis was placed on timely follow-up after discharge.  I also discussed the potential for the development of an acute emergent condition requiring further evaluation, admission, or even surgical intervention. I discussed that we found nothing during the visit today indicating the need for further workup, admission, or the presence of an unstable medical condition.  I encouraged the patient to return to the emergency department immediately for ANY concerns, worsening, new complaints, or if symptoms persist and unable to seek follow-up in a timely fashion.  The patient/family expressed understanding and agreement with this plan.  The patient will follow-up with primary care for reevaluation.       MEDICATIONS ADMINISTERED IN ED:  Medications   sodium chloride 0.9 % bolus 1,000 mL (0 mL Intravenous Stopped 7/23/22 0529)   cefTRIAXone (ROCEPHIN) 1 g/100 mL 0.9% NS (MBP) (0 g Intravenous Stopped 7/23/22 0603)       PROCEDURES:  Procedures          CRITICAL CARE TIME    Total Critical Care time was 0 minutes, excluding separately reportable procedures.   There was a high probability of clinically  significant/life threatening deterioration in the patient's condition which required my urgent intervention.      FINAL IMPRESSION      1. Other constipation    2. Nausea    3. Acute UTI    4. Generalized headache    5. Tinnitus of both ears          DISPOSITION/PLAN     ED Disposition     ED Disposition   Discharge    Condition   Stable    Comment   --             PATIENT REFERRED TO:  Ngozi Davis DO  [address] [facility] [address]  [phone]    Call   As needed    [facility] Emergency Department  [address] [address] [phone]  Go to   If symptoms worsen      DISCHARGE MEDICATIONS:     Medication List      START taking these medications    cefdinir 300 MG capsule  Commonly known as: OMNICEF  Take 1 capsule by mouth 2 (Two) Times a Day for 7 days.     polyethylene glycol 17 g packet  Commonly known as: MIRALAX  Take 17 g by mouth Daily for 10 days.        CONTINUE taking these medications    acetaminophen 325 MG tablet  Commonly known as: TYLENOL  Take 2 tablets by mouth Every 4 (Four) Hours As Needed for Mild Pain .     allopurinol 100 MG tablet  Commonly known as: ZYLOPRIM  Take 1 tablet by mouth Every Night.     ALPRAZolam 0.25 MG tablet  Commonly known as: XANAX  Take 1 tablet by mouth 3 (Three) Times a Day As Needed for Anxiety (and agitation).     artificial tears ophthalmic ointment     atorvastatin 80 MG tablet  Commonly known as: LIPITOR     bisacodyl 10 MG suppository  Commonly known as: Dulcolax  Insert 1 suppository into the rectum Daily As Needed for Constipation.     carboxymethylcellulose 0.5 % solution  Commonly known as: REFRESH PLUS     famotidine 20 MG tablet  Commonly known as: PEPCID     fexofenadine 60 MG tablet  Commonly known as: ALLEGRA  Take 1 tablet by mouth Daily.     furosemide 20 MG tablet  Commonly known as: LASIX  Take 1 tablet by mouth Daily As Needed (increased shortness of breath, swelling).     gabapentin 300  MG capsule  Commonly known as: NEURONTIN  Take 1 capsule by mouth Every Night.     levothyroxine 50 MCG tablet  Commonly known as: SYNTHROID, LEVOTHROID  Take 1 tablet by mouth Every Morning.     memantine 5 MG tablet  Commonly known as: NAMENDA  Take 1 tablet daily for 4 weeks then 1 tablet twice a day and continue     metoprolol succinate XL 25 MG 24 hr tablet  Commonly known as: TOPROL-XL  Take 1 tablet by mouth Daily.     nystatin 100,000 unit/mL suspension  Commonly known as: MYCOSTATIN  Swish and swallow 5mLs by mouth 4 times daily     ofloxacin 0.3 % ophthalmic solution  Commonly known as: OCUFLOX     ondansetron ODT 4 MG disintegrating tablet  Commonly known as: Zofran ODT  Place 1 tablet on the tongue Every 8 (Eight) Hours As Needed for Nausea or Vomiting.     REFRESH DRY EYE THERAPY OP     SM Aspirin Adult Low Strength 81 MG EC tablet  Generic drug: aspirin           Where to Get Your Medications      These medications were sent to [id number] - [place], [place] - [address] [phone]  - [phone] [place] [address]    Phone: [phone]   · cefdinir 300 MG capsule  · polyethylene glycol 17 g packet         Comment: Please note this report has been produced using speech recognition software.      TOSIN Toussaint Jason C, PA  07/24/22 0057       Isaias Crespo PA  07/24/22 2174

## 2022-07-25 DIAGNOSIS — R11.0 NAUSEA: ICD-10-CM

## 2022-07-25 LAB — BACTERIA SPEC AEROBE CULT: ABNORMAL

## 2022-07-26 NOTE — TELEPHONE ENCOUNTER
Rx Refill Note  Requested Prescriptions     Pending Prescriptions Disp Refills   • ondansetron ODT (ZOFRAN-ODT) 4 MG disintegrating tablet [Pharmacy Med Name: Ondansetron Oral Tablet Disintegrating 4 MG] 20 tablet 0     Sig: DISSOLVE 1 TABLET IN MOUTH EVERY EIGHT HOURS AS NEEDED FOR NAUSEA AND VOMITING      Last office visit with prescribing clinician: 6/8/2022      Next office visit with prescribing clinician: 7/27/2022            Zarina Adams CMA  07/26/22, 08:07 EDT

## 2022-07-27 ENCOUNTER — LAB (OUTPATIENT)
Dept: LAB | Facility: HOSPITAL | Age: 87
End: 2022-07-27

## 2022-07-27 ENCOUNTER — TELEPHONE (OUTPATIENT)
Dept: NEUROLOGY | Facility: CLINIC | Age: 87
End: 2022-07-27

## 2022-07-27 ENCOUNTER — PRIOR AUTHORIZATION (OUTPATIENT)
Dept: NEUROLOGY | Facility: CLINIC | Age: 87
End: 2022-07-27

## 2022-07-27 ENCOUNTER — OFFICE VISIT (OUTPATIENT)
Dept: NEUROLOGY | Facility: CLINIC | Age: 87
End: 2022-07-27

## 2022-07-27 VITALS
SYSTOLIC BLOOD PRESSURE: 120 MMHG | WEIGHT: 116 LBS | DIASTOLIC BLOOD PRESSURE: 76 MMHG | BODY MASS INDEX: 19.81 KG/M2 | HEART RATE: 54 BPM | OXYGEN SATURATION: 95 % | HEIGHT: 64 IN

## 2022-07-27 DIAGNOSIS — H93.12 TINNITUS OF LEFT EAR: ICD-10-CM

## 2022-07-27 DIAGNOSIS — R94.6 ABNORMAL THYROID FUNCTION TEST: ICD-10-CM

## 2022-07-27 DIAGNOSIS — R51.9 ACUTE NONINTRACTABLE HEADACHE, UNSPECIFIED HEADACHE TYPE: ICD-10-CM

## 2022-07-27 DIAGNOSIS — R42 ORTHOSTATIC DIZZINESS: ICD-10-CM

## 2022-07-27 DIAGNOSIS — G44.85 STABBING HEADACHE: ICD-10-CM

## 2022-07-27 DIAGNOSIS — G43.009 MIGRAINE WITHOUT AURA AND WITHOUT STATUS MIGRAINOSUS, NOT INTRACTABLE: Primary | ICD-10-CM

## 2022-07-27 DIAGNOSIS — E87.1 HYPONATREMIA: ICD-10-CM

## 2022-07-27 DIAGNOSIS — H91.93 BILATERAL HEARING LOSS, UNSPECIFIED HEARING LOSS TYPE: ICD-10-CM

## 2022-07-27 DIAGNOSIS — F02.818 LATE ONSET ALZHEIMER'S DEMENTIA WITH BEHAVIORAL DISTURBANCE: ICD-10-CM

## 2022-07-27 DIAGNOSIS — G30.1 LATE ONSET ALZHEIMER'S DEMENTIA WITH BEHAVIORAL DISTURBANCE: ICD-10-CM

## 2022-07-27 LAB
ANION GAP SERPL CALCULATED.3IONS-SCNC: 13.2 MMOL/L (ref 5–15)
BUN SERPL-MCNC: 15 MG/DL (ref 8–23)
BUN/CREAT SERPL: 11.5 (ref 7–25)
CALCIUM SPEC-SCNC: 9.4 MG/DL (ref 8.2–9.6)
CHLORIDE SERPL-SCNC: 102 MMOL/L (ref 98–107)
CO2 SERPL-SCNC: 23.8 MMOL/L (ref 22–29)
CREAT SERPL-MCNC: 1.31 MG/DL (ref 0.57–1)
CRP SERPL-MCNC: <0.3 MG/DL (ref 0–0.5)
EGFRCR SERPLBLD CKD-EPI 2021: 38.3 ML/MIN/1.73
ERYTHROCYTE [SEDIMENTATION RATE] IN BLOOD: 59 MM/HR (ref 0–30)
GLUCOSE SERPL-MCNC: 67 MG/DL (ref 65–99)
POTASSIUM SERPL-SCNC: 4.3 MMOL/L (ref 3.5–5.2)
SODIUM SERPL-SCNC: 139 MMOL/L (ref 136–145)
T4 FREE SERPL-MCNC: 1.21 NG/DL (ref 0.93–1.7)
TSH SERPL DL<=0.05 MIU/L-ACNC: 0.7 UIU/ML (ref 0.27–4.2)

## 2022-07-27 PROCEDURE — 36415 COLL VENOUS BLD VENIPUNCTURE: CPT

## 2022-07-27 PROCEDURE — 84443 ASSAY THYROID STIM HORMONE: CPT

## 2022-07-27 PROCEDURE — 86140 C-REACTIVE PROTEIN: CPT

## 2022-07-27 PROCEDURE — 85652 RBC SED RATE AUTOMATED: CPT

## 2022-07-27 PROCEDURE — 84439 ASSAY OF FREE THYROXINE: CPT

## 2022-07-27 PROCEDURE — 99214 OFFICE O/P EST MOD 30 MIN: CPT | Performed by: NURSE PRACTITIONER

## 2022-07-27 PROCEDURE — 80048 BASIC METABOLIC PNL TOTAL CA: CPT

## 2022-07-27 RX ORDER — MEMANTINE HYDROCHLORIDE 5 MG/1
5 TABLET ORAL 2 TIMES DAILY
Qty: 180 TABLET | Refills: 1 | Status: SHIPPED | OUTPATIENT
Start: 2022-07-27 | End: 2022-09-21 | Stop reason: SDUPTHER

## 2022-07-27 RX ORDER — LIDOCAINE HYDROCHLORIDE 20 MG/ML
SOLUTION OROPHARYNGEAL
COMMUNITY
Start: 2022-07-18 | End: 2022-09-10 | Stop reason: HOSPADM

## 2022-07-27 RX ORDER — FLUCONAZOLE 100 MG/1
TABLET ORAL
COMMUNITY
Start: 2022-07-18 | End: 2022-09-10 | Stop reason: HOSPADM

## 2022-07-27 RX ORDER — ONDANSETRON 4 MG/1
TABLET, ORALLY DISINTEGRATING ORAL
Qty: 20 TABLET | Refills: 2 | Status: SHIPPED | OUTPATIENT
Start: 2022-07-27 | End: 2022-08-22 | Stop reason: SDUPTHER

## 2022-07-27 RX ORDER — RIMEGEPANT SULFATE 75 MG/75MG
75 TABLET, ORALLY DISINTEGRATING ORAL EVERY OTHER DAY
Qty: 16 TABLET | Refills: 5 | Status: SHIPPED | OUTPATIENT
Start: 2022-07-27 | End: 2022-09-19

## 2022-07-27 NOTE — PROGRESS NOTES
Subjective:     Patient ID: Scott Diego is a 89 y.o. female.    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 At last visit, we did try to add in some memantine. We placed her on memantine 5 mg with titration to 1 tablet twice daily for her memory. She was previously on donepezil, but stopped taking that. She is accompanied by her son during today's visit. She does live at home with her son who is her primary caregiver.    She has had dementia symptoms since 2016 with forgetfulness, repetitiveness, and word finding issues which have worsened over time. She had an MRI in 2016. She does have impairments in all spheres of cognition. She does have impairments in basic ADLs. Her family manages medications and finances. She does not drive.     We also continued her alprazolam low dose for her to take 3 times daily as needed for anxiety and agitation. The patient's son states that the patient has not taken any medications for anxiety besides alprazolam. She has been on citalopram before and discontinued it due to worsening kidney function.     Since last visit, she has had several ER visits including 06/10/2022 ER visit for atypical chest pain and acute UTI. On 07/02/2022, she was seen at the ER for tinnitus, headaches, and nausea. On 07/23/2022, she was seen for constipation, nausea, another UTI, headaches and tinnitus. She has been having headaches and ringing in her left ear per her son, Noah Sinha who is with her today. She reports she has ringing in her left ear during today's visit. She states her headache has been going on for approximately 1 month. She localizes the pain in her forehead. She describes the pain as sharp and stabbing. She adds that  "she has not had headaches with the ringing in her head before, but she had migraine headaches in the past. She denies having migraines recently. The patient's son states that the patient has experienced headaches and nausea everyday in the last month and she described that it feels like \"somebody shot her\" and localized the pain in her temple occasionally, and in her forehead and the back of her head sometimes. He notices that the headaches seem to come and go and he waited a few minutes to see if the headaches improved before he gave her Tylenol; sometimes it did improve, but sometimes it did not. She states she takes Tylenol for her headaches. The patient's son states the patient has tried the children's Benadryl, but it did not help. She reports that her headaches worsen when she changes positions. She denies vertigo. She denies light and sound sensitivities with her headaches, however, due to her Alzheimer's diagnosis, she has trouble with giving accurate information about her symptoms.    The patient's son reports that the patient has had dizziness. She denies any falls. She notes that she does not drink plenty of fluid. She states she does not have any kind of metal in her body, but does not want to proceed with an MRI since she is concerned about lying flat and her breathing due to CHF during an MRI. The patient's son states that the patient has been referred to ENT, Dr. Se Law for her tinnitus, but has not had an appointment scheduled yet. She would like to see ENT first. She is not interested in doing physical therapy at this time.     The patient denies any vision changes. She states she had her eyes checked and got the same glasses.    The patient's son states the patient complains of itching \"all over\", but there is no rash.      Per the patient's records, she was seen for significant constipation at the ER and they thought it was contributing to nausea. She states she was prescribed " medications and her constipation is improving. The patient's son reports that she is taking 2 laxative pills a day, a stool softener, and the fiber. She states she takes Zofran for nausea. The patient's son states that the patient has been taking Zofran 2 tablets per day for 3 or 4 weeks. She was taking 1 tablet a day previously. He states that the patient takes Zofran and sleeps and then feels nauseous again when she gets up. She notes that she feels nauseous after she wakes up and raises up in the bed. She is currently taking cefdinir, which can cause nausea too.     She is under palliative care services and they come to her house.     The patient's son states that the patient has been following with cardiology for the congestive heart failure. She states she has difficulty breathing which is at baseline. They are doing comfort measures only.    Prior Neuro workup and history:    Her QTc interval is 530, so she cannot be treated with Haldol or Seroquel.       She does follow with cardiology, Dr. Jose Antonio Aragon, for chronic congestive heart failure.      She was on Aricept, but no longer takes it. The son states 3 years ago the patient got on Facebook, and found the daughter of an old boyfriend in [place], and asked her if she knew Romie Diego who was an old boyfriend, and she said yes, it was her dad. She got in touch with him, got , and moved to [place] for 2.5 years until he passed away in 01/2022. It was during this time she was taken off of Aricept, but her son is uncertain why.     She denies significant anxiety or worrying. She denies any trouble with depression.      The patient does not drive. Her highest level of education was high school. 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     Her son makes her meals. He states most of the time he purchases her meals because she wants to eat hamburgers from [employer] or [employer] for all 3 meals per day. She eats Honey Buns in the  morning. She drinks coffee with cream and sugar. Previously she had a poor appetite so now he tries to let her eat whatever she likes.     She does experience hallucinations- seeing people that are not there and talks to them.      Her son states she fell between the couch, and the table. She also fell off the porch. She does have neuropathy with burning, numbness, and tingling. She takes gabapentin 300 mg at bedtime, and 100 mg in the morning, but has not been taking the 100 mg in the morning because the son thought it might cause more confusion.       The patient does not use an assistive walking device, but does hold onto furniture when ambulating.    3/3/2022 CT of the head without contrast showed no acute intracranial abnormalities and did show moderate volume loss with mild chronic small vessel ischemic changes.  I reviewed the imaging today in clinic with the patient and her son and agree with radiology interpretation.    She did previously have an MRI of the brain without contrast on 2/27/2016 and this did show extensive chronic small vessel ischemic changes with moderate global volume loss but no acute intracranial abnormalities and this was stable compared to imaging from 4/23/2015.    The following portions of the patient's history were reviewed and updated as appropriate: allergies, current medications, past family history, past medical history, past social history, past surgical history and problem list.    Past Medical History:   Diagnosis Date   • Anxiety    • Congestive heart failure (HCC)    • Coronary artery disease    • Dementia (HCC)    • Depression    • Hyperlipidemia    • Malignant neoplasm (HCC)    • Memory loss    • Peripheral neuropathy    • Stroke (HCC)     mini stroke   • Systolic heart failure (HCC) 09/25/2017    Chronic/compensated (EF 25%)       Past Surgical History:   Procedure Laterality Date   • CARDIAC CATHETERIZATION     • CHOLECYSTECTOMY     • EYE SURGERY     • HYSTERECTOMY          Social History     Socioeconomic History   • Marital status:    Tobacco Use   • Smoking status: Never Smoker   • Smokeless tobacco: Never Used   Vaping Use   • Vaping Use: Never used   Substance and Sexual Activity   • Alcohol use: Never   • Drug use: Never   • Sexual activity: Defer       Family History   Problem Relation Age of Onset   • Cancer Mother    • No Known Problems Father    • Cancer Sister    • Cancer Brother    • Parkinsonism Brother           Current Outpatient Medications:   •  acetaminophen (TYLENOL) 325 MG tablet, Take 2 tablets by mouth Every 4 (Four) Hours As Needed for Mild Pain ., Disp: , Rfl:   •  allopurinol (ZYLOPRIM) 100 MG tablet, Take 1 tablet by mouth Every Night., Disp: 30 tablet, Rfl: 2  •  ALPRAZolam (XANAX) 0.25 MG tablet, Take 1 tablet by mouth 3 (Three) Times a Day As Needed for Anxiety (and agitation)., Disp: 90 tablet, Rfl: 1  •  Artificial Tear Ointment (artificial tears) ophthalmic ointment, Administer 1 application to both eyes Daily., Disp: , Rfl:   •  atorvastatin (LIPITOR) 80 MG tablet, Take 80 mg by mouth Every Night., Disp: , Rfl:   •  bisacodyl (Dulcolax) 10 MG suppository, Insert 1 suppository into the rectum Daily As Needed for Constipation., Disp: 15 suppository, Rfl: 1  •  carboxymethylcellulose (REFRESH PLUS) 0.5 % solution, 3 (Three) Times a Day As Needed for Dry Eyes., Disp: , Rfl:   •  cefdinir (OMNICEF) 300 MG capsule, Take 1 capsule by mouth 2 (Two) Times a Day for 7 days., Disp: 14 capsule, Rfl: 0  •  famotidine (PEPCID) 20 MG tablet, Take 20 mg by mouth Daily., Disp: , Rfl:   •  fexofenadine (ALLEGRA) 60 MG tablet, Take 1 tablet by mouth Daily., Disp: 30 tablet, Rfl: 5  •  fluconazole (DIFLUCAN) 100 MG tablet, TAKE 1 TABLET BY MOUTH EVERY DAY FOR 10 DOSES, Disp: , Rfl:   •  furosemide (LASIX) 20 MG tablet, Take 1 tablet by mouth Daily As Needed (increased shortness of breath, swelling)., Disp: 90 tablet, Rfl: 0  •  gabapentin (NEURONTIN) 300 MG  "capsule, Take 1 capsule by mouth Every Night., Disp: 90 capsule, Rfl: 0  •  Glycerin-Polysorbate 80 (REFRESH DRY EYE THERAPY OP), Apply  to eye(s) as directed by provider As Needed., Disp: , Rfl:   •  levothyroxine (SYNTHROID, LEVOTHROID) 50 MCG tablet, Take 1 tablet by mouth Every Morning., Disp: 30 tablet, Rfl: 5  •  Lidocaine Viscous HCl (XYLOCAINE) 2 % solution, , Disp: , Rfl:   •  memantine (NAMENDA) 5 MG tablet, Take 1 tablet by mouth 2 (Two) Times a Day., Disp: 180 tablet, Rfl: 1  •  metoprolol succinate XL (TOPROL-XL) 25 MG 24 hr tablet, Take 1 tablet by mouth Daily., Disp: 30 tablet, Rfl: 5  •  Nurtec 75 MG dispersible tablet, Take 1 tablet by mouth Every Other Day., Disp: 16 tablet, Rfl: 5  •  nystatin (MYCOSTATIN) 100,000 unit/mL suspension, Swish and swallow 5mLs by mouth 4 times daily, Disp: 60 mL, Rfl: 0  •  ofloxacin (OCUFLOX) 0.3 % ophthalmic solution, Administer 1 drop to both eyes 3 (Three) Times a Day. For 10 days, started 03-01-22, Disp: , Rfl:   •  ondansetron ODT (ZOFRAN-ODT) 4 MG disintegrating tablet, DISSOLVE 1 TABLET IN MOUTH EVERY EIGHT HOURS AS NEEDED FOR NAUSEA AND VOMITING, Disp: 20 tablet, Rfl: 2  •  polyethylene glycol (MIRALAX) 17 g packet, Take 17 g by mouth Daily for 10 days., Disp: 10 packet, Rfl: 0  •  SM Aspirin Adult Low Strength 81 MG EC tablet, Take 81 mg by mouth Daily., Disp: , Rfl:      Review of Systems   Eyes: Positive for photophobia.   Gastrointestinal: Positive for constipation and nausea.   Neurological: Positive for dizziness and headaches.   Psychiatric/Behavioral: Positive for confusion and decreased concentration. The patient is nervous/anxious.    All other systems reviewed and are negative.       Objective:  /76   Pulse 54   Ht 162.6 cm (64\")   Wt 52.6 kg (116 lb)   SpO2 95%   BMI 19.91 kg/m²     Neurologic Exam     Mental Status   Oriented to person.   Disoriented to place.   Disoriented to time.   Registration: recalls 3 of 3 objects. Recall at 5 " minutes: recalls 2 of 3 objects.   Attention: decreased. Concentration: decreased.   Speech: speech is normal   Level of consciousness: alert  Abnormal comprehension.   Repeats the same question multiple times throughout visit     Cranial Nerves     CN II   Visual acuity: decreased (wears glasses, repeated blinking of eyes chronic)    CN III, IV, VI   Pupils are equal, round, and reactive to light.  Extraocular motions are normal.   CN III: no CN III palsy  CN VI: no CN VI palsy  Nystagmus: none   Diplopia: none  Ophthalmoparesis: none  Upgaze: normal  Downgaze: normal    CN V   Facial sensation intact.     CN VII   Facial expression full, symmetric.     CN VIII   Hearing: impaired    CN IX, X   CN IX normal.   CN X normal.     CN XI   CN XI normal.     CN XII   CN XII normal.     Motor Exam   Muscle bulk: normal  Overall muscle tone: normal    Strength   Strength 5/5 throughout.     Gait, Coordination, and Reflexes     Gait  Gait: (wide based, guarded, no assistive device, slow but steady)    Coordination   Romberg: negative  Finger to nose coordination: normal  Heel to shin coordination: normal    Tremor   Resting tremor: absent  Intention tremor: absent  Action tremor: absent    Reflexes   Reflexes 2+ except as noted.   Right : 2+  Left : 2+      Physical Exam  Constitutional:       Appearance: Normal appearance.   HENT:      Head: Normocephalic and atraumatic.        Comments:   No dizziness with movement of head while sitting, always has dizziness with sitting to standing  Eyes:      Extraocular Movements: EOM normal.      Pupils: Pupils are equal, round, and reactive to light.   Neurological:      Mental Status: She is alert.      Coordination: Finger-Nose-Finger Test, Heel to Shin Test and Romberg Test normal.      Deep Tendon Reflexes: Strength normal.   Psychiatric:         Mood and Affect: Mood is anxious.         Speech: Speech normal.         Behavior: Behavior is slowed.         Thought Content:  Thought content normal.         Cognition and Memory: She exhibits impaired recent memory.         Judgment: Judgment is inappropriate.           At her last visit, her MMSE was 12/30 with 0/3 for word recall. Today, her MMSE is 20/30 with 2/3 for word recall.    At [facility] ER:  A CT of the head without contrast on 07/02/2022 showed no acute intracranial abnormalities. It did show some mild chronic small vessel ischemic changes and mild volume loss.   A CT of the head without contrast on 07/23/2022 with no changes compared to prior CT of the head scan.    Assessment/Plan:       Diagnoses and all orders for this visit:    1. Migraine without aura and without status migrainosus, not intractable (Primary)  -     Nurtec 75 MG dispersible tablet; Take 1 tablet by mouth Every Other Day.  Dispense: 16 tablet; Refill: 5    2. Late onset Alzheimer's dementia with behavioral disturbance (HCC)  -     memantine (NAMENDA) 5 MG tablet; Take 1 tablet by mouth 2 (Two) Times a Day.  Dispense: 180 tablet; Refill: 1    3. Orthostatic dizziness  Comments:  change positions slowly and push fluids    4. Stabbing headache  -     Sedimentation Rate; Future  -     C-reactive Protein; Future    5. Tinnitus of left ear  Comments:  pending appointment with ENT    6. Bilateral hearing loss, unspecified hearing loss type  Comments:  pending appointment with ENT      Discussed the treatment options for the headaches-headache symptoms most consistent with migraines. Triptans contraindicated due to her age and cardiovascular risk factors. Given the fact that she has tried citalopram before and discontinued it due to poor kidney function and she is taking metoprolol and is still having headaches, I would recommend Nurtec ODT to prevent headaches. She will take 1 tablet every other day, 28 samples of Nurtec ODT were given. She can also take Tylenol as well. Topamax contraindicated due to her age and Alzheimer's. Depakote note recommended. She  is already on gabapentin. She is already on a beat blocker metoprolol. She is a poor candidate for tricyclic antidepressants like amitriptyline or nortriptyline. Continue tylenol for pain as needed. Labs today.     All information was discussed in detail with the patient and her son. For now, we will not complete any additional neuroimaging since she cannot lie flat. She is completing care with palliative care services. She declines any physical therapy for now. They will discuss with palliative care for nausea and constipation. Advised to change positions slowly and drink plenty of fluids to help with dizziness. She is going to see ENT for tinnitus and hearing loss. She declines MRI/MRA imaging as she cannot lie flat. This is certainly reasonable.    She will continue on memantine. Blood work is ordered. We will follow up in 8 weeks or sooner if needed.       Reviewed medications, potential side effects and signs and symptoms to report. Discussed risk versus benefits of treatment plan with patient and/or family-including medications, labs and radiology that may be ordered. Addressed questions and concerns during visit. Patient and/or family verbalized understanding and agree with plan.    AS THE PROVIDER, I PERSONALLY WORE PPE DURING ENTIRE FACE TO FACE ENCOUNTER IN CLINIC WITH THE PATIENT. PATIENT ALSO WORE PPE DURING ENTIRE FACE TO FACE ENCOUNTER EXCEPT FOR A MAX OF 30 SECONDS DURING NEUROLOGICAL EVALUATION OF CRANIAL NERVES AND THEN MASK WAS PLACED BACK OVER PATIENT FACE FOR REMAINDER OF VISIT. I WASHED MY HANDS BEFORE AND AFTER VISIT.    During this visit the following were done:  Labs Reviewed [x]    Labs Ordered [x]    Radiology Reports Reviewed [x]    Radiology Ordered []    PCP Records Reviewed []    Referring Provider Records Reviewed []    ER Records Reviewed []    Hospital Records Reviewed []    History Obtained From Family []    Radiology Images Reviewed [x]  son  Other Reviewed []    Records Requested  []      Transcribed from ambient dictation for VASILE Rowe by RADHA BLANCAS.  07/27/22   13:05 EDT    Patient verbalized consent to the visit recording.  I have personally performed the services described in this document as transcribed by the above individual, and it is both accurate and complete.  VASILE Rowe  7/27/2022  13:20 EDT

## 2022-07-28 ENCOUNTER — TELEPHONE (OUTPATIENT)
Dept: NEUROLOGY | Facility: CLINIC | Age: 87
End: 2022-07-28

## 2022-07-28 NOTE — TELEPHONE ENCOUNTER
----- Message from VASILE Rowe sent at 7/28/2022  8:01 AM EDT -----  Please notify the patient's son that her blood work shows a stable kidney function level.  Her CRP which is one of the inflammatory markers is negative.  Her thyroid levels do appear normal now.  Her sed rate which is the other inflammatory level is actually elevated at 59, however, with her age this is still considered elevated, but this could be related to her kidney disease and may not necessarily be related to her headaches. However, I would recommend you fax these labs to ENT Dr. Se Law who patient is supposed to be seeing so they are aware of the labs. I will CC PCP with Shinto as well. No changes recommended at this time. Thanks, VASILE Gordon.

## 2022-07-28 NOTE — PROGRESS NOTES
Please notify the patient's son that her blood work shows a stable kidney function level.  Her CRP which is one of the inflammatory markers is negative.  Her thyroid levels do appear normal now.  Her sed rate which is the other inflammatory level is actually elevated at 59, however, with her age this is still considered elevated, but this could be related to her kidney disease and may not necessarily be related to her headaches. However, I would recommend you fax these labs to ENT Dr. Se Law who patient is supposed to be seeing so they are aware of the labs. I will CC PCP with Jain as well. No changes recommended at this time. Thanks, VASILE Gordon.

## 2022-07-31 ENCOUNTER — HOSPITAL ENCOUNTER (EMERGENCY)
Facility: HOSPITAL | Age: 87
Discharge: HOME | End: 2022-07-31
Attending: EMERGENCY MEDICINE | Admitting: EMERGENCY MEDICINE

## 2022-07-31 VITALS
DIASTOLIC BLOOD PRESSURE: 72 MMHG | HEART RATE: 88 BPM | TEMPERATURE: 98 F | OXYGEN SATURATION: 95 % | BODY MASS INDEX: 19.97 KG/M2 | SYSTOLIC BLOOD PRESSURE: 106 MMHG | RESPIRATION RATE: 16 BRPM | WEIGHT: 117 LBS | HEIGHT: 64 IN

## 2022-07-31 DIAGNOSIS — R11.2 NAUSEA AND VOMITING, UNSPECIFIED VOMITING TYPE: Primary | ICD-10-CM

## 2022-07-31 PROCEDURE — 99283 EMERGENCY DEPT VISIT LOW MDM: CPT

## 2022-07-31 RX ORDER — ACETAMINOPHEN 500 MG
500 TABLET ORAL ONCE
Status: COMPLETED | OUTPATIENT
Start: 2022-07-31 | End: 2022-07-31

## 2022-07-31 RX ORDER — LIDOCAINE HYDROCHLORIDE 20 MG/ML
15 SOLUTION OROPHARYNGEAL ONCE
Status: COMPLETED | OUTPATIENT
Start: 2022-07-31 | End: 2022-07-31

## 2022-07-31 RX ORDER — ALUMINA, MAGNESIA, AND SIMETHICONE 2400; 2400; 240 MG/30ML; MG/30ML; MG/30ML
15 SUSPENSION ORAL ONCE
Status: COMPLETED | OUTPATIENT
Start: 2022-07-31 | End: 2022-07-31

## 2022-07-31 RX ADMIN — ACETAMINOPHEN 500 MG: 500 TABLET, FILM COATED ORAL at 14:33

## 2022-07-31 RX ADMIN — ALUMINUM HYDROXIDE, MAGNESIUM HYDROXIDE, AND DIMETHICONE 15 ML: 400; 400; 40 SUSPENSION ORAL at 14:30

## 2022-07-31 RX ADMIN — LIDOCAINE HYDROCHLORIDE 15 ML: 20 SOLUTION ORAL at 14:30

## 2022-08-09 ENCOUNTER — HOSPITAL ENCOUNTER (EMERGENCY)
Facility: HOSPITAL | Age: 87
Discharge: HOME | End: 2022-08-09
Attending: EMERGENCY MEDICINE | Admitting: EMERGENCY MEDICINE

## 2022-08-09 ENCOUNTER — APPOINTMENT (OUTPATIENT)
Dept: GENERAL RADIOLOGY | Facility: HOSPITAL | Age: 87
End: 2022-08-09

## 2022-08-09 VITALS
OXYGEN SATURATION: 95 % | TEMPERATURE: 97.8 F | SYSTOLIC BLOOD PRESSURE: 131 MMHG | DIASTOLIC BLOOD PRESSURE: 84 MMHG | WEIGHT: 117 LBS | HEIGHT: 65 IN | HEART RATE: 85 BPM | BODY MASS INDEX: 19.49 KG/M2 | RESPIRATION RATE: 16 BRPM

## 2022-08-09 DIAGNOSIS — I50.9 ACUTE ON CHRONIC CONGESTIVE HEART FAILURE, UNSPECIFIED HEART FAILURE TYPE: Primary | ICD-10-CM

## 2022-08-09 LAB
ALBUMIN SERPL-MCNC: 3.5 G/DL (ref 3.5–5.2)
ALBUMIN/GLOB SERPL: 0.7 G/DL
ALP SERPL-CCNC: 41 U/L (ref 39–117)
ALT SERPL W P-5'-P-CCNC: 9 U/L (ref 1–33)
ANION GAP SERPL CALCULATED.3IONS-SCNC: 9 MMOL/L (ref 5–15)
AST SERPL-CCNC: 20 U/L (ref 1–32)
BASOPHILS # BLD AUTO: 0.09 10*3/MM3 (ref 0–0.2)
BASOPHILS NFR BLD AUTO: 1.2 % (ref 0–1.5)
BILIRUB SERPL-MCNC: 0.5 MG/DL (ref 0–1.2)
BUN SERPL-MCNC: 21 MG/DL (ref 8–23)
BUN/CREAT SERPL: 13.1 (ref 7–25)
CALCIUM SPEC-SCNC: 9.2 MG/DL (ref 8.2–9.6)
CHLORIDE SERPL-SCNC: 100 MMOL/L (ref 98–107)
CO2 SERPL-SCNC: 26 MMOL/L (ref 22–29)
CREAT SERPL-MCNC: 1.6 MG/DL (ref 0.57–1)
DEPRECATED RDW RBC AUTO: 51.2 FL (ref 37–54)
EGFRCR SERPLBLD CKD-EPI 2021: 30.1 ML/MIN/1.73
EOSINOPHIL # BLD AUTO: 0.19 10*3/MM3 (ref 0–0.4)
EOSINOPHIL NFR BLD AUTO: 2.5 % (ref 0.3–6.2)
ERYTHROCYTE [DISTWIDTH] IN BLOOD BY AUTOMATED COUNT: 14.6 % (ref 12.3–15.4)
GLOBULIN UR ELPH-MCNC: 4.9 GM/DL
GLUCOSE SERPL-MCNC: 103 MG/DL (ref 65–99)
HCT VFR BLD AUTO: 30.6 % (ref 34–46.6)
HGB BLD-MCNC: 9.8 G/DL (ref 12–15.9)
IMM GRANULOCYTES # BLD AUTO: 0.03 10*3/MM3 (ref 0–0.05)
IMM GRANULOCYTES NFR BLD AUTO: 0.4 % (ref 0–0.5)
LYMPHOCYTES # BLD AUTO: 0.92 10*3/MM3 (ref 0.7–3.1)
LYMPHOCYTES NFR BLD AUTO: 11.9 % (ref 19.6–45.3)
MCH RBC QN AUTO: 30.5 PG (ref 26.6–33)
MCHC RBC AUTO-ENTMCNC: 32 G/DL (ref 31.5–35.7)
MCV RBC AUTO: 95.3 FL (ref 79–97)
MONOCYTES # BLD AUTO: 0.9 10*3/MM3 (ref 0.1–0.9)
MONOCYTES NFR BLD AUTO: 11.7 % (ref 5–12)
NEUTROPHILS NFR BLD AUTO: 5.58 10*3/MM3 (ref 1.7–7)
NEUTROPHILS NFR BLD AUTO: 72.3 % (ref 42.7–76)
NRBC BLD AUTO-RTO: 0 /100 WBC (ref 0–0.2)
NT-PROBNP SERPL-MCNC: 6457 PG/ML (ref 0–1800)
PLATELET # BLD AUTO: 239 10*3/MM3 (ref 140–450)
PMV BLD AUTO: 11.8 FL (ref 6–12)
POTASSIUM SERPL-SCNC: 5.2 MMOL/L (ref 3.5–5.2)
PROT SERPL-MCNC: 8.4 G/DL (ref 6–8.5)
RBC # BLD AUTO: 3.21 10*6/MM3 (ref 3.77–5.28)
SODIUM SERPL-SCNC: 135 MMOL/L (ref 136–145)
TROPONIN T SERPL-MCNC: 0.01 NG/ML (ref 0–0.03)
WBC NRBC COR # BLD: 7.71 10*3/MM3 (ref 3.4–10.8)

## 2022-08-09 PROCEDURE — 25010000002 FUROSEMIDE PER 20 MG: Performed by: NURSE PRACTITIONER

## 2022-08-09 PROCEDURE — 85025 COMPLETE CBC W/AUTO DIFF WBC: CPT | Performed by: NURSE PRACTITIONER

## 2022-08-09 PROCEDURE — 96374 THER/PROPH/DIAG INJ IV PUSH: CPT

## 2022-08-09 PROCEDURE — 71045 X-RAY EXAM CHEST 1 VIEW: CPT

## 2022-08-09 PROCEDURE — 99284 EMERGENCY DEPT VISIT MOD MDM: CPT

## 2022-08-09 PROCEDURE — 84484 ASSAY OF TROPONIN QUANT: CPT | Performed by: NURSE PRACTITIONER

## 2022-08-09 PROCEDURE — 80053 COMPREHEN METABOLIC PANEL: CPT | Performed by: NURSE PRACTITIONER

## 2022-08-09 PROCEDURE — 83880 ASSAY OF NATRIURETIC PEPTIDE: CPT | Performed by: NURSE PRACTITIONER

## 2022-08-09 PROCEDURE — 93005 ELECTROCARDIOGRAM TRACING: CPT | Performed by: NURSE PRACTITIONER

## 2022-08-09 RX ORDER — SODIUM CHLORIDE 0.9 % (FLUSH) 0.9 %
10 SYRINGE (ML) INJECTION AS NEEDED
Status: DISCONTINUED | OUTPATIENT
Start: 2022-08-09 | End: 2022-08-09 | Stop reason: HOSPADM

## 2022-08-09 RX ORDER — FUROSEMIDE 10 MG/ML
20 INJECTION INTRAMUSCULAR; INTRAVENOUS ONCE
Status: COMPLETED | OUTPATIENT
Start: 2022-08-09 | End: 2022-08-09

## 2022-08-09 RX ADMIN — FUROSEMIDE 20 MG: 10 INJECTION INTRAMUSCULAR; INTRAVENOUS at 13:27

## 2022-08-16 LAB
QT INTERVAL: 434 MS
QTC INTERVAL: 548 MS

## 2022-08-20 ENCOUNTER — APPOINTMENT (OUTPATIENT)
Dept: GENERAL RADIOLOGY | Facility: HOSPITAL | Age: 87
End: 2022-08-20

## 2022-08-20 ENCOUNTER — APPOINTMENT (OUTPATIENT)
Dept: CT IMAGING | Facility: HOSPITAL | Age: 87
End: 2022-08-20

## 2022-08-20 ENCOUNTER — HOSPITAL ENCOUNTER (EMERGENCY)
Facility: HOSPITAL | Age: 87
Discharge: HOME | End: 2022-08-20
Attending: EMERGENCY MEDICINE | Admitting: EMERGENCY MEDICINE

## 2022-08-20 VITALS
WEIGHT: 117 LBS | OXYGEN SATURATION: 97 % | RESPIRATION RATE: 16 BRPM | SYSTOLIC BLOOD PRESSURE: 108 MMHG | TEMPERATURE: 97.9 F | HEIGHT: 64 IN | BODY MASS INDEX: 19.97 KG/M2 | HEART RATE: 77 BPM | DIASTOLIC BLOOD PRESSURE: 59 MMHG

## 2022-08-20 DIAGNOSIS — D64.9 ANEMIA, UNSPECIFIED TYPE: ICD-10-CM

## 2022-08-20 DIAGNOSIS — N18.9 CHRONIC KIDNEY DISEASE, UNSPECIFIED CKD STAGE: ICD-10-CM

## 2022-08-20 DIAGNOSIS — R10.84 GENERALIZED ABDOMINAL PAIN: Primary | ICD-10-CM

## 2022-08-20 DIAGNOSIS — K59.00 CONSTIPATION, UNSPECIFIED CONSTIPATION TYPE: ICD-10-CM

## 2022-08-20 LAB
ALBUMIN SERPL-MCNC: 3.5 G/DL (ref 3.5–5.2)
ALBUMIN/GLOB SERPL: 0.7 G/DL
ALP SERPL-CCNC: 38 U/L (ref 39–117)
ALT SERPL W P-5'-P-CCNC: 6 U/L (ref 1–33)
ANION GAP SERPL CALCULATED.3IONS-SCNC: 10 MMOL/L (ref 5–15)
AST SERPL-CCNC: 15 U/L (ref 1–32)
BASOPHILS # BLD AUTO: 0.07 10*3/MM3 (ref 0–0.2)
BASOPHILS NFR BLD AUTO: 1 % (ref 0–1.5)
BILIRUB SERPL-MCNC: 0.4 MG/DL (ref 0–1.2)
BILIRUB UR QL STRIP: NEGATIVE
BUN SERPL-MCNC: 23 MG/DL (ref 8–23)
BUN/CREAT SERPL: 16.9 (ref 7–25)
CALCIUM SPEC-SCNC: 9.2 MG/DL (ref 8.2–9.6)
CHLORIDE SERPL-SCNC: 99 MMOL/L (ref 98–107)
CLARITY UR: CLEAR
CO2 SERPL-SCNC: 22 MMOL/L (ref 22–29)
COLOR UR: YELLOW
CREAT SERPL-MCNC: 1.36 MG/DL (ref 0.57–1)
D-LACTATE SERPL-SCNC: 1.6 MMOL/L (ref 0.5–2)
DEPRECATED RDW RBC AUTO: 55 FL (ref 37–54)
EGFRCR SERPLBLD CKD-EPI 2021: 36.6 ML/MIN/1.73
EOSINOPHIL # BLD AUTO: 0.34 10*3/MM3 (ref 0–0.4)
EOSINOPHIL NFR BLD AUTO: 4.8 % (ref 0.3–6.2)
ERYTHROCYTE [DISTWIDTH] IN BLOOD BY AUTOMATED COUNT: 15.3 % (ref 12.3–15.4)
FLUAV RNA RESP QL NAA+PROBE: NOT DETECTED
FLUBV RNA RESP QL NAA+PROBE: NOT DETECTED
GLOBULIN UR ELPH-MCNC: 4.8 GM/DL
GLUCOSE SERPL-MCNC: 130 MG/DL (ref 65–99)
GLUCOSE UR STRIP-MCNC: NEGATIVE MG/DL
HCT VFR BLD AUTO: 30.5 % (ref 34–46.6)
HGB BLD-MCNC: 9.5 G/DL (ref 12–15.9)
HGB UR QL STRIP.AUTO: NEGATIVE
HOLD SPECIMEN: NORMAL
IMM GRANULOCYTES # BLD AUTO: 0.02 10*3/MM3 (ref 0–0.05)
IMM GRANULOCYTES NFR BLD AUTO: 0.3 % (ref 0–0.5)
KETONES UR QL STRIP: NEGATIVE
LEUKOCYTE ESTERASE UR QL STRIP.AUTO: NEGATIVE
LIPASE SERPL-CCNC: 43 U/L (ref 13–60)
LYMPHOCYTES # BLD AUTO: 1.36 10*3/MM3 (ref 0.7–3.1)
LYMPHOCYTES NFR BLD AUTO: 19.2 % (ref 19.6–45.3)
MCH RBC QN AUTO: 30.7 PG (ref 26.6–33)
MCHC RBC AUTO-ENTMCNC: 31.1 G/DL (ref 31.5–35.7)
MCV RBC AUTO: 98.7 FL (ref 79–97)
MONOCYTES # BLD AUTO: 0.96 10*3/MM3 (ref 0.1–0.9)
MONOCYTES NFR BLD AUTO: 13.6 % (ref 5–12)
NEUTROPHILS NFR BLD AUTO: 4.32 10*3/MM3 (ref 1.7–7)
NEUTROPHILS NFR BLD AUTO: 61.1 % (ref 42.7–76)
NITRITE UR QL STRIP: NEGATIVE
NRBC BLD AUTO-RTO: 0 /100 WBC (ref 0–0.2)
NT-PROBNP SERPL-MCNC: 5703 PG/ML (ref 0–1800)
PH UR STRIP.AUTO: 6 [PH] (ref 5–8)
PLATELET # BLD AUTO: 205 10*3/MM3 (ref 140–450)
PMV BLD AUTO: 11.3 FL (ref 6–12)
POTASSIUM SERPL-SCNC: 4 MMOL/L (ref 3.5–5.2)
PROT SERPL-MCNC: 8.3 G/DL (ref 6–8.5)
PROT UR QL STRIP: NEGATIVE
RBC # BLD AUTO: 3.09 10*6/MM3 (ref 3.77–5.28)
SARS-COV-2 RNA RESP QL NAA+PROBE: NOT DETECTED
SODIUM SERPL-SCNC: 131 MMOL/L (ref 136–145)
SP GR UR STRIP: 1.02 (ref 1–1.03)
TROPONIN T SERPL-MCNC: 0.02 NG/ML (ref 0–0.03)
UROBILINOGEN UR QL STRIP: NORMAL
WBC NRBC COR # BLD: 7.07 10*3/MM3 (ref 3.4–10.8)
WHOLE BLOOD HOLD COAG: NORMAL
WHOLE BLOOD HOLD SPECIMEN: NORMAL

## 2022-08-20 PROCEDURE — 93005 ELECTROCARDIOGRAM TRACING: CPT | Performed by: EMERGENCY MEDICINE

## 2022-08-20 PROCEDURE — 71045 X-RAY EXAM CHEST 1 VIEW: CPT

## 2022-08-20 PROCEDURE — 85025 COMPLETE CBC W/AUTO DIFF WBC: CPT | Performed by: EMERGENCY MEDICINE

## 2022-08-20 PROCEDURE — 81003 URINALYSIS AUTO W/O SCOPE: CPT | Performed by: EMERGENCY MEDICINE

## 2022-08-20 PROCEDURE — 80053 COMPREHEN METABOLIC PANEL: CPT | Performed by: EMERGENCY MEDICINE

## 2022-08-20 PROCEDURE — 25010000002 ONDANSETRON PER 1 MG: Performed by: PHYSICIAN ASSISTANT

## 2022-08-20 PROCEDURE — 83880 ASSAY OF NATRIURETIC PEPTIDE: CPT | Performed by: PHYSICIAN ASSISTANT

## 2022-08-20 PROCEDURE — 74176 CT ABD & PELVIS W/O CONTRAST: CPT

## 2022-08-20 PROCEDURE — 83690 ASSAY OF LIPASE: CPT | Performed by: EMERGENCY MEDICINE

## 2022-08-20 PROCEDURE — P9612 CATHETERIZE FOR URINE SPEC: HCPCS

## 2022-08-20 PROCEDURE — 87636 SARSCOV2 & INF A&B AMP PRB: CPT | Performed by: PHYSICIAN ASSISTANT

## 2022-08-20 PROCEDURE — 96374 THER/PROPH/DIAG INJ IV PUSH: CPT

## 2022-08-20 PROCEDURE — 99283 EMERGENCY DEPT VISIT LOW MDM: CPT

## 2022-08-20 PROCEDURE — 84484 ASSAY OF TROPONIN QUANT: CPT | Performed by: EMERGENCY MEDICINE

## 2022-08-20 PROCEDURE — 93005 ELECTROCARDIOGRAM TRACING: CPT

## 2022-08-20 PROCEDURE — 96375 TX/PRO/DX INJ NEW DRUG ADDON: CPT

## 2022-08-20 PROCEDURE — 83605 ASSAY OF LACTIC ACID: CPT | Performed by: PHYSICIAN ASSISTANT

## 2022-08-20 RX ORDER — SODIUM CHLORIDE 0.9 % (FLUSH) 0.9 %
10 SYRINGE (ML) INJECTION AS NEEDED
Status: DISCONTINUED | OUTPATIENT
Start: 2022-08-20 | End: 2022-08-20 | Stop reason: HOSPADM

## 2022-08-20 RX ORDER — ONDANSETRON 2 MG/ML
4 INJECTION INTRAMUSCULAR; INTRAVENOUS ONCE
Status: COMPLETED | OUTPATIENT
Start: 2022-08-20 | End: 2022-08-20

## 2022-08-20 RX ORDER — ACETAMINOPHEN 500 MG
1000 TABLET ORAL ONCE
Status: COMPLETED | OUTPATIENT
Start: 2022-08-20 | End: 2022-08-20

## 2022-08-20 RX ORDER — FAMOTIDINE 10 MG/ML
20 INJECTION, SOLUTION INTRAVENOUS ONCE
Status: COMPLETED | OUTPATIENT
Start: 2022-08-20 | End: 2022-08-20

## 2022-08-20 RX ADMIN — ACETAMINOPHEN 1000 MG: 500 TABLET, FILM COATED ORAL at 16:11

## 2022-08-20 RX ADMIN — SODIUM CHLORIDE 500 ML: 9 INJECTION, SOLUTION INTRAVENOUS at 15:23

## 2022-08-20 RX ADMIN — FAMOTIDINE 20 MG: 10 INJECTION INTRAVENOUS at 15:01

## 2022-08-20 RX ADMIN — ONDANSETRON 4 MG: 2 INJECTION INTRAMUSCULAR; INTRAVENOUS at 15:05

## 2022-08-20 NOTE — ED PROVIDER NOTES
Subjective   Pt is a 89 yo female presenting to ED with complaints of abdominal pain. PMHx significant for CAD, HTN, HLD, CHF, CVA, Dementia and Depression. Pt and son providing hx. Patient pleasantly confused in ED. Son explains intermittent abdominal pain for the past month with nausea. Patient started on Azithromycin for sinus infection 3 days ago and after taking antibiotic this morning began complaining of worse upper abdominal pain. No vomiting r diarrhea. Son gave Xanax, Zofran and Tylenol earlier today but she still complained of pain. She has had problems with constipation in the past and used to be on Miralax and stool softener but took her off because she started going too much. LBM this morning, no blood. She denies dizziness, fever, cough, CP, SOB or urinary sx.      History provided by:  Patient and medical records      Review of Systems   Constitutional: Negative for chills and fever.   HENT: Negative for congestion, sore throat and trouble swallowing.    Eyes: Negative for visual disturbance.   Respiratory: Negative for cough and shortness of breath.    Cardiovascular: Negative for chest pain.   Gastrointestinal: Positive for abdominal pain and nausea. Negative for diarrhea and vomiting.   Genitourinary: Negative for difficulty urinating, dysuria and flank pain.   Musculoskeletal: Negative for arthralgias and back pain.   Skin: Negative for wound.   Neurological: Negative for dizziness, syncope, weakness, numbness and headaches.   Psychiatric/Behavioral: Positive for confusion (chronic).       Past Medical History:   Diagnosis Date   • Anxiety    • Congestive heart failure (HCC)    • Coronary artery disease    • Dementia (HCC)    • Depression    • Hyperlipidemia    • Malignant neoplasm (HCC)    • Memory loss    • Peripheral neuropathy    • Stroke (HCC)     mini stroke   • Systolic heart failure (HCC) 09/25/2017    Chronic/compensated (EF 25%)       Allergies   Allergen Reactions   • Augmentin  [Amoxicillin-Pot Clavulanate] Nausea And Vomiting     Makes her sick at her stomach   • Claritin [Loratadine] Unknown (See Comments)     Doesn't remember   • Keflex [Cephalexin] Nausea And Vomiting     Makes pt sick at her stomach   • Sulfa Antibiotics Other (See Comments)     Does not remember       Past Surgical History:   Procedure Laterality Date   • CARDIAC CATHETERIZATION     • CHOLECYSTECTOMY     • EYE SURGERY     • HYSTERECTOMY         Family History   Problem Relation Age of Onset   • Cancer Mother    • No Known Problems Father    • Cancer Sister    • Cancer Brother    • Parkinsonism Brother        Social History     Socioeconomic History   • Marital status:    Tobacco Use   • Smoking status: Never Smoker   • Smokeless tobacco: Never Used   Vaping Use   • Vaping Use: Never used   Substance and Sexual Activity   • Alcohol use: Never   • Drug use: Never   • Sexual activity: Defer           Objective   Physical Exam  Vitals and nursing note reviewed.   Constitutional:       Appearance: She is well-developed.   HENT:      Head: Atraumatic.      Nose: Nose normal.   Eyes:      General: Lids are normal.      Conjunctiva/sclera: Conjunctivae normal.      Pupils: Pupils are equal, round, and reactive to light.   Cardiovascular:      Rate and Rhythm: Normal rate and regular rhythm.      Heart sounds: Normal heart sounds.   Pulmonary:      Effort: Pulmonary effort is normal.      Breath sounds: Normal breath sounds. No wheezing.   Abdominal:      General: There is no distension.      Palpations: Abdomen is soft.      Tenderness: There is abdominal tenderness in the epigastric area and periumbilical area. There is no right CVA tenderness, left CVA tenderness, guarding or rebound.   Musculoskeletal:         General: No tenderness. Normal range of motion.      Cervical back: Normal range of motion and neck supple.   Skin:     General: Skin is warm and dry.      Findings: No erythema or rash.   Neurological:       Mental Status: She is alert and oriented to person, place, and time.      Sensory: No sensory deficit.   Psychiatric:         Mood and Affect: Mood is anxious.         Speech: Speech normal.         Behavior: Behavior normal.         Procedures           ED Course      Discussed results and tx plan with patient and son. Patient resting and feeling better. No emergent findings in ED workup. Son will restart constipation meds for his mother.     Reviewed old records.     Discussed patient with Dr. Moraes who is agreeable with ED course and tx plan.     Recent Results (from the past 24 hour(s))   ECG 12 Lead    Collection Time: 08/20/22  1:36 PM   Result Value Ref Range    QT Interval 450 ms    QTC Interval 553 ms   Comprehensive Metabolic Panel    Collection Time: 08/20/22  2:24 PM    Specimen: Blood   Result Value Ref Range    Glucose 130 (H) 65 - 99 mg/dL    BUN 23 8 - 23 mg/dL    Creatinine 1.36 (H) 0.57 - 1.00 mg/dL    Sodium 131 (L) 136 - 145 mmol/L    Potassium 4.0 3.5 - 5.2 mmol/L    Chloride 99 98 - 107 mmol/L    CO2 22.0 22.0 - 29.0 mmol/L    Calcium 9.2 8.2 - 9.6 mg/dL    Total Protein 8.3 6.0 - 8.5 g/dL    Albumin 3.50 3.50 - 5.20 g/dL    ALT (SGPT) 6 1 - 33 U/L    AST (SGOT) 15 1 - 32 U/L    Alkaline Phosphatase 38 (L) 39 - 117 U/L    Total Bilirubin 0.4 0.0 - 1.2 mg/dL    Globulin 4.8 gm/dL    A/G Ratio 0.7 g/dL    BUN/Creatinine Ratio 16.9 7.0 - 25.0    Anion Gap 10.0 5.0 - 15.0 mmol/L    eGFR 36.6 (L) >60.0 mL/min/1.73   Lipase    Collection Time: 08/20/22  2:24 PM    Specimen: Blood   Result Value Ref Range    Lipase 43 13 - 60 U/L   Troponin    Collection Time: 08/20/22  2:24 PM    Specimen: Blood   Result Value Ref Range    Troponin T 0.016 0.000 - 0.030 ng/mL   Green Top (Gel)    Collection Time: 08/20/22  2:24 PM   Result Value Ref Range    Extra Tube Hold for add-ons.    BNP    Collection Time: 08/20/22  2:24 PM    Specimen: Blood   Result Value Ref Range    proBNP 5,703.0 (H) 0.0 - 1,800.0 pg/mL    Lactic Acid, Plasma    Collection Time: 08/20/22  2:24 PM    Specimen: Blood   Result Value Ref Range    Lactate 1.6 0.5 - 2.0 mmol/L   COVID-19 and FLU A/B PCR - Swab, Nasopharynx    Collection Time: 08/20/22  2:25 PM    Specimen: Nasopharynx; Swab   Result Value Ref Range    COVID19 Not Detected Not Detected - Ref. Range    Influenza A PCR Not Detected Not Detected    Influenza B PCR Not Detected Not Detected   Lavender Top    Collection Time: 08/20/22  2:40 PM   Result Value Ref Range    Extra Tube hold for add-on    Gold Top - SST    Collection Time: 08/20/22  2:40 PM   Result Value Ref Range    Extra Tube Hold for add-ons.    Light Blue Top    Collection Time: 08/20/22  2:40 PM   Result Value Ref Range    Extra Tube Hold for add-ons.    CBC Auto Differential    Collection Time: 08/20/22  2:40 PM    Specimen: Blood   Result Value Ref Range    WBC 7.07 3.40 - 10.80 10*3/mm3    RBC 3.09 (L) 3.77 - 5.28 10*6/mm3    Hemoglobin 9.5 (L) 12.0 - 15.9 g/dL    Hematocrit 30.5 (L) 34.0 - 46.6 %    MCV 98.7 (H) 79.0 - 97.0 fL    MCH 30.7 26.6 - 33.0 pg    MCHC 31.1 (L) 31.5 - 35.7 g/dL    RDW 15.3 12.3 - 15.4 %    RDW-SD 55.0 (H) 37.0 - 54.0 fl    MPV 11.3 6.0 - 12.0 fL    Platelets 205 140 - 450 10*3/mm3    Neutrophil % 61.1 42.7 - 76.0 %    Lymphocyte % 19.2 (L) 19.6 - 45.3 %    Monocyte % 13.6 (H) 5.0 - 12.0 %    Eosinophil % 4.8 0.3 - 6.2 %    Basophil % 1.0 0.0 - 1.5 %    Immature Grans % 0.3 0.0 - 0.5 %    Neutrophils, Absolute 4.32 1.70 - 7.00 10*3/mm3    Lymphocytes, Absolute 1.36 0.70 - 3.10 10*3/mm3    Monocytes, Absolute 0.96 (H) 0.10 - 0.90 10*3/mm3    Eosinophils, Absolute 0.34 0.00 - 0.40 10*3/mm3    Basophils, Absolute 0.07 0.00 - 0.20 10*3/mm3    Immature Grans, Absolute 0.02 0.00 - 0.05 10*3/mm3    nRBC 0.0 0.0 - 0.2 /100 WBC   Green Top (Gel)    Collection Time: 08/20/22  2:40 PM   Result Value Ref Range    Extra Tube Hold for add-ons.    Urinalysis With Microscopic If Indicated (No Culture) - Urine,  Catheter    Collection Time: 08/20/22  3:46 PM    Specimen: Urine, Catheter   Result Value Ref Range    Color, UA Yellow Yellow, Straw    Appearance, UA Clear Clear    pH, UA 6.0 5.0 - 8.0    Specific Gravity, UA 1.016 1.001 - 1.030    Glucose, UA Negative Negative    Ketones, UA Negative Negative    Bilirubin, UA Negative Negative    Blood, UA Negative Negative    Protein, UA Negative Negative    Leuk Esterase, UA Negative Negative    Nitrite, UA Negative Negative    Urobilinogen, UA 0.2 E.U./dL 0.2 - 1.0 E.U./dL     Note: In addition to lab results from this visit, the labs listed above may include labs taken at another facility or during a different encounter within the last 24 hours. Please correlate lab times with ED admission and discharge times for further clarification of the services performed during this visit.    CT Abdomen Pelvis Without Contrast   Final Result   There is moderate colonic fecal stasis, without evidence of small bowel   obstruction. CT appearance of the abdomen and pelvis is otherwise   similar to comparison, with chronic findings present including severe   atrophy of the right kidney. No acute findings are present.       This report was finalized on 8/20/2022 2:57 PM by Saúl Benites.          XR Chest 1 View   Final Result   Mild chronic changes of the lung fields without evidence of   acute cardiopulmonary abnormality.        This report was finalized on 8/20/2022 2:01 PM by Saúl Benites.            Vitals:    08/20/22 1457 08/20/22 1500 08/20/22 1501 08/20/22 1530   BP: 109/48 108/55  108/59   BP Location:       Patient Position:       Pulse: 84 78  77   Resp:   16    Temp:       TempSrc:       SpO2:       Weight:       Height:         Medications   sodium chloride 0.9 % flush 10 mL (has no administration in time range)   acetaminophen (TYLENOL) tablet 1,000 mg (has no administration in time range)   ondansetron (ZOFRAN) injection 4 mg (4 mg Intravenous Given 8/20/22 1505)    famotidine (PEPCID) injection 20 mg (20 mg Intravenous Given 8/20/22 1501)   sodium chloride 0.9 % bolus 500 mL (500 mL Intravenous New Bag 8/20/22 1523)     ECG/EMG Results (last 24 hours)     Procedure Component Value Units Date/Time    ECG 12 Lead [[id number]] Collected: 08/20/22 1336     Updated: 08/20/22 1338     QT Interval 450 ms      QTC Interval 553 ms     Narrative:      Test Reason : Upper Abdominal Pain Triage Protocol  Blood Pressure :   */*   mmHG  Vent. Rate :  91 BPM     Atrial Rate :  91 BPM     P-R Int : 200 ms          QRS Dur : 142 ms      QT Int : 450 ms       P-R-T Axes :  65 104 -57 degrees     QTc Int : 553 ms    Sinus rhythm with frequent premature ventricular complexes  Rightward axis  Nonspecific intraventricular block  Cannot rule out Anterior infarct , age undetermined  T wave abnormality, consider inferior ischemia  Abnormal ECG  When compared with ECG of 09-AUG-2022 11:00,  QRS axis shifted right  Minimal criteria for Anterior infarct are now present  T wave inversion now evident in Inferior leads  T wave inversion less evident in Lateral leads    Referred By:            Confirmed By:         ECG 12 Lead   Preliminary Result   Test Reason : Upper Abdominal Pain Triage Protocol   Blood Pressure :   */*   mmHG   Vent. Rate :  91 BPM     Atrial Rate :  91 BPM      P-R Int : 200 ms          QRS Dur : 142 ms       QT Int : 450 ms       P-R-T Axes :  65 104 -57 degrees      QTc Int : 553 ms      Sinus rhythm with frequent premature ventricular complexes   Rightward axis   Nonspecific intraventricular block   Cannot rule out Anterior infarct , age undetermined   T wave abnormality, consider inferior ischemia   Abnormal ECG   When compared with ECG of 09-AUG-2022 11:00,   QRS axis shifted right   Minimal criteria for Anterior infarct are now present   T wave inversion now evident in Inferior leads   T wave inversion less evident in Lateral leads      Referred By:            Confirmed By:            DISCHARGE    Patient discharged in stable condition.    Reviewed implications of results, diagnosis, meds, responsibility to follow up, warning signs and symptoms of possible worsening, potential complications and reasons to return to ER.    Patient/Family voiced understanding of above instructions.    Discussed plan for discharge, as there is no emergent indication for admission.  Pt/family is agreeable and understands need for follow up and possible repeat testing.  Pt/family is aware that discharge does not mean that nothing is wrong but that it indicates no emergency is currently present that requires admission and they must continue care with follow-up as given below or with a physician of their choice.     FOLLOW-UP  Ngozi Davis DO  [address] [id number] [phone]    Schedule an appointment as soon as possible for a visit       [facility] Emergency Department  [address] [place], [place] [id number] [phone]    If symptoms worsen         Medication List      No changes were made to your prescriptions during this visit.                                            MDM    Final diagnoses:   Generalized abdominal pain   Constipation, unspecified constipation type   Chronic kidney disease, unspecified CKD stage   Anemia, unspecified type       ED Disposition  ED Disposition     ED Disposition   Discharge    Condition   Stable    Comment   --             Ngozi Davis DO  [address] [phone]    Schedule an appointment as soon as possible for a visit       [facility] Emergency Department  [address] [place] [id number] [phone]    If symptoms worsen         Medication List      No changes were made to your prescriptions during this visit.          Olamide Munroe PA  08/20/22 1605

## 2022-08-21 LAB
QT INTERVAL: 450 MS
QTC INTERVAL: 553 MS

## 2022-08-22 ENCOUNTER — APPOINTMENT (OUTPATIENT)
Dept: CT IMAGING | Facility: HOSPITAL | Age: 87
End: 2022-08-22

## 2022-08-22 ENCOUNTER — OFFICE VISIT (OUTPATIENT)
Dept: FAMILY MEDICINE CLINIC | Facility: CLINIC | Age: 87
End: 2022-08-22

## 2022-08-22 ENCOUNTER — HOSPITAL ENCOUNTER (EMERGENCY)
Facility: HOSPITAL | Age: 87
Discharge: HOME | End: 2022-08-23
Attending: EMERGENCY MEDICINE | Admitting: EMERGENCY MEDICINE

## 2022-08-22 VITALS
WEIGHT: 123.8 LBS | TEMPERATURE: 97.1 F | SYSTOLIC BLOOD PRESSURE: 116 MMHG | OXYGEN SATURATION: 99 % | HEIGHT: 64 IN | HEART RATE: 95 BPM | DIASTOLIC BLOOD PRESSURE: 68 MMHG | BODY MASS INDEX: 21.13 KG/M2

## 2022-08-22 VITALS
OXYGEN SATURATION: 95 % | RESPIRATION RATE: 18 BRPM | BODY MASS INDEX: 21 KG/M2 | WEIGHT: 123 LBS | SYSTOLIC BLOOD PRESSURE: 121 MMHG | HEART RATE: 77 BPM | DIASTOLIC BLOOD PRESSURE: 68 MMHG | HEIGHT: 64 IN | TEMPERATURE: 98.4 F

## 2022-08-22 DIAGNOSIS — G43.C0 PERIODIC HEADACHE SYNDROME, NOT INTRACTABLE: Primary | ICD-10-CM

## 2022-08-22 DIAGNOSIS — Z86.59 HISTORY OF DEMENTIA: ICD-10-CM

## 2022-08-22 DIAGNOSIS — R11.0 NAUSEA: ICD-10-CM

## 2022-08-22 DIAGNOSIS — R41.82 ALTERED MENTAL STATUS, UNSPECIFIED ALTERED MENTAL STATUS TYPE: Primary | ICD-10-CM

## 2022-08-22 LAB
ALBUMIN SERPL-MCNC: 3.5 G/DL (ref 3.5–5.2)
ALBUMIN/GLOB SERPL: 0.7 G/DL
ALP SERPL-CCNC: 45 U/L (ref 39–117)
ALT SERPL W P-5'-P-CCNC: 9 U/L (ref 1–33)
ANION GAP SERPL CALCULATED.3IONS-SCNC: 12 MMOL/L (ref 5–15)
AST SERPL-CCNC: 19 U/L (ref 1–32)
BASOPHILS # BLD AUTO: 0.03 10*3/MM3 (ref 0–0.2)
BASOPHILS NFR BLD AUTO: 0.3 % (ref 0–1.5)
BILIRUB SERPL-MCNC: 0.6 MG/DL (ref 0–1.2)
BUN SERPL-MCNC: 24 MG/DL (ref 8–23)
BUN/CREAT SERPL: 18.2 (ref 7–25)
CALCIUM SPEC-SCNC: 9.4 MG/DL (ref 8.2–9.6)
CHLORIDE SERPL-SCNC: 104 MMOL/L (ref 98–107)
CO2 SERPL-SCNC: 22 MMOL/L (ref 22–29)
CREAT SERPL-MCNC: 1.32 MG/DL (ref 0.57–1)
DEPRECATED RDW RBC AUTO: 54.8 FL (ref 37–54)
EGFRCR SERPLBLD CKD-EPI 2021: 38 ML/MIN/1.73
EOSINOPHIL # BLD AUTO: 0.01 10*3/MM3 (ref 0–0.4)
EOSINOPHIL NFR BLD AUTO: 0.1 % (ref 0.3–6.2)
ERYTHROCYTE [DISTWIDTH] IN BLOOD BY AUTOMATED COUNT: 15.3 % (ref 12.3–15.4)
FLUAV SUBTYP SPEC NAA+PROBE: NOT DETECTED
FLUBV RNA ISLT QL NAA+PROBE: NOT DETECTED
GLOBULIN UR ELPH-MCNC: 5.2 GM/DL
GLUCOSE SERPL-MCNC: 134 MG/DL (ref 65–99)
HCT VFR BLD AUTO: 31.9 % (ref 34–46.6)
HGB BLD-MCNC: 10 G/DL (ref 12–15.9)
IMM GRANULOCYTES # BLD AUTO: 0.05 10*3/MM3 (ref 0–0.05)
IMM GRANULOCYTES NFR BLD AUTO: 0.5 % (ref 0–0.5)
LYMPHOCYTES # BLD AUTO: 0.73 10*3/MM3 (ref 0.7–3.1)
LYMPHOCYTES NFR BLD AUTO: 7.9 % (ref 19.6–45.3)
MCH RBC QN AUTO: 30.6 PG (ref 26.6–33)
MCHC RBC AUTO-ENTMCNC: 31.3 G/DL (ref 31.5–35.7)
MCV RBC AUTO: 97.6 FL (ref 79–97)
MONOCYTES # BLD AUTO: 0.24 10*3/MM3 (ref 0.1–0.9)
MONOCYTES NFR BLD AUTO: 2.6 % (ref 5–12)
NEUTROPHILS NFR BLD AUTO: 8.18 10*3/MM3 (ref 1.7–7)
NEUTROPHILS NFR BLD AUTO: 88.6 % (ref 42.7–76)
NRBC BLD AUTO-RTO: 0 /100 WBC (ref 0–0.2)
PLATELET # BLD AUTO: 202 10*3/MM3 (ref 140–450)
PMV BLD AUTO: 11.7 FL (ref 6–12)
POTASSIUM SERPL-SCNC: 4.7 MMOL/L (ref 3.5–5.2)
PROT SERPL-MCNC: 8.7 G/DL (ref 6–8.5)
RBC # BLD AUTO: 3.27 10*6/MM3 (ref 3.77–5.28)
SARS-COV-2 RNA PNL SPEC NAA+PROBE: NOT DETECTED
SODIUM SERPL-SCNC: 138 MMOL/L (ref 136–145)
WBC NRBC COR # BLD: 9.24 10*3/MM3 (ref 3.4–10.8)

## 2022-08-22 PROCEDURE — 36415 COLL VENOUS BLD VENIPUNCTURE: CPT

## 2022-08-22 PROCEDURE — 87636 SARSCOV2 & INF A&B AMP PRB: CPT | Performed by: EMERGENCY MEDICINE

## 2022-08-22 PROCEDURE — 85025 COMPLETE CBC W/AUTO DIFF WBC: CPT | Performed by: EMERGENCY MEDICINE

## 2022-08-22 PROCEDURE — P9612 CATHETERIZE FOR URINE SPEC: HCPCS

## 2022-08-22 PROCEDURE — 70450 CT HEAD/BRAIN W/O DYE: CPT

## 2022-08-22 PROCEDURE — 99214 OFFICE O/P EST MOD 30 MIN: CPT | Performed by: PHYSICIAN ASSISTANT

## 2022-08-22 PROCEDURE — 81003 URINALYSIS AUTO W/O SCOPE: CPT | Performed by: EMERGENCY MEDICINE

## 2022-08-22 PROCEDURE — 99283 EMERGENCY DEPT VISIT LOW MDM: CPT

## 2022-08-22 PROCEDURE — 80053 COMPREHEN METABOLIC PANEL: CPT | Performed by: EMERGENCY MEDICINE

## 2022-08-22 RX ORDER — ALPRAZOLAM 0.5 MG/1
0.5 TABLET ORAL EVERY 6 HOURS PRN
COMMUNITY
Start: 2022-08-17

## 2022-08-22 RX ORDER — PREDNISONE 1 MG/1
TABLET ORAL
COMMUNITY
Start: 2022-08-17 | End: 2022-09-10 | Stop reason: HOSPADM

## 2022-08-22 RX ORDER — FEXOFENADINE HCL 180 MG/1
180 TABLET ORAL DAILY
COMMUNITY
Start: 2022-08-04 | End: 2022-09-10 | Stop reason: HOSPADM

## 2022-08-22 RX ORDER — PREDNISONE 10 MG/1
TABLET ORAL
Qty: 11 TABLET | Refills: 0 | Status: SHIPPED | OUTPATIENT
Start: 2022-08-22 | End: 2022-09-10 | Stop reason: HOSPADM

## 2022-08-22 RX ORDER — ONDANSETRON 4 MG/1
4 TABLET, ORALLY DISINTEGRATING ORAL EVERY 4 HOURS PRN
Qty: 30 TABLET | Refills: 0 | Status: SHIPPED | OUTPATIENT
Start: 2022-08-22 | End: 2022-09-10 | Stop reason: HOSPADM

## 2022-08-22 RX ORDER — ONDANSETRON 4 MG/1
TABLET, FILM COATED ORAL
COMMUNITY
Start: 2022-08-17 | End: 2022-08-22

## 2022-08-22 NOTE — PROGRESS NOTES
"    Chief Complaint   Patient presents with   • Nausea     X 1 month off and on    • Headache     X 1 month off and on   • Fatigue       HPI     Scott Diego is a pleasant 89 y.o. female with a past medical history of cognitive impairment, chronic systolic heart failure, coronary artery disease, hypertension, hypothyroidism, and migraines who presents for evaluation of \"chief complaint.\"     Accompanied by her son who provides much of the history. She woke up with a frontal headache this morning and nausea. She has been having headaches for a month or more and this one is similar. Her son states she has shooting pains into her temples intermittently. They tried Tylenol today without improvement. The zofran helped some with nausea but it comes back. She was started on Nurtec every other day by neurology last month but this has not helped her headaches. She declined an MRI at her last visit since she was worried about smothering during the test. She sees palliative care who prescribed prednisone on Wednesday last week but she has not started this yet. She also has issues with her balance which have been ongoing.     Past Medical History:   Diagnosis Date   • Anxiety    • Congestive heart failure (HCC)    • Coronary artery disease    • Dementia (HCC)    • Depression    • Hyperlipidemia    • Malignant neoplasm (HCC)    • Memory loss    • Peripheral neuropathy    • Stroke (HCC)     mini stroke   • Systolic heart failure (HCC) 09/25/2017    Chronic/compensated (EF 25%)       Past Surgical History:   Procedure Laterality Date   • CARDIAC CATHETERIZATION     • CHOLECYSTECTOMY     • EYE SURGERY     • HYSTERECTOMY         Family History   Problem Relation Age of Onset   • Cancer Mother    • No Known Problems Father    • Cancer Sister    • Cancer Brother    • Parkinsonism Brother        Social History     Socioeconomic History   • Marital status:    Tobacco Use   • Smoking status: Never Smoker   • Smokeless tobacco: " Never Used   Vaping Use   • Vaping Use: Never used   Substance and Sexual Activity   • Alcohol use: Never   • Drug use: Never   • Sexual activity: Defer       Allergies   Allergen Reactions   • Augmentin [Amoxicillin-Pot Clavulanate] Nausea And Vomiting     Makes her sick at her stomach   • Claritin [Loratadine] Unknown (See Comments)     Doesn't remember   • Keflex [Cephalexin] Nausea And Vomiting     Makes pt sick at her stomach   • Sulfa Antibiotics Other (See Comments)     Does not remember       ROS    Review of Systems   Gastrointestinal: Positive for nausea.   Musculoskeletal: Positive for gait problem.   Neurological: Positive for headache. Negative for speech difficulty and numbness.       Vitals:    08/22/22 1330   BP: 116/68   Pulse: 95   Temp: 97.1 °F (36.2 °C)   SpO2: 99%     Body mass index is 21.25 kg/m².      Current Outpatient Medications:   •  acetaminophen (TYLENOL) 325 MG tablet, Take 2 tablets by mouth Every 4 (Four) Hours As Needed for Mild Pain ., Disp: , Rfl:   •  allopurinol (ZYLOPRIM) 100 MG tablet, Take 1 tablet by mouth Every Night., Disp: 30 tablet, Rfl: 2  •  ALPRAZolam (XANAX) 0.5 MG tablet, Take 0.5 mg by mouth Every 6 (Six) Hours As Needed., Disp: , Rfl:   •  Artificial Tear Ointment (artificial tears) ophthalmic ointment, Administer 1 application to both eyes Daily., Disp: , Rfl:   •  atorvastatin (LIPITOR) 80 MG tablet, Take 80 mg by mouth Every Night., Disp: , Rfl:   •  bisacodyl (Dulcolax) 10 MG suppository, Insert 1 suppository into the rectum Daily As Needed for Constipation., Disp: 15 suppository, Rfl: 1  •  carboxymethylcellulose (REFRESH PLUS) 0.5 % solution, 3 (Three) Times a Day As Needed for Dry Eyes., Disp: , Rfl:   •  famotidine (PEPCID) 20 MG tablet, Take 20 mg by mouth Daily., Disp: , Rfl:   •  fexofenadine (ALLEGRA) 180 MG tablet, Take 180 mg by mouth Daily., Disp: , Rfl:   •  fexofenadine (ALLEGRA) 60 MG tablet, Take 1 tablet by mouth Daily., Disp: 30 tablet, Rfl:  5  •  fluconazole (DIFLUCAN) 100 MG tablet, TAKE 1 TABLET BY MOUTH EVERY DAY FOR 10 DOSES, Disp: , Rfl:   •  furosemide (LASIX) 20 MG tablet, Take 1 tablet by mouth Daily As Needed (increased shortness of breath, swelling)., Disp: 90 tablet, Rfl: 0  •  gabapentin (NEURONTIN) 300 MG capsule, Take 1 capsule by mouth Every Night., Disp: 90 capsule, Rfl: 0  •  Glycerin-Polysorbate 80 (REFRESH DRY EYE THERAPY OP), Apply  to eye(s) as directed by provider As Needed., Disp: , Rfl:   •  levothyroxine (SYNTHROID, LEVOTHROID) 50 MCG tablet, Take 1 tablet by mouth Every Morning., Disp: 30 tablet, Rfl: 5  •  Lidocaine Viscous HCl (XYLOCAINE) 2 % solution, , Disp: , Rfl:   •  memantine (NAMENDA) 5 MG tablet, Take 1 tablet by mouth 2 (Two) Times a Day., Disp: 180 tablet, Rfl: 1  •  metoprolol succinate XL (TOPROL-XL) 25 MG 24 hr tablet, Take 1 tablet by mouth Daily., Disp: 30 tablet, Rfl: 5  •  Nurtec 75 MG dispersible tablet, Take 1 tablet by mouth Every Other Day., Disp: 16 tablet, Rfl: 5  •  nystatin (MYCOSTATIN) 100,000 unit/mL suspension, Swish and swallow 5mLs by mouth 4 times daily, Disp: 60 mL, Rfl: 0  •  ofloxacin (OCUFLOX) 0.3 % ophthalmic solution, Administer 1 drop to both eyes 3 (Three) Times a Day. For 10 days, started 03-01-22, Disp: , Rfl:   •  ondansetron ODT (ZOFRAN-ODT) 4 MG disintegrating tablet, Place 1 tablet on the tongue Every 4 (Four) Hours As Needed for Nausea., Disp: 30 tablet, Rfl: 0  •  predniSONE (DELTASONE) 5 MG tablet, TAKE 1 TABLET BY MOUTH EVERY DAY FOR 10 DAYS, Disp: , Rfl:   •  SM Aspirin Adult Low Strength 81 MG EC tablet, Take 81 mg by mouth Daily., Disp: , Rfl:   •  ALPRAZolam (XANAX) 0.25 MG tablet, Take 1 tablet by mouth 3 (Three) Times a Day As Needed for Anxiety (and agitation)., Disp: 90 tablet, Rfl: 1  •  predniSONE (DELTASONE) 10 MG tablet, Take 2 tablets po qd x 3d, then 1 tab qd x 3d, then 0.5 tab qd x 3d, Disp: 11 tablet, Rfl: 0    PE    Physical Exam  Vitals reviewed.    Cardiovascular:      Rate and Rhythm: Normal rate.      Heart sounds: Normal heart sounds.   Pulmonary:      Effort: Pulmonary effort is normal. No respiratory distress.   Musculoskeletal:      Right lower leg: No edema.      Left lower leg: No edema.   Neurological:      Mental Status: She is alert.      Comments: Slow gait with walker   Psychiatric:         Mood and Affect: Mood normal.          A/P    Problem List Items Addressed This Visit    None     Visit Diagnoses     Periodic headache syndrome, not intractable    -  Primary  -ESR high (59) on 7/27/22  -Will trial short course of steroids to hopefully disrupt her headache pattern  -May take Nurtec again today if needed but thus far it has not been effective  -Follow-up with PCP in 1 week and with neurology on 9/21 or sooner if needed      Nausea      -May take zofran every 4 hours as needed    Relevant Medications    ondansetron ODT (ZOFRAN-ODT) 4 MG disintegrating tablet          Plan of care was reviewed with patient at the conclusion of today's visit. Education was provided regarding diagnoses, management, prescribed or recommended OTC products, and the importance of compliance with follow-up appointments. The patient was counseled regarding the risks, benefits, and possible side-effects of treatment. I advised the patient to keep me informed of any acute changes in their status including new, worsening, or persistent symptoms. Patient expresses understanding and agreement with the management plan.        TOSIN Kurtz

## 2022-08-23 LAB
BILIRUB UR QL STRIP: NEGATIVE
CLARITY UR: CLEAR
COLOR UR: YELLOW
GLUCOSE UR STRIP-MCNC: NEGATIVE MG/DL
HGB UR QL STRIP.AUTO: NEGATIVE
KETONES UR QL STRIP: NEGATIVE
LEUKOCYTE ESTERASE UR QL STRIP.AUTO: NEGATIVE
NITRITE UR QL STRIP: NEGATIVE
PH UR STRIP.AUTO: 6 [PH] (ref 5–8)
PROT UR QL STRIP: ABNORMAL
SP GR UR STRIP: 1.02 (ref 1–1.03)
UROBILINOGEN UR QL STRIP: ABNORMAL

## 2022-08-23 NOTE — ED PROVIDER NOTES
[facility]    EMERGENCY DEPARTMENT ENCOUNTER      Pt Name: Scott Diego  MRN: [id number]  YOB: 1930  Date of evaluation: 8/22/2022  Provider: Aubrey Hays MD    CHIEF COMPLAINT       Chief Complaint   Patient presents with   • Anxiety         HISTORY OF PRESENT ILLNESS  (Location/Symptom, Timing/Onset, Context/Setting, Quality, Duration, Modifying Factors, Severity.)   Scott Diego is a 89 y.o. female who presents to the emergency department with her son after requesting to come in.  The patient's son states that she was started on some prednisone earlier today and after taking her first dose had a slight amount of agitation with no specific complaint and requested to come to the emergency department.  The patient herself has no complaint at this time.  The son states that she is slightly off from her baseline but notes that she does have some dementia and this occurs from time to time.      Nursing notes were reviewed.    REVIEW OF SYSTEMS    (2-9 systems for level 4, 10 or more for level 5)   ROS:  General:  No fevers, no chills, no weakness  Cardiovascular:  No chest pain, no palpitations  Respiratory:  No shortness of breath, no cough, no wheezing  Gastrointestinal:  No pain, no nausea, no vomiting, no diarrhea  Musculoskeletal:  No muscle pain, no joint pain  Skin:  No rash  Neurologic:  No speech problems, no headache, no extremity numbness, no extremity tingling, no extremity weakness  Psychiatric:  No anxiety  Genitourinary:  No dysuria, no hematuria    Except as noted above the remainder of the review of systems was reviewed and negative.       PAST MEDICAL HISTORY     Past Medical History:   Diagnosis Date   • Anxiety    • Congestive heart failure (HCC)    • Coronary artery disease    • Dementia (HCC)    • Depression    • Hyperlipidemia    • Malignant neoplasm (HCC)    • Memory loss    • Peripheral neuropathy    • Stroke (HCC)     mini stroke   • Systolic heart failure (HCC)  09/25/2017    Chronic/compensated (EF 25%)         SURGICAL HISTORY       Past Surgical History:   Procedure Laterality Date   • CARDIAC CATHETERIZATION     • CHOLECYSTECTOMY     • EYE SURGERY     • HYSTERECTOMY           CURRENT MEDICATIONS     No current facility-administered medications for this encounter.    Current Outpatient Medications:   •  acetaminophen (TYLENOL) 325 MG tablet, Take 2 tablets by mouth Every 4 (Four) Hours As Needed for Mild Pain ., Disp: , Rfl:   •  allopurinol (ZYLOPRIM) 100 MG tablet, Take 1 tablet by mouth Every Night., Disp: 30 tablet, Rfl: 2  •  ALPRAZolam (XANAX) 0.25 MG tablet, Take 1 tablet by mouth 3 (Three) Times a Day As Needed for Anxiety (and agitation)., Disp: 90 tablet, Rfl: 1  •  ALPRAZolam (XANAX) 0.5 MG tablet, Take 0.5 mg by mouth Every 6 (Six) Hours As Needed., Disp: , Rfl:   •  Artificial Tear Ointment (artificial tears) ophthalmic ointment, Administer 1 application to both eyes Daily., Disp: , Rfl:   •  atorvastatin (LIPITOR) 80 MG tablet, Take 80 mg by mouth Every Night., Disp: , Rfl:   •  bisacodyl (Dulcolax) 10 MG suppository, Insert 1 suppository into the rectum Daily As Needed for Constipation., Disp: 15 suppository, Rfl: 1  •  carboxymethylcellulose (REFRESH PLUS) 0.5 % solution, 3 (Three) Times a Day As Needed for Dry Eyes., Disp: , Rfl:   •  famotidine (PEPCID) 20 MG tablet, Take 20 mg by mouth Daily., Disp: , Rfl:   •  fexofenadine (ALLEGRA) 180 MG tablet, Take 180 mg by mouth Daily., Disp: , Rfl:   •  fexofenadine (ALLEGRA) 60 MG tablet, Take 1 tablet by mouth Daily., Disp: 30 tablet, Rfl: 5  •  fluconazole (DIFLUCAN) 100 MG tablet, TAKE 1 TABLET BY MOUTH EVERY DAY FOR 10 DOSES, Disp: , Rfl:   •  furosemide (LASIX) 20 MG tablet, Take 1 tablet by mouth Daily As Needed (increased shortness of breath, swelling)., Disp: 90 tablet, Rfl: 0  •  gabapentin (NEURONTIN) 300 MG capsule, Take 1 capsule by mouth Every Night., Disp: 90 capsule, Rfl: 0  •   Glycerin-Polysorbate 80 (REFRESH DRY EYE THERAPY OP), Apply  to eye(s) as directed by provider As Needed., Disp: , Rfl:   •  levothyroxine (SYNTHROID, LEVOTHROID) 50 MCG tablet, Take 1 tablet by mouth Every Morning., Disp: 30 tablet, Rfl: 5  •  Lidocaine Viscous HCl (XYLOCAINE) 2 % solution, , Disp: , Rfl:   •  memantine (NAMENDA) 5 MG tablet, Take 1 tablet by mouth 2 (Two) Times a Day., Disp: 180 tablet, Rfl: 1  •  metoprolol succinate XL (TOPROL-XL) 25 MG 24 hr tablet, Take 1 tablet by mouth Daily., Disp: 30 tablet, Rfl: 5  •  Nurtec 75 MG dispersible tablet, Take 1 tablet by mouth Every Other Day., Disp: 16 tablet, Rfl: 5  •  nystatin (MYCOSTATIN) 100,000 unit/mL suspension, Swish and swallow 5mLs by mouth 4 times daily, Disp: 60 mL, Rfl: 0  •  ofloxacin (OCUFLOX) 0.3 % ophthalmic solution, Administer 1 drop to both eyes 3 (Three) Times a Day. For 10 days, started 03-01-22, Disp: , Rfl:   •  ondansetron ODT (ZOFRAN-ODT) 4 MG disintegrating tablet, Place 1 tablet on the tongue Every 4 (Four) Hours As Needed for Nausea., Disp: 30 tablet, Rfl: 0  •  predniSONE (DELTASONE) 10 MG tablet, Take 2 tablets po qd x 3d, then 1 tab qd x 3d, then 0.5 tab qd x 3d, Disp: 11 tablet, Rfl: 0  •  predniSONE (DELTASONE) 5 MG tablet, TAKE 1 TABLET BY MOUTH EVERY DAY FOR 10 DAYS, Disp: , Rfl:   •  SM Aspirin Adult Low Strength 81 MG EC tablet, Take 81 mg by mouth Daily., Disp: , Rfl:     ALLERGIES     Augmentin [amoxicillin-pot clavulanate], Claritin [loratadine], Keflex [cephalexin], and Sulfa antibiotics    FAMILY HISTORY       Family History   Problem Relation Age of Onset   • Cancer Mother    • No Known Problems Father    • Cancer Sister    • Cancer Brother    • Parkinsonism Brother           SOCIAL HISTORY       Social History     Socioeconomic History   • Marital status:    Tobacco Use   • Smoking status: Never Smoker   • Smokeless tobacco: Never Used   Vaping Use   • Vaping Use: Never used   Substance and Sexual Activity  "  • Alcohol use: Never   • Drug use: Never   • Sexual activity: Defer         PHYSICAL EXAM    (up to 7 for level 4, 8 or more for level 5)     Vitals:    08/22/22 1855 08/22/22 2337   BP: 129/72 121/68   BP Location: Left arm    Patient Position: Sitting    Pulse: 93 77   Resp: 18 18   Temp: 98.4 °F (36.9 °C)    SpO2: 93% 95%   Weight: 55.8 kg (123 lb)    Height: 162.6 cm (64\")        Physical Exam  General: Awake, alert, no acute distress.  HEENT: Conjunctivae normal.  Neck: Trachea midline.  Cardiac: Heart regular rate, rhythm, no murmurs, rubs, or gallops  Lungs: Lungs are clear to auscultation, there is no wheezing, rhonchi, or rales. There is no use of accessory muscles.  Chest wall: There is no tenderness to palpation over the chest wall or over ribs  Abdomen: Abdomen is soft, nontender, nondistended. There are no firm or pulsatile masses, no rebound rigidity or guarding.   Musculoskeletal: No deformity.  Neuro: Alert and oriented to person and place.  5 out of 5 strength in all 4 extremities.  Cranial nerves are grossly intact.  There is no sensory deficit.  I observed the patient ambulating without difficulty.  Dermatology: Skin is warm and dry  Psych: Mentation is grossly normal, cognition is grossly normal. Affect is appropriate.        DIAGNOSTIC RESULTS     RADIOLOGY:   Non-plain film images such as CT, Ultrasound and MRI are read by the radiologist. Plain radiographic images are visualized and preliminarily interpreted by the emergency physician with the below findings:      [x] Radiologist's Report Reviewed:  CT Head Without Contrast   Final Result           1.  Generalized parenchymal volume loss and changes of chronic small   vessel ischemic disease.  No acute intracranial abnormality identified.       This report was finalized on 8/22/2022 11:05 PM by Ney Pineda MD.                ED BEDSIDE ULTRASOUND:   Performed by ED Physician - none    LABS:    I have reviewed and interpreted all of the " currently available lab results from this visit (if applicable):  Results for orders placed or performed during the hospital encounter of 08/22/22   COVID-19 and FLU A/B PCR - Swab, Nasopharynx    Specimen: Nasopharynx; Swab   Result Value Ref Range    COVID19 Not Detected Not Detected - Ref. Range    Influenza A PCR Not Detected Not Detected    Influenza B PCR Not Detected Not Detected   Comprehensive Metabolic Panel    Specimen: Blood   Result Value Ref Range    Glucose 134 (H) 65 - 99 mg/dL    BUN 24 (H) 8 - 23 mg/dL    Creatinine 1.32 (H) 0.57 - 1.00 mg/dL    Sodium 138 136 - 145 mmol/L    Potassium 4.7 3.5 - 5.2 mmol/L    Chloride 104 98 - 107 mmol/L    CO2 22.0 22.0 - 29.0 mmol/L    Calcium 9.4 8.2 - 9.6 mg/dL    Total Protein 8.7 (H) 6.0 - 8.5 g/dL    Albumin 3.50 3.50 - 5.20 g/dL    ALT (SGPT) 9 1 - 33 U/L    AST (SGOT) 19 1 - 32 U/L    Alkaline Phosphatase 45 39 - 117 U/L    Total Bilirubin 0.6 0.0 - 1.2 mg/dL    Globulin 5.2 gm/dL    A/G Ratio 0.7 g/dL    BUN/Creatinine Ratio 18.2 7.0 - 25.0    Anion Gap 12.0 5.0 - 15.0 mmol/L    eGFR 38.0 (L) >60.0 mL/min/1.73   Urinalysis With Culture If Indicated - Urine, Catheter    Specimen: Urine, Catheter   Result Value Ref Range    Color, UA Yellow Yellow, Straw    Appearance, UA Clear Clear    pH, UA 6.0 5.0 - 8.0    Specific Gravity, UA 1.022 1.001 - 1.030    Glucose, UA Negative Negative    Ketones, UA Negative Negative    Bilirubin, UA Negative Negative    Blood, UA Negative Negative    Protein, UA Trace (A) Negative    Leuk Esterase, UA Negative Negative    Nitrite, UA Negative Negative    Urobilinogen, UA 0.2 E.U./dL 0.2 - 1.0 E.U./dL   CBC Auto Differential    Specimen: Blood   Result Value Ref Range    WBC 9.24 3.40 - 10.80 10*3/mm3    RBC 3.27 (L) 3.77 - 5.28 10*6/mm3    Hemoglobin 10.0 (L) 12.0 - 15.9 g/dL    Hematocrit 31.9 (L) 34.0 - 46.6 %    MCV 97.6 (H) 79.0 - 97.0 fL    MCH 30.6 26.6 - 33.0 pg    MCHC 31.3 (L) 31.5 - 35.7 g/dL    RDW 15.3 12.3 -  "15.4 %    RDW-SD 54.8 (H) 37.0 - 54.0 fl    MPV 11.7 6.0 - 12.0 fL    Platelets 202 140 - 450 10*3/mm3    Neutrophil % 88.6 (H) 42.7 - 76.0 %    Lymphocyte % 7.9 (L) 19.6 - 45.3 %    Monocyte % 2.6 (L) 5.0 - 12.0 %    Eosinophil % 0.1 (L) 0.3 - 6.2 %    Basophil % 0.3 0.0 - 1.5 %    Immature Grans % 0.5 0.0 - 0.5 %    Neutrophils, Absolute 8.18 (H) 1.70 - 7.00 10*3/mm3    Lymphocytes, Absolute 0.73 0.70 - 3.10 10*3/mm3    Monocytes, Absolute 0.24 0.10 - 0.90 10*3/mm3    Eosinophils, Absolute 0.01 0.00 - 0.40 10*3/mm3    Basophils, Absolute 0.03 0.00 - 0.20 10*3/mm3    Immature Grans, Absolute 0.05 0.00 - 0.05 10*3/mm3    nRBC 0.0 0.0 - 0.2 /100 WBC        All other labs were within normal range or not returned as of this dictation.      EMERGENCY DEPARTMENT COURSE and DIFFERENTIAL DIAGNOSIS/MDM:   Vitals:    Vitals:    08/22/22 1855 08/22/22 2337   BP: 129/72 121/68   BP Location: Left arm    Patient Position: Sitting    Pulse: 93 77   Resp: 18 18   Temp: 98.4 °F (36.9 °C)    SpO2: 93% 95%   Weight: 55.8 kg (123 lb)    Height: 162.6 cm (64\")        ED Course as of 08/23/22 0031   Tue Aug 23, 2022   0028 On reevaluation, the patient jay very well-appearing.  She is at her neurologic baseline per report from the son at the bedside.  Suspect that her transient confusion earlier may be related to initiation of prednisone today.  She has no focal neurologic deficit, is able to follow all commands, and has no other findings that would raise concern for CVA.  Head CT demonstrates no evidence of hemorrhage, mass, or other acute intracranial process.  Labs demonstrate no significant new abnormality including no hyponatremia.  Urinalysis is unremarkable and COVID swab is also negative.  She has some chronic anemia but no other significant electrolyte derangement.  I feel that she is appropriate for discharge home at this time with monitoring and close outpatient follow-up. [NS]      ED Course User Index  [NS] Saúl, " Aubrey PONCE MD         I had a discussion with the patient/family regarding diagnosis, diagnostic results, treatment plan, and medications.  The patient/family indicated understanding of these instructions.  I spent adequate time at the bedside preceding discharge necessary to personally discuss the aftercare instructions, giving patient education, providing explanations of the results of our evaluations/findings, and my decision making to assure that the patient/family understand the plan of care.  Time was allotted to answer questions at that time and throughout the ED course.  Emphasis was placed on timely follow-up after discharge.  I also discussed the potential for the development of an acute emergent condition requiring further evaluation, admission, or even surgical intervention. I discussed that we found nothing during the visit today indicating the need for further workup, admission, or the presence of an unstable medical condition.  I encouraged the patient to return to the emergency department immediately for ANY concerns, worsening, new complaints, or if symptoms persist and unable to seek follow-up in a timely fashion.  The patient/family expressed understanding and agreement with this plan.  The patient will follow-up with their PCP in 1-2 days for reevaluation.           FINAL IMPRESSION      1. Altered mental status, unspecified altered mental status type    2. History of dementia          DISPOSITION/PLAN     ED Disposition     ED Disposition   Discharge    Condition   Stable    Comment   --             PATIENT REFERRED TO:  Ngozi Davis, DO  [address] [address]  [phone]    Schedule an appointment as soon as possible for a visit in 2 days      [facility] Emergency Department  [address] [address] [phone]    If symptoms worsen      DISCHARGE MEDICATIONS:     Medication List      CONTINUE taking these medications     acetaminophen 325 MG tablet  Commonly known as: TYLENOL  Take 2 tablets by mouth Every 4 (Four) Hours As Needed for Mild Pain .     allopurinol 100 MG tablet  Commonly known as: ZYLOPRIM  Take 1 tablet by mouth Every Night.     * ALPRAZolam 0.25 MG tablet  Commonly known as: XANAX  Take 1 tablet by mouth 3 (Three) Times a Day As Needed for Anxiety (and agitation).     * ALPRAZolam 0.5 MG tablet  Commonly known as: XANAX     artificial tears ophthalmic ointment     atorvastatin 80 MG tablet  Commonly known as: LIPITOR     bisacodyl 10 MG suppository  Commonly known as: Dulcolax  Insert 1 suppository into the rectum Daily As Needed for Constipation.     carboxymethylcellulose 0.5 % solution  Commonly known as: REFRESH PLUS     famotidine 20 MG tablet  Commonly known as: PEPCID     * fexofenadine 60 MG tablet  Commonly known as: ALLEGRA  Take 1 tablet by mouth Daily.     * fexofenadine 180 MG tablet  Commonly known as: ALLEGRA     fluconazole 100 MG tablet  Commonly known as: DIFLUCAN     furosemide 20 MG tablet  Commonly known as: LASIX  Take 1 tablet by mouth Daily As Needed (increased shortness of breath, swelling).     gabapentin 300 MG capsule  Commonly known as: NEURONTIN  Take 1 capsule by mouth Every Night.     levothyroxine 50 MCG tablet  Commonly known as: SYNTHROID, LEVOTHROID  Take 1 tablet by mouth Every Morning.     Lidocaine Viscous HCl 2 % solution  Commonly known as: XYLOCAINE     memantine 5 MG tablet  Commonly known as: NAMENDA  Take 1 tablet by mouth 2 (Two) Times a Day.     metoprolol succinate XL 25 MG 24 hr tablet  Commonly known as: TOPROL-XL  Take 1 tablet by mouth Daily.     Nurtec 75 MG tablet dispersible tablet  Generic drug: Rimegepant Sulfate  Take 1 tablet by mouth Every Other Day.     nystatin 100,000 unit/mL suspension  Commonly known as: MYCOSTATIN  Swish and swallow 5mLs by mouth 4 times daily     ofloxacin 0.3 % ophthalmic solution  Commonly known as: OCUFLOX     ondansetron ODT 4  MG disintegrating tablet  Commonly known as: ZOFRAN-ODT  Place 1 tablet on the tongue Every 4 (Four) Hours As Needed for Nausea.     * predniSONE 5 MG tablet  Commonly known as: DELTASONE     * predniSONE 10 MG tablet  Commonly known as: DELTASONE  Take 2 tablets po qd x 3d, then 1 tab qd x 3d, then 0.5 tab qd x 3d     REFRESH DRY EYE THERAPY OP     SM Aspirin Adult Low Strength 81 MG EC tablet  Generic drug: aspirin         * This list has 6 medication(s) that are the same as other medications prescribed for you. Read the directions carefully, and ask your doctor or other care provider to review them with you.                    Comment: Please note this report has been produced using speech recognition software.      Aubrey Hays MD  Attending Emergency Physician               Aubrey Hays MD  08/23/22 0031

## 2022-08-26 ENCOUNTER — APPOINTMENT (OUTPATIENT)
Dept: GENERAL RADIOLOGY | Facility: HOSPITAL | Age: 87
End: 2022-08-26

## 2022-08-26 ENCOUNTER — HOSPITAL ENCOUNTER (EMERGENCY)
Facility: HOSPITAL | Age: 87
Discharge: HOME | End: 2022-08-26
Attending: EMERGENCY MEDICINE | Admitting: EMERGENCY MEDICINE

## 2022-08-26 VITALS
SYSTOLIC BLOOD PRESSURE: 113 MMHG | TEMPERATURE: 97.5 F | BODY MASS INDEX: 21 KG/M2 | HEIGHT: 64 IN | DIASTOLIC BLOOD PRESSURE: 80 MMHG | RESPIRATION RATE: 18 BRPM | HEART RATE: 71 BPM | WEIGHT: 123 LBS | OXYGEN SATURATION: 92 %

## 2022-08-26 DIAGNOSIS — R11.0 CHRONIC NAUSEA: Primary | ICD-10-CM

## 2022-08-26 LAB
ALBUMIN SERPL-MCNC: 3.7 G/DL (ref 3.5–5.2)
ALBUMIN/GLOB SERPL: 0.8 G/DL
ALP SERPL-CCNC: 42 U/L (ref 39–117)
ALT SERPL W P-5'-P-CCNC: 17 U/L (ref 1–33)
ANION GAP SERPL CALCULATED.3IONS-SCNC: 10 MMOL/L (ref 5–15)
AST SERPL-CCNC: 24 U/L (ref 1–32)
BASOPHILS # BLD AUTO: 0.05 10*3/MM3 (ref 0–0.2)
BASOPHILS NFR BLD AUTO: 0.5 % (ref 0–1.5)
BILIRUB SERPL-MCNC: 0.5 MG/DL (ref 0–1.2)
BILIRUB UR QL STRIP: NEGATIVE
BUN SERPL-MCNC: 26 MG/DL (ref 8–23)
BUN/CREAT SERPL: 19.1 (ref 7–25)
CALCIUM SPEC-SCNC: 9.5 MG/DL (ref 8.2–9.6)
CHLORIDE SERPL-SCNC: 104 MMOL/L (ref 98–107)
CK SERPL-CCNC: 26 U/L (ref 20–180)
CLARITY UR: CLEAR
CO2 SERPL-SCNC: 24 MMOL/L (ref 22–29)
COLOR UR: YELLOW
CREAT SERPL-MCNC: 1.36 MG/DL (ref 0.57–1)
D-LACTATE SERPL-SCNC: 1.1 MMOL/L (ref 0.5–2)
DEPRECATED RDW RBC AUTO: 57.4 FL (ref 37–54)
EGFRCR SERPLBLD CKD-EPI 2021: 36.6 ML/MIN/1.73
EOSINOPHIL # BLD AUTO: 0.02 10*3/MM3 (ref 0–0.4)
EOSINOPHIL NFR BLD AUTO: 0.2 % (ref 0.3–6.2)
ERYTHROCYTE [DISTWIDTH] IN BLOOD BY AUTOMATED COUNT: 16 % (ref 12.3–15.4)
GLOBULIN UR ELPH-MCNC: 4.8 GM/DL
GLUCOSE SERPL-MCNC: 126 MG/DL (ref 65–99)
GLUCOSE UR STRIP-MCNC: NEGATIVE MG/DL
HCT VFR BLD AUTO: 29.8 % (ref 34–46.6)
HGB BLD-MCNC: 9.4 G/DL (ref 12–15.9)
HGB UR QL STRIP.AUTO: NEGATIVE
HOLD SPECIMEN: NORMAL
IMM GRANULOCYTES # BLD AUTO: 0.04 10*3/MM3 (ref 0–0.05)
IMM GRANULOCYTES NFR BLD AUTO: 0.4 % (ref 0–0.5)
KETONES UR QL STRIP: NEGATIVE
LEUKOCYTE ESTERASE UR QL STRIP.AUTO: NEGATIVE
LIPASE SERPL-CCNC: 33 U/L (ref 13–60)
LYMPHOCYTES # BLD AUTO: 1.1 10*3/MM3 (ref 0.7–3.1)
LYMPHOCYTES NFR BLD AUTO: 11.5 % (ref 19.6–45.3)
MAGNESIUM SERPL-MCNC: 2.1 MG/DL (ref 1.7–2.3)
MCH RBC QN AUTO: 31.2 PG (ref 26.6–33)
MCHC RBC AUTO-ENTMCNC: 31.5 G/DL (ref 31.5–35.7)
MCV RBC AUTO: 99 FL (ref 79–97)
MONOCYTES # BLD AUTO: 0.66 10*3/MM3 (ref 0.1–0.9)
MONOCYTES NFR BLD AUTO: 6.9 % (ref 5–12)
NEUTROPHILS NFR BLD AUTO: 7.71 10*3/MM3 (ref 1.7–7)
NEUTROPHILS NFR BLD AUTO: 80.5 % (ref 42.7–76)
NITRITE UR QL STRIP: NEGATIVE
NRBC BLD AUTO-RTO: 0 /100 WBC (ref 0–0.2)
NT-PROBNP SERPL-MCNC: ABNORMAL PG/ML (ref 0–1800)
PH UR STRIP.AUTO: 6 [PH] (ref 5–8)
PLATELET # BLD AUTO: 209 10*3/MM3 (ref 140–450)
PMV BLD AUTO: 12 FL (ref 6–12)
POTASSIUM SERPL-SCNC: 4.7 MMOL/L (ref 3.5–5.2)
PROT SERPL-MCNC: 8.5 G/DL (ref 6–8.5)
PROT UR QL STRIP: ABNORMAL
RBC # BLD AUTO: 3.01 10*6/MM3 (ref 3.77–5.28)
SODIUM SERPL-SCNC: 138 MMOL/L (ref 136–145)
SP GR UR STRIP: 1.02 (ref 1–1.03)
TROPONIN T SERPL-MCNC: <0.01 NG/ML (ref 0–0.03)
UROBILINOGEN UR QL STRIP: ABNORMAL
WBC NRBC COR # BLD: 9.58 10*3/MM3 (ref 3.4–10.8)
WHOLE BLOOD HOLD COAG: NORMAL
WHOLE BLOOD HOLD SPECIMEN: NORMAL

## 2022-08-26 PROCEDURE — 74022 RADEX COMPL AQT ABD SERIES: CPT

## 2022-08-26 PROCEDURE — 99283 EMERGENCY DEPT VISIT LOW MDM: CPT

## 2022-08-26 PROCEDURE — 81003 URINALYSIS AUTO W/O SCOPE: CPT | Performed by: EMERGENCY MEDICINE

## 2022-08-26 PROCEDURE — 83605 ASSAY OF LACTIC ACID: CPT | Performed by: EMERGENCY MEDICINE

## 2022-08-26 PROCEDURE — 83735 ASSAY OF MAGNESIUM: CPT | Performed by: NURSE PRACTITIONER

## 2022-08-26 PROCEDURE — 36415 COLL VENOUS BLD VENIPUNCTURE: CPT

## 2022-08-26 PROCEDURE — 80053 COMPREHEN METABOLIC PANEL: CPT | Performed by: EMERGENCY MEDICINE

## 2022-08-26 PROCEDURE — 84484 ASSAY OF TROPONIN QUANT: CPT | Performed by: NURSE PRACTITIONER

## 2022-08-26 PROCEDURE — 83690 ASSAY OF LIPASE: CPT | Performed by: EMERGENCY MEDICINE

## 2022-08-26 PROCEDURE — P9612 CATHETERIZE FOR URINE SPEC: HCPCS

## 2022-08-26 PROCEDURE — 85025 COMPLETE CBC W/AUTO DIFF WBC: CPT

## 2022-08-26 PROCEDURE — 83880 ASSAY OF NATRIURETIC PEPTIDE: CPT | Performed by: NURSE PRACTITIONER

## 2022-08-26 PROCEDURE — 82550 ASSAY OF CK (CPK): CPT | Performed by: NURSE PRACTITIONER

## 2022-08-26 RX ORDER — LANOLIN ALCOHOL/MO/W.PET/CERES
25 CREAM (GRAM) TOPICAL DAILY
Status: DISCONTINUED | OUTPATIENT
Start: 2022-08-26 | End: 2022-08-27 | Stop reason: HOSPADM

## 2022-08-26 RX ORDER — ONDANSETRON 4 MG/1
4 TABLET, ORALLY DISINTEGRATING ORAL EVERY 6 HOURS PRN
Qty: 20 TABLET | Refills: 0 | Status: SHIPPED | OUTPATIENT
Start: 2022-08-26

## 2022-08-26 RX ORDER — SODIUM CHLORIDE 9 MG/ML
10 INJECTION INTRAVENOUS AS NEEDED
Status: DISCONTINUED | OUTPATIENT
Start: 2022-08-26 | End: 2022-08-27 | Stop reason: HOSPADM

## 2022-08-26 RX ADMIN — SODIUM CHLORIDE 500 ML: 9 INJECTION, SOLUTION INTRAVENOUS at 20:19

## 2022-08-26 RX ADMIN — PYRIDOXINE HCL TAB 50 MG 25 MG: 50 TAB at 22:01

## 2022-08-27 NOTE — ED PROVIDER NOTES
EMERGENCY DEPARTMENT ENCOUNTER    Pt Name: Scott Diego  MRN: [id number]  Pt :   4/10/1930  Room Number:    Date of encounter:  2022  PCP: Ngozi Davis DO  ED Provider: VASILE Rivero    Historian: Patient and caregiver son      HPI:  Chief Complaint: Nausea        Context: Scott Diego is a 89 y.o. female who presents to the ED c/o daily experiencing nausea especially in the mornings without vomiting.  Patient usually responds partially and temporarily to administration of Zofran with or without administration of Xanax.  This morning, this combination was not effective to relieve her symptoms.  Her symptoms are intermittent and seem to be better in the midday.  Reviewing her medication list, the newest medication started in this timeframe is Namenda started approximately 5 to 6 weeks ago.    Patient does have history of constipation.  She adamantly denies any abdominal pain.  Her diet consists of highly palatable foods such as chicken nuggets and hamburgers.  She is complaining of hunger currently.    Review of systems is negative for fever or chills.  Negative for chest pain or cough or shortness of breath.  Positive for nausea without vomiting or diarrhea.  She had a good bowel movement today.  She denies any abdominal pain or flank pain.  Positive for generalized weakness chronically without dizziness and no history of syncope or palpitations.  No neurosensory complaints or focal weakness      PAST MEDICAL HISTORY  Past Medical History:   Diagnosis Date   • Anxiety    • Congestive heart failure (HCC)    • Coronary artery disease    • Dementia (HCC)    • Depression    • Hyperlipidemia    • Malignant neoplasm (HCC)    • Memory loss    • Peripheral neuropathy    • Stroke (HCC)     mini stroke   • Systolic heart failure (HCC) 2017    Chronic/compensated (EF 25%)         PAST SURGICAL HISTORY  Past Surgical History:   Procedure Laterality Date   • CARDIAC CATHETERIZATION     •  CHOLECYSTECTOMY     • EYE SURGERY     • HYSTERECTOMY           FAMILY HISTORY  Family History   Problem Relation Age of Onset   • Cancer Mother    • No Known Problems Father    • Cancer Sister    • Cancer Brother    • Parkinsonism Brother          SOCIAL HISTORY  Social History     Socioeconomic History   • Marital status:    Tobacco Use   • Smoking status: Never Smoker   • Smokeless tobacco: Never Used   Vaping Use   • Vaping Use: Never used   Substance and Sexual Activity   • Alcohol use: Never   • Drug use: Never   • Sexual activity: Defer         ALLERGIES  Augmentin [amoxicillin-pot clavulanate], Claritin [loratadine], Keflex [cephalexin], and Sulfa antibiotics        REVIEW OF SYSTEMS  Review of Systems     All systems reviewed and negative except for those discussed in HPI.       PHYSICAL EXAM    I have reviewed the triage vital signs and nursing notes.    ED Triage Vitals [08/26/22 1850]   Temp Heart Rate Resp BP SpO2   97.5 °F (36.4 °C) 84 18 113/80 96 %      Temp src Heart Rate Source Patient Position BP Location FiO2 (%)   Oral Monitor Sitting Right arm --       Physical Exam  GENERAL:   Appears talkative and optimistic.  She is an older who appears somewhat underweight with generalized sarcopenia.  She is asymptomatic at this time.  HENT: Nares patent.  EYES: No scleral icterus.  CV: Regular rhythm, regular rate.  No tachycardia.  No peripheral edema  RESPIRATORY: Normal effort.  No audible wheezes, rales or rhonchi.  ABDOMEN: Soft, nontender at this time.  MUSCULOSKELETAL: No deformities.   NEURO: Alert, moves all extremities, follows commands.  SKIN: Warm, dry, no rash visualized.        LAB RESULTS  Recent Results (from the past 24 hour(s))   Lavender Top    Collection Time: 08/26/22  7:06 PM   Result Value Ref Range    Extra Tube hold for add-on    Light Blue Top    Collection Time: 08/26/22  7:06 PM   Result Value Ref Range    Extra Tube Hold for add-ons.    CBC Auto Differential     Collection Time: 08/26/22  7:06 PM    Specimen: Blood   Result Value Ref Range    WBC 9.58 3.40 - 10.80 10*3/mm3    RBC 3.01 (L) 3.77 - 5.28 10*6/mm3    Hemoglobin 9.4 (L) 12.0 - 15.9 g/dL    Hematocrit 29.8 (L) 34.0 - 46.6 %    MCV 99.0 (H) 79.0 - 97.0 fL    MCH 31.2 26.6 - 33.0 pg    MCHC 31.5 31.5 - 35.7 g/dL    RDW 16.0 (H) 12.3 - 15.4 %    RDW-SD 57.4 (H) 37.0 - 54.0 fl    MPV 12.0 6.0 - 12.0 fL    Platelets 209 140 - 450 10*3/mm3    Neutrophil % 80.5 (H) 42.7 - 76.0 %    Lymphocyte % 11.5 (L) 19.6 - 45.3 %    Monocyte % 6.9 5.0 - 12.0 %    Eosinophil % 0.2 (L) 0.3 - 6.2 %    Basophil % 0.5 0.0 - 1.5 %    Immature Grans % 0.4 0.0 - 0.5 %    Neutrophils, Absolute 7.71 (H) 1.70 - 7.00 10*3/mm3    Lymphocytes, Absolute 1.10 0.70 - 3.10 10*3/mm3    Monocytes, Absolute 0.66 0.10 - 0.90 10*3/mm3    Eosinophils, Absolute 0.02 0.00 - 0.40 10*3/mm3    Basophils, Absolute 0.05 0.00 - 0.20 10*3/mm3    Immature Grans, Absolute 0.04 0.00 - 0.05 10*3/mm3    nRBC 0.0 0.0 - 0.2 /100 WBC   Comprehensive Metabolic Panel    Collection Time: 08/26/22  7:44 PM    Specimen: Arm, Right; Blood   Result Value Ref Range    Glucose 126 (H) 65 - 99 mg/dL    BUN 26 (H) 8 - 23 mg/dL    Creatinine 1.36 (H) 0.57 - 1.00 mg/dL    Sodium 138 136 - 145 mmol/L    Potassium 4.7 3.5 - 5.2 mmol/L    Chloride 104 98 - 107 mmol/L    CO2 24.0 22.0 - 29.0 mmol/L    Calcium 9.5 8.2 - 9.6 mg/dL    Total Protein 8.5 6.0 - 8.5 g/dL    Albumin 3.70 3.50 - 5.20 g/dL    ALT (SGPT) 17 1 - 33 U/L    AST (SGOT) 24 1 - 32 U/L    Alkaline Phosphatase 42 39 - 117 U/L    Total Bilirubin 0.5 0.0 - 1.2 mg/dL    Globulin 4.8 gm/dL    A/G Ratio 0.8 g/dL    BUN/Creatinine Ratio 19.1 7.0 - 25.0    Anion Gap 10.0 5.0 - 15.0 mmol/L    eGFR 36.6 (L) >60.0 mL/min/1.73   Lipase    Collection Time: 08/26/22  7:44 PM    Specimen: Arm, Right; Blood   Result Value Ref Range    Lipase 33 13 - 60 U/L   Lactic Acid, Plasma    Collection Time: 08/26/22  7:44 PM    Specimen: Arm, Right;  Blood   Result Value Ref Range    Lactate 1.1 0.5 - 2.0 mmol/L   Green Top (Gel)    Collection Time: 08/26/22  7:44 PM   Result Value Ref Range    Extra Tube Hold for add-ons.    Gold Top - SST    Collection Time: 08/26/22  7:44 PM   Result Value Ref Range    Extra Tube Hold for add-ons.    Magnesium    Collection Time: 08/26/22  7:44 PM    Specimen: Arm, Right; Blood   Result Value Ref Range    Magnesium 2.1 1.7 - 2.3 mg/dL   CK    Collection Time: 08/26/22  7:44 PM    Specimen: Arm, Right; Blood   Result Value Ref Range    Creatine Kinase 26 20 - 180 U/L   Troponin    Collection Time: 08/26/22  7:44 PM    Specimen: Arm, Right; Blood   Result Value Ref Range    Troponin T <0.010 0.000 - 0.030 ng/mL   BNP    Collection Time: 08/26/22  7:44 PM    Specimen: Arm, Right; Blood   Result Value Ref Range    proBNP 10,572.0 (H) 0.0 - 1,800.0 pg/mL   Urinalysis With Microscopic If Indicated (No Culture) - Urine, Catheter    Collection Time: 08/26/22  8:18 PM    Specimen: Urine, Catheter   Result Value Ref Range    Color, UA Yellow Yellow, Straw    Appearance, UA Clear Clear    pH, UA 6.0 5.0 - 8.0    Specific Gravity, UA 1.022 1.001 - 1.030    Glucose, UA Negative Negative    Ketones, UA Negative Negative    Bilirubin, UA Negative Negative    Blood, UA Negative Negative    Protein, UA Trace (A) Negative    Leuk Esterase, UA Negative Negative    Nitrite, UA Negative Negative    Urobilinogen, UA 1.0 E.U./dL 0.2 - 1.0 E.U./dL       If labs were ordered, I independently reviewed the results.        RADIOLOGY  XR Abdomen 2+ VW with Chest 1 VW    Result Date: 8/26/2022  DATE OF EXAM: 8/26/2022 8:27 PM  PROCEDURE: XR ABDOMEN 2+ VIEWS W CHEST 1 VW-  INDICATIONS: 89-year-old, nausea  COMPARISON: Two-view abdomen radiograph dated 11/15/2013, CT abdomen and pelvis dated 08/20/2022  TECHNIQUE: A complete acute abdomen series, including supine, erect, and/or decubitus of the abdomen and single view of the chest were obtained.  FINDINGS:  There is a moderate amount of stool throughout the colon. Air-fluid levels are present which appear to be predominantly in the colon. No distended bowel loops are identified. No suspicious gas collections or mass effect or calcifications. There is evidence of previous pelvic surgery. Bony structures are intact. There is stable cardiomegaly and senescent change in the thoracic aorta and bony thorax. There are chronic interstitial markings throughout the lungs with signs of fibrosis in the lung bases. Pulmonary vascularity is unremarkable. The trachea is midline.       1. Moderate stool burden, nonspecific bowel gas pattern. No free air under the diaphragm. 2. Marked cardiomegaly and chronic interstitial lung disease/fibrosis. No acute cardiopulmonary process.  This report was finalized on 8/26/2022 9:36 PM by Jaron Wheeler DO.        PROCEDURES    Procedures    No orders to display       MEDICATIONS GIVEN IN ER    Medications   Sodium Chloride (PF) 0.9 % 10 mL (has no administration in time range)   vitamin B-6 (PYRIDOXINE) tablet 25 mg (25 mg Oral Given 8/26/22 2201)   sodium chloride 0.9 % bolus 500 mL (500 mL Intravenous New Bag 8/26/22 2019)           ED Course as of 08/26/22 2254   Fri Aug 26, 2022   1938 Hemoglobin(!): 9.4 [MS]   1938 Hematocrit(!): 29.8 [MS]   2040 proBNP(!): 10,572.0 [MS]   2040 eGFR(!): 36.6 [MS]   2216 Patient has been chatty and personable and in no acute distress throughout her 3 and half hour ED course.  She states she has had no abdominal pain and no nausea while she has been present.  Patient's work-up is very reassuring.  Her vital signs been stable throughout her ED visit.  She is receiving a modest bolus of fluid and some vitamin B6 as adjuncts for potential sources for nausea, and elders.  I had a long discussion with her caregiver son with whom she lives regarding parameters for concern that would warrant return to the emergency department.  Mr. Diego understands and concurs  this outpatient plan and close follow-up [MS]      ED Course User Index  [MS] Alexa Singletary APRN             AS OF 22:54 EDT VITALS:    BP - 113/80  HR - 71  TEMP - 97.5 °F (36.4 °C) (Oral)  O2 SATS - 92%                  DIAGNOSIS  Final diagnoses:   Chronic nausea         DISPOSITION    DISCHARGE    Patient discharged in stable condition.    Reviewed implications of results, diagnosis, meds, responsibility to follow up, warning signs and symptoms of possible worsening, potential complications and reasons to return to ER.    Patient/Family voiced understanding of above instructions.    Discussed plan for discharge, as there is no emergent indication for admission.  Pt/family is agreeable and understands need for follow up and possible repeat testing.  Pt/family is aware that discharge does not mean that nothing is wrong but that it indicates no emergency is currently present that requires admission and they must continue care with follow-up as given below or with a physician of their choice.     FOLLOW-UP  Ngozi Davis, DO  [address] [address]  [phone]    Schedule an appointment as soon as possible for a visit in 2 days  If symptoms worsen         Medication List      Changed    * ondansetron ODT 4 MG disintegrating tablet  Commonly known as: ZOFRAN-ODT  Place 1 tablet on the tongue Every 4 (Four) Hours As Needed for Nausea.  What changed: Another medication with the same name was added. Make sure you understand how and when to take each.     * ondansetron ODT 4 MG disintegrating tablet  Commonly known as: ZOFRAN-ODT  Place 1 tablet on the tongue Every 6 (Six) Hours As Needed for Nausea.  What changed: You were already taking a medication with the same name, and this prescription was added. Make sure you understand how and when to take each.         * This list has 2 medication(s) that are the same as other medications prescribed for you. Read the directions carefully, and ask  your doctor or other care provider to review them with you.               Where to Get Your Medications      These medications were sent to [id number] - [place], [place] - [address] - [phone]  - [phone]   [address] [address], [facility] [address]    Phone: [phone]   · ondansetron ODT 4 MG disintegrating tablet                  Alexa Singletary, APRN  08/26/22 6734

## 2022-08-27 NOTE — DISCHARGE INSTRUCTIONS
Home to rest and maintain her very best hydration and nutrition.  Consider discontinuation of Namenda as a possible source of new medications that may be contributing to her nausea.  Methylated vitamin B complex that contains vitamin B6 (pyridoxine) can be helpful.  Discussed use of scopolamine with palliative care.  Take every necessary precaution to avoid falls as nausea medications can cause her wrist to increase greatly.  Return to the emergency department as needed for worsening symptoms or concerns.  Zofran can be continued.  Additional Zofran has been forwarded to your personal pharmacy.

## 2022-08-30 ENCOUNTER — HOSPITAL ENCOUNTER (EMERGENCY)
Facility: HOSPITAL | Age: 87
Discharge: HOME | End: 2022-08-30
Attending: EMERGENCY MEDICINE | Admitting: EMERGENCY MEDICINE

## 2022-08-30 ENCOUNTER — APPOINTMENT (OUTPATIENT)
Dept: CT IMAGING | Facility: HOSPITAL | Age: 87
End: 2022-08-30

## 2022-08-30 VITALS
BODY MASS INDEX: 21 KG/M2 | TEMPERATURE: 98.2 F | RESPIRATION RATE: 18 BRPM | DIASTOLIC BLOOD PRESSURE: 82 MMHG | HEIGHT: 64 IN | WEIGHT: 123 LBS | HEART RATE: 87 BPM | OXYGEN SATURATION: 94 % | SYSTOLIC BLOOD PRESSURE: 132 MMHG

## 2022-08-30 DIAGNOSIS — F41.9 ANXIETY: ICD-10-CM

## 2022-08-30 DIAGNOSIS — R11.0 NAUSEA: ICD-10-CM

## 2022-08-30 DIAGNOSIS — R10.84 GENERALIZED ABDOMINAL PAIN: Primary | ICD-10-CM

## 2022-08-30 DIAGNOSIS — N18.9 CHRONIC KIDNEY DISEASE, UNSPECIFIED CKD STAGE: ICD-10-CM

## 2022-08-30 DIAGNOSIS — D64.9 ANEMIA, UNSPECIFIED TYPE: ICD-10-CM

## 2022-08-30 LAB
ALBUMIN SERPL-MCNC: 4.1 G/DL (ref 3.5–5.2)
ALBUMIN/GLOB SERPL: 0.9 G/DL
ALP SERPL-CCNC: 49 U/L (ref 39–117)
ALT SERPL W P-5'-P-CCNC: 19 U/L (ref 1–33)
ANION GAP SERPL CALCULATED.3IONS-SCNC: 10 MMOL/L (ref 5–15)
AST SERPL-CCNC: 22 U/L (ref 1–32)
BACTERIA UR QL AUTO: ABNORMAL /HPF
BASOPHILS # BLD AUTO: 0.05 10*3/MM3 (ref 0–0.2)
BASOPHILS NFR BLD AUTO: 0.5 % (ref 0–1.5)
BILIRUB SERPL-MCNC: 0.4 MG/DL (ref 0–1.2)
BILIRUB UR QL STRIP: NEGATIVE
BUN SERPL-MCNC: 18 MG/DL (ref 8–23)
BUN/CREAT SERPL: 13.1 (ref 7–25)
CALCIUM SPEC-SCNC: 9.4 MG/DL (ref 8.2–9.6)
CHLORIDE SERPL-SCNC: 103 MMOL/L (ref 98–107)
CLARITY UR: CLEAR
CO2 SERPL-SCNC: 26 MMOL/L (ref 22–29)
COLOR UR: YELLOW
CREAT SERPL-MCNC: 1.37 MG/DL (ref 0.57–1)
DEPRECATED RDW RBC AUTO: 57.2 FL (ref 37–54)
EGFRCR SERPLBLD CKD-EPI 2021: 36.3 ML/MIN/1.73
EOSINOPHIL # BLD AUTO: 0.17 10*3/MM3 (ref 0–0.4)
EOSINOPHIL NFR BLD AUTO: 1.5 % (ref 0.3–6.2)
ERYTHROCYTE [DISTWIDTH] IN BLOOD BY AUTOMATED COUNT: 16.3 % (ref 12.3–15.4)
GLOBULIN UR ELPH-MCNC: 4.8 GM/DL
GLUCOSE SERPL-MCNC: 124 MG/DL (ref 65–99)
GLUCOSE UR STRIP-MCNC: NEGATIVE MG/DL
HCT VFR BLD AUTO: 29.4 % (ref 34–46.6)
HGB BLD-MCNC: 9.4 G/DL (ref 12–15.9)
HGB UR QL STRIP.AUTO: NEGATIVE
HOLD SPECIMEN: NORMAL
HYALINE CASTS UR QL AUTO: ABNORMAL /LPF
IMM GRANULOCYTES # BLD AUTO: 0.06 10*3/MM3 (ref 0–0.05)
IMM GRANULOCYTES NFR BLD AUTO: 0.5 % (ref 0–0.5)
KETONES UR QL STRIP: NEGATIVE
LEUKOCYTE ESTERASE UR QL STRIP.AUTO: ABNORMAL
LIPASE SERPL-CCNC: 32 U/L (ref 13–60)
LYMPHOCYTES # BLD AUTO: 1.48 10*3/MM3 (ref 0.7–3.1)
LYMPHOCYTES NFR BLD AUTO: 13.5 % (ref 19.6–45.3)
MCH RBC QN AUTO: 31.3 PG (ref 26.6–33)
MCHC RBC AUTO-ENTMCNC: 32 G/DL (ref 31.5–35.7)
MCV RBC AUTO: 98 FL (ref 79–97)
MONOCYTES # BLD AUTO: 0.95 10*3/MM3 (ref 0.1–0.9)
MONOCYTES NFR BLD AUTO: 8.7 % (ref 5–12)
NEUTROPHILS NFR BLD AUTO: 75.3 % (ref 42.7–76)
NEUTROPHILS NFR BLD AUTO: 8.27 10*3/MM3 (ref 1.7–7)
NITRITE UR QL STRIP: NEGATIVE
NRBC BLD AUTO-RTO: 0 /100 WBC (ref 0–0.2)
PH UR STRIP.AUTO: 6 [PH] (ref 5–8)
PLATELET # BLD AUTO: 216 10*3/MM3 (ref 140–450)
PMV BLD AUTO: 11.8 FL (ref 6–12)
POTASSIUM SERPL-SCNC: 4.8 MMOL/L (ref 3.5–5.2)
PROT SERPL-MCNC: 8.9 G/DL (ref 6–8.5)
PROT UR QL STRIP: NEGATIVE
RBC # BLD AUTO: 3 10*6/MM3 (ref 3.77–5.28)
RBC # UR STRIP: ABNORMAL /HPF
REF LAB TEST METHOD: ABNORMAL
SODIUM SERPL-SCNC: 139 MMOL/L (ref 136–145)
SP GR UR STRIP: 1.01 (ref 1–1.03)
SQUAMOUS #/AREA URNS HPF: ABNORMAL /HPF
UROBILINOGEN UR QL STRIP: ABNORMAL
WBC # UR STRIP: ABNORMAL /HPF
WBC NRBC COR # BLD: 10.98 10*3/MM3 (ref 3.4–10.8)
WHOLE BLOOD HOLD COAG: NORMAL
WHOLE BLOOD HOLD SPECIMEN: NORMAL

## 2022-08-30 PROCEDURE — 74176 CT ABD & PELVIS W/O CONTRAST: CPT

## 2022-08-30 PROCEDURE — 96375 TX/PRO/DX INJ NEW DRUG ADDON: CPT

## 2022-08-30 PROCEDURE — 25010000002 ONDANSETRON PER 1 MG: Performed by: PHYSICIAN ASSISTANT

## 2022-08-30 PROCEDURE — 85025 COMPLETE CBC W/AUTO DIFF WBC: CPT

## 2022-08-30 PROCEDURE — 96374 THER/PROPH/DIAG INJ IV PUSH: CPT

## 2022-08-30 PROCEDURE — 80053 COMPREHEN METABOLIC PANEL: CPT

## 2022-08-30 PROCEDURE — 81001 URINALYSIS AUTO W/SCOPE: CPT | Performed by: PHYSICIAN ASSISTANT

## 2022-08-30 PROCEDURE — 99283 EMERGENCY DEPT VISIT LOW MDM: CPT

## 2022-08-30 PROCEDURE — 83690 ASSAY OF LIPASE: CPT | Performed by: PHYSICIAN ASSISTANT

## 2022-08-30 RX ORDER — FAMOTIDINE 10 MG/ML
20 INJECTION, SOLUTION INTRAVENOUS ONCE
Status: COMPLETED | OUTPATIENT
Start: 2022-08-30 | End: 2022-08-30

## 2022-08-30 RX ORDER — SODIUM CHLORIDE 0.9 % (FLUSH) 0.9 %
10 SYRINGE (ML) INJECTION AS NEEDED
Status: DISCONTINUED | OUTPATIENT
Start: 2022-08-30 | End: 2022-08-31 | Stop reason: HOSPADM

## 2022-08-30 RX ORDER — ONDANSETRON 2 MG/ML
4 INJECTION INTRAMUSCULAR; INTRAVENOUS ONCE
Status: COMPLETED | OUTPATIENT
Start: 2022-08-30 | End: 2022-08-30

## 2022-08-30 RX ADMIN — SODIUM CHLORIDE 500 ML: 9 INJECTION, SOLUTION INTRAVENOUS at 21:08

## 2022-08-30 RX ADMIN — ONDANSETRON 4 MG: 2 INJECTION INTRAMUSCULAR; INTRAVENOUS at 21:08

## 2022-08-30 RX ADMIN — FAMOTIDINE 20 MG: 10 INJECTION, SOLUTION INTRAVENOUS at 21:09

## 2022-09-05 ENCOUNTER — HOSPITAL ENCOUNTER (INPATIENT)
Facility: HOSPITAL | Age: 87
LOS: 5 days | Discharge: HOME HEALTH | End: 2022-09-10
Attending: EMERGENCY MEDICINE | Admitting: FAMILY MEDICINE

## 2022-09-05 ENCOUNTER — APPOINTMENT (OUTPATIENT)
Dept: CARDIOLOGY | Facility: HOSPITAL | Age: 87
End: 2022-09-05

## 2022-09-05 ENCOUNTER — APPOINTMENT (OUTPATIENT)
Dept: GENERAL RADIOLOGY | Facility: HOSPITAL | Age: 87
End: 2022-09-05

## 2022-09-05 DIAGNOSIS — N18.9 CHRONIC RENAL IMPAIRMENT, UNSPECIFIED CKD STAGE: ICD-10-CM

## 2022-09-05 DIAGNOSIS — I50.9 ACUTE ON CHRONIC CONGESTIVE HEART FAILURE, UNSPECIFIED HEART FAILURE TYPE: ICD-10-CM

## 2022-09-05 DIAGNOSIS — R09.02 HYPOXIA: Primary | ICD-10-CM

## 2022-09-05 DIAGNOSIS — U07.1 COVID-19: ICD-10-CM

## 2022-09-05 DIAGNOSIS — K59.00 CONSTIPATION, UNSPECIFIED CONSTIPATION TYPE: ICD-10-CM

## 2022-09-05 DIAGNOSIS — R41.82 ALTERED MENTAL STATUS, UNSPECIFIED ALTERED MENTAL STATUS TYPE: ICD-10-CM

## 2022-09-05 DIAGNOSIS — R11.0 NAUSEA: ICD-10-CM

## 2022-09-05 PROBLEM — R06.00 DYSPNEA DUE TO COVID-19: Status: ACTIVE | Noted: 2022-09-05

## 2022-09-05 PROBLEM — J96.01 ACUTE HYPOXEMIC RESPIRATORY FAILURE DUE TO COVID-19 (HCC): Status: ACTIVE | Noted: 2022-09-05

## 2022-09-05 PROBLEM — G93.49 ENCEPHALOPATHY DUE TO COVID-19 VIRUS: Status: ACTIVE | Noted: 2022-09-05

## 2022-09-05 LAB
ALBUMIN SERPL-MCNC: 4.2 G/DL (ref 3.5–5.2)
ALBUMIN SERPL-MCNC: 4.4 G/DL (ref 3.5–5.2)
ALBUMIN/GLOB SERPL: 0.8 G/DL
ALP SERPL-CCNC: 71 U/L (ref 39–117)
ALP SERPL-CCNC: 71 U/L (ref 39–117)
ALT SERPL W P-5'-P-CCNC: 12 U/L (ref 1–33)
ALT SERPL W P-5'-P-CCNC: 13 U/L (ref 1–33)
ANION GAP SERPL CALCULATED.3IONS-SCNC: 12 MMOL/L (ref 5–15)
AST SERPL-CCNC: 20 U/L (ref 1–32)
AST SERPL-CCNC: 22 U/L (ref 1–32)
BASOPHILS # BLD AUTO: 0.06 10*3/MM3 (ref 0–0.2)
BASOPHILS NFR BLD AUTO: 0.8 % (ref 0–1.5)
BILIRUB CONJ SERPL-MCNC: 0.2 MG/DL (ref 0–0.3)
BILIRUB INDIRECT SERPL-MCNC: 0.5 MG/DL
BILIRUB SERPL-MCNC: 0.7 MG/DL (ref 0–1.2)
BILIRUB SERPL-MCNC: 0.7 MG/DL (ref 0–1.2)
BILIRUB UR QL STRIP: NEGATIVE
BUN SERPL-MCNC: 16 MG/DL (ref 8–23)
BUN/CREAT SERPL: 10.6 (ref 7–25)
CALCIUM SPEC-SCNC: 10.2 MG/DL (ref 8.2–9.6)
CHLORIDE SERPL-SCNC: 101 MMOL/L (ref 98–107)
CLARITY UR: CLEAR
CO2 SERPL-SCNC: 24 MMOL/L (ref 22–29)
COLOR UR: YELLOW
CREAT SERPL-MCNC: 1.51 MG/DL (ref 0.57–1)
CREAT SERPL-MCNC: 1.57 MG/DL (ref 0.57–1)
CRP SERPL-MCNC: 1.63 MG/DL (ref 0–0.5)
D DIMER PPP FEU-MCNC: 0.47 MCGFEU/ML (ref 0.01–0.5)
D-LACTATE SERPL-SCNC: 2 MMOL/L (ref 0.5–2)
D-LACTATE SERPL-SCNC: 2.6 MMOL/L (ref 0.5–2)
D-LACTATE SERPL-SCNC: 3.4 MMOL/L (ref 0.5–2)
DEPRECATED RDW RBC AUTO: 59.6 FL (ref 37–54)
EGFRCR SERPLBLD CKD-EPI 2021: 30.8 ML/MIN/1.73
EGFRCR SERPLBLD CKD-EPI 2021: 32.3 ML/MIN/1.73
EOSINOPHIL # BLD AUTO: 0.09 10*3/MM3 (ref 0–0.4)
EOSINOPHIL NFR BLD AUTO: 1.1 % (ref 0.3–6.2)
ERYTHROCYTE [DISTWIDTH] IN BLOOD BY AUTOMATED COUNT: 16.6 % (ref 12.3–15.4)
FLUAV SUBTYP SPEC NAA+PROBE: NOT DETECTED
FLUBV RNA ISLT QL NAA+PROBE: NOT DETECTED
GLOBULIN UR ELPH-MCNC: 5.4 GM/DL
GLUCOSE SERPL-MCNC: 115 MG/DL (ref 65–99)
GLUCOSE UR STRIP-MCNC: NEGATIVE MG/DL
HCT VFR BLD AUTO: 32.7 % (ref 34–46.6)
HGB BLD-MCNC: 10.3 G/DL (ref 12–15.9)
HGB UR QL STRIP.AUTO: NEGATIVE
HOLD SPECIMEN: NORMAL
IMM GRANULOCYTES # BLD AUTO: 0.02 10*3/MM3 (ref 0–0.05)
IMM GRANULOCYTES NFR BLD AUTO: 0.3 % (ref 0–0.5)
INR PPP: 1.16 (ref 0.84–1.13)
KETONES UR QL STRIP: NEGATIVE
LDH SERPL-CCNC: 180 U/L (ref 135–214)
LEUKOCYTE ESTERASE UR QL STRIP.AUTO: NEGATIVE
LYMPHOCYTES # BLD AUTO: 0.73 10*3/MM3 (ref 0.7–3.1)
LYMPHOCYTES NFR BLD AUTO: 9.3 % (ref 19.6–45.3)
MAGNESIUM SERPL-MCNC: 2.8 MG/DL (ref 1.7–2.3)
MCH RBC QN AUTO: 30.7 PG (ref 26.6–33)
MCHC RBC AUTO-ENTMCNC: 31.5 G/DL (ref 31.5–35.7)
MCV RBC AUTO: 97.3 FL (ref 79–97)
MONOCYTES # BLD AUTO: 0.9 10*3/MM3 (ref 0.1–0.9)
MONOCYTES NFR BLD AUTO: 11.4 % (ref 5–12)
NEUTROPHILS NFR BLD AUTO: 6.07 10*3/MM3 (ref 1.7–7)
NEUTROPHILS NFR BLD AUTO: 77.1 % (ref 42.7–76)
NITRITE UR QL STRIP: NEGATIVE
NRBC BLD AUTO-RTO: 0 /100 WBC (ref 0–0.2)
NT-PROBNP SERPL-MCNC: ABNORMAL PG/ML (ref 0–1800)
NT-PROBNP SERPL-MCNC: ABNORMAL PG/ML (ref 0–1800)
PH UR STRIP.AUTO: 6.5 [PH] (ref 5–8)
PLATELET # BLD AUTO: 229 10*3/MM3 (ref 140–450)
PMV BLD AUTO: 11.4 FL (ref 6–12)
POTASSIUM SERPL-SCNC: 5.3 MMOL/L (ref 3.5–5.2)
PROCALCITONIN SERPL-MCNC: 0.04 NG/ML (ref 0–0.25)
PROT SERPL-MCNC: 8.8 G/DL (ref 6–8.5)
PROT SERPL-MCNC: 9.8 G/DL (ref 6–8.5)
PROT UR QL STRIP: ABNORMAL
PROTHROMBIN TIME: 14.7 SECONDS (ref 11.4–14.4)
RBC # BLD AUTO: 3.36 10*6/MM3 (ref 3.77–5.28)
SARS-COV-2 RNA PNL SPEC NAA+PROBE: DETECTED
SODIUM SERPL-SCNC: 137 MMOL/L (ref 136–145)
SP GR UR STRIP: 1.02 (ref 1–1.03)
TROPONIN T SERPL-MCNC: <0.01 NG/ML (ref 0–0.03)
UROBILINOGEN UR QL STRIP: ABNORMAL
WBC NRBC COR # BLD: 7.87 10*3/MM3 (ref 3.4–10.8)
WHOLE BLOOD HOLD COAG: NORMAL
WHOLE BLOOD HOLD SPECIMEN: NORMAL

## 2022-09-05 PROCEDURE — 86140 C-REACTIVE PROTEIN: CPT | Performed by: PHYSICIAN ASSISTANT

## 2022-09-05 PROCEDURE — 83605 ASSAY OF LACTIC ACID: CPT | Performed by: PHYSICIAN ASSISTANT

## 2022-09-05 PROCEDURE — 82565 ASSAY OF CREATININE: CPT | Performed by: NURSE PRACTITIONER

## 2022-09-05 PROCEDURE — 85610 PROTHROMBIN TIME: CPT | Performed by: PHYSICIAN ASSISTANT

## 2022-09-05 PROCEDURE — 82248 BILIRUBIN DIRECT: CPT

## 2022-09-05 PROCEDURE — 85025 COMPLETE CBC W/AUTO DIFF WBC: CPT

## 2022-09-05 PROCEDURE — 80053 COMPREHEN METABOLIC PANEL: CPT

## 2022-09-05 PROCEDURE — XW033E5 INTRODUCTION OF REMDESIVIR ANTI-INFECTIVE INTO PERIPHERAL VEIN, PERCUTANEOUS APPROACH, NEW TECHNOLOGY GROUP 5: ICD-10-PCS | Performed by: FAMILY MEDICINE

## 2022-09-05 PROCEDURE — 87636 SARSCOV2 & INF A&B AMP PRB: CPT

## 2022-09-05 PROCEDURE — 25010000002 FUROSEMIDE PER 20 MG: Performed by: PHYSICIAN ASSISTANT

## 2022-09-05 PROCEDURE — 99223 1ST HOSP IP/OBS HIGH 75: CPT | Performed by: INTERNAL MEDICINE

## 2022-09-05 PROCEDURE — 25010000002 REMDESIVIR 100 MG/20ML SOLUTION 1 EACH VIAL: Performed by: NURSE PRACTITIONER

## 2022-09-05 PROCEDURE — 85379 FIBRIN DEGRADATION QUANT: CPT | Performed by: PHYSICIAN ASSISTANT

## 2022-09-05 PROCEDURE — 25010000002 DEXAMETHASONE PER 1 MG: Performed by: NURSE PRACTITIONER

## 2022-09-05 PROCEDURE — 99284 EMERGENCY DEPT VISIT MOD MDM: CPT

## 2022-09-05 PROCEDURE — P9612 CATHETERIZE FOR URINE SPEC: HCPCS

## 2022-09-05 PROCEDURE — 83615 LACTATE (LD) (LDH) ENZYME: CPT | Performed by: PHYSICIAN ASSISTANT

## 2022-09-05 PROCEDURE — 83880 ASSAY OF NATRIURETIC PEPTIDE: CPT | Performed by: PHYSICIAN ASSISTANT

## 2022-09-05 PROCEDURE — 83880 ASSAY OF NATRIURETIC PEPTIDE: CPT | Performed by: NURSE PRACTITIONER

## 2022-09-05 PROCEDURE — 3E0333Z INTRODUCTION OF ANTI-INFLAMMATORY INTO PERIPHERAL VEIN, PERCUTANEOUS APPROACH: ICD-10-PCS | Performed by: FAMILY MEDICINE

## 2022-09-05 PROCEDURE — 25010000002 HEPARIN (PORCINE) PER 1000 UNITS: Performed by: NURSE PRACTITIONER

## 2022-09-05 PROCEDURE — 71045 X-RAY EXAM CHEST 1 VIEW: CPT

## 2022-09-05 PROCEDURE — 84145 PROCALCITONIN (PCT): CPT | Performed by: PHYSICIAN ASSISTANT

## 2022-09-05 PROCEDURE — 83735 ASSAY OF MAGNESIUM: CPT

## 2022-09-05 PROCEDURE — 84484 ASSAY OF TROPONIN QUANT: CPT

## 2022-09-05 PROCEDURE — 81003 URINALYSIS AUTO W/O SCOPE: CPT | Performed by: PHYSICIAN ASSISTANT

## 2022-09-05 RX ORDER — HALOPERIDOL 1 MG/1
1 TABLET ORAL EVERY 6 HOURS PRN
COMMUNITY
End: 2022-09-10 | Stop reason: HOSPADM

## 2022-09-05 RX ORDER — FUROSEMIDE 10 MG/ML
40 INJECTION INTRAMUSCULAR; INTRAVENOUS ONCE
Status: COMPLETED | OUTPATIENT
Start: 2022-09-05 | End: 2022-09-05

## 2022-09-05 RX ORDER — ACETAMINOPHEN 650 MG/1
650 SUPPOSITORY RECTAL EVERY 4 HOURS PRN
Status: DISCONTINUED | OUTPATIENT
Start: 2022-09-05 | End: 2022-09-10 | Stop reason: HOSPADM

## 2022-09-05 RX ORDER — DEXAMETHASONE SODIUM PHOSPHATE 4 MG/ML
6 INJECTION, SOLUTION INTRA-ARTICULAR; INTRALESIONAL; INTRAMUSCULAR; INTRAVENOUS; SOFT TISSUE DAILY
Status: DISCONTINUED | OUTPATIENT
Start: 2022-09-05 | End: 2022-09-06

## 2022-09-05 RX ORDER — ACETAMINOPHEN 325 MG/1
650 TABLET ORAL EVERY 4 HOURS PRN
Status: DISCONTINUED | OUTPATIENT
Start: 2022-09-05 | End: 2022-09-10 | Stop reason: HOSPADM

## 2022-09-05 RX ORDER — IPRATROPIUM BROMIDE AND ALBUTEROL SULFATE 2.5; .5 MG/3ML; MG/3ML
3 SOLUTION RESPIRATORY (INHALATION) EVERY 6 HOURS PRN
Status: DISCONTINUED | OUTPATIENT
Start: 2022-09-05 | End: 2022-09-10 | Stop reason: HOSPADM

## 2022-09-05 RX ORDER — DEXTROMETHORPHAN POLISTIREX 30 MG/5ML
60 SUSPENSION ORAL EVERY 12 HOURS PRN
Status: DISCONTINUED | OUTPATIENT
Start: 2022-09-05 | End: 2022-09-10 | Stop reason: HOSPADM

## 2022-09-05 RX ORDER — SODIUM CHLORIDE 0.9 % (FLUSH) 0.9 %
10 SYRINGE (ML) INJECTION AS NEEDED
Status: DISCONTINUED | OUTPATIENT
Start: 2022-09-05 | End: 2022-09-10 | Stop reason: HOSPADM

## 2022-09-05 RX ORDER — ALBUTEROL SULFATE 90 UG/1
2 AEROSOL, METERED RESPIRATORY (INHALATION) EVERY 6 HOURS PRN
Status: DISCONTINUED | OUTPATIENT
Start: 2022-09-05 | End: 2022-09-10 | Stop reason: HOSPADM

## 2022-09-05 RX ORDER — HEPARIN SODIUM 5000 [USP'U]/ML
5000 INJECTION, SOLUTION INTRAVENOUS; SUBCUTANEOUS EVERY 8 HOURS SCHEDULED
Status: DISCONTINUED | OUTPATIENT
Start: 2022-09-05 | End: 2022-09-10 | Stop reason: HOSPADM

## 2022-09-05 RX ORDER — FUROSEMIDE 10 MG/ML
40 INJECTION INTRAMUSCULAR; INTRAVENOUS DAILY
Status: DISCONTINUED | OUTPATIENT
Start: 2022-09-06 | End: 2022-09-06

## 2022-09-05 RX ADMIN — ACETAMINOPHEN 650 MG: 325 TABLET, FILM COATED ORAL at 17:30

## 2022-09-05 RX ADMIN — REMDESIVIR 200 MG: 100 INJECTION, POWDER, LYOPHILIZED, FOR SOLUTION INTRAVENOUS at 17:31

## 2022-09-05 RX ADMIN — NYSTATIN 500000 UNITS: 100000 SUSPENSION ORAL at 17:46

## 2022-09-05 RX ADMIN — FUROSEMIDE 40 MG: 10 INJECTION, SOLUTION INTRAMUSCULAR; INTRAVENOUS at 15:49

## 2022-09-05 RX ADMIN — METOPROLOL TARTRATE 25 MG: 25 TABLET, FILM COATED ORAL at 17:46

## 2022-09-05 RX ADMIN — NYSTATIN 500000 UNITS: 100000 SUSPENSION ORAL at 20:07

## 2022-09-05 RX ADMIN — HEPARIN SODIUM 5000 UNITS: 5000 INJECTION INTRAVENOUS; SUBCUTANEOUS at 17:19

## 2022-09-05 RX ADMIN — HEPARIN SODIUM 5000 UNITS: 5000 INJECTION INTRAVENOUS; SUBCUTANEOUS at 20:07

## 2022-09-05 RX ADMIN — DEXAMETHASONE SODIUM PHOSPHATE 6 MG: 4 INJECTION, SOLUTION INTRAMUSCULAR; INTRAVENOUS at 17:18

## 2022-09-05 NOTE — H&P
"    [facility] Medicine Services  HISTORY AND PHYSICAL    Patient Name: Scott Diego  : 4/10/1930  MRN: [id number]  Primary Care Physician: Ngozi Davis DO  Date of admission: 2022    Subjective   Subjective     Chief Complaint:  Hypoxia, AMS, COVID, SOA     HPI:  Scott Diego is a 89 y.o. female with past medical history significant for CHF, mild dementia, HTN, hypothyroidism,? CKD, and nocturnal oxygen use.  Patient lives with her son and family since the passing of her  in January of this year.  Son reports progressive decline in mental status and general health since that time.  On chart review patient was admitted to [facility] 3/3 - 3/17/2022 and then since then has had 10 ER visits since 6/10/2022 (for nausea, abd pain, h/a and general fatigue) before presenting today.      She presents today with 1 month history of nausea and headache by son's report, as well as increasing lethargy, confusion, chest congestion over the last approximate week.  Wears oxygen at hs at baseline but has been requiring it daily for about the last 2 or 3 weeks.  She follows with  and the heart valve center for her CHF.  Son reports last dose of \"as needed\" Lasix was given approximately 2 or 3 weeks ago.  On presentation today creatinine 1.51, hypoxic with sats of 87% room air, temp 100.4, heart rate 116, elevated lactate 2.6 and CRP 1.63.  CXR with cardiomegaly and interstitial pulmonary edema.  Tested positive for COVID-19.  Son further reports the entire family currently has COVID-19.  Will admit to hospitalist service for further treatment.         ROS obtained via chart review and per son via phone due to pts AMS   Review of Systems   Constitutional: Positive for activity change, appetite change, fatigue and fever.   HENT: Positive for congestion.    Eyes: Negative.    Respiratory: Positive for cough.    Cardiovascular: Negative.    Gastrointestinal: Positive for " nausea. Negative for abdominal pain, diarrhea and vomiting.   Endocrine: Negative.    Genitourinary: Negative.    Musculoskeletal: Positive for arthralgias and gait problem.   Skin: Positive for pallor.   Allergic/Immunologic: Negative.    Neurological: Positive for headaches. Negative for seizures and syncope.   Hematological: Negative.    Psychiatric/Behavioral: Positive for confusion and decreased concentration. The patient is nervous/anxious.         All other systems reviewed and are negative.     Personal History     Past Medical History:   Diagnosis Date   • Anxiety    • Congestive heart failure (HCC)    • Coronary artery disease    • Dementia (HCC)    • Depression    • Hyperlipidemia    • Malignant neoplasm (HCC)    • Memory loss    • Peripheral neuropathy    • Stroke (HCC)     mini stroke   • Systolic heart failure (HCC) 09/25/2017    Chronic/compensated (EF 25%)             Past Surgical History:   Procedure Laterality Date   • CARDIAC CATHETERIZATION     • CHOLECYSTECTOMY     • EYE SURGERY     • HYSTERECTOMY         Family History:  family history includes Cancer in her brother, mother, and sister; No Known Problems in her father; Parkinsonism in her brother. Otherwise pertinent FHx was reviewed and unremarkable.     Social History:  reports that she has never smoked. She has never used smokeless tobacco. She reports that she does not drink alcohol and does not use drugs.  Social History     Social History Narrative    Caffeine Intake:0-1  servings per day    Patient lives at her son's home       Medications:  ALPRAZolam, Glycerin-Polysorbate 80, Lidocaine Viscous HCl, Rimegepant Sulfate, acetaminophen, allopurinol, artificial tears, aspirin, atorvastatin, bisacodyl, carboxymethylcellulose, famotidine, fexofenadine, fluconazole, furosemide, gabapentin, levothyroxine, memantine, metoprolol succinate XL, nystatin, ofloxacin, ondansetron ODT, and predniSONE    Allergies   Allergen Reactions   • Augmentin  [Amoxicillin-Pot Clavulanate] Nausea And Vomiting     Makes her sick at her stomach   • Claritin [Loratadine] Unknown (See Comments)     Doesn't remember   • Keflex [Cephalexin] Nausea And Vomiting     Makes pt sick at her stomach   • Sulfa Antibiotics Other (See Comments)     Does not remember       Objective   Objective     Vital Signs:   Temp:  [100.4 °F (38 °C)] 100.4 °F (38 °C)  Heart Rate:  [106-120] 116  Resp:  [16] 16  BP: (152)/(91) 152/91  Flow (L/min):  [2] 2    Physical Exam   Constitutional: Awake, alert.  Resting back in bed.  Frail and chronically ill-appearing.  Confused.  Eyes: PERRLA, sclerae anicteric, no conjunctival injection  HENT: NCAT, mucous membranes dry  Neck: Supple, no thyromegaly, no lymphadenopathy, trachea midline  Respiratory: Lungs with few fine crackles at bases, no current wheezes.  Nonlabored respirations on 2 LNC sats 95%.  Cardiovascular: Tacky, no murmurs, rubs, or gallops, palpable pedal pulses bilaterally  Gastrointestinal: Positive bowel sounds, soft, nontender, nondistended  Musculoskeletal: No bilateral ankle edema, no clubbing or cyanosis to extremities.  Amezquita spontaneously.  Psychiatric: AMS, cooperative  Neurologic: Oriented to her first name only.  Confused otherwise, strength symmetric in all extremities but generally weak, Cranial Nerves grossly intact to confrontation, speech clear but confused.  Skin: No rashes.  General pallor.      Results Reviewed:  I have personally reviewed most recent indicated data and agree with findings including:  [x]  Laboratory  [x]  Radiology  [x]  EKG/Telemetry  []  Pathology  []  Cardiac/Vascular Studies  [x]  Old records  []  Other:  Most pertinent findings include:    LAB RESULTS:      Lab 09/05/22  1315 08/30/22  1737   WBC 7.87 10.98*   HEMOGLOBIN 10.3* 9.4*   HEMATOCRIT 32.7* 29.4*   PLATELETS 229 216   NEUTROS ABS 6.07 8.27*   IMMATURE GRANS (ABS) 0.02 0.06*   LYMPHS ABS 0.73 1.48   MONOS ABS 0.90 0.95*   EOS ABS 0.09 0.17    MCV 97.3* 98.0*   CRP 1.63*  --    PROCALCITONIN 0.04  --    LACTATE 2.6*  --      --    PROTIME 14.7*  --    D DIMER QUANT 0.47  --          Lab 09/05/22  1315 08/30/22  1737   SODIUM 137 139   POTASSIUM 5.3* 4.8   CHLORIDE 101 103   CO2 24.0 26.0   ANION GAP 12.0 10.0   BUN 16 18   CREATININE 1.51* 1.37*   EGFR 32.3* 36.3*   GLUCOSE 115* 124*   CALCIUM 10.2* 9.4   MAGNESIUM 2.8*  --          Lab 09/05/22  1315 08/30/22  1737   TOTAL PROTEIN 9.8* 8.9*   ALBUMIN 4.40 4.10   GLOBULIN 5.4 4.8   ALT (SGPT) 13 19   AST (SGOT) 22 22   BILIRUBIN 0.7 0.4   ALK PHOS 71 49   LIPASE  --  32         Lab 09/05/22  1315   PROBNP 13,441.0*   TROPONIN T <0.010   PROTIME 14.7*   INR 1.16*                 Brief Urine Lab Results  (Last result in the past 365 days)      Color   Clarity   Blood   Leuk Est   Nitrite   Protein   CREAT   Urine HCG        09/05/22 1507 Yellow   Clear   Negative   Negative   Negative   Trace               Microbiology Results (last 10 days)     Procedure Component Value - Date/Time    COVID PRE-OP / PRE-PROCEDURE SCREENING ORDER (NO ISOLATION) - Swab, Nasopharynx [[id number]]  (Abnormal) Collected: 09/05/22 1316    Lab Status: Final result Specimen: Swab from Nasopharynx Updated: 09/05/22 1400    Narrative:      The following orders were created for panel order COVID PRE-OP / PRE-PROCEDURE SCREENING ORDER (NO ISOLATION) - Swab, Nasopharynx.  Procedure                               Abnormality         Status                     ---------                               -----------         ------                     COVID-19 and FLU A/B PCR...[[id number]]  Abnormal            Final result                 Please view results for these tests on the individual orders.    COVID-19 and FLU A/B PCR - Swab, Nasopharynx [[id number]]  (Abnormal) Collected: 09/05/22 1316    Lab Status: Final result Specimen: Swab from Nasopharynx Updated: 09/05/22 1400     COVID19 Detected     Influenza A PCR Not Detected      Influenza B PCR Not Detected    Narrative:      Fact sheet for providers: https://www.fda.gov/media/069949/download    Fact sheet for patients: https://www.fda.gov/media/705361/download    Test performed by PCR.  Influenza A and Influenza B negative results should be considered presumptive in samples that have a positive SARS-CoV-2 result.    Competitive inhibition studies showed that SARS-CoV-2 virus, when present at concentrations above 3.6E+04 copies/mL, can inhibit the detection and amplification of influenza A and influenza B virus RNA if present at or below 1.8E+02 copies/mL or 4.9E+02 copies/mL, respectively, and may lead to false negative influenza virus results. If co-infection with influenza A or influenza B virus is suspected in samples with a positive SARS-CoV-2 result, the sample should be re-tested with another FDA cleared, approved, or authorized influenza test, if influenza virus detection would change clinical management.          XR Chest 1 View    Result Date: 9/5/2022  DATE OF EXAM: 9/5/2022 1:46 PM  PROCEDURE: XR CHEST 1 VW-  INDICATIONS: Weak/Dizzy/AMS triage protocol.  COMPARISON: Chest x-ray 8/20/2022  TECHNIQUE: Single frontal view of the chest.  FINDINGS: Stable cardiomediastinal silhouette with redemonstration of cardiomegaly. There is increased diffuse interstitial prominence from prior compatible with interstitial pulmonary edema. No definite pleural effusion. No pneumothorax. No acute osseous findings.      Impression: Cardiomegaly and interstitial pulmonary edema.  This report was finalized on 9/5/2022 1:56 PM by Rito Delatorre MD.        Results for orders placed during the hospital encounter of 03/03/22    Adult Transthoracic Echo Complete W/ Cont if Necessary Per Protocol    Interpretation Summary  · Estimated left ventricular EF = 30%  · Moderate mitral valve regurgitation is present.  · There is calcification of the aortic valve.  · Estimated right ventricular systolic pressure  "from tricuspid regurgitation is mildly elevated (35-45 mmHg).      Assessment & Plan   Assessment & Plan       COVID-19 virus detected    Acute hypoxemic respiratory failure due to COVID-19 (HCC)    Acute on chronic congestive heart failure (HCC)    Hypertension    Dementia (HCC)    CARYN (acute kidney injury) (HCC)    Hypoxia    Hypothyroid    Dyspnea due to COVID-19    Encephalopathy due to COVID-19 virus    Anxiety      Assessment/Plan:    COVID-19  Acute hypoxic respiratory failure  --Entire household as COVID currently.  Patient lives with son.  -- Currently low-grade temp, hypoxic, elevated lactate and CRP.  -- CXR with CM and interstitial edema  --Several negative COVID's recently.  Last negative was just 8/22/2022  -- COVID isolation thorough approx 9/20/2022  -- COVID labs daily  -- Oxygen as needed, per her son she is supposed to wear 2.5L nightly  -- nebs, cough meds  -- Start remdesivir.  -- Start dexamethasone.  Note… Son states she tends to get a little \"crazy\" on steroids in the past.  Low threshold due to age and reported history to discontinue dexamethasone as majority of her hypoxia may be related to CHF rather than COVID    Acute/Chronic HFrEF  New Afib RVR   Hx prolonged QTc   -- Follows with Dr. Aragon and heart valve center regularly  --consult cardiology in the AM  --change home metoprolol to 25 bid with first dose now for better rate control, can increase PRN  -- Last dose of Lasix outpatient was approximately 2-3 weeks ago per son as it was \"as needed\"  -- Last echo 3/2022 EF 30% (deidre new echo due to new afib and chf), heart failure exacerbation possibly triggered by new onset A.fib  --ck EKG   --start lasix 40 mg daily IV for now, strict I&Os, if poor response would switch to Bumex   --monitor labs     Headache  Nausea x1 month  --Patient reports 1 month history (has been taking Dramamine, scopolamine, Zofran), ?likely worsened by COVID    Worsening encephalopathy  Hx of mild " dementia  --Follows with Jessie Dover, APC with [facility] neurology  -- Progressive mental decline since  passed 1/2022  -- Recent Xanax dosing increased from 0.25 up to 0.5 mg  -- Gabapentin recently decreased from twice daily to nightly  -- Was on Namenda, neuro thought may be contributing or causing worsening nausea therefore DC'd 8/27    CKD 3b  -- Appears baselien creatinine around 1.3-1.4 range by chart review.  -- Currently 1.51  -- Likely due to volume overload/renal congestion  -- Monitor labs    Thrush   --nystatin s/s     Anxiety  -- Has been taking gabapentin only at at bedtime recently versus prior twice daily  -- Xanax recently increased from 0.25 to 0.5  -- Worsening since 1/2022 when her  passed    HTN/HLD  -- Home meds as tolerated    Hypothyroidism  --TSH 0.699     General debility  Failure to thrive  -- PT/OT/CM for discharge planning    DVT prophylaxis:  Sequentials, heparin SQ     CODE STATUS:    Level Of Support Discussed With: Next of Kin (If No Surrogate)  Code Status (Patient has no pulse and is not breathing): No CPR (Do Not Attempt to Resuscitate)  Medical Interventions (Patient has pulse or is breathing): Full Support  Son confirms DNR/DNI, would involve palliative for further GOC discussions in the AM.    This note has been completed as part of a split-shared workflow.     Signature: Electronically signed by VASILE Hinojosa, 09/05/22, 4:22 PM EDT      Attending   Admission Attestation       I have performed an independent face-to-face diagnostic evaluation including performing an independent physical examination as documented here.  The documented plan of care above was reviewed and developed with the advanced practice clinician (APC).      Brief Summary Statement:   Scott Diego is a 89 y.o. female with PMHx significant for NICM/HFrEF (EF 30% 3/3022), HTN, stage 3b CKD, dementia, HLD, hypothyroidism, generalized anxiety, recent decline/FTT after death of  "her  1/2022 with multiple ED visits who presents to [facility] with increasing confusion and hypoxia. Most of the history was obtained from patient's son, who states that patient currently lives with him. The entire household has COVID. He tells me that patient has been increasingly confused. She has been complaining is significant headache and nausea/vomiting. Her namenda was discontinued on 8/27 as it was felt that it may be contributing to her nausea. And she has been seen in the ED multiple times for this same complaint. Her COVID testing was all previously negative. They have tried multiple medications including zofran, dramamine, tylenol and most recently xanax (increased from 0.25mg to 0.5mg dose). He states they tried scopolamine also but \"she could hardly stand up on that stuff\". Patient also notes increasing shortness of breath. She normally wears 2.5L of O2 at night, however over the last 2 weeks she has been wearing it during the day also. She follows closely with heart/valve center and he states she was taken off her lasix due to some electrolyte abnormalities and her kidneys. It was only to be dosed \"as needed\", however he reports her last dose was probably 2-3 weeks ago. In the ED found to be hypoxic requiring 2L. Her COVID testing was positive. Her CXR was consistent with cardiomegaly and pulmonary edema with BNP >13K concerning for CHF exacerbation. After arriving to the floor she developed new onset A.fib.    Remainder of detailed HPI is as noted by APC and has been reviewed and/or edited by me for completeness.    Attending Physical Exam:  Temp:  [99.9 °F (37.7 °C)-100.4 °F (38 °C)] 99.9 °F (37.7 °C)  Heart Rate:  [106-120] 120  Resp:  [16] 16  BP: (146-152)/(83-91) 146/83  Flow (L/min):  [2] 2    Constitutional: Awake, alert, elderly appearing female  Eyes: PERRLA, sclerae anicteric, no conjunctival injection  HENT: NCAT, mucous membranes moist  Neck: Supple, no thyromegaly, no lymphadenopathy, " trachea midline  Respiratory: Crackles in bases, non-labored on 2L  Cardiovascular: tachycardic, no murmurs, rubs, or gallops, palpable pedal pulses bilaterally  Gastrointestinal: Positive bowel sounds, soft, nontender, nondistended  Musculoskeletal: No bilateral ankle edema, no clubbing or cyanosis to extremities  Psychiatric:flat, drowsy  Neurologic: Oriented to person only, strength symmetric in all extremities, Cranial Nerves grossly intact to confrontation, speech clear  Skin: No rashes    Brief Assessment/Plan :  See detailed assessment and plan developed with APC which I have reviewed and/or edited for completeness.        Admission Status: I believe that this patient meets INPATIENT status due to hypoxia, COVID, advanced age, new onset A.fib RVR and CHF exacerbation. I feel patient’s risk for adverse outcomes and need for care warrant INPATIENT evaluation and I predict the patient’s care encounter to likely last beyond 2 midnights.        Radha Gutierrez, DO  09/05/22

## 2022-09-05 NOTE — ED PROVIDER NOTES
Subjective   89-year-old female presents emergency department today with increasing confusion.  I spoke to her son via the phone, apparently the entire family has tested positive for COVID-19 and are all symptomatic.  Has had over the week and has become increasingly confused and lethargic.  They are concerned that she to have COVID-19.  She has had boosters in vaccinations for COVID-19.  Her son states that she has not had a fever.  She has not seemed exceptionally short of breath.  She had very little cough.  She had some mild upper respiratory congestion.  As far as he knows she has not had any urinary symptoms.  She is not a smoker and has no underlying chronic lung disease.  She does have a history of mild dementia, however normally not this lethargic or confused.  He states she is just been kind of picking at things in the air and not making any sense speaking.  They are unable to get her to stand up this morning due to her weakness.  Called EMS to have her brought to the emergency department.      History provided by:  Patient and relative  History limited by:  Mental status change   used: No    Weakness - Generalized  Severity:  Severe  Onset quality:  Gradual  Duration:  2 days  Timing:  Constant  Progression:  Worsening  Chronicity:  New  Context comment:  Entire family with COVID-19.  She has been mildly symptomatic but increasing lethargy and confusion.  Relieved by:  Nothing  Worsened by:  Nothing  Ineffective treatments:  None tried  Associated symptoms: abdominal pain and cough    Associated symptoms: no chest pain, no diarrhea, no dysuria, no falls, no fever, no foul-smelling urine, no frequency, no hematochezia, no lethargy, no loss of consciousness, no melena, no nausea, no sensory-motor deficit, no urgency, no vision change and no vomiting    Risk factors: congestive heart failure    Risk factors: no anemia, no diabetes, no family hx of stroke, no heart disease, no new  medications and no recent stressors        Review of Systems   Unable to perform ROS: Mental status change   Constitutional: Negative for chills and fever.   Respiratory: Positive for cough. Negative for chest tightness and wheezing.    Cardiovascular: Negative for chest pain.   Gastrointestinal: Positive for abdominal pain. Negative for diarrhea, hematochezia, melena, nausea and vomiting.   Genitourinary: Negative for dysuria, frequency and urgency.   Musculoskeletal: Positive for back pain and neck pain. Negative for falls.   Skin: Negative for pallor and rash.   Neurological: Negative for loss of consciousness.   Psychiatric/Behavioral: Negative.    All other systems reviewed and are negative.      Past Medical History:   Diagnosis Date   • Anxiety    • Congestive heart failure (HCC)    • Coronary artery disease    • Dementia (HCC)    • Depression    • Hyperlipidemia    • Malignant neoplasm (HCC)    • Memory loss    • Peripheral neuropathy    • Stroke (HCC)     mini stroke   • Systolic heart failure (HCC) 09/25/2017    Chronic/compensated (EF 25%)       Allergies   Allergen Reactions   • Augmentin [Amoxicillin-Pot Clavulanate] Nausea And Vomiting     Makes her sick at her stomach   • Claritin [Loratadine] Unknown (See Comments)     Doesn't remember   • Keflex [Cephalexin] Nausea And Vomiting     Makes pt sick at her stomach   • Sulfa Antibiotics Other (See Comments)     Does not remember       Past Surgical History:   Procedure Laterality Date   • CARDIAC CATHETERIZATION     • CHOLECYSTECTOMY     • EYE SURGERY     • HYSTERECTOMY         Family History   Problem Relation Age of Onset   • Cancer Mother    • No Known Problems Father    • Cancer Sister    • Cancer Brother    • Parkinsonism Brother    • No Known Problems Son        Social History     Socioeconomic History   • Marital status:    Tobacco Use   • Smoking status: Never Smoker   • Smokeless tobacco: Never Used   Vaping Use   • Vaping Use: Never used    Substance and Sexual Activity   • Alcohol use: Never   • Drug use: Never   • Sexual activity: Defer     Comment:  since 1/2022           Objective   Physical Exam  Vitals and nursing note reviewed.   Constitutional:       General: She is not in acute distress.     Appearance: She is well-developed. She is not diaphoretic.      Comments: Confused taking off her O2 sat monitor constantly trying to climb out of the bed   HENT:      Head: Normocephalic and atraumatic.      Nose: Congestion present.      Mouth/Throat:      Mouth: Mucous membranes are dry.   Eyes:      General: No scleral icterus.     Conjunctiva/sclera: Conjunctivae normal.   Cardiovascular:      Rate and Rhythm: Regular rhythm. Tachycardia present.      Heart sounds: Normal heart sounds. No murmur heard.  Pulmonary:      Effort: Pulmonary effort is normal. No respiratory distress.      Breath sounds: Rales present.      Comments: Faint rales in the lower lung bases.  Abdominal:      General: Bowel sounds are normal.      Palpations: Abdomen is soft.      Tenderness: There is no abdominal tenderness.   Musculoskeletal:         General: Normal range of motion.      Cervical back: Normal range of motion and neck supple.   Skin:     General: Skin is warm and dry.   Neurological:      Mental Status: She is alert. She is disoriented.   Psychiatric:      Comments: Not communicating picking in the air try to take off her monitors.         Procedures           ED Course  ED Course as of 09/06/22 1318   Mon Sep 05, 2022   1451 Discussed with Dr. Gutierrez.  She will admit. [ASHKAN]   9087 I called Mrs. Diego son notifying him of the findings and the admission. [ASHKAN]      ED Course User Index  [ASHKAN] Sid Cesar PA                                 Recent Results (from the past 24 hour(s))   Urinalysis With Culture If Indicated - Urine, Catheter    Collection Time: 09/05/22  3:07 PM    Specimen: Urine, Catheter   Result Value Ref Range    Color, UA Yellow  Yellow, Straw    Appearance, UA Clear Clear    pH, UA 6.5 5.0 - 8.0    Specific Gravity, UA 1.018 1.001 - 1.030    Glucose, UA Negative Negative    Ketones, UA Negative Negative    Bilirubin, UA Negative Negative    Blood, UA Negative Negative    Protein, UA Trace (A) Negative    Leuk Esterase, UA Negative Negative    Nitrite, UA Negative Negative    Urobilinogen, UA 0.2 E.U./dL 0.2 - 1.0 E.U./dL   STAT Lactic Acid, Reflex    Collection Time: 09/05/22  6:14 PM    Specimen: Blood   Result Value Ref Range    Lactate 3.4 (C) 0.5 - 2.0 mmol/L   Hepatic Function Panel    Collection Time: 09/05/22  6:14 PM    Specimen: Blood   Result Value Ref Range    Total Protein 8.8 (H) 6.0 - 8.5 g/dL    Albumin 4.20 3.50 - 5.20 g/dL    ALT (SGPT) 12 1 - 33 U/L    AST (SGOT) 20 1 - 32 U/L    Alkaline Phosphatase 71 39 - 117 U/L    Total Bilirubin 0.7 0.0 - 1.2 mg/dL    Bilirubin, Direct 0.2 0.0 - 0.3 mg/dL    Bilirubin, Indirect 0.5 mg/dL   Creatinine, Serum    Collection Time: 09/05/22  6:14 PM    Specimen: Blood   Result Value Ref Range    Creatinine 1.57 (H) 0.57 - 1.00 mg/dL    eGFR 30.8 (L) >60.0 mL/min/1.73   proBNP    Collection Time: 09/05/22  6:14 PM    Specimen: Blood   Result Value Ref Range    proBNP 25,296.0 (H) 0.0 - 1,800.0 pg/mL   STAT Lactic Acid, Reflex    Collection Time: 09/05/22  8:57 PM    Specimen: Blood   Result Value Ref Range    Lactate 2.0 0.5 - 2.0 mmol/L   Comprehensive Metabolic Panel    Collection Time: 09/06/22  5:10 AM    Specimen: Blood   Result Value Ref Range    Glucose 137 (H) 65 - 99 mg/dL    BUN 24 (H) 8 - 23 mg/dL    Creatinine 1.83 (H) 0.57 - 1.00 mg/dL    Sodium 139 136 - 145 mmol/L    Potassium 4.8 3.5 - 5.2 mmol/L    Chloride 100 98 - 107 mmol/L    CO2 23.0 22.0 - 29.0 mmol/L    Calcium 9.2 8.2 - 9.6 mg/dL    Total Protein 8.1 6.0 - 8.5 g/dL    Albumin 3.70 3.50 - 5.20 g/dL    ALT (SGPT) 21 1 - 33 U/L    AST (SGOT) 43 (H) 1 - 32 U/L    Alkaline Phosphatase 92 39 - 117 U/L    Total Bilirubin  1.0 0.0 - 1.2 mg/dL    Globulin 4.4 gm/dL    A/G Ratio 0.8 g/dL    BUN/Creatinine Ratio 13.1 7.0 - 25.0    Anion Gap 16.0 (H) 5.0 - 15.0 mmol/L    eGFR 25.6 (L) >60.0 mL/min/1.73   C-reactive Protein    Collection Time: 09/06/22  5:10 AM    Specimen: Blood   Result Value Ref Range    C-Reactive Protein 3.57 (H) 0.00 - 0.50 mg/dL   Sedimentation Rate    Collection Time: 09/06/22  5:10 AM    Specimen: Blood   Result Value Ref Range    Sed Rate 67 (H) 0 - 30 mm/hr   Protime-INR    Collection Time: 09/06/22  5:10 AM    Specimen: Blood   Result Value Ref Range    Protime 16.9 (H) 11.4 - 14.4 Seconds    INR 1.38 (H) 0.84 - 1.13   CBC Auto Differential    Collection Time: 09/06/22  5:10 AM    Specimen: Blood   Result Value Ref Range    WBC 4.42 3.40 - 10.80 10*3/mm3    RBC 2.92 (L) 3.77 - 5.28 10*6/mm3    Hemoglobin 9.2 (L) 12.0 - 15.9 g/dL    Hematocrit 28.2 (L) 34.0 - 46.6 %    MCV 96.6 79.0 - 97.0 fL    MCH 31.5 26.6 - 33.0 pg    MCHC 32.6 31.5 - 35.7 g/dL    RDW 16.6 (H) 12.3 - 15.4 %    RDW-SD 58.2 (H) 37.0 - 54.0 fl    MPV 12.0 6.0 - 12.0 fL    Platelets 204 140 - 450 10*3/mm3    Neutrophil % 79.5 (H) 42.7 - 76.0 %    Lymphocyte % 9.7 (L) 19.6 - 45.3 %    Monocyte % 10.4 5.0 - 12.0 %    Eosinophil % 0.0 (L) 0.3 - 6.2 %    Basophil % 0.2 0.0 - 1.5 %    Immature Grans % 0.2 0.0 - 0.5 %    Neutrophils, Absolute 3.51 1.70 - 7.00 10*3/mm3    Lymphocytes, Absolute 0.43 (L) 0.70 - 3.10 10*3/mm3    Monocytes, Absolute 0.46 0.10 - 0.90 10*3/mm3    Eosinophils, Absolute 0.00 0.00 - 0.40 10*3/mm3    Basophils, Absolute 0.01 0.00 - 0.20 10*3/mm3    Immature Grans, Absolute 0.01 0.00 - 0.05 10*3/mm3    nRBC 0.0 0.0 - 0.2 /100 WBC   Bilirubin, Direct    Collection Time: 09/06/22  5:10 AM    Specimen: Blood   Result Value Ref Range    Bilirubin, Direct 0.5 (H) 0.0 - 0.3 mg/dL   Adult Transthoracic Echo Complete w/ Color, Spectral and Contrast if Necessary Per Protocol    Collection Time: 09/06/22 11:58 AM   Result Value Ref Range     Target HR (85%) 109 bpm    Max. Pred. HR (100%) 128 bpm     Note: In addition to lab results from this visit, the labs listed above may include labs taken at another facility or during a different encounter within the last 24 hours. Please correlate lab times with ED admission and discharge times for further clarification of the services performed during this visit.    XR Chest 1 View   Final Result   Cardiomegaly and interstitial pulmonary edema.       This report was finalized on 9/5/2022 1:56 PM by Rito Delatorre MD.            Vitals:    09/05/22 2343 09/06/22 0523 09/06/22 0738 09/06/22 1100   BP:  124/71 122/71 119/69   BP Location:  Left arm Left arm Left arm   Patient Position:  Lying Lying Sitting   Pulse: 88 90 91 93   Resp: 19 18 16 16   Temp: 97.1 °F (36.2 °C) 96.2 °F (35.7 °C) 97 °F (36.1 °C) 97.7 °F (36.5 °C)   TempSrc: Oral Axillary Oral Oral   SpO2: 98% 95%     Weight:       Height:         Medications   sodium chloride 0.9 % flush 10 mL (10 mL Intravenous Given 9/6/22 0820)   albuterol sulfate HFA (PROVENTIL HFA;VENTOLIN HFA;PROAIR HFA) inhaler 2 puff (has no administration in time range)   acetaminophen (TYLENOL) tablet 650 mg (650 mg Oral Given 9/5/22 1730)     Or   acetaminophen (TYLENOL) suppository 650 mg ( Rectal Not Given:  See Alt 9/5/22 1730)   dextromethorphan polistirex ER (DELSYM) 30 MG/5ML oral suspension 60 mg (has no administration in time range)   ipratropium-albuterol (DUO-NEB) nebulizer solution 3 mL (has no administration in time range)   Pharmacy Consult - Remdesivir (has no administration in time range)   remdesivir 200 mg in sodium chloride 0.9 % 290 mL IVPB (powder vial) (200 mg Intravenous New Bag 9/5/22 1731)     Followed by   remdesivir 100 mg in sodium chloride 0.9 % 250 mL IVPB (powder vial) (100 mg Intravenous New Bag 9/6/22 1257)   heparin (porcine) 5000 UNIT/ML injection 5,000 Units (5,000 Units Subcutaneous Given 9/6/22 2225)   nystatin (MYCOSTATIN) 100,000  unit/mL suspension 500,000 Units (500,000 Units Oral Given 9/6/22 0819)   ALPRAZolam (XANAX) tablet 0.25 mg (has no administration in time range)   famotidine (PEPCID) tablet 20 mg (20 mg Oral Given 9/6/22 0819)   cetirizine (zyrTEC) tablet 10 mg (10 mg Oral Given 9/6/22 0820)   gabapentin (NEURONTIN) capsule 300 mg (has no administration in time range)   levothyroxine (SYNTHROID, LEVOTHROID) tablet 50 mcg (50 mcg Oral Given 9/6/22 0819)   memantine (NAMENDA) tablet 5 mg (5 mg Oral Given 9/6/22 0821)   metoprolol succinate XL (TOPROL-XL) 24 hr tablet 25 mg (25 mg Oral Given 9/6/22 0827)   aspirin EC tablet 81 mg (81 mg Oral Given 9/6/22 0819)   dexamethasone (DECADRON) injection 6 mg (has no administration in time range)     Or   dexamethasone (DECADRON) tablet 6 mg (has no administration in time range)   furosemide (LASIX) injection 40 mg (40 mg Intravenous Given 9/5/22 1549)   melatonin tablet 5 mg (5 mg Oral Given 9/6/22 0043)     ECG/EMG Results (last 24 hours)     ** No results found for the last 24 hours. **        ECG 12 Lead    (Results Pending)   ECG 12 Lead    (Results Pending)   ECG 12 Lead    (Results Pending)                 MDM  Number of Diagnoses or Management Options  Acute on chronic congestive heart failure, unspecified heart failure type (HCC): established and worsening  Altered mental status, unspecified altered mental status type: new and requires workup  Chronic renal impairment, unspecified CKD stage: established and worsening  COVID-19: new and requires workup  Hypoxia: new and requires workup     Amount and/or Complexity of Data Reviewed  Clinical lab tests: reviewed and ordered  Tests in the radiology section of CPT®: reviewed and ordered  Tests in the medicine section of CPT®: ordered and reviewed  Decide to obtain previous medical records or to obtain history from someone other than the patient: yes  Discuss the patient with other providers: yes    Patient Progress  Patient progress:  stable      Final diagnoses:   Hypoxia   COVID-19   Acute on chronic congestive heart failure, unspecified heart failure type (HCC)   Chronic renal impairment, unspecified CKD stage   Altered mental status, unspecified altered mental status type       ED Disposition  ED Disposition     ED Disposition   Decision to Admit    Condition   --    Comment   Level of Care: Telemetry [5]   Diagnosis: Hypoxia [579162]   Admitting Physician: RAN GLEASON [[id number]]   Isolate for COVID?: Yes [1]   Certification: I Certify That Inpatient Hospital Services Are Medically Necessary For Greater Than 2 Midnights               No follow-up provider specified.       Medication List      ASK your doctor about these medications    ALPRAZolam 0.5 MG tablet  Commonly known as: XANAX  Ask about: Which instructions should I use?             Sdi Cesar PA  09/06/22 6580

## 2022-09-06 ENCOUNTER — APPOINTMENT (OUTPATIENT)
Dept: CARDIOLOGY | Facility: HOSPITAL | Age: 87
End: 2022-09-06

## 2022-09-06 LAB
ALBUMIN SERPL-MCNC: 3.7 G/DL (ref 3.5–5.2)
ALBUMIN/GLOB SERPL: 0.8 G/DL
ALP SERPL-CCNC: 92 U/L (ref 39–117)
ALT SERPL W P-5'-P-CCNC: 21 U/L (ref 1–33)
ANION GAP SERPL CALCULATED.3IONS-SCNC: 16 MMOL/L (ref 5–15)
AST SERPL-CCNC: 43 U/L (ref 1–32)
BASOPHILS # BLD AUTO: 0.01 10*3/MM3 (ref 0–0.2)
BASOPHILS NFR BLD AUTO: 0.2 % (ref 0–1.5)
BH CV ECHO MEAS - AI P1/2T: 559.9 MSEC
BH CV ECHO MEAS - AO MAX PG: 7 MMHG
BH CV ECHO MEAS - AO MEAN PG: 4 MMHG
BH CV ECHO MEAS - AO ROOT DIAM: 3.2 CM
BH CV ECHO MEAS - AO V2 MAX: 132 CM/SEC
BH CV ECHO MEAS - AO V2 VTI: 22.9 CM
BH CV ECHO MEAS - AVA(I,D): 2.29 CM2
BH CV ECHO MEAS - EDV(CUBED): 117.6 ML
BH CV ECHO MEAS - EDV(MOD-SP2): 86.4 ML
BH CV ECHO MEAS - EDV(MOD-SP4): 111 ML
BH CV ECHO MEAS - EF(MOD-BP): 24.8 %
BH CV ECHO MEAS - EF(MOD-SP2): 25 %
BH CV ECHO MEAS - EF(MOD-SP4): 20.7 %
BH CV ECHO MEAS - ESV(CUBED): 91.1 ML
BH CV ECHO MEAS - ESV(MOD-SP2): 64.8 ML
BH CV ECHO MEAS - ESV(MOD-SP4): 88 ML
BH CV ECHO MEAS - FS: 8.2 %
BH CV ECHO MEAS - IVS/LVPW: 0.83 CM
BH CV ECHO MEAS - IVSD: 1 CM
BH CV ECHO MEAS - LA DIMENSION: 4.2 CM
BH CV ECHO MEAS - LAT PEAK E' VEL: 8.9 CM/SEC
BH CV ECHO MEAS - LV MASS(C)D: 200.5 GRAMS
BH CV ECHO MEAS - LV MAX PG: 2.5 MMHG
BH CV ECHO MEAS - LV MEAN PG: 1 MMHG
BH CV ECHO MEAS - LV V1 MAX: 79.5 CM/SEC
BH CV ECHO MEAS - LV V1 VTI: 16.7 CM
BH CV ECHO MEAS - LVIDD: 4.9 CM
BH CV ECHO MEAS - LVIDS: 4.5 CM
BH CV ECHO MEAS - LVOT AREA: 3.1 CM2
BH CV ECHO MEAS - LVOT DIAM: 2 CM
BH CV ECHO MEAS - LVPWD: 1.2 CM
BH CV ECHO MEAS - MED PEAK E' VEL: 6.4 CM/SEC
BH CV ECHO MEAS - MR MAX PG: 78.2 MMHG
BH CV ECHO MEAS - MR MAX VEL: 442 CM/SEC
BH CV ECHO MEAS - MR MEAN PG: 47.5 MMHG
BH CV ECHO MEAS - MR MEAN VEL: 313 CM/SEC
BH CV ECHO MEAS - MR VTI: 175 CM
BH CV ECHO MEAS - MV DEC SLOPE: 1106 CM/SEC2
BH CV ECHO MEAS - MV DEC TIME: 0.11 MSEC
BH CV ECHO MEAS - MV E MAX VEL: 122 CM/SEC
BH CV ECHO MEAS - MV MAX PG: 7 MMHG
BH CV ECHO MEAS - MV MEAN PG: 2 MMHG
BH CV ECHO MEAS - MV V2 VTI: 17.4 CM
BH CV ECHO MEAS - MVA(VTI): 3 CM2
BH CV ECHO MEAS - PA ACC TIME: 0.11 SEC
BH CV ECHO MEAS - PA PR(ACCEL): 30 MMHG
BH CV ECHO MEAS - PA V2 MAX: 82.6 CM/SEC
BH CV ECHO MEAS - RAP SYSTOLE: 15 MMHG
BH CV ECHO MEAS - RVSP: 56 MMHG
BH CV ECHO MEAS - SV(LVOT): 52.5 ML
BH CV ECHO MEAS - SV(MOD-SP2): 21.6 ML
BH CV ECHO MEAS - SV(MOD-SP4): 23 ML
BH CV ECHO MEAS - TAPSE (>1.6): 1.88 CM
BH CV ECHO MEAS - TR MAX PG: 37.3 MMHG
BH CV ECHO MEAS - TR MAX VEL: 305.4 CM/SEC
BH CV ECHO MEASUREMENTS AVERAGE E/E' RATIO: 15.95
BH CV VAS BP RIGHT ARM: NORMAL MMHG
BH CV XLRA - RV BASE: 4 CM
BH CV XLRA - RV LENGTH: 6.6 CM
BH CV XLRA - RV MID: 3.1 CM
BH CV XLRA - TDI S': 9.5 CM/SEC
BILIRUB CONJ SERPL-MCNC: 0.5 MG/DL (ref 0–0.3)
BILIRUB SERPL-MCNC: 1 MG/DL (ref 0–1.2)
BUN SERPL-MCNC: 24 MG/DL (ref 8–23)
BUN/CREAT SERPL: 13.1 (ref 7–25)
CALCIUM SPEC-SCNC: 9.2 MG/DL (ref 8.2–9.6)
CHLORIDE SERPL-SCNC: 100 MMOL/L (ref 98–107)
CO2 SERPL-SCNC: 23 MMOL/L (ref 22–29)
CREAT SERPL-MCNC: 1.83 MG/DL (ref 0.57–1)
CRP SERPL-MCNC: 3.57 MG/DL (ref 0–0.5)
DEPRECATED RDW RBC AUTO: 58.2 FL (ref 37–54)
EGFRCR SERPLBLD CKD-EPI 2021: 25.6 ML/MIN/1.73
EOSINOPHIL # BLD AUTO: 0 10*3/MM3 (ref 0–0.4)
EOSINOPHIL NFR BLD AUTO: 0 % (ref 0.3–6.2)
ERYTHROCYTE [DISTWIDTH] IN BLOOD BY AUTOMATED COUNT: 16.6 % (ref 12.3–15.4)
ERYTHROCYTE [SEDIMENTATION RATE] IN BLOOD: 67 MM/HR (ref 0–30)
GLOBULIN UR ELPH-MCNC: 4.4 GM/DL
GLUCOSE SERPL-MCNC: 137 MG/DL (ref 65–99)
HCT VFR BLD AUTO: 28.2 % (ref 34–46.6)
HGB BLD-MCNC: 9.2 G/DL (ref 12–15.9)
IMM GRANULOCYTES # BLD AUTO: 0.01 10*3/MM3 (ref 0–0.05)
IMM GRANULOCYTES NFR BLD AUTO: 0.2 % (ref 0–0.5)
INR PPP: 1.38 (ref 0.84–1.13)
LYMPHOCYTES # BLD AUTO: 0.43 10*3/MM3 (ref 0.7–3.1)
LYMPHOCYTES NFR BLD AUTO: 9.7 % (ref 19.6–45.3)
MAXIMAL PREDICTED HEART RATE: 128 BPM
MCH RBC QN AUTO: 31.5 PG (ref 26.6–33)
MCHC RBC AUTO-ENTMCNC: 32.6 G/DL (ref 31.5–35.7)
MCV RBC AUTO: 96.6 FL (ref 79–97)
MONOCYTES # BLD AUTO: 0.46 10*3/MM3 (ref 0.1–0.9)
MONOCYTES NFR BLD AUTO: 10.4 % (ref 5–12)
NEUTROPHILS NFR BLD AUTO: 3.51 10*3/MM3 (ref 1.7–7)
NEUTROPHILS NFR BLD AUTO: 79.5 % (ref 42.7–76)
NRBC BLD AUTO-RTO: 0 /100 WBC (ref 0–0.2)
PLATELET # BLD AUTO: 204 10*3/MM3 (ref 140–450)
PMV BLD AUTO: 12 FL (ref 6–12)
POTASSIUM SERPL-SCNC: 4.8 MMOL/L (ref 3.5–5.2)
PROT SERPL-MCNC: 8.1 G/DL (ref 6–8.5)
PROTHROMBIN TIME: 16.9 SECONDS (ref 11.4–14.4)
QT INTERVAL: 466 MS
QTC INTERVAL: 566 MS
RBC # BLD AUTO: 2.92 10*6/MM3 (ref 3.77–5.28)
SODIUM SERPL-SCNC: 139 MMOL/L (ref 136–145)
STRESS TARGET HR: 109 BPM
WBC NRBC COR # BLD: 4.42 10*3/MM3 (ref 3.4–10.8)

## 2022-09-06 PROCEDURE — 97165 OT EVAL LOW COMPLEX 30 MIN: CPT

## 2022-09-06 PROCEDURE — 25010000002 REMDESIVIR 100 MG/20ML SOLUTION 1 EACH VIAL: Performed by: NURSE PRACTITIONER

## 2022-09-06 PROCEDURE — 93005 ELECTROCARDIOGRAM TRACING: CPT

## 2022-09-06 PROCEDURE — 93306 TTE W/DOPPLER COMPLETE: CPT

## 2022-09-06 PROCEDURE — 99222 1ST HOSP IP/OBS MODERATE 55: CPT

## 2022-09-06 PROCEDURE — 80053 COMPREHEN METABOLIC PANEL: CPT | Performed by: NURSE PRACTITIONER

## 2022-09-06 PROCEDURE — 85610 PROTHROMBIN TIME: CPT | Performed by: NURSE PRACTITIONER

## 2022-09-06 PROCEDURE — 92610 EVALUATE SWALLOWING FUNCTION: CPT

## 2022-09-06 PROCEDURE — 25010000002 HEPARIN (PORCINE) PER 1000 UNITS: Performed by: NURSE PRACTITIONER

## 2022-09-06 PROCEDURE — 86140 C-REACTIVE PROTEIN: CPT | Performed by: NURSE PRACTITIONER

## 2022-09-06 PROCEDURE — 99233 SBSQ HOSP IP/OBS HIGH 50: CPT | Performed by: HOSPITALIST

## 2022-09-06 PROCEDURE — 97535 SELF CARE MNGMENT TRAINING: CPT

## 2022-09-06 PROCEDURE — 85025 COMPLETE CBC W/AUTO DIFF WBC: CPT | Performed by: NURSE PRACTITIONER

## 2022-09-06 PROCEDURE — 82248 BILIRUBIN DIRECT: CPT | Performed by: NURSE PRACTITIONER

## 2022-09-06 PROCEDURE — 93306 TTE W/DOPPLER COMPLETE: CPT | Performed by: INTERNAL MEDICINE

## 2022-09-06 PROCEDURE — 85652 RBC SED RATE AUTOMATED: CPT | Performed by: NURSE PRACTITIONER

## 2022-09-06 PROCEDURE — 25010000002 DEXAMETHASONE PER 1 MG: Performed by: NURSE PRACTITIONER

## 2022-09-06 PROCEDURE — 97530 THERAPEUTIC ACTIVITIES: CPT

## 2022-09-06 RX ORDER — GABAPENTIN 300 MG/1
300 CAPSULE ORAL NIGHTLY
Status: DISCONTINUED | OUTPATIENT
Start: 2022-09-06 | End: 2022-09-10 | Stop reason: HOSPADM

## 2022-09-06 RX ORDER — ASPIRIN 81 MG/1
81 TABLET ORAL DAILY
Status: DISCONTINUED | OUTPATIENT
Start: 2022-09-06 | End: 2022-09-10 | Stop reason: HOSPADM

## 2022-09-06 RX ORDER — LEVOTHYROXINE SODIUM 0.05 MG/1
50 TABLET ORAL
Status: DISCONTINUED | OUTPATIENT
Start: 2022-09-06 | End: 2022-09-10 | Stop reason: HOSPADM

## 2022-09-06 RX ORDER — MEMANTINE HYDROCHLORIDE 10 MG/1
5 TABLET ORAL 2 TIMES DAILY
Status: DISCONTINUED | OUTPATIENT
Start: 2022-09-06 | End: 2022-09-10 | Stop reason: HOSPADM

## 2022-09-06 RX ORDER — CETIRIZINE HYDROCHLORIDE 10 MG/1
10 TABLET ORAL DAILY
Status: DISCONTINUED | OUTPATIENT
Start: 2022-09-06 | End: 2022-09-10 | Stop reason: HOSPADM

## 2022-09-06 RX ORDER — METOPROLOL SUCCINATE 25 MG/1
25 TABLET, EXTENDED RELEASE ORAL DAILY
Status: DISCONTINUED | OUTPATIENT
Start: 2022-09-06 | End: 2022-09-10 | Stop reason: HOSPADM

## 2022-09-06 RX ORDER — ALPRAZOLAM 0.25 MG/1
0.25 TABLET ORAL 3 TIMES DAILY PRN
Status: DISCONTINUED | OUTPATIENT
Start: 2022-09-06 | End: 2022-09-10 | Stop reason: HOSPADM

## 2022-09-06 RX ORDER — FAMOTIDINE 20 MG/1
20 TABLET, FILM COATED ORAL 2 TIMES DAILY
Status: DISCONTINUED | OUTPATIENT
Start: 2022-09-06 | End: 2022-09-07

## 2022-09-06 RX ORDER — DEXAMETHASONE SODIUM PHOSPHATE 4 MG/ML
6 INJECTION, SOLUTION INTRA-ARTICULAR; INTRALESIONAL; INTRAMUSCULAR; INTRAVENOUS; SOFT TISSUE
Status: DISCONTINUED | OUTPATIENT
Start: 2022-09-07 | End: 2022-09-10 | Stop reason: HOSPADM

## 2022-09-06 RX ORDER — CHOLECALCIFEROL (VITAMIN D3) 125 MCG
5 CAPSULE ORAL ONCE
Status: COMPLETED | OUTPATIENT
Start: 2022-09-06 | End: 2022-09-06

## 2022-09-06 RX ORDER — CHOLECALCIFEROL (VITAMIN D3) 125 MCG
5 CAPSULE ORAL NIGHTLY PRN
Status: DISCONTINUED | OUTPATIENT
Start: 2022-09-06 | End: 2022-09-10 | Stop reason: HOSPADM

## 2022-09-06 RX ADMIN — FAMOTIDINE 20 MG: 20 TABLET ORAL at 20:14

## 2022-09-06 RX ADMIN — ACETAMINOPHEN 650 MG: 325 TABLET, FILM COATED ORAL at 23:11

## 2022-09-06 RX ADMIN — NYSTATIN 500000 UNITS: 100000 SUSPENSION ORAL at 12:57

## 2022-09-06 RX ADMIN — HEPARIN SODIUM 5000 UNITS: 5000 INJECTION INTRAVENOUS; SUBCUTANEOUS at 21:24

## 2022-09-06 RX ADMIN — LEVOTHYROXINE SODIUM 50 MCG: 50 TABLET ORAL at 08:19

## 2022-09-06 RX ADMIN — DEXAMETHASONE SODIUM PHOSPHATE 6 MG: 4 INJECTION, SOLUTION INTRAMUSCULAR; INTRAVENOUS at 08:20

## 2022-09-06 RX ADMIN — NYSTATIN 500000 UNITS: 100000 SUSPENSION ORAL at 18:44

## 2022-09-06 RX ADMIN — Medication 10 ML: at 20:14

## 2022-09-06 RX ADMIN — Medication 5 MG: at 00:43

## 2022-09-06 RX ADMIN — MEMANTINE 5 MG: 10 TABLET ORAL at 20:14

## 2022-09-06 RX ADMIN — GABAPENTIN 300 MG: 300 CAPSULE ORAL at 20:14

## 2022-09-06 RX ADMIN — CETIRIZINE HYDROCHLORIDE 10 MG: 10 TABLET, FILM COATED ORAL at 08:20

## 2022-09-06 RX ADMIN — Medication 5 MG: at 23:35

## 2022-09-06 RX ADMIN — METOPROLOL SUCCINATE 25 MG: 25 TABLET, EXTENDED RELEASE ORAL at 08:27

## 2022-09-06 RX ADMIN — REMDESIVIR 100 MG: 100 INJECTION, POWDER, LYOPHILIZED, FOR SOLUTION INTRAVENOUS at 12:57

## 2022-09-06 RX ADMIN — ALPRAZOLAM 0.25 MG: 0.25 TABLET ORAL at 20:14

## 2022-09-06 RX ADMIN — NYSTATIN 500000 UNITS: 100000 SUSPENSION ORAL at 23:12

## 2022-09-06 RX ADMIN — MEMANTINE 5 MG: 10 TABLET ORAL at 08:21

## 2022-09-06 RX ADMIN — FAMOTIDINE 20 MG: 20 TABLET ORAL at 08:19

## 2022-09-06 RX ADMIN — HEPARIN SODIUM 5000 UNITS: 5000 INJECTION INTRAVENOUS; SUBCUTANEOUS at 15:25

## 2022-09-06 RX ADMIN — ASPIRIN 81 MG: 81 TABLET, COATED ORAL at 08:19

## 2022-09-06 RX ADMIN — HEPARIN SODIUM 5000 UNITS: 5000 INJECTION INTRAVENOUS; SUBCUTANEOUS at 05:20

## 2022-09-06 RX ADMIN — NYSTATIN 500000 UNITS: 100000 SUSPENSION ORAL at 08:19

## 2022-09-06 RX ADMIN — Medication 10 ML: at 08:20

## 2022-09-06 NOTE — THERAPY EVALUATION
Patient Name: Scott Diego  : 4/10/1930    MRN: [id number]                              Today's Date: 2022       Admit Date: 2022    Visit Dx:     ICD-10-CM ICD-9-CM   1. Hypoxia  R09.02 799.02   2. COVID-19  U07.1 079.89   3. Acute on chronic congestive heart failure, unspecified heart failure type (HCC)  I50.9 428.0   4. Chronic renal impairment, unspecified CKD stage  N18.9 585.9   5. Altered mental status, unspecified altered mental status type  R41.82 780.97     Patient Active Problem List   Diagnosis   • Idiopathic peripheral neuropathy   • Neck pain   • Mild cognitive impairment   • Anemia   • Hypertension   • Dehydration   • History of CVA (cerebrovascular accident)   • Dementia (HCC)   • Left bundle branch block   • Orthostatic dizziness   • Chronic systolic heart failure (HCC)   • Normocytic anemia   • CARYN (acute kidney injury) (HCC)   • Dyspnea   • Hypoxia   • Hypothyroid   • Pericardial effusion   • Acute respiratory failure with hypoxia (HCC)   • Right lower lobe pneumonia   • Ventricular ectopy   • Migraine without aura and without status migrainosus, not intractable   • Acute hypoxemic respiratory failure due to COVID-19 (HCC)   • Dyspnea due to COVID-19   • COVID-19 virus detected   • Encephalopathy due to COVID-19 virus   • Acute on chronic congestive heart failure (HCC)   • Anxiety     Past Medical History:   Diagnosis Date   • Anxiety    • Congestive heart failure (HCC)    • Coronary artery disease    • Dementia (HCC)    • Depression    • Hyperlipidemia    • Malignant neoplasm (HCC)    • Memory loss    • Peripheral neuropathy    • Stroke (HCC)     mini stroke   • Systolic heart failure (HCC) 2017    Chronic/compensated (EF 25%)     Past Surgical History:   Procedure Laterality Date   • CARDIAC CATHETERIZATION     • CHOLECYSTECTOMY     • EYE SURGERY     • HYSTERECTOMY        General Information     Row Name 22 0919          OT Time and Intention    Document Type evaluation   -     Mode of Treatment individual therapy;occupational therapy  -     Row Name 09/06/22 0919          General Information    Patient Profile Reviewed yes  -ASHKAN     Prior Level of Function independent:;feeding;grooming;mod assist:;dressing;bathing;min assist:;gait  pt. limited historian and unable to fully answer PLOF questions, question accuracy  -     Existing Precautions/Restrictions fall;other (see comments)  contact and airborne  -     Barriers to Rehab previous functional deficit;cognitive status  -     Row Name 09/06/22 0919          Occupational Profile    Environmental Supports and Barriers (Occupational Profile) pt. unable to give home information, thinks she uses a wx some  -     Row Name 09/06/22 0919          Living Environment    People in Home child(chong), adult;grandchild(chong)  pt. lives with son and his family per chart  -     Row Name 09/06/22 0919          Home Main Entrance    Number of Stairs, Main Entrance --  pt. unable to state  -     Row Name 09/06/22 0919          Cognition    Orientation Status (Cognition) oriented to;person;place;situation;time  nurse had just oriented pt., but pt. was uable to verbalize information back, pt. appears with executive function deficit with difficulty initiating and sequencing tasks and problem solving  -     Row Name 09/06/22 0919          Safety Issues, Functional Mobility    Safety Issues Affecting Function (Mobility) ability to follow commands;insight into deficits/self-awareness;awareness of need for assistance;judgment;problem-solving;safety precaution awareness;safety precautions follow-through/compliance;sequencing abilities  -     Impairments Affecting Function (Mobility) balance;cognition;endurance/activity tolerance;strength;postural/trunk control  difficulty assessing vision  -     Cognitive Impairments, Mobility Safety/Performance attention;awareness, need for assistance;insight into  deficits/self-awareness;judgment;problem-solving/reasoning;safety precaution awareness;safety precaution follow-through;sequencing abilities  -           User Key  (r) = Recorded By, (t) = Taken By, (c) = Cosigned By    Initials Name Provider Type    Rosario Richardson, OT Occupational Therapist                 Mobility/ADL's     Row Name 09/06/22 0925          Bed Mobility    Bed Mobility supine-sit;scooting/bridging  -     Scooting/Bridging [place] (Bed Mobility) verbal cues;moderate assist (50% patient effort)  -ASHKAN     Supine-Sit [place] (Bed Mobility) minimum assist (75% patient effort);verbal cues;nonverbal cues (demo/gesture)  -     Bed Mobility, Safety Issues cognitive deficits limit understanding;decreased use of arms for pushing/pulling;decreased use of legs for bridging/pushing  -     Assistive Device (Bed Mobility) bed rails  -ASHKAN     Comment, (Bed Mobility) good core strength sitting upright in bed, but difficuty processing how to scoot to EOB  -     Row Name 09/06/22 0925          Transfers    Transfers sit-stand transfer;stand-sit transfer  -     Sit-Stand [place] (Transfers) minimum assist (75% patient effort);verbal cues  -     Stand-Sit [place] (Transfers) minimum assist (75% patient effort);verbal cues;nonverbal cues (demo/gesture)  -     Row Name 09/06/22 0925          Sit-Stand Transfer    Assistive Device (Sit-Stand Transfers) walker, front-wheeled  -ASHKAN     Row Name 09/06/22 0925          Stand-Sit Transfer    Assistive Device (Stand-Sit Transfers) walker, front-wheeled  -ASHKAN     Comment, (Stand-Sit Transfer) pt. difficulty processing how to back to recliner needing recliner lined up behind her and could not initiate reaching back to recliner needing OT to bring her hands back to arm rest, when asked to scoot back in chair just stood back up, nurse and OT had to scoot pt. back in recliner with sheet  -     Row Name 09/06/22 0925          Functional Mobility     Functional Mobility- Ind. Level minimum assist (75% patient effort);verbal cues required  -ASHKAN     Functional Mobility- Device walker, front-wheeled  -ASHKAN     Functional Mobility-Distance (Feet) 20  -ASHKAN     Functional Mobility- Comment pt. initially with flexed posture and small step, but then able to progress with cues to upright posture and faster larger steps  -     Row Name 09/06/22 0925          Activities of Daily Living    BADL Assessment/Intervention lower body dressing;grooming;upper body dressing  nurse gave pt. cup of pills and asked her to take,  pt. needed specific cues to bring cup to mouth and tilt it back to initiate  -     Row Name 09/06/22 0925          Lower Body Dressing Assessment/Training    Comment, (Lower Body Dressing) pt. when asked to doff socks showed she could easily cross up LE's over knee and touch toes, but did not doff and mer, limited by nausea  -     Row Name 09/06/22 0925          Grooming Assessment/Training    [place] Level (Grooming) hair care, combing/brushing;wash face, hands;minimum assist (75% patient effort);verbal cues  -ASHKAN     Position (Grooming) supported sitting  -ASHKAN     Comment, (Grooming) cues to wash all areas including hands  -     Row Name 09/06/22 0925          Upper Body Dressing Assessment/Training    [place] Level (Upper Body Dressing) don;pajama/robe;maximum assist (25% patient effort)  -ASHKAN     Position (Upper Body Dressing) edge of bed sitting  -ASHKAN     Comment, (Upper Body Dressing) limited by IV and distraction taking meds  -           User Key  (r) = Recorded By, (t) = Taken By, (c) = Cosigned By    Initials Name Provider Type    Rosario Richardson, OT Occupational Therapist               Obj/Interventions     Row Name 09/06/22 0931          Sensory Assessment (Somatosensory)    Sensory Assessment (Somatosensory) unable/difficult to assess  -     Sensory Assessment pt. with no complaints of numbness or tingling, pt. was able to use  grooming objects and hold pill cup without droppage  -     Row Name 09/06/22 0931          Vision Assessment/Intervention    Visual Impairment/Limitations corrective lenses full-time;unable/difficult to assess  -     Vision Assessment Comment pt. squinting eyes, pt. kept stating eyes were messed up, did not state yes to blurriness, but sensitive and dry responses yes to  -     Row Name 09/06/22 0931          Range of Motion Comprehensive    General Range of Motion bilateral upper extremity ROM WFL  -[facility] Name 09/06/22 0931          Strength Comprehensive (MMT)    General Manual Muscle Testing (MMT) Assessment upper extremity strength deficits identified  -     Comment, General Manual Muscle Testing (MMT) Assessment BUE grossly 4 to 4+/5  -[facility] Name 09/06/22 0931          Motor Skills    Motor Skills functional endurance  -     Functional Endurance good -  -     Row Name 09/06/22 0931          Balance    Balance Assessment sitting static balance;sitting dynamic balance;standing static balance;standing dynamic balance  -     Static Sitting Balance supervision  -     Dynamic Sitting Balance contact guard;standby assist  CGA EOB, SBA recliner  -     Position, Sitting Balance unsupported;sitting edge of bed;supported;sitting in chair  -     Static Standing Balance contact guard  -     Dynamic Standing Balance minimal assist  -ASHKAN     Position/Device Used, Standing Balance walker, rolling  -     Balance Interventions sit to stand;occupation based/functional task  -           User Key  (r) = Recorded By, (t) = Taken By, (c) = Cosigned By    Initials Name Provider Type    Rosario Richardson, OT Occupational Therapist               Goals/Plan     [facility] Name 09/06/22 0941          Bed Mobility Goal 1 (OT)    Activity/Assistive Device (Bed Mobility Goal 1, OT) bed mobility activities, all;bed rails  -     [place] Level/Cues Needed (Bed Mobility Goal 1, OT) contact guard  required;tactile cues required;verbal cues required  -ASHKAN     Time Frame (Bed Mobility Goal 1, OT) long term goal (LTG);10 days  -ASHKAN     Progress/Outcomes (Bed Mobility Goal 1, OT) new goal  -ASHKAN     Row Name 09/06/22 0941          Transfer Goal 1 (OT)    Activity/Assistive Device (Transfer Goal 1, OT) toilet;commode;commode, bedside without drop arms;walker, rolling  grab bars  -ASHKAN     [place] Level/Cues Needed (Transfer Goal 1, OT) contact guard required;tactile cues required;verbal cues required  -ASHKAN     Time Frame (Transfer Goal 1, OT) long term goal (LTG);10 days  -ASHKAN     Progress/Outcome (Transfer Goal 1, OT) new goal  -ASHKAN     Row Name 09/06/22 0941          Dressing Goal 1 (OT)    Activity/Device (Dressing Goal 1, OT) lower body dressing  socks and undergarment  -ASHKAN     [place]/Cues Needed (Dressing Goal 1, OT) contact guard required;verbal cues required;tactile cues required  -ASHKAN     Time Frame (Dressing Goal 1, OT) long term goal (LTG);10 days  -ASHKAN     Progress/Outcome (Dressing Goal 1, OT) new goal  -ASHKAN     Row Name 09/06/22 0941          Toileting Goal 1 (OT)    Activity/Device (Toileting Goal 1, OT) toileting skills, all;commode;grab bar/safety frame;commode, bedside without drop arms  -ASHKAN     [place] Level/Cues Needed (Toileting Goal 1, OT) minimum assist (75% or more patient effort);tactile cues required;verbal cues required  -ASHKAN     Time Frame (Toileting Goal 1, OT) long term goal (LTG);10 days  -ASHKAN     Progress/Outcome (Toileting Goal 1, OT) new goal  -ASHKAN     Row Name 09/06/22 0941          Grooming Goal 1 (OT)    Activity/Device (Grooming Goal 1, OT) hair care;oral care;wash face, hands  -ASHKAN     [place] (Grooming Goal 1, OT) standby assist;tactile cues required;verbal cues required  -ASHKAN     Time Frame (Grooming Goal 1, OT) long term goal (LTG);10 days  -ASHKAN     Strategies/Barriers (Grooming Goal 1, OT) sitting/standing sinkside  -ASHKAN     Progress/Outcome (Grooming Goal 1, OT) new  goal  -     Row Name 09/06/22 0941          Therapy Assessment/Plan (OT)    Planned Therapy Interventions (OT) activity tolerance training;BADL retraining;cognitive/visual perception retraining;functional balance retraining;occupation/activity based interventions;ROM/therapeutic exercise;strengthening exercise;patient/caregiver education/training;transfer/mobility retraining  -           User Key  (r) = Recorded By, (t) = Taken By, (c) = Cosigned By    Initials Name Provider Type    Rosario Richardson, OT Occupational Therapist               Clinical Impression     Row Name 09/06/22 0934          Pain Assessment    Pre/Posttreatment Pain Comment pt. only reporting pain when nurse placed meds/flush in R IV, LUE BP take, reporting nausea, nurse in room and aware of nausea and R IV pain  -ASHKAN     Pain Intervention(s) Repositioned;Ambulation/increased activity  -     Additional Documentation Pain Scale: FACES Pre/Post-Treatment (Group)  -[facility] Name 09/06/22 0934          Pain Scale: FACES Pre/Post-Treatment    Pain: FACES Scale, Pretreatment 2-->hurts little bit  -     Posttreatment Pain Rating 2-->hurts little bit  -     Row Name 09/06/22 0934          Plan of Care Review    Plan of Care Reviewed With patient  -ASHKAN     Progress no change  -     Outcome Evaluation OT evaluation complete with pt. demonstrating impaired cognition (executive function-task initiation, sequencing, judgement, problem solving, self awarness), balance, strength and endurance all impacting BADL independence and related transfers.  Pt. reporting nausea throughout session and with dry light sensative eyes with squinting.  Pt. overall min a supine to sit, but mod A to scoot to EOB, min A to trasnfer and mobilize 20 ft with walker.  Pt. much difficulty sequencing back to chair, reaching back and could not scoot back in chair.  Pt. overall min A with grooming and LBD tasks in sitting, but cues to sequence and initiate. Pt. appropriate  for skilled OT services to address deficit areas.  Recommend SNF at discharge. If family declines pt. will need 24/7 assist and HH therapy services.  Will need information from family to determine AD recs.  -     Row Name 09/06/22 0934          Therapy Assessment/Plan (OT)    Patient/Family Therapy Goal Statement (OT) pt. unable to state  -ASHKAN     Rehab Potential (OT) good, to achieve stated therapy goals  -     Criteria for Skilled Therapeutic Interventions Met (OT) yes;meets criteria;skilled treatment is necessary  -     Therapy Frequency (OT) daily  -     Row Name 09/06/22 0934          Therapy Plan Review/Discharge Plan (OT)    Equipment Needs Upon Discharge (OT) --  UTD without family giving home information  -     Anticipated Discharge Disposition (OT) skilled nursing facility  -     Row Name 09/06/22 0934          Vital Signs    Pre Systolic BP Rehab 122  -ASHKAN     Pre Treatment Diastolic BP 71  -ASHKAN     Post Systolic BP Rehab 132  -ASHKAN     Post Treatment Diastolic BP 78  -ASHKAN     Pretreatment Heart Rate (beats/min) 92  -ASHKAN     Posttreatment Heart Rate (beats/min) 85  -ASHKAN     Pre SpO2 (%) 92  -ASHKAN     O2 Delivery Pre Treatment nasal cannula  -ASHKAN     Post SpO2 (%) 94  -ASHKAN     O2 Delivery Post Treatment nasal cannula  -ASHKAN     Pre Patient Position Supine  -ASHKAN     Intra Patient Position Standing  -ASHKAN     Post Patient Position Sitting  -ASHKAN     Row Name 09/06/22 0934          Positioning and Restraints    Pre-Treatment Position in bed  -ASHKAN     Post Treatment Position chair  -ASHKAN     In Chair reclined;call light within reach;encouraged to call for assist;exit alarm on;waffle cushion;heels elevated  nurse in room and aware  -ASHKAN           User Key  (r) = Recorded By, (t) = Taken By, (c) = Cosigned By    Initials Name Provider Type    Rosario Richardson, OT Occupational Therapist               Outcome Measures     Row Name 09/06/22 0943          How much help from another is currently needed...    Putting on and  taking off regular lower body clothing? 2  -ASHKAN     Bathing (including washing, rinsing, and drying) 2  -ASHKAN     Toileting (which includes using toilet bed pan or urinal) 2  -ASHKAN     Putting on and taking off regular upper body clothing 2  -ASHKAN     Taking care of personal grooming (such as brushing teeth) 2  -ASHKAN     Eating meals 3  -ASHKAN     AM-PAC 6 Clicks Score (OT) 13  -ASHKAN     Row Name 09/06/22 0943          Functional Assessment    Outcome Measure Options AM-PAC 6 Clicks Daily Activity (OT)  -ASHKAN           User Key  (r) = Recorded By, (t) = Taken By, (c) = Cosigned By    Initials Name Provider Type    Rosario Richardson, OT Occupational Therapist                Occupational Therapy Education                 Title: PT OT SLP Therapies (In Progress)     Topic: Occupational Therapy (In Progress)     Point: ADL training (Done)     Description:   Instruct learner(s) on proper safety adaptation and remediation techniques during self care or transfers.   Instruct in proper use of assistive devices.              Learning Progress Summary           Patient Acceptance, E,D, VU,NR by ASHKAN at 9/6/2022 0944    Comment: bed mobility, wx use, transfer and BADL initiation and sequencing/safety, reason for consult                   Point: Home exercise program (Not Started)     Description:   Instruct learner(s) on appropriate technique for monitoring, assisting and/or progressing therapeutic exercises/activities.              Learner Progress:  Not documented in this visit.          Point: Precautions (Done)     Description:   Instruct learner(s) on prescribed precautions during self-care and functional transfers.              Learning Progress Summary           Patient Acceptance, E,D, VU,NR by ASHKAN at 9/6/2022 0944    Comment: bed mobility, wx use, transfer and BADL initiation and sequencing/safety, reason for consult                   Point: Body mechanics (Not Started)     Description:   Instruct learner(s) on proper positioning and  spine alignment during self-care, functional mobility activities and/or exercises.              Learner Progress:  Not documented in this visit.                      User Key     Initials Effective Dates Name Provider Type Discipline     06/16/21 -  Rosario Ballesteros, OT Occupational Therapist OT              OT Recommendation and Plan  Planned Therapy Interventions (OT): activity tolerance training, BADL retraining, cognitive/visual perception retraining, functional balance retraining, occupation/activity based interventions, ROM/therapeutic exercise, strengthening exercise, patient/caregiver education/training, transfer/mobility retraining  Therapy Frequency (OT): daily  Plan of Care Review  Plan of Care Reviewed With: patient  Progress: no change  Outcome Evaluation: OT evaluation complete with pt. demonstrating impaired cognition (executive function-task initiation, sequencing, judgement, problem solving, self awarness), balance, strength and endurance all impacting BADL independence and related transfers.  Pt. reporting nausea throughout session and with dry light sensative eyes with squinting.  Pt. overall min a supine to sit, but mod A to scoot to EOB, min A to trasnfer and mobilize 20 ft with walker.  Pt. much difficulty sequencing back to chair, reaching back and could not scoot back in chair.  Pt. overall min A with grooming and LBD tasks in sitting, but cues to sequence and initiate. Pt. appropriate for skilled OT services to address deficit areas.  Recommend SNF at discharge. If family declines pt. will need 24/7 assist and HH therapy services.  Will need information from family to determine AD recs.     Time Calculation:    Time Calculation- OT     Row Name 09/06/22 0946             Time Calculation- OT    OT Start Time 0812  -ASHKAN      OT Received On 09/06/22  -ASHKAN      OT Goal Re-Cert Due Date 09/16/22  -ASHKAN              Timed Charges    19253 - OT Therapeutic Activity Minutes 12  -ASHKAN      31933 - OT Self  Care/Mgmt Minutes 12  -ASHKAN              Untimed Charges    OT Eval/Re-eval Minutes 47  -ASHKAN              Total Minutes    Timed Charges Total Minutes 24  -ASHKAN      Untimed Charges Total Minutes 47  -ASHKAN       Total Minutes 71  -ASHKAN            User Key  (r) = Recorded By, (t) = Taken By, (c) = Cosigned By    Initials Name Provider Type    Rosario Richardson OT Occupational Therapist              Therapy Charges for Today     Code Description Service Date Service Provider Modifiers Qty    79427897284 HC OT THERAPEUTIC ACT EA 15 MIN 9/6/2022 Rosario Ballesteros, OT GO 1    65487084020 HC OT SELF CARE/MGMT/TRAIN EA 15 MIN 9/6/2022 Rosario Ballesteros OT GO 1    40876498287 HC OT EVAL LOW COMPLEXITY 4 9/6/2022 Rosario Ballesteros OT GO 1               Rosario Ballesteros OT  9/6/2022

## 2022-09-06 NOTE — PLAN OF CARE
Goal Outcome Evaluation:  Plan of Care Reviewed With: patient        Progress: no change  Outcome Evaluation: OT evaluation complete with pt. demonstrating impaired cognition (executive function-task initiation, sequencing, judgement, problem solving, self awarness), balance, strength and endurance all impacting BADL independence and related transfers.  Pt. reporting nausea throughout session and with dry light sensative eyes with squinting.  Pt. overall min a supine to sit, but mod A to scoot to EOB, min A to trasnfer and mobilize 20 ft with walker.  Pt. much difficulty sequencing back to chair, reaching back and could not scoot back in chair.  Pt. overall min A with grooming and LBD tasks in sitting, but cues to sequence and initiate. Pt. appropriate for skilled OT services to address deficit areas.  Recommend SNF at discharge. If family declines pt. will need 24/7 assist and HH therapy services.  Will need information from family to determine AD recs.

## 2022-09-06 NOTE — PLAN OF CARE
Goal Outcome Evaluation:  Plan of Care Reviewed With: patient        Progress: no change  Outcome Evaluation: Patient restless this shift, multiple attempts OOB. Pt rested some after getting up to BSC and after administration of melatonin. VSS. C/o nausea. Remains in afib with frequent PVCs. Continue current POC.

## 2022-09-06 NOTE — CASE MANAGEMENT/SOCIAL WORK
Discharge Planning Assessment  [facility]     Patient Name: Scott Diego  MRN: [id number]  Today's Date: 9/6/2022    Admit Date: 9/5/2022     Discharge Needs Assessment     Row Name 09/06/22 1207       Living Environment    People in Home child(chong), adult;grandchild(chong)    Current Living Arrangements home       Resource/Environmental Concerns    Resource/Environmental Concerns none       Transition Planning    Patient/Family Anticipated Services at Transition        Discharge Needs Assessment    Equipment Currently Used at Home oxygen;walker, rolling  Oxygen through [employer]    Equipment Needed After Discharge none               Discharge Plan     Row Name 09/06/22 1208       Plan    Plan SNF    Patient/Family in Agreement with Plan yes    Plan Comments Spoke with Son, Miguel Sinha on phone. Mrs. Diego lives with son and grandchild in [place] [facility]. Was independent with care and used a walker. Was on O2 at home at 2.5L, through [employer]. PCP is Ngozi Davis. Son states that he can transport. Spoke to son about SNF, he is agreeable for referrals to be made. He doesn't want [facility], she has been there before. Will continue to follow.    Final Discharge Disposition Code 03 - skilled nursing facility (SNF)              Continued Care and Services - Admitted Since 9/5/2022    Coordination has not been started for this encounter.       Expected Discharge Date and Time     Expected Discharge Date Expected Discharge Time    Sep 10, 2022          Demographic Summary     Row Name 09/06/22 1206       General Information    Arrived From home    Preferred Language English               Functional Status     Row Name 09/06/22 1206       Functional Status    Usual Activity Tolerance fair    Current Activity Tolerance fair       Functional Status, IADL    Medications independent    Meal Preparation assistive equipment    Housekeeping assistive equipment    Laundry assistive equipment    Shopping  assistive equipment               Psychosocial    No documentation.                Abuse/Neglect    No documentation.                Legal    No documentation.                Substance Abuse    No documentation.                Patient Forms    No documentation.                   Meryl Carmen RN

## 2022-09-06 NOTE — CONSULTS
[facility]  CARDIOLOGY CONSULTATION NOTE    Scott Diego  : 4/10/1930  MRN:[id number]  Home Phone:[phone]    Date of Admission:2022  Date of Consultation: 22Referring Provider: Jan Martins MD  Primary Provider:  Ngozi Davis DO    Referring Provider: Jan Martins MD  Reason for Consultation: Afib w RVR, heart failure    IDENTIFICATION: A 89 y.o. female  2022. Owner of [employer]    CC:   Chief Complaint   Patient presents with   • Weakness - Generalized       PROBLEM LIST:     1. Nonischemic cardiomyopathy/chronic systolic congestive heart failure  1.  2D echo: LVEF = 20%.  Mild TR.  Mild MR.  Moderate AR.  Mild RA diastolic collapse from pericardial effusion.  There is a moderate 1-2 cm circumferential pericardial effusion.  2. 3/22 2D echo: LVEF = 30%.  Moderate MR.  Calcification of aortic valve.  RVSP due to TR mildly elevated (35-45)  2. History of CVA  1.  14-day Holter monitor: Min 57, AVG 84, max 240.  SVE 2.3%.  VE 15.3%.  39 runs of VT - fastest 18 beats at 240 bpm; longest 17 beats at 131 BPM.  1 SVT episode - 8 beats at 213 bpm.  3. CAD  1.  coronary calcifications appreciated on CT chest.  4. Acute Systolic Heart Failure  5. COVID19 positive admission 2022  6. Hypertension   7. hyperlipidemia  8. Orthostasis  9. CKD stage III  1.  CR 2.01  2.  CR 1.55  10. Hypothyroidism  11. Dementia  12. Appetite/weight loss  1. 3/22 [facility] admission for poor p.o. intake, CARYN, hyperkalemia, and chronic systolic HF.  13. Surgical  1. CCX  2. Eye surgery  3. Hysterectomy      ALLERGIES:   Allergies   Allergen Reactions   • Augmentin [Amoxicillin-Pot Clavulanate] Nausea And Vomiting     Makes her sick at her stomach   • Claritin [Loratadine] Unknown (See Comments)     Doesn't remember   • Keflex [Cephalexin] Nausea And Vomiting     Makes pt sick at her stomach   • Sulfa Antibiotics Other (See Comments)     Does not  remember       HPI: Patient has mild dementia baseline and can answer some questions appropriately. She is alerted to person place time and situation so most of history via chart review.    Ms. Diego is a 89-year-old female with above cardiac history who we are seeing in consultation for acute heart failure and new onset A. fib with RVR.  Her  passed in January of this year and patient's son reports that she is experiencing a progressive decline in mental status and general health since that time.  She presented to the ER yesterday for 1 month history of nausea and headache.  She was found to be COVID-positive and in A. fib with RVR when arriving on the floor from around 5065-6570 on 9/5/22.  25 of metoprolol tartrate was given and she converted to sinus rhythm and has remained in sinus sinceChest x-ray revealed pulmonary edema and cardiomegaly.  Troponin negative x2, proBNP 25,000, and creatinine 1.5.  No EKG data.  She received 40 mg of Lasix yesterday afternoon but has received no further diuresis.    At the time of my visit, patient was in sinus rhythm with frequent PACs and rate 88.  Patient complains of mild dyspnea, headache, nausea and fatigue but denies chest pain, palpitations, or presyncope.           ROS: All systems have been reviewed and are negative with the exception of those mentioned in the HPI and problem list above.    HOME MEDICINES:   Current Outpatient Medications   Medication Instructions   • acetaminophen (TYLENOL) 650 mg, Oral, Every 4 Hours PRN   • ALPRAZolam (XANAX) 0.5 mg, Oral, Every 6 Hours PRN   • Artificial Tear Ointment (artificial tears) ophthalmic ointment 1 application, Both Eyes, Daily   • atorvastatin (LIPITOR) 80 mg, Oral, Nightly   • bisacodyl (DULCOLAX) 10 mg, Rectal, Daily PRN   • carboxymethylcellulose (REFRESH PLUS) 0.5 % solution 3 Times Daily PRN   • famotidine (PEPCID) 20 mg, Oral, 2 Times Daily   • fexofenadine (ALLEGRA) 60 mg, Oral, Daily   • fexofenadine  (ALLEGRA) 180 mg, Oral, Daily   • fluconazole (DIFLUCAN) 100 MG tablet TAKE 1 TABLET BY MOUTH EVERY DAY FOR 10 DOSES   • furosemide (LASIX) 20 mg, Oral, Daily PRN   • gabapentin (NEURONTIN) 300 mg, Oral, Nightly   • Glycerin-Polysorbate 80 (REFRESH DRY EYE THERAPY OP) Ophthalmic, As Needed   • haloperidol (HALDOL) 1 mg, Oral, Every 6 Hours PRN   • levothyroxine (SYNTHROID, LEVOTHROID) 50 mcg, Oral, Every Early Morning   • Lidocaine Viscous HCl (XYLOCAINE) 2 % solution No dose, route, or frequency recorded.   • memantine (NAMENDA) 5 mg, Oral, 2 Times Daily   • metoprolol succinate XL (TOPROL-XL) 25 mg, Oral, Daily   • Nurtec 75 mg, Oral, Every Other Day   • nystatin (MYCOSTATIN) 100,000 unit/mL suspension Swish and swallow 5mLs by mouth 4 times daily   • ofloxacin (OCUFLOX) 0.3 % ophthalmic solution 1 drop, Both Eyes, 3 Times Daily, For 10 days, started 03-01-22   • ondansetron ODT (ZOFRAN-ODT) 4 mg, Translingual, Every 4 Hours PRN   • ondansetron ODT (ZOFRAN-ODT) 4 mg, Translingual, Every 6 Hours PRN   • predniSONE (DELTASONE) 10 MG tablet Take 2 tablets po qd x 3d, then 1 tab qd x 3d, then 0.5 tab qd x 3d   • predniSONE (DELTASONE) 5 MG tablet TAKE 1 TABLET BY MOUTH EVERY DAY FOR 10 DAYS   • SM Aspirin Adult Low Strength 81 mg, Oral, Daily       Surgical History:   Past Surgical History:   Procedure Laterality Date   • CARDIAC CATHETERIZATION     • CHOLECYSTECTOMY     • EYE SURGERY     • HYSTERECTOMY         Social History:   Social History     Socioeconomic History   • Marital status:    Tobacco Use   • Smoking status: Never Smoker   • Smokeless tobacco: Never Used   Vaping Use   • Vaping Use: Never used   Substance and Sexual Activity   • Alcohol use: Never   • Drug use: Never   • Sexual activity: Defer     Comment:  since 1/2022       Family History:   Family History   Problem Relation Age of Onset   • Cancer Mother    • No Known Problems Father    • Cancer Sister    • Cancer Brother    •  "Parkinsonism Brother    • No Known Problems Son        Objective     /71 (BP Location: Left arm, Patient Position: Lying)   Pulse 91   Temp 97 °F (36.1 °C) (Oral)   Resp 16   Ht 162.6 cm (64\")   Wt 57.7 kg (127 lb 3.2 oz)   SpO2 95%   BMI 21.83 kg/m²     Intake/Output Summary (Last 24 hours) at 9/6/2022 0945  Last data filed at 9/6/2022 0554  Gross per 24 hour   Intake 324.1 ml   Output 800 ml   Net -475.9 ml       PHYSICAL EXAM:  CONSTITUTIONAL: Well nourished, cooperative, in no acute distress  HEENT: Normocephalic, atraumatic, no JVD  CARDIOVASCULAR:  Regular rhythm and normal rate, no murmur, gallop, rub.   RESPIRATORY: normal respiratory effort, no wheezing, rales or rhonchi. Diminished basilar lung sounds bilaterally  EXTREMITIES: No gross deformities, no edema. Peripheral pulses are present and equal bilaterally. Radial 2+, DP 1+  NEUROLOGICAL: No gross focal deficits, a&o x1  PSYCHIATRIC: Normal mood and affect. Behavior is normal     Labs/Diagnostic Data  Results from last 7 days   Lab Units 09/06/22  0510 09/05/22  1814 09/05/22  1315 08/30/22  1737   SODIUM mmol/L 139  --  137 139   POTASSIUM mmol/L 4.8  --  5.3* 4.8   CHLORIDE mmol/L 100  --  101 103   CO2 mmol/L 23.0  --  24.0 26.0   BUN mg/dL 24*  --  16 18   CREATININE mg/dL 1.83* 1.57* 1.51* 1.37*   GLUCOSE mg/dL 137*  --  115* 124*   CALCIUM mg/dL 9.2  --  10.2* 9.4     Results from last 7 days   Lab Units 09/05/22  1315   TROPONIN T ng/mL <0.010     Results from last 7 days   Lab Units 09/06/22  0510 09/05/22  1315 08/30/22  1737   WBC 10*3/mm3 4.42 7.87 10.98*   HEMOGLOBIN g/dL 9.2* 10.3* 9.4*   HEMATOCRIT % 28.2* 32.7* 29.4*   PLATELETS 10*3/mm3 204 229 216     Results from last 7 days   Lab Units 09/05/22  1315   MAGNESIUM mg/dL 2.8*                 Results from last 7 days   Lab Units 09/05/22  1814   PROBNP pg/mL 25,296.0*     Results from last 7 days   Lab Units 09/06/22  0510 09/05/22  1315   PROTIME Seconds 16.9* 14.7*   INR  " 1.38* 1.16*       Tele: Sinus Rhythm with PVCs    Radiology Data:  CXR 9/5/2022:  IMPRESSION:  Cardiomegaly and interstitial pulmonary edema.      Current Medications:    aspirin, 81 mg, Oral, Daily  cetirizine, 10 mg, Oral, Daily  dexamethasone, 6 mg, Intravenous, Daily  famotidine, 20 mg, Oral, BID  gabapentin, 300 mg, Oral, Nightly  heparin (porcine), 5,000 Units, Subcutaneous, Q8H  levothyroxine, 50 mcg, Oral, Q AM  memantine, 5 mg, Oral, BID  metoprolol succinate XL, 25 mg, Oral, Daily  nystatin, 5 mL, Oral, 4x Daily  remdesivir, 100 mg, Intravenous, Q24H      Pharmacy Consult - Remdesivir,         Assessment and Plan:   Assessment  1. Acute Hypoxic Respiratory Failure  2. New Afib w RVR  3. Nonischemic Cardiomyopathy with Acute on Chronic Systolic Heart Failure  a. Pending echo  4. COVID19 Infection  5. Hypertension  6. Hyperlipidemia  7. CKD          Plan   1. Patient has been in sinus rhythm since around 1800 yesterday. Converted after extra dose of metoprolol tartrate. Continue to monitor on home metoprolol succinate 25 mg daily.  2. Echo and EKG pending. Troponin negative x2.  No indication for further ischemic evaluation. Patient has history of nonischemic cardiomyopathy. Denies current angina. Acute COVID19 illness is more likely to be precipitating factor.   3. Agree with holding further diuretics with creatinine of 1.8 and without appearing volume overloaded on exam. Continue to monitor volume status closely.  4. Continue lipitor for HLD.    Thank you for requesting our consultative services and allowing us to take part in this patient's care. We will continue to follow.      Scribed by Anup Holly PA-C for Dr. Jose Antonio Aragon MD on 09/06/22

## 2022-09-06 NOTE — PROGRESS NOTES
Nutrition Screening     Pt does not meet screen criteria for nutrition risk per reported wt/intake hx. Cont to follow per protocol   Overall weight gain in past 6 weeks and currently is NPO.  Will monitor intake once diet is ordered.  Does have hx of complaint of nausea for approx 1 month per family.  All family has covid.        Eloise Ellis RDN, LD   15 min

## 2022-09-06 NOTE — PAYOR COMM NOTE
"Ref # [id number]  MRN [id number]  Jessie Mills RN, BSN, CMGT-BC  Phone # [phone]  Fax # [phone]  Scott Diego (89 y.o. Female)             Date of Birth   04/10/1930    Social Security Number       Address   [address] [facility] [address]    Home Phone   [phone]    MRN   [id number]       Alevism   Yazidi    Marital Status                               Admission Date   22    Admission Type   Emergency    Admitting Provider   Jan Martins MD    Attending Provider   Jan Martins MD    Department, Room/Bed   [facility] 6B, N637/1       Discharge Date       Discharge Disposition       Discharge Destination                               Attending Provider: Jan Martins MD    Allergies: Augmentin [Amoxicillin-pot Clavulanate], Claritin [Loratadine], Keflex [Cephalexin], Sulfa Antibiotics    Isolation: Contact Air   Infection: COVID (confirmed) (22)   Code Status: No CPR   Advance Care Planning Activity    Ht: 162.6 cm (64\")   Wt: 57.7 kg (127 lb 3.2 oz)    Admission Cmt: None   Principal Problem: COVID-19 virus detected [U07.1]                 Active Insurance as of 2022     Primary Coverage     Payor Plan Insurance Group Employer/Plan Group    ANTHEM MEDICARE REPLACEMENT ANTHEM MEDICARE ADVANTAGE [id number]     Payor Plan Address Payor Plan Phone Number Payor Plan Fax Number Effective Dates    [address] [phone]  3/1/2022 - None Entered    [facility] [id number]       Subscriber Name Subscriber Birth Date Member ID       SCOTT DIEGO 4/10/1930 [id number]                  History & Physical      Radha Gutierrez DO at 22 [facility]8              Psychiatric Medicine Services  HISTORY AND PHYSICAL    Patient Name: Scott Diego  : 4/10/1930  MRN: [id number]  Primary Care Physician: Ngozi Davis DO  Date of admission: 2022    Subjective   Subjective     Chief Complaint:  Hypoxia, AMS, COVID, " "SOA     HPI:  Scott Diego is a 89 y.o. female with past medical history significant for CHF, mild dementia, HTN, hypothyroidism,? CKD, and nocturnal oxygen use.  Patient lives with her son and family since the passing of her  in January of this year.  Son reports progressive decline in mental status and general health since that time.  On chart review patient was admitted to [facility] 3/3 - 3/17/2022 and then since then has had 10 ER visits since 6/10/2022 (for nausea, abd pain, h/a and general fatigue) before presenting today.      She presents today with 1 month history of nausea and headache by son's report, as well as increasing lethargy, confusion, chest congestion over the last approximate week.  Wears oxygen at hs at baseline but has been requiring it daily for about the last 2 or 3 weeks.  She follows with  and the heart valve center for her CHF.  Son reports last dose of \"as needed\" Lasix was given approximately 2 or 3 weeks ago.  On presentation today creatinine 1.51, hypoxic with sats of 87% room air, temp 100.4, heart rate 116, elevated lactate 2.6 and CRP 1.63.  CXR with cardiomegaly and interstitial pulmonary edema.  Tested positive for COVID-19.  Son further reports the entire family currently has COVID-19.  Will admit to hospitalist service for further treatment.         ROS obtained via chart review and per son via phone due to pts AMS   Review of Systems   Constitutional: Positive for activity change, appetite change, fatigue and fever.   HENT: Positive for congestion.    Eyes: Negative.    Respiratory: Positive for cough.    Cardiovascular: Negative.    Gastrointestinal: Positive for nausea. Negative for abdominal pain, diarrhea and vomiting.   Endocrine: Negative.    Genitourinary: Negative.    Musculoskeletal: Positive for arthralgias and gait problem.   Skin: Positive for pallor.   Allergic/Immunologic: Negative.    Neurological: Positive for headaches. Negative for " seizures and syncope.   Hematological: Negative.    Psychiatric/Behavioral: Positive for confusion and decreased concentration. The patient is nervous/anxious.         All other systems reviewed and are negative.     Personal History     Past Medical History:   Diagnosis Date   • Anxiety    • Congestive heart failure (HCC)    • Coronary artery disease    • Dementia (HCC)    • Depression    • Hyperlipidemia    • Malignant neoplasm (HCC)    • Memory loss    • Peripheral neuropathy    • Stroke (HCC)     mini stroke   • Systolic heart failure (HCC) 09/25/2017    Chronic/compensated (EF 25%)             Past Surgical History:   Procedure Laterality Date   • CARDIAC CATHETERIZATION     • CHOLECYSTECTOMY     • EYE SURGERY     • HYSTERECTOMY         Family History:  family history includes Cancer in her brother, mother, and sister; No Known Problems in her father; Parkinsonism in her brother. Otherwise pertinent FHx was reviewed and unremarkable.     Social History:  reports that she has never smoked. She has never used smokeless tobacco. She reports that she does not drink alcohol and does not use drugs.  Social History     Social History Narrative    Caffeine Intake:0-1  servings per day    Patient lives at her son's home       Medications:  ALPRAZolam, Glycerin-Polysorbate 80, Lidocaine Viscous HCl, Rimegepant Sulfate, acetaminophen, allopurinol, artificial tears, aspirin, atorvastatin, bisacodyl, carboxymethylcellulose, famotidine, fexofenadine, fluconazole, furosemide, gabapentin, levothyroxine, memantine, metoprolol succinate XL, nystatin, ofloxacin, ondansetron ODT, and predniSONE    Allergies   Allergen Reactions   • Augmentin [Amoxicillin-Pot Clavulanate] Nausea And Vomiting     Makes her sick at her stomach   • Claritin [Loratadine] Unknown (See Comments)     Doesn't remember   • Keflex [Cephalexin] Nausea And Vomiting     Makes pt sick at her stomach   • Sulfa Antibiotics Other (See Comments)     Does not  remember       Objective   Objective     Vital Signs:   Temp:  [100.4 °F (38 °C)] 100.4 °F (38 °C)  Heart Rate:  [106-120] 116  Resp:  [16] 16  BP: (152)/(91) 152/91  Flow (L/min):  [2] 2    Physical Exam   Constitutional: Awake, alert.  Resting back in bed.  Frail and chronically ill-appearing.  Confused.  Eyes: PERRLA, sclerae anicteric, no conjunctival injection  HENT: NCAT, mucous membranes dry  Neck: Supple, no thyromegaly, no lymphadenopathy, trachea midline  Respiratory: Lungs with few fine crackles at bases, no current wheezes.  Nonlabored respirations on 2 LNC sats 95%.  Cardiovascular: Tacky, no murmurs, rubs, or gallops, palpable pedal pulses bilaterally  Gastrointestinal: Positive bowel sounds, soft, nontender, nondistended  Musculoskeletal: No bilateral ankle edema, no clubbing or cyanosis to extremities.  Amezquita spontaneously.  Psychiatric: AMS, cooperative  Neurologic: Oriented to her first name only.  Confused otherwise, strength symmetric in all extremities but generally weak, Cranial Nerves grossly intact to confrontation, speech clear but confused.  Skin: No rashes.  General pallor.      Results Reviewed:  I have personally reviewed most recent indicated data and agree with findings including:  [x]  Laboratory  [x]  Radiology  [x]  EKG/Telemetry  []  Pathology  []  Cardiac/Vascular Studies  [x]  Old records  []  Other:  Most pertinent findings include:    LAB RESULTS:      Lab 09/05/22  1315 08/30/22  1737   WBC 7.87 10.98*   HEMOGLOBIN 10.3* 9.4*   HEMATOCRIT 32.7* 29.4*   PLATELETS 229 216   NEUTROS ABS 6.07 8.27*   IMMATURE GRANS (ABS) 0.02 0.06*   LYMPHS ABS 0.73 1.48   MONOS ABS 0.90 0.95*   EOS ABS 0.09 0.17   MCV 97.3* 98.0*   CRP 1.63*  --    PROCALCITONIN 0.04  --    LACTATE 2.6*  --      --    PROTIME 14.7*  --    D DIMER QUANT 0.47  --          Lab 09/05/22  1315 08/30/22  1737   SODIUM 137 139   POTASSIUM 5.3* 4.8   CHLORIDE 101 103   CO2 24.0 26.0   ANION GAP 12.0 10.0   BUN 16 18    CREATININE 1.51* 1.37*   EGFR 32.3* 36.3*   GLUCOSE 115* 124*   CALCIUM 10.2* 9.4   MAGNESIUM 2.8*  --          Lab 09/05/22  1315 08/30/22  1737   TOTAL PROTEIN 9.8* 8.9*   ALBUMIN 4.40 4.10   GLOBULIN 5.4 4.8   ALT (SGPT) 13 19   AST (SGOT) 22 22   BILIRUBIN 0.7 0.4   ALK PHOS 71 49   LIPASE  --  32         Lab 09/05/22  1315   PROBNP 13,441.0*   TROPONIN T <0.010   PROTIME 14.7*   INR 1.16*                 Brief Urine Lab Results  (Last result in the past 365 days)      Color   Clarity   Blood   Leuk Est   Nitrite   Protein   CREAT   Urine HCG        09/05/22 1507 Yellow   Clear   Negative   Negative   Negative   Trace               Microbiology Results (last 10 days)     Procedure Component Value - Date/Time    COVID PRE-OP / PRE-PROCEDURE SCREENING ORDER (NO ISOLATION) - Swab, Nasopharynx [[id number]]  (Abnormal) Collected: 09/05/22 1316    Lab Status: Final result Specimen: Swab from Nasopharynx Updated: 09/05/22 1400    Narrative:      The following orders were created for panel order COVID PRE-OP / PRE-PROCEDURE SCREENING ORDER (NO ISOLATION) - Swab, Nasopharynx.  Procedure                               Abnormality         Status                     ---------                               -----------         ------                     COVID-19 and FLU A/B PCR...[[id number]]  Abnormal            Final result                 Please view results for these tests on the individual orders.    COVID-19 and FLU A/B PCR - Swab, Nasopharynx [[id number]]  (Abnormal) Collected: 09/05/22 1316    Lab Status: Final result Specimen: Swab from Nasopharynx Updated: 09/05/22 1400     COVID19 Detected     Influenza A PCR Not Detected     Influenza B PCR Not Detected    Narrative:      Fact sheet for providers: https://www.fda.gov/media/971541/download    Fact sheet for patients: https://www.fda.gov/media/162322/download    Test performed by PCR.  Influenza A and Influenza B negative results should be considered presumptive in  samples that have a positive SARS-CoV-2 result.    Competitive inhibition studies showed that SARS-CoV-2 virus, when present at concentrations above 3.6E+04 copies/mL, can inhibit the detection and amplification of influenza A and influenza B virus RNA if present at or below 1.8E+02 copies/mL or 4.9E+02 copies/mL, respectively, and may lead to false negative influenza virus results. If co-infection with influenza A or influenza B virus is suspected in samples with a positive SARS-CoV-2 result, the sample should be re-tested with another FDA cleared, approved, or authorized influenza test, if influenza virus detection would change clinical management.          XR Chest 1 View    Result Date: 9/5/2022  DATE OF EXAM: 9/5/2022 1:46 PM  PROCEDURE: XR CHEST 1 VW-  INDICATIONS: Weak/Dizzy/AMS triage protocol.  COMPARISON: Chest x-ray 8/20/2022  TECHNIQUE: Single frontal view of the chest.  FINDINGS: Stable cardiomediastinal silhouette with redemonstration of cardiomegaly. There is increased diffuse interstitial prominence from prior compatible with interstitial pulmonary edema. No definite pleural effusion. No pneumothorax. No acute osseous findings.      Impression: Cardiomegaly and interstitial pulmonary edema.  This report was finalized on 9/5/2022 1:56 PM by Rito Delatorre MD.        Results for orders placed during the hospital encounter of 03/03/22    Adult Transthoracic Echo Complete W/ Cont if Necessary Per Protocol    Interpretation Summary  · Estimated left ventricular EF = 30%  · Moderate mitral valve regurgitation is present.  · There is calcification of the aortic valve.  · Estimated right ventricular systolic pressure from tricuspid regurgitation is mildly elevated (35-45 mmHg).      Assessment & Plan   Assessment & Plan       COVID-19 virus detected    Acute hypoxemic respiratory failure due to COVID-19 (HCC)    Acute on chronic congestive heart failure (HCC)    Hypertension    Dementia (HCC)    CARYN (acute  "kidney injury) (HCC)    Hypoxia    Hypothyroid    Dyspnea due to COVID-19    Encephalopathy due to COVID-19 virus    Anxiety      Assessment/Plan:    COVID-19  Acute hypoxic respiratory failure  --Entire household as COVID currently.  Patient lives with son.  -- Currently low-grade temp, hypoxic, elevated lactate and CRP.  -- CXR with CM and interstitial edema  --Several negative COVID's recently.  Last negative was just 8/22/2022  -- COVID isolation thorough approx 9/20/2022  -- COVID labs daily  -- Oxygen as needed, per her son she is supposed to wear 2.5L nightly  -- nebs, cough meds  -- Start remdesivir.  -- Start dexamethasone.  Note… Son states she tends to get a little \"crazy\" on steroids in the past.  Low threshold due to age and reported history to discontinue dexamethasone as majority of her hypoxia may be related to CHF rather than COVID    Acute/Chronic HFrEF  New Afib RVR   Hx prolonged QTc   -- Follows with Dr. Aragon and heart valve center regularly  --consult cardiology in the AM  --change home metoprolol to 25 bid with first dose now for better rate control, can increase PRN  -- Last dose of Lasix outpatient was approximately 2-3 weeks ago per son as it was \"as needed\"  -- Last echo 3/2022 EF 30% (deidre new echo due to new afib and chf), heart failure exacerbation possibly triggered by new onset A.fib  --ck EKG   --start lasix 40 mg daily IV for now, strict I&Os, if poor response would switch to Bumex   --monitor labs     Headache  Nausea x1 month  --Patient reports 1 month history (has been taking Dramamine, scopolamine, Zofran), ?likely worsened by COVID    Worsening encephalopathy  Hx of mild dementia  --Follows with Jessie Dover, APC with [facility] neurology  -- Progressive mental decline since  passed 1/2022  -- Recent Xanax dosing increased from 0.25 up to 0.5 mg  -- Gabapentin recently decreased from twice daily to nightly  -- Was on Namenda, neuro thought may be contributing or causing " worsening nausea therefore DC'd 8/27    CKD 3b  -- Appears baselien creatinine around 1.3-1.4 range by chart review.  -- Currently 1.51  -- Likely due to volume overload/renal congestion  -- Monitor labs    Thrush   --nystatin s/s     Anxiety  -- Has been taking gabapentin only at at bedtime recently versus prior twice daily  -- Xanax recently increased from 0.25 to 0.5  -- Worsening since 1/2022 when her  passed    HTN/HLD  -- Home meds as tolerated    Hypothyroidism  --TSH 0.699     General debility  Failure to thrive  -- PT/OT/CM for discharge planning    DVT prophylaxis:  Sequentials, heparin SQ     CODE STATUS:    Level Of Support Discussed With: Next of Kin (If No Surrogate)  Code Status (Patient has no pulse and is not breathing): No CPR (Do Not Attempt to Resuscitate)  Medical Interventions (Patient has pulse or is breathing): Full Support  Son confirms DNR/DNI, would involve palliative for further GOC discussions in the AM.    This note has been completed as part of a split-shared workflow.     Signature: Electronically signed by VASILE Hinojosa, 09/05/22, 4:22 PM EDT      Attending   Admission Attestation       I have performed an independent face-to-face diagnostic evaluation including performing an independent physical examination as documented here.  The documented plan of care above was reviewed and developed with the advanced practice clinician (APC).      Brief Summary Statement:   Scott Diego is a 89 y.o. female with PMHx significant for NICM/HFrEF (EF 30% 3/3022), HTN, stage 3b CKD, dementia, HLD, hypothyroidism, generalized anxiety, recent decline/FTT after death of her  1/2022 with multiple ED visits who presents to [facility] with increasing confusion and hypoxia. Most of the history was obtained from patient's son, who states that patient currently lives with him. The entire household has COVID. He tells me that patient has been increasingly confused. She has been  "complaining is significant headache and nausea/vomiting. Her namenda was discontinued on 8/27 as it was felt that it may be contributing to her nausea. And she has been seen in the ED multiple times for this same complaint. Her COVID testing was all previously negative. They have tried multiple medications including zofran, dramamine, tylenol and most recently xanax (increased from 0.25mg to 0.5mg dose). He states they tried scopolamine also but \"she could hardly stand up on that stuff\". Patient also notes increasing shortness of breath. She normally wears 2.5L of O2 at night, however over the last 2 weeks she has been wearing it during the day also. She follows closely with heart/valve center and he states she was taken off her lasix due to some electrolyte abnormalities and her kidneys. It was only to be dosed \"as needed\", however he reports her last dose was probably 2-3 weeks ago. In the ED found to be hypoxic requiring 2L. Her COVID testing was positive. Her CXR was consistent with cardiomegaly and pulmonary edema with BNP >13K concerning for CHF exacerbation. After arriving to the floor she developed new onset A.fib.    Remainder of detailed HPI is as noted by APC and has been reviewed and/or edited by me for completeness.    Attending Physical Exam:  Temp:  [99.9 °F (37.7 °C)-100.4 °F (38 °C)] 99.9 °F (37.7 °C)  Heart Rate:  [106-120] 120  Resp:  [16] 16  BP: (146-152)/(83-91) 146/83  Flow (L/min):  [2] 2    Constitutional: Awake, alert, elderly appearing female  Eyes: PERRLA, sclerae anicteric, no conjunctival injection  HENT: NCAT, mucous membranes moist  Neck: Supple, no thyromegaly, no lymphadenopathy, trachea midline  Respiratory: Crackles in bases, non-labored on 2L  Cardiovascular: tachycardic, no murmurs, rubs, or gallops, palpable pedal pulses bilaterally  Gastrointestinal: Positive bowel sounds, soft, nontender, nondistended  Musculoskeletal: No bilateral ankle edema, no clubbing or cyanosis to " extremities  Psychiatric:flat, drowsy  Neurologic: Oriented to person only, strength symmetric in all extremities, Cranial Nerves grossly intact to confrontation, speech clear  Skin: No rashes    Brief Assessment/Plan :  See detailed assessment and plan developed with [facility] which I have reviewed and/or edited for completeness.        Admission Status: I believe that this patient meets INPATIENT status due to hypoxia, COVID, advanced age, new onset A.fib RVR and CHF exacerbation. I feel patient’s risk for adverse outcomes and need for care warrant INPATIENT evaluation and I predict the patient’s care encounter to likely last beyond 2 midnights.        Radha Gutierrez DO  09/05/22                          Electronically signed by Radha Gutierrez DO at 09/05/22 2111          Emergency Department Notes      Sid Cesar PA at 09/05/22 1440          Subjective   89-year-old female presents emergency department today with increasing confusion.  I spoke to her son via the phone, apparently the entire family has tested positive for COVID-19 and are all symptomatic.  Has had over the week and has become increasingly confused and lethargic.  They are concerned that she to have COVID-19.  She has had boosters in vaccinations for COVID-19.  Her son states that she has not had a fever.  She has not seemed exceptionally short of breath.  She had very little cough.  She had some mild upper respiratory congestion.  As far as he knows she has not had any urinary symptoms.  She is not a smoker and has no underlying chronic lung disease.  She does have a history of mild dementia, however normally not this lethargic or confused.  He states she is just been kind of picking at things in the air and not making any sense speaking.  They are unable to get her to stand up this morning due to her weakness.  Called EMS to have her brought to the emergency department.      History provided by:  Patient and relative  History limited  by:  Mental status change   used: No    Weakness - Generalized  Severity:  Severe  Onset quality:  Gradual  Duration:  2 days  Timing:  Constant  Progression:  Worsening  Chronicity:  New  Context comment:  Entire family with COVID-19.  She has been mildly symptomatic but increasing lethargy and confusion.  Relieved by:  Nothing  Worsened by:  Nothing  Ineffective treatments:  None tried  Associated symptoms: abdominal pain and cough    Associated symptoms: no chest pain, no diarrhea, no dysuria, no falls, no fever, no foul-smelling urine, no frequency, no hematochezia, no lethargy, no loss of consciousness, no melena, no nausea, no sensory-motor deficit, no urgency, no vision change and no vomiting    Risk factors: congestive heart failure    Risk factors: no anemia, no diabetes, no family hx of stroke, no heart disease, no new medications and no recent stressors        Review of Systems   Unable to perform ROS: Mental status change   Constitutional: Negative for chills and fever.   Respiratory: Positive for cough. Negative for chest tightness and wheezing.    Cardiovascular: Negative for chest pain.   Gastrointestinal: Positive for abdominal pain. Negative for diarrhea, hematochezia, melena, nausea and vomiting.   Genitourinary: Negative for dysuria, frequency and urgency.   Musculoskeletal: Positive for back pain and neck pain. Negative for falls.   Skin: Negative for pallor and rash.   Neurological: Negative for loss of consciousness.   Psychiatric/Behavioral: Negative.    All other systems reviewed and are negative.      Past Medical History:   Diagnosis Date   • Anxiety    • Congestive heart failure (HCC)    • Coronary artery disease    • Dementia (HCC)    • Depression    • Hyperlipidemia    • Malignant neoplasm (HCC)    • Memory loss    • Peripheral neuropathy    • Stroke (HCC)     mini stroke   • Systolic heart failure (HCC) 09/25/2017    Chronic/compensated (EF 25%)       Allergies    Allergen Reactions   • Augmentin [Amoxicillin-Pot Clavulanate] Nausea And Vomiting     Makes her sick at her stomach   • Claritin [Loratadine] Unknown (See Comments)     Doesn't remember   • Keflex [Cephalexin] Nausea And Vomiting     Makes pt sick at her stomach   • Sulfa Antibiotics Other (See Comments)     Does not remember       Past Surgical History:   Procedure Laterality Date   • CARDIAC CATHETERIZATION     • CHOLECYSTECTOMY     • EYE SURGERY     • HYSTERECTOMY         Family History   Problem Relation Age of Onset   • Cancer Mother    • No Known Problems Father    • Cancer Sister    • Cancer Brother    • Parkinsonism Brother    • No Known Problems Son        Social History     Socioeconomic History   • Marital status:    Tobacco Use   • Smoking status: Never Smoker   • Smokeless tobacco: Never Used   Vaping Use   • Vaping Use: Never used   Substance and Sexual Activity   • Alcohol use: Never   • Drug use: Never   • Sexual activity: Defer     Comment:  since 1/2022           Objective   Physical Exam  Vitals and nursing note reviewed.   Constitutional:       General: She is not in acute distress.     Appearance: She is well-developed. She is not diaphoretic.      Comments: Confused taking off her O2 sat monitor constantly trying to climb out of the bed   HENT:      Head: Normocephalic and atraumatic.      Nose: Congestion present.      Mouth/Throat:      Mouth: Mucous membranes are dry.   Eyes:      General: No scleral icterus.     Conjunctiva/sclera: Conjunctivae normal.   Cardiovascular:      Rate and Rhythm: Regular rhythm. Tachycardia present.      Heart sounds: Normal heart sounds. No murmur heard.  Pulmonary:      Effort: Pulmonary effort is normal. No respiratory distress.      Breath sounds: Rales present.      Comments: Faint rales in the lower lung bases.  Abdominal:      General: Bowel sounds are normal.      Palpations: Abdomen is soft.      Tenderness: There is no abdominal  tenderness.   Musculoskeletal:         General: Normal range of motion.      Cervical back: Normal range of motion and neck supple.   Skin:     General: Skin is warm and dry.   Neurological:      Mental Status: She is alert. She is disoriented.   Psychiatric:      Comments: Not communicating picking in the air try to take off her monitors.         Procedures          ED Course  ED Course as of 09/06/22 1318   Mon Sep 05, 2022   1451 Discussed with Dr. Gutierrez.  She will admit. [ASHKAN]   0429 I called Mrs. Diego son notifying him of the findings and the admission. [ASHKAN]      ED Course User Index  [ASHKAN] Sid Cesar PA                                 Recent Results (from the past 24 hour(s))   Urinalysis With Culture If Indicated - Urine, Catheter    Collection Time: 09/05/22  3:07 PM    Specimen: Urine, Catheter   Result Value Ref Range    Color, UA Yellow Yellow, Straw    Appearance, UA Clear Clear    pH, UA 6.5 5.0 - 8.0    Specific Gravity, UA 1.018 1.001 - 1.030    Glucose, UA Negative Negative    Ketones, UA Negative Negative    Bilirubin, UA Negative Negative    Blood, UA Negative Negative    Protein, UA Trace (A) Negative    Leuk Esterase, UA Negative Negative    Nitrite, UA Negative Negative    Urobilinogen, UA 0.2 E.U./dL 0.2 - 1.0 E.U./dL   STAT Lactic Acid, Reflex    Collection Time: 09/05/22  6:14 PM    Specimen: Blood   Result Value Ref Range    Lactate 3.4 (C) 0.5 - 2.0 mmol/L   Hepatic Function Panel    Collection Time: 09/05/22  6:14 PM    Specimen: Blood   Result Value Ref Range    Total Protein 8.8 (H) 6.0 - 8.5 g/dL    Albumin 4.20 3.50 - 5.20 g/dL    ALT (SGPT) 12 1 - 33 U/L    AST (SGOT) 20 1 - 32 U/L    Alkaline Phosphatase 71 39 - 117 U/L    Total Bilirubin 0.7 0.0 - 1.2 mg/dL    Bilirubin, Direct 0.2 0.0 - 0.3 mg/dL    Bilirubin, Indirect 0.5 mg/dL   Creatinine, Serum    Collection Time: 09/05/22  6:14 PM    Specimen: Blood   Result Value Ref Range    Creatinine 1.57 (H) 0.57 - 1.00 mg/dL     eGFR 30.8 (L) >60.0 mL/min/1.73   proBNP    Collection Time: 09/05/22  6:14 PM    Specimen: Blood   Result Value Ref Range    proBNP 25,296.0 (H) 0.0 - 1,800.0 pg/mL   STAT Lactic Acid, Reflex    Collection Time: 09/05/22  8:57 PM    Specimen: Blood   Result Value Ref Range    Lactate 2.0 0.5 - 2.0 mmol/L   Comprehensive Metabolic Panel    Collection Time: 09/06/22  5:10 AM    Specimen: Blood   Result Value Ref Range    Glucose 137 (H) 65 - 99 mg/dL    BUN 24 (H) 8 - 23 mg/dL    Creatinine 1.83 (H) 0.57 - 1.00 mg/dL    Sodium 139 136 - 145 mmol/L    Potassium 4.8 3.5 - 5.2 mmol/L    Chloride 100 98 - 107 mmol/L    CO2 23.0 22.0 - 29.0 mmol/L    Calcium 9.2 8.2 - 9.6 mg/dL    Total Protein 8.1 6.0 - 8.5 g/dL    Albumin 3.70 3.50 - 5.20 g/dL    ALT (SGPT) 21 1 - 33 U/L    AST (SGOT) 43 (H) 1 - 32 U/L    Alkaline Phosphatase 92 39 - 117 U/L    Total Bilirubin 1.0 0.0 - 1.2 mg/dL    Globulin 4.4 gm/dL    A/G Ratio 0.8 g/dL    BUN/Creatinine Ratio 13.1 7.0 - 25.0    Anion Gap 16.0 (H) 5.0 - 15.0 mmol/L    eGFR 25.6 (L) >60.0 mL/min/1.73   C-reactive Protein    Collection Time: 09/06/22  5:10 AM    Specimen: Blood   Result Value Ref Range    C-Reactive Protein 3.57 (H) 0.00 - 0.50 mg/dL   Sedimentation Rate    Collection Time: 09/06/22  5:10 AM    Specimen: Blood   Result Value Ref Range    Sed Rate 67 (H) 0 - 30 mm/hr   Protime-INR    Collection Time: 09/06/22  5:10 AM    Specimen: Blood   Result Value Ref Range    Protime 16.9 (H) 11.4 - 14.4 Seconds    INR 1.38 (H) 0.84 - 1.13   CBC Auto Differential    Collection Time: 09/06/22  5:10 AM    Specimen: Blood   Result Value Ref Range    WBC 4.42 3.40 - 10.80 10*3/mm3    RBC 2.92 (L) 3.77 - 5.28 10*6/mm3    Hemoglobin 9.2 (L) 12.0 - 15.9 g/dL    Hematocrit 28.2 (L) 34.0 - 46.6 %    MCV 96.6 79.0 - 97.0 fL    MCH 31.5 26.6 - 33.0 pg    MCHC 32.6 31.5 - 35.7 g/dL    RDW 16.6 (H) 12.3 - 15.4 %    RDW-SD 58.2 (H) 37.0 - 54.0 fl    MPV 12.0 6.0 - 12.0 fL    Platelets 204 140  - 450 10*3/mm3    Neutrophil % 79.5 (H) 42.7 - 76.0 %    Lymphocyte % 9.7 (L) 19.6 - 45.3 %    Monocyte % 10.4 5.0 - 12.0 %    Eosinophil % 0.0 (L) 0.3 - 6.2 %    Basophil % 0.2 0.0 - 1.5 %    Immature Grans % 0.2 0.0 - 0.5 %    Neutrophils, Absolute 3.51 1.70 - 7.00 10*3/mm3    Lymphocytes, Absolute 0.43 (L) 0.70 - 3.10 10*3/mm3    Monocytes, Absolute 0.46 0.10 - 0.90 10*3/mm3    Eosinophils, Absolute 0.00 0.00 - 0.40 10*3/mm3    Basophils, Absolute 0.01 0.00 - 0.20 10*3/mm3    Immature Grans, Absolute 0.01 0.00 - 0.05 10*3/mm3    nRBC 0.0 0.0 - 0.2 /100 WBC   Bilirubin, Direct    Collection Time: 09/06/22  5:10 AM    Specimen: Blood   Result Value Ref Range    Bilirubin, Direct 0.5 (H) 0.0 - 0.3 mg/dL   Adult Transthoracic Echo Complete w/ Color, Spectral and Contrast if Necessary Per Protocol    Collection Time: 09/06/22 11:58 AM   Result Value Ref Range    Target HR (85%) 109 bpm    Max. Pred. HR (100%) 128 bpm     Note: In addition to lab results from this visit, the labs listed above may include labs taken at another facility or during a different encounter within the last 24 hours. Please correlate lab times with ED admission and discharge times for further clarification of the services performed during this visit.    XR Chest 1 View   Final Result   Cardiomegaly and interstitial pulmonary edema.       This report was finalized on 9/5/2022 1:56 PM by Rito Delatorre MD.            Vitals:    09/05/22 2343 09/06/22 0523 09/06/22 0738 09/06/22 1100   BP:  124/71 122/71 119/69   BP Location:  Left arm Left arm Left arm   Patient Position:  Lying Lying Sitting   Pulse: 88 90 91 93   Resp: 19 18 16 16   Temp: 97.1 °F (36.2 °C) 96.2 °F (35.7 °C) 97 °F (36.1 °C) 97.7 °F (36.5 °C)   TempSrc: Oral Axillary Oral Oral   SpO2: 98% 95%     Weight:       Height:         Medications   sodium chloride 0.9 % flush 10 mL (10 mL Intravenous Given 9/6/22 8120)   albuterol sulfate HFA (PROVENTIL HFA;VENTOLIN HFA;PROAIR HFA)  inhaler 2 puff (has no administration in time range)   acetaminophen (TYLENOL) tablet 650 mg (650 mg Oral Given 9/5/22 1730)     Or   acetaminophen (TYLENOL) suppository 650 mg ( Rectal Not Given:  See Alt 9/5/22 1730)   dextromethorphan polistirex ER (DELSYM) 30 MG/5ML oral suspension 60 mg (has no administration in time range)   ipratropium-albuterol (DUO-NEB) nebulizer solution 3 mL (has no administration in time range)   Pharmacy Consult - Remdesivir (has no administration in time range)   remdesivir 200 mg in sodium chloride 0.9 % 290 mL IVPB (powder vial) (200 mg Intravenous New Bag 9/5/22 1731)     Followed by   remdesivir 100 mg in sodium chloride 0.9 % 250 mL IVPB (powder vial) (100 mg Intravenous New Bag 9/6/22 1257)   heparin (porcine) 5000 UNIT/ML injection 5,000 Units (5,000 Units Subcutaneous Given 9/6/22 0520)   nystatin (MYCOSTATIN) 100,000 unit/mL suspension 500,000 Units (500,000 Units Oral Given 9/6/22 0819)   ALPRAZolam (XANAX) tablet 0.25 mg (has no administration in time range)   famotidine (PEPCID) tablet 20 mg (20 mg Oral Given 9/6/22 0819)   cetirizine (zyrTEC) tablet 10 mg (10 mg Oral Given 9/6/22 0820)   gabapentin (NEURONTIN) capsule 300 mg (has no administration in time range)   levothyroxine (SYNTHROID, LEVOTHROID) tablet 50 mcg (50 mcg Oral Given 9/6/22 0819)   memantine (NAMENDA) tablet 5 mg (5 mg Oral Given 9/6/22 0821)   metoprolol succinate XL (TOPROL-XL) 24 hr tablet 25 mg (25 mg Oral Given 9/6/22 0827)   aspirin EC tablet 81 mg (81 mg Oral Given 9/6/22 0819)   dexamethasone (DECADRON) injection 6 mg (has no administration in time range)     Or   dexamethasone (DECADRON) tablet 6 mg (has no administration in time range)   furosemide (LASIX) injection 40 mg (40 mg Intravenous Given 9/5/22 1019)   melatonin tablet 5 mg (5 mg Oral Given 9/6/22 0043)     ECG/EMG Results (last 24 hours)     ** No results found for the last 24 hours. **        ECG 12 Lead    (Results Pending)   ECG 12  Lead    (Results Pending)   ECG 12 Lead    (Results Pending)                 MDM  Number of Diagnoses or Management Options  Acute on chronic congestive heart failure, unspecified heart failure type (HCC): established and worsening  Altered mental status, unspecified altered mental status type: new and requires workup  Chronic renal impairment, unspecified CKD stage: established and worsening  COVID-19: new and requires workup  Hypoxia: new and requires workup     Amount and/or Complexity of Data Reviewed  Clinical lab tests: reviewed and ordered  Tests in the radiology section of CPT®: reviewed and ordered  Tests in the medicine section of CPT®: ordered and reviewed  Decide to obtain previous medical records or to obtain history from someone other than the patient: yes  Discuss the patient with other providers: yes    Patient Progress  Patient progress: stable      Final diagnoses:   Hypoxia   COVID-19   Acute on chronic congestive heart failure, unspecified heart failure type (HCC)   Chronic renal impairment, unspecified CKD stage   Altered mental status, unspecified altered mental status type       ED Disposition  ED Disposition     ED Disposition   Decision to Admit    Condition   --    Comment   Level of Care: Telemetry [5]   Diagnosis: Hypoxia [746623]   Admitting Physician: RAN GLEASON [[id number]]   Isolate for COVID?: Yes [1]   Certification: I Certify That Inpatient Hospital Services Are Medically Necessary For Greater Than 2 Midnights               No follow-up provider specified.       Medication List      ASK your doctor about these medications    ALPRAZolam 0.5 MG tablet  Commonly known as: XANAX  Ask about: Which instructions should I use?             Sid Cesar PA  09/06/22 1318      Electronically signed by Sid Cesar PA at 09/06/22 1318         Current Facility-Administered Medications   Medication Dose Route Frequency Provider Last Rate Last Admin   • acetaminophen  (TYLENOL) tablet 650 mg  650 mg Oral Q4H PRN Gia Diaz APRN   650 mg at 09/05/22 1730    Or   • acetaminophen (TYLENOL) suppository 650 mg  650 mg Rectal Q4H PRN Gia Diaz, VASILE       • albuterol sulfate HFA (PROVENTIL HFA;VENTOLIN HFA;PROAIR HFA) inhaler 2 puff  2 puff Inhalation Q6H PRN Gia Diaz APRN       • ALPRAZolam (XANAX) tablet 0.25 mg  0.25 mg Oral TID PRN Jan Martins MD       • aspirin EC tablet 81 mg  81 mg Oral Daily Jan Martins MD   81 mg at 09/06/22 0819   • cetirizine (zyrTEC) tablet 10 mg  10 mg Oral Daily Jan Martins MD   10 mg at 09/06/22 0820   • [START ON 9/7/2022] dexamethasone (DECADRON) injection 6 mg  6 mg Intravenous Daily With Breakfast Jan Martins MD        Or   • [START ON 9/7/2022] dexamethasone (DECADRON) tablet 6 mg  6 mg Oral Daily With Breakfast Jan Martins MD       • dextromethorphan polistirex ER (DELSYM) 30 MG/5ML oral suspension 60 mg  60 mg Oral Q12H PRN Gia Diaz, VASILE       • famotidine (PEPCID) tablet 20 mg  20 mg Oral BID Jan Martins MD   20 mg at 09/06/22 0819   • gabapentin (NEURONTIN) capsule 300 mg  300 mg Oral Nightly Jan Martins MD       • heparin (porcine) 5000 UNIT/ML injection 5,000 Units  5,000 Units Subcutaneous Q8H Gia Diaz APRN   5,000 Units at 09/06/22 1525   • ipratropium-albuterol (DUO-NEB) nebulizer solution 3 mL  3 mL Nebulization Q6H PRN Gia Diaz, VASILE       • levothyroxine (SYNTHROID, LEVOTHROID) tablet 50 mcg  50 mcg Oral Q AM Jan Martins MD   50 mcg at 09/06/22 0819   • memantine (NAMENDA) tablet 5 mg  5 mg Oral BID Jan Martins MD   5 mg at 09/06/22 0821   • metoprolol succinate XL (TOPROL-XL) 24 hr tablet 25 mg  25 mg Oral Daily Jna Martins MD   25 mg at 09/06/22 0827   • nystatin (MYCOSTATIN) 100,000 unit/mL suspension 500,000 Units  5 mL Oral 4x Daily Gia Diaz, VASILE   500,000 Units at  09/06/22 1257   • Pharmacy Consult - Remdesivir   Does not apply Continuous PRN Gia Diaz APRN       • remdesivir 100 mg in sodium chloride 0.9 % 250 mL IVPB (powder vial)  100 mg Intravenous Q24H Gia Diaz APRN   100 mg at 09/06/22 1257   • sodium chloride 0.9 % flush 10 mL  10 mL Intravenous PRN Christopher Willams DO   10 mL at 09/06/22 0820     Lab Results (last 48 hours)     Procedure Component Value Units Date/Time    Protime-INR [[id number]]  (Abnormal) Collected: 09/06/22 0510    Specimen: Blood Updated: 09/06/22 0651     Protime 16.9 Seconds      INR 1.38    Sedimentation Rate [[id number]]  (Abnormal) Collected: 09/06/22 0510    Specimen: Blood Updated: 09/06/22 0639     Sed Rate 67 mm/hr     CBC & Differential [[id number]]  (Abnormal) Collected: 09/06/22 0510    Specimen: Blood Updated: 09/06/22 0615    Narrative:      The following orders were created for panel order CBC & Differential.  Procedure                               Abnormality         Status                     ---------                               -----------         ------                     CBC Auto Differential[[id number]]        Abnormal            Final result                 Please view results for these tests on the individual orders.    CBC Auto Differential [[id number]]  (Abnormal) Collected: 09/06/22 0510    Specimen: Blood Updated: 09/06/22 0615     WBC 4.42 10*3/mm3      RBC 2.92 10*6/mm3      Hemoglobin 9.2 g/dL      Hematocrit 28.2 %      MCV 96.6 fL      MCH 31.5 pg      MCHC 32.6 g/dL      RDW 16.6 %      RDW-SD 58.2 fl      MPV 12.0 fL      Platelets 204 10*3/mm3      Neutrophil % 79.5 %      Lymphocyte % 9.7 %      Monocyte % 10.4 %      Eosinophil % 0.0 %      Basophil % 0.2 %      Immature Grans % 0.2 %      Neutrophils, Absolute 3.51 10*3/mm3      Lymphocytes, Absolute 0.43 10*3/mm3      Monocytes, Absolute 0.46 10*3/mm3      Eosinophils, Absolute 0.00 10*3/mm3      Basophils, Absolute 0.01  10*3/mm3      Immature Grans, Absolute 0.01 10*3/mm3      nRBC 0.0 /100 WBC     Comprehensive Metabolic Panel [[id number]]  (Abnormal) Collected: 09/06/22 0510    Specimen: Blood Updated: 09/06/22 0613     Glucose 137 mg/dL      BUN 24 mg/dL      Creatinine 1.83 mg/dL      Sodium 139 mmol/L      Potassium 4.8 mmol/L      Chloride 100 mmol/L      CO2 23.0 mmol/L      Calcium 9.2 mg/dL      Total Protein 8.1 g/dL      Albumin 3.70 g/dL      ALT (SGPT) 21 U/L      AST (SGOT) 43 U/L      Alkaline Phosphatase 92 U/L      Total Bilirubin 1.0 mg/dL      Globulin 4.4 gm/dL      Comment: Calculated Result        A/G Ratio 0.8 g/dL      BUN/Creatinine Ratio 13.1     Anion Gap 16.0 mmol/L      eGFR 25.6 mL/min/1.73      Comment: National Kidney Foundation and American Society of Nephrology (ASN) Task Force recommended calculation based on the Chronic Kidney Disease Epidemiology Collaboration (CKD-EPI) equation refit without adjustment for race.       Narrative:      GFR Normal >60  Chronic Kidney Disease <60  Kidney Failure <15      C-reactive Protein [[id number]]  (Abnormal) Collected: 09/06/22 0510    Specimen: Blood Updated: 09/06/22 0606     C-Reactive Protein 3.57 mg/dL     Bilirubin, Direct [[id number]]  (Abnormal) Collected: 09/06/22 0510    Specimen: Blood Updated: 09/06/22 0606     Bilirubin, Direct 0.5 mg/dL     proBNP [[id number]]  (Abnormal) Collected: 09/05/22 1814    Specimen: Blood Updated: 09/05/22 2225     proBNP 25,296.0 pg/mL     Narrative:      Among patients with dyspnea, NT-proBNP is highly sensitive for the detection of acute congestive heart failure. In addition NT-proBNP of <300 pg/ml effectively rules out acute congestive heart failure with 99% negative predictive value.    Results may be falsely decreased if patient taking Biotin.      STAT Lactic Acid, Reflex [[id number]]  (Normal) Collected: 09/05/22 2057    Specimen: Blood Updated: 09/05/22 2135     Lactate 2.0 mmol/L      Comment: Falsely depressed  results may occur on samples drawn from patients receiving N-Acetylcysteine (NAC) or Metamizole.       STAT Lactic Acid, Reflex [[id number]]  (Abnormal) Collected: 09/05/22 1814    Specimen: Blood Updated: 09/05/22 1920     Lactate 3.4 mmol/L      Comment: Falsely depressed results may occur on samples drawn from patients receiving N-Acetylcysteine (NAC) or Metamizole.       Hepatic Function Panel [[id number]]  (Abnormal) Collected: 09/05/22 1814    Specimen: Blood Updated: 09/05/22 1913     Total Protein 8.8 g/dL      Albumin 4.20 g/dL      ALT (SGPT) 12 U/L      AST (SGOT) 20 U/L      Alkaline Phosphatase 71 U/L      Total Bilirubin 0.7 mg/dL      Bilirubin, Direct 0.2 mg/dL      Bilirubin, Indirect 0.5 mg/dL     Creatinine, Serum [[id number]]  (Abnormal) Collected: 09/05/22 1814    Specimen: Blood Updated: 09/05/22 1913     Creatinine 1.57 mg/dL      eGFR 30.8 mL/min/1.73      Comment: National Kidney Foundation and American Society of Nephrology (ASN) Task Force recommended calculation based on the Chronic Kidney Disease Epidemiology Collaboration (CKD-EPI) equation refit without adjustment for race.       Narrative:      GFR Normal >60  Chronic Kidney Disease <60  Kidney Failure <15      [facility] Draw [[id number]] Collected: 09/05/22 1315    Specimen: Blood Updated: 09/05/22 1717    Narrative:      The following orders were created for panel order [facility] Draw.  Procedure                               Abnormality         Status                     ---------                               -----------         ------                     Green Top (Gel)[[id number]]                                  Final result               Lavender Top[[id number]]                                     Final result               Gold Top - SST[[id number]]                                   Final result               Quiros Top[[id number]]                                         Final result               Light Blue Top[[id number]]                   "                 Final result                 Please view results for these tests on the individual orders.    Quiros Top [[id number]] Collected: 09/05/22 1315    Specimen: Blood Updated: 09/05/22 1717     Extra Tube Hold for add-ons.     Comment: Auto resulted.       Lactic Acid, Plasma [[id number]]  (Abnormal) Collected: 09/05/22 1315    Specimen: Blood Updated: 09/05/22 1533     Lactate 2.6 mmol/L      Comment: Falsely depressed results may occur on samples drawn from patients receiving N-Acetylcysteine (NAC) or Metamizole.       Urinalysis With Culture If Indicated - Urine, Catheter [[id number]]  (Abnormal) Collected: 09/05/22 1507    Specimen: Urine, Catheter Updated: 09/05/22 1528     Color, UA Yellow     Appearance, UA Clear     pH, UA 6.5     Specific Gravity, UA 1.018     Glucose, UA Negative     Ketones, UA Negative     Bilirubin, UA Negative     Blood, UA Negative     Protein, UA Trace     Leuk Esterase, UA Negative     Nitrite, UA Negative     Urobilinogen, UA 0.2 E.U./dL    Narrative:      In absence of clinical symptoms, the presence of pyuria, bacteria, and/or nitrites on the urinalysis result does not correlate with infection.  Urine microscopic not indicated.    Procalcitonin [[id number]]  (Normal) Collected: 09/05/22 1315    Specimen: Blood Updated: 09/05/22 1526     Procalcitonin 0.04 ng/mL     Narrative:      As a Marker for Sepsis (Non-Neonates):    1. <0.5 ng/mL represents a low risk of severe sepsis and/or septic shock.  2. >2 ng/mL represents a high risk of severe sepsis and/or septic shock.    As a Marker for Lower Respiratory Tract Infections that require antibiotic therapy:    PCT on Admission    Antibiotic Therapy       6-12 Hrs later    >0.5                Strongly Recommended  >0.25 - <0.5        Recommended   0.1 - 0.25          Discouraged              Remeasure/reassess PCT  <0.1                Strongly Discouraged     Remeasure/reassess PCT    As 28 day mortality risk marker: \"Change in " "Procalcitonin Result\" (>80% or <=80%) if Day 0 (or Day 1) and Day 4 values are available. Refer to [email or web address]://[email or web address]    Change in PCT <=80%  A decrease of PCT levels below or equal to 80% defines a positive change in PCT test result representing a higher risk for 28-day all-cause mortality of patients diagnosed with severe sepsis for septic shock.    Change in PCT >80%  A decrease of PCT levels of more than 80% defines a negative change in PCT result representing a lower risk for 28-day all-cause mortality of patients diagnosed with severe sepsis or septic shock.       Lactate Dehydrogenase [[id number]]  (Normal) Collected: 09/05/22 1315    Specimen: Blood Updated: 09/05/22 1525      U/L     C-reactive Protein [[id number]]  (Abnormal) Collected: 09/05/22 1315    Specimen: Blood Updated: 09/05/22 1525     C-Reactive Protein 1.63 mg/dL     BNP [[id number]]  (Abnormal) Collected: 09/05/22 1315    Specimen: Blood Updated: 09/05/22 1517     proBNP 13,441.0 pg/mL     Narrative:      Among patients with dyspnea, NT-proBNP is highly sensitive for the detection of acute congestive heart failure. In addition NT-proBNP of <300 pg/ml effectively rules out acute congestive heart failure with 99% negative predictive value.    Results may be falsely decreased if patient taking Biotin.      Protime-INR [[id number]]  (Abnormal) Collected: 09/05/22 1315    Specimen: Blood Updated: 09/05/22 1506     Protime 14.7 Seconds      INR 1.16    D-dimer, Quantitative [[id number]]  (Normal) Collected: 09/05/22 1315    Specimen: Blood Updated: 09/05/22 1506     D-Dimer, Quantitative 0.47 MCGFEU/mL     Narrative:      The D-Dimer assay test is to be used in conjunction with a clinical pretest probability (PTP) assessment model, and has been approved by the FDA to exclude pulmonary embolism (PE) and deep venous thrombosis (DVT) in outpatients suspected of PE or DVT, with a cutoff of 0.5 MCGFEU/mL.    Light Blue Top [[id number]] " Collected: 09/05/22 1315    Specimen: Blood Updated: 09/05/22 1433     Extra Tube Hold for add-ons.     Comment: Auto resulted       Green Top (Gel) [[id number]] Collected: 09/05/22 1315    Specimen: Blood Updated: 09/05/22 1433     Extra Tube Hold for add-ons.     Comment: Auto resulted.       Lavender Top [[id number]] Collected: 09/05/22 1315    Specimen: Blood Updated: 09/05/22 1433     Extra Tube hold for add-on     Comment: Auto resulted       Gold Top - SST [[id number]] Collected: 09/05/22 1315    Specimen: Blood Updated: 09/05/22 1433     Extra Tube Hold for add-ons.     Comment: Auto resulted.       COVID PRE-OP / PRE-PROCEDURE SCREENING ORDER (NO ISOLATION) - Swab, Nasopharynx [[id number]]  (Abnormal) Collected: 09/05/22 1316    Specimen: Swab from Nasopharynx Updated: 09/05/22 1400    Narrative:      The following orders were created for panel order COVID PRE-OP / PRE-PROCEDURE SCREENING ORDER (NO ISOLATION) - Swab, Nasopharynx.  Procedure                               Abnormality         Status                     ---------                               -----------         ------                     COVID-19 and FLU A/B PCR...[[id number]]  Abnormal            Final result                 Please view results for these tests on the individual orders.    COVID-19 and FLU A/B PCR - Swab, Nasopharynx [[id number]]  (Abnormal) Collected: 09/05/22 1316    Specimen: Swab from Nasopharynx Updated: 09/05/22 1400     COVID19 Detected     Influenza A PCR Not Detected     Influenza B PCR Not Detected    Narrative:      Fact sheet for providers: https://www.fda.gov/media/946430/download    Fact sheet for patients: https://www.fda.gov/media/592424/download    Test performed by PCR.  Influenza A and Influenza B negative results should be considered presumptive in samples that have a positive SARS-CoV-2 result.    Competitive inhibition studies showed that SARS-CoV-2 virus, when present at concentrations above 3.6E+04 copies/mL,  can inhibit the detection and amplification of influenza A and influenza B virus RNA if present at or below 1.8E+02 copies/mL or 4.9E+02 copies/mL, respectively, and may lead to false negative influenza virus results. If co-infection with influenza A or influenza B virus is suspected in samples with a positive SARS-CoV-2 result, the sample should be re-tested with another FDA cleared, approved, or authorized influenza test, if influenza virus detection would change clinical management.    Comprehensive Metabolic Panel [[id number]]  (Abnormal) Collected: 09/05/22 1315    Specimen: Blood Updated: 09/05/22 1348     Glucose 115 mg/dL      BUN 16 mg/dL      Creatinine 1.51 mg/dL      Sodium 137 mmol/L      Potassium 5.3 mmol/L      Chloride 101 mmol/L      CO2 24.0 mmol/L      Calcium 10.2 mg/dL      Total Protein 9.8 g/dL      Albumin 4.40 g/dL      ALT (SGPT) 13 U/L      AST (SGOT) 22 U/L      Alkaline Phosphatase 71 U/L      Total Bilirubin 0.7 mg/dL      Globulin 5.4 gm/dL      Comment: Calculated Result        A/G Ratio 0.8 g/dL      BUN/Creatinine Ratio 10.6     Anion Gap 12.0 mmol/L      eGFR 32.3 mL/min/1.73      Comment: National Kidney Foundation and American Society of Nephrology (ASN) Task Force recommended calculation based on the Chronic Kidney Disease Epidemiology Collaboration (CKD-EPI) equation refit without adjustment for race.       Narrative:      GFR Normal >60  Chronic Kidney Disease <60  Kidney Failure <15      Magnesium [[id number]]  (Abnormal) Collected: 09/05/22 1315    Specimen: Blood Updated: 09/05/22 1348     Magnesium 2.8 mg/dL     Troponin [[id number]]  (Normal) Collected: 09/05/22 1315    Specimen: Blood Updated: 09/05/22 1344     Troponin T <0.010 ng/mL     Narrative:      Troponin T Reference Range:  <= 0.03 ng/mL-   Negative for AMI  >0.03 ng/mL-     Abnormal for myocardial necrosis.  Clinicians would have to utilize clinical acumen, EKG, Troponin and serial changes to determine if it is  an Acute Myocardial Infarction or myocardial injury due to an underlying chronic condition.       Results may be falsely decreased if patient taking Biotin.      CBC & Differential [[id number]]  (Abnormal) Collected: 09/05/22 1315    Specimen: Blood Updated: 09/05/22 1331    Narrative:      The following orders were created for panel order CBC & Differential.  Procedure                               Abnormality         Status                     ---------                               -----------         ------                     CBC Auto Differential[[id number]]        Abnormal            Final result                 Please view results for these tests on the individual orders.    CBC Auto Differential [[id number]]  (Abnormal) Collected: 09/05/22 1315    Specimen: Blood Updated: 09/05/22 1331     WBC 7.87 10*3/mm3      RBC 3.36 10*6/mm3      Hemoglobin 10.3 g/dL      Hematocrit 32.7 %      MCV 97.3 fL      MCH 30.7 pg      MCHC 31.5 g/dL      RDW 16.6 %      RDW-SD 59.6 fl      MPV 11.4 fL      Platelets 229 10*3/mm3      Neutrophil % 77.1 %      Lymphocyte % 9.3 %      Monocyte % 11.4 %      Eosinophil % 1.1 %      Basophil % 0.8 %      Immature Grans % 0.3 %      Neutrophils, Absolute 6.07 10*3/mm3      Lymphocytes, Absolute 0.73 10*3/mm3      Monocytes, Absolute 0.90 10*3/mm3      Eosinophils, Absolute 0.09 10*3/mm3      Basophils, Absolute 0.06 10*3/mm3      Immature Grans, Absolute 0.02 10*3/mm3      nRBC 0.0 /100 WBC           Imaging Results (Last 48 Hours)     Procedure Component Value Units Date/Time    XR Chest 1 View [[id number]] Collected: 09/05/22 1355     Updated: 09/05/22 1411    Narrative:      DATE OF EXAM: 9/5/2022 1:46 PM     PROCEDURE: XR CHEST 1 VW-     INDICATIONS: Weak/Dizzy/AMS triage protocol.      COMPARISON: Chest x-ray 8/20/2022     TECHNIQUE: Single frontal view of the chest.     FINDINGS:  Stable cardiomediastinal silhouette with redemonstration of  cardiomegaly. There is increased  diffuse interstitial prominence from  prior compatible with interstitial pulmonary edema. No definite pleural  effusion. No pneumothorax. No acute osseous findings.       Impression:      Cardiomegaly and interstitial pulmonary edema.     This report was finalized on 2022 1:56 PM by Rito Delatorre MD.           Operative/Procedure Notes (last 48 hours)  Notes from 22 1620 through 22 1620   No notes of this type exist for this encounter.          Physician Progress Notes (last 48 hours)      Jan Martins MD at 22 0805              [facility] Medicine Services  PROGRESS NOTE    Patient Name: Scott Diego  : 4/10/1930  MRN: [id number]    Date of Admission: 2022  Primary Care Physician: Ngozi Davis DO    Subjective   Subjective     CC:  SOA    HPI:  Confused. Notes SOA. Dry cough. Denies chest pain. No n/v. No f/c.    Review of Systems   Constitutional: Positive for activity change and fatigue.   HENT: Positive for congestion.    Respiratory: Positive for cough and shortness of breath. Negative for chest tightness.    Cardiovascular: Positive for leg swelling. Negative for chest pain.   Gastrointestinal: Negative.    Genitourinary: Negative.    Neurological: Negative.    Psychiatric/Behavioral: Positive for confusion.       Objective   Objective     Vital Signs:   Temp:  [96.2 °F (35.7 °C)-100.4 °F (38 °C)] 97 °F (36.1 °C)  Heart Rate:  [] 91  Resp:  [16-19] 16  BP: (115-152)/(71-91) 122/71  Flow (L/min):  [2] 2     Physical Exam:  NAD< alert, oriented to person only  OP clear, dry MM  Neck supple  No LAD  RRR  Decreased at bases, otherwise clear  +BS, ND, NT, soft  MARTÍNEZ  Normal affect  No rashes    Results Reviewed:  LAB RESULTS:      Lab 22  0510 22  1814 22  1315 22  1737   WBC 4.42  --   --  7.87 10.98*   HEMOGLOBIN 9.2*  --   --  10.3* 9.4*   HEMATOCRIT 28.2*  --   --  32.7* 29.4*   PLATELETS 204  --   --   229 216   NEUTROS ABS 3.51  --   --  6.07 8.27*   IMMATURE GRANS (ABS) 0.01  --   --  0.02 0.06*   LYMPHS ABS 0.43*  --   --  0.73 1.48   MONOS ABS 0.46  --   --  0.90 0.95*   EOS ABS 0.00  --   --  0.09 0.17   MCV 96.6  --   --  97.3* 98.0*   SED RATE 67*  --   --   --   --    CRP 3.57*  --   --  1.63*  --    PROCALCITONIN  --   --   --  0.04  --    LACTATE  --  2.0 3.4* 2.6*  --    LDH  --   --   --  180  --    PROTIME 16.9*  --   --  14.7*  --    D DIMER QUANT  --   --   --  0.47  --          Lab 09/06/22 0510 09/05/22 1814 09/05/22 1315 08/30/22  1737   SODIUM 139  --  137 139   POTASSIUM 4.8  --  5.3* 4.8   CHLORIDE 100  --  101 103   CO2 23.0  --  24.0 26.0   ANION GAP 16.0*  --  12.0 10.0   BUN 24*  --  16 18   CREATININE 1.83* 1.57* 1.51* 1.37*   EGFR 25.6* 30.8* 32.3* 36.3*   GLUCOSE 137*  --  115* 124*   CALCIUM 9.2  --  10.2* 9.4   MAGNESIUM  --   --  2.8*  --          Lab 09/06/22 0510 09/05/22 1814 09/05/22 1315 08/30/22  1737   TOTAL PROTEIN 8.1 8.8* 9.8* 8.9*   ALBUMIN 3.70 4.20 4.40 4.10   GLOBULIN 4.4  --  5.4 4.8   ALT (SGPT) 21 12 13 19   AST (SGOT) 43* 20 22 22   BILIRUBIN 1.0 0.7 0.7 0.4   INDIRECT BILIRUBIN  --  0.5  --   --    BILIRUBIN DIRECT 0.5* 0.2  --   --    ALK PHOS 92 71 71 49   LIPASE  --   --   --  32         Lab 09/06/22 0510 09/05/22  1814 09/05/22  1315   PROBNP  --  25,296.0* 13,441.0*   TROPONIN T  --   --  <0.010   PROTIME 16.9*  --  14.7*   INR 1.38*  --  1.16*                 Brief Urine Lab Results  (Last result in the past 365 days)      Color   Clarity   Blood   Leuk Est   Nitrite   Protein   CREAT   Urine HCG        09/05/22 1507 Yellow   Clear   Negative   Negative   Negative   Trace                 Microbiology Results Abnormal     None          XR Chest 1 View    Result Date: 9/5/2022  DATE OF EXAM: 9/5/2022 1:46 PM  PROCEDURE: XR CHEST 1 VW-  INDICATIONS: Weak/Dizzy/AMS triage protocol.  COMPARISON: Chest x-ray 8/20/2022  TECHNIQUE: Single frontal view of the  chest.  FINDINGS: Stable cardiomediastinal silhouette with redemonstration of cardiomegaly. There is increased diffuse interstitial prominence from prior compatible with interstitial pulmonary edema. No definite pleural effusion. No pneumothorax. No acute osseous findings.      Impression: Cardiomegaly and interstitial pulmonary edema.  This report was finalized on 9/5/2022 1:56 PM by Rito Delatorre MD.        Results for orders placed during the hospital encounter of 03/03/22    Adult Transthoracic Echo Complete W/ Cont if Necessary Per Protocol    Interpretation Summary  · Estimated left ventricular EF = 30%  · Moderate mitral valve regurgitation is present.  · There is calcification of the aortic valve.  · Estimated right ventricular systolic pressure from tricuspid regurgitation is mildly elevated (35-45 mmHg).      I have reviewed the medications:  Scheduled Meds:aspirin, 81 mg, Oral, Daily  cetirizine, 10 mg, Oral, Daily  dexamethasone, 6 mg, Intravenous, Daily  famotidine, 20 mg, Oral, BID  gabapentin, 300 mg, Oral, Nightly  heparin (porcine), 5,000 Units, Subcutaneous, Q8H  levothyroxine, 50 mcg, Oral, Q AM  memantine, 5 mg, Oral, BID  metoprolol succinate XL, 25 mg, Oral, Daily  nystatin, 5 mL, Oral, 4x Daily  remdesivir, 100 mg, Intravenous, Q24H      Continuous Infusions:Pharmacy Consult - Remdesivir,       PRN Meds:.•  acetaminophen **OR** acetaminophen  •  albuterol sulfate HFA  •  ALPRAZolam  •  dextromethorphan polistirex ER  •  ipratropium-albuterol  •  Pharmacy Consult - Remdesivir  •  sodium chloride    Assessment & Plan   Assessment & Plan     Active Hospital Problems    Diagnosis  POA   • **COVID-19 virus detected [U07.1]  Yes   • Acute hypoxemic respiratory failure due to COVID-19 (HCC) [U07.1, J96.01]  Yes   • Dyspnea due to COVID-19 [U07.1, R06.00]  Yes   • Encephalopathy due to COVID-19 virus [U07.1, G93.49]  Yes   • Acute on chronic congestive heart failure (HCC) [I50.9]  Unknown   •  Anxiety [F41.9]  Unknown   • Hypothyroid [E03.9]  Yes   • Hypoxia [R09.02]  Yes   • CARYN (acute kidney injury) (HCC) [N17.9]  Unknown   • Dementia (HCC) [F03.90]  Yes   • Hypertension [I10]  Yes      Resolved Hospital Problems   No resolved problems to display.        Brief Hospital Course to date:  Scott Diego is a 89 y.o. female here with acute hypoxic respiratory failure    COVID-19  Acute hypoxic respiratory failure  -dex/remdesivir  -92% on RA during my exam today  -in no distress    A/C HFrEF  New atrial fibrillation with RVR  Hx of prolonged QT  -s/p IV lasix  -on RA, 92% today  -no edema noted  -cardiology consulted    CARYN/suspected CKD  -s/p IV lasix  -hold and monitor    Confusion  -likely due to acute illness and isolation  -with underlying dementia    Thrush  -nystatin    Anxiety  -on xanax prn    Hypothyroidism  -on synthroid    Debility/weakness  -PT/OT    Will d/w family. Await cardiology recommendations  Expected Discharge Location and Transportation: Rehab  Expected Discharge Date: 9/9    DVT prophylaxis:  Medical DVT prophylaxis orders are present.          CODE STATUS:   Code Status and Medical Interventions:   Ordered at: 09/05/22 1603     Level Of Support Discussed With:    Next of Kin (If No Surrogate)     Code Status (Patient has no pulse and is not breathing):    No CPR (Do Not Attempt to Resuscitate)     Medical Interventions (Patient has pulse or is breathing):    Full Support       Jan Martins MD  09/06/22                Electronically signed by Jan Martins MD at 09/06/22 0809          Consult Notes (last 48 hours)      Anup Holly PA-C at 09/06/22 0945      Consult Orders    1. Inpatient Cardiology Consult [[id number]] ordered by Gia Diaz APRN at 09/05/22 1736          Attestation signed by Jose Antonio Aragon MD at 09/06/22 1607    I have reviewed this documentation and agree.                  [facility]  CARDIOLOGY  CONSULTATION NOTE    Scott Diego  : 4/10/1930  MRN:[id number]  Home Phone:[phone]    Date of Admission:2022  Date of Consultation: 22Referring Provider: Jan aMrtins MD  Primary Provider:  Ngozi Davis DO    Referring Provider: Jan Martins MD  Reason for Consultation: Afib w RVR, heart failure    IDENTIFICATION: A 89 y.o. female  2022. Owner of [employer]    CC:   Chief Complaint   Patient presents with   • Weakness - Generalized       PROBLEM LIST:     1. Nonischemic cardiomyopathy/chronic systolic congestive heart failure  1.  2D echo: LVEF = 20%.  Mild TR.  Mild MR.  Moderate AR.  Mild RA diastolic collapse from pericardial effusion.  There is a moderate 1-2 cm circumferential pericardial effusion.  2. 3/22 2D echo: LVEF = 30%.  Moderate MR.  Calcification of aortic valve.  RVSP due to TR mildly elevated (35-45)  2. History of CVA  1.  14-day Holter monitor: Min 57, AVG 84, max 240.  SVE 2.3%.  VE 15.3%.  39 runs of VT - fastest 18 beats at 240 bpm; longest 17 beats at 131 BPM.  1 SVT episode - 8 beats at 213 bpm.  3. CAD  1.  coronary calcifications appreciated on CT chest.  4. Acute Systolic Heart Failure  5. COVID19 positive admission 2022  6. Hypertension   7. hyperlipidemia  8. Orthostasis  9. CKD stage III  1.  CR 2.01  2.  CR 1.55  10. Hypothyroidism  11. Dementia  12. Appetite/weight loss  1. 3/22 [facility] admission for poor p.o. intake, CARYN, hyperkalemia, and chronic systolic HF.  13. Surgical  1. CCX  2. Eye surgery  3. Hysterectomy      ALLERGIES:   Allergies   Allergen Reactions   • Augmentin [Amoxicillin-Pot Clavulanate] Nausea And Vomiting     Makes her sick at her stomach   • Claritin [Loratadine] Unknown (See Comments)     Doesn't remember   • Keflex [Cephalexin] Nausea And Vomiting     Makes pt sick at her stomach   • Sulfa Antibiotics Other (See Comments)     Does not remember       HPI: Patient has mild dementia baseline and  can answer some questions appropriately. She is alerted to person place time and situation so most of history via chart review.    Ms. Diego is a 89-year-old female with above cardiac history who we are seeing in consultation for acute heart failure and new onset A. fib with RVR.  Her  passed in January of this year and patient's son reports that she is experiencing a progressive decline in mental status and general health since that time.  She presented to the ER yesterday for 1 month history of nausea and headache.  She was found to be COVID-positive and in A. fib with RVR when arriving on the floor from around [id number] on 9/5/22.  25 of metoprolol tartrate was given and she converted to sinus rhythm and has remained in sinus sinceChest x-ray revealed pulmonary edema and cardiomegaly.  Troponin negative x2, proBNP 25,000, and creatinine 1.5.  No EKG data.  She received 40 mg of Lasix yesterday afternoon but has received no further diuresis.    At the time of my visit, patient was in sinus rhythm with frequent PACs and rate 88.  Patient complains of mild dyspnea, headache, nausea and fatigue but denies chest pain, palpitations, or presyncope.           ROS: All systems have been reviewed and are negative with the exception of those mentioned in the HPI and problem list above.    HOME MEDICINES:   Current Outpatient Medications   Medication Instructions   • acetaminophen (TYLENOL) 650 mg, Oral, Every 4 Hours PRN   • ALPRAZolam (XANAX) 0.5 mg, Oral, Every 6 Hours PRN   • Artificial Tear Ointment (artificial tears) ophthalmic ointment 1 application, Both Eyes, Daily   • atorvastatin (LIPITOR) 80 mg, Oral, Nightly   • bisacodyl (DULCOLAX) 10 mg, Rectal, Daily PRN   • carboxymethylcellulose (REFRESH PLUS) 0.5 % solution 3 Times Daily PRN   • famotidine (PEPCID) 20 mg, Oral, 2 Times Daily   • fexofenadine (ALLEGRA) 60 mg, Oral, Daily   • fexofenadine (ALLEGRA) 180 mg, Oral, Daily   • fluconazole (DIFLUCAN) 100  MG tablet TAKE 1 TABLET BY MOUTH EVERY DAY FOR 10 DOSES   • furosemide (LASIX) 20 mg, Oral, Daily PRN   • gabapentin (NEURONTIN) 300 mg, Oral, Nightly   • Glycerin-Polysorbate 80 (REFRESH DRY EYE THERAPY OP) Ophthalmic, As Needed   • haloperidol (HALDOL) 1 mg, Oral, Every 6 Hours PRN   • levothyroxine (SYNTHROID, LEVOTHROID) 50 mcg, Oral, Every Early Morning   • Lidocaine Viscous HCl (XYLOCAINE) 2 % solution No dose, route, or frequency recorded.   • memantine (NAMENDA) 5 mg, Oral, 2 Times Daily   • metoprolol succinate XL (TOPROL-XL) 25 mg, Oral, Daily   • Nurtec 75 mg, Oral, Every Other Day   • nystatin (MYCOSTATIN) 100,000 unit/mL suspension Swish and swallow 5mLs by mouth 4 times daily   • ofloxacin (OCUFLOX) 0.3 % ophthalmic solution 1 drop, Both Eyes, 3 Times Daily, For 10 days, started 03-01-22   • ondansetron ODT (ZOFRAN-ODT) 4 mg, Translingual, Every 4 Hours PRN   • ondansetron ODT (ZOFRAN-ODT) 4 mg, Translingual, Every 6 Hours PRN   • predniSONE (DELTASONE) 10 MG tablet Take 2 tablets po qd x 3d, then 1 tab qd x 3d, then 0.5 tab qd x 3d   • predniSONE (DELTASONE) 5 MG tablet TAKE 1 TABLET BY MOUTH EVERY DAY FOR 10 DAYS   • SM Aspirin Adult Low Strength 81 mg, Oral, Daily       Surgical History:   Past Surgical History:   Procedure Laterality Date   • CARDIAC CATHETERIZATION     • CHOLECYSTECTOMY     • EYE SURGERY     • HYSTERECTOMY         Social History:   Social History     Socioeconomic History   • Marital status:    Tobacco Use   • Smoking status: Never Smoker   • Smokeless tobacco: Never Used   Vaping Use   • Vaping Use: Never used   Substance and Sexual Activity   • Alcohol use: Never   • Drug use: Never   • Sexual activity: Defer     Comment:  since 1/2022       Family History:   Family History   Problem Relation Age of Onset   • Cancer Mother    • No Known Problems Father    • Cancer Sister    • Cancer Brother    • Parkinsonism Brother    • No Known Problems Son        Objective  "    /71 (BP Location: Left arm, Patient Position: Lying)   Pulse 91   Temp 97 °F (36.1 °C) (Oral)   Resp 16   Ht 162.6 cm (64\")   Wt 57.7 kg (127 lb 3.2 oz)   SpO2 95%   BMI 21.83 kg/m²     Intake/Output Summary (Last 24 hours) at 9/6/2022 0945  Last data filed at 9/6/2022 0554  Gross per 24 hour   Intake 324.1 ml   Output 800 ml   Net -475.9 ml       PHYSICAL EXAM:  CONSTITUTIONAL: Well nourished, cooperative, in no acute distress  HEENT: Normocephalic, atraumatic, no JVD  CARDIOVASCULAR:  Regular rhythm and normal rate, no murmur, gallop, rub.   RESPIRATORY: normal respiratory effort, no wheezing, rales or rhonchi. Diminished basilar lung sounds bilaterally  EXTREMITIES: No gross deformities, no edema. Peripheral pulses are present and equal bilaterally. Radial 2+, DP 1+  NEUROLOGICAL: No gross focal deficits, a&o x1  PSYCHIATRIC: Normal mood and affect. Behavior is normal     Labs/Diagnostic Data  Results from last 7 days   Lab Units 09/06/22  0510 09/05/22  1814 09/05/22  1315 08/30/22  1737   SODIUM mmol/L 139  --  137 139   POTASSIUM mmol/L 4.8  --  5.3* 4.8   CHLORIDE mmol/L 100  --  101 103   CO2 mmol/L 23.0  --  24.0 26.0   BUN mg/dL 24*  --  16 18   CREATININE mg/dL 1.83* 1.57* 1.51* 1.37*   GLUCOSE mg/dL 137*  --  115* 124*   CALCIUM mg/dL 9.2  --  10.2* 9.4     Results from last 7 days   Lab Units 09/05/22  1315   TROPONIN T ng/mL <0.010     Results from last 7 days   Lab Units 09/06/22  0510 09/05/22  1315 08/30/22  1737   WBC 10*3/mm3 4.42 7.87 10.98*   HEMOGLOBIN g/dL 9.2* 10.3* 9.4*   HEMATOCRIT % 28.2* 32.7* 29.4*   PLATELETS 10*3/mm3 204 229 216     Results from last 7 days   Lab Units 09/05/22  1315   MAGNESIUM mg/dL 2.8*                 Results from last 7 days   Lab Units 09/05/22  1814   PROBNP pg/mL 25,296.0*     Results from last 7 days   Lab Units 09/06/22  0510 09/05/22  1315   PROTIME Seconds 16.9* 14.7*   INR  1.38* 1.16*       Tele: Sinus Rhythm with PVCs    Radiology " Data:  CXR 9/5/2022:  IMPRESSION:  Cardiomegaly and interstitial pulmonary edema.      Current Medications:    aspirin, 81 mg, Oral, Daily  cetirizine, 10 mg, Oral, Daily  dexamethasone, 6 mg, Intravenous, Daily  famotidine, 20 mg, Oral, BID  gabapentin, 300 mg, Oral, Nightly  heparin (porcine), 5,000 Units, Subcutaneous, Q8H  levothyroxine, 50 mcg, Oral, Q AM  memantine, 5 mg, Oral, BID  metoprolol succinate XL, 25 mg, Oral, Daily  nystatin, 5 mL, Oral, 4x Daily  remdesivir, 100 mg, Intravenous, Q24H      Pharmacy Consult - Remdesivir,         Assessment and Plan:   Assessment  1. Acute Hypoxic Respiratory Failure  2. New Afib w RVR  3. Nonischemic Cardiomyopathy with Acute on Chronic Systolic Heart Failure  a. Pending echo  4. COVID19 Infection  5. Hypertension  6. Hyperlipidemia  7. CKD          Plan   1. Patient has been in sinus rhythm since around 1800 yesterday. Converted after extra dose of metoprolol tartrate. Continue to monitor on home metoprolol succinate 25 mg daily.  2. Echo and EKG pending. Troponin negative x2.  No indication for further ischemic evaluation. Patient has history of nonischemic cardiomyopathy. Denies current angina. Acute COVID19 illness is more likely to be precipitating factor.   3. Agree with holding further diuretics with creatinine of 1.8 and without appearing volume overloaded on exam. Continue to monitor volume status closely.  4. Continue lipitor for HLD.    Thank you for requesting our consultative services and allowing us to take part in this patient's care. We will continue to follow.      Scribed by Anup Holly PA-C for Dr. Jose Antonio Aragon MD on 09/06/22    Electronically signed by Jose Antonio Aragon MD at 09/06/22 9940

## 2022-09-06 NOTE — THERAPY EVALUATION
Acute Care - Speech Language Pathology   Swallow Initial Evaluation [facility]   Clinical Swallow Evaluation     Patient Name: Scott Diego  : 4/10/1930  MRN: [id number]  Today's Date: 2022               Admit Date: 2022    Visit Dx:     ICD-10-CM ICD-9-CM   1. Hypoxia  R09.02 799.02   2. COVID-19  U07.1 079.89   3. Acute on chronic congestive heart failure, unspecified heart failure type (HCC)  I50.9 428.0   4. Chronic renal impairment, unspecified CKD stage  N18.9 585.9   5. Altered mental status, unspecified altered mental status type  R41.82 780.97     Patient Active Problem List   Diagnosis   • Idiopathic peripheral neuropathy   • Neck pain   • Mild cognitive impairment   • Anemia   • Hypertension   • Dehydration   • History of CVA (cerebrovascular accident)   • Dementia (HCC)   • Left bundle branch block   • Orthostatic dizziness   • Chronic systolic heart failure (HCC)   • Normocytic anemia   • CARYN (acute kidney injury) (HCC)   • Dyspnea   • Hypoxia   • Hypothyroid   • Pericardial effusion   • Acute respiratory failure with hypoxia (HCC)   • Right lower lobe pneumonia   • Ventricular ectopy   • Migraine without aura and without status migrainosus, not intractable   • Acute hypoxemic respiratory failure due to COVID-19 (HCC)   • Dyspnea due to COVID-19   • COVID-19 virus detected   • Encephalopathy due to COVID-19 virus   • Acute on chronic congestive heart failure (HCC)   • Anxiety     Past Medical History:   Diagnosis Date   • Anxiety    • Congestive heart failure (HCC)    • Coronary artery disease    • Dementia (HCC)    • Depression    • Hyperlipidemia    • Malignant neoplasm (HCC)    • Memory loss    • Peripheral neuropathy    • Stroke (HCC)     mini stroke   • Systolic heart failure (HCC) 2017    Chronic/compensated (EF 25%)     Past Surgical History:   Procedure Laterality Date   • CARDIAC CATHETERIZATION     • CHOLECYSTECTOMY     • EYE SURGERY     • HYSTERECTOMY         SLP  Recommendation and Plan  SLP Swallowing Diagnosis: functional oral phase, other (see comments) (no suspected pharyngeal impairment) (09/06/22 1350)  SLP Diet Recommendation: soft textures, ground, thin liquids (09/06/22 1350)     SLP Rec. for Method of Medication Administration: meds whole, with pudding or applesauce, as tolerated (09/06/22 1350)           Swallow Criteria for Skilled Therapeutic Interventions Met: no problems identified which require skilled intervention (09/06/22 1350)  Anticipated Discharge Disposition (SLP): unknown (09/06/22 1350)  Rehab Potential/Prognosis, Swallowing: good, to achieve stated therapy goals (09/06/22 1350)  Therapy Frequency (Swallow): evaluation only (09/06/22 1350)  Predicted Duration Therapy Intervention (Days): until discharge (09/06/22 1350)                                  Plan of Care Reviewed With: patient  Progress: no change (eval)      SWALLOW EVALUATION (last 72 hours)     SLP Adult Swallow Evaluation     Row Name 09/06/22 1350                   Rehab Evaluation    Document Type evaluation  -EN        Subjective Information no complaints  -EN        Patient Observations alert;cooperative  -EN        Patient/Family/Caregiver Comments/Observations no family present; covid positive  -EN        Patient Effort good  -EN        Symptoms Noted During/After Treatment none  -EN                  General Information    Patient Profile Reviewed yes  -EN        Pertinent History Of Current Problem Pt adm w/ Covid. Hx of CHF, dementia, HTN, hypothyroidism, CKD, on nocturnal TFs at home. Multiple recent hospitalizations this year.  -EN        Current Method of Nutrition NPO  -EN        Precautions/Limitations, Vision WFL;for purposes of eval  -EN        Precautions/Limitations, Hearing WFL;for purposes of eval  -EN        Prior Level of Function-Communication cognitive-linguistic impairment;other (see comments)  dementia per chart  -EN        Prior Level of Function-Swallowing  safe, efficient swallowing in all situations  -EN        Plans/Goals Discussed with patient  -EN        Barriers to Rehab cognitive status  -EN        Patient's Goals for Discharge patient could not state  -EN                  Pain    Additional Documentation Pain Scale: FACES Pre/Post-Treatment (Group)  -EN                  Pain Scale: FACES Pre/Post-Treatment    Pain: FACES Scale, Pretreatment 0-->no hurt  -EN        Posttreatment Pain Rating 0-->no hurt  -EN                  Oral Motor Structure and Function    Dentition Assessment edentulous, dentures not available  -EN        Secretion Management WNL/WFL  -EN        Mucosal Quality moist, healthy  -EN                  Oral Musculature and Cranial Nerve Assessment    Oral Motor General Assessment WFL;unable to assess  -EN        Oral Motor, Comment Difficult to assess all areas d/t cognitive status; overall seemingly WFL.  -EN                  General Eating/Swallowing Observations    Respiratory Support Currently in Use nasal cannula  -EN        Eating/Swallowing Skills fed by SLP  -EN        Positioning During Eating upright in chair  -EN        Utensils Used spoon;cup;straw  -EN        Consistencies Trialed soft textures;ground;pureed;thin liquids  -EN                  Clinical Swallow Eval    Oral Prep Phase WFL  -EN        Oral Transit WFL  -EN        Oral Residue WFL  -EN        Pharyngeal Phase no overt signs/symptoms of pharyngeal impairment  -EN        Clinical Swallow Evaluation Summary Pt w/o dentures available for evaluation and unable to confirm baseline diet d/t cognitive status inhibiting pt participation in conversation. There were no s/s of aspiration during all trials administered. Did not trial regular solid d/t dentition status. Pt w/ functional mastication w/ soft/ground solids. Rec: soft/ground and thin liquids. Meds whole in pureed. May adjust diet texture as appropriate. Will sign off at this time. Should concerns arise, please  re-consult.  -EN                  SLP Evaluation Clinical Impression    SLP Swallowing Diagnosis functional oral phase;other (see comments)  no suspected pharyngeal impairment  -EN        Functional Impact no impact on function  -EN        Rehab Potential/Prognosis, Swallowing good, to achieve stated therapy goals  -EN        Swallow Criteria for Skilled Therapeutic Interventions Met no problems identified which require skilled intervention  -EN                  Recommendations    Therapy Frequency (Swallow) evaluation only  -EN        Predicted Duration Therapy Intervention (Days) until discharge  -EN        SLP Diet Recommendation soft textures;ground;thin liquids  -EN        Oral Care Recommendations Oral Care BID/PRN  -EN        SLP Rec. for Method of Medication Administration meds whole;with pudding or applesauce;as tolerated  -EN        Anticipated Discharge Disposition (SLP) unknown  -EN              User Key  (r) = Recorded By, (t) = Taken By, (c) = Cosigned By    Initials Name Effective Dates    EN Marni Kaur MS CCC-SLP 06/22/22 -                 EDUCATION  The patient has been educated in the following areas:   Dysphagia (Swallowing Impairment).              Time Calculation:    Time Calculation- SLP     Row Name 09/06/22 1420             Time Calculation- SLP    SLP Start Time 1350  -EN      SLP Received On 09/06/22  -EN              Untimed Charges    SLP Eval/Re-eval  ST Eval Oral Pharyng Swallow - [id number]  -EN      [id number] Eval Oral Pharyng Swallow Minutes 45  -EN              Total Minutes    Untimed Charges Total Minutes 45  -EN       Total Minutes 45  -EN            User Key  (r) = Recorded By, (t) = Taken By, (c) = Cosigned By    Initials Name Provider Type    EN Marni Kaur MS CCC-SLP Speech and Language Pathologist                Therapy Charges for Today     Code Description Service Date Service Provider Modifiers Qty    [id number] HC ST EVAL ORAL PHARYNG SWALLOW 3 9/6/2022 Vincent  Marni CHONG, MS CCC-SLP GN 1               Marni Kaur, MS HOFFMANN-SLP  9/6/2022

## 2022-09-06 NOTE — PROGRESS NOTES
[facility] Medicine Services  PROGRESS NOTE    Patient Name: Scott Diego  : 4/10/1930  MRN: [id number]    Date of Admission: 2022  Primary Care Physician: Ngozi Davis DO    Subjective   Subjective     CC:  SOA    HPI:  Confused. Notes SOA. Dry cough. Denies chest pain. No n/v. No f/c.    Review of Systems   Constitutional: Positive for activity change and fatigue.   HENT: Positive for congestion.    Respiratory: Positive for cough and shortness of breath. Negative for chest tightness.    Cardiovascular: Positive for leg swelling. Negative for chest pain.   Gastrointestinal: Negative.    Genitourinary: Negative.    Neurological: Negative.    Psychiatric/Behavioral: Positive for confusion.       Objective   Objective     Vital Signs:   Temp:  [96.2 °F (35.7 °C)-100.4 °F (38 °C)] 97 °F (36.1 °C)  Heart Rate:  [] 91  Resp:  [16-19] 16  BP: (115-152)/(71-91) 122/71  Flow (L/min):  [2] 2     Physical Exam:  NAD< alert, oriented to person only  OP clear, dry MM  Neck supple  No LAD  RRR  Decreased at bases, otherwise clear  +BS, ND, NT, soft  MARTÍNEZ  Normal affect  No rashes    Results Reviewed:  LAB RESULTS:      Lab 22  0510 227 22  1814 22  1315 22  1737   WBC 4.42  --   --  7.87 10.98*   HEMOGLOBIN 9.2*  --   --  10.3* 9.4*   HEMATOCRIT 28.2*  --   --  32.7* 29.4*   PLATELETS 204  --   --  229 216   NEUTROS ABS 3.51  --   --  6.07 8.27*   IMMATURE GRANS (ABS) 0.01  --   --  0.02 0.06*   LYMPHS ABS 0.43*  --   --  0.73 1.48   MONOS ABS 0.46  --   --  0.90 0.95*   EOS ABS 0.00  --   --  0.09 0.17   MCV 96.6  --   --  97.3* 98.0*   SED RATE 67*  --   --   --   --    CRP 3.57*  --   --  1.63*  --    PROCALCITONIN  --   --   --  0.04  --    LACTATE  --  2.0 3.4* 2.6*  --    LDH  --   --   --  180  --    PROTIME 16.9*  --   --  14.7*  --    D DIMER QUANT  --   --   --  0.47  --          Lab 22  0510 22  1814 22  8995  08/30/22  1737   SODIUM 139  --  137 139   POTASSIUM 4.8  --  5.3* 4.8   CHLORIDE 100  --  101 103   CO2 23.0  --  24.0 26.0   ANION GAP 16.0*  --  12.0 10.0   BUN 24*  --  16 18   CREATININE 1.83* 1.57* 1.51* 1.37*   EGFR 25.6* 30.8* 32.3* 36.3*   GLUCOSE 137*  --  115* 124*   CALCIUM 9.2  --  10.2* 9.4   MAGNESIUM  --   --  2.8*  --          Lab 09/06/22  0510 09/05/22  1814 09/05/22  1315 08/30/22  1737   TOTAL PROTEIN 8.1 8.8* 9.8* 8.9*   ALBUMIN 3.70 4.20 4.40 4.10   GLOBULIN 4.4  --  5.4 4.8   ALT (SGPT) 21 12 13 19   AST (SGOT) 43* 20 22 22   BILIRUBIN 1.0 0.7 0.7 0.4   INDIRECT BILIRUBIN  --  0.5  --   --    BILIRUBIN DIRECT 0.5* 0.2  --   --    ALK PHOS 92 71 71 49   LIPASE  --   --   --  32         Lab 09/06/22  0510 09/05/22  1814 09/05/22  1315   PROBNP  --  25,296.0* 13,441.0*   TROPONIN T  --   --  <0.010   PROTIME 16.9*  --  14.7*   INR 1.38*  --  1.16*                 Brief Urine Lab Results  (Last result in the past 365 days)      Color   Clarity   Blood   Leuk Est   Nitrite   Protein   CREAT   Urine HCG        09/05/22 1507 Yellow   Clear   Negative   Negative   Negative   Trace                 Microbiology Results Abnormal     None          XR Chest 1 View    Result Date: 9/5/2022  DATE OF EXAM: 9/5/2022 1:46 PM  PROCEDURE: XR CHEST 1 VW-  INDICATIONS: Weak/Dizzy/AMS triage protocol.  COMPARISON: Chest x-ray 8/20/2022  TECHNIQUE: Single frontal view of the chest.  FINDINGS: Stable cardiomediastinal silhouette with redemonstration of cardiomegaly. There is increased diffuse interstitial prominence from prior compatible with interstitial pulmonary edema. No definite pleural effusion. No pneumothorax. No acute osseous findings.      Impression: Cardiomegaly and interstitial pulmonary edema.  This report was finalized on 9/5/2022 1:56 PM by Rito Delatorre MD.        Results for orders placed during the hospital encounter of 03/03/22    Adult Transthoracic Echo Complete W/ Cont if Necessary Per  Protocol    Interpretation Summary  · Estimated left ventricular EF = 30%  · Moderate mitral valve regurgitation is present.  · There is calcification of the aortic valve.  · Estimated right ventricular systolic pressure from tricuspid regurgitation is mildly elevated (35-45 mmHg).      I have reviewed the medications:  Scheduled Meds:aspirin, 81 mg, Oral, Daily  cetirizine, 10 mg, Oral, Daily  dexamethasone, 6 mg, Intravenous, Daily  famotidine, 20 mg, Oral, BID  gabapentin, 300 mg, Oral, Nightly  heparin (porcine), 5,000 Units, Subcutaneous, Q8H  levothyroxine, 50 mcg, Oral, Q AM  memantine, 5 mg, Oral, BID  metoprolol succinate XL, 25 mg, Oral, Daily  nystatin, 5 mL, Oral, 4x Daily  remdesivir, 100 mg, Intravenous, Q24H      Continuous Infusions:Pharmacy Consult - Remdesivir,       PRN Meds:.•  acetaminophen **OR** acetaminophen  •  albuterol sulfate HFA  •  ALPRAZolam  •  dextromethorphan polistirex ER  •  ipratropium-albuterol  •  Pharmacy Consult - Remdesivir  •  sodium chloride    Assessment & Plan   Assessment & Plan     Active Hospital Problems    Diagnosis  POA   • **COVID-19 virus detected [U07.1]  Yes   • Acute hypoxemic respiratory failure due to COVID-19 (HCC) [U07.1, J96.01]  Yes   • Dyspnea due to COVID-19 [U07.1, R06.00]  Yes   • Encephalopathy due to COVID-19 virus [U07.1, G93.49]  Yes   • Acute on chronic congestive heart failure (HCC) [I50.9]  Unknown   • Anxiety [F41.9]  Unknown   • Hypothyroid [E03.9]  Yes   • Hypoxia [R09.02]  Yes   • CARYN (acute kidney injury) (HCC) [N17.9]  Unknown   • Dementia (HCC) [F03.90]  Yes   • Hypertension [I10]  Yes      Resolved Hospital Problems   No resolved problems to display.        Brief Hospital Course to date:  Scott Diego is a 89 y.o. female here with acute hypoxic respiratory failure    COVID-19  Acute hypoxic respiratory failure  -dex/remdesivir  -92% on RA during my exam today  -in no distress    A/C HFrEF  New atrial fibrillation with RVR  Hx of  prolonged QT  -s/p IV lasix  -on RA, 92% today  -no edema noted  -cardiology consulted    CARYN/suspected CKD  -s/p IV lasix  -hold and monitor    Confusion  -likely due to acute illness and isolation  -with underlying dementia    Thrush  -nystatin    Anxiety  -on xanax prn    Hypothyroidism  -on synthroid    Debility/weakness  -PT/OT    Will d/w family. Await cardiology recommendations  Expected Discharge Location and Transportation: Rehab  Expected Discharge Date: 9/9    DVT prophylaxis:  Medical DVT prophylaxis orders are present.          CODE STATUS:   Code Status and Medical Interventions:   Ordered at: 09/05/22 1603     Level Of Support Discussed With:    Next of Kin (If No Surrogate)     Code Status (Patient has no pulse and is not breathing):    No CPR (Do Not Attempt to Resuscitate)     Medical Interventions (Patient has pulse or is breathing):    Full Support       Jan Martins MD  09/06/22

## 2022-09-06 NOTE — PLAN OF CARE
Goal Outcome Evaluation:  Plan of Care Reviewed With: patient        Progress: no change (eval)   SLP evaluation completed. Will sign-off dysphagia. Please see note for further details and recommendations.

## 2022-09-07 LAB
ALBUMIN SERPL-MCNC: 3.4 G/DL (ref 3.5–5.2)
ALBUMIN/GLOB SERPL: 0.8 G/DL
ALP SERPL-CCNC: 76 U/L (ref 39–117)
ALT SERPL W P-5'-P-CCNC: 21 U/L (ref 1–33)
ANION GAP SERPL CALCULATED.3IONS-SCNC: 12 MMOL/L (ref 5–15)
AST SERPL-CCNC: 43 U/L (ref 1–32)
BASOPHILS # BLD AUTO: 0 10*3/MM3 (ref 0–0.2)
BASOPHILS NFR BLD AUTO: 0 % (ref 0–1.5)
BILIRUB CONJ SERPL-MCNC: 0.2 MG/DL (ref 0–0.3)
BILIRUB SERPL-MCNC: 0.6 MG/DL (ref 0–1.2)
BUN SERPL-MCNC: 46 MG/DL (ref 8–23)
BUN/CREAT SERPL: 23.2 (ref 7–25)
CALCIUM SPEC-SCNC: 8.6 MG/DL (ref 8.2–9.6)
CHLORIDE SERPL-SCNC: 97 MMOL/L (ref 98–107)
CO2 SERPL-SCNC: 22 MMOL/L (ref 22–29)
CREAT SERPL-MCNC: 1.98 MG/DL (ref 0.57–1)
CRP SERPL-MCNC: 3.02 MG/DL (ref 0–0.5)
DEPRECATED RDW RBC AUTO: 57.8 FL (ref 37–54)
EGFRCR SERPLBLD CKD-EPI 2021: 23.3 ML/MIN/1.73
EOSINOPHIL # BLD AUTO: 0 10*3/MM3 (ref 0–0.4)
EOSINOPHIL NFR BLD AUTO: 0 % (ref 0.3–6.2)
ERYTHROCYTE [DISTWIDTH] IN BLOOD BY AUTOMATED COUNT: 16.6 % (ref 12.3–15.4)
ERYTHROCYTE [SEDIMENTATION RATE] IN BLOOD: 57 MM/HR (ref 0–30)
GLOBULIN UR ELPH-MCNC: 4.2 GM/DL
GLUCOSE SERPL-MCNC: 104 MG/DL (ref 65–99)
HCT VFR BLD AUTO: 26.3 % (ref 34–46.6)
HGB BLD-MCNC: 8.6 G/DL (ref 12–15.9)
IMM GRANULOCYTES # BLD AUTO: 0.03 10*3/MM3 (ref 0–0.05)
IMM GRANULOCYTES NFR BLD AUTO: 0.4 % (ref 0–0.5)
LYMPHOCYTES # BLD AUTO: 0.52 10*3/MM3 (ref 0.7–3.1)
LYMPHOCYTES NFR BLD AUTO: 6.8 % (ref 19.6–45.3)
MCH RBC QN AUTO: 31.3 PG (ref 26.6–33)
MCHC RBC AUTO-ENTMCNC: 32.7 G/DL (ref 31.5–35.7)
MCV RBC AUTO: 95.6 FL (ref 79–97)
MONOCYTES # BLD AUTO: 1.36 10*3/MM3 (ref 0.1–0.9)
MONOCYTES NFR BLD AUTO: 17.8 % (ref 5–12)
NEUTROPHILS NFR BLD AUTO: 5.72 10*3/MM3 (ref 1.7–7)
NEUTROPHILS NFR BLD AUTO: 75 % (ref 42.7–76)
NRBC BLD AUTO-RTO: 0 /100 WBC (ref 0–0.2)
PLATELET # BLD AUTO: 190 10*3/MM3 (ref 140–450)
PMV BLD AUTO: 11.9 FL (ref 6–12)
POTASSIUM SERPL-SCNC: 5.1 MMOL/L (ref 3.5–5.2)
PROT SERPL-MCNC: 7.6 G/DL (ref 6–8.5)
RBC # BLD AUTO: 2.75 10*6/MM3 (ref 3.77–5.28)
SODIUM SERPL-SCNC: 131 MMOL/L (ref 136–145)
WBC NRBC COR # BLD: 7.63 10*3/MM3 (ref 3.4–10.8)

## 2022-09-07 PROCEDURE — 82248 BILIRUBIN DIRECT: CPT | Performed by: NURSE PRACTITIONER

## 2022-09-07 PROCEDURE — 25010000002 REMDESIVIR 100 MG/20ML SOLUTION 1 EACH VIAL: Performed by: NURSE PRACTITIONER

## 2022-09-07 PROCEDURE — 99221 1ST HOSP IP/OBS SF/LOW 40: CPT | Performed by: PHYSICIAN ASSISTANT

## 2022-09-07 PROCEDURE — 85652 RBC SED RATE AUTOMATED: CPT | Performed by: NURSE PRACTITIONER

## 2022-09-07 PROCEDURE — 25010000002 HEPARIN (PORCINE) PER 1000 UNITS: Performed by: HOSPITALIST

## 2022-09-07 PROCEDURE — 25010000002 HEPARIN (PORCINE) PER 1000 UNITS: Performed by: NURSE PRACTITIONER

## 2022-09-07 PROCEDURE — 85025 COMPLETE CBC W/AUTO DIFF WBC: CPT | Performed by: NURSE PRACTITIONER

## 2022-09-07 PROCEDURE — 99232 SBSQ HOSP IP/OBS MODERATE 35: CPT | Performed by: HOSPITALIST

## 2022-09-07 PROCEDURE — 63710000001 DEXAMETHASONE PER 0.25 MG: Performed by: HOSPITALIST

## 2022-09-07 PROCEDURE — 86140 C-REACTIVE PROTEIN: CPT | Performed by: NURSE PRACTITIONER

## 2022-09-07 PROCEDURE — 80053 COMPREHEN METABOLIC PANEL: CPT | Performed by: NURSE PRACTITIONER

## 2022-09-07 RX ORDER — SODIUM CHLORIDE 9 MG/ML
75 INJECTION, SOLUTION INTRAVENOUS CONTINUOUS
Status: DISCONTINUED | OUTPATIENT
Start: 2022-09-07 | End: 2022-09-08

## 2022-09-07 RX ORDER — PANTOPRAZOLE SODIUM 40 MG/1
40 TABLET, DELAYED RELEASE ORAL
Status: DISCONTINUED | OUTPATIENT
Start: 2022-09-08 | End: 2022-09-10 | Stop reason: HOSPADM

## 2022-09-07 RX ORDER — FAMOTIDINE 20 MG/1
20 TABLET, FILM COATED ORAL DAILY
Status: DISCONTINUED | OUTPATIENT
Start: 2022-09-08 | End: 2022-09-07

## 2022-09-07 RX ORDER — POLYETHYLENE GLYCOL 3350 17 G/17G
17 POWDER, FOR SOLUTION ORAL DAILY
Status: DISCONTINUED | OUTPATIENT
Start: 2022-09-08 | End: 2022-09-10 | Stop reason: HOSPADM

## 2022-09-07 RX ORDER — GABAPENTIN 300 MG/1
300 CAPSULE ORAL NIGHTLY
Status: DISCONTINUED | OUTPATIENT
Start: 2022-09-07 | End: 2022-09-07 | Stop reason: SDUPTHER

## 2022-09-07 RX ADMIN — HEPARIN SODIUM 5000 UNITS: 5000 INJECTION INTRAVENOUS; SUBCUTANEOUS at 20:11

## 2022-09-07 RX ADMIN — Medication 10 ML: at 20:11

## 2022-09-07 RX ADMIN — CETIRIZINE HYDROCHLORIDE 10 MG: 10 TABLET, FILM COATED ORAL at 09:25

## 2022-09-07 RX ADMIN — FAMOTIDINE 20 MG: 20 TABLET ORAL at 09:25

## 2022-09-07 RX ADMIN — GABAPENTIN 300 MG: 300 CAPSULE ORAL at 20:10

## 2022-09-07 RX ADMIN — MEMANTINE 5 MG: 10 TABLET ORAL at 20:11

## 2022-09-07 RX ADMIN — NYSTATIN 500000 UNITS: 100000 SUSPENSION ORAL at 17:48

## 2022-09-07 RX ADMIN — ALPRAZOLAM 0.25 MG: 0.25 TABLET ORAL at 18:27

## 2022-09-07 RX ADMIN — Medication 10 ML: at 09:26

## 2022-09-07 RX ADMIN — METOPROLOL SUCCINATE 25 MG: 25 TABLET, EXTENDED RELEASE ORAL at 09:26

## 2022-09-07 RX ADMIN — MEMANTINE 5 MG: 10 TABLET ORAL at 09:25

## 2022-09-07 RX ADMIN — HEPARIN SODIUM 5000 UNITS: 5000 INJECTION INTRAVENOUS; SUBCUTANEOUS at 13:24

## 2022-09-07 RX ADMIN — REMDESIVIR 100 MG: 100 INJECTION, POWDER, LYOPHILIZED, FOR SOLUTION INTRAVENOUS at 13:25

## 2022-09-07 RX ADMIN — HEPARIN SODIUM 5000 UNITS: 5000 INJECTION INTRAVENOUS; SUBCUTANEOUS at 06:28

## 2022-09-07 RX ADMIN — ACETAMINOPHEN 650 MG: 325 TABLET, FILM COATED ORAL at 16:51

## 2022-09-07 RX ADMIN — ASPIRIN 81 MG: 81 TABLET, COATED ORAL at 09:26

## 2022-09-07 RX ADMIN — DEXAMETHASONE 6 MG: 2 TABLET ORAL at 09:25

## 2022-09-07 RX ADMIN — SODIUM CHLORIDE 75 ML/HR: 9 INJECTION, SOLUTION INTRAVENOUS at 09:44

## 2022-09-07 RX ADMIN — NYSTATIN 500000 UNITS: 100000 SUSPENSION ORAL at 13:24

## 2022-09-07 RX ADMIN — LEVOTHYROXINE SODIUM 50 MCG: 50 TABLET ORAL at 06:29

## 2022-09-07 RX ADMIN — NYSTATIN 500000 UNITS: 100000 SUSPENSION ORAL at 09:26

## 2022-09-07 RX ADMIN — NYSTATIN 500000 UNITS: 100000 SUSPENSION ORAL at 20:11

## 2022-09-07 NOTE — PLAN OF CARE
Goal Outcome Evaluation:         Patient was very restless throughout the night.  Alprazolam was ineffective.  Gave tylenol and nystatin for throat pain.  Administered melatonin.  She did sleep a couple of hours only.  Woke up restless again.  Oxygen is okay at rest. Placed 1 liter on her because it would drop with restlessness.  She continues to pull off oxygen and forehead piece to monitor oxygen status. Had a large bowel movement.  She has minimal uop at 200 cc.  Did a bladder scan and it read 50 mL.  She was oriented to self and year at the beginning of  shift.  Only oriented to self.  Have been sitting at bedside.

## 2022-09-07 NOTE — PROGRESS NOTES
"  [facility]  PROGRESS NOTE    Date of Admission: 9/5/2022  Date of Service: 09/07/22    Primary Care Physician: Ngozi Davis DO    Chief Complaint: Afib w RVR, heart failure      Subjective      HPI: pleasantly confused sitting in bed. Reports no chest pain, shortness of breath or palpitations. On 1 L/min via nc. Some agitation per nursing reports      Objective   Vitals: /74 (BP Location: Left arm, Patient Position: Lying)   Pulse 77   Temp 96.8 °F (36 °C) (Axillary)   Resp 16   Ht 162.6 cm (64\")   Wt 57.7 kg (127 lb 3.2 oz)   SpO2 98%   BMI 21.83 kg/m²     Physical Exam:   GENERAL: Alert, cooperative, in no acute distress.   HEENT: Normocephalic, no jugular venous distention  HEART: Regular rate and rhythm; no murmurs, rubs or gallops  LUNGS: Clear to auscultation bilaterally. No wheezing, rales or rhonchi. Nonlabored breathing  EXTREMITIES: No peripheral edema      Results:  Results from last 7 days   Lab Units 09/07/22  0640 09/06/22  0510 09/05/22  1315   WBC 10*3/mm3 7.63 4.42 7.87   HEMOGLOBIN g/dL 8.6* 9.2* 10.3*   HEMATOCRIT % 26.3* 28.2* 32.7*   PLATELETS 10*3/mm3 190 204 229     Results from last 7 days   Lab Units 09/07/22  0640 09/06/22  0510 09/05/22  1814 09/05/22  1315   SODIUM mmol/L 131* 139  --  137   POTASSIUM mmol/L 5.1 4.8  --  5.3*   CHLORIDE mmol/L 97* 100  --  101   CO2 mmol/L 22.0 23.0  --  24.0   BUN mg/dL 46* 24*  --  16   CREATININE mg/dL 1.98* 1.83* 1.57* 1.51*   GLUCOSE mg/dL 104* 137*  --  115*      Lab Results   Component Value Date    AST 43 (H) 09/07/2022    ALT 21 09/07/2022                     Results from last 7 days   Lab Units 09/06/22  0510 09/05/22  1315   PROTIME Seconds 16.9* 14.7*   INR  1.38* 1.16*     Results from last 7 days   Lab Units 09/05/22  1315   TROPONIN T ng/mL <0.010     Results from last 7 days   Lab Units 09/05/22  1814   PROBNP pg/mL 25,296.0*         Intake/Output Summary (Last 24 hours) at 9/7/2022 " 1326  Last data filed at 9/7/2022 1200  Gross per 24 hour   Intake --   Output 425 ml   Net -425 ml       Tele NSR w PACs, no Afib    Radiology Data: no new data    Current Medications:  aspirin, 81 mg, Oral, Daily  cetirizine, 10 mg, Oral, Daily  dexamethasone, 6 mg, Intravenous, Daily With Breakfast   Or  dexamethasone, 6 mg, Oral, Daily With Breakfast  [START ON 9/8/2022] famotidine, 20 mg, Oral, Daily  gabapentin, 300 mg, Oral, Nightly  heparin (porcine), 5,000 Units, Subcutaneous, Q8H  levothyroxine, 50 mcg, Oral, Q AM  memantine, 5 mg, Oral, BID  metoprolol succinate XL, 25 mg, Oral, Daily  nystatin, 5 mL, Oral, 4x Daily  [START ON 9/8/2022] pantoprazole, 40 mg, Oral, Q AM  remdesivir, 100 mg, Intravenous, Q24H      Pharmacy Consult - Remdesivir,   sodium chloride, 75 mL/hr, Last Rate: 75 mL/hr (09/07/22 0944)      Assessment  1. Acute Hypoxic Respiratory Failure  2. New Afib w RVR  3. Nonischemic Cardiomyopathy with Acute on Chronic Systolic Heart Failure  a. Pending echo  4. COVID19 Infection  1. Receiving remdesivir treatment  5. Hypertension  6. Hyperlipidemia  7. CKD  1. Cr 1.98 this AM        Plan   1. Patient has been in sinus rhythm since around 1800 9/5 with no recurrence of atrial fibrillation. Converted after extra dose of metoprolol tartrate. Continue to monitor on home metoprolol succinate 25 mg daily. Anticoagulation not necessary d/t brevity of afib, but will consider if it recurs.  2. Echo and EKG pending. Troponin negative x2.  No indication for further ischemic evaluation. Patient has history of nonischemic cardiomyopathy. Denies current angina. Acute COVID19 illness is more likely to be precipitating factor.   3. Agree with holding further diuretics with creatinine of 1.98 and without appearing volume overloaded on exam. Light fluid replacement today d/t dehydration. Lungs with no fluid on exam. Continue to monitor volume status closely.  4. Continue lipitor for HLD.    eSigned by Anup  JULEE Holly

## 2022-09-07 NOTE — PROGRESS NOTES
[facility] Medicine Services  PROGRESS NOTE    Patient Name: Scott Diego  : 4/10/1930  MRN: [id number]    Date of Admission: 2022  Primary Care Physician: Ngozi Davis DO    Subjective   Subjective     CC:  SOA    HPI:  Confused. Denies pain or dyspnea. Hungry.    Review of Systems   Constitutional: Positive for activity change and fatigue.   HENT: Positive for congestion.    Respiratory: Positive for cough and shortness of breath. Negative for chest tightness.    Cardiovascular: Negative for chest pain and leg swelling.   Gastrointestinal: Negative.    Genitourinary: Negative.    Neurological: Negative.    Psychiatric/Behavioral: Positive for confusion.       Objective   Objective     Vital Signs:   Temp:  [96.8 °F (36 °C)-98.4 °F (36.9 °C)] 96.8 °F (36 °C)  Heart Rate:  [77-98] 77  Resp:  [16-20] 16  BP: (119-139)/() 120/74  Flow (L/min):  [1-2] 1     Physical Exam:  NAD< alert, oriented to person only  OP clear, dry MM  Neck supple  No LAD  RRR  Decreased at bases, otherwise clear  +BS, ND, NT, soft  MARTÍNEZ  Normal affect  No rashes  No change otherwise from     Results Reviewed:  LAB RESULTS:      Lab 22  0640 22  0510 227 22  1814 22  1315   WBC 7.63 4.42  --   --  7.87   HEMOGLOBIN 8.6* 9.2*  --   --  10.3*   HEMATOCRIT 26.3* 28.2*  --   --  32.7*   PLATELETS 190 204  --   --  229   NEUTROS ABS 5.72 3.51  --   --  6.07   IMMATURE GRANS (ABS) 0.03 0.01  --   --  0.02   LYMPHS ABS 0.52* 0.43*  --   --  0.73   MONOS ABS 1.36* 0.46  --   --  0.90   EOS ABS 0.00 0.00  --   --  0.09   MCV 95.6 96.6  --   --  97.3*   SED RATE 57* 67*  --   --   --    CRP 3.02* 3.57*  --   --  1.63*   PROCALCITONIN  --   --   --   --  0.04   LACTATE  --   --  2.0 3.4* 2.6*   LDH  --   --   --   --  180   PROTIME  --  16.9*  --   --  14.7*   D DIMER QUANT  --   --   --   --  0.47         Lab 22  0640 22  0510 22  1814 22  1316    SODIUM 131* 139  --  137   POTASSIUM 5.1 4.8  --  5.3*   CHLORIDE 97* 100  --  101   CO2 22.0 23.0  --  24.0   ANION GAP 12.0 16.0*  --  12.0   BUN 46* 24*  --  16   CREATININE 1.98* 1.83* 1.57* 1.51*   EGFR 23.3* 25.6* 30.8* 32.3*   GLUCOSE 104* 137*  --  115*   CALCIUM 8.6 9.2  --  10.2*   MAGNESIUM  --   --   --  2.8*         Lab 09/07/22  0640 09/06/22  0510 09/05/22  1814 09/05/22  1315   TOTAL PROTEIN 7.6 8.1 8.8* 9.8*   ALBUMIN 3.40* 3.70 4.20 4.40   GLOBULIN 4.2 4.4  --  5.4   ALT (SGPT) 21 21 12 13   AST (SGOT) 43* 43* 20 22   BILIRUBIN 0.6 1.0 0.7 0.7   INDIRECT BILIRUBIN  --   --  0.5  --    BILIRUBIN DIRECT 0.2 0.5* 0.2  --    ALK PHOS 76 92 71 71         Lab 09/06/22  0510 09/05/22  1814 09/05/22  1315   PROBNP  --  25,296.0* 13,441.0*   TROPONIN T  --   --  <0.010   PROTIME 16.9*  --  14.7*   INR 1.38*  --  1.16*                 Brief Urine Lab Results  (Last result in the past 365 days)      Color   Clarity   Blood   Leuk Est   Nitrite   Protein   CREAT   Urine HCG        09/05/22 1507 Yellow   Clear   Negative   Negative   Negative   Trace                 Microbiology Results Abnormal     None          Adult Transthoracic Echo Complete w/ Color, Spectral and Contrast if Necessary Per Protocol    Result Date: 9/6/2022  · Left ventricular ejection fraction appears to be 21 - 25%. Left ventricular systolic function is severely decreased. · Left ventricular diastolic function is consistent with (grade II w/high LAP) pseudonormalization. · There is a small (<1cm) pericardial effusion. · There is a left pleural effusion. There is a right pleural effusion. · Moderate mitral valve regurgitation is present. · Moderate to severe tricuspid valve regurgitation is present. · Estimated right ventricular systolic pressure from tricuspid regurgitation is markedly elevated (>55 mmHg). Calculated right ventricular systolic pressure from tricuspid regurgitation is 56 mmHg.      XR Chest 1 View    Result Date:  9/5/2022  DATE OF EXAM: 9/5/2022 1:46 PM  PROCEDURE: XR CHEST 1 VW-  INDICATIONS: Weak/Dizzy/AMS triage protocol.  COMPARISON: Chest x-ray 8/20/2022  TECHNIQUE: Single frontal view of the chest.  FINDINGS: Stable cardiomediastinal silhouette with redemonstration of cardiomegaly. There is increased diffuse interstitial prominence from prior compatible with interstitial pulmonary edema. No definite pleural effusion. No pneumothorax. No acute osseous findings.      Impression: Cardiomegaly and interstitial pulmonary edema.  This report was finalized on 9/5/2022 1:56 PM by Rito Delatorre MD.        Results for orders placed during the hospital encounter of 09/05/22    Adult Transthoracic Echo Complete w/ Color, Spectral and Contrast if Necessary Per Protocol    Interpretation Summary  · Left ventricular ejection fraction appears to be 21 - 25%. Left ventricular systolic function is severely decreased.  · Left ventricular diastolic function is consistent with (grade II w/high LAP) pseudonormalization.  · There is a small (<1cm) pericardial effusion.  · There is a left pleural effusion. There is a right pleural effusion.  · Moderate mitral valve regurgitation is present.  · Moderate to severe tricuspid valve regurgitation is present.  · Estimated right ventricular systolic pressure from tricuspid regurgitation is markedly elevated (>55 mmHg). Calculated right ventricular systolic pressure from tricuspid regurgitation is 56 mmHg.      I have reviewed the medications:  Scheduled Meds:aspirin, 81 mg, Oral, Daily  cetirizine, 10 mg, Oral, Daily  dexamethasone, 6 mg, Intravenous, Daily With Breakfast   Or  dexamethasone, 6 mg, Oral, Daily With Breakfast  famotidine, 20 mg, Oral, BID  gabapentin, 300 mg, Oral, Nightly  heparin (porcine), 5,000 Units, Subcutaneous, Q8H  levothyroxine, 50 mcg, Oral, Q AM  memantine, 5 mg, Oral, BID  metoprolol succinate XL, 25 mg, Oral, Daily  nystatin, 5 mL, Oral, 4x Daily  remdesivir, 100 mg,  Intravenous, Q24H      Continuous Infusions:Pharmacy Consult - Remdesivir,   sodium chloride, 75 mL/hr      PRN Meds:.•  acetaminophen **OR** acetaminophen  •  albuterol sulfate HFA  •  ALPRAZolam  •  dextromethorphan polistirex ER  •  ipratropium-albuterol  •  melatonin  •  Pharmacy Consult - Remdesivir  •  sodium chloride    Assessment & Plan   Assessment & Plan     Active Hospital Problems    Diagnosis  POA   • **COVID-19 virus detected [U07.1]  Yes   • Acute hypoxemic respiratory failure due to COVID-19 (HCC) [U07.1, J96.01]  Yes   • Dyspnea due to COVID-19 [U07.1, R06.00]  Yes   • Encephalopathy due to COVID-19 virus [U07.1, G93.49]  Yes   • Acute on chronic congestive heart failure (HCC) [I50.9]  Unknown   • Anxiety [F41.9]  Unknown   • Hypothyroid [E03.9]  Yes   • Hypoxia [R09.02]  Yes   • CARYN (acute kidney injury) (HCC) [N17.9]  Unknown   • Dementia (HCC) [F03.90]  Yes   • Hypertension [I10]  Yes      Resolved Hospital Problems   No resolved problems to display.        Brief Hospital Course to date:  Scott iDego is a 89 y.o. female here with acute hypoxic respiratory failure    COVID-19  Acute hypoxic respiratory failure  -dex/remdesivir  -92% on RA during my exam today  -in no distress  -can probably discontinue isolation 9/16    A/C HFrEF  New atrial fibrillation with RVR  Hx of prolonged QT  -s/p IV lasix, hold further diuretics, not overtly overloaded  -on RA, 92% today  -no edema noted  -cardiology consulted and following    CARYN/suspected CKD  -s/p IV lasix  -poor intake, give IVF and monitor    Nausea  -no complaints, and expressed hunger  -son notes ongoing nausea for several weeks, with random bouts of emesis  -family requesting GI evaluation  -CT A/P unremarkable on admission    Confusion  -likely due to acute illness and isolation  -with underlying dementia    Thrush  -nystatin    Anxiety  -on xanax prn    Hypothyroidism  -on synthroid    Debility/weakness  -PT/OT    Will d/w family. Await  cardiology recommendations  Expected Discharge Location and Transportation: Rehab  Expected Discharge Date: 9/9    DVT prophylaxis:  Medical DVT prophylaxis orders are present.     AM-PAC 6 Clicks Score (PT): 12 (09/07/22 0830)    CODE STATUS:   Code Status and Medical Interventions:   Ordered at: 09/05/22 1603     Level Of Support Discussed With:    Next of Kin (If No Surrogate)     Code Status (Patient has no pulse and is not breathing):    No CPR (Do Not Attempt to Resuscitate)     Medical Interventions (Patient has pulse or is breathing):    Full Support       Jan Martins MD  09/07/22

## 2022-09-07 NOTE — CONSULTS
"[facility] Consult    Referring Provider: Jan Martins MD    PCP: Ngozi Davis DO    Reason for Consultation: Nausea     Chief complaint: Shortness of breath and fever     History of present illness:    Scott Diego is a 89 y.o. female who is admitted with acute hypoxic respiratory failure secondary to COVID-19 infection and acute on chronic congestive heart failure.  She has history of Alzheimer's dementia and has had a progressive cognitive decline since the death of her  in January 2022.   She is pleasantly confused during most of our conversation though she has had times of agitation (\"Did you bring my teeth or I'll choke you\").   She currently denies nausea, vomiting nor abdominal pain.  She has had a sitter present at bedside whom reiterates that she has not had nausea during her stay.       Majority of history is obtained by her son, Noah Sinha, via telephone.   He lives with his mother and is her primary caregiver.   He states she has recurrent complaints of nausea daily for two months.   She additionally has abdominal pain at times and begs to seek medical evaluation almost daily.  He has brought her to the emergency room multiple times the last two months for these complaints.   She has had three CTs of the abdomen that show excess stool burden consistent with constipation.  She was started on Miralax as well as other OTC laxatives that resulted in diarrhea.        Allergies:  Augmentin [amoxicillin-pot clavulanate], Claritin [loratadine], Keflex [cephalexin], and Sulfa antibiotics    Scheduled Meds:  aspirin, 81 mg, Oral, Daily  cetirizine, 10 mg, Oral, Daily  dexamethasone, 6 mg, Intravenous, Daily With Breakfast   Or  dexamethasone, 6 mg, Oral, Daily With Breakfast  [START ON 9/8/2022] famotidine, 20 mg, Oral, Daily  gabapentin, 300 mg, Oral, Nightly  heparin (porcine), 5,000 Units, Subcutaneous, Q8H  levothyroxine, 50 mcg, Oral, Q AM  memantine, 5 mg, Oral, BID  metoprolol " succinate XL, 25 mg, Oral, Daily  nystatin, 5 mL, Oral, 4x Daily  [START ON 9/8/2022] pantoprazole, 40 mg, Oral, Q AM  remdesivir, 100 mg, Intravenous, Q24H         Infusions:  Pharmacy Consult - Remdesivir,   sodium chloride, 75 mL/hr, Last Rate: 75 mL/hr (09/07/22 0944)        PRN Meds:  •  acetaminophen **OR** acetaminophen  •  albuterol sulfate HFA  •  ALPRAZolam  •  dextromethorphan polistirex ER  •  ipratropium-albuterol  •  melatonin  •  Pharmacy Consult - Remdesivir  •  sodium chloride    Home Meds:  Medications Prior to Admission   Medication Sig Dispense Refill Last Dose   • acetaminophen (TYLENOL) 325 MG tablet Take 2 tablets by mouth Every 4 (Four) Hours As Needed for Mild Pain .      • ALPRAZolam (XANAX) 0.5 MG tablet Take 0.5 mg by mouth Every 6 (Six) Hours As Needed.      • atorvastatin (LIPITOR) 80 MG tablet Take 80 mg by mouth Every Night.      • famotidine (PEPCID) 20 MG tablet Take 20 mg by mouth 2 (Two) Times a Day.      • gabapentin (NEURONTIN) 300 MG capsule Take 1 capsule by mouth Every Night. 90 capsule 0    • haloperidol (HALDOL) 1 MG tablet Take 1 mg by mouth Every 6 (Six) Hours As Needed.      • levothyroxine (SYNTHROID, LEVOTHROID) 50 MCG tablet Take 1 tablet by mouth Every Morning. 30 tablet 5    • memantine (NAMENDA) 5 MG tablet Take 1 tablet by mouth 2 (Two) Times a Day. 180 tablet 1    • metoprolol succinate XL (TOPROL-XL) 25 MG 24 hr tablet Take 1 tablet by mouth Daily. 30 tablet 5    • SM Aspirin Adult Low Strength 81 MG EC tablet Take 81 mg by mouth Daily.      • Artificial Tear Ointment (artificial tears) ophthalmic ointment Administer 1 application to both eyes Daily.      • bisacodyl (Dulcolax) 10 MG suppository Insert 1 suppository into the rectum Daily As Needed for Constipation. 15 suppository 1    • carboxymethylcellulose (REFRESH PLUS) 0.5 % solution 3 (Three) Times a Day As Needed for Dry Eyes.      • fexofenadine (ALLEGRA) 180 MG tablet Take 180 mg by mouth Daily.      •  fexofenadine (ALLEGRA) 60 MG tablet Take 1 tablet by mouth Daily. 30 tablet 5    • fluconazole (DIFLUCAN) 100 MG tablet TAKE 1 TABLET BY MOUTH EVERY DAY FOR 10 DOSES      • furosemide (LASIX) 20 MG tablet Take 1 tablet by mouth Daily As Needed (increased shortness of breath, swelling). 90 tablet 0    • Glycerin-Polysorbate 80 (REFRESH DRY EYE THERAPY OP) Apply  to eye(s) as directed by provider As Needed.      • Lidocaine Viscous HCl (XYLOCAINE) 2 % solution       • Nurtec 75 MG dispersible tablet Take 1 tablet by mouth Every Other Day. 16 tablet 5    • nystatin (MYCOSTATIN) 100,000 unit/mL suspension Swish and swallow 5mLs by mouth 4 times daily 60 mL 0    • ofloxacin (OCUFLOX) 0.3 % ophthalmic solution Administer 1 drop to both eyes 3 (Three) Times a Day. For 10 days, started 03-01-22      • ondansetron ODT (ZOFRAN-ODT) 4 MG disintegrating tablet Place 1 tablet on the tongue Every 4 (Four) Hours As Needed for Nausea. 30 tablet 0    • ondansetron ODT (ZOFRAN-ODT) 4 MG disintegrating tablet Place 1 tablet on the tongue Every 6 (Six) Hours As Needed for Nausea. 20 tablet 0    • predniSONE (DELTASONE) 10 MG tablet Take 2 tablets po qd x 3d, then 1 tab qd x 3d, then 0.5 tab qd x 3d 11 tablet 0    • predniSONE (DELTASONE) 5 MG tablet TAKE 1 TABLET BY MOUTH EVERY DAY FOR 10 DAYS          ROS: Review of Systems   Unable to perform ROS: Mental status change       PAST MED HX:  Past Medical History:   Diagnosis Date   • Anxiety    • Congestive heart failure (HCC)    • Coronary artery disease    • Dementia (HCC)    • Depression    • Hyperlipidemia    • Malignant neoplasm (HCC)    • Memory loss    • Peripheral neuropathy    • Stroke (HCC)     mini stroke   • Systolic heart failure (HCC) 09/25/2017    Chronic/compensated (EF 25%)       PAST SURG HX:  Past Surgical History:   Procedure Laterality Date   • CARDIAC CATHETERIZATION     • CHOLECYSTECTOMY     • EYE SURGERY     • HYSTERECTOMY         FAM HX:  Family History   Problem  "Relation Age of Onset   • Cancer Mother    • No Known Problems Father    • Cancer Sister    • Cancer Brother    • Parkinsonism Brother    • No Known Problems Son        SOC HX:  Social History     Socioeconomic History   • Marital status:    Tobacco Use   • Smoking status: Never Smoker   • Smokeless tobacco: Never Used   Vaping Use   • Vaping Use: Never used   Substance and Sexual Activity   • Alcohol use: Never   • Drug use: Never   • Sexual activity: Defer     Comment:  since 1/2022       PHYSICAL EXAM  /75 (BP Location: Left arm, Patient Position: Lying)   Pulse 78   Temp 95 °F (35 °C) (Axillary)   Resp 16   Ht 162.6 cm (64\")   Wt 57.7 kg (127 lb 3.2 oz)   SpO2 90%   BMI 21.83 kg/m²   Wt Readings from Last 3 Encounters:   09/05/22 57.7 kg (127 lb 3.2 oz)   08/30/22 55.8 kg (123 lb)   08/26/22 55.8 kg (123 lb)   ,body mass index is 21.83 kg/m².  Physical Exam  Constitutional:       General: She is not in acute distress.     Comments: Elderly female sitting up in bed, confused   HENT:      Mouth/Throat:      Comments: Edentulous   Cardiovascular:      Rate and Rhythm: Normal rate and regular rhythm.   Pulmonary:      Effort: Pulmonary effort is normal.      Breath sounds: Normal breath sounds.   Abdominal:      General: Bowel sounds are normal. There is no distension.      Palpations: Abdomen is soft.      Tenderness: There is no abdominal tenderness.   Musculoskeletal:      Right lower leg: No edema.      Left lower leg: No edema.   Skin:     General: Skin is warm and dry.   Neurological:      Mental Status: She is alert.      Comments: Oriented to self only.    Psychiatric:         Behavior: Behavior normal.         Results Review:   I reviewed the patient's new clinical results.    Lab Results   Component Value Date    WBC 7.63 09/07/2022    HGB 8.6 (L) 09/07/2022    HGB 9.2 (L) 09/06/2022    HGB 10.3 (L) 09/05/2022    HCT 26.3 (L) 09/07/2022    MCV 95.6 09/07/2022     " 09/07/2022     Lab Results   Component Value Date    INR 1.38 (H) 09/06/2022    INR 1.16 (H) 09/05/2022    INR 0.98 02/18/2016     Lab Results   Component Value Date    GLUCOSE 104 (H) 09/07/2022    BUN 46 (H) 09/07/2022    CREATININE 1.98 (H) 09/07/2022    EGFRIFNONA 46 (L) 06/19/2018    BCR 23.2 09/07/2022     (L) 09/07/2022    K 5.1 09/07/2022    CO2 22.0 09/07/2022    CALCIUM 8.6 09/07/2022    ALBUMIN 3.40 (L) 09/07/2022    ALKPHOS 76 09/07/2022    BILITOT 0.6 09/07/2022    BILIDIR 0.2 09/07/2022    ALT 21 09/07/2022    AST 43 (H) 09/07/2022     I reviewed her noncontrast CT Abdomens from 7/23/2022, 8/20/22 and 8/30/22.  There is excess stool burden throughout the colon.  This is less prevalent on her most recent scan though she does have excess in the right colon.       ASSESSMENTS/PLANS    1. Nausea without vomiting  2. Chronic idiopathic constipation   3. Alzheimer's dementia  4. Acute hypoxic respiratory failure  5. COVID-19 infection     >>> Will discontinue Famotidine and begin Pantoprazole 40 mg daily   >>> Discontinue home Scopalomine due to anticholinergic effects  >>> Recommend trial of OTC Guadalupe Root 500 mg TID for nausea at home   >>> Once daily Miralax.   Recommend Dulcolax suppository if 3 days without a a bowel movement   >>> Symptoms may be exacerbated by her cognitive impairment/progressive dementia as she has not had nausea during her hospitalization nor required Zofran inpatient while her son states he has to give it every 4 hours at home.      I discussed the patient's findings and my recommendations with patient and her son, Noah Sinha.  He voiced understanding and appreciation in the care plan above.      TOSIN Swan  09/07/22  14:18 EDT

## 2022-09-08 LAB
ALBUMIN SERPL-MCNC: 4 G/DL (ref 3.5–5.2)
ALBUMIN/GLOB SERPL: 0.9 G/DL
ALP SERPL-CCNC: 75 U/L (ref 39–117)
ALT SERPL W P-5'-P-CCNC: 23 U/L (ref 1–33)
ANION GAP SERPL CALCULATED.3IONS-SCNC: 16 MMOL/L (ref 5–15)
AST SERPL-CCNC: 41 U/L (ref 1–32)
BASOPHILS # BLD AUTO: 0.01 10*3/MM3 (ref 0–0.2)
BASOPHILS NFR BLD AUTO: 0.1 % (ref 0–1.5)
BILIRUB CONJ SERPL-MCNC: 0.2 MG/DL (ref 0–0.3)
BILIRUB SERPL-MCNC: 0.6 MG/DL (ref 0–1.2)
BUN SERPL-MCNC: 52 MG/DL (ref 8–23)
BUN/CREAT SERPL: 29.7 (ref 7–25)
CALCIUM SPEC-SCNC: 9.1 MG/DL (ref 8.2–9.6)
CHLORIDE SERPL-SCNC: 100 MMOL/L (ref 98–107)
CO2 SERPL-SCNC: 20 MMOL/L (ref 22–29)
CREAT SERPL-MCNC: 1.75 MG/DL (ref 0.57–1)
CRP SERPL-MCNC: 2.03 MG/DL (ref 0–0.5)
DEPRECATED RDW RBC AUTO: 58.4 FL (ref 37–54)
EGFRCR SERPLBLD CKD-EPI 2021: 27.1 ML/MIN/1.73
EOSINOPHIL # BLD AUTO: 0 10*3/MM3 (ref 0–0.4)
EOSINOPHIL NFR BLD AUTO: 0 % (ref 0.3–6.2)
ERYTHROCYTE [DISTWIDTH] IN BLOOD BY AUTOMATED COUNT: 16.8 % (ref 12.3–15.4)
ERYTHROCYTE [SEDIMENTATION RATE] IN BLOOD: 54 MM/HR (ref 0–30)
GLOBULIN UR ELPH-MCNC: 4.4 GM/DL
GLUCOSE SERPL-MCNC: 103 MG/DL (ref 65–99)
HCT VFR BLD AUTO: 29.6 % (ref 34–46.6)
HGB BLD-MCNC: 9.5 G/DL (ref 12–15.9)
IMM GRANULOCYTES # BLD AUTO: 0.03 10*3/MM3 (ref 0–0.05)
IMM GRANULOCYTES NFR BLD AUTO: 0.3 % (ref 0–0.5)
INR PPP: 1.33 (ref 0.84–1.13)
LYMPHOCYTES # BLD AUTO: 1.06 10*3/MM3 (ref 0.7–3.1)
LYMPHOCYTES NFR BLD AUTO: 11.4 % (ref 19.6–45.3)
MAGNESIUM SERPL-MCNC: 2.4 MG/DL (ref 1.7–2.3)
MCH RBC QN AUTO: 31.1 PG (ref 26.6–33)
MCHC RBC AUTO-ENTMCNC: 32.1 G/DL (ref 31.5–35.7)
MCV RBC AUTO: 97 FL (ref 79–97)
MONOCYTES # BLD AUTO: 1.49 10*3/MM3 (ref 0.1–0.9)
MONOCYTES NFR BLD AUTO: 16 % (ref 5–12)
NEUTROPHILS NFR BLD AUTO: 6.74 10*3/MM3 (ref 1.7–7)
NEUTROPHILS NFR BLD AUTO: 72.2 % (ref 42.7–76)
NRBC BLD AUTO-RTO: 0 /100 WBC (ref 0–0.2)
NT-PROBNP SERPL-MCNC: ABNORMAL PG/ML (ref 0–1800)
PLATELET # BLD AUTO: 238 10*3/MM3 (ref 140–450)
PMV BLD AUTO: 12.1 FL (ref 6–12)
POTASSIUM SERPL-SCNC: 4.3 MMOL/L (ref 3.5–5.2)
PROT SERPL-MCNC: 8.4 G/DL (ref 6–8.5)
PROTHROMBIN TIME: 16.4 SECONDS (ref 11.4–14.4)
RBC # BLD AUTO: 3.05 10*6/MM3 (ref 3.77–5.28)
SODIUM SERPL-SCNC: 136 MMOL/L (ref 136–145)
WBC NRBC COR # BLD: 9.33 10*3/MM3 (ref 3.4–10.8)

## 2022-09-08 PROCEDURE — 25010000002 REMDESIVIR 100 MG/20ML SOLUTION 1 EACH VIAL: Performed by: HOSPITALIST

## 2022-09-08 PROCEDURE — 85610 PROTHROMBIN TIME: CPT | Performed by: HOSPITALIST

## 2022-09-08 PROCEDURE — 83880 ASSAY OF NATRIURETIC PEPTIDE: CPT | Performed by: HOSPITALIST

## 2022-09-08 PROCEDURE — 86140 C-REACTIVE PROTEIN: CPT | Performed by: HOSPITALIST

## 2022-09-08 PROCEDURE — 85652 RBC SED RATE AUTOMATED: CPT | Performed by: HOSPITALIST

## 2022-09-08 PROCEDURE — 25010000002 HEPARIN (PORCINE) PER 1000 UNITS: Performed by: HOSPITALIST

## 2022-09-08 PROCEDURE — 85025 COMPLETE CBC W/AUTO DIFF WBC: CPT | Performed by: HOSPITALIST

## 2022-09-08 PROCEDURE — 83735 ASSAY OF MAGNESIUM: CPT | Performed by: HOSPITALIST

## 2022-09-08 PROCEDURE — 63710000001 DEXAMETHASONE PER 0.25 MG: Performed by: HOSPITALIST

## 2022-09-08 PROCEDURE — 97162 PT EVAL MOD COMPLEX 30 MIN: CPT

## 2022-09-08 PROCEDURE — 80053 COMPREHEN METABOLIC PANEL: CPT | Performed by: HOSPITALIST

## 2022-09-08 PROCEDURE — 99233 SBSQ HOSP IP/OBS HIGH 50: CPT | Performed by: FAMILY MEDICINE

## 2022-09-08 PROCEDURE — 82248 BILIRUBIN DIRECT: CPT | Performed by: HOSPITALIST

## 2022-09-08 RX ADMIN — HEPARIN SODIUM 5000 UNITS: 5000 INJECTION INTRAVENOUS; SUBCUTANEOUS at 20:02

## 2022-09-08 RX ADMIN — ALPRAZOLAM 0.25 MG: 0.25 TABLET ORAL at 12:22

## 2022-09-08 RX ADMIN — NYSTATIN 500000 UNITS: 100000 SUSPENSION ORAL at 17:46

## 2022-09-08 RX ADMIN — REMDESIVIR 100 MG: 100 INJECTION, POWDER, LYOPHILIZED, FOR SOLUTION INTRAVENOUS at 12:22

## 2022-09-08 RX ADMIN — ASPIRIN 81 MG: 81 TABLET, COATED ORAL at 08:16

## 2022-09-08 RX ADMIN — LEVOTHYROXINE SODIUM 50 MCG: 50 TABLET ORAL at 05:57

## 2022-09-08 RX ADMIN — CETIRIZINE HYDROCHLORIDE 10 MG: 10 TABLET, FILM COATED ORAL at 08:16

## 2022-09-08 RX ADMIN — POLYETHYLENE GLYCOL 3350 17 G: 17 POWDER, FOR SOLUTION ORAL at 08:16

## 2022-09-08 RX ADMIN — SODIUM CHLORIDE 75 ML/HR: 9 INJECTION, SOLUTION INTRAVENOUS at 09:48

## 2022-09-08 RX ADMIN — MEMANTINE 5 MG: 10 TABLET ORAL at 08:16

## 2022-09-08 RX ADMIN — NYSTATIN 500000 UNITS: 100000 SUSPENSION ORAL at 12:22

## 2022-09-08 RX ADMIN — MINERAL OIL AND WHITE PETROLATUM: 150; 830 OINTMENT OPHTHALMIC at 20:00

## 2022-09-08 RX ADMIN — MEMANTINE 5 MG: 10 TABLET ORAL at 20:02

## 2022-09-08 RX ADMIN — Medication 10 ML: at 08:16

## 2022-09-08 RX ADMIN — PANTOPRAZOLE SODIUM 40 MG: 40 TABLET, DELAYED RELEASE ORAL at 05:57

## 2022-09-08 RX ADMIN — GABAPENTIN 300 MG: 300 CAPSULE ORAL at 20:03

## 2022-09-08 RX ADMIN — Medication 10 ML: at 20:02

## 2022-09-08 RX ADMIN — METOPROLOL SUCCINATE 25 MG: 25 TABLET, EXTENDED RELEASE ORAL at 08:16

## 2022-09-08 RX ADMIN — NYSTATIN 500000 UNITS: 100000 SUSPENSION ORAL at 20:03

## 2022-09-08 RX ADMIN — Medication 5 MG: at 23:16

## 2022-09-08 RX ADMIN — DEXAMETHASONE 6 MG: 2 TABLET ORAL at 08:16

## 2022-09-08 RX ADMIN — HEPARIN SODIUM 5000 UNITS: 5000 INJECTION INTRAVENOUS; SUBCUTANEOUS at 05:57

## 2022-09-08 RX ADMIN — HEPARIN SODIUM 5000 UNITS: 5000 INJECTION INTRAVENOUS; SUBCUTANEOUS at 17:46

## 2022-09-08 NOTE — PLAN OF CARE
"Goal Outcome Evaluation:   Pt very restless in beginning of shift, up to chair with P.T., frequently climbing out of bed and chair, very confused. Pt stated \"I am just so nervous\", PRN xanax given, pt reports relief. Assisted to BR, gait unsteady, furniture surfing. Plan TBD when pt closer to being out of isolation.               "

## 2022-09-08 NOTE — PLAN OF CARE
Goal Outcome Evaluation:  Plan of Care Reviewed With: (P) patient        Progress: (P) no change  Outcome Evaluation: (P) PT initial evaluation complete for pt presenting with impaired cognition, strength, and balance. Pt ambulated 25ft in the room using a RW and requiring CGA. Pt required consistent cues to follow commands and for sequencing with the RW. Skilled IP PT warranted. Recommend d/c to SNF.

## 2022-09-08 NOTE — PROGRESS NOTES
Nutrition Services    Patient Name:  Scott Diego  YOB: 1930  MRN: [id number]  Admit Date:  9/5/2022    MST 3 noted but patient has not lost weight but has gained 3 lb since 8/22, MD appt.  Intake has been poor at 38%   Will offer Boost Plus BID.       Electronically signed by:  Eloise Ellis RD  09/08/22 12:00 EDT

## 2022-09-08 NOTE — PROGRESS NOTES
[facility] Medicine Services  PROGRESS NOTE    Patient Name: Scott Diego  : 4/10/1930  MRN: [id number]    Date of Admission: 2022  Primary Care Physician: Ngozi Davis DO    Subjective   Subjective     CC:  F/U COVID    HPI:  Patient seen and examined. Oriented to self and knows she's in a hospital, otherwise confused. She asks me where her purse is, tells me she needs to get cleaned up to go some places.     ROS:  UTO reliable ROS     Objective   Objective     Vital Signs:   Temp:  [94.1 °F (34.5 °C)-97 °F (36.1 °C)] 94.1 °F (34.5 °C)  Heart Rate:  [73-93] 73  Resp:  [16-20] 18  BP: (118-129)/(73-84) 121/73  Flow (L/min):  [2] 2     Physical Exam:  Constitutional: No acute distress, awake, alert  HENT: NCAT, mucous membranes moist  Respiratory: Clear to auscultation bilaterally, respiratory effort normal   Cardiovascular: RRR, no murmurs, rubs, or gallops  Gastrointestinal: Positive bowel sounds, soft, nontender, nondistended  Musculoskeletal: No bilateral ankle edema  Psychiatric: Appropriate affect, tries to get up at times   Neurologic: Oriented to self, knows she's in hospital but doesn't know which one. Otherwise confused, no focal deficits   Skin: No rashes    Results Reviewed:  LAB RESULTS:      Lab 22  0922 22  0640 22  0510 22  2057 22  1814 22  1315   WBC 9.33 7.63 4.42  --   --  7.87   HEMOGLOBIN 9.5* 8.6* 9.2*  --   --  10.3*   HEMATOCRIT 29.6* 26.3* 28.2*  --   --  32.7*   PLATELETS 238 190 204  --   --  229   NEUTROS ABS 6.74 5.72 3.51  --   --  6.07   IMMATURE GRANS (ABS) 0.03 0.03 0.01  --   --  0.02   LYMPHS ABS 1.06 0.52* 0.43*  --   --  0.73   MONOS ABS 1.49* 1.36* 0.46  --   --  0.90   EOS ABS 0.00 0.00 0.00  --   --  0.09   MCV 97.0 95.6 96.6  --   --  97.3*   SED RATE 54* 57* 67*  --   --   --    CRP 2.03* 3.02* 3.57*  --   --  1.63*   PROCALCITONIN  --   --   --   --   --  0.04   LACTATE  --   --   --  2.0 3.4*  2.6*   LDH  --   --   --   --   --  180   PROTIME 16.4*  --  16.9*  --   --  14.7*   D DIMER QUANT  --   --   --   --   --  0.47         Lab 09/08/22 0922 09/07/22 0640 09/06/22 0510 09/05/22 1814 09/05/22  1315   SODIUM 136 131* 139  --  137   POTASSIUM 4.3 5.1 4.8  --  5.3*   CHLORIDE 100 97* 100  --  101   CO2 20.0* 22.0 23.0  --  24.0   ANION GAP 16.0* 12.0 16.0*  --  12.0   BUN 52* 46* 24*  --  16   CREATININE 1.75* 1.98* 1.83* 1.57* 1.51*   EGFR 27.1* 23.3* 25.6* 30.8* 32.3*   GLUCOSE 103* 104* 137*  --  115*   CALCIUM 9.1 8.6 9.2  --  10.2*   MAGNESIUM 2.4*  --   --   --  2.8*         Lab 09/08/22 0922 09/07/22 0640 09/06/22 0510 09/05/22 1814 09/05/22  1315   TOTAL PROTEIN 8.4 7.6 8.1 8.8* 9.8*   ALBUMIN 4.00 3.40* 3.70 4.20 4.40   GLOBULIN 4.4 4.2 4.4  --  5.4   ALT (SGPT) 23 21 21 12 13   AST (SGOT) 41* 43* 43* 20 22   BILIRUBIN 0.6 0.6 1.0 0.7 0.7   INDIRECT BILIRUBIN  --   --   --  0.5  --    BILIRUBIN DIRECT 0.2 0.2 0.5* 0.2  --    ALK PHOS 75 76 92 71 71         Lab 09/08/22 0922 09/06/22 0510 09/05/22 1814 09/05/22  1315   PROBNP 33,559.0*  --  25,296.0* 13,441.0*   TROPONIN T  --   --   --  <0.010   PROTIME 16.4* 16.9*  --  14.7*   INR 1.33* 1.38*  --  1.16*                 Brief Urine Lab Results  (Last result in the past 365 days)      Color   Clarity   Blood   Leuk Est   Nitrite   Protein   CREAT   Urine HCG        09/05/22 1507 Yellow   Clear   Negative   Negative   Negative   Trace                 Microbiology Results Abnormal     None          Adult Transthoracic Echo Complete w/ Color, Spectral and Contrast if Necessary Per Protocol    Result Date: 9/6/2022  · Left ventricular ejection fraction appears to be 21 - 25%. Left ventricular systolic function is severely decreased. · Left ventricular diastolic function is consistent with (grade II w/high LAP) pseudonormalization. · There is a small (<1cm) pericardial effusion. · There is a left pleural effusion. There is a right pleural  effusion. · Moderate mitral valve regurgitation is present. · Moderate to severe tricuspid valve regurgitation is present. · Estimated right ventricular systolic pressure from tricuspid regurgitation is markedly elevated (>55 mmHg). Calculated right ventricular systolic pressure from tricuspid regurgitation is 56 mmHg.        Results for orders placed during the hospital encounter of 09/05/22    Adult Transthoracic Echo Complete w/ Color, Spectral and Contrast if Necessary Per Protocol    Interpretation Summary  · Left ventricular ejection fraction appears to be 21 - 25%. Left ventricular systolic function is severely decreased.  · Left ventricular diastolic function is consistent with (grade II w/high LAP) pseudonormalization.  · There is a small (<1cm) pericardial effusion.  · There is a left pleural effusion. There is a right pleural effusion.  · Moderate mitral valve regurgitation is present.  · Moderate to severe tricuspid valve regurgitation is present.  · Estimated right ventricular systolic pressure from tricuspid regurgitation is markedly elevated (>55 mmHg). Calculated right ventricular systolic pressure from tricuspid regurgitation is 56 mmHg.      I have reviewed the medications:  Scheduled Meds:artificial tears, , Both Eyes, TID  aspirin, 81 mg, Oral, Daily  cetirizine, 10 mg, Oral, Daily  dexamethasone, 6 mg, Intravenous, Daily With Breakfast   Or  dexamethasone, 6 mg, Oral, Daily With Breakfast  gabapentin, 300 mg, Oral, Nightly  heparin (porcine), 5,000 Units, Subcutaneous, Q8H  levothyroxine, 50 mcg, Oral, Q AM  memantine, 5 mg, Oral, BID  metoprolol succinate XL, 25 mg, Oral, Daily  nystatin, 5 mL, Oral, 4x Daily  pantoprazole, 40 mg, Oral, Q AM  polyethylene glycol, 17 g, Oral, Daily  remdesivir, 100 mg, Intravenous, Q24H      Continuous Infusions:Pharmacy Consult - Remdesivir,   sodium chloride, 75 mL/hr, Last Rate: 75 mL/hr (09/08/22 0948)      PRN Meds:.•  acetaminophen **OR** acetaminophen  •   albuterol sulfate HFA  •  ALPRAZolam  •  dextromethorphan polistirex ER  •  ipratropium-albuterol  •  melatonin  •  Pharmacy Consult - Remdesivir  •  sodium chloride    Assessment & Plan   Assessment & Plan     Active Hospital Problems    Diagnosis  POA   • **COVID-19 virus detected [U07.1]  Yes   • Acute hypoxemic respiratory failure due to COVID-19 (HCC) [U07.1, J96.01]  Yes   • Dyspnea due to COVID-19 [U07.1, R06.00]  Yes   • Encephalopathy due to COVID-19 virus [U07.1, G93.49]  Yes   • Acute on chronic congestive heart failure (HCC) [I50.9]  Unknown   • Anxiety [F41.9]  Unknown   • Hypothyroid [E03.9]  Yes   • Hypoxia [R09.02]  Yes   • CARYN (acute kidney injury) (HCC) [N17.9]  Unknown   • Dementia (HCC) [F03.90]  Yes   • Hypertension [I10]  Yes      Resolved Hospital Problems   No resolved problems to display.        Brief Hospital Course to date:  Scott Diego is a 89 y.o. female with past medical history significant for CHF, mild dementia, HTN, hypothyroidism,? CKD, and nocturnal oxygen use.  Patient lives with her son and family since the passing of her  in January of this year.  Son reports progressive decline in mental status and general health since that time.  On chart review patient was admitted to [facility] 3/3 - 3/17/2022 and then since then has had 10 ER visits since 6/10/2022 (for nausea, abd pain, h/a and general fatigue) before presenting 9/5/22.   Pt found to have COVID, CHF exacerbation.    This patient's problems and plans were partially entered by my partner and updated as appropriate by me 09/08/22.  All problems are new to me today     COVID-19  Acute hypoxic respiratory failure -> resolved   -Continue Remdesivir, day 4  -Continue Decadron, plan for 10 days  -on RA  -Tested + 9/5, needs 20 days of isolation, out on 9/26      A/C HFrEF  New atrial fibrillation with RVR  Hx of prolonged QT  -Cardiology following, Patient has been in sinus rhythm since around 1800 9/5 with no  recurrence of atrial fibrillation  -Continue BB  -No AC due to brevity of A fib   -Holding further diuretics due to renal function    CARYN/suspected CKD  -s/p IV lasix  -Started on IVF yesterday due to poor intake. DC.      Nausea  -no complaints  -son noted ongoing nausea for several weeks, with random bouts of emesis  -CT A/P unremarkable on admission  -Seen by GI  -DC Famotidine  -Start Protonix 40mg daily   -DC Scopolamine due to anti-cholinergic effects  -Recommend trial of OTC Guadalupe Root 500 mg TID for nausea at home   -Once daily Miralax.   Recommend Dulcolax suppository if 3 days without a a bowel movement   -Symptoms may be exacerbated by her cognitive impairment/progressive dementia as she has not had nausea during her hospitalization nor required Zofran inpatient while her son states he has to give it every 4 hours at home.       Confusion  -likely due to acute illness and isolation  -with underlying dementia  -Has sitter      Thrush  -Continue nystatin swish and swallow      Anxiety  -Continue Xanax prn     Hypothyroidism  -Continue synthroid     Debility/weakness  -PT/OT recommends SNF. Complicated as patient will not be out of isolation until 9/26. CM has discussed with son, likely home with  and 24/7 care     Expected Discharge Location and Transportation: Home vs Rehab   Expected Discharge Date: 9/10 if home, if rehab, 9/27     DVT prophylaxis:  Medical DVT prophylaxis orders are present.     AM-PAC 6 Clicks Score (PT): (P) 16 (09/08/22 0917)    CODE STATUS:   Code Status and Medical Interventions:   Ordered at: 09/05/22 1603     Level Of Support Discussed With:    Next of Kin (If No Surrogate)     Code Status (Patient has no pulse and is not breathing):    No CPR (Do Not Attempt to Resuscitate)     Medical Interventions (Patient has pulse or is breathing):    Full Support       Trinidad Thorpe, DO  09/08/22

## 2022-09-08 NOTE — THERAPY EVALUATION
Patient Name: Scott Diego  : 4/10/1930    MRN: [id number]                              Today's Date: 2022       Admit Date: 2022    Visit Dx:     ICD-10-CM ICD-9-CM   1. Hypoxia  R09.02 799.02   2. COVID-19  U07.1 079.89   3. Acute on chronic congestive heart failure, unspecified heart failure type (HCC)  I50.9 428.0   4. Chronic renal impairment, unspecified CKD stage  N18.9 585.9   5. Altered mental status, unspecified altered mental status type  R41.82 780.97     Patient Active Problem List   Diagnosis   • Idiopathic peripheral neuropathy   • Neck pain   • Mild cognitive impairment   • Anemia   • Hypertension   • Dehydration   • History of CVA (cerebrovascular accident)   • Dementia (HCC)   • Left bundle branch block   • Orthostatic dizziness   • Chronic systolic heart failure (HCC)   • Normocytic anemia   • CARYN (acute kidney injury) (HCC)   • Dyspnea   • Hypoxia   • Hypothyroid   • Pericardial effusion   • Acute respiratory failure with hypoxia (HCC)   • Right lower lobe pneumonia   • Ventricular ectopy   • Migraine without aura and without status migrainosus, not intractable   • Acute hypoxemic respiratory failure due to COVID-19 (HCC)   • Dyspnea due to COVID-19   • COVID-19 virus detected   • Encephalopathy due to COVID-19 virus   • Acute on chronic congestive heart failure (HCC)   • Anxiety     Past Medical History:   Diagnosis Date   • Anxiety    • Congestive heart failure (HCC)    • Coronary artery disease    • Dementia (HCC)    • Depression    • Hyperlipidemia    • Malignant neoplasm (HCC)    • Memory loss    • Peripheral neuropathy    • Stroke (HCC)     mini stroke   • Systolic heart failure (HCC) 2017    Chronic/compensated (EF 25%)     Past Surgical History:   Procedure Laterality Date   • CARDIAC CATHETERIZATION     • CHOLECYSTECTOMY     • EYE SURGERY     • HYSTERECTOMY        General Information     Row Name 22 0917          Physical Therapy Time and Intention    Document  Type evaluation (P)   -JS     Mode of Treatment physical therapy (P)   -     Row Name 09/08/22 0917          General Information    Patient Profile Reviewed yes (P)   -JS     Prior Level of Function independent:;gait;transfer (P)   According to chart review; pt questionable historian and unable to answer all PLOF questions  -     Existing Precautions/Restrictions fall;other (see comments) (P)   airborne precautions  -JS     Barriers to Rehab cognitive status;previous functional deficit (P)   -     Row Name 09/08/22 0917          Living Environment    People in Home child(chong), adult;grandchild(chong) (P)   According to chart review  -     Row Name 09/08/22 0917          Home Main Entrance    Number of Stairs, Main Entrance other (see comments) (P)   Pt unable to answer  -     Row Name 09/08/22 0917          Stairs Within Home, Primary    Number of Stairs, Within Home, Primary other (see comments) (P)   To be assessed later  -     Row Name 09/08/22 0917          Cognition    Orientation Status (Cognition) oriented to;person;disoriented to;place;situation;time (P)   -     Row Name 09/08/22 0917          Safety Issues, Functional Mobility    Safety Issues Affecting Function (Mobility) ability to follow commands;awareness of need for assistance;insight into deficits/self-awareness;safety precaution awareness;safety precautions follow-through/compliance (P)   -JS     Impairments Affecting Function (Mobility) balance;cognition;endurance/activity tolerance;strength;postural/trunk control (P)   -     Cognitive Impairments, Mobility Safety/Performance attention;awareness, need for assistance;insight into deficits/self-awareness;judgment;problem-solving/reasoning;safety precaution awareness;safety precaution follow-through (P)   -           User Key  (r) = Recorded By, (t) = Taken By, (c) = Cosigned By    Initials Name Provider Type    Dru Cahdwick PT Student PT Student               Mobility      Row Name 09/08/22 0917          Bed Mobility    Bed Mobility supine-sit;scooting/bridging (P)   -JS     Scooting/Bridging [place] (Bed Mobility) minimum assist (75% patient effort);verbal cues (P)   Scooting to EOB after transitioning supine-to-sit  -JS     Supine-Sit [place] (Bed Mobility) minimum assist (75% patient effort);verbal cues (P)   -JS     Comment, (Bed Mobility) Requires constant VCs to stay on track. Responds better to whole task cues than to cues breaking down a motion into parts. (P)   -JS     Row Name 09/08/22 0917          Transfers    Comment, (Transfers) Vcs for hand placement and sequencing. (P)   -JS     Row Name 09/08/22 0917          Sit-Stand Transfer    Sit-Stand [place] (Transfers) contact guard (P)   -JS     Assistive Device (Sit-Stand Transfers) walker, front-wheeled (P)   -JS     Row Name 09/08/22 0917          Gait/Stairs (Locomotion)    [place] Level (Gait) contact guard;verbal cues (P)   -JS     Assistive Device (Gait) walker, front-wheeled (P)   -JS     Distance in Feet (Gait) 25 (P)   -JS     Deviations/Abnormal Patterns (Gait) bilateral deviations;maria e decreased;gait speed decreased;stride length decreased (P)   -JS     Bilateral Gait Deviations forward flexed posture;heel strike decreased (P)   -JS     Comment, (Gait/Stairs) Pt required cues to keep both feet inside the walker during ambulation and to avoid pushing the walker to far out in front of her. (P)   -JS           User Key  (r) = Recorded By, (t) = Taken By, (c) = Cosigned By    Initials Name Provider Type    Dru Chadwick, PT Student PT Student               Obj/Interventions     Row Name 09/08/22 0917          Range of Motion Comprehensive    General Range of Motion bilateral lower extremity ROM WFL (P)   -     Row Name 09/08/22 0917          Strength Comprehensive (MMT)    General Manual Muscle Testing (MMT) Assessment other (see comments) (P)   -JS     Comment, General Manual  Muscle Testing (MMT) Assessment BLE grossly 3+/5 (P)   -JS     Row Name 09/08/22 0917          Balance    Balance Assessment sitting static balance;sitting dynamic balance;standing static balance;standing dynamic balance (P)   -JS     Static Sitting Balance supervision (P)   -JS     Dynamic Sitting Balance contact guard (P)   -JS     Position, Sitting Balance unsupported;sitting edge of bed (P)   -JS     Static Standing Balance contact guard (P)   -JS     Dynamic Standing Balance contact guard (P)   -JS     Position/Device Used, Standing Balance supported;walker, front-wheeled (P)   -JS     Row Name 09/08/22 0917          Sensory Assessment (Somatosensory)    Sensory Assessment (Somatosensory) unable/difficult to assess (P)   -JS           User Key  (r) = Recorded By, (t) = Taken By, (c) = Cosigned By    Initials Name Provider Type    Dru Chadwick, PT Student PT Student               Goals/Plan     Row Name 09/08/22 0917          Bed Mobility Goal 1 (PT)    Activity/Assistive Device (Bed Mobility Goal 1, PT) sit to supine/supine to sit (P)   -JS     [place] Level/Cues Needed (Bed Mobility Goal 1, PT) standby assist (P)   -JS     Time Frame (Bed Mobility Goal 1, PT) 2 weeks (P)   -JS     Row Name 09/08/22 0917          Transfer Goal 1 (PT)    Activity/Assistive Device (Transfer Goal 1, PT) sit-to-stand/stand-to-sit;bed-to-chair/chair-to-bed (P)   -JS     [place] Level/Cues Needed (Transfer Goal 1, PT) standby assist (P)   -JS     Time Frame (Transfer Goal 1, PT) 2 weeks (P)   -JS     Row Name 09/08/22 0917          Gait Training Goal 1 (PT)    Activity/Assistive Device (Gait Training Goal 1, PT) gait (walking locomotion);walker, rolling (P)   -JS     [place] Level (Gait Training Goal 1, PT) standby assist (P)   -JS     Distance (Gait Training Goal 1, PT) 100 (P)   -JS     Time Frame (Gait Training Goal 1, PT) 2 weeks (P)   -JS     Row Name 09/08/22 0917          Therapy Assessment/Plan  (PT)    Planned Therapy Interventions (PT) balance training;bed mobility training;gait training;patient/family education;strengthening;transfer training (P)   -JS           User Key  (r) = Recorded By, (t) = Taken By, (c) = Cosigned By    Initials Name Provider Type    Dru Chadwick, PT Student PT Student               Clinical Impression     Row Name 09/08/22 0917          Pain    Pretreatment Pain Rating 0/10 - no pain (P)   -JS     Posttreatment Pain Rating 0/10 - no pain (P)   -JS     Row Name 09/08/22 0917          Plan of Care Review    Plan of Care Reviewed With patient (P)   -JS     Progress no change (P)   -JS     Outcome Evaluation PT initial evaluation complete for pt presenting with impaired cognition, strength, and balance. Pt ambulated 25ft in the room using a RW and requiring CGA. Pt required consistent cues to follow commands and for sequencing with the RW. Skilled IP PT warranted. Recommend d/c to SNF. (P)   -JS     Row Name 09/08/22 0917          Therapy Assessment/Plan (PT)    Patient/Family Therapy Goals Statement (PT) Return to PLOF (P)   -JS     Rehab Potential (PT) fair, will monitor progress closely (P)   -JS     Criteria for Skilled Interventions Met (PT) yes;meets criteria;skilled treatment is necessary (P)   -JS     Therapy Frequency (PT) daily (P)   -JS     Predicted Duration of Therapy Intervention (PT) 2  weeks (P)   -JS     Row Name 09/08/22 0917          Vital Signs    Pre Systolic BP Rehab 150 (P)   -JS     Pre Treatment Diastolic BP 50 (P)   -JS     Pretreatment Heart Rate (beats/min) 85 (P)   -JS     Posttreatment Heart Rate (beats/min) 71 (P)   -JS     Pre SpO2 (%) 98 (P)   -JS     O2 Delivery Pre Treatment room air (P)   -JS     O2 Delivery Intra Treatment room air (P)   -JS     Post SpO2 (%) 99 (P)   -JS     O2 Delivery Post Treatment room air (P)   -JS     Pre Patient Position Supine (P)   -JS     Intra Patient Position Standing (P)   -JS     Post Patient Position  Sitting (P)   -     Row Name 09/08/22 0917          Positioning and Restraints    Pre-Treatment Position in bed (P)   -JS     Post Treatment Position chair (P)   -JS     In Chair notified nsg;reclined;call light within reach;encouraged to call for assist;exit alarm on (P)   -JS           User Key  (r) = Recorded By, (t) = Taken By, (c) = Cosigned By    Initials Name Provider Type    Dru Chadwick, PT Student PT Student               Outcome Measures     Row Name 09/08/22 0917 09/08/22 0802       How much help from another person do you currently need...    Turning from your back to your side while in flat bed without using bedrails? 3 (P)   -JS 2  -HC    Moving from lying on back to sitting on the side of a flat bed without bedrails? 2 (P)   -JS 2  -HC    Moving to and from a bed to a chair (including a wheelchair)? 3 (P)   - 2  -HC    Standing up from a chair using your arms (e.g., wheelchair, bedside chair)? 3 (P)   - 2  -HC    Climbing 3-5 steps with a railing? 2 (P)   -JS 2  -HC    To walk in hospital room? 3 (P)   - 2  -HC    AM-PAC 6 Clicks Score (PT) 16 (P)   - 12  -HC    Highest level of mobility 5 --> Static standing (P)   -JS 4 --> Transferred to chair/commode  -HC    Row Name 09/08/22 0917          Functional Assessment    Outcome Measure Options AM-PAC 6 Clicks Basic Mobility (PT) (P)   -           User Key  (r) = Recorded By, (t) = Taken By, (c) = Cosigned By    Initials Name Provider Type    Lenora Taylor, RN Registered Nurse    Dru Chadwick, PT Student PT Student                             Physical Therapy Education                 Title: PT OT SLP Therapies (In Progress)     Topic: Physical Therapy (In Progress)     Point: Mobility training (In Progress)     Learning Progress Summary           Patient Acceptance, E, NR by SAMANTA at 9/8/2022 0917                   Point: Home exercise program (Not Started)     Learner Progress:  Not documented in this visit.           Point: Body mechanics (In Progress)     Learning Progress Summary           Patient Acceptance, E, NR by SAMANTA at 9/8/2022 0917                   Point: Precautions (In Progress)     Learning Progress Summary           Patient Acceptance, E, NR by SAMANTA at 9/8/2022 0917                               User Key     Initials Effective Dates Name Provider Type Discipline     07/28/22 -  Dru Adam, PT Student PT Student PT              PT Recommendation and Plan  Planned Therapy Interventions (PT): (P) balance training, bed mobility training, gait training, patient/family education, strengthening, transfer training  Plan of Care Reviewed With: (P) patient  Progress: (P) no change  Outcome Evaluation: (P) PT initial evaluation complete for pt presenting with impaired cognition, strength, and balance. Pt ambulated 25ft in the room using a RW and requiring CGA. Pt required consistent cues to follow commands and for sequencing with the RW. Skilled IP PT warranted. Recommend d/c to SNF.     Time Calculation:    PT Charges     Row Name 09/08/22 0917             Time Calculation    Start Time 0917 (P)   -      PT Received On 09/08/22 (P)   -      PT Goal Re-Cert Due Date 09/18/22 (P)   -JS              Untimed Charges    PT Eval/Re-eval Minutes 50 (P)   -JS              Total Minutes    Untimed Charges Total Minutes 50 (P)   -JS       Total Minutes 50 (P)   -JS            User Key  (r) = Recorded By, (t) = Taken By, (c) = Cosigned By    Initials Name Provider Type    Dru Chadwick, PT Student PT Student              Therapy Charges for Today     Code Description Service Date Service Provider Modifiers Qty    [id number] HC PT EVAL MOD COMPLEXITY 4 9/8/2022 Dru Adam, PT Student GP 1          PT G-Codes  Outcome Measure Options: (P) AM-PAC 6 Clicks Basic Mobility (PT)  AM-PAC 6 Clicks Score (PT): (P) 16  AM-PAC 6 Clicks Score (OT): 13    Dru Adam, PT  Student  9/8/2022

## 2022-09-08 NOTE — PROGRESS NOTES
"  [facility]  PROGRESS NOTE    Date of Admission: 9/5/2022  Date of Service: 09/08/22    Primary Care Physician: Ngozi Davis DO    Chief Complaint: afib w rvr    Subjective      HPI: Patient sitting up comfortably in armchair in normal sinus rhythm.  No new cardiac complaints overnight.      Objective   Vitals: /76 (BP Location: Right arm, Patient Position: Lying)   Pulse 92   Temp 95.8 °F (35.4 °C) (Oral)   Resp 18   Ht 162.6 cm (64\")   Wt 57.7 kg (127 lb 3.2 oz)   SpO2 99%   BMI 21.83 kg/m²     Physical Exam:   GENERAL: Alert, cooperative, in no acute distress.   HEENT: Normocephalic, no jugular venous distention  HEART: Regular rate and rhythm; no murmurs, rubs or gallops  LUNGS: Clear to auscultation bilaterally. No wheezing, rales or rhonchi. dry  EXTREMITIES: No obvious deformities, cyanosis, or edema noted.     Results:  Results from last 7 days   Lab Units 09/08/22 0922 09/07/22 0640 09/06/22  0510   WBC 10*3/mm3 9.33 7.63 4.42   HEMOGLOBIN g/dL 9.5* 8.6* 9.2*   HEMATOCRIT % 29.6* 26.3* 28.2*   PLATELETS 10*3/mm3 238 190 204     Results from last 7 days   Lab Units 09/08/22 0922 09/07/22  0640 09/06/22  0510   SODIUM mmol/L 136 131* 139   POTASSIUM mmol/L 4.3 5.1 4.8   CHLORIDE mmol/L 100 97* 100   CO2 mmol/L 20.0* 22.0 23.0   BUN mg/dL 52* 46* 24*   CREATININE mg/dL 1.75* 1.98* 1.83*   GLUCOSE mg/dL 103* 104* 137*      Lab Results   Component Value Date    AST 41 (H) 09/08/2022    ALT 23 09/08/2022                     Results from last 7 days   Lab Units 09/08/22 0922 09/06/22  0510 09/05/22  1315   PROTIME Seconds 16.4* 16.9* 14.7*   INR  1.33* 1.38* 1.16*     Results from last 7 days   Lab Units 09/05/22  1315   TROPONIN T ng/mL <0.010     Results from last 7 days   Lab Units 09/08/22  0922   PROBNP pg/mL 33,559.0*         Intake/Output Summary (Last 24 hours) at 9/8/2022 1128  Last data filed at 9/8/2022 0400  Gross per 24 hour   Intake 770 ml "   Output 550 ml   Net 220 ml       I personally reviewed the patient's EKG/Telemetry data    Radiology Data:       Current Medications:  artificial tears, , Both Eyes, TID  aspirin, 81 mg, Oral, Daily  cetirizine, 10 mg, Oral, Daily  dexamethasone, 6 mg, Intravenous, Daily With Breakfast   Or  dexamethasone, 6 mg, Oral, Daily With Breakfast  gabapentin, 300 mg, Oral, Nightly  heparin (porcine), 5,000 Units, Subcutaneous, Q8H  levothyroxine, 50 mcg, Oral, Q AM  memantine, 5 mg, Oral, BID  metoprolol succinate XL, 25 mg, Oral, Daily  nystatin, 5 mL, Oral, 4x Daily  pantoprazole, 40 mg, Oral, Q AM  polyethylene glycol, 17 g, Oral, Daily  remdesivir, 100 mg, Intravenous, Q24H      Pharmacy Consult - Remdesivir,   sodium chloride, 75 mL/hr, Last Rate: 75 mL/hr (09/08/22 0948)         Assessment  1. Acute Hypoxic Respiratory Failure  2. New Afib w RVR  3. Nonischemic Cardiomyopathy with Acute on Chronic Systolic Heart Failure  a. Echo 9/6/22: EF 21-25%, small pericardial effusion, bilateral pleural effusions, moderate MR, moderate to severe TR, RVSP 56  4. COVID19 Infection  1. Receiving remdesivir treatment  5. Hypertension  6. Hyperlipidemia  7. CKD  1. Cr 1.75 this AM 9/8/22        Plan   1. Patient has been in sinus rhythm since around 1800 9/5 with no recurrence of atrial fibrillation. Converted after extra dose of metoprolol tartrate. Continue to monitor on home metoprolol succinate 25 mg daily. Anticoagulation not necessary d/t brevity of afib, but will consider if it recurs.  2. Troponin negative x2.  No indication for further ischemic evaluation. Patient has history of nonischemic cardiomyopathy. Denies current angina. Acute COVID19 illness is more likely to be precipitating factor.   3. Agree with holding further diuretics with creatinine of 1.75 as long as o2 sats remain stable and physical exams do not reveal fluid overload.  Continue to monitor volume status closely.   4. Continue lipitor for HLD.    Signed  by Anup Holly PA-C

## 2022-09-08 NOTE — PLAN OF CARE
Goal Outcome Evaluation:           Progress: no change  Outcome Evaluation: Pt VSS. RA. Slept well this shift. No reports of pain or SOA. NSR on tele. No acute overnight events. Continue plan of care

## 2022-09-08 NOTE — CASE MANAGEMENT/SOCIAL WORK
"Continued Stay Note  [facility]     Patient Name: Scott Diego  MRN: [id number]  Today's Date: 9/8/2022    Admit Date: 9/5/2022     Discharge Plan     Row Name 09/08/22 1131       Plan    Plan Home w/HH vs. SNF    Plan Comments Discussed patient in MDR.  Patient will complete Remdesivir tomorrow, 9/9.  Spoke with patient's son by phone.  Therapy is recommending SNF at discharge.  However, patient is in Covid isolation until 9/25.  Per PT note, patient ambulated 25 ft w/RW and CGA.  Explained to patient's son that CM would not be able to make referrals until patient is close to coming out of isolation.  Also explained, that patient's dementia could be a barrier to placement and there are only a few facilities in network with her insurance.  Son mentioned that patient has \"been kicked out of rehab before\" due to dementia related issues.  Discussed the possibility of patient coming home with home health and that patient would require 24/7 supervision.  CM will follow up with patient's son tomorrow to see if he would like to take his mother home or pursue SNF.    Final Discharge Disposition Code 30 - still a patient               Discharge Codes    No documentation.               Expected Discharge Date and Time     Expected Discharge Date Expected Discharge Time    Sep 16, 2022             Ann Palacios RN    "

## 2022-09-09 LAB
ALBUMIN SERPL-MCNC: 3.6 G/DL (ref 3.5–5.2)
ALBUMIN/GLOB SERPL: 0.9 G/DL
ALP SERPL-CCNC: 64 U/L (ref 39–117)
ALT SERPL W P-5'-P-CCNC: 18 U/L (ref 1–33)
ANION GAP SERPL CALCULATED.3IONS-SCNC: 12 MMOL/L (ref 5–15)
AST SERPL-CCNC: 29 U/L (ref 1–32)
BASOPHILS # BLD AUTO: 0.01 10*3/MM3 (ref 0–0.2)
BASOPHILS NFR BLD AUTO: 0.1 % (ref 0–1.5)
BILIRUB CONJ SERPL-MCNC: 0.2 MG/DL (ref 0–0.3)
BILIRUB SERPL-MCNC: 0.6 MG/DL (ref 0–1.2)
BUN SERPL-MCNC: 54 MG/DL (ref 8–23)
BUN/CREAT SERPL: 33.1 (ref 7–25)
CALCIUM SPEC-SCNC: 8.7 MG/DL (ref 8.2–9.6)
CHLORIDE SERPL-SCNC: 97 MMOL/L (ref 98–107)
CO2 SERPL-SCNC: 22 MMOL/L (ref 22–29)
CREAT SERPL-MCNC: 1.63 MG/DL (ref 0.57–1)
CRP SERPL-MCNC: 1.45 MG/DL (ref 0–0.5)
DEPRECATED RDW RBC AUTO: 58 FL (ref 37–54)
EGFRCR SERPLBLD CKD-EPI 2021: 29.5 ML/MIN/1.73
EOSINOPHIL # BLD AUTO: 0 10*3/MM3 (ref 0–0.4)
EOSINOPHIL NFR BLD AUTO: 0 % (ref 0.3–6.2)
ERYTHROCYTE [DISTWIDTH] IN BLOOD BY AUTOMATED COUNT: 16.7 % (ref 12.3–15.4)
ERYTHROCYTE [SEDIMENTATION RATE] IN BLOOD: 82 MM/HR (ref 0–30)
GLOBULIN UR ELPH-MCNC: 4.1 GM/DL
GLUCOSE SERPL-MCNC: 111 MG/DL (ref 65–99)
HCT VFR BLD AUTO: 28 % (ref 34–46.6)
HGB BLD-MCNC: 9.2 G/DL (ref 12–15.9)
IMM GRANULOCYTES # BLD AUTO: 0.05 10*3/MM3 (ref 0–0.05)
IMM GRANULOCYTES NFR BLD AUTO: 0.5 % (ref 0–0.5)
LYMPHOCYTES # BLD AUTO: 0.82 10*3/MM3 (ref 0.7–3.1)
LYMPHOCYTES NFR BLD AUTO: 9 % (ref 19.6–45.3)
MAGNESIUM SERPL-MCNC: 2.4 MG/DL (ref 1.7–2.3)
MCH RBC QN AUTO: 31.4 PG (ref 26.6–33)
MCHC RBC AUTO-ENTMCNC: 32.9 G/DL (ref 31.5–35.7)
MCV RBC AUTO: 95.6 FL (ref 79–97)
MONOCYTES # BLD AUTO: 1.51 10*3/MM3 (ref 0.1–0.9)
MONOCYTES NFR BLD AUTO: 16.5 % (ref 5–12)
NEUTROPHILS NFR BLD AUTO: 6.77 10*3/MM3 (ref 1.7–7)
NEUTROPHILS NFR BLD AUTO: 73.9 % (ref 42.7–76)
NRBC BLD AUTO-RTO: 0 /100 WBC (ref 0–0.2)
PLATELET # BLD AUTO: 222 10*3/MM3 (ref 140–450)
PMV BLD AUTO: 12.3 FL (ref 6–12)
POTASSIUM SERPL-SCNC: 4.6 MMOL/L (ref 3.5–5.2)
POTASSIUM SERPL-SCNC: 4.8 MMOL/L (ref 3.5–5.2)
PROT SERPL-MCNC: 7.7 G/DL (ref 6–8.5)
RBC # BLD AUTO: 2.93 10*6/MM3 (ref 3.77–5.28)
SODIUM SERPL-SCNC: 131 MMOL/L (ref 136–145)
WBC NRBC COR # BLD: 9.16 10*3/MM3 (ref 3.4–10.8)

## 2022-09-09 PROCEDURE — 84132 ASSAY OF SERUM POTASSIUM: CPT | Performed by: NURSE PRACTITIONER

## 2022-09-09 PROCEDURE — 25010000002 HEPARIN (PORCINE) PER 1000 UNITS: Performed by: HOSPITALIST

## 2022-09-09 PROCEDURE — 80053 COMPREHEN METABOLIC PANEL: CPT | Performed by: HOSPITALIST

## 2022-09-09 PROCEDURE — 97535 SELF CARE MNGMENT TRAINING: CPT

## 2022-09-09 PROCEDURE — 85652 RBC SED RATE AUTOMATED: CPT | Performed by: HOSPITALIST

## 2022-09-09 PROCEDURE — 86140 C-REACTIVE PROTEIN: CPT | Performed by: HOSPITALIST

## 2022-09-09 PROCEDURE — 25010000002 REMDESIVIR 100 MG/20ML SOLUTION 1 EACH VIAL: Performed by: HOSPITALIST

## 2022-09-09 PROCEDURE — 82248 BILIRUBIN DIRECT: CPT | Performed by: HOSPITALIST

## 2022-09-09 PROCEDURE — 85025 COMPLETE CBC W/AUTO DIFF WBC: CPT | Performed by: HOSPITALIST

## 2022-09-09 PROCEDURE — 83735 ASSAY OF MAGNESIUM: CPT | Performed by: NURSE PRACTITIONER

## 2022-09-09 PROCEDURE — 99232 SBSQ HOSP IP/OBS MODERATE 35: CPT | Performed by: NURSE PRACTITIONER

## 2022-09-09 RX ADMIN — PANTOPRAZOLE SODIUM 40 MG: 40 TABLET, DELAYED RELEASE ORAL at 08:18

## 2022-09-09 RX ADMIN — REMDESIVIR 100 MG: 100 INJECTION, POWDER, LYOPHILIZED, FOR SOLUTION INTRAVENOUS at 12:20

## 2022-09-09 RX ADMIN — LEVOTHYROXINE SODIUM 50 MCG: 50 TABLET ORAL at 06:37

## 2022-09-09 RX ADMIN — MINERAL OIL AND WHITE PETROLATUM: 150; 830 OINTMENT OPHTHALMIC at 22:14

## 2022-09-09 RX ADMIN — Medication 10 ML: at 22:13

## 2022-09-09 RX ADMIN — NYSTATIN 500000 UNITS: 100000 SUSPENSION ORAL at 12:21

## 2022-09-09 RX ADMIN — DEXAMETHASONE 6 MG: 2 TABLET ORAL at 08:17

## 2022-09-09 RX ADMIN — HEPARIN SODIUM 5000 UNITS: 5000 INJECTION INTRAVENOUS; SUBCUTANEOUS at 06:37

## 2022-09-09 RX ADMIN — HEPARIN SODIUM 5000 UNITS: 5000 INJECTION INTRAVENOUS; SUBCUTANEOUS at 22:13

## 2022-09-09 RX ADMIN — MINERAL OIL AND WHITE PETROLATUM: 150; 830 OINTMENT OPHTHALMIC at 08:19

## 2022-09-09 RX ADMIN — MEMANTINE 5 MG: 10 TABLET ORAL at 22:13

## 2022-09-09 RX ADMIN — CETIRIZINE HYDROCHLORIDE 10 MG: 10 TABLET, FILM COATED ORAL at 08:18

## 2022-09-09 RX ADMIN — HEPARIN SODIUM 5000 UNITS: 5000 INJECTION INTRAVENOUS; SUBCUTANEOUS at 13:37

## 2022-09-09 RX ADMIN — DEXTROMETHORPHAN POLISTIREX 60 MG: 30 SUSPENSION ORAL at 03:13

## 2022-09-09 RX ADMIN — NYSTATIN 500000 UNITS: 100000 SUSPENSION ORAL at 16:48

## 2022-09-09 RX ADMIN — Medication 5 MG: at 22:13

## 2022-09-09 RX ADMIN — GABAPENTIN 300 MG: 300 CAPSULE ORAL at 22:13

## 2022-09-09 RX ADMIN — ALPRAZOLAM 0.25 MG: 0.25 TABLET ORAL at 17:48

## 2022-09-09 RX ADMIN — NYSTATIN 500000 UNITS: 100000 SUSPENSION ORAL at 22:13

## 2022-09-09 RX ADMIN — MEMANTINE 5 MG: 10 TABLET ORAL at 08:17

## 2022-09-09 RX ADMIN — POLYETHYLENE GLYCOL 3350 17 G: 17 POWDER, FOR SOLUTION ORAL at 08:19

## 2022-09-09 RX ADMIN — ALPRAZOLAM 0.25 MG: 0.25 TABLET ORAL at 08:17

## 2022-09-09 RX ADMIN — METOPROLOL SUCCINATE 25 MG: 25 TABLET, EXTENDED RELEASE ORAL at 08:17

## 2022-09-09 RX ADMIN — MINERAL OIL AND WHITE PETROLATUM: 150; 830 OINTMENT OPHTHALMIC at 16:48

## 2022-09-09 RX ADMIN — ASPIRIN 81 MG: 81 TABLET, COATED ORAL at 08:18

## 2022-09-09 NOTE — PLAN OF CARE
Goal Outcome Evaluation:  Plan of Care Reviewed With: patient        Progress: no change  Outcome Evaluation: Pt remains confused and anxious, 5th dose of remdesivir completed today, pt c/o soa at times, however O2 sat 92-96% on room air, and 100% on 2L. VSS.

## 2022-09-09 NOTE — CASE MANAGEMENT/SOCIAL WORK
Continued Stay Note  [facility]     Patient Name: Scott Diego  MRN: [id number]  Today's Date: 9/9/2022    Admit Date: 9/5/2022     Discharge Plan     Row Name 09/09/22 1451       Plan    Plan Home w/Home Health    Patient/Family in Agreement with Plan yes    Plan Comments Spoke with patient' son by phone.  He is agreeable to take patient home with home health at discharge.  Referral accepted by [facility] at [facility] for PT, OT and skilled nursing.  Explained to son that patient would likely be medically ready for discharge tomorrow.  He will transport.  Nurse to call when patient has discharge orders.  CM will continue to follow.    Final Discharge Disposition Code 06 - home with home health care               Discharge Codes    No documentation.               Expected Discharge Date and Time     Expected Discharge Date Expected Discharge Time    Sep 16, 2022             Ann Palacios RN

## 2022-09-09 NOTE — PLAN OF CARE
"Goal Outcome Evaluation:  Plan of Care Reviewed With: patient        Progress: no change  Outcome Evaluation: Patient oriented to self only. Confused throughout the night. Patient frequently getting out of bed unassisted. Patient redirected anc re-educated repeatedly. Patient complained that \"breathing was difficult\" once this shift. Lungs clear, no use of accessory muscle noted. Repirations regular. Per MD note patient on 2L NC HS at home. Patient place on 2L NC and patient reported immediate improvement in breathing. Oxygen saturation greater then 96% on room air. Patient normal sinus rhythm with frequent PVC. PVC noted trigeminal at times. At approximately 0130 patient noted to have 6 beat run of GUILLAUME vitale RN at bedside during incident. Patient non symptomatic. Vitals stable. Hospitalist aprn notified no new orders, cardiology to be notified. Cardiology aprn notified. Magnesium and potassium checked per order. Telemetry strip printed and placed in chart. Patient continues to have frequent PVC's with trigeminal PVC intermittantly.  "

## 2022-09-09 NOTE — THERAPY TREATMENT NOTE
Patient Name: Scott Diego  : 4/10/1930    MRN: [id number]                              Today's Date: 2022       Admit Date: 2022    Visit Dx:     ICD-10-CM ICD-9-CM   1. Hypoxia  R09.02 799.02   2. COVID-19  U07.1 079.89   3. Acute on chronic congestive heart failure, unspecified heart failure type (HCC)  I50.9 428.0   4. Chronic renal impairment, unspecified CKD stage  N18.9 585.9   5. Altered mental status, unspecified altered mental status type  R41.82 780.97     Patient Active Problem List   Diagnosis   • Idiopathic peripheral neuropathy   • Neck pain   • Mild cognitive impairment   • Anemia   • Hypertension   • Dehydration   • History of CVA (cerebrovascular accident)   • Dementia (HCC)   • Left bundle branch block   • Orthostatic dizziness   • Chronic systolic heart failure (HCC)   • Normocytic anemia   • CARYN (acute kidney injury) (HCC)   • Dyspnea   • Hypoxia   • Hypothyroid   • Pericardial effusion   • Acute respiratory failure with hypoxia (HCC)   • Right lower lobe pneumonia   • Ventricular ectopy   • Migraine without aura and without status migrainosus, not intractable   • Acute hypoxemic respiratory failure due to COVID-19 (HCC)   • Dyspnea due to COVID-19   • COVID-19 virus detected   • Encephalopathy due to COVID-19 virus   • Acute on chronic congestive heart failure (HCC)   • Anxiety     Past Medical History:   Diagnosis Date   • Anxiety    • Congestive heart failure (HCC)    • Coronary artery disease    • Dementia (HCC)    • Depression    • Hyperlipidemia    • Malignant neoplasm (HCC)    • Memory loss    • Peripheral neuropathy    • Stroke (HCC)     mini stroke   • Systolic heart failure (HCC) 2017    Chronic/compensated (EF 25%)     Past Surgical History:   Procedure Laterality Date   • CARDIAC CATHETERIZATION     • CHOLECYSTECTOMY     • EYE SURGERY     • HYSTERECTOMY        General Information     Row Name 22 0855          OT Time and Intention    Document Type therapy  note (daily note)  -     Mode of Treatment individual therapy;occupational therapy  -     Row Name 09/09/22 0855          General Information    Patient Profile Reviewed yes  -ASHKAN     Existing Precautions/Restrictions fall;other (see comments)  airborne/contact  -     Barriers to Rehab cognitive status;previous functional deficit  -     Row Name 09/09/22 0855          Cognition    Orientation Status (Cognition) oriented to;person;disoriented to;place;situation;time  pt. stating she is in the recovery room in the basement  -     Row Name 09/09/22 0855          Safety Issues, Functional Mobility    Safety Issues Affecting Function (Mobility) ability to follow commands;awareness of need for assistance;insight into deficits/self-awareness;judgment;problem-solving;safety precaution awareness;safety precautions follow-through/compliance;sequencing abilities  -     Impairments Affecting Function (Mobility) balance;cognition;endurance/activity tolerance;strength  -           User Key  (r) = Recorded By, (t) = Taken By, (c) = Cosigned By    Initials Name Provider Type    ASHKAN Rosario Ballesteros OT Occupational Therapist                 Mobility/ADL's     Row Name 09/09/22 0856          Bed Mobility    Supine-Sit [place] (Bed Mobility) contact guard;verbal cues  -     Bed Mobility, Safety Issues cognitive deficits limit understanding  -     Assistive Device (Bed Mobility) bed rails;head of bed elevated  -     Comment, (Bed Mobility) pt. was able to sit up today with one cue to move to EOB  -     Row Name 09/09/22 0856          Transfers    Transfers sit-stand transfer;stand-sit transfer  -     Sit-Stand [place] (Transfers) contact guard  -     Stand-Sit [place] (Transfers) verbal cues;minimum assist (75% patient effort)  -     Row Name 09/09/22 0856          Sit-Stand Transfer    Assistive Device (Sit-Stand Transfers) walker, front-wheeled  -     Row Name 09/09/22 0856           Stand-Sit Transfer    Assistive Device (Stand-Sit Transfers) walker, front-wheeled  -     Comment, (Stand-Sit Transfer) pt. needed several cues to go around bed to chair, pt. bumping front of walker into chair, but with cues was able to turn, but did not reach back despite cues so min a to control lowering, pt. when cued to scoot back in chair initially just stood again  -     Row Name 09/09/22 [facility]          Functional Mobility    Functional Mobility- Ind. Level minimum assist (75% patient effort)  -     Functional Mobility- Device walker, front-wheeled  -     Functional Mobility-Distance (Feet) --  to bathroom and back to recliner, grossly 20 ft  -     Functional Mobility- Comment pt. needed cues to step closer to walker, some assist for hand placement  and steering walker  -     Row Name 09/09/22 0856          Activities of Daily Living    BADL Assessment/Intervention upper body dressing;grooming;feeding;bathing  -     Row Name 09/09/22 [facility]          Grooming Assessment/Training    [place] Level (Grooming) hair care, combing/brushing;wash face, hands;set up;verbal cues;oral care regimen;minimum assist (75% patient effort)  -ASHKAN     Position (Grooming) sink side;unsupported standing  -ASHKAN     Comment, (Grooming) pt. stood at sink extensive time with good balance, cues to sequence grooming tasks, dentures donned  -     Row Name 09/09/22 [facility]          Upper Body Dressing Assessment/Training    [place] Level (Upper Body Dressing) don;pajama/robe;moderate assist (50% patient effort)  -ASHKAN     Position (Upper Body Dressing) edge of bed sitting  -     Row Name 09/09/22 0856          Self-Feeding Assessment/Training    [place] Level (Feeding) liquids to mouth;scoop food and bring to mouth;supervision;verbal cues;prepare tray/open items;maximum assist (25% patient effort)  -ASHKAN     Position (Self-Feeding) supported sitting  -ASHKAN     Comment, (Feeding) pt. needed several cues to initiate self  feeding then initiating on own, pt. reported difficulty chewing and that Boost stuck in throat.  -     Row Name 09/09/22 0856          Bathing Assessment/Intervention    [place] Level (Bathing) verbal cues;minimum assist (75% patient effort)  -ASHKAN     Position (Bathing) unsupported standing  -ASHKAN     Comment, (Bathing) pt. with cues and assist to loosen gown and place powder in hand, pt. washed axillary area and powder placement and pt. pushed down undergarement and wash from and back raphael areas  -           User Key  (r) = Recorded By, (t) = Taken By, (c) = Cosigned By    Initials Name Provider Type    Rosario Richardson OT Occupational Therapist               Obj/Interventions     Row Name 09/09/22 0907          Motor Skills    Functional Endurance improved with abililty to stand sink side grossly 20 minutes  -     Row Name 09/09/22 0907          Balance    Static Sitting Balance supervision  -     Dynamic Sitting Balance standby assist  [facility] [place]  -ASHKAN     Position, Sitting Balance unsupported;sitting edge of bed  -ASHKAN     Static Standing Balance standby assist  -ASHKAN     Dynamic Standing Balance standby assist  grooming and bathing sinkside  -AHSKAN     Balance Interventions sit to stand;occupation based/functional task  -           User Key  (r) = Recorded By, (t) = Taken By, (c) = Cosigned By    Initials Name Provider Type    Rosario Rcihardson OT Occupational Therapist               Goals/Plan     Row Name 09/09/22 0910          Bed Mobility Goal 1 (OT)    Progress/Outcomes (Bed Mobility Goal 1, OT) goal partially met  -     Row Name 09/09/22 0910          Transfer Goal 1 (OT)    Progress/Outcome (Transfer Goal 1, OT) goal ongoing  -     Row Name 09/09/22 0910          Dressing Goal 1 (OT)    Progress/Outcome (Dressing Goal 1, OT) goal ongoing  -[facility] Name 09/09/22 0910          Toileting Goal 1 (OT)    Progress/Outcome (Toileting Goal 1, OT) goal ongoing  -[facility] Name 09/09/22 0910           Grooming Goal 1 (OT)    Progress/Outcome (Grooming Goal 1, OT) goal partially met  -ASHKAN           User Key  (r) = Recorded By, (t) = Taken By, (c) = Cosigned By    Initials Name Provider Type    Rosario Richardson, OT Occupational Therapist               Clinical Impression     Row Name 09/09/22 0908          Pain Assessment    Pretreatment Pain Rating 0/10 - no pain  -     Posttreatment Pain Rating 0/10 - no pain  -[facility] Name 09/09/22 0908          Plan of Care Review    Plan of Care Reviewed With patient  -ASHKAN     Progress improving  -     Outcome Evaluation Pt. oriented to self  only.  Pt. needed less cueing today to initiate and sequence tasks.  Improved endurance standing sink side SBA grossly 20 minutes completing bathing and grooming task SBA to min A with cueing. If pt. continues to improve and family able to give 24/7 supervision pt. may be able to return home vs SNF. Continue OT POC.  -[facility] Name 09/09/22 0908          Therapy Assessment/Plan (OT)    Therapy Frequency (OT) daily  -[facility] Name 09/09/22 0908          Therapy Plan Review/Discharge Plan (OT)    Anticipated Discharge Disposition (OT) skilled nursing facility  -[facility] Name 09/09/22 0908          Vital Signs    Pre Systolic BP Rehab 133  -ASHKAN     Pre Treatment Diastolic BP 83  -ASHKAN     Pretreatment Heart Rate (beats/min) 79  -ASHKAN     Posttreatment Heart Rate (beats/min) 79  -ASHKAN     Pre SpO2 (%) 99  -ASHKAN     O2 Delivery Pre Treatment nasal cannula  per nurse OK off 02 NC  -ASHKAN     O2 Delivery Intra Treatment room air  -ASHKAN     Post SpO2 (%) 95  -ASHKAN     O2 Delivery Post Treatment room air  -ASHKAN     Pre Patient Position Supine  -ASHKAN     Intra Patient Position Standing  -ASHKAN     Post Patient Position Sitting  -[facility] Name 09/09/22 0908          Positioning and Restraints    Pre-Treatment Position in bed  -ASHKAN     Post Treatment Position chair  -ASHKAN     In Chair reclined;call light within reach;encouraged to call for assist;exit  alarm on;waffle cushion;legs elevated  nurse aware pt. up  -ASHKAN           User Key  (r) = Recorded By, (t) = Taken By, (c) = Cosigned By    Initials Name Provider Type    Rosario Richardson OT Occupational Therapist               Outcome Measures     Row Name 09/09/22 0911          How much help from another is currently needed...    Putting on and taking off regular lower body clothing? 3  -ASHKAN     Bathing (including washing, rinsing, and drying) 3  -ASHKAN     Toileting (which includes using toilet bed pan or urinal) 3  -ASHKAN     Putting on and taking off regular upper body clothing 3  -ASHKAN     Taking care of personal grooming (such as brushing teeth) 3  -ASHKAN     Eating meals 3  -ASHKAN     AM-PAC 6 Clicks Score (OT) 18  -ASHKAN     Row Name 09/09/22 0911          How much help from another person do you currently need...    Turning from your back to your side while in flat bed without using bedrails? --  but min to mod cues for all tasks  -ASHKAN     Row Name 09/09/22 0911          Functional Assessment    Outcome Measure Options AM-PAC 6 Clicks Daily Activity (OT)  -ASHKAN           User Key  (r) = Recorded By, (t) = Taken By, (c) = Cosigned By    Initials Name Provider Type    Rosario Richardson OT Occupational Therapist                Occupational Therapy Education                 Title: PT OT SLP Therapies (In Progress)     Topic: Occupational Therapy (In Progress)     Point: ADL training (In Progress)     Description:   Instruct learner(s) on proper safety adaptation and remediation techniques during self care or transfers.   Instruct in proper use of assistive devices.              Learning Progress Summary           Patient Acceptance, E, NR by ASHKAN at 9/9/2022 0912    Comment: re-orientation, sequencing and safety BADL's and transfers, stance in walker    Acceptance, E,D, VU,NR by ASHKAN at 9/6/2022 0944    Comment: bed mobility, wx use, transfer and BADL initiation and sequencing/safety, reason for consult                   Point:  Home exercise program (Not Started)     Description:   Instruct learner(s) on appropriate technique for monitoring, assisting and/or progressing therapeutic exercises/activities.              Learner Progress:  Not documented in this visit.          Point: Precautions (In Progress)     Description:   Instruct learner(s) on prescribed precautions during self-care and functional transfers.              Learning Progress Summary           Patient Acceptance, E, NR by ASHKAN at 9/9/2022 0912    Comment: re-orientation, sequencing and safety BADL's and transfers, stance in walker    Acceptance, E,D, VU,NR by ASHKAN at 9/6/2022 0944    Comment: bed mobility, wx use, transfer and BADL initiation and sequencing/safety, reason for consult                   Point: Body mechanics (In Progress)     Description:   Instruct learner(s) on proper positioning and spine alignment during self-care, functional mobility activities and/or exercises.              Learning Progress Summary           Patient Acceptance, E, NR by ASHKAN at 9/9/2022 0912    Comment: re-orientation, sequencing and safety BADL's and transfers, stance in walker                               User Key     Initials Effective Dates Name Provider Type Discipline     06/16/21 -  Rosario Ballesteros, OT Occupational Therapist OT              OT Recommendation and Plan  Planned Therapy Interventions (OT): activity tolerance training, BADL retraining, cognitive/visual perception retraining, functional balance retraining, occupation/activity based interventions, ROM/therapeutic exercise, strengthening exercise, patient/caregiver education/training, transfer/mobility retraining  Therapy Frequency (OT): daily  Plan of Care Review  Plan of Care Reviewed With: patient  Progress: improving  Outcome Evaluation: Pt. oriented to self  only.  Pt. needed less cueing today to initiate and sequence tasks.  Improved endurance standing sink side SBA grossly 20 minutes completing bathing and grooming  task SBA to min A with cueing. If pt. continues to improve and family able to give 24/7 supervision pt. may be able to return home vs SNF. Continue OT POC.     Time Calculation:    Time Calculation- OT     Row Name 09/09/22 0914             Time Calculation- OT    OT Start Time 0815  -ASHKAN      OT Received On 09/09/22  -ASHKAN      OT Goal Re-Cert Due Date 09/16/22  -ASHKAN              Timed Charges    79608 - OT Therapeutic Activity Minutes 7  -ASHKAN      57577 - OT Self Care/Mgmt Minutes 29  -ASHKAN              Total Minutes    Timed Charges Total Minutes 36  -ASHKAN       Total Minutes 36  -ASHKAN            User Key  (r) = Recorded By, (t) = Taken By, (c) = Cosigned By    Initials Name Provider Type    Rosario Richardson OT Occupational Therapist              Therapy Charges for Today     Code Description Service Date Service Provider Modifiers Qty    [id number] HC OT SELF CARE/MGMT/TRAIN EA 15 MIN 9/9/2022 Rosario Ballesteros OT GO 2               Rosario Ballesteros OT  9/9/2022

## 2022-09-09 NOTE — PROGRESS NOTES
[facility] Medicine Services  PROGRESS NOTE    Patient Name: Scott Diego  : 4/10/1930  MRN: [id number]    Date of Admission: 2022  Primary Care Physician: Ngozi Davis DO    Subjective   Subjective     CC:  F/U COVID     HPI:  Patient is sitting up in chair in NAD eating breakfast. She states she feels good. Alert to name and can tell me where she lives.     ROS:  UTo reliable ROS          Objective   Objective     Vital Signs:   Temp:  [94.7 °F (34.8 °C)-97.2 °F (36.2 °C)] 96.9 °F (36.1 °C)  Heart Rate:  [66-92] 81  Resp:  [16-18] 16  BP: (117-133)/(72-91) 133/83  Flow (L/min):  [2] 2     Physical Exam:  Constitutional: No acute distress, awake, alert  HENT: NCAT, mucous membranes moist  Respiratory: Clear to auscultation bilaterally, respiratory effort normal room air 98%   Cardiovascular: RRR, no murmurs, rubs, or gallops  Gastrointestinal: Positive bowel sounds, soft, nontender, nondistended  Musculoskeletal: No bilateral ankle edema  Psychiatric: Appropriate affect, cooperative  Neurologic: Oriented to self, strength symmetric in all extremities, speech clear  Skin: No rashes      Results Reviewed:  LAB RESULTS:      Lab 22  0803 22  0922 22  0640 22  0510 22  2057 22  1814 22  1315   WBC 9.16 9.33 7.63 4.42  --   --  7.87   HEMOGLOBIN 9.2* 9.5* 8.6* 9.2*  --   --  10.3*   HEMATOCRIT 28.0* 29.6* 26.3* 28.2*  --   --  32.7*   PLATELETS 222 238 190 204  --   --  229   NEUTROS ABS 6.77 6.74 5.72 3.51  --   --  6.07   IMMATURE GRANS (ABS) 0.05 0.03 0.03 0.01  --   --  0.02   LYMPHS ABS 0.82 1.06 0.52* 0.43*  --   --  0.73   MONOS ABS 1.51* 1.49* 1.36* 0.46  --   --  0.90   EOS ABS 0.00 0.00 0.00 0.00  --   --  0.09   MCV 95.6 97.0 95.6 96.6  --   --  97.3*   SED RATE 82* 54* 57* 67*  --   --   --    CRP 1.45* 2.03* 3.02* 3.57*  --   --  1.63*   PROCALCITONIN  --   --   --   --   --   --  0.04   LACTATE  --   --   --   --  2.0 3.4*  2.6*   LDH  --   --   --   --   --   --  180   PROTIME  --  16.4*  --  16.9*  --   --  14.7*   D DIMER QUANT  --   --   --   --   --   --  0.47         Lab 09/09/22  0803 09/09/22 0100 09/08/22 0922 09/07/22 0640 09/06/22  0510 09/05/22  1814 09/05/22  1315   SODIUM 131*  --  136 131* 139  --  137   POTASSIUM 4.6 4.8 4.3 5.1 4.8  --  5.3*   CHLORIDE 97*  --  100 97* 100  --  101   CO2 22.0  --  20.0* 22.0 23.0  --  24.0   ANION GAP 12.0  --  16.0* 12.0 16.0*  --  12.0   BUN 54*  --  52* 46* 24*  --  16   CREATININE 1.63*  --  1.75* 1.98* 1.83* 1.57* 1.51*   EGFR 29.5*  --  27.1* 23.3* 25.6* 30.8* 32.3*   GLUCOSE 111*  --  103* 104* 137*  --  115*   CALCIUM 8.7  --  9.1 8.6 9.2  --  10.2*   MAGNESIUM  --  2.4* 2.4*  --   --   --  2.8*         Lab 09/09/22 0803 09/08/22 0922 09/07/22 0640 09/06/22  0510 09/05/22  1814 09/05/22  1315   TOTAL PROTEIN 7.7 8.4 7.6 8.1 8.8* 9.8*   ALBUMIN 3.60 4.00 3.40* 3.70 4.20 4.40   GLOBULIN 4.1 4.4 4.2 4.4  --  5.4   ALT (SGPT) 18 23 21 21 12 13   AST (SGOT) 29 41* 43* 43* 20 22   BILIRUBIN 0.6 0.6 0.6 1.0 0.7 0.7   INDIRECT BILIRUBIN  --   --   --   --  0.5  --    BILIRUBIN DIRECT 0.2 0.2 0.2 0.5* 0.2  --    ALK PHOS 64 75 76 92 71 71         Lab 09/08/22  0922 09/06/22  0510 09/05/22  1814 09/05/22  1315   PROBNP 33,559.0*  --  25,296.0* 13,441.0*   TROPONIN T  --   --   --  <0.010   PROTIME 16.4* 16.9*  --  14.7*   INR 1.33* 1.38*  --  1.16*                 Brief Urine Lab Results  (Last result in the past 365 days)      Color   Clarity   Blood   Leuk Est   Nitrite   Protein   CREAT   Urine HCG        09/05/22 1507 Yellow   Clear   Negative   Negative   Negative   Trace                 Microbiology Results Abnormal     None          No radiology results from the last 24 hrs    Results for orders placed during the hospital encounter of 09/05/22    Adult Transthoracic Echo Complete w/ Color, Spectral and Contrast if Necessary Per Protocol    Interpretation Summary  · Left  ventricular ejection fraction appears to be 21 - 25%. Left ventricular systolic function is severely decreased.  · Left ventricular diastolic function is consistent with (grade II w/high LAP) pseudonormalization.  · There is a small (<1cm) pericardial effusion.  · There is a left pleural effusion. There is a right pleural effusion.  · Moderate mitral valve regurgitation is present.  · Moderate to severe tricuspid valve regurgitation is present.  · Estimated right ventricular systolic pressure from tricuspid regurgitation is markedly elevated (>55 mmHg). Calculated right ventricular systolic pressure from tricuspid regurgitation is 56 mmHg.      I have reviewed the medications:  Scheduled Meds:artificial tears, , Both Eyes, TID  aspirin, 81 mg, Oral, Daily  cetirizine, 10 mg, Oral, Daily  dexamethasone, 6 mg, Intravenous, Daily With Breakfast   Or  dexamethasone, 6 mg, Oral, Daily With Breakfast  gabapentin, 300 mg, Oral, Nightly  heparin (porcine), 5,000 Units, Subcutaneous, Q8H  levothyroxine, 50 mcg, Oral, Q AM  memantine, 5 mg, Oral, BID  metoprolol succinate XL, 25 mg, Oral, Daily  nystatin, 5 mL, Oral, 4x Daily  pantoprazole, 40 mg, Oral, Q AM  polyethylene glycol, 17 g, Oral, Daily  remdesivir, 100 mg, Intravenous, Q24H      Continuous Infusions:Pharmacy Consult - Remdesivir,       PRN Meds:.•  acetaminophen **OR** acetaminophen  •  albuterol sulfate HFA  •  ALPRAZolam  •  dextromethorphan polistirex ER  •  ipratropium-albuterol  •  melatonin  •  Pharmacy Consult - Remdesivir  •  sodium chloride    Assessment & Plan   Assessment & Plan     Active Hospital Problems    Diagnosis  POA   • **COVID-19 virus detected [U07.1]  Yes   • Acute hypoxemic respiratory failure due to COVID-19 (HCC) [U07.1, J96.01]  Yes   • Dyspnea due to COVID-19 [U07.1, R06.00]  Yes   • Encephalopathy due to COVID-19 virus [U07.1, G93.49]  Yes   • Acute on chronic congestive heart failure (HCC) [I50.9]  Unknown   • Anxiety [F41.9]  Unknown    • Hypothyroid [E03.9]  Yes   • Hypoxia [R09.02]  Yes   • CARYN (acute kidney injury) (HCC) [N17.9]  Unknown   • Dementia (HCC) [F03.90]  Yes   • Hypertension [I10]  Yes      Resolved Hospital Problems   No resolved problems to display.        Brief Hospital Course to date:  Scott Diego is a 89 y.o. female with past medical history significant for CHF, mild dementia, HTN, hypothyroidism,? CKD, and nocturnal oxygen use.  Patient lives with her son and family since the passing of her  in January of this year.  Son reports progressive decline in mental status and general health since that time.  On chart review patient was admitted to [facility] 3/3 - 3/17/2022 and then since then has had 10 ER visits since 6/10/2022 (for nausea, abd pain, h/a and general fatigue) before presenting 9/5/22.   Pt found to have COVID, CHF exacerbation.     This patient's problems and plans were partially entered by my partner and updated as appropriate by me 09/09/22.  All problems are new to me today      COVID-19  Acute hypoxic respiratory failure -> resolved   -Continue Remdesivir, day 5  -Continue Decadron, plan for 10 days  -on RA  -Tested + 9/5, needs 10 days of isolation, out on  9/16     A/C HFrEF  New atrial fibrillation with RVR  Hx of prolonged QT  -Cardiology following, Patient has been in sinus rhythm since around 1800 9/5 with no recurrence of atrial fibrillation  -Continue BB  -No AC due to brevity of A fib   -Holding further diuretics due to renal function     CARYN/suspected CKD  -s/p IV lasix  -S/p IVF due to poor intake..   -- baseline creatinine  1.3-1.4     Nausea  -no complaints  -son noted ongoing nausea for several weeks, with random bouts of emesis  -CT A/P unremarkable on admission  -Seen by GI  -DC Famotidine  -Start Protonix 40mg daily  And miralax daily for constipation   -DC Scopolamine due to anti-cholinergic effects  -Recommend trial of OTC Guadalupe Root 500 mg TID for nausea at home   -    Recommend Dulcolax suppository if 3 days without a a bowel movement   -Symptoms may be exacerbated by her cognitive impairment/progressive dementia as she has not had nausea during her hospitalization nor required Zofran inpatient while her son states he has to give it every 4 hours at home.    -- follow up with [facility] in 3-4 weeks      Confusion  -likely due to acute illness and isolation  -with underlying dementia      Thrush  -Continue nystatin swish and swallow      Anxiety  -Continue Xanax prn     Hypothyroidism  -Continue synthroid     Debility/weakness  -PT/OT recommends SNF. Complicated as patient will not be out of isolation until 9/16. I have discussed with son today she will be discharged tomorrow home with him where she previously lived     Expected Discharge Location and Transportation: home with family if caregivers can be arranged by private vehicle   Expected Discharge Date: 9/10/22    DVT prophylaxis:  Medical DVT prophylaxis orders are present.     AM-PAC 6 Clicks Score (PT): 16 (09/08/22 0917)    CODE STATUS:   Code Status and Medical Interventions:   Ordered at: 09/05/22 1603     Level Of Support Discussed With:    Next of Kin (If No Surrogate)     Code Status (Patient has no pulse and is not breathing):    No CPR (Do Not Attempt to Resuscitate)     Medical Interventions (Patient has pulse or is breathing):    Full Support       Isa Russell, APRN  09/09/22

## 2022-09-09 NOTE — PROGRESS NOTES
"  [facility]  PROGRESS NOTE    Date of Admission: 9/5/2022  Date of Service: 09/09/22    Primary Care Physician: Ngozi Davis DO    Chief Complaint: new afib w rvr      Subjective      HPI: laying in bed in no distress with no complaints, alert to person and hospital setting but unaware of time or situation. NSR      Objective   Vitals: /83 (BP Location: Left arm, Patient Position: Lying)   Pulse 76   Temp 95.6 °F (35.3 °C) (Oral)   Resp 16   Ht 162.6 cm (64\")   Wt 57.7 kg (127 lb 3.2 oz)   SpO2 96%   BMI 21.83 kg/m²     Physical Exam:   GENERAL: Alert, cooperative, in no acute distress.   HEART: Regular rate and rhythm; no murmurs, rubs or gallops  LUNGS: Clear to auscultation bilaterally. No wheezing, rales or rhonchi. Nonlabored breathing  EXTREMITIES: No obvious deformities, cyanosis, or edema noted.       Results:  Results from last 7 days   Lab Units 09/08/22 0922 09/07/22  0640 09/06/22  0510   WBC 10*3/mm3 9.33 7.63 4.42   HEMOGLOBIN g/dL 9.5* 8.6* 9.2*   HEMATOCRIT % 29.6* 26.3* 28.2*   PLATELETS 10*3/mm3 238 190 204     Results from last 7 days   Lab Units 09/09/22  0100 09/08/22 0922 09/07/22  0640 09/06/22  0510   SODIUM mmol/L  --  136 131* 139   POTASSIUM mmol/L 4.8 4.3 5.1 4.8   CHLORIDE mmol/L  --  100 97* 100   CO2 mmol/L  --  20.0* 22.0 23.0   BUN mg/dL  --  52* 46* 24*   CREATININE mg/dL  --  1.75* 1.98* 1.83*   GLUCOSE mg/dL  --  103* 104* 137*      Lab Results   Component Value Date    AST 41 (H) 09/08/2022    ALT 23 09/08/2022                     Results from last 7 days   Lab Units 09/08/22  0922 09/06/22  0510 09/05/22  1315   PROTIME Seconds 16.4* 16.9* 14.7*   INR  1.33* 1.38* 1.16*     Results from last 7 days   Lab Units 09/05/22  1315   TROPONIN T ng/mL <0.010     Results from last 7 days   Lab Units 09/08/22  0922   PROBNP pg/mL 33,559.0*         Intake/Output Summary (Last 24 hours) at 9/9/2022 0731  Last data filed at 9/8/2022 " 0922  Gross per 24 hour   Intake 240 ml   Output --   Net 240 ml       I personally reviewed the patient's EKG/Telemetry data    Radiology Data: no new data    Current Medications:  artificial tears, , Both Eyes, TID  aspirin, 81 mg, Oral, Daily  cetirizine, 10 mg, Oral, Daily  dexamethasone, 6 mg, Intravenous, Daily With Breakfast   Or  dexamethasone, 6 mg, Oral, Daily With Breakfast  gabapentin, 300 mg, Oral, Nightly  heparin (porcine), 5,000 Units, Subcutaneous, Q8H  levothyroxine, 50 mcg, Oral, Q AM  memantine, 5 mg, Oral, BID  metoprolol succinate XL, 25 mg, Oral, Daily  nystatin, 5 mL, Oral, 4x Daily  pantoprazole, 40 mg, Oral, Q AM  polyethylene glycol, 17 g, Oral, Daily  remdesivir, 100 mg, Intravenous, Q24H      Pharmacy Consult - Remdesivir,         Assessment  1. Acute Hypoxic Respiratory Failure  2. New Afib w RVR  3. Nonischemic Cardiomyopathy with Acute on Chronic Systolic Heart Failure  a. Echo 9/6/22: EF 21-25%, small pericardial effusion, bilateral pleural effusions, moderate MR, moderate to severe TR, RVSP 56  4. COVID19 Infection  1. Receiving remdesivir treatment  5. Hypertension  6. Hyperlipidemia  7. CKD  1. Last Cr 1.75 9/8/22, eGFR 27        Plan   1. Patient has been in sinus rhythm since around 1800 9/5 with no recurrence of atrial fibrillation. Converted after extra dose of metoprolol tartrate. Continue to monitor on home metoprolol succinate 25 mg daily. Anticoagulation not necessary d/t brevity of afib, but will consider if it recurs.  2. Troponin negative x2.  No indication for further ischemic evaluation. Patient has history of nonischemic cardiomyopathy. Denies current angina. Acute COVID19 illness is more likely to be precipitating factor.   3. Agree with holding further diuretics with creatinine of 1.75 as long as o2 sats remain stable and physical exams do not reveal fluid overload.  Continue to monitor volume status closely.   4. Continue lipitor for HLD.    Anup Holly,  JULEE

## 2022-09-09 NOTE — PLAN OF CARE
Goal Outcome Evaluation:  Plan of Care Reviewed With: patient        Progress: improving  Outcome Evaluation: Pt. oriented to self  only.  Pt. needed less cueing today to initiate and sequence tasks.  Improved endurance standing sink side SBA grossly 20 minutes completing bathing and grooming task SBA to min A with cueing. If pt. continues to improve and family able to give 24/7 supervision pt. may be able to return home vs SNF. Continue OT POC.

## 2022-09-09 NOTE — PROGRESS NOTES
Patient has remained without complaints of nausea during this hospitalization.   She has not received Zofran.   She is tolerating a soft textured diet.         >>> Recommend once daily PPI   >>> Recommend once daily Miralax for chronic constipation        Will arrange outpatient follow up with [facility] in 3-4 weeks.   We will sign off at this time.  Please call for any questions or concerns.

## 2022-09-10 ENCOUNTER — READMISSION MANAGEMENT (OUTPATIENT)
Dept: CALL CENTER | Facility: HOSPITAL | Age: 87
End: 2022-09-10

## 2022-09-10 VITALS
SYSTOLIC BLOOD PRESSURE: 129 MMHG | HEIGHT: 64 IN | TEMPERATURE: 97.5 F | OXYGEN SATURATION: 100 % | HEART RATE: 74 BPM | RESPIRATION RATE: 20 BRPM | DIASTOLIC BLOOD PRESSURE: 68 MMHG | WEIGHT: 127.2 LBS | BODY MASS INDEX: 21.72 KG/M2

## 2022-09-10 LAB
ALBUMIN SERPL-MCNC: 3.2 G/DL (ref 3.5–5.2)
ALBUMIN/GLOB SERPL: 0.8 G/DL
ALP SERPL-CCNC: 53 U/L (ref 39–117)
ALT SERPL W P-5'-P-CCNC: 17 U/L (ref 1–33)
ANION GAP SERPL CALCULATED.3IONS-SCNC: 13 MMOL/L (ref 5–15)
AST SERPL-CCNC: 23 U/L (ref 1–32)
BASOPHILS # BLD AUTO: 0 10*3/MM3 (ref 0–0.2)
BASOPHILS NFR BLD AUTO: 0 % (ref 0–1.5)
BILIRUB SERPL-MCNC: 0.5 MG/DL (ref 0–1.2)
BUN SERPL-MCNC: 57 MG/DL (ref 8–23)
BUN/CREAT SERPL: 36.5 (ref 7–25)
CALCIUM SPEC-SCNC: 8.5 MG/DL (ref 8.2–9.6)
CHLORIDE SERPL-SCNC: 103 MMOL/L (ref 98–107)
CO2 SERPL-SCNC: 18 MMOL/L (ref 22–29)
CREAT SERPL-MCNC: 1.56 MG/DL (ref 0.57–1)
CRP SERPL-MCNC: 0.86 MG/DL (ref 0–0.5)
DEPRECATED RDW RBC AUTO: 57.9 FL (ref 37–54)
EGFRCR SERPLBLD CKD-EPI 2021: 31.1 ML/MIN/1.73
EOSINOPHIL # BLD AUTO: 0 10*3/MM3 (ref 0–0.4)
EOSINOPHIL NFR BLD AUTO: 0 % (ref 0.3–6.2)
ERYTHROCYTE [DISTWIDTH] IN BLOOD BY AUTOMATED COUNT: 16.4 % (ref 12.3–15.4)
ERYTHROCYTE [SEDIMENTATION RATE] IN BLOOD: 60 MM/HR (ref 0–30)
GLOBULIN UR ELPH-MCNC: 4.2 GM/DL
GLUCOSE SERPL-MCNC: 127 MG/DL (ref 65–99)
HCT VFR BLD AUTO: 27.1 % (ref 34–46.6)
HGB BLD-MCNC: 8.6 G/DL (ref 12–15.9)
IMM GRANULOCYTES # BLD AUTO: 0.04 10*3/MM3 (ref 0–0.05)
IMM GRANULOCYTES NFR BLD AUTO: 0.6 % (ref 0–0.5)
INR PPP: 1.42 (ref 0.84–1.13)
LYMPHOCYTES # BLD AUTO: 0.6 10*3/MM3 (ref 0.7–3.1)
LYMPHOCYTES NFR BLD AUTO: 9.3 % (ref 19.6–45.3)
MCH RBC QN AUTO: 30.7 PG (ref 26.6–33)
MCHC RBC AUTO-ENTMCNC: 31.7 G/DL (ref 31.5–35.7)
MCV RBC AUTO: 96.8 FL (ref 79–97)
MONOCYTES # BLD AUTO: 0.96 10*3/MM3 (ref 0.1–0.9)
MONOCYTES NFR BLD AUTO: 14.8 % (ref 5–12)
NEUTROPHILS NFR BLD AUTO: 4.88 10*3/MM3 (ref 1.7–7)
NEUTROPHILS NFR BLD AUTO: 75.3 % (ref 42.7–76)
NRBC BLD AUTO-RTO: 0 /100 WBC (ref 0–0.2)
NT-PROBNP SERPL-MCNC: ABNORMAL PG/ML (ref 0–1800)
PLATELET # BLD AUTO: 171 10*3/MM3 (ref 140–450)
PMV BLD AUTO: 12 FL (ref 6–12)
POTASSIUM SERPL-SCNC: 4.4 MMOL/L (ref 3.5–5.2)
PROT SERPL-MCNC: 7.4 G/DL (ref 6–8.5)
PROTHROMBIN TIME: 17.3 SECONDS (ref 11.4–14.4)
RBC # BLD AUTO: 2.8 10*6/MM3 (ref 3.77–5.28)
SODIUM SERPL-SCNC: 134 MMOL/L (ref 136–145)
WBC NRBC COR # BLD: 6.48 10*3/MM3 (ref 3.4–10.8)

## 2022-09-10 PROCEDURE — 83880 ASSAY OF NATRIURETIC PEPTIDE: CPT | Performed by: HOSPITALIST

## 2022-09-10 PROCEDURE — 85652 RBC SED RATE AUTOMATED: CPT | Performed by: HOSPITALIST

## 2022-09-10 PROCEDURE — 85025 COMPLETE CBC W/AUTO DIFF WBC: CPT | Performed by: HOSPITALIST

## 2022-09-10 PROCEDURE — 25010000002 HEPARIN (PORCINE) PER 1000 UNITS: Performed by: HOSPITALIST

## 2022-09-10 PROCEDURE — 86140 C-REACTIVE PROTEIN: CPT | Performed by: HOSPITALIST

## 2022-09-10 PROCEDURE — 63710000001 DEXAMETHASONE PER 0.25 MG: Performed by: HOSPITALIST

## 2022-09-10 PROCEDURE — 80053 COMPREHEN METABOLIC PANEL: CPT | Performed by: HOSPITALIST

## 2022-09-10 PROCEDURE — 85610 PROTHROMBIN TIME: CPT | Performed by: HOSPITALIST

## 2022-09-10 PROCEDURE — 99239 HOSP IP/OBS DSCHRG MGMT >30: CPT | Performed by: NURSE PRACTITIONER

## 2022-09-10 RX ORDER — DEXAMETHASONE 6 MG/1
6 TABLET ORAL
Qty: 4 TABLET | Refills: 0 | Status: SHIPPED | OUTPATIENT
Start: 2022-09-11 | End: 2022-09-15

## 2022-09-10 RX ORDER — ASCORBIC ACID 1000 MG
500 TABLET ORAL 3 TIMES DAILY
Qty: 90 CAPSULE | Refills: 0 | Status: SHIPPED | OUTPATIENT
Start: 2022-09-10 | End: 2022-10-10 | Stop reason: SDUPTHER

## 2022-09-10 RX ORDER — POLYETHYLENE GLYCOL 3350 17 G/17G
17 POWDER, FOR SOLUTION ORAL DAILY
Qty: 30 PACKET | Refills: 0 | Status: SHIPPED | OUTPATIENT
Start: 2022-09-11 | End: 2022-09-10 | Stop reason: SDUPTHER

## 2022-09-10 RX ORDER — PANTOPRAZOLE SODIUM 40 MG/1
40 TABLET, DELAYED RELEASE ORAL
Qty: 30 TABLET | Refills: 0 | Status: SHIPPED | OUTPATIENT
Start: 2022-09-11 | End: 2022-09-10 | Stop reason: SDUPTHER

## 2022-09-10 RX ORDER — DEXAMETHASONE 6 MG/1
6 TABLET ORAL
Qty: 4 TABLET | Refills: 0 | Status: SHIPPED | OUTPATIENT
Start: 2022-09-11 | End: 2022-09-10 | Stop reason: SDUPTHER

## 2022-09-10 RX ORDER — ASCORBIC ACID 1000 MG
500 TABLET ORAL 3 TIMES DAILY
Qty: 90 CAPSULE | Refills: 0 | Status: SHIPPED | OUTPATIENT
Start: 2022-09-10 | End: 2022-09-10 | Stop reason: SDUPTHER

## 2022-09-10 RX ORDER — POLYETHYLENE GLYCOL 3350 17 G/17G
17 POWDER, FOR SOLUTION ORAL DAILY
Qty: 510 G | Refills: 0 | Status: ON HOLD | OUTPATIENT
Start: 2022-09-11 | End: 2022-10-18

## 2022-09-10 RX ORDER — BISACODYL 10 MG
10 SUPPOSITORY, RECTAL RECTAL DAILY PRN
Qty: 15 SUPPOSITORY | Refills: 1 | Status: SHIPPED | OUTPATIENT
Start: 2022-09-10

## 2022-09-10 RX ORDER — PANTOPRAZOLE SODIUM 40 MG/1
40 TABLET, DELAYED RELEASE ORAL
Qty: 30 TABLET | Refills: 0 | Status: SHIPPED | OUTPATIENT
Start: 2022-09-11 | End: 2022-10-10 | Stop reason: SDUPTHER

## 2022-09-10 RX ADMIN — ALPRAZOLAM 0.25 MG: 0.25 TABLET ORAL at 04:13

## 2022-09-10 RX ADMIN — POLYETHYLENE GLYCOL 3350 17 G: 17 POWDER, FOR SOLUTION ORAL at 08:58

## 2022-09-10 RX ADMIN — HEPARIN SODIUM 5000 UNITS: 5000 INJECTION INTRAVENOUS; SUBCUTANEOUS at 05:02

## 2022-09-10 RX ADMIN — ASPIRIN 81 MG: 81 TABLET, COATED ORAL at 08:58

## 2022-09-10 RX ADMIN — PANTOPRAZOLE SODIUM 40 MG: 40 TABLET, DELAYED RELEASE ORAL at 05:02

## 2022-09-10 RX ADMIN — DEXAMETHASONE 6 MG: 2 TABLET ORAL at 08:58

## 2022-09-10 RX ADMIN — METOPROLOL SUCCINATE 25 MG: 25 TABLET, EXTENDED RELEASE ORAL at 08:58

## 2022-09-10 RX ADMIN — Medication 10 ML: at 08:58

## 2022-09-10 RX ADMIN — ALPRAZOLAM 0.25 MG: 0.25 TABLET ORAL at 15:30

## 2022-09-10 RX ADMIN — CETIRIZINE HYDROCHLORIDE 10 MG: 10 TABLET, FILM COATED ORAL at 08:59

## 2022-09-10 RX ADMIN — MEMANTINE 5 MG: 10 TABLET ORAL at 08:58

## 2022-09-10 RX ADMIN — MINERAL OIL AND WHITE PETROLATUM: 150; 830 OINTMENT OPHTHALMIC at 08:58

## 2022-09-10 RX ADMIN — LEVOTHYROXINE SODIUM 50 MCG: 50 TABLET ORAL at 05:02

## 2022-09-10 NOTE — PLAN OF CARE
Goal Outcome Evaluation:           Progress: no change  Outcome Evaluation: Pt remains on room air. NSR with frequent PVCs on monitor. Pt oriented to self only overnight. Pt became anxious and scared this morning. PRN xanax given. Otherwise no complaints. Ambulated to bathroom with assist x1. Will continue plan of care.

## 2022-09-10 NOTE — DISCHARGE SUMMARY
[facility] Medicine Services  DISCHARGE SUMMARY    Patient Name: Scott Diego  : 4/10/1930  MRN: [id number]    Date of Admission: 2022  2:30 PM  Date of Discharge:  9/10/2022  Primary Care Physician: Ngozi Davis DO    Consults     Date and Time Order Name Status Description    2022 11:46 AM Inpatient Gastroenterology Consult Completed     2022 12:34 AM Inpatient Cardiology Consult Completed           Hospital Course     Presenting Problem:   Hypoxia [R09.02]    Active Hospital Problems    Diagnosis  POA   • **COVID-19 virus detected [U07.1]  Yes   • Acute hypoxemic respiratory failure due to COVID-19 (HCC) [U07.1, J96.01]  Yes   • Dyspnea due to COVID-19 [U07.1, R06.00]  Yes   • Encephalopathy due to COVID-19 virus [U07.1, G93.49]  Yes   • Acute on chronic congestive heart failure (HCC) [I50.9]  Unknown   • Anxiety [F41.9]  Unknown   • Hypothyroid [E03.9]  Yes   • Hypoxia [R09.02]  Yes   • CARYN (acute kidney injury) (HCC) [N17.9]  Unknown   • Dementia (HCC) [F03.90]  Yes   • Hypertension [I10]  Yes      Resolved Hospital Problems   No resolved problems to display.          Hospital Course:  Scott Diego is a 89 y.o. female with past medical history significant for CHF, mild dementia, HTN, hypothyroidism,? CKD, and nocturnal oxygen use.  Patient lives with her son and family since the passing of her  in January of this year.  Son reports progressive decline in mental status and general health since that time.  On chart review patient was admitted to [facility] 3/3 - 3/17/2022 and then since then has had 10 ER visits since 6/10/2022 (for nausea, abd pain, h/a and general fatigue) before presenting 22.   Pt found to have COVID, CHF exacerbation. She received IV lasix but had increase in creatinine. She was given IVF's with improvement and has been stable on room air. She had Afib while in hospital which self resolved and had no further episodes.  Cardiology saw and did not recommend any AC due to short duration of Afib.       COVID-19  Acute hypoxic respiratory failure -> resolved   -s/p Remdesivir, day 5  -Continue Decadron, plan for 10 days  -on RA  -Tested + on 9/5, needs 10 days of isolation, out on  9/16     A/C HFrEF  New atrial fibrillation with RVR  Hx of prolonged QT  -Cardiology following, Patient has been in sinus rhythm since around 1800 9/5 with no recurrence of atrial fibrillation  -Continue BB  -No AC due to brevity of A fib   -Holding further diuretics due to renal function, will resume prn lasix at dc  -- pt follows with heart and valve clinic, has follow scheduled on 9/21     CARYN/suspected CKD  -s/p IV lasix  -S/p IVF due to poor intake..   -- baseline creatinine  1.3-1.4     Nausea  -no complaints  -son noted ongoing nausea for several weeks, with random bouts of emesis  -CT A/P unremarkable on admission  -Seen by GI  -DC Famotidine  -Start Protonix 40mg daily  And miralax daily for constipation   -DC Scopolamine due to anti-cholinergic effects  -Recommend trial of OTC Guadalupe Root 500 mg TID for nausea at home   -   Recommend Dulcolax suppository if 3 days without a a bowel movement   -Symptoms may be exacerbated by her cognitive impairment/progressive dementia as she has not had nausea during her hospitalization nor required Zofran inpatient while her son states he has to give it every 4 hours at home.    -- follow up with [facility] in 3-4 weeks      Confusion  -likely due to acute illness and isolation  -with underlying dementia      Thrush  -Continue nystatin swish and swallow   -- improved      Anxiety  -Continue Xanax prn     Hypothyroidism  -Continue synthroid     Debility/weakness  -PT/OT recommends SNF. Complicated as patient will not be out of isolation until 9/16. I have discussed with son and he feels comfortable bringing patient back home with him where she previously lived     patient has remained clinically stable and will be  discharged home today.       Discharge Follow Up Recommendations for outpatient labs/diagnostics:   follow up with pcp after 9/16  Follow up with [facility] 3-4 weeks   Follow up with heart and valve clinic on 9/21    Day of Discharge     HPI:   Patient is sitting up in bed in NAD. She states she feels good. Has been tolerating diet without any nausea. She is on room air 97%. No acute events overnight per nursing.     Review of Systems  Gen- No fevers, chills  CV- No chest pain, palpitations  Resp- No cough, dyspnea  GI- No N/V/D, abd pain        Vital Signs:   Temp:  [95.5 °F (35.3 °C)-97.9 °F (36.6 °C)] 97.9 °F (36.6 °C)  Heart Rate:  [66-82] 82  Resp:  [16-18] 18  BP: (113-136)/(65-84) 136/84      Physical Exam:  Constitutional: No acute distress, awake, alert  HENT: NCAT, mucous membranes moist  Respiratory:faint rales in bases, respiratory effort normal room air 97%  Cardiovascular: RRR, no murmurs, rubs, or gallops  Gastrointestinal: Positive bowel sounds, soft, nontender, nondistended  Musculoskeletal: No bilateral ankle edema  Psychiatric: Appropriate affect, cooperative  Neurologic: Oriented x 2, strength symmetric in all extremities, Cranial Nerves grossly intact to confrontation, speech clear  Skin: No rashes, pale       Pertinent  and/or Most Recent Results     LAB RESULTS:      Lab 09/10/22  0545 09/09/22  0803 09/08/22  0922 09/07/22  0640 09/06/22  0510 09/05/22  2057 09/05/22  1814 09/05/22  1315   WBC 6.48 9.16 9.33 7.63 4.42  --   --  7.87   HEMOGLOBIN 8.6* 9.2* 9.5* 8.6* 9.2*  --   --  10.3*   HEMATOCRIT 27.1* 28.0* 29.6* 26.3* 28.2*  --   --  32.7*   PLATELETS 171 222 238 190 204  --   --  229   NEUTROS ABS 4.88 6.77 6.74 5.72 3.51  --   --  6.07   IMMATURE GRANS (ABS) 0.04 0.05 0.03 0.03 0.01  --   --  0.02   LYMPHS ABS 0.60* 0.82 1.06 0.52* 0.43*  --   --  0.73   MONOS ABS 0.96* 1.51* 1.49* 1.36* 0.46  --   --  0.90   EOS ABS 0.00 0.00 0.00 0.00 0.00  --   --  0.09   MCV 96.8 95.6 97.0 95.6 96.6   --   --  97.3*   SED RATE 60* 82* 54* 57* 67*  --   --   --    CRP 0.86* 1.45* 2.03* 3.02* 3.57*  --   --  1.63*   PROCALCITONIN  --   --   --   --   --   --   --  0.04   LACTATE  --   --   --   --   --  2.0 3.4* 2.6*   LDH  --   --   --   --   --   --   --  180   PROTIME 17.3*  --  16.4*  --  16.9*  --   --  14.7*   D DIMER QUANT  --   --   --   --   --   --   --  0.47         Lab 09/10/22  0545 09/09/22  0803 09/09/22  0100 09/08/22  0922 09/07/22  0640 09/06/22  0510 09/05/22  1814 09/05/22  1315   SODIUM 134* 131*  --  136 131* 139  --  137   POTASSIUM 4.4 4.6 4.8 4.3 5.1 4.8  --  5.3*   CHLORIDE 103 97*  --  100 97* 100  --  101   CO2 18.0* 22.0  --  20.0* 22.0 23.0  --  24.0   ANION GAP 13.0 12.0  --  16.0* 12.0 16.0*  --  12.0   BUN 57* 54*  --  52* 46* 24*  --  16   CREATININE 1.56* 1.63*  --  1.75* 1.98* 1.83*   < > 1.51*   EGFR 31.1* 29.5*  --  27.1* 23.3* 25.6*   < > 32.3*   GLUCOSE 127* 111*  --  103* 104* 137*  --  115*   CALCIUM 8.5 8.7  --  9.1 8.6 9.2  --  10.2*   MAGNESIUM  --   --  2.4* 2.4*  --   --   --  2.8*    < > = values in this interval not displayed.         Lab 09/10/22  0545 09/09/22 0803 09/08/22 0922 09/07/22 0640 09/06/22  0510 09/05/22  1814   TOTAL PROTEIN 7.4 7.7 8.4 7.6 8.1 8.8*   ALBUMIN 3.20* 3.60 4.00 3.40* 3.70 4.20   GLOBULIN 4.2 4.1 4.4 4.2 4.4  --    ALT (SGPT) 17 18 23 21 21 12   AST (SGOT) 23 29 41* 43* 43* 20   BILIRUBIN 0.5 0.6 0.6 0.6 1.0 0.7   INDIRECT BILIRUBIN  --   --   --   --   --  0.5   BILIRUBIN DIRECT  --  0.2 0.2 0.2 0.5* 0.2   ALK PHOS 53 64 75 76 92 71         Lab 09/10/22  0545 09/08/22 0922 09/06/22  0510 09/05/22  1814 09/05/22  1315   PROBNP 35,852.0* 33,559.0*  --  25,296.0* 13,441.0*   TROPONIN T  --   --   --   --  <0.010   PROTIME 17.3* 16.4* 16.9*  --  14.7*   INR 1.42* 1.33* 1.38*  --  1.16*                 Brief Urine Lab Results  (Last result in the past 365 days)      Color   Clarity   Blood   Leuk Est   Nitrite   Protein   CREAT   Urine HCG         09/05/22 1507 Yellow   Clear   Negative   Negative   Negative   Trace               Microbiology Results (last 10 days)     Procedure Component Value - Date/Time    COVID PRE-OP / PRE-PROCEDURE SCREENING ORDER (NO ISOLATION) - Swab, Nasopharynx [[id number]]  (Abnormal) Collected: 09/05/22 1316    Lab Status: Final result Specimen: Swab from Nasopharynx Updated: 09/05/22 1400    Narrative:      The following orders were created for panel order COVID PRE-OP / PRE-PROCEDURE SCREENING ORDER (NO ISOLATION) - Swab, Nasopharynx.  Procedure                               Abnormality         Status                     ---------                               -----------         ------                     COVID-19 and FLU A/B PCR...[[id number]]  Abnormal            Final result                 Please view results for these tests on the individual orders.    COVID-19 and FLU A/B PCR - Swab, Nasopharynx [[id number]]  (Abnormal) Collected: 09/05/22 1316    Lab Status: Final result Specimen: Swab from Nasopharynx Updated: 09/05/22 1400     COVID19 Detected     Influenza A PCR Not Detected     Influenza B PCR Not Detected    Narrative:      Fact sheet for providers: https://www.fda.gov/media/232876/download    Fact sheet for patients: https://www.fda.gov/media/465341/download    Test performed by PCR.  Influenza A and Influenza B negative results should be considered presumptive in samples that have a positive SARS-CoV-2 result.    Competitive inhibition studies showed that SARS-CoV-2 virus, when present at concentrations above 3.6E+04 copies/mL, can inhibit the detection and amplification of influenza A and influenza B virus RNA if present at or below 1.8E+02 copies/mL or 4.9E+02 copies/mL, respectively, and may lead to false negative influenza virus results. If co-infection with influenza A or influenza B virus is suspected in samples with a positive SARS-CoV-2 result, the sample should be re-tested with another FDA cleared,  approved, or authorized influenza test, if influenza virus detection would change clinical management.          Adult Transthoracic Echo Complete w/ Color, Spectral and Contrast if Necessary Per Protocol    Result Date: 9/6/2022  · Left ventricular ejection fraction appears to be 21 - 25%. Left ventricular systolic function is severely decreased. · Left ventricular diastolic function is consistent with (grade II w/high LAP) pseudonormalization. · There is a small (<1cm) pericardial effusion. · There is a left pleural effusion. There is a right pleural effusion. · Moderate mitral valve regurgitation is present. · Moderate to severe tricuspid valve regurgitation is present. · Estimated right ventricular systolic pressure from tricuspid regurgitation is markedly elevated (>55 mmHg). Calculated right ventricular systolic pressure from tricuspid regurgitation is 56 mmHg.      CT Abdomen Pelvis Without Contrast    Result Date: 8/30/2022  DATE OF EXAM: 8/30/2022 6:52 PM  PROCEDURE: CT ABDOMEN PELVIS WO CONTRAST-  INDICATIONS: abd pain  COMPARISON: 8/20/2022  TECHNIQUE: Routine transaxial slices were obtained through the abdomen and pelvis without the administration of intravenous contrast. Reconstructed coronal and sagittal images were also obtained. Automated exposure control and iterative construction methods were used.  The radiation dose reduction device was turned on for each scan per the ALARA (As Low as Reasonably Achievable) protocol.  FINDINGS: History indicates recent nausea as well as generalized abdominal pain.  There is a small right pleural effusion, minimally increased from prior study, and associated patchy interstitial disease, also very similar to 8/20/2022 exam. Mild linear scarring left base is stable. There is a stable small pericardial effusion.  Gallbladder is surgically absent. No significant abnormalities are seen of the liver, spleen, pancreas, or adrenal glands. Right kidney is again noted to be  severely atrophic. Left kidney appears appropriately hyperplastic and normal. No upper abdominal free air ascites or adenopathy is seen. No gross abnormalities are seen in the stomach or duodenum. Bowel loops are normal in caliber.  Regarding the lower abdomen and pelvis, the terminal ileum and cecum appear normal. The appendix is not identified. Bladder is moderately distended and normal in appearance. Uterus and ovaries are not identified, either atrophic or surgically absent. Bony structures appear to be intact.       1. Very small right pleural effusion and small pericardial effusion similar to prior study from 8/20/2022. Right effusion may be mildly increased. 2. Stable patchy right lower lung disease, perhaps generalized atelectasis. 3. No evidence of bowel obstruction, obstructive uropathy, acute intra-abdominal inflammatory change or other acute disease is seen. Atrophic right kidney again noted.  This report was finalized on 8/30/2022 7:44 PM by Dr. Jan Alvarez MD.      XR Chest 1 View    Result Date: 9/5/2022  DATE OF EXAM: 9/5/2022 1:46 PM  PROCEDURE: XR CHEST 1 VW-  INDICATIONS: Weak/Dizzy/AMS triage protocol.  COMPARISON: Chest x-ray 8/20/2022  TECHNIQUE: Single frontal view of the chest.  FINDINGS: Stable cardiomediastinal silhouette with redemonstration of cardiomegaly. There is increased diffuse interstitial prominence from prior compatible with interstitial pulmonary edema. No definite pleural effusion. No pneumothorax. No acute osseous findings.      Cardiomegaly and interstitial pulmonary edema.  This report was finalized on 9/5/2022 1:56 PM by Rito Delatorre MD.                Results for orders placed during the hospital encounter of 09/05/22    Adult Transthoracic Echo Complete w/ Color, Spectral and Contrast if Necessary Per Protocol    Interpretation Summary  · Left ventricular ejection fraction appears to be 21 - 25%. Left ventricular systolic function is severely decreased.  · Left  ventricular diastolic function is consistent with (grade II w/high LAP) pseudonormalization.  · There is a small (<1cm) pericardial effusion.  · There is a left pleural effusion. There is a right pleural effusion.  · Moderate mitral valve regurgitation is present.  · Moderate to severe tricuspid valve regurgitation is present.  · Estimated right ventricular systolic pressure from tricuspid regurgitation is markedly elevated (>55 mmHg). Calculated right ventricular systolic pressure from tricuspid regurgitation is 56 mmHg.      Plan for Follow-up of Pending Labs/Results: bmp at pcp office     Discharge Details        Discharge Medications      New Medications      Instructions Start Date   dexamethasone 6 MG tablet  Commonly known as: DECADRON   6 mg, Oral, Daily With Breakfast   Start Date: September 11, 2022     Guadalupe Root 500 MG capsule   500 mg, Oral, 3 Times Daily      pantoprazole 40 MG EC tablet  Commonly known as: PROTONIX   40 mg, Oral, Every Early Morning   Start Date: September 11, 2022     polyethylene glycol 17 g packet  Commonly known as: MIRALAX   17 g, Oral, Daily   Start Date: September 11, 2022        Changes to Medications      Instructions Start Date   bisacodyl 10 MG suppository  Commonly known as: Dulcolax  What changed: additional instructions   10 mg, Rectal, Daily PRN, If no BM in 3 days take supp      fexofenadine 60 MG tablet  Commonly known as: ALLEGRA  What changed: Another medication with the same name was removed. Continue taking this medication, and follow the directions you see here.   60 mg, Oral, Daily      ondansetron ODT 4 MG disintegrating tablet  Commonly known as: ZOFRAN-ODT  What changed: Another medication with the same name was removed. Continue taking this medication, and follow the directions you see here.   4 mg, Translingual, Every 6 Hours PRN         Continue These Medications      Instructions Start Date   acetaminophen 325 MG tablet  Commonly known as: TYLENOL   650  mg, Oral, Every 4 Hours PRN      ALPRAZolam 0.5 MG tablet  Commonly known as: XANAX   0.5 mg, Oral, Every 6 Hours PRN      artificial tears ophthalmic ointment   1 application, Both Eyes, Daily      atorvastatin 80 MG tablet  Commonly known as: LIPITOR   80 mg, Oral, Nightly      carboxymethylcellulose 0.5 % solution  Commonly known as: REFRESH PLUS   3 Times Daily PRN      furosemide 20 MG tablet  Commonly known as: LASIX   20 mg, Oral, Daily PRN      gabapentin 300 MG capsule  Commonly known as: NEURONTIN   300 mg, Oral, Nightly      levothyroxine 50 MCG tablet  Commonly known as: SYNTHROID, LEVOTHROID   50 mcg, Oral, Every Early Morning      memantine 5 MG tablet  Commonly known as: NAMENDA   5 mg, Oral, 2 Times Daily      metoprolol succinate XL 25 MG 24 hr tablet  Commonly known as: TOPROL-XL   25 mg, Oral, Daily      Nurtec 75 MG tablet dispersible tablet  Generic drug: Rimegepant Sulfate   75 mg, Oral, Every Other Day      REFRESH DRY EYE THERAPY OP   Ophthalmic, As Needed      SM Aspirin Adult Low Strength 81 MG EC tablet  Generic drug: aspirin   81 mg, Oral, Daily         Stop These Medications    famotidine 20 MG tablet  Commonly known as: PEPCID     fluconazole 100 MG tablet  Commonly known as: DIFLUCAN     haloperidol 1 MG tablet  Commonly known as: HALDOL     Lidocaine Viscous HCl 2 % solution  Commonly known as: XYLOCAINE     nystatin 100,000 unit/mL suspension  Commonly known as: MYCOSTATIN     ofloxacin 0.3 % ophthalmic solution  Commonly known as: OCUFLOX     predniSONE 10 MG tablet  Commonly known as: DELTASONE     predniSONE 5 MG tablet  Commonly known as: DELTASONE            Allergies   Allergen Reactions   • Augmentin [Amoxicillin-Pot Clavulanate] Nausea And Vomiting     Makes her sick at her stomach   • Claritin [Loratadine] Unknown (See Comments)     Doesn't remember   • Keflex [Cephalexin] Nausea And Vomiting     Makes pt sick at her stomach   • Sulfa Antibiotics Other (See Comments)      Does not remember         Discharge Disposition:      Diet:  Hospital:  Diet Order   Procedures   • Diet Soft Texture; [employer]       Activity:  Activity Instructions     Activity as Tolerated      Measure Blood Pressure      Measure Weight      Weigh every day. If you have 2 consecutive days of weight gain take prn lasix. If weight does not improve after prn lasix dose please call heart and valve clinic          Restrictions or Other Recommendations:         CODE STATUS:    Code Status and Medical Interventions:   Ordered at: 09/05/22 1603     Level Of Support Discussed With:    Next of Kin (If No Surrogate)     Code Status (Patient has no pulse and is not breathing):    No CPR (Do Not Attempt to Resuscitate)     Medical Interventions (Patient has pulse or is breathing):    Full Support       Future Appointments   Date Time Provider Department Center   9/21/2022 11:00 AM Chrissy Toribio APRN [facility]   9/21/2022  3:00 PM Jessie Dover APRN [facility]       Additional Instructions for the Follow-ups that You Need to Schedule     Ambulatory Referral to Home Health   As directed      Face to Face Visit Date: 9/9/2022    Follow-up provider for Plan of Care?: I treated the patient in an acute care facility and will not continue treatment after discharge.    Follow-up provider: CODY CARD [[id number]]    Reason/Clinical Findings: covid 19    Describe mobility limitations that make leaving home difficult: impaired functional mobility, balance, gait and endurance    Nursing/Therapeutic Services Requested: Skilled Nursing Physical Therapy Occupational Therapy    Skilled nursing orders: Cardiopulmonary assessments Neurovascular assessments    PT orders: Therapeutic exercise Gait Training Transfer training Strengthening Home safety assessment    Weight Bearing Status: As Tolerated    Occupational orders: Activities of daily living Energy conservation Strengthening Home safety assessment     Frequency: 1 Week 1         Discharge Follow-up with PCP   As directed       Currently Documented PCP:    Ngozi Davis DO    PCP Phone Number:    [phone]     Follow Up Details: follow up with pcp after 9/16         Discharge Follow-up with Specified Provider: Follow up with [facility] 3-4 weeks   As directed      To: Follow up with [facility] 3-4 weeks         Discharge Follow-up with Specified Provider: keep scheduled follow up with heart and valve clinic   As directed      To: keep scheduled follow up with heart and valve clinic                     VASILE Renteria  09/10/22      Time Spent on Discharge:  I spent  40 minutes on this discharge activity which included: face-to-face encounter with the patient, reviewing the data in the system, coordination of the care with the nursing staff as well as consultants, documentation, and entering orders.

## 2022-09-10 NOTE — OUTREACH NOTE
Prep Survey    Flowsheet Row Responses   [facility] patient discharged from? [place]   Is LACE score < 7 ? No   Emergency Room discharge w/ pulse ox? No   Eligibility [facility]   Date of Admission 09/05/22   Date of Discharge 09/10/22   Discharge Disposition [facility] [place] [facility]   Discharge diagnosis Covid   Does the patient have one of the following disease processes/diagnoses(primary or secondary)? COVID-19   Does the patient have Home health ordered? Yes   What is the Home health agency?  [facility] for PT, OT and skilled nursing   Is there a DME ordered? No   Prep survey completed? Yes          EDOUARD MONAE - Registered Nurse

## 2022-09-12 ENCOUNTER — TRANSITIONAL CARE MANAGEMENT TELEPHONE ENCOUNTER (OUTPATIENT)
Dept: CALL CENTER | Facility: HOSPITAL | Age: 87
End: 2022-09-12

## 2022-09-12 NOTE — OUTREACH NOTE
Call Center TCM Note    Flowsheet Row Responses   [facility] patient discharged from? [place]   Does the patient have one of the following disease processes/diagnoses(primary or secondary)? COVID-19   COVID-19 underlying condition? CHF   TCM attempt successful? Yes   Call start time 1434   Call end time 1441   Discharge diagnosis Covid   Is patient permission given to speak with other caregiver? Yes   List who call center can speak with VAUGHN GASPAR    Person spoke with today (if not patient) and relationship VAUGHN GASPAR    Meds reviewed with patient/caregiver? Yes   Does the patient have all medications ordered at discharge? Yes   Is the patient taking all medications as directed (includes completed medication regime)? Yes   Comments Ngozi Davis, DO PCP. HFU appt scheduled for 9/19/22  1115am.   [Patient has f/u with heart failure clinic 9/21]   What is the Home health agency?  [facility] for PT, OT and skilled nursing   Has home health visited the patient within 72 hours of discharge? Yes   DME comments Wears home O2 2.5 L   Comments Monitor home B/P, HR, O2 sat and daily weight. Keep record.    Did the patient receive a copy of their discharge instructions? Yes   Did the patient receive a copy of COVID-19 specific instructions? Yes   Nursing interventions Reviewed instructions with patient   What is the patient's perception of their health status since discharge? Improving   Does the patient have any of the following symptoms? Cough, Shortness of breath  [Patient reports continued cough and mildly short winded. ]   Nursing Interventions Nurse provided patient education   Pulse Ox monitoring Intermittent   O2 Sat comments Reports sats in the 90's on her oxygen.    O2 Sat: education provided Sat levels, Monitoring frequency, When to seek care   O2 Sat education comments Advised to return to ER if unable to maintain O2 sat 90% or above.    Is the patient/caregiver able to teach back steps to  recovery at home? Eat a well-balance diet, Rest and rebuild strength, gradually increase activity, Set small, achievable goals for return to baseline health   If the patient is a current smoker, are they able to teach back resources for cessation? Not a smoker   Is the patient/caregiver able to teach back the hierarchy of who to call/visit for symptoms/problems? PCP, Specialist, Home health nurse, Urgent Care, ED, 911 Yes   Is the patient able to teach back Heart Failure diet management? Yes   Is the patient able to teach back signs and symptoms of worsening condition? (i.e. weight gain, shortness of air, etc.) Yes   TCM call completed? Yes          Pennie Naranjo RN    9/12/2022, 14:43 EDT

## 2022-09-13 ENCOUNTER — TELEPHONE (OUTPATIENT)
Dept: FAMILY MEDICINE CLINIC | Facility: CLINIC | Age: 87
End: 2022-09-13

## 2022-09-13 NOTE — TELEPHONE ENCOUNTER
Caller: ANGELA    Relationship to patient: Home Health    Best call back number: [phone]    Patient is needing: ANA ADVISED SHE SAW PATIENT AT HOME TODAY FOR AN OCCUPATIONAL THERAPY EVALUATION AND WANTS TO SEE THE PATIENT ONCE A WEEK FOR 4 WEEKS

## 2022-09-14 ENCOUNTER — READMISSION MANAGEMENT (OUTPATIENT)
Dept: CALL CENTER | Facility: HOSPITAL | Age: 87
End: 2022-09-14

## 2022-09-14 NOTE — TELEPHONE ENCOUNTER
Restart your lactulose medications 4 times daily to decrease your the ammonia in your system.  This will help with your dizziness.  Follow-up with GI or Dr. Elena later this week for recheck.  Return with any new or worsening symptoms or any concerns for further evaluation.   Verbal orders relayed

## 2022-09-14 NOTE — OUTREACH NOTE
COVID-19 Week 1 Survey    Flowsheet Row Responses   [facility] patient discharged from? [place]   Does the patient have one of the following disease processes/diagnoses(primary or secondary)? COVID-19   COVID-19 underlying condition? CHF   Call Number Call 2   Week 1 Call successful? Yes   Call start time 1433   Call end time 1439   Discharge diagnosis Covid   Is patient permission given to speak with other caregiver? Yes   List who call center can speak with VAUGHN GASPAR    Person spoke with today (if not patient) and relationship VAUGHN GASPAR    Meds reviewed with patient/caregiver? Yes   Is the patient having any side effects they believe may be caused by any medication additions or changes? Yes   Side effects comments  sleeplessness   Does the patient have all medications ordered at discharge? Yes   Is the patient taking all medications as directed (includes completed medication regime)? Yes   Does the patient have a primary care provider?  Yes   Comments regarding PCP Ngozi Davis,  PCP. Appt 9/19   Does the patient have an appointment with their PCP or specialist within 7 days of discharge? Yes   Has the patient kept scheduled appointments due by today? N/A   Comments Ngozi Davis DO PCP. HFU appt scheduled for 9/19/22  1115am.    What is the Home health agency?  [facility] for PT, OT and skilled nursing   Has home health visited the patient within 72 hours of discharge? Yes   DME comments Wears home O2 2.5 L   Psychosocial issues? No   Psychosocial comments Patient with dementia.    Comments Monitor home B/P, HR, O2 sat and daily weight. Keep record.    Did the patient receive a copy of their discharge instructions? Yes   Did the patient receive a copy of COVID-19 specific instructions? Yes   Nursing interventions Reviewed instructions with patient  [son]   What is the patient's perception of their health status since discharge? Improving   Does the patient have any of the following  symptoms? Cough, Shortness of breath  [Mild short winded.]   Nursing Interventions Nurse provided patient education   Pulse Ox monitoring Intermittent   Pulse Ox device source Patient   O2 Sat comments High 90's on 2.5L   O2 Sat: education provided Sat levels, Monitoring frequency, When to seek care   Is the patient/caregiver able to teach back steps to recovery at home? Set small, achievable goals for return to baseline health, Rest and rebuild strength, gradually increase activity, Eat a well-balance diet   If the patient is a current smoker, are they able to teach back resources for cessation? Not a smoker   Is the patient/caregiver able to teach back the hierarchy of who to call/visit for symptoms/problems? PCP, Specialist, Home health nurse, Urgent Care, ED, 911 Yes   Is the patient able to teach back Heart Failure diet management? Yes   Is the patient able to teach back signs and symptoms of worsening condition? (i.e. weight gain, shortness of air, etc.) Yes   COVID-19 call completed? Yes          CODI CENTENO - Registered Nurse

## 2022-09-19 ENCOUNTER — OFFICE VISIT (OUTPATIENT)
Dept: FAMILY MEDICINE CLINIC | Facility: CLINIC | Age: 87
End: 2022-09-19

## 2022-09-19 ENCOUNTER — LAB (OUTPATIENT)
Dept: LAB | Facility: HOSPITAL | Age: 87
End: 2022-09-19

## 2022-09-19 VITALS
BODY MASS INDEX: 20.28 KG/M2 | DIASTOLIC BLOOD PRESSURE: 64 MMHG | OXYGEN SATURATION: 95 % | WEIGHT: 118.8 LBS | SYSTOLIC BLOOD PRESSURE: 110 MMHG | HEIGHT: 64 IN | HEART RATE: 72 BPM

## 2022-09-19 DIAGNOSIS — F02.818 LATE ONSET ALZHEIMER'S DEMENTIA WITH BEHAVIORAL DISTURBANCE: ICD-10-CM

## 2022-09-19 DIAGNOSIS — U07.1 COVID-19 VIRUS DETECTED: ICD-10-CM

## 2022-09-19 DIAGNOSIS — I50.23 ACUTE ON CHRONIC HFREF (HEART FAILURE WITH REDUCED EJECTION FRACTION): ICD-10-CM

## 2022-09-19 DIAGNOSIS — I10 PRIMARY HYPERTENSION: ICD-10-CM

## 2022-09-19 DIAGNOSIS — Z09 HOSPITAL DISCHARGE FOLLOW-UP: ICD-10-CM

## 2022-09-19 DIAGNOSIS — I50.9 ACUTE ON CHRONIC CONGESTIVE HEART FAILURE, UNSPECIFIED HEART FAILURE TYPE: ICD-10-CM

## 2022-09-19 DIAGNOSIS — G30.1 LATE ONSET ALZHEIMER'S DEMENTIA WITH BEHAVIORAL DISTURBANCE: ICD-10-CM

## 2022-09-19 DIAGNOSIS — R11.0 NAUSEA: ICD-10-CM

## 2022-09-19 DIAGNOSIS — N18.32 STAGE 3B CHRONIC KIDNEY DISEASE: Primary | ICD-10-CM

## 2022-09-19 DIAGNOSIS — N18.32 STAGE 3B CHRONIC KIDNEY DISEASE: ICD-10-CM

## 2022-09-19 PROCEDURE — 83880 ASSAY OF NATRIURETIC PEPTIDE: CPT

## 2022-09-19 PROCEDURE — 99495 TRANSJ CARE MGMT MOD F2F 14D: CPT | Performed by: STUDENT IN AN ORGANIZED HEALTH CARE EDUCATION/TRAINING PROGRAM

## 2022-09-19 PROCEDURE — 80048 BASIC METABOLIC PNL TOTAL CA: CPT

## 2022-09-19 PROCEDURE — 36415 COLL VENOUS BLD VENIPUNCTURE: CPT

## 2022-09-19 PROCEDURE — 1111F DSCHRG MED/CURRENT MED MERGE: CPT | Performed by: STUDENT IN AN ORGANIZED HEALTH CARE EDUCATION/TRAINING PROGRAM

## 2022-09-19 NOTE — PROGRESS NOTES
Transitional Care Follow Up Visit  Subjective     Scott Diego is a 89 y.o. female who presents for a transitional care management visit.    Within 48 business hours after discharge our office contacted her via telephone to coordinate her care and needs.      I reviewed and discussed the details of that call along with the discharge summary, hospital problems, inpatient lab results, inpatient diagnostic studies, and consultation reports with Scott.     Current outpatient and discharge medications have been reconciled for the patient.  Reviewed by: Ngozi Davis DO      Date of TCM Phone Call 9/10/2022   [facility]   Date of Admission 9/5/2022   Date of Discharge 9/10/2022   Discharge Disposition Home-Health Care [facility]     Risk for Readmission (LACE) No data recorded      History of Present Illness   Course During Hospital Stay:  Per discharge summary:  Scott Diego is a 89 y.o. female with past medical history significant for CHF, mild dementia, HTN, hypothyroidism,CKD, and nocturnal oxygen use.  On chart review patient was admitted to [facility] 3/3 - 3/17/2022 and then since then has had 10 ER visits since 6/10/2022 (for nausea, abd pain, h/a and general fatigue) before presenting 9/5/22.   Pt found to have COVID, CHF exacerbation. She received IV lasix but had increase in creatinine. She was given IVF's with improvement and has been stable on room air. She had Afib while in hospital which self resolved and had no further episodes. Cardiology saw and did not recommend any AC due to short duration of Afib.       COVID-19  Acute hypoxic respiratory failure -> resolved   -s/p Remdesivir, Decadron     A/C HFrEF  New atrial fibrillation with RVR  Hx of prolonged QT  -Cardiology followed, Continue BB. No AC due to brevity of A fib. Pt follows with heart and valve clinic, has follow scheduled on 9/21     CARYN/suspected CKD  -- baseline creatinine  1.3-1.4     Nausea  -Start Protonix  "40mg daily  And miralax daily for constipation   -DC Scopolamine due to anti-cholinergic effects  -Recommend trial of OTC Ginger Root 500 mg TID for nausea at home   - Recommend Dulcolax suppository if 3 days without a a bowel movement      Debility/weakness  -PT/OT recommends SNF. Complicated as patient will not be out of isolation until 9/16. She was discharged home with her son.    TODAY  She presents with her son. She has been doing well since discharge. Denies nausea since starting PPI and ginger root.   She does report oral pain with swallowing and change in her taste. Feels everything tastes \"hot\" and causes burning in her mouth. Has white discharge in her mouth.    She has used her lasix sparingly, only twice in the past week as needed for dyspnea. Her O2 has remained around 98%. Has tried drinking more water but she admits to very little water intake, doesn't like the taste. Son is watering down coffee and places bottles at every table to help encourage her.      The following portions of the patient's history were reviewed and updated as appropriate: allergies, current medications, past family history, past medical history, past social history, past surgical history and problem list.    Review of Systems    Objective   Physical Exam  Vitals reviewed.   Constitutional:       Appearance: Normal appearance.   Cardiovascular:      Rate and Rhythm: Normal rate and regular rhythm.      Pulses: Normal pulses.      Heart sounds: Murmur heard.      Comments: No LE edema  Pulmonary:      Effort: Pulmonary effort is normal. No respiratory distress.      Breath sounds: Normal breath sounds. No wheezing, rhonchi or rales.   Skin:     General: Skin is warm and dry.   Neurological:      Mental Status: She is alert.   Psychiatric:         Mood and Affect: Mood normal.         Behavior: Behavior normal.         Judgment: Judgment normal.         Assessment & Plan   Diagnoses and all orders for this visit:    1. Stage 3b " chronic kidney disease (HCC) (Primary)  -     Basic metabolic panel; Future  CARYN while hospitalized, will repeat renal function today. Counseled on importance of adequate hydration and avoiding nephrotoxic agents     2. Acute on chronic congestive heart failure, unspecified heart failure type (HCC)  Appears euvolemic today. Continue to use Lasix only as needed for now to avoid dehydration.   Continue BB  Has follow up with heart and valve next week  Discussed signs/symptoms of volume overload (LE edema, dyspnea, orthopnea)    3. Primary hypertension  Well controlled, continue metoprolol     4. Late onset Alzheimer's dementia with behavioral disturbance (HCC)  Stable, complicates all aspects of care. Living with son. Following with neurology.    5. COVID-19 virus detected  Resolved     6. Hospital discharge follow-up  She is following with home healthy PT/OT and palliative.   7. Nausea  Resolved with PPI and ginger root. Has follow up with GI next month.    Follow up in 3 months

## 2022-09-20 LAB
ANION GAP SERPL CALCULATED.3IONS-SCNC: 10.8 MMOL/L (ref 5–15)
BUN SERPL-MCNC: 16 MG/DL (ref 8–23)
BUN/CREAT SERPL: 13.6 (ref 7–25)
CALCIUM SPEC-SCNC: 9 MG/DL (ref 8.2–9.6)
CHLORIDE SERPL-SCNC: 103 MMOL/L (ref 98–107)
CO2 SERPL-SCNC: 23.2 MMOL/L (ref 22–29)
CREAT SERPL-MCNC: 1.18 MG/DL (ref 0.57–1)
EGFRCR SERPLBLD CKD-EPI 2021: 43.4 ML/MIN/1.73
GLUCOSE SERPL-MCNC: 58 MG/DL (ref 65–99)
POTASSIUM SERPL-SCNC: 4.6 MMOL/L (ref 3.5–5.2)
SODIUM SERPL-SCNC: 137 MMOL/L (ref 136–145)

## 2022-09-20 NOTE — PROGRESS NOTES
"[facility]  Heart and Valve Center      Chief Complaint  Follow-up and Congestive Heart Failure    History of Present Illness    Scott Diego is a 89 y.o. female with nonischemic cardiomyopathy/chronic systolic heart failure, orthostasis, dementia, hypothyroidism and CKD  who presents today to follow up on chronic systolic heart failure.  She unfortunately was admitted on 9/5-9/10 with COVID-19 and heart failure.  She received IV Lasix but had worsening CKD and required IV fluid.  She had A. fib while in the hospital but spontaneously resolved.  Cardiology was consulted and recommended no anticoagulation due to short episode of A. fib and high risk for falls.    She has been to the ED 10 times since June for various reasons, mostly for nausea and headaches.  She has appoint with neurology today.  She notes some intermittent shortness of breath.  Her son reports occasionally she will say that she is smothering when she lays down.  He watches her weights and gives her Lasix intermittently.  He reports that she is a very poor appetite, which is worsened since her hospital stay due to oral candidiasis.  She is followed by home health and palliative care.     Objective     Vital Signs:   Vitals:    09/21/22 1114   BP: 97/53   BP Location: Left arm   Patient Position: Sitting   Cuff Size: Small Adult   Pulse: 80   Resp: 20   Temp: 96.1 °F (35.6 °C)   TempSrc: Temporal   SpO2: 97%   Weight: 53.1 kg (117 lb)   Height: 162.6 cm (64\")     Body mass index is 20.08 kg/m².    Physical Exam  Vitals reviewed.   Constitutional:       Appearance: Normal appearance.   HENT:      Head: Normocephalic.   Neck:      Vascular: No carotid bruit.   Cardiovascular:      Rate and Rhythm: Normal rate and regular rhythm.      Pulses: Normal pulses.      Heart sounds: S1 normal and S2 normal. Murmur heard.    Systolic murmur is present with a grade of 2/6.  Pulmonary:      Effort: Pulmonary effort is normal. No respiratory " distress.      Breath sounds: Rales present.   Chest:      Chest wall: No tenderness.   Abdominal:      General: Abdomen is flat.      Palpations: Abdomen is soft.   Musculoskeletal:      Cervical back: Neck supple.      Right lower leg: No edema.      Left lower leg: No edema.   Skin:     General: Skin is warm and dry.   Neurological:      General: No focal deficit present.      Mental Status: She is alert and oriented to person, place, and time. Mental status is at baseline.   Psychiatric:         Mood and Affect: Mood normal.         Behavior: Behavior normal.         Thought Content: Thought content normal.           Result Review :   Adult Transthoracic Echo Complete w/ Color, Spectral and Contrast if Necessary Per Protocol (09/06/2022 11:58)    Basic metabolic panel (09/19/2022 12:15)  Basic metabolic panel (09/19/2022 12:15)  proBNP (09/10/2022 05:45)  C-reactive Protein (09/10/2022 05:45)  CBC & Differential (09/10/2022 05:45)    Hospital records reviewed              Assessment and Plan {CC Problem List  Visit Diagnosis  ROS  Review (Popup)  Health Maintenance  Quality  BestPractice  Medications  SmartSets  SnapShot Encounters  Media :23}   1.  Acute on chronic HFrEF  Take lasix daily for 3 days and then decrease to QOD  Advised to call if having any worsening symptoms    2. CKD IIIb  Recent BMP showed improved creatinine    3.  Cachexia  Again, encouraged adequate hydration and regular meals. Recommended 2-3 Boosts a day depending on intake    4. Hypotension  Advised to monitor closely with the addition of Lasix.  Advised to make sure that she rises slowly and maintains adequate hydration    5. Goals of care discussion  Patient currently followed by palliative care.  She was referred to hospice, but was not a candidate.  Son reports that hospice checks on her monthly.  She has an upcoming appointment with palliative and he plans to discuss another evaluation with hospice           Follow Up  {Instructions Charge Capture  Follow-up Communications :23}   No follow-ups on file.    Dictated Utilizing Dragon Dictation

## 2022-09-21 ENCOUNTER — OFFICE VISIT (OUTPATIENT)
Dept: NEUROLOGY | Facility: CLINIC | Age: 87
End: 2022-09-21

## 2022-09-21 ENCOUNTER — READMISSION MANAGEMENT (OUTPATIENT)
Dept: CALL CENTER | Facility: HOSPITAL | Age: 87
End: 2022-09-21

## 2022-09-21 ENCOUNTER — OFFICE VISIT (OUTPATIENT)
Dept: CARDIOLOGY | Facility: HOSPITAL | Age: 87
End: 2022-09-21

## 2022-09-21 VITALS
WEIGHT: 117 LBS | HEIGHT: 64 IN | SYSTOLIC BLOOD PRESSURE: 130 MMHG | OXYGEN SATURATION: 90 % | HEART RATE: 62 BPM | DIASTOLIC BLOOD PRESSURE: 82 MMHG | BODY MASS INDEX: 19.97 KG/M2

## 2022-09-21 VITALS
OXYGEN SATURATION: 97 % | TEMPERATURE: 96.1 F | WEIGHT: 117 LBS | BODY MASS INDEX: 19.97 KG/M2 | SYSTOLIC BLOOD PRESSURE: 97 MMHG | DIASTOLIC BLOOD PRESSURE: 53 MMHG | HEART RATE: 80 BPM | HEIGHT: 64 IN | RESPIRATION RATE: 20 BRPM

## 2022-09-21 DIAGNOSIS — G43.009 MIGRAINE WITHOUT AURA AND WITHOUT STATUS MIGRAINOSUS, NOT INTRACTABLE: ICD-10-CM

## 2022-09-21 DIAGNOSIS — I95.9 HYPOTENSION, UNSPECIFIED HYPOTENSION TYPE: ICD-10-CM

## 2022-09-21 DIAGNOSIS — Z74.09 IMPAIRED FUNCTIONAL MOBILITY, BALANCE, GAIT, AND ENDURANCE: ICD-10-CM

## 2022-09-21 DIAGNOSIS — H91.93 BILATERAL HEARING LOSS, UNSPECIFIED HEARING LOSS TYPE: ICD-10-CM

## 2022-09-21 DIAGNOSIS — F02.818 LATE ONSET ALZHEIMER'S DEMENTIA WITH BEHAVIORAL DISTURBANCE: Primary | ICD-10-CM

## 2022-09-21 DIAGNOSIS — N18.32 STAGE 3B CHRONIC KIDNEY DISEASE: ICD-10-CM

## 2022-09-21 DIAGNOSIS — Z71.89 GOALS OF CARE, COUNSELING/DISCUSSION: ICD-10-CM

## 2022-09-21 DIAGNOSIS — G30.1 LATE ONSET ALZHEIMER'S DEMENTIA WITH BEHAVIORAL DISTURBANCE: Primary | ICD-10-CM

## 2022-09-21 DIAGNOSIS — R64 CACHEXIA: ICD-10-CM

## 2022-09-21 DIAGNOSIS — I50.23 ACUTE ON CHRONIC HFREF (HEART FAILURE WITH REDUCED EJECTION FRACTION): Primary | ICD-10-CM

## 2022-09-21 LAB — NT-PROBNP SERPL-MCNC: 9899 PG/ML (ref 0–1800)

## 2022-09-21 PROCEDURE — 99214 OFFICE O/P EST MOD 30 MIN: CPT | Performed by: NURSE PRACTITIONER

## 2022-09-21 RX ORDER — MEMANTINE HYDROCHLORIDE 5 MG/1
5 TABLET ORAL 2 TIMES DAILY
Qty: 180 TABLET | Refills: 1 | Status: SHIPPED | OUTPATIENT
Start: 2022-09-21

## 2022-09-21 NOTE — OUTREACH NOTE
COVID-19 Week 2 Survey    Flowsheet Row Responses   [facility] patient discharged from? [place]   Does the patient have one of the following disease processes/diagnoses(primary or secondary)? COVID-19   COVID-19 underlying condition? CHF   Call Number Call 1   COVID-19 Week 2: Call 1 attempt successful? Yes   Call start time 1400   Call end time 1406   Discharge diagnosis Covid   Is patient permission given to speak with other caregiver? Yes   List who call center can speak with VAUGHN GASPAR    Person spoke with today (if not patient) and relationship VAUGHN GASPAR    Meds reviewed with patient/caregiver? Yes   Does the patient have all medications ordered at discharge? Yes   Is the patient taking all medications as directed (includes completed medication regime)? Yes   Medication comments Cardiology ordered lasix today.    Does the patient have a primary care provider?  Yes   Comments regarding PCP Ngozi Davis, DO PCP. Appt 9/19   Has the patient kept scheduled appointments due by today? Yes   Comments patient saw cardiology NP today.    What is the Home health agency?  [facility] for PT, OT and skilled nursing   Has home health visited the patient within 72 hours of discharge? Yes   DME comments Wears home O2 2.5 L   Psychosocial issues? No   Psychosocial comments Patient with dementia.    Comments Monitor home B/P, HR, O2 sat and daily weight. Keep record.    Did the patient receive a copy of their discharge instructions? Yes   Did the patient receive a copy of COVID-19 specific instructions? Yes   Nursing interventions Reviewed instructions with patient  [son]   What is the patient's perception of their health status since discharge? Worsening  [Patient with crackles in lungs today with Dr assessment. Patient has been ordered diuretic by cardiology NP. ]   Does the patient have any of the following symptoms? Cough, Shortness of breath   Nursing Interventions Nurse provided patient education   [Provider has assessed today. ]   Pulse Ox monitoring Intermittent   Pulse Ox device source Patient   O2 Sat comments Son reports O2 levels still remain good on 2.5L   O2 Sat: education provided Sat levels, Monitoring frequency, When to seek care   Is the patient/caregiver able to teach back steps to recovery at home? Set small, achievable goals for return to baseline health, Rest and rebuild strength, gradually increase activity   If the patient is a current smoker, are they able to teach back resources for cessation? Not a smoker   Is the patient/caregiver able to teach back the hierarchy of who to call/visit for symptoms/problems? PCP, Specialist, Home health nurse, Urgent Care, ED, 911 Yes   Is the patient able to teach back Heart Failure diet management? Yes   Is the patient able to teach back signs and symptoms of worsening condition? (i.e. weight gain, shortness of air, etc.) Yes   COVID-19 call completed? Yes          CODI CENTENO - Registered Nurse

## 2022-09-21 NOTE — PROGRESS NOTES
Subjective:     Patient ID: Scott Diego is a 89 y.o. female.    CC:   Chief Complaint   Patient presents with   • Migraine   • Memory Loss       HPI:   History of Present Illness   Today 9/21/2022- This is a pleasant 89-year-old female who presents for 2-month neurology follow-up on migraine headaches, late onset Alzheimer's disease, orthostatic dizziness, tinnitus, hearing loss, and stabbing headaches.     On 07/31/2022, she presented to [facility] ER for nausea and vomiting on 08/09/2022. She also presented back to [facility] ER for acute on chronic congestive heart failure on 08/20/2022. She presented for abdominal pain, constipation, chronic kidney disease, and anemia to [facility] emergency department. She also presented to the ER again on 08/22/2022 for anxiety. Unfortunately, she has had multiple ER visits including one on 08/26/2022 for continued nausea and constipation. On 08/30/2022, she was also at the [facility] emergency department for similar symptoms of abdominal pain, nausea, chronic kidney disease, and anxiety. Finally, on 09/05/2022, she was admitted to [facility] for diagnosis of COVID-19. Since her  passed away in 01/2022, she has lived with her son. Unfortunately, she has had recurrent issues including COVID-19 & CHF exacerbation. She did receive IV Lasix during her hospitalization. She had a short duration of atrial fibrillation, but no anticoagulation was recommended. In regards to her stomach complaints, they recommended trial of over-the-counter michele root for nausea at home, continuing to monitor bowel movements, and following up with gastroenterology. Physical therapy and occupational therapy at that time recommended skilled nursing facility. Her son felt comfortable with taking her back home following discharge. She was discharged on 09/10/2022.     She has recently seen her primary care provider on  09/19/2022 following hospitalization. She also saw [facility] earlier today 09/21/2022 to follow up on the congestive heart failure. She is currently followed by palliative care. She is currently not a candidate for hospice. She is with her son during today's visit.    Today, the patient is accompanied by her son. He states that she has had 2 headaches since she came home from the hospital. She has been taking Tylenol for the headaches. She tried the Nurtec for a while, but she was told to stop when she was having stomach issues. The patient reports that the headaches have improved since having COVID-19. She is back on the memantine. The patient states that she is eating and sleeping well. She notes that she has been drinking some water, but she does not like water. Her son states that he has been making her coffee very weak. The patient has had a fall since her last visit 2 months ago. She is using her walker. Home health is doing 1 physical therapy and 1 occupational therapy visit a week and a nurse is going to come for 4 visits. Palliative care comes monthly, and they are going to discuss another hospice reevaluation.    The patient states that her mouth feels hot. Her son mentions that she has thrush, and she is being treated with nystatin. Her primary care provider is aware of the thrush.    Her memory has been about the same overall. She has lost 10lbs in the past few weeks.    Current outpatient and discharge medications have been reconciled for the patient.  Reviewed by: VASILE Rowe    Prior Neuro workup and history:  Has late onset Alzheimer's dementia with behavioral disturbances. At last visit, we did try to add in some memantine. We placed her on memantine 5 mg with titration to 1 tablet twice daily for her memory. She was previously on donepezil, but stopped taking that. She is accompanied by her son during today's visit. She does live at home with her son who is her primary  caregiver.     She has had dementia symptoms since 2016 with forgetfulness, repetitiveness, and word finding issues which have worsened over time. She had an MRI in 2016. She does have impairments in all spheres of cognition. She does have impairments in basic ADLs. Her family manages medications and finances. She does not drive.      We also continued her alprazolam low dose for her to take 3 times daily as needed for anxiety and agitation. The patient's son states that the patient has not taken any medications for anxiety besides alprazolam. She has been on citalopram before and discontinued it due to worsening kidney function.     Her QTc interval is 530, so she cannot be treated with Haldol or Seroquel.       She does follow with cardiology, Dr. Jose Antonio Aragon, for chronic congestive heart failure.      She was on Aricept, but no longer takes it. The son states 3 years ago the patient got on Facebook, and found the daughter of an old boyfriend in [place], and asked her if she knew Romie Diego who was an old boyfriend, and she said yes, it was her dad. She got in touch with him, got , and moved to [place] for 2.5 years until he passed away in 01/2022. It was during this time she was taken off of Aricept, but her son is uncertain why.     She denies significant anxiety or worrying. She denies any trouble with depression.      The patient does not drive. Her highest level of education was high school. 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     Her son makes her meals. He states most of the time he purchases her meals because she wants to eat hamburgers from [employer] or [employer] for all 3 meals per day. She eats Honey Buns in the morning. She drinks coffee with cream and sugar. Previously she had a poor appetite so now he tries to let her eat whatever she likes.     She does experience hallucinations- seeing people that are not there and talks to them.      Her son states she  fell between the couch, and the table. She also fell off the porch. She does have neuropathy with burning, numbness, and tingling. She takes gabapentin 300 mg at bedtime, and 100 mg in the morning, but has not been taking the 100 mg in the morning because the son thought it might cause more confusion.       The patient does not use an assistive walking device, but does hold onto furniture when ambulating.     3/3/2022 CT of the head without contrast showed no acute intracranial abnormalities and did show moderate volume loss with mild chronic small vessel ischemic changes.  I reviewed the imaging previously in clinic with the patient and her son and agree with radiology interpretation.      She did previously have an MRI of the brain without contrast on 2/27/2016 and this did show extensive chronic small vessel ischemic changes with moderate global volume loss but no acute intracranial abnormalities and this was stable compared to imaging from 4/23/2015.    The following portions of the patient's history were reviewed and updated as appropriate: allergies, current medications, past family history, past medical history, past social history, past surgical history and problem list.    Past Medical History:   Diagnosis Date   • Anxiety    • Congestive heart failure (HCC)    • Coronary artery disease    • Dementia (HCC)    • Depression    • Hyperlipidemia    • Malignant neoplasm (HCC)    • Memory loss    • Peripheral neuropathy    • Stroke (HCC)     mini stroke   • Systolic heart failure (HCC) 09/25/2017    Chronic/compensated (EF 25%)       Past Surgical History:   Procedure Laterality Date   • CARDIAC CATHETERIZATION     • CHOLECYSTECTOMY     • EYE SURGERY     • HYSTERECTOMY         Social History     Socioeconomic History   • Marital status:    Tobacco Use   • Smoking status: Never Smoker   • Smokeless tobacco: Never Used   Vaping Use   • Vaping Use: Never used   Substance and Sexual Activity   • Alcohol use:  Never   • Drug use: Never   • Sexual activity: Defer     Comment:  since 1/2022       Family History   Problem Relation Age of Onset   • Cancer Mother    • No Known Problems Father    • Cancer Sister    • Cancer Brother    • Parkinsonism Brother    • No Known Problems Son           Current Outpatient Medications:   •  acetaminophen (TYLENOL) 325 MG tablet, Take 2 tablets by mouth Every 4 (Four) Hours As Needed for Mild Pain ., Disp: , Rfl:   •  ALPRAZolam (XANAX) 0.5 MG tablet, Take 0.5 mg by mouth Every 6 (Six) Hours As Needed., Disp: , Rfl:   •  Artificial Tear Ointment (artificial tears) ophthalmic ointment, Administer 1 application to both eyes Daily., Disp: , Rfl:   •  atorvastatin (LIPITOR) 80 MG tablet, Take 80 mg by mouth Every Night., Disp: , Rfl:   •  bisacodyl (Dulcolax) 10 MG suppository, Insert 1 suppository into the rectum Daily As Needed for Constipation. If no BM in 3 days take supp, Disp: 15 suppository, Rfl: 1  •  carboxymethylcellulose (REFRESH PLUS) 0.5 % solution, 3 (Three) Times a Day As Needed for Dry Eyes., Disp: , Rfl:   •  fexofenadine (ALLEGRA) 60 MG tablet, Take 1 tablet by mouth Daily., Disp: 30 tablet, Rfl: 5  •  furosemide (LASIX) 20 MG tablet, Take 1 tablet by mouth Daily As Needed (increased shortness of breath, swelling)., Disp: 90 tablet, Rfl: 0  •  gabapentin (NEURONTIN) 300 MG capsule, Take 1 capsule by mouth Every Night., Disp: 90 capsule, Rfl: 0  •  Ginger, Zingiber officinalis, (Ginger Root) 500 MG capsule, Take 1 capsule by mouth 3 (Three) Times a Day for 30 days., Disp: 90 capsule, Rfl: 0  •  Glycerin-Polysorbate 80 (REFRESH DRY EYE THERAPY OP), Apply  to eye(s) as directed by provider As Needed., Disp: , Rfl:   •  levothyroxine (SYNTHROID, LEVOTHROID) 50 MCG tablet, Take 1 tablet by mouth Every Morning., Disp: 30 tablet, Rfl: 5  •  memantine (NAMENDA) 5 MG tablet, Take 1 tablet by mouth 2 (Two) Times a Day., Disp: 180 tablet, Rfl: 1  •  metoprolol succinate XL  (TOPROL-XL) 25 MG 24 hr tablet, Take 1 tablet by mouth Daily., Disp: 30 tablet, Rfl: 5  •  ondansetron ODT (ZOFRAN-ODT) 4 MG disintegrating tablet, Place 1 tablet on the tongue Every 6 (Six) Hours As Needed for Nausea., Disp: 20 tablet, Rfl: 0  •  pantoprazole (PROTONIX) 40 MG EC tablet, Take 1 tablet by mouth Every Morning., Disp: 30 tablet, Rfl: 0  •  polyethylene glycol (MIRALAX) 17 GM/SCOOP powder, Mix 17 g in liquid and drink by mouth Daily for 30 days., Disp: 510 g, Rfl: 0  •  SM Aspirin Adult Low Strength 81 MG EC tablet, Take 81 mg by mouth Daily., Disp: , Rfl:      Review of Systems   Constitutional: Negative for chills, fatigue, fever and unexpected weight change.   HENT: Negative for ear pain, hearing loss, nosebleeds, rhinorrhea and sore throat.    Eyes: Negative for photophobia, pain, discharge, itching and visual disturbance.   Respiratory: Negative for cough, chest tightness, shortness of breath and wheezing.    Cardiovascular: Negative for chest pain, palpitations and leg swelling.   Gastrointestinal: Negative for abdominal pain, blood in stool, constipation, diarrhea, nausea and vomiting.   Genitourinary: Negative for dysuria, frequency, hematuria and urgency.   Musculoskeletal: Positive for gait problem. Negative for arthralgias, back pain, joint swelling, myalgias, neck pain and neck stiffness.   Skin: Negative for rash and wound.   Allergic/Immunologic: Negative for environmental allergies and food allergies.   Neurological: Positive for headaches (much improved). Negative for dizziness, tremors, seizures, syncope, speech difficulty, weakness, light-headedness and numbness.   Hematological: Negative for adenopathy. Does not bruise/bleed easily.   Psychiatric/Behavioral: Positive for confusion and decreased concentration. Negative for agitation, hallucinations, sleep disturbance and suicidal ideas. The patient is nervous/anxious.    All other systems reviewed and are negative.       Objective:  BP  "130/82   Pulse 62   Ht 162.6 cm (64\")   Wt 53.1 kg (117 lb)   SpO2 90%   BMI 20.08 kg/m²     Neurologic Exam  Mental Status   Oriented to person.   Disoriented to place.   Disoriented to time.   Registration: recalls 3 of 3 objects. Recall at 5 minutes: recalls 2 of 3 objects.   Attention: decreased. Concentration: decreased.   Speech: speech is normal   Level of consciousness: alert  Abnormal comprehension.   Repeats the same question multiple times throughout visit      Cranial Nerves      CN II   Visual acuity: decreased (wears glasses, repeated blinking of eyes chronic)     CN III, IV, VI   Pupils are equal, round, and reactive to light.  Extraocular motions are normal.   CN III: no CN III palsy  CN VI: no CN VI palsy  Nystagmus: none   Diplopia: none  Ophthalmoparesis: none  Upgaze: normal  Downgaze: normal     CN V   Facial sensation intact.      CN VII   Facial expression full, symmetric.      CN VIII   Hearing: impaired     CN IX, X   CN IX normal.   CN X normal.      CN XI   CN XI normal.      CN XII   CN XII normal.      Motor Exam   Muscle bulk: normal  Overall muscle tone: normal     Strength   Strength 5/5 throughout.      Gait, Coordination, and Reflexes      Gait  Gait: (wide based, guarded, slow but steady)     Coordination   Romberg: negative  Finger to nose coordination: normal  Heel to shin coordination: normal     Tremor   Resting tremor: absent  Intention tremor: absent  Action tremor: absent     Reflexes   Reflexes 2+ except as noted.   Right : 2+  Left : 2+     Physical Exam    Constitutional:       Appearance: Normal appearance.   Eyes:      Extraocular Movements: EOM normal.      Pupils: Pupils are equal, round, and reactive to light.   Neurological:      Mental Status: She is alert.      Coordination: Finger-Nose-Finger Test, Heel to Shin Test and Romberg Test normal.      Deep Tendon Reflexes: Strength normal.   Psychiatric:         Mood and Affect: Mood is anxious.         " Speech: Speech normal.         Behavior: Behavior is slowed.         Thought Content: Thought content normal.         Cognition and Memory: She exhibits impaired recent memory.         Judgment: Judgment is inappropriate.        July 2022 MMSE is 20/30 with 2/3 for word recall.    Assessment/Plan:       Diagnoses and all orders for this visit:    1. Late onset Alzheimer's dementia with behavioral disturbance (HCC) (Primary)  -     memantine (NAMENDA) 5 MG tablet; Take 1 tablet by mouth 2 (Two) Times a Day.  Dispense: 180 tablet; Refill: 1    2. Migraine without aura and without status migrainosus, not intractable  Comments:  continue tylenol as needed, nurtec odt did not help and possibly caused stomach issues    3. Bilateral hearing loss, unspecified hearing loss type  Comments:  son will schedule ENT visit    4. Impaired functional mobility, balance, gait, and endurance  Comments:  continue walker and falls prevention    Her headaches are resolved with the exception of 2 since we last saw her. She is no longer on the Nurtec ODT as they thought this may have worsened her nausea. Her stomach has improved with the ginger root and has not been complaining of that. She takes Tylenol if she gets a headache.    She is following with home health services for physical therapy, occupational therapy, as well as nursing palliative care comes monthly and they are going to discuss another hospice reevaluation. Overall, they have no additional concerns today.    She is going to continue the memantine 5 mg twice a day. She is living with her son. He feels well supported overall.    We are going to follow up in 4 months for reevaluation of her memory or sooner if needed.    Continue close monitoring with PCP and all specialists.    Continue using walker for falls prevention.     Reviewed medications, potential side effects and signs and symptoms to report. Discussed risk versus benefits of treatment plan with patient and/or  family-including medications, labs and radiology that may be ordered. Addressed questions and concerns during visit. Patient and/or family verbalized understanding and agree with plan.    AS THE PROVIDER, I PERSONALLY WORE PPE DURING ENTIRE FACE TO FACE ENCOUNTER IN CLINIC WITH THE PATIENT. PATIENT ALSO WORE PPE DURING ENTIRE FACE TO FACE ENCOUNTER EXCEPT FOR A MAX OF 30 SECONDS DURING NEUROLOGICAL EVALUATION OF CRANIAL NERVES AND THEN MASK WAS PLACED BACK OVER PATIENT FACE FOR REMAINDER OF VISIT. I WASHED MY HANDS BEFORE AND AFTER VISIT.    During this visit the following were done:  Labs Reviewed [x]    Labs Ordered []    Radiology Reports Reviewed [x]    Radiology Ordered []    PCP Records Reviewed [x]    Referring Provider Records Reviewed []    ER Records Reviewed [x]    Hospital Records Reviewed [x]    History Obtained From Family []    Radiology Images Reviewed []    Other Reviewed [x]  Cardiology notes reviewed  Records Requested []      Transcribed from ambient dictation for VASILE Rowe by Mela Mcgee.  09/21/22   18:26 EDT    Patient verbalized consent to the visit recording.  I have personally performed the services described in this document as transcribed by the above individual, and it is both accurate and complete.  VASILE Rowe  9/22/2022  06:39 EDT

## 2022-09-21 NOTE — PROGRESS NOTES
Her heart failure number has improved significantly (was up to 35,000), so this is a good sign. Would continue with plan to take lasix for 3 days and then take every other day

## 2022-09-27 NOTE — PROGRESS NOTES
"[facility]  [facility]      Chief Complaint  Follow-up and Congestive Heart Failure    History of Present Illness    Scott Diego is a 89 y.o. female with nonischemic cardiomyopathy/chronic systolic heart failure, orthostasis, dementia, hypothyroidism and CKD  who presents today to follow up on chronic systolic heart failure.      She unfortunately has had multiple ED visits recently and  was admitted on 9/5-9/10 with COVID-19 and heart failure.  She received IV Lasix but had worsening CKD and required IV fluid.  She had A. fib while in the hospital but spontaneously resolved.  Cardiology was consulted and recommended no anticoagulation due to short episode of A. fib and high risk for falls.At her visit last week she had evidence of slight fluid overload so she was advised to resume her lasix daily for 3 days and then every other day. She is followed by palliative care and home health    She reports some intermittent shortness of breath, she thinks she is better but is not for sure. Her son says she does still complain of dyspnea. Her main complaint is tinnitus    .     Objective     Vital Signs:   Vitals:    09/28/22 1322   BP: 118/67   BP Location: Left arm   Patient Position: Sitting   Cuff Size: Adult   Pulse: 96   Resp: 16   Temp: 96.7 °F (35.9 °C)   TempSrc: Temporal   SpO2: 93%   Weight: 53.6 kg (118 lb 3.2 oz)   Height: 162.6 cm (64\")     Body mass index is 20.29 kg/m².    Physical Exam  Vitals reviewed.   Constitutional:       Appearance: Normal appearance.   HENT:      Head: Normocephalic.   Neck:      Vascular: No carotid bruit.   Cardiovascular:      Rate and Rhythm: Normal rate and regular rhythm.      Pulses: Normal pulses.      Heart sounds: S1 normal and S2 normal. Murmur heard.    Systolic murmur is present with a grade of 2/6.  Pulmonary:      Effort: Pulmonary effort is normal. No respiratory distress.      Breath sounds: Rales present.   Chest:      Chest wall: No " tenderness.   Abdominal:      General: Abdomen is flat.      Palpations: Abdomen is soft.   Musculoskeletal:      Cervical back: Neck supple.      Right lower leg: No edema.      Left lower leg: No edema.   Skin:     General: Skin is warm and dry.   Neurological:      General: No focal deficit present.      Mental Status: She is alert and oriented to person, place, and time. Mental status is at baseline.   Psychiatric:         Mood and Affect: Mood normal.         Behavior: Behavior normal.         Thought Content: Thought content normal.           Result Review :   Adult Transthoracic Echo Complete w/ Color, Spectral and Contrast if Necessary Per Protocol (09/06/2022 11:58)    Basic metabolic panel (09/19/2022 12:15)  Basic metabolic panel (09/19/2022 12:15)  proBNP (09/10/2022 05:45)  C-reactive Protein (09/10/2022 05:45)  CBC & Differential (09/10/2022 05:45)  EKG today shows normal sinus rhythm first-degree block with frequent PVCs    Hospital records reviewed              Assessment and Plan {CC Problem List  Visit Diagnosis  ROS  Review (Popup)  Health Maintenance  Quality  BestPractice  Medications  SmartSets  SnapShot Encounters  Media :23}   1.  Acute on chronic HFrEF  She still has some rales on exam.  We will do chest x-ray.  Significant provement proBNP at last check.  May need to consider resuming Lasix daily, difficult to dose secondary to poor p.o. intake and CKD  CXR and BMP today    2. CKD IIIb  Recent BMP showed improved creatinine  BMP today    3. Paroxysmal afib  Irregular heart rate today confirmed to be frequent PVCs on EKG.  She did have a brief episode of paroxysmal A. fib in the hospital.  Anticoagulation deferred secondary to brief episode and risk of falls    4. Frequent PVCs  Patient asymptomatic  BMP and magnesium today     Further follow up pending CXR and labs    Follow Up {Instructions Charge Capture  Follow-up Communications :23}   Return if symptoms worsen or fail to  improve.    Dictated Utilizing Dragon Dictation

## 2022-09-28 ENCOUNTER — HOSPITAL ENCOUNTER (OUTPATIENT)
Dept: GENERAL RADIOLOGY | Facility: HOSPITAL | Age: 87
Discharge: HOME | End: 2022-09-28

## 2022-09-28 ENCOUNTER — LAB (OUTPATIENT)
Dept: LAB | Facility: HOSPITAL | Age: 87
End: 2022-09-28

## 2022-09-28 ENCOUNTER — OFFICE VISIT (OUTPATIENT)
Dept: CARDIOLOGY | Facility: HOSPITAL | Age: 87
End: 2022-09-28

## 2022-09-28 ENCOUNTER — HOSPITAL ENCOUNTER (OUTPATIENT)
Dept: CARDIOLOGY | Facility: HOSPITAL | Age: 87
Discharge: HOME | End: 2022-09-28

## 2022-09-28 ENCOUNTER — READMISSION MANAGEMENT (OUTPATIENT)
Dept: CALL CENTER | Facility: HOSPITAL | Age: 87
End: 2022-09-28

## 2022-09-28 VITALS
TEMPERATURE: 96.7 F | HEIGHT: 64 IN | OXYGEN SATURATION: 93 % | RESPIRATION RATE: 16 BRPM | BODY MASS INDEX: 20.18 KG/M2 | SYSTOLIC BLOOD PRESSURE: 118 MMHG | HEART RATE: 96 BPM | DIASTOLIC BLOOD PRESSURE: 67 MMHG | WEIGHT: 118.2 LBS

## 2022-09-28 DIAGNOSIS — N18.32 STAGE 3B CHRONIC KIDNEY DISEASE: ICD-10-CM

## 2022-09-28 DIAGNOSIS — I49.3 FREQUENT PVCS: ICD-10-CM

## 2022-09-28 DIAGNOSIS — I48.0 PAROXYSMAL ATRIAL FIBRILLATION: ICD-10-CM

## 2022-09-28 DIAGNOSIS — I50.23 ACUTE ON CHRONIC SYSTOLIC HEART FAILURE: ICD-10-CM

## 2022-09-28 DIAGNOSIS — I50.23 ACUTE ON CHRONIC SYSTOLIC HEART FAILURE: Primary | ICD-10-CM

## 2022-09-28 PROBLEM — N17.9 AKI (ACUTE KIDNEY INJURY): Status: RESOLVED | Noted: 2017-12-25 | Resolved: 2022-09-28

## 2022-09-28 LAB
QT INTERVAL: 452 MS
QTC INTERVAL: 534 MS

## 2022-09-28 PROCEDURE — 36415 COLL VENOUS BLD VENIPUNCTURE: CPT

## 2022-09-28 PROCEDURE — 93010 ELECTROCARDIOGRAM REPORT: CPT | Performed by: INTERNAL MEDICINE

## 2022-09-28 PROCEDURE — 99214 OFFICE O/P EST MOD 30 MIN: CPT | Performed by: NURSE PRACTITIONER

## 2022-09-28 PROCEDURE — 71046 X-RAY EXAM CHEST 2 VIEWS: CPT

## 2022-09-28 PROCEDURE — 80048 BASIC METABOLIC PNL TOTAL CA: CPT

## 2022-09-28 PROCEDURE — 93005 ELECTROCARDIOGRAM TRACING: CPT | Performed by: NURSE PRACTITIONER

## 2022-09-28 PROCEDURE — 83735 ASSAY OF MAGNESIUM: CPT

## 2022-09-28 RX ORDER — GABAPENTIN 100 MG/1
100 CAPSULE ORAL AS NEEDED
COMMUNITY
End: 2022-12-07

## 2022-09-29 ENCOUNTER — TELEPHONE (OUTPATIENT)
Dept: CARDIOLOGY | Facility: HOSPITAL | Age: 87
End: 2022-09-29

## 2022-09-29 LAB
ANION GAP SERPL CALCULATED.3IONS-SCNC: 8.6 MMOL/L (ref 5–15)
BUN SERPL-MCNC: 20 MG/DL (ref 8–23)
BUN/CREAT SERPL: 13.4 (ref 7–25)
CALCIUM SPEC-SCNC: 9.1 MG/DL (ref 8.2–9.6)
CHLORIDE SERPL-SCNC: 99 MMOL/L (ref 98–107)
CO2 SERPL-SCNC: 29.4 MMOL/L (ref 22–29)
CREAT SERPL-MCNC: 1.49 MG/DL (ref 0.57–1)
EGFRCR SERPLBLD CKD-EPI 2021: 32.8 ML/MIN/1.73
GLUCOSE SERPL-MCNC: 77 MG/DL (ref 65–99)
MAGNESIUM SERPL-MCNC: 2.4 MG/DL (ref 1.7–2.3)
POTASSIUM SERPL-SCNC: 4.6 MMOL/L (ref 3.5–5.2)
SODIUM SERPL-SCNC: 137 MMOL/L (ref 136–145)

## 2022-09-29 NOTE — TELEPHONE ENCOUNTER
Called patient's son with chest x-ray and lab results.  Chest x-ray showed improvement in pulmonary edema, mild pulmonary vascular congestion and chronic interstitial changes noted.  Creatinine up from last check, but still at baseline.  Advised to continue Lasix every other day.  Advised to call with any worsening symptoms.  Patient to follow-up in the clinic in 4 weeks.  He verbalized understanding with no further questions or concerns

## 2022-10-05 ENCOUNTER — TELEPHONE (OUTPATIENT)
Dept: FAMILY MEDICINE CLINIC | Facility: CLINIC | Age: 87
End: 2022-10-05

## 2022-10-05 DIAGNOSIS — R41.0 CONFUSION: Primary | ICD-10-CM

## 2022-10-05 DIAGNOSIS — R11.0 NAUSEA: ICD-10-CM

## 2022-10-05 NOTE — TELEPHONE ENCOUNTER
Caller: JANA    Relationship: [facility]    Best call back number: [phone]    What orders are you requesting (i.e. lab or imaging): BMP AND URINALYSIS    In what timeframe would the patient need to come in: AS SOON AS POSSIBLE    Where will you receive your lab/imaging services: AT HOME    Additional notes:   PATIENT IS HAVING INCREASED CONFUSION, NAUSEA, AND BLOOD PRESSURE IS DECLINING.[facility] WOULD LIKE ORDERS FOR A BMP AND UA.. BLOOD PRESSURE 10/05/22 WAS 10/05/22 102/54

## 2022-10-06 NOTE — TELEPHONE ENCOUNTER
Contacted  & informed her that labs are in the system. She requested labs be faxed to [facility],labs have been faxed as requested.    Contacted pt's son (bh verbal reviewed) & informed him that PCP would like to see pt. He verbalized understanding and they are scheduled  for a visit on Monday.

## 2022-10-08 ENCOUNTER — HOSPITAL ENCOUNTER (EMERGENCY)
Facility: HOSPITAL | Age: 87
Discharge: HOME | End: 2022-10-08
Attending: EMERGENCY MEDICINE | Admitting: EMERGENCY MEDICINE

## 2022-10-08 ENCOUNTER — APPOINTMENT (OUTPATIENT)
Dept: GENERAL RADIOLOGY | Facility: HOSPITAL | Age: 87
End: 2022-10-08

## 2022-10-08 ENCOUNTER — APPOINTMENT (OUTPATIENT)
Dept: CT IMAGING | Facility: HOSPITAL | Age: 87
End: 2022-10-08

## 2022-10-08 VITALS
HEART RATE: 95 BPM | WEIGHT: 117 LBS | TEMPERATURE: 98.1 F | OXYGEN SATURATION: 95 % | SYSTOLIC BLOOD PRESSURE: 129 MMHG | DIASTOLIC BLOOD PRESSURE: 85 MMHG | RESPIRATION RATE: 18 BRPM | HEIGHT: 64 IN | BODY MASS INDEX: 19.97 KG/M2

## 2022-10-08 DIAGNOSIS — J90 CHRONIC BILATERAL PLEURAL EFFUSIONS: ICD-10-CM

## 2022-10-08 DIAGNOSIS — E86.0 DEHYDRATION: ICD-10-CM

## 2022-10-08 DIAGNOSIS — R10.10 UPPER ABDOMINAL PAIN: Primary | ICD-10-CM

## 2022-10-08 LAB
ALBUMIN SERPL-MCNC: 3.9 G/DL (ref 3.5–5.2)
ALBUMIN/GLOB SERPL: 0.9 G/DL
ALP SERPL-CCNC: 53 U/L (ref 39–117)
ALT SERPL W P-5'-P-CCNC: 5 U/L (ref 1–33)
ANION GAP SERPL CALCULATED.3IONS-SCNC: 13 MMOL/L (ref 5–15)
AST SERPL-CCNC: 16 U/L (ref 1–32)
BACTERIA UR QL AUTO: ABNORMAL /HPF
BASOPHILS # BLD AUTO: 0.06 10*3/MM3 (ref 0–0.2)
BASOPHILS NFR BLD AUTO: 0.8 % (ref 0–1.5)
BILIRUB SERPL-MCNC: 0.6 MG/DL (ref 0–1.2)
BILIRUB UR QL STRIP: NEGATIVE
BUN SERPL-MCNC: 21 MG/DL (ref 8–23)
BUN/CREAT SERPL: 16.3 (ref 7–25)
CALCIUM SPEC-SCNC: 9.5 MG/DL (ref 8.2–9.6)
CHLORIDE SERPL-SCNC: 102 MMOL/L (ref 98–107)
CLARITY UR: CLEAR
CO2 SERPL-SCNC: 25 MMOL/L (ref 22–29)
COLOR UR: YELLOW
CREAT SERPL-MCNC: 1.29 MG/DL (ref 0.57–1)
D-LACTATE SERPL-SCNC: 1.4 MMOL/L (ref 0.5–2)
DEPRECATED RDW RBC AUTO: 54.8 FL (ref 37–54)
EGFRCR SERPLBLD CKD-EPI 2021: 39 ML/MIN/1.73
EOSINOPHIL # BLD AUTO: 0.21 10*3/MM3 (ref 0–0.4)
EOSINOPHIL NFR BLD AUTO: 2.7 % (ref 0.3–6.2)
ERYTHROCYTE [DISTWIDTH] IN BLOOD BY AUTOMATED COUNT: 14.9 % (ref 12.3–15.4)
FLUAV RNA RESP QL NAA+PROBE: NOT DETECTED
FLUBV RNA RESP QL NAA+PROBE: NOT DETECTED
GLOBULIN UR ELPH-MCNC: 4.4 GM/DL
GLUCOSE SERPL-MCNC: 104 MG/DL (ref 65–99)
GLUCOSE UR STRIP-MCNC: NEGATIVE MG/DL
HCT VFR BLD AUTO: 34.5 % (ref 34–46.6)
HGB BLD-MCNC: 10.6 G/DL (ref 12–15.9)
HGB UR QL STRIP.AUTO: NEGATIVE
HOLD SPECIMEN: NORMAL
HYALINE CASTS UR QL AUTO: ABNORMAL /LPF
IMM GRANULOCYTES # BLD AUTO: 0.02 10*3/MM3 (ref 0–0.05)
IMM GRANULOCYTES NFR BLD AUTO: 0.3 % (ref 0–0.5)
KETONES UR QL STRIP: NEGATIVE
LEUKOCYTE ESTERASE UR QL STRIP.AUTO: ABNORMAL
LIPASE SERPL-CCNC: 35 U/L (ref 13–60)
LYMPHOCYTES # BLD AUTO: 1.54 10*3/MM3 (ref 0.7–3.1)
LYMPHOCYTES NFR BLD AUTO: 19.6 % (ref 19.6–45.3)
MCH RBC QN AUTO: 30.7 PG (ref 26.6–33)
MCHC RBC AUTO-ENTMCNC: 30.7 G/DL (ref 31.5–35.7)
MCV RBC AUTO: 100 FL (ref 79–97)
MONOCYTES # BLD AUTO: 0.85 10*3/MM3 (ref 0.1–0.9)
MONOCYTES NFR BLD AUTO: 10.8 % (ref 5–12)
NEUTROPHILS NFR BLD AUTO: 5.17 10*3/MM3 (ref 1.7–7)
NEUTROPHILS NFR BLD AUTO: 65.8 % (ref 42.7–76)
NITRITE UR QL STRIP: POSITIVE
NRBC BLD AUTO-RTO: 0 /100 WBC (ref 0–0.2)
PH UR STRIP.AUTO: 6.5 [PH] (ref 5–8)
PLATELET # BLD AUTO: 307 10*3/MM3 (ref 140–450)
PMV BLD AUTO: 11.2 FL (ref 6–12)
POTASSIUM SERPL-SCNC: 4.2 MMOL/L (ref 3.5–5.2)
PROT SERPL-MCNC: 8.3 G/DL (ref 6–8.5)
PROT UR QL STRIP: ABNORMAL
RBC # BLD AUTO: 3.45 10*6/MM3 (ref 3.77–5.28)
RBC # UR STRIP: ABNORMAL /HPF
REF LAB TEST METHOD: ABNORMAL
SARS-COV-2 RNA RESP QL NAA+PROBE: NOT DETECTED
SODIUM SERPL-SCNC: 140 MMOL/L (ref 136–145)
SP GR UR STRIP: 1.02 (ref 1–1.03)
SQUAMOUS #/AREA URNS HPF: ABNORMAL /HPF
UROBILINOGEN UR QL STRIP: ABNORMAL
WBC # UR STRIP: ABNORMAL /HPF
WBC NRBC COR # BLD: 7.85 10*3/MM3 (ref 3.4–10.8)
WHOLE BLOOD HOLD COAG: NORMAL
WHOLE BLOOD HOLD SPECIMEN: NORMAL

## 2022-10-08 PROCEDURE — P9612 CATHETERIZE FOR URINE SPEC: HCPCS

## 2022-10-08 PROCEDURE — 85025 COMPLETE CBC W/AUTO DIFF WBC: CPT | Performed by: EMERGENCY MEDICINE

## 2022-10-08 PROCEDURE — 83690 ASSAY OF LIPASE: CPT | Performed by: EMERGENCY MEDICINE

## 2022-10-08 PROCEDURE — 80053 COMPREHEN METABOLIC PANEL: CPT | Performed by: EMERGENCY MEDICINE

## 2022-10-08 PROCEDURE — 83605 ASSAY OF LACTIC ACID: CPT | Performed by: EMERGENCY MEDICINE

## 2022-10-08 PROCEDURE — 87636 SARSCOV2 & INF A&B AMP PRB: CPT | Performed by: EMERGENCY MEDICINE

## 2022-10-08 PROCEDURE — 74176 CT ABD & PELVIS W/O CONTRAST: CPT

## 2022-10-08 PROCEDURE — 81001 URINALYSIS AUTO W/SCOPE: CPT | Performed by: EMERGENCY MEDICINE

## 2022-10-08 PROCEDURE — 99284 EMERGENCY DEPT VISIT MOD MDM: CPT

## 2022-10-08 PROCEDURE — 93005 ELECTROCARDIOGRAM TRACING: CPT | Performed by: EMERGENCY MEDICINE

## 2022-10-08 PROCEDURE — 71045 X-RAY EXAM CHEST 1 VIEW: CPT

## 2022-10-08 RX ORDER — SODIUM CHLORIDE 9 MG/ML
10 INJECTION INTRAVENOUS AS NEEDED
Status: DISCONTINUED | OUTPATIENT
Start: 2022-10-08 | End: 2022-10-08 | Stop reason: HOSPADM

## 2022-10-08 RX ADMIN — SODIUM CHLORIDE 1000 ML: 9 INJECTION, SOLUTION INTRAVENOUS at 18:39

## 2022-10-08 NOTE — ED PROVIDER NOTES
Subjective   History of Present Illness  89-year-old female who presents for evaluation of upper abdominal pain, nausea, fatigue for the last 2 days.  The patient reports that she began to have upper abdominal pain 2 days ago.  It does not radiate into the chest or any other area of the abdomen.  It stays in the anterior portion of the abdomen.  She also reports that she has had increased fatigue.  The patient's son feels like she is dehydrated because she has only drank a small amount of coffee over the last 2 days.  She reportedly drank 2 cups of coffee yesterday and only half cup of coffee a day.  She denies nausea or vomiting but does report decreased appetite.  She denies chest pain, cough, shortness of breath.  She denies any sick contacts.  No new medications.  No fever or other infectious symptoms.  She denies change in urination include no dysuria, frequency, urgency.  No change in bowel unction including no diarrhea or blood in stool.        Review of Systems   Constitutional: Positive for activity change, appetite change and fatigue. Negative for chills and fever.   HENT: Negative for congestion, ear pain, postnasal drip, sinus pressure and sore throat.    Eyes: Negative for pain, redness and visual disturbance.   Respiratory: Negative for cough, chest tightness and shortness of breath.    Cardiovascular: Negative for chest pain, palpitations and leg swelling.   Gastrointestinal: Positive for abdominal pain and nausea. Negative for anal bleeding, blood in stool, diarrhea and vomiting.   Endocrine: Negative for polydipsia and polyuria.   Genitourinary: Negative for difficulty urinating, dysuria, frequency and urgency.   Musculoskeletal: Negative for arthralgias, back pain and neck pain.   Skin: Negative for pallor and rash.   Allergic/Immunologic: Negative for environmental allergies and immunocompromised state.   Neurological: Negative for dizziness, weakness and headaches.   Hematological: Negative for  adenopathy.   Psychiatric/Behavioral: Negative for confusion, self-injury and suicidal ideas. The patient is not nervous/anxious.    All other systems reviewed and are negative.      Past Medical History:   Diagnosis Date   • Anxiety    • Congestive heart failure (HCC)    • Coronary artery disease    • Dementia (HCC)    • Depression    • Hyperlipidemia    • Malignant neoplasm (HCC)    • Memory loss    • Peripheral neuropathy    • Stroke (HCC)     mini stroke   • Systolic heart failure (HCC) 09/25/2017    Chronic/compensated (EF 25%)       Allergies   Allergen Reactions   • Augmentin [Amoxicillin-Pot Clavulanate] Nausea And Vomiting     Makes her sick at her stomach   • Claritin [Loratadine] Unknown (See Comments)     Doesn't remember   • Keflex [Cephalexin] Nausea And Vomiting     Makes pt sick at her stomach   • Sulfa Antibiotics Other (See Comments)     Does not remember       Past Surgical History:   Procedure Laterality Date   • CARDIAC CATHETERIZATION     • CHOLECYSTECTOMY     • EYE SURGERY     • HYSTERECTOMY         Family History   Problem Relation Age of Onset   • Cancer Mother    • No Known Problems Father    • Cancer Sister    • Cancer Brother    • Parkinsonism Brother    • No Known Problems Son        Social History     Socioeconomic History   • Marital status:    Tobacco Use   • Smoking status: Never   • Smokeless tobacco: Never   Vaping Use   • Vaping Use: Never used   Substance and Sexual Activity   • Alcohol use: Never   • Drug use: Never   • Sexual activity: Defer     Comment:  since 1/2022           Objective   Physical Exam  Vitals and nursing note reviewed.   Constitutional:       General: She is not in acute distress.     Appearance: Normal appearance. She is well-developed. She is not toxic-appearing or diaphoretic.   HENT:      Head: Normocephalic and atraumatic.      Right Ear: External ear normal.      Left Ear: External ear normal.      Nose: Nose normal.   Eyes:      General:  Lids are normal.      Pupils: Pupils are equal, round, and reactive to light.   Neck:      Trachea: No tracheal deviation.   Cardiovascular:      Rate and Rhythm: Regular rhythm. Tachycardia present.      Pulses: No decreased pulses.      Heart sounds: Normal heart sounds. No murmur heard.    No friction rub. No gallop.   Pulmonary:      Effort: Pulmonary effort is normal. No respiratory distress.      Breath sounds: Normal breath sounds. No decreased breath sounds, wheezing, rhonchi or rales.   Abdominal:      General: Bowel sounds are normal.      Palpations: Abdomen is soft.      Tenderness: There is abdominal tenderness in the epigastric area. There is no guarding or rebound.   Musculoskeletal:         General: No deformity. Normal range of motion.      Cervical back: Normal range of motion and neck supple.   Lymphadenopathy:      Cervical: No cervical adenopathy.   Skin:     General: Skin is warm and dry.      Findings: No rash.   Neurological:      Mental Status: She is alert and oriented to person, place, and time.      Cranial Nerves: No cranial nerve deficit.      Sensory: No sensory deficit.   Psychiatric:         Speech: Speech normal.         Behavior: Behavior normal.         Thought Content: Thought content normal.         Judgment: Judgment normal.         Procedures           ED Course                                           MDM  Number of Diagnoses or Management Options  Chronic bilateral pleural effusions: new and requires workup  Dehydration: new and requires workup  Upper abdominal pain: new and requires workup  Diagnosis management comments: No acute adenopathy identified on CT scan of the abdomen pelvis.  The patient was given IV fluids while here in the ER.    Pleural effusions in the lungs were noted to be relatively stable. Even without any oxygen on the patient's oxygen saturations were 92 to 93%.  The patient typically wears 2-1/2 L of nasal cannula oxygen at all times.    She will be  discharged with advised to drink more water and less coffee.    Advised to follow-up with primary care physician for recheck in 1 week.       Amount and/or Complexity of Data Reviewed  Clinical lab tests: ordered and reviewed  Tests in the radiology section of CPT®: ordered and reviewed  Decide to obtain previous medical records or to obtain history from someone other than the patient: yes  Obtain history from someone other than the patient: yes  Review and summarize past medical records: yes  Independent visualization of images, tracings, or specimens: yes        Final diagnoses:   Upper abdominal pain   Dehydration   Chronic bilateral pleural effusions       ED Disposition  ED Disposition     ED Disposition   Discharge    Condition   Stable    Comment   --             Ngozi Davis DO  [address] [facility] [id number]  [phone]    In 1 week           Medication List      No changes were made to your prescriptions during this visit.          Marge Contreras MD  10/08/22 9991

## 2022-10-09 NOTE — DISCHARGE INSTRUCTIONS
Make sure the rest and drink plenty of fluids.    Try to drink more water and less dehydrating fluids such as coffee.    Follow-up with primary care physician for recheck in 1 week.

## 2022-10-10 ENCOUNTER — OFFICE VISIT (OUTPATIENT)
Dept: FAMILY MEDICINE CLINIC | Facility: CLINIC | Age: 87
End: 2022-10-10

## 2022-10-10 VITALS
HEIGHT: 64 IN | DIASTOLIC BLOOD PRESSURE: 62 MMHG | WEIGHT: 118.4 LBS | SYSTOLIC BLOOD PRESSURE: 110 MMHG | BODY MASS INDEX: 20.22 KG/M2

## 2022-10-10 DIAGNOSIS — R11.0 NAUSEA: ICD-10-CM

## 2022-10-10 DIAGNOSIS — K21.9 GASTROESOPHAGEAL REFLUX DISEASE, UNSPECIFIED WHETHER ESOPHAGITIS PRESENT: Primary | ICD-10-CM

## 2022-10-10 DIAGNOSIS — N30.00 ACUTE CYSTITIS WITHOUT HEMATURIA: ICD-10-CM

## 2022-10-10 PROCEDURE — 99214 OFFICE O/P EST MOD 30 MIN: CPT | Performed by: STUDENT IN AN ORGANIZED HEALTH CARE EDUCATION/TRAINING PROGRAM

## 2022-10-10 RX ORDER — NITROFURANTOIN 25; 75 MG/1; MG/1
100 CAPSULE ORAL DAILY
Qty: 5 CAPSULE | Refills: 0 | Status: SHIPPED | OUTPATIENT
Start: 2022-10-10 | End: 2022-10-12

## 2022-10-10 RX ORDER — ASCORBIC ACID 1000 MG
500 TABLET ORAL 3 TIMES DAILY
Qty: 90 CAPSULE | Refills: 2 | Status: SHIPPED | OUTPATIENT
Start: 2022-10-10 | End: 2022-10-24

## 2022-10-10 RX ORDER — PANTOPRAZOLE SODIUM 40 MG/1
40 TABLET, DELAYED RELEASE ORAL
Qty: 30 TABLET | Refills: 5 | Status: SHIPPED | OUTPATIENT
Start: 2022-10-10 | End: 2022-10-11

## 2022-10-10 NOTE — PROGRESS NOTES
Established Office Visit      Patient Name: Scott Diego  : 4/10/1930   MRN: [id number]   Care Team: Patient Care Team:  Ngozi Davis DO as PCP - General (Internal Medicine)  Jose Antonio Aragon MD as Consulting Physician (Cardiology)    Chief Complaint:    Chief Complaint   Patient presents with   • Nausea     On going for a week, lower stomach    • Constipation       History of Present Illness: Scott Diego is a 89 y.o. female who is here today to follow up with nausea.     Nausea - she reports ongoing nausea which returned about 1.5 weeks ago. Originally started >6 months ago but had improved recently with ginger root and PPI. She has been compliant with medications. No changes within her diet. Cannot identify trigger and unsure why symptoms returned. Reports epigastric discomfort which comes and goes. Improves with zofran temporarily. Has appointment with GI tomorrow.     Son feels she is slightly more confused and generally weaker over the past week. She denies urinary complaints but did have +LE and nitrites on UA in the ER 10/8/22.      Subjective      Review of Systems:   Review of Systems - See HPI    I have reviewed and the following portions of the patient's history were updated as appropriate: past family history, past medical history, past social history, past surgical history and problem list.    Medications:     Current Outpatient Medications:   •  acetaminophen (TYLENOL) 325 MG tablet, Take 2 tablets by mouth Every 4 (Four) Hours As Needed for Mild Pain ., Disp: , Rfl:   •  ALPRAZolam (XANAX) 0.5 MG tablet, Take 0.5 mg by mouth Every 6 (Six) Hours As Needed., Disp: , Rfl:   •  Artificial Tear Ointment (artificial tears) ophthalmic ointment, Administer 1 application to both eyes Daily., Disp: , Rfl:   •  atorvastatin (LIPITOR) 80 MG tablet, Take 80 mg by mouth Every Night., Disp: , Rfl:   •  bisacodyl (Dulcolax) 10 MG suppository, Insert 1 suppository into the rectum Daily As Needed for  Constipation. If no BM in 3 days take supp, Disp: 15 suppository, Rfl: 1  •  carboxymethylcellulose (REFRESH PLUS) 0.5 % solution, 3 (Three) Times a Day As Needed for Dry Eyes., Disp: , Rfl:   •  fexofenadine (ALLEGRA) 60 MG tablet, Take 1 tablet by mouth Daily., Disp: 30 tablet, Rfl: 5  •  furosemide (LASIX) 20 MG tablet, Take 1 tablet by mouth Daily As Needed (increased shortness of breath, swelling)., Disp: 90 tablet, Rfl: 0  •  gabapentin (NEURONTIN) 100 MG capsule, Take 100 mg by mouth Daily., Disp: , Rfl:   •  gabapentin (NEURONTIN) 300 MG capsule, Take 1 capsule by mouth Every Night., Disp: 90 capsule, Rfl: 0  •  Ginger, Zingiber officinalis, (Ginger Root) 500 MG capsule, Take 1 capsule by mouth 3 (Three) Times a Day for 30 days., Disp: 90 capsule, Rfl: 2  •  Glycerin-Polysorbate 80 (REFRESH DRY EYE THERAPY OP), Apply  to eye(s) as directed by provider As Needed., Disp: , Rfl:   •  levothyroxine (SYNTHROID, LEVOTHROID) 50 MCG tablet, Take 1 tablet by mouth Every Morning., Disp: 30 tablet, Rfl: 5  •  memantine (NAMENDA) 5 MG tablet, Take 1 tablet by mouth 2 (Two) Times a Day., Disp: 180 tablet, Rfl: 1  •  metoprolol succinate XL (TOPROL-XL) 25 MG 24 hr tablet, Take 1 tablet by mouth Daily., Disp: 30 tablet, Rfl: 5  •  ondansetron ODT (ZOFRAN-ODT) 4 MG disintegrating tablet, Place 1 tablet on the tongue Every 6 (Six) Hours As Needed for Nausea., Disp: 20 tablet, Rfl: 0  •  pantoprazole (PROTONIX) 40 MG EC tablet, Take 1 tablet by mouth Every Morning., Disp: 30 tablet, Rfl: 5  •  polyethylene glycol (MIRALAX) 17 GM/SCOOP powder, Mix 17 g in liquid and drink by mouth Daily for 30 days., Disp: 510 g, Rfl: 0  •  SM Aspirin Adult Low Strength 81 MG EC tablet, Take 81 mg by mouth Daily., Disp: , Rfl:   •  nitrofurantoin, macrocrystal-monohydrate, (Macrobid) 100 MG capsule, Take 1 capsule by mouth Daily., Disp: 5 capsule, Rfl: 0    Allergies:   Allergies   Allergen Reactions   • Augmentin [Amoxicillin-Pot Clavulanate]  "Nausea And Vomiting     Makes her sick at her stomach   • Claritin [Loratadine] Unknown (See Comments)     Doesn't remember   • Keflex [Cephalexin] Nausea And Vomiting     Makes pt sick at her stomach   • Sulfa Antibiotics Other (See Comments)     Does not remember       Objective     Physical Exam:  Vital Signs:   Vitals:    10/10/22 1306   BP: 110/62   Weight: 53.7 kg (118 lb 6.4 oz)   Height: 162.6 cm (64\")     Body mass index is 20.32 kg/m².     Physical Exam  Vitals reviewed.   Constitutional:       Appearance: Normal appearance.   Cardiovascular:      Rate and Rhythm: Normal rate.   Pulmonary:      Effort: Pulmonary effort is normal. No respiratory distress.   Abdominal:      General: Abdomen is flat. There is no distension.      Palpations: Abdomen is soft.      Tenderness: There is no abdominal tenderness.   Skin:     General: Skin is warm and dry.   Neurological:      Mental Status: She is alert.   Psychiatric:         Mood and Affect: Mood normal.         Behavior: Behavior normal.         Judgment: Judgment normal.         Assessment / Plan      Assessment/Plan:   Problems Addressed This Visit  Diagnoses and all orders for this visit:    1. Gastroesophageal reflux disease, unspecified whether esophagitis present (Primary)  -     pantoprazole (PROTONIX) 40 MG EC tablet; Take 1 tablet by mouth Every Morning.  Dispense: 30 tablet; Refill: 5    2. Nausea  -     pantoprazole (PROTONIX) 40 MG EC tablet; Take 1 tablet by mouth Every Morning.  Dispense: 30 tablet; Refill: 5  -     Guadalupe, Zingiber officinalis, (Guadalupe Root) 500 MG capsule; Take 1 capsule by mouth 3 (Three) Times a Day for 30 days.  Dispense: 90 capsule; Refill: 2    3. Acute cystitis without hematuria  -     nitrofurantoin, macrocrystal-monohydrate, (Macrobid) 100 MG capsule; Take 1 capsule by mouth Daily.  Dispense: 5 capsule; Refill: 0      She is a poor historian and inconsistent with her subjective symptoms. Concerned UTI may be playing a " role in generalized weakness, slightly worse confusion, nausea. Her UA 10/8/22 is consistent with this. We will treat with macrobid, she has tolerated this well in the past.     In regards to nausea, this is a chronic, ongoing problem which recently had improved with treatment of constipation, acid reflux with PPI, and starting ginger root. Refilled PPI and ginger root. Unclear why it has returned but she plans to discuss further with GI tomorrow. We discussed dietary modifications and she is unable to identify trigger. Discussed importance of small, more frequent meals rather than large meals. I also suggest holding Atorvastatin as this would help with medication burden and possible nausea. Unclear if she is still benefiting from it given her age 89, it would be reasonable to discuss stopping this. Her son will reach out to her cardiologist to confirm.         Plan of care reviewed with patient at the conclusion of today's visit. Education was provided regarding diagnosis and management.  Patient verbalizes understanding of and agreement with management plan.    Follow Up: 12/19/22 as currently scheduled or sooner as needed  No follow-ups on file.        DO GONZALO Fink RD  [facility]  [address]  [facility] [address]  Fax [phone]  Phone [phone]       Calm

## 2022-10-11 ENCOUNTER — PREP FOR SURGERY (OUTPATIENT)
Dept: OTHER | Facility: HOSPITAL | Age: 87
End: 2022-10-11

## 2022-10-11 ENCOUNTER — TELEMEDICINE (OUTPATIENT)
Dept: GASTROENTEROLOGY | Facility: CLINIC | Age: 87
End: 2022-10-11

## 2022-10-11 ENCOUNTER — TELEPHONE (OUTPATIENT)
Dept: FAMILY MEDICINE CLINIC | Facility: CLINIC | Age: 87
End: 2022-10-11

## 2022-10-11 DIAGNOSIS — R11.0 NAUSEA WITHOUT VOMITING: Primary | ICD-10-CM

## 2022-10-11 DIAGNOSIS — K59.00 CONSTIPATION, UNSPECIFIED CONSTIPATION TYPE: ICD-10-CM

## 2022-10-11 DIAGNOSIS — K21.9 GASTROESOPHAGEAL REFLUX DISEASE, UNSPECIFIED WHETHER ESOPHAGITIS PRESENT: ICD-10-CM

## 2022-10-11 PROCEDURE — 99214 OFFICE O/P EST MOD 30 MIN: CPT | Performed by: PHYSICIAN ASSISTANT

## 2022-10-11 RX ORDER — PANTOPRAZOLE SODIUM 40 MG/1
40 TABLET, DELAYED RELEASE ORAL 2 TIMES DAILY
Qty: 60 TABLET | Refills: 1 | Status: SHIPPED | OUTPATIENT
Start: 2022-10-11 | End: 2022-12-05

## 2022-10-11 NOTE — PROGRESS NOTES
Follow Up      Patient Name: Scott Diego  : 4/10/1930   MRN: [id number]     Chief Complaint:  No chief complaint on file.      History of Present Illness: Scott Diego is a 89 y.o. female, PMH includes CHF, HTN, thyroid disease, anxiety, dementia, recent COVID-19 infection, who is here today for hospital follow up. Son is with patient today and helps to provide the history.     Pt was admitted to  [facility]  - 9/10 for evaluation of COVID-19 infection. She was seen by GI team for evaluation of nausea; pantoprazole 40mg daily was ordered, as well as miralax for constipation.    Pt and son state that since discharge, pt was doing initially well. She has had persistent nausea, worse in morning, that has been ongoing for 1-2 weeks. She is taking pantoprazole 40mg daily, in addition to michele root 550mg TID. She notes associated poor appetite, chronic left ear tinnitus. Sx are somewhat alleviated when laying down, closing her eyes. Pt denies dysequilibrium or emesis. She admittedly has little desire to eat or drink and consumes mainly coffee. She was then seen at  [facility] ED 10/8 for evaluation of upper abdominal pain and persistent nausea. Pt admittedly had only consumed coffee x 2 days.     Constipation is much improved using miralax and docusate daily. She generally has one large, complete BM every 1-2 days.     Patient denies associated fever, chills, abdominal pain, indigestion, diarrhea, hematemesis, dysphagia, hematochezia, melena, bloating, weight loss or gain, dysuria, jaundice or bruising.    Subjective      Review of Systems:   Review of Systems   Constitutional: Positive for appetite change (decreased). Negative for chills, diaphoresis, fatigue, fever, unexpected weight gain and unexpected weight loss.   HENT: Positive for tinnitus. Negative for drooling, facial swelling, mouth sores, rhinorrhea, sore throat, trouble swallowing and voice change.    Eyes: Negative.    Respiratory: Negative for  cough, chest tightness and shortness of breath.    Cardiovascular: Negative for chest pain.   Gastrointestinal: Positive for nausea. Negative for abdominal pain, blood in stool, constipation, diarrhea, vomiting, GERD and indigestion.   Genitourinary: Negative for dysuria, flank pain, hematuria and pelvic pain.   Musculoskeletal: Negative for arthralgias and myalgias.   Skin: Negative for color change, pallor and rash.   Neurological: Negative for dizziness, tremors, syncope, weakness and numbness.   Psychiatric/Behavioral: Negative for hallucinations and sleep disturbance. The patient is not nervous/anxious.    All other systems reviewed and are negative.      Medications:     Current Outpatient Medications:   •  acetaminophen (TYLENOL) 325 MG tablet, Take 2 tablets by mouth Every 4 (Four) Hours As Needed for Mild Pain ., Disp: , Rfl:   •  ALPRAZolam (XANAX) 0.5 MG tablet, Take 0.5 mg by mouth Every 6 (Six) Hours As Needed., Disp: , Rfl:   •  Artificial Tear Ointment (artificial tears) ophthalmic ointment, Administer 1 application to both eyes Daily., Disp: , Rfl:   •  atorvastatin (LIPITOR) 80 MG tablet, Take 80 mg by mouth Every Night., Disp: , Rfl:   •  bisacodyl (Dulcolax) 10 MG suppository, Insert 1 suppository into the rectum Daily As Needed for Constipation. If no BM in 3 days take supp, Disp: 15 suppository, Rfl: 1  •  carboxymethylcellulose (REFRESH PLUS) 0.5 % solution, 3 (Three) Times a Day As Needed for Dry Eyes., Disp: , Rfl:   •  fexofenadine (ALLEGRA) 60 MG tablet, Take 1 tablet by mouth Daily., Disp: 30 tablet, Rfl: 5  •  furosemide (LASIX) 20 MG tablet, Take 1 tablet by mouth Daily As Needed (increased shortness of breath, swelling)., Disp: 90 tablet, Rfl: 0  •  gabapentin (NEURONTIN) 100 MG capsule, Take 100 mg by mouth Daily., Disp: , Rfl:   •  gabapentin (NEURONTIN) 300 MG capsule, Take 1 capsule by mouth Every Night., Disp: 90 capsule, Rfl: 0  •  Ginger, Zingiber officinalis, (Ginger Root) 500  MG capsule, Take 1 capsule by mouth 3 (Three) Times a Day for 30 days., Disp: 90 capsule, Rfl: 2  •  Glycerin-Polysorbate 80 (REFRESH DRY EYE THERAPY OP), Apply  to eye(s) as directed by provider As Needed., Disp: , Rfl:   •  levothyroxine (SYNTHROID, LEVOTHROID) 50 MCG tablet, Take 1 tablet by mouth Every Morning., Disp: 30 tablet, Rfl: 5  •  memantine (NAMENDA) 5 MG tablet, Take 1 tablet by mouth 2 (Two) Times a Day., Disp: 180 tablet, Rfl: 1  •  metoprolol succinate XL (TOPROL-XL) 25 MG 24 hr tablet, Take 1 tablet by mouth Daily., Disp: 30 tablet, Rfl: 5  •  nitrofurantoin, macrocrystal-monohydrate, (Macrobid) 100 MG capsule, Take 1 capsule by mouth Daily., Disp: 5 capsule, Rfl: 0  •  ondansetron ODT (ZOFRAN-ODT) 4 MG disintegrating tablet, Place 1 tablet on the tongue Every 6 (Six) Hours As Needed for Nausea., Disp: 20 tablet, Rfl: 0  •  pantoprazole (PROTONIX) 40 MG EC tablet, Take 1 tablet by mouth Every Morning., Disp: 30 tablet, Rfl: 5  •  polyethylene glycol (MIRALAX) 17 GM/SCOOP powder, Mix 17 g in liquid and drink by mouth Daily for 30 days., Disp: 510 g, Rfl: 0  •  SM Aspirin Adult Low Strength 81 MG EC tablet, Take 81 mg by mouth Daily., Disp: , Rfl:     Allergies:   Allergies   Allergen Reactions   • Augmentin [Amoxicillin-Pot Clavulanate] Nausea And Vomiting     Makes her sick at her stomach   • Claritin [Loratadine] Unknown (See Comments)     Doesn't remember   • Keflex [Cephalexin] Nausea And Vomiting     Makes pt sick at her stomach   • Sulfa Antibiotics Other (See Comments)     Does not remember       Social History:   Social History     Socioeconomic History   • Marital status:    Tobacco Use   • Smoking status: Never   • Smokeless tobacco: Never   Vaping Use   • Vaping Use: Never used   Substance and Sexual Activity   • Alcohol use: Never   • Drug use: Never   • Sexual activity: Defer     Comment:  since 1/2022        Surgical History:   Past Surgical History:   Procedure  Laterality Date   • CARDIAC CATHETERIZATION     • CHOLECYSTECTOMY     • EYE SURGERY     • HYSTERECTOMY          Medical History:   Past Medical History:   Diagnosis Date   • Anxiety    • Congestive heart failure (HCC)    • Coronary artery disease    • Dementia (HCC)    • Depression    • Hyperlipidemia    • Malignant neoplasm (HCC)    • Memory loss    • Peripheral neuropathy    • Stroke (HCC)     mini stroke   • Systolic heart failure (HCC) 09/25/2017    Chronic/compensated (EF 25%)        Objective     Physical Exam:  Vital Signs: There were no vitals filed for this visit.  There is no height or weight on file to calculate BMI.     Physical Exam  Vitals and nursing note reviewed.   Constitutional:       Appearance: Normal appearance. She is normal weight. She is ill-appearing (chronically). She is not diaphoretic.      Interventions: Nasal cannula in place.      Comments: Pt is sitting on couch with eyes closed for most of video visit. She responds verbally to questions.    HENT:      Head: Normocephalic and atraumatic.      Right Ear: External ear normal.      Left Ear: External ear normal.      Nose: Nose normal. No rhinorrhea.   Eyes:      General:         Right eye: No discharge.         Left eye: No discharge.      Conjunctiva/sclera: Conjunctivae normal.      Pupils: Pupils are equal, round, and reactive to light.   Neck:      Thyroid: No thyromegaly.   Pulmonary:      Effort: Pulmonary effort is normal. No respiratory distress.   Abdominal:      General: Abdomen is flat. There is no distension.   Musculoskeletal:         General: Normal range of motion.      Cervical back: Normal range of motion.   Skin:     Coloration: Skin is not jaundiced or pale.      Findings: No bruising.   Neurological:      Comments: Oriented to person and place.          Assessment / Plan      Assessment/Plan:   There are no diagnoses linked to this encounter.     Nausea without vomiting  GERD  Constipation, improved   - increased  pantoprazole 40mg to BID   - continue michele root 550mg TID   - continue zofran PRN   - pt given GERD diet instructions, advised to avoid GI irritants such as caffeine, carbonation, EtOH, tobacco, chocolate, peppermint, acid-based foods   - previous labs, imaging, hospital records reviewed   - schedule for EGD   - follow up in clinic after completion of above studies   - call clinic at any time for questions or new / worsened sx    Follow Up:   No follow-ups on file.    Plan of care reviewed with the patient at the conclusion of today's visit.  Education was provided regarding diagnosis, management, and any prescribed or recommended OTC medications.  Patient verbalized understanding of and agreement with management plan.     NOTE TO PATIENT: The 21st Century Cures Act makes medical notes like these available to patients in the interest of transparency. However, be advised this is a medical document. It is intended as peer to peer communication. It is written in medical language and may contain abbreviations or verbiage that are unfamiliar. It may appear blunt or direct. Medical documents are intended to carry relevant information, facts as evident, and the clinical opinion of the practitioner.     Time Statement:   Discussed plan of care in detail with patient today. Patient verbally understands and agrees. I have spent 30 minutes reviewing available diagnostics, obtaining history, examining the patient, developing a treatment plan, and educating the patient on disease process and plan of care.     Priya Sanabria PA-C   [facility]

## 2022-10-11 NOTE — TELEPHONE ENCOUNTER
Caller: LILIANE    Relationship: [employer]     Best call back number: [phone]    What was the call regarding:   LILIANE WITH [employer] WOULD LIKE A CALL BACK WITH VERBAL ORDERS TO EXTEND PATIENT'S OCCUPATIONAL THERAPY FOR 1 TIME A WEEK FOR 4 WEEKS     Do you require a callback:YES

## 2022-10-12 ENCOUNTER — HOSPITAL ENCOUNTER (INPATIENT)
Facility: HOSPITAL | Age: 87
LOS: 5 days | Discharge: HOME HEALTH | End: 2022-10-18
Attending: EMERGENCY MEDICINE | Admitting: INTERNAL MEDICINE

## 2022-10-12 ENCOUNTER — APPOINTMENT (OUTPATIENT)
Dept: GENERAL RADIOLOGY | Facility: HOSPITAL | Age: 87
End: 2022-10-12

## 2022-10-12 DIAGNOSIS — R13.12 OROPHARYNGEAL DYSPHAGIA: ICD-10-CM

## 2022-10-12 DIAGNOSIS — J96.01 ACUTE RESPIRATORY FAILURE WITH HYPOXIA: ICD-10-CM

## 2022-10-12 DIAGNOSIS — I50.23 ACUTE ON CHRONIC SYSTOLIC CONGESTIVE HEART FAILURE: Primary | ICD-10-CM

## 2022-10-12 DIAGNOSIS — R11.0 NAUSEA: ICD-10-CM

## 2022-10-12 DIAGNOSIS — N39.0 URINARY TRACT INFECTION WITHOUT HEMATURIA, SITE UNSPECIFIED: ICD-10-CM

## 2022-10-12 PROBLEM — J96.90 RESPIRATORY FAILURE (HCC): Status: ACTIVE | Noted: 2022-10-12

## 2022-10-12 PROBLEM — R09.02 HYPOXIA: Status: ACTIVE | Noted: 2022-10-12

## 2022-10-12 LAB
ALBUMIN SERPL-MCNC: 3.8 G/DL (ref 3.5–5.2)
ALBUMIN/GLOB SERPL: 0.8 G/DL
ALP SERPL-CCNC: 53 U/L (ref 39–117)
ALT SERPL W P-5'-P-CCNC: 6 U/L (ref 1–33)
ANION GAP SERPL CALCULATED.3IONS-SCNC: 13 MMOL/L (ref 5–15)
AST SERPL-CCNC: 27 U/L (ref 1–32)
ATMOSPHERIC PRESS: ABNORMAL MM[HG]
BACTERIA UR QL AUTO: ABNORMAL /HPF
BASE EXCESS BLDV CALC-SCNC: 4.1 MMOL/L (ref -2–2)
BASOPHILS # BLD AUTO: 0.06 10*3/MM3 (ref 0–0.2)
BASOPHILS NFR BLD AUTO: 0.4 % (ref 0–1.5)
BDY SITE: ABNORMAL
BILIRUB SERPL-MCNC: 0.4 MG/DL (ref 0–1.2)
BILIRUB UR QL STRIP: NEGATIVE
BODY TEMPERATURE: 37 C
BUN SERPL-MCNC: 16 MG/DL (ref 8–23)
BUN/CREAT SERPL: 12.8 (ref 7–25)
CALCIUM SPEC-SCNC: 9.4 MG/DL (ref 8.2–9.6)
CHLORIDE SERPL-SCNC: 101 MMOL/L (ref 98–107)
CLARITY UR: CLEAR
CO2 BLDA-SCNC: 32.2 MMOL/L (ref 22–33)
CO2 SERPL-SCNC: 27 MMOL/L (ref 22–29)
COHGB MFR BLD: 1 %
COLOR UR: YELLOW
CREAT SERPL-MCNC: 1.25 MG/DL (ref 0.57–1)
D-LACTATE SERPL-SCNC: 0.9 MMOL/L (ref 0.5–2)
D-LACTATE SERPL-SCNC: 2.2 MMOL/L (ref 0.5–2)
DEPRECATED RDW RBC AUTO: 53.4 FL (ref 37–54)
EGFRCR SERPLBLD CKD-EPI 2021: 40.5 ML/MIN/1.73
EOSINOPHIL # BLD AUTO: 0.17 10*3/MM3 (ref 0–0.4)
EOSINOPHIL NFR BLD AUTO: 1.2 % (ref 0.3–6.2)
EPAP: 0
ERYTHROCYTE [DISTWIDTH] IN BLOOD BY AUTOMATED COUNT: 14.6 % (ref 12.3–15.4)
FLUAV RNA RESP QL NAA+PROBE: NOT DETECTED
FLUBV RNA RESP QL NAA+PROBE: NOT DETECTED
GLOBULIN UR ELPH-MCNC: 5 GM/DL
GLUCOSE SERPL-MCNC: 138 MG/DL (ref 65–99)
GLUCOSE UR STRIP-MCNC: NEGATIVE MG/DL
HCO3 BLDV-SCNC: 30.6 MMOL/L (ref 22–28)
HCT VFR BLD AUTO: 33.3 % (ref 34–46.6)
HGB BLD-MCNC: 10.3 G/DL (ref 12–15.9)
HGB BLDA-MCNC: 10.4 G/DL (ref 14–18)
HGB UR QL STRIP.AUTO: NEGATIVE
HOLD SPECIMEN: NORMAL
HYALINE CASTS UR QL AUTO: ABNORMAL /LPF
IMM GRANULOCYTES # BLD AUTO: 0.07 10*3/MM3 (ref 0–0.05)
IMM GRANULOCYTES NFR BLD AUTO: 0.5 % (ref 0–0.5)
INHALED O2 CONCENTRATION: 36 %
IPAP: 0
KETONES UR QL STRIP: NEGATIVE
LEUKOCYTE ESTERASE UR QL STRIP.AUTO: ABNORMAL
LYMPHOCYTES # BLD AUTO: 2.08 10*3/MM3 (ref 0.7–3.1)
LYMPHOCYTES NFR BLD AUTO: 14.2 % (ref 19.6–45.3)
MAGNESIUM SERPL-MCNC: 2.1 MG/DL (ref 1.7–2.3)
MCH RBC QN AUTO: 30.6 PG (ref 26.6–33)
MCHC RBC AUTO-ENTMCNC: 30.9 G/DL (ref 31.5–35.7)
MCV RBC AUTO: 98.8 FL (ref 79–97)
METHGB BLD QL: 0.5 %
MODALITY: ABNORMAL
MONOCYTES # BLD AUTO: 1.2 10*3/MM3 (ref 0.1–0.9)
MONOCYTES NFR BLD AUTO: 8.2 % (ref 5–12)
NEUTROPHILS NFR BLD AUTO: 11.09 10*3/MM3 (ref 1.7–7)
NEUTROPHILS NFR BLD AUTO: 75.5 % (ref 42.7–76)
NITRITE UR QL STRIP: POSITIVE
NOTE: ABNORMAL
NRBC BLD AUTO-RTO: 0 /100 WBC (ref 0–0.2)
NT-PROBNP SERPL-MCNC: 5194 PG/ML (ref 0–1800)
OXYHGB MFR BLDV: 26.7 %
PAW @ PEAK INSP FLOW SETTING VENT: 0 CMH2O
PCO2 BLDV: 54.5 MM HG (ref 41–51)
PH BLDV: 7.36 PH UNITS (ref 7.31–7.41)
PH UR STRIP.AUTO: 6.5 [PH] (ref 5–8)
PLATELET # BLD AUTO: 312 10*3/MM3 (ref 140–450)
PMV BLD AUTO: 11.2 FL (ref 6–12)
PO2 BLDV: 20.9 MM HG (ref 27–53)
POTASSIUM SERPL-SCNC: 4 MMOL/L (ref 3.5–5.2)
PROT SERPL-MCNC: 8.8 G/DL (ref 6–8.5)
PROT UR QL STRIP: ABNORMAL
RBC # BLD AUTO: 3.37 10*6/MM3 (ref 3.77–5.28)
RBC # UR STRIP: ABNORMAL /HPF
REF LAB TEST METHOD: ABNORMAL
SARS-COV-2 RNA RESP QL NAA+PROBE: DETECTED
SODIUM SERPL-SCNC: 141 MMOL/L (ref 136–145)
SP GR UR STRIP: 1.02 (ref 1–1.03)
SQUAMOUS #/AREA URNS HPF: ABNORMAL /HPF
TOTAL RATE: 0 BREATHS/MINUTE
TROPONIN T SERPL-MCNC: 0.01 NG/ML (ref 0–0.03)
TSH SERPL DL<=0.05 MIU/L-ACNC: 3.47 UIU/ML (ref 0.27–4.2)
UROBILINOGEN UR QL STRIP: ABNORMAL
WBC # UR STRIP: ABNORMAL /HPF
WBC NRBC COR # BLD: 14.67 10*3/MM3 (ref 3.4–10.8)
WHOLE BLOOD HOLD COAG: NORMAL
WHOLE BLOOD HOLD SPECIMEN: NORMAL

## 2022-10-12 PROCEDURE — 25010000002 CEFTRIAXONE PER 250 MG: Performed by: INTERNAL MEDICINE

## 2022-10-12 PROCEDURE — 97165 OT EVAL LOW COMPLEX 30 MIN: CPT

## 2022-10-12 PROCEDURE — 97530 THERAPEUTIC ACTIVITIES: CPT

## 2022-10-12 PROCEDURE — 80053 COMPREHEN METABOLIC PANEL: CPT | Performed by: EMERGENCY MEDICINE

## 2022-10-12 PROCEDURE — 92610 EVALUATE SWALLOWING FUNCTION: CPT

## 2022-10-12 PROCEDURE — 81001 URINALYSIS AUTO W/SCOPE: CPT | Performed by: EMERGENCY MEDICINE

## 2022-10-12 PROCEDURE — 94799 UNLISTED PULMONARY SVC/PX: CPT

## 2022-10-12 PROCEDURE — 99284 EMERGENCY DEPT VISIT MOD MDM: CPT

## 2022-10-12 PROCEDURE — 87636 SARSCOV2 & INF A&B AMP PRB: CPT | Performed by: EMERGENCY MEDICINE

## 2022-10-12 PROCEDURE — 83605 ASSAY OF LACTIC ACID: CPT | Performed by: EMERGENCY MEDICINE

## 2022-10-12 PROCEDURE — 25010000002 HEPARIN (PORCINE) PER 1000 UNITS: Performed by: INTERNAL MEDICINE

## 2022-10-12 PROCEDURE — 82805 BLOOD GASES W/O2 SATURATION: CPT

## 2022-10-12 PROCEDURE — G0378 HOSPITAL OBSERVATION PER HR: HCPCS

## 2022-10-12 PROCEDURE — 25010000002 METHYLPREDNISOLONE PER 125 MG: Performed by: EMERGENCY MEDICINE

## 2022-10-12 PROCEDURE — 87040 BLOOD CULTURE FOR BACTERIA: CPT | Performed by: EMERGENCY MEDICINE

## 2022-10-12 PROCEDURE — 84484 ASSAY OF TROPONIN QUANT: CPT | Performed by: EMERGENCY MEDICINE

## 2022-10-12 PROCEDURE — 36415 COLL VENOUS BLD VENIPUNCTURE: CPT

## 2022-10-12 PROCEDURE — 97535 SELF CARE MNGMENT TRAINING: CPT

## 2022-10-12 PROCEDURE — 97162 PT EVAL MOD COMPLEX 30 MIN: CPT

## 2022-10-12 PROCEDURE — 93005 ELECTROCARDIOGRAM TRACING: CPT

## 2022-10-12 PROCEDURE — 74230 X-RAY XM SWLNG FUNCJ C+: CPT

## 2022-10-12 PROCEDURE — 94640 AIRWAY INHALATION TREATMENT: CPT

## 2022-10-12 PROCEDURE — 83880 ASSAY OF NATRIURETIC PEPTIDE: CPT | Performed by: EMERGENCY MEDICINE

## 2022-10-12 PROCEDURE — 99223 1ST HOSP IP/OBS HIGH 75: CPT | Performed by: INTERNAL MEDICINE

## 2022-10-12 PROCEDURE — 83735 ASSAY OF MAGNESIUM: CPT | Performed by: EMERGENCY MEDICINE

## 2022-10-12 PROCEDURE — 85025 COMPLETE CBC W/AUTO DIFF WBC: CPT | Performed by: EMERGENCY MEDICINE

## 2022-10-12 PROCEDURE — 84443 ASSAY THYROID STIM HORMONE: CPT | Performed by: EMERGENCY MEDICINE

## 2022-10-12 PROCEDURE — 94664 DEMO&/EVAL PT USE INHALER: CPT

## 2022-10-12 PROCEDURE — 71045 X-RAY EXAM CHEST 1 VIEW: CPT

## 2022-10-12 PROCEDURE — 92611 MOTION FLUOROSCOPY/SWALLOW: CPT

## 2022-10-12 PROCEDURE — 87086 URINE CULTURE/COLONY COUNT: CPT | Performed by: INTERNAL MEDICINE

## 2022-10-12 PROCEDURE — 93005 ELECTROCARDIOGRAM TRACING: CPT | Performed by: EMERGENCY MEDICINE

## 2022-10-12 RX ORDER — PANTOPRAZOLE SODIUM 40 MG/1
40 TABLET, DELAYED RELEASE ORAL 2 TIMES DAILY
Status: DISCONTINUED | OUTPATIENT
Start: 2022-10-12 | End: 2022-10-18 | Stop reason: HOSPADM

## 2022-10-12 RX ORDER — IPRATROPIUM BROMIDE AND ALBUTEROL SULFATE 2.5; .5 MG/3ML; MG/3ML
3 SOLUTION RESPIRATORY (INHALATION)
Status: DISCONTINUED | OUTPATIENT
Start: 2022-10-12 | End: 2022-10-13

## 2022-10-12 RX ORDER — ONDANSETRON 2 MG/ML
4 INJECTION INTRAMUSCULAR; INTRAVENOUS EVERY 6 HOURS PRN
Status: DISCONTINUED | OUTPATIENT
Start: 2022-10-12 | End: 2022-10-15

## 2022-10-12 RX ORDER — METOPROLOL SUCCINATE 25 MG/1
25 TABLET, EXTENDED RELEASE ORAL DAILY
Status: DISCONTINUED | OUTPATIENT
Start: 2022-10-12 | End: 2022-10-14

## 2022-10-12 RX ORDER — POLYVINYL ALCOHOL 14 MG/ML
1 SOLUTION/ DROPS OPHTHALMIC
Status: DISCONTINUED | OUTPATIENT
Start: 2022-10-12 | End: 2022-10-18 | Stop reason: HOSPADM

## 2022-10-12 RX ORDER — HEPARIN SODIUM 5000 [USP'U]/ML
5000 INJECTION, SOLUTION INTRAVENOUS; SUBCUTANEOUS EVERY 12 HOURS SCHEDULED
Status: DISCONTINUED | OUTPATIENT
Start: 2022-10-12 | End: 2022-10-18 | Stop reason: HOSPADM

## 2022-10-12 RX ORDER — IPRATROPIUM BROMIDE AND ALBUTEROL SULFATE 2.5; .5 MG/3ML; MG/3ML
3 SOLUTION RESPIRATORY (INHALATION) ONCE
Status: COMPLETED | OUTPATIENT
Start: 2022-10-12 | End: 2022-10-12

## 2022-10-12 RX ORDER — GABAPENTIN 100 MG/1
100 CAPSULE ORAL NIGHTLY
Status: DISCONTINUED | OUTPATIENT
Start: 2022-10-12 | End: 2022-10-18 | Stop reason: HOSPADM

## 2022-10-12 RX ORDER — LEVOTHYROXINE SODIUM 0.05 MG/1
50 TABLET ORAL
Status: DISCONTINUED | OUTPATIENT
Start: 2022-10-12 | End: 2022-10-18 | Stop reason: HOSPADM

## 2022-10-12 RX ORDER — POLYETHYLENE GLYCOL 3350 17 G/17G
17 POWDER, FOR SOLUTION ORAL DAILY PRN
Status: DISCONTINUED | OUTPATIENT
Start: 2022-10-12 | End: 2022-10-15

## 2022-10-12 RX ORDER — ONDANSETRON 4 MG/1
4 TABLET, FILM COATED ORAL EVERY 6 HOURS PRN
Status: DISCONTINUED | OUTPATIENT
Start: 2022-10-12 | End: 2022-10-15

## 2022-10-12 RX ORDER — ASPIRIN 81 MG/1
81 TABLET ORAL DAILY
Status: DISCONTINUED | OUTPATIENT
Start: 2022-10-12 | End: 2022-10-18 | Stop reason: HOSPADM

## 2022-10-12 RX ORDER — BISACODYL 5 MG/1
5 TABLET, DELAYED RELEASE ORAL DAILY PRN
Status: DISCONTINUED | OUTPATIENT
Start: 2022-10-12 | End: 2022-10-15

## 2022-10-12 RX ORDER — ALPRAZOLAM 0.5 MG/1
0.5 TABLET ORAL 3 TIMES DAILY PRN
Status: DISCONTINUED | OUTPATIENT
Start: 2022-10-12 | End: 2022-10-18 | Stop reason: HOSPADM

## 2022-10-12 RX ORDER — AMOXICILLIN 250 MG
2 CAPSULE ORAL 2 TIMES DAILY
Status: DISCONTINUED | OUTPATIENT
Start: 2022-10-12 | End: 2022-10-15

## 2022-10-12 RX ORDER — SODIUM CHLORIDE 0.9 % (FLUSH) 0.9 %
10 SYRINGE (ML) INJECTION AS NEEDED
Status: DISCONTINUED | OUTPATIENT
Start: 2022-10-12 | End: 2022-10-18 | Stop reason: HOSPADM

## 2022-10-12 RX ORDER — SODIUM CHLORIDE 9 MG/ML
50 INJECTION, SOLUTION INTRAVENOUS CONTINUOUS
Status: ACTIVE | OUTPATIENT
Start: 2022-10-12 | End: 2022-10-12

## 2022-10-12 RX ORDER — IPRATROPIUM BROMIDE AND ALBUTEROL SULFATE 2.5; .5 MG/3ML; MG/3ML
3 SOLUTION RESPIRATORY (INHALATION) EVERY 6 HOURS PRN
Status: DISCONTINUED | OUTPATIENT
Start: 2022-10-13 | End: 2022-10-13

## 2022-10-12 RX ORDER — ACETAMINOPHEN 325 MG/1
650 TABLET ORAL EVERY 4 HOURS PRN
Status: DISCONTINUED | OUTPATIENT
Start: 2022-10-12 | End: 2022-10-18 | Stop reason: HOSPADM

## 2022-10-12 RX ORDER — ACETAMINOPHEN 650 MG/1
650 SUPPOSITORY RECTAL EVERY 4 HOURS PRN
Status: DISCONTINUED | OUTPATIENT
Start: 2022-10-12 | End: 2022-10-15

## 2022-10-12 RX ORDER — BISACODYL 10 MG
10 SUPPOSITORY, RECTAL RECTAL DAILY PRN
Status: DISCONTINUED | OUTPATIENT
Start: 2022-10-12 | End: 2022-10-15

## 2022-10-12 RX ORDER — ACETAMINOPHEN 160 MG/5ML
650 SOLUTION ORAL EVERY 4 HOURS PRN
Status: DISCONTINUED | OUTPATIENT
Start: 2022-10-12 | End: 2022-10-15

## 2022-10-12 RX ORDER — METHYLPREDNISOLONE SODIUM SUCCINATE 125 MG/2ML
125 INJECTION, POWDER, LYOPHILIZED, FOR SOLUTION INTRAMUSCULAR; INTRAVENOUS ONCE
Status: COMPLETED | OUTPATIENT
Start: 2022-10-12 | End: 2022-10-12

## 2022-10-12 RX ORDER — SODIUM CHLORIDE 0.9 % (FLUSH) 0.9 %
10 SYRINGE (ML) INJECTION EVERY 12 HOURS SCHEDULED
Status: DISCONTINUED | OUTPATIENT
Start: 2022-10-12 | End: 2022-10-18 | Stop reason: HOSPADM

## 2022-10-12 RX ORDER — MEMANTINE HYDROCHLORIDE 10 MG/1
5 TABLET ORAL 2 TIMES DAILY
Status: DISCONTINUED | OUTPATIENT
Start: 2022-10-12 | End: 2022-10-18 | Stop reason: HOSPADM

## 2022-10-12 RX ADMIN — ASPIRIN 81 MG: 81 TABLET, COATED ORAL at 09:46

## 2022-10-12 RX ADMIN — BARIUM SULFATE 50 ML: 400 SUSPENSION ORAL at 14:37

## 2022-10-12 RX ADMIN — IPRATROPIUM BROMIDE AND ALBUTEROL SULFATE 3 ML: .5; 2.5 SOLUTION RESPIRATORY (INHALATION) at 20:38

## 2022-10-12 RX ADMIN — BARIUM SULFATE 20 ML: 400 PASTE ORAL at 14:37

## 2022-10-12 RX ADMIN — IPRATROPIUM BROMIDE AND ALBUTEROL SULFATE 3 ML: .5; 2.5 SOLUTION RESPIRATORY (INHALATION) at 02:55

## 2022-10-12 RX ADMIN — DOXYCYCLINE 100 MG: 100 INJECTION, POWDER, LYOPHILIZED, FOR SOLUTION INTRAVENOUS at 18:09

## 2022-10-12 RX ADMIN — Medication 10 ML: at 20:14

## 2022-10-12 RX ADMIN — DOXYCYCLINE 100 MG: 100 INJECTION, POWDER, LYOPHILIZED, FOR SOLUTION INTRAVENOUS at 05:06

## 2022-10-12 RX ADMIN — BARIUM SULFATE 100 ML: 0.81 POWDER, FOR SUSPENSION ORAL at 14:37

## 2022-10-12 RX ADMIN — HEPARIN SODIUM 5000 UNITS: 5000 INJECTION INTRAVENOUS; SUBCUTANEOUS at 20:14

## 2022-10-12 RX ADMIN — SENNOSIDES AND DOCUSATE SODIUM 2 TABLET: 50; 8.6 TABLET ORAL at 20:14

## 2022-10-12 RX ADMIN — GABAPENTIN 100 MG: 100 CAPSULE ORAL at 20:13

## 2022-10-12 RX ADMIN — MEMANTINE 5 MG: 5 TABLET ORAL at 09:46

## 2022-10-12 RX ADMIN — PANTOPRAZOLE SODIUM 40 MG: 40 TABLET, DELAYED RELEASE ORAL at 09:46

## 2022-10-12 RX ADMIN — PANTOPRAZOLE SODIUM 40 MG: 40 TABLET, DELAYED RELEASE ORAL at 20:14

## 2022-10-12 RX ADMIN — ACETAMINOPHEN 650 MG: 325 TABLET, FILM COATED ORAL at 04:44

## 2022-10-12 RX ADMIN — Medication 10 ML: at 09:46

## 2022-10-12 RX ADMIN — HEPARIN SODIUM 5000 UNITS: 5000 INJECTION INTRAVENOUS; SUBCUTANEOUS at 09:45

## 2022-10-12 RX ADMIN — LEVOTHYROXINE SODIUM 50 MCG: 50 TABLET ORAL at 04:43

## 2022-10-12 RX ADMIN — BARIUM SULFATE 10 ML: 400 SUSPENSION ORAL at 14:37

## 2022-10-12 RX ADMIN — MINERAL OIL AND PETROLATUM 1 APPLICATION: 150; 830 OINTMENT OPHTHALMIC at 09:46

## 2022-10-12 RX ADMIN — MEMANTINE 5 MG: 5 TABLET ORAL at 20:14

## 2022-10-12 RX ADMIN — METHYLPREDNISOLONE SODIUM SUCCINATE 125 MG: 125 INJECTION, POWDER, FOR SOLUTION INTRAMUSCULAR; INTRAVENOUS at 03:17

## 2022-10-12 RX ADMIN — SODIUM CHLORIDE 50 ML/HR: 9 INJECTION, SOLUTION INTRAVENOUS at 04:47

## 2022-10-12 RX ADMIN — IPRATROPIUM BROMIDE AND ALBUTEROL SULFATE 3 ML: .5; 2.5 SOLUTION RESPIRATORY (INHALATION) at 17:04

## 2022-10-12 RX ADMIN — SODIUM CHLORIDE 1 G: 900 INJECTION INTRAVENOUS at 04:46

## 2022-10-12 NOTE — THERAPY EVALUATION
Patient Name: Scott Diego  : 4/10/1930    MRN: [id number]                              Today's Date: 10/12/2022       Admit Date: 10/12/2022    Visit Dx:     ICD-10-CM ICD-9-CM   1. Acute on chronic systolic congestive heart failure (HCC)  I50.23 428.23     428.0   2. Acute respiratory failure with hypoxia (HCC)  J96.01 518.81   3. Urinary tract infection without hematuria, site unspecified  N39.0 599.0     Patient Active Problem List   Diagnosis   • Idiopathic peripheral neuropathy   • Neck pain   • Mild cognitive impairment   • Anemia   • Hypertension   • Dehydration   • History of CVA (cerebrovascular accident)   • Dementia (HCC)   • Left bundle branch block   • Orthostatic dizziness   • Chronic systolic heart failure (HCC)   • Normocytic anemia   • Dyspnea   • Hypoxia   • Hypothyroid   • Pericardial effusion   • Acute respiratory failure with hypoxia (HCC)   • Right lower lobe pneumonia   • Ventricular ectopy   • Migraine without aura and without status migrainosus, not intractable   • Acute hypoxemic respiratory failure due to COVID-19 (HCC)   • Dyspnea due to COVID-19   • COVID-19 virus detected   • Encephalopathy due to COVID-19 virus   • Acute on chronic congestive heart failure (HCC)   • Anxiety   • Bilateral hearing loss   • Impaired functional mobility, balance, gait, and endurance   • Nausea without vomiting   • Hypoxia   • Respiratory failure (HCC)     Past Medical History:   Diagnosis Date   • Anxiety    • Congestive heart failure (HCC)    • Coronary artery disease    • Dementia (HCC)    • Depression    • Hyperlipidemia    • Malignant neoplasm (HCC)    • Memory loss    • Peripheral neuropathy    • Stroke (HCC)     mini stroke   • Systolic heart failure (HCC) 2017    Chronic/compensated (EF 25%)     Past Surgical History:   Procedure Laterality Date   • CARDIAC CATHETERIZATION     • CHOLECYSTECTOMY     • EYE SURGERY     • HYSTERECTOMY        General Information     Row Name 10/12/22 1106           Physical Therapy Time and Intention    Document Type evaluation  -     Mode of Treatment physical therapy  -     Row Name 10/12/22 1106          General Information    Patient Profile Reviewed yes  -ML     Prior Level of Function min assist:;all household mobility;gait;transfer  PLOF unknonw, patient poor historian, reports using RW at home  -ML     Existing Precautions/Restrictions fall;oxygen therapy device and L/min  -ML     Barriers to Rehab previous functional deficit;cognitive status  -ML     Row Name 10/12/22 1106          Living Environment    People in Home child(chong), adult  -     Row Name 10/12/22 1106          Cognition    Orientation Status (Cognition) oriented to;person;verbal cues/prompts needed for orientation;time;place  -     Row Name 10/12/22 1106          Safety Issues, Functional Mobility    Safety Issues Affecting Function (Mobility) ability to follow commands;awareness of need for assistance;insight into deficits/self-awareness;judgment;positioning of assistive device;safety precaution awareness;safety precautions follow-through/compliance;sequencing abilities  -     Impairments Affecting Function (Mobility) balance;cognition;endurance/activity tolerance;postural/trunk control;strength;visual/perceptual  -ML     Cognitive Impairments, Mobility Safety/Performance awareness, need for assistance;insight into deficits/self-awareness;judgment;problem-solving/reasoning;safety precaution awareness;safety precaution follow-through;sequencing abilities  -ML           User Key  (r) = Recorded By, (t) = Taken By, (c) = Cosigned By    Initials Name Provider Type     Izzy Harris Physical Therapist               Mobility     Row Name 10/12/22 1109          Bed Mobility    Supine-Sit [place] (Bed Mobility) not tested  -     Comment, (Bed Mobility) Patient found up in chair  -     Row Name 10/12/22 1109          Sit-Stand Transfer    Sit-Stand [place] (Transfers) verbal  cues;nonverbal cues (demo/gesture);contact guard  -ML     Assistive Device (Sit-Stand Transfers) walker, front-wheeled  -ML     Comment, (Sit-Stand Transfer) Patient required frequent verbal cues for correct hand placement with sit to stand transfer  -ML     Row Name 10/12/22 1109          Gait/Stairs (Locomotion)    [place] Level (Gait) verbal cues;nonverbal cues (demo/gesture);minimum assist (75% patient effort);1 person assist  -ML     Assistive Device (Gait) walker, front-wheeled  -ML     Distance in Feet (Gait) 20  -ML     Deviations/Abnormal Patterns (Gait) bilateral deviations;festinating/shuffling;stride length decreased;weight shifting decreased  -ML     Bilateral Gait Deviations forward flexed posture;heel strike decreased  -ML     Comment, (Gait/Stairs) Patient ambulated with RW and Aron. Patient with forward flexed posture throughout. Patient required verbal cues to navigate in room safely. Patient running into bed and IV pole. Patient required verbal cues to step into the RW.  -ML           User Key  (r) = Recorded By, (t) = Taken By, (c) = Cosigned By    Initials Name Provider Type     Izzy Harris Physical Therapist               Obj/Interventions     Row Name 10/12/22 1112          Range of Motion Comprehensive    General Range of Motion bilateral lower extremity ROM WFL  -     Row Name 10/12/22 1112          Strength Comprehensive (MMT)    General Manual Muscle Testing (MMT) Assessment lower extremity strength deficits identified  -ML     Comment, General Manual Muscle Testing (MMT) Assessment BLE at least 3/5 observed with mobility, unable to complete formal MMT due to cognitive status  -     Row Name 10/12/22 1112          Balance    Balance Assessment sitting static balance;sit to stand dynamic balance;standing dynamic balance;standing static balance  -ML     Static Sitting Balance supervision  -ML     Position, Sitting Balance supported;unsupported;sitting in chair  -ML     Sit to  Stand Dynamic Balance contact guard;verbal cues;1-person assist  -ML     Static Standing Balance contact guard  -ML     Dynamic Standing Balance minimal assist;verbal cues;non-verbal cues (demo/gesture)  -ML     Position/Device Used, Standing Balance supported;walker, rolling  -ML     Balance Interventions sitting;standing;sit to stand;supported;static;dynamic;occupation based/functional task  -ML           User Key  (r) = Recorded By, (t) = Taken By, (c) = Cosigned By    Initials Name Provider Type    ML Izzy Harris Physical Therapist               Goals/Plan     Row Name 10/12/22 1119          Bed Mobility Goal 1 (PT)    Activity/Assistive Device (Bed Mobility Goal 1, PT) sit to supine/supine to sit  -ML     [place] Level/Cues Needed (Bed Mobility Goal 1, PT) supervision required  -ML     Time Frame (Bed Mobility Goal 1, PT) 10 days  -ML     Row Name 10/12/22 1119          Transfer Goal 1 (PT)    Activity/Assistive Device (Transfer Goal 1, PT) sit-to-stand/stand-to-sit;bed-to-chair/chair-to-bed;walker, rolling  -ML     [place] Level/Cues Needed (Transfer Goal 1, PT) supervision required  -ML     Time Frame (Transfer Goal 1, PT) 10 days  -ML     Row Name 10/12/22 1119          Gait Training Goal 1 (PT)    Activity/Assistive Device (Gait Training Goal 1, PT) gait (walking locomotion);assistive device use;decrease fall risk;improve balance and speed;increase endurance/gait distance;walker, rolling  -ML     [place] Level (Gait Training Goal 1, PT) supervision required  -ML     Distance (Gait Training Goal 1, PT) 100  -ML     Time Frame (Gait Training Goal 1, PT) 10 days  -ML     Row Name 10/12/22 1119          Therapy Assessment/Plan (PT)    Planned Therapy Interventions (PT) balance training;bed mobility training;gait training;home exercise program;neuromuscular re-education;patient/family education;postural re-education;strengthening;transfer training  -ML           User Key  (r) = Recorded By, (t) =  Taken By, (c) = Cosigned By    Initials Name Provider Type     Izzy Harris Physical Therapist               Clinical Impression     Row Name 10/12/22 1114          Pain    Pretreatment Pain Rating 0/10 - no pain  -ML     Posttreatment Pain Rating 0/10 - no pain  -ML     Row Name 10/12/22 1114          Plan of Care Review    Plan of Care Reviewed With patient  -     Outcome Evaluation Physical therapy evaluation complete. The patient orientated to self only, able to follow commands in order to participate in PT evaluation. Patient required verbal cues throughout to increase safety awareness. Patient required CGA for sit to stand transfer with RW. Patient ambulated 20 feet with RW and Aron, patient with forward flexed posture and shuffled gait. Verbal cues to navigate safely in room. Patient presents with decreased safety awareness, strength and balance deficits. Patient would continue to benefit from skilled PT to improve functional mobility and decrease falls risk. At hospital discharge recommend SNF.  -     Row Name 10/12/22 1114          Therapy Assessment/Plan (PT)    Rehab Potential (PT) fair, will monitor progress closely  -     Criteria for Skilled Interventions Met (PT) yes;meets criteria;skilled treatment is necessary  -     Therapy Frequency (PT) daily  -ML     Row Name 10/12/22 1114          Vital Signs    Pre Patient Position Sitting  -ML     Intra Patient Position Standing  -ML     Post Patient Position Sitting  -ML     Row Name 10/12/22 1114          Positioning and Restraints    Pre-Treatment Position sitting in chair/recliner  -ML     Post Treatment Position chair  -ML     In Chair notified nsg;call light within reach;encouraged to call for assist;exit alarm on;waffle cushion;legs elevated;reclined  -           User Key  (r) = Recorded By, (t) = Taken By, (c) = Cosigned By    Initials Name Provider Type    ML Izzy Harris Physical Therapist               Outcome Measures     Row Name  10/12/22 1120          How much help from another person do you currently need...    Turning from your back to your side while in flat bed without using bedrails? 3  -ML     Moving from lying on back to sitting on the side of a flat bed without bedrails? 3  -ML     Moving to and from a bed to a chair (including a wheelchair)? 3  -ML     Standing up from a chair using your arms (e.g., wheelchair, bedside chair)? 3  -ML     Climbing 3-5 steps with a railing? 3  -ML     To walk in hospital room? 3  -ML     AM-PAC 6 Clicks Score (PT) 18  -ML     Highest level of mobility 6 --> Walked 10 steps or more  -ML     Row Name 10/12/22 1120 10/12/22 1032       Functional Assessment    Outcome Measure Options AM-PAC 6 Clicks Basic Mobility (PT)  -ML AM-PAC 6 Clicks Daily Activity (OT)  -ASHKAN          User Key  (r) = Recorded By, (t) = Taken By, (c) = Cosigned By    Initials Name Provider Type    Rosario Richardson, OT Occupational Therapist    Izzy Longo Physical Therapist                             Physical Therapy Education     Title: PT OT SLP Therapies (In Progress)     Topic: Physical Therapy (In Progress)     Point: Mobility training (In Progress)     Learning Progress Summary           Patient Acceptance, E, NR by  at 10/12/2022 1120                   Point: Home exercise program (Not Started)     Learner Progress:  Not documented in this visit.          Point: Body mechanics (In Progress)     Learning Progress Summary           Patient Acceptance, E, NR by ML at 10/12/2022 1120                   Point: Precautions (In Progress)     Learning Progress Summary           Patient Acceptance, E, NR by  at 10/12/2022 1120                               User Key     Initials Effective Dates Name Provider Type Discipline     04/22/21 -  Izzy Harris Physical Therapist PT              PT Recommendation and Plan  Planned Therapy Interventions (PT): balance training, bed mobility training, gait training, home exercise  program, neuromuscular re-education, patient/family education, postural re-education, strengthening, transfer training  Plan of Care Reviewed With: patient  Outcome Evaluation: Physical therapy evaluation complete. The patient orientated to self only, able to follow commands in order to participate in PT evaluation. Patient required verbal cues throughout to increase safety awareness. Patient required CGA for sit to stand transfer with RW. Patient ambulated 20 feet with RW and Aron, patient with forward flexed posture and shuffled gait. Verbal cues to navigate safely in room. Patient presents with decreased safety awareness, strength and balance deficits. Patient would continue to benefit from skilled PT to improve functional mobility and decrease falls risk. At hospital discharge recommend SNF.     Time Calculation:    PT Charges     Row Name 10/12/22 1122             Time Calculation    Start Time 1035  -ML      PT Received On 10/12/22  -ML      PT Goal Re-Cert Due Date 10/22/22  -ML         Timed Charges    23107 - PT Therapeutic Activity Minutes 10  -ML         Untimed Charges    PT Eval/Re-eval Minutes 46  -ML         Total Minutes    Timed Charges Total Minutes 10  -ML      Untimed Charges Total Minutes 46  -ML       Total Minutes 56  -ML            User Key  (r) = Recorded By, (t) = Taken By, (c) = Cosigned By    Initials Name Provider Type    Izzy Longo Physical Therapist              Therapy Charges for Today     Code Description Service Date Service Provider Modifiers Qty    57955548449 HC PT THERAPEUTIC ACT EA 15 MIN 10/12/2022 Izzy Harris GP 1    08321212706 HC PT EVAL MOD COMPLEXITY 4 10/12/2022 Izzy Harris GP 1          PT G-Codes  Outcome Measure Options: AM-PAC 6 Clicks Basic Mobility (PT)  AM-PAC 6 Clicks Score (PT): 18  AM-PAC 6 Clicks Score (OT): 15    Izzy Harris  10/12/2022

## 2022-10-12 NOTE — ED PROVIDER NOTES
EMERGENCY DEPARTMENT ENCOUNTER    Pt Name: Scott Diego  MRN: [id number]  Pt :   4/10/1930  Room Number:    Date of encounter:  10/12/2022  PCP: Ngozi Davis DO  ED Provider: Martin Russell MD    Historian: Family      HPI:  Chief Complaint: Shortness of breath        Context: Scott Diego is a 89 y.o. female who presents to the ED c/o increased shortness of breath for 2 to 3 hours.  She does have a history of heart failure, and prior COVID infection 2 months ago.  She has had more shortness of breath, during the night and uncomfortable with this, she typically uses 2 L and had to turn up her oxygen level at home with this.  She felt very anxious also, and self treated with prescribed anxiety medicine, and continued to feel uncomfortable and somewhat worse after this.      PAST MEDICAL HISTORY  Past Medical History:   Diagnosis Date   • Anxiety    • Congestive heart failure (HCC)    • Coronary artery disease    • Dementia (HCC)    • Depression    • Hyperlipidemia    • Malignant neoplasm (HCC)    • Memory loss    • Peripheral neuropathy    • Stroke (HCC)     mini stroke   • Systolic heart failure (HCC) 2017    Chronic/compensated (EF 25%)         PAST SURGICAL HISTORY  Past Surgical History:   Procedure Laterality Date   • CARDIAC CATHETERIZATION     • CHOLECYSTECTOMY     • EYE SURGERY     • HYSTERECTOMY           FAMILY HISTORY  Family History   Problem Relation Age of Onset   • Cancer Mother    • No Known Problems Father    • Cancer Sister    • Cancer Brother    • Parkinsonism Brother    • No Known Problems Son          SOCIAL HISTORY  Social History     Socioeconomic History   • Marital status:    Tobacco Use   • Smoking status: Never   • Smokeless tobacco: Never   Vaping Use   • Vaping Use: Never used   Substance and Sexual Activity   • Alcohol use: Never   • Drug use: Never   • Sexual activity: Defer     Comment:  since 2022         ALLERGIES  Augmentin  [amoxicillin-pot clavulanate], Claritin [loratadine], Keflex [cephalexin], and Sulfa antibiotics        REVIEW OF SYSTEMS  Review of Systems     Constitutional: Negative. No fever, no weakness.   HENT: Negative for sneezing and sore throat.    Respiratory: Negative for cough. Negative for shortness of breath.    Cardiovascular: Negative.  Negative for chest pain.   Gastrointestinal: Negative.  Negative for abdominal pain.   Genitourinary: Negative.  Negative for difficulty urinating.     All systems reviewwed and negative except as noted in HPI.    PHYSICAL EXAM    I have reviewed the triage vital signs and nursing notes.    ED Triage Vitals [10/12/22 0206]   Temp Heart Rate Resp BP SpO2   98.6 °F (37 °C) (!) 123 24 125/92 95 %      Temp src Heart Rate Source Patient Position BP Location FiO2 (%)   -- -- -- -- --       Physical Exam  GENERAL:   Appears moderate distress  HENT: Nares patent.  EYES: No scleral icterus.  CV: Tachycardia, regular  RESPIRATORY: Increased effort.  Decreased air movement and wheezing.  ABDOMEN: Soft, nontender  MUSCULOSKELETAL: No deformities.   NEURO: Alert, moves all extremities, follows commands.  SKIN: Warm, dry, no rash visualized.        LAB RESULTS  Recent Results (from the past 24 hour(s))   ECG 12 Lead    Collection Time: 10/12/22  2:19 AM   Result Value Ref Range    QT Interval 354 ms    QTC Interval 518 ms   BNP    Collection Time: 10/12/22  2:21 AM    Specimen: Blood   Result Value Ref Range    proBNP 5,194.0 (H) 0.0 - 1,800.0 pg/mL   Troponin    Collection Time: 10/12/22  2:21 AM    Specimen: Blood   Result Value Ref Range    Troponin T 0.012 0.000 - 0.030 ng/mL   Green Top (Gel)    Collection Time: 10/12/22  2:21 AM   Result Value Ref Range    Extra Tube Hold for add-ons.    Lavender Top    Collection Time: 10/12/22  2:21 AM   Result Value Ref Range    Extra Tube hold for add-on    Gold Top - SST    Collection Time: 10/12/22  2:21 AM   Result Value Ref Range    Extra Tube  Hold for add-ons.    Light Blue Top    Collection Time: 10/12/22  2:21 AM   Result Value Ref Range    Extra Tube Hold for add-ons.    CBC Auto Differential    Collection Time: 10/12/22  2:21 AM    Specimen: Blood   Result Value Ref Range    WBC 14.67 (H) 3.40 - 10.80 10*3/mm3    RBC 3.37 (L) 3.77 - 5.28 10*6/mm3    Hemoglobin 10.3 (L) 12.0 - 15.9 g/dL    Hematocrit 33.3 (L) 34.0 - 46.6 %    MCV 98.8 (H) 79.0 - 97.0 fL    MCH 30.6 26.6 - 33.0 pg    MCHC 30.9 (L) 31.5 - 35.7 g/dL    RDW 14.6 12.3 - 15.4 %    RDW-SD 53.4 37.0 - 54.0 fl    MPV 11.2 6.0 - 12.0 fL    Platelets 312 140 - 450 10*3/mm3    Neutrophil % 75.5 42.7 - 76.0 %    Lymphocyte % 14.2 (L) 19.6 - 45.3 %    Monocyte % 8.2 5.0 - 12.0 %    Eosinophil % 1.2 0.3 - 6.2 %    Basophil % 0.4 0.0 - 1.5 %    Immature Grans % 0.5 0.0 - 0.5 %    Neutrophils, Absolute 11.09 (H) 1.70 - 7.00 10*3/mm3    Lymphocytes, Absolute 2.08 0.70 - 3.10 10*3/mm3    Monocytes, Absolute 1.20 (H) 0.10 - 0.90 10*3/mm3    Eosinophils, Absolute 0.17 0.00 - 0.40 10*3/mm3    Basophils, Absolute 0.06 0.00 - 0.20 10*3/mm3    Immature Grans, Absolute 0.07 (H) 0.00 - 0.05 10*3/mm3    nRBC 0.0 0.0 - 0.2 /100 WBC   Magnesium    Collection Time: 10/12/22  2:21 AM    Specimen: Blood   Result Value Ref Range    Magnesium 2.1 1.7 - 2.3 mg/dL   TSH    Collection Time: 10/12/22  2:21 AM    Specimen: Blood   Result Value Ref Range    TSH 3.470 0.270 - 4.200 uIU/mL   Urinalysis With Microscopic If Indicated (No Culture) - Urine, Clean Catch    Collection Time: 10/12/22  2:51 AM    Specimen: Urine, Clean Catch   Result Value Ref Range    Color, UA Yellow Yellow, Straw    Appearance, UA Clear Clear    pH, UA 6.5 5.0 - 8.0    Specific Gravity, UA 1.018 1.001 - 1.030    Glucose, UA Negative Negative    Ketones, UA Negative Negative    Bilirubin, UA Negative Negative    Blood, UA Negative Negative    Protein, UA Trace (A) Negative    Leuk Esterase, UA Large (3+) (A) Negative    Nitrite, UA Positive (A)  Negative    Urobilinogen, UA 0.2 E.U./dL 0.2 - 1.0 E.U./dL   Urinalysis, Microscopic Only - Urine, Clean Catch    Collection Time: 10/12/22  2:51 AM    Specimen: Urine, Clean Catch   Result Value Ref Range    RBC, UA 3-6 (A) None Seen, 0-2 /HPF    WBC, UA Too Numerous to Count (A) None Seen, 0-2 /HPF    Bacteria, UA None Seen None Seen, Trace /HPF    Squamous Epithelial Cells, UA 7-12 (A) None Seen, 0-2 /HPF    Hyaline Casts, UA 0-6 0 - 6 /LPF    Methodology Automated Microscopy    COVID-19 and FLU A/B PCR - Swab, Nasopharynx    Collection Time: 10/12/22  2:52 AM    Specimen: Nasopharynx; Swab   Result Value Ref Range    COVID19 Detected (C) Not Detected - Ref. Range    Influenza A PCR Not Detected Not Detected    Influenza B PCR Not Detected Not Detected   Blood Gas, Venous With Co-Ox    Collection Time: 10/12/22  3:28 AM    Specimen: Venous Blood   Result Value Ref Range    Site Nurse/Dr Draw     pH, Venous 7.358 7.310 - 7.410 pH Units    pCO2, Venous 54.5 (H) 41.0 - 51.0 mm Hg    pO2, Venous 20.9 (L) 27.0 - 53.0 mm Hg    HCO3, Venous 30.6 (H) 22.0 - 28.0 mmol/L    Base Excess, Venous 4.1 (H) -2.0 - 2.0 mmol/L    Hemoglobin, Blood Gas 10.4 (L) 14 - 18 g/dL    Oxyhemoglobin Venous 26.7 %    Methemoglobin Venous 0.5 %    Carboxyhemoglobin Venous 1.0 %    CO2 Content 32.2 22 - 33 mmol/L    Temperature 37.0 C    Barometric Pressure for Blood Gas      Modality Nasal Cannula     FIO2 36 %    Rate 0 Breaths/minute    PIP 0 cmH2O    IPAP 0     EPAP 0     Note         If labs were ordered, I independently reviewed the results.        RADIOLOGY  XR Chest 1 View    Result Date: 10/12/2022  FRONTAL VIEW OF THE CHEST CLINICAL INDICATION: Shortness of breath COMPARISON: October 8, 2022. FINDINGS: Lines: None Increased interstitial markings and prominence of the pulmonary vasculature. Trace bilateral pleural effusions. No pneumothorax. Cardiomediastinal morphology is mildly enlarged.  Osseous structures are unremarkable.      Findings suggestive of pulmonary edema, similar to prior. Electronically signed by:  Shira Michele D.O.  10/12/2022 1:04 AM [place] Time    n for official interpretation.        PROCEDURES    Procedures    ECG 12 Lead   Preliminary Result   Test Reason : SOA Protocol   Blood Pressure :   */*   mmHG   Vent. Rate : 129 BPM     Atrial Rate : 131 BPM      P-R Int :   * ms          QRS Dur : 150 ms       QT Int : 354 ms       P-R-T Axes :   * -24 129 degrees      QTc Int : 518 ms      Atrial fibrillation with rapid ventricular response with premature    ventricular or aberrantly conducted complexes   Left bundle branch block   Abnormal ECG   When compared with ECG of 08-OCT-2022 20:48, (Unconfirmed)   Atrial fibrillation has replaced Sinus rhythm   Nonspecific T wave abnormality, improved in Inferior leads      Referred By: EDMD           Confirmed By:           MEDICATIONS GIVEN IN ER    Medications   sodium chloride 0.9 % flush 10 mL (has no administration in time range)   cefTRIAXone (ROCEPHIN) 2 g/100 mL 0.9% NS IVPB (MBP) (has no administration in time range)   methylPREDNISolone sodium succinate (SOLU-Medrol) injection 125 mg (125 mg Intravenous Given 10/12/22 0317)   ipratropium-albuterol (DUO-NEB) nebulizer solution 3 mL (3 mL Nebulization Given 10/12/22 0255)         PROGRESS, DATA ANALYSIS, CONSULTS, AND MEDICAL DECISION MAKING    89-year-old presenting hypoxic, from home, recent history of COVID, past medical history of CHF.  Differential diagnoses: Pneumonia, CHF, pleural effusion, sepsis    Full sepsis bolus withheld, due to fluid overload/CHF    AS OF 03:33 EDT VITALS:    BP - 125/92  HR - (!) 123  TEMP - 98.6 °F (37 °C)  O2 SATS - 94%                  DIAGNOSIS  Final diagnoses:   Acute on chronic systolic congestive heart failure (HCC)   Acute respiratory failure with hypoxia (HCC)   Urinary tract infection without hematuria, site unspecified         DISPOSITION  admit           Martin Russell,  MD  10/18/22 0727

## 2022-10-12 NOTE — THERAPY EVALUATION
Acute Care - Speech Language Pathology   Swallow Initial Evaluation [facility]   Clinical Swallow Evaluation     Patient Name: Scott Diego  : 4/10/1930  MRN: [id number]  Today's Date: 10/12/2022               Admit Date: 10/12/2022    Visit Dx:     ICD-10-CM ICD-9-CM   1. Acute on chronic systolic congestive heart failure (HCC)  I50.23 428.23     428.0   2. Acute respiratory failure with hypoxia (HCC)  J96.01 518.81   3. Urinary tract infection without hematuria, site unspecified  N39.0 599.0   4. Dysphagia, unspecified type  R13.10 787.20     Patient Active Problem List   Diagnosis   • Idiopathic peripheral neuropathy   • Neck pain   • Mild cognitive impairment   • Anemia   • Hypertension   • Dehydration   • History of CVA (cerebrovascular accident)   • Dementia (HCC)   • Left bundle branch block   • Orthostatic dizziness   • Chronic systolic heart failure (HCC)   • Normocytic anemia   • Dyspnea   • Hypoxia   • Hypothyroid   • Pericardial effusion   • Acute respiratory failure with hypoxia (HCC)   • Right lower lobe pneumonia   • Ventricular ectopy   • Migraine without aura and without status migrainosus, not intractable   • Acute hypoxemic respiratory failure due to COVID-19 (HCC)   • Dyspnea due to COVID-19   • COVID-19 virus detected   • Encephalopathy due to COVID-19 virus   • Acute on chronic congestive heart failure (HCC)   • Anxiety   • Bilateral hearing loss   • Impaired functional mobility, balance, gait, and endurance   • Nausea without vomiting   • Hypoxia   • Respiratory failure (HCC)     Past Medical History:   Diagnosis Date   • Anxiety    • Congestive heart failure (HCC)    • Coronary artery disease    • Dementia (HCC)    • Depression    • Hyperlipidemia    • Malignant neoplasm (HCC)    • Memory loss    • Peripheral neuropathy    • Stroke (HCC)     mini stroke   • Systolic heart failure (HCC) 2017    Chronic/compensated (EF 25%)     Past Surgical History:   Procedure Laterality Date    • CARDIAC CATHETERIZATION     • CHOLECYSTECTOMY     • EYE SURGERY     • HYSTERECTOMY         SLP Recommendation and Plan  SLP Swallowing Diagnosis: R/O pharyngeal dysphagia, esophageal dysphagia (10/12/22 0955)  SLP Diet Recommendation: puree with some mashed, nectar thick liquids (10/12/22 0955)  Recommended Precautions and Strategies: upright posture during/after eating, general aspiration precautions, reflux precautions (10/12/22 0955)  SLP Rec. for Method of Medication Administration: meds whole, with thick liquids, with pudding or applesauce, as tolerated (10/12/22 0955)     Monitor for Signs of Aspiration: yes, notify SLP if any concerns (10/12/22 0955)  Recommended Diagnostics: VFSS (MBS), other (see comments) (w/ warren UGI) (10/12/22 0955)  Swallow Criteria for Skilled Therapeutic Interventions Met: demonstrates skilled criteria (10/12/22 0955)  Anticipated Discharge Disposition (SLP): unknown, anticipate therapy at next level of care (10/12/22 0955)  Rehab Potential/Prognosis, Swallowing: good, to achieve stated therapy goals (10/12/22 0955)  Therapy Frequency (Swallow): PRN (10/12/22 0955)  Predicted Duration Therapy Intervention (Days): until discharge (10/12/22 0955)                                     Plan of Care Reviewed With: patient      SWALLOW EVALUATION (last 72 hours)     SLP Adult Swallow Evaluation     Row Name 10/12/22 0955                   Rehab Evaluation    Document Type evaluation  -RD        Subjective Information no complaints  -RD        Patient Observations alert;cooperative;agree to therapy  -RD        Patient/Family/Caregiver Comments/Observations none  -RD        Patient Effort good  -RD           General Information    Patient Profile Reviewed yes  -RD        Pertinent History Of Current Problem Adm w/ A/C systolic CHF, hx recent COVID-19, respiratory insufficiency, HTN, hx CVA, dementia, MCI, hypothyroid, hearing loss. Consult for swallowing given concern for ? aspiration or  reflux of coffee before bed resulting in resp issues per chart.  -RD        Current Method of Nutrition NPO  -RD        Precautions/Limitations, Vision WFL with corrective lenses;for purposes of eval  -RD        Precautions/Limitations, Hearing hearing impairment, bilaterally  -RD        Prior Level of Function-Communication cognitive-linguistic impairment;other (see comments)  dementia, MCI, prior CVA  -RD        Prior Level of Function-Swallowing no diet consistency restrictions;other (see comments)  per pt  -RD        Plans/Goals Discussed with patient;agreed upon  -RD        Barriers to Rehab none identified  -RD        Patient's Goals for Discharge eat/drink without coughing/choking  -RD           Pain    Additional Documentation Pain Scale: Numbers Pre/Post-Treatment (Group)  -RD           Pain Scale: Numbers Pre/Post-Treatment    Pretreatment Pain Rating 0/10 - no pain  -RD        Posttreatment Pain Rating 0/10 - no pain  -RD           Oral Motor Structure and Function    Dentition Assessment edentulous, dentures not available  -RD        Secretion Management WNL/WFL  -RD        Mucosal Quality moist, healthy  -RD        Volitional Swallow delayed  -RD        Volitional Cough WFL  -RD           Oral Musculature and Cranial Nerve Assessment    Oral Motor General Assessment generalized oral motor weakness  -RD           General Eating/Swallowing Observations    Respiratory Support Currently in Use nasal cannula  -RD        O2 Liters 4L  -RD        Eating/Swallowing Skills fed by SLP;self-fed;appropriate self-feeding skills observed  -RD        Positioning During Eating upright 90 degree;upright in chair  -RD        Utensils Used spoon;cup;straw  -RD        Consistencies Trialed thin liquids;nectar/syrup-thick liquids;pureed;soft textures  -RD           Clinical Swallow Eval    Oral Prep Phase impaired  -RD        Oral Transit impaired  -RD        Oral Residue impaired  -RD        Pharyngeal Phase suspected  pharyngeal impairment  -RD        Esophageal Phase suspected esophageal impairment  -RD           Oral Prep Concerns    Oral Prep Concerns prolonged mastication  -RD        Prolonged Mastication mechanical soft  -RD        Oral Prep Concerns, Comment edentulous status impacting  -RD           Oral Transit Concerns    Oral Transit Concerns increased oral transit time  -RD        Increased Oral Transit Time mechanical soft  -RD           Oral Residue Concerns    Oral Residue Concerns diffuse residue throughout oral cavity  -RD        Diffuse Residue Throughout Oral Cavity mechanical soft  -RD        Oral Residue Concerns, Comment mostly cleared w/ liquid wash  -RD           Pharyngeal Phase Concerns    Pharyngeal Phase Concerns wet vocal quality  -RD        Wet Vocal Quality thin  -RD        Pharyngeal Phase Concerns, Comment Concern for aspiration event vs. reflux of coffee at home before bed resulting in resp issues per chart. Overt s/s of aspiration c/b wet/hoarse vocal quality noted w/ thin liquids. No immediate s/s of aspiration w/ nectar-thick liquids, pudding, or solids. Okay for modified diet until MBS w/ warren UGI. Pt agreeable  -RD           Esophageal Phase Concerns    Esophageal Phase Concerns belching  -RD        Belching thin  -RD        Esophageal Phase Concerns, Comment hx of reflux  -RD           SLP Evaluation Clinical Impression    SLP Swallowing Diagnosis R/O pharyngeal dysphagia;esophageal dysphagia  -RD        Functional Impact risk of aspiration/pneumonia  -RD        Rehab Potential/Prognosis, Swallowing good, to achieve stated therapy goals  -RD        Swallow Criteria for Skilled Therapeutic Interventions Met demonstrates skilled criteria  -RD           Recommendations    Therapy Frequency (Swallow) PRN  -RD        Predicted Duration Therapy Intervention (Days) until discharge  -RD        SLP Diet Recommendation puree with some mashed;nectar thick liquids  -RD        Recommended Diagnostics VFSS  (MBS);other (see comments)  w/ warren UGI  -RD        Recommended Precautions and Strategies upright posture during/after eating;general aspiration precautions;reflux precautions  -RD        Oral Care Recommendations Oral Care BID/PRN  -RD        SLP Rec. for Method of Medication Administration meds whole;with thick liquids;with pudding or applesauce;as tolerated  -RD        Monitor for Signs of Aspiration yes;notify SLP if any concerns  -RD        Anticipated Discharge Disposition (SLP) unknown;anticipate therapy at next level of care  -RD              User Key  (r) = Recorded By, (t) = Taken By, (c) = Cosigned By    Initials Name Effective Dates    Olamide Heard MS CCC-SLP 06/16/21 -                 EDUCATION  The patient has been educated in the following areas:   Dysphagia (Swallowing Impairment) Oral Care/Hydration Modified Diet Instruction.              Time Calculation:    Time Calculation- SLP     Row Name 10/12/22 1029             Time Calculation- SLP    SLP Start Time 0955  -RD      SLP Received On 10/12/22  -RD         Untimed Charges    40105-OV Eval Oral Pharyng Swallow Minutes 45  -RD         Total Minutes    Untimed Charges Total Minutes 45  -RD       Total Minutes 45  -RD            User Key  (r) = Recorded By, (t) = Taken By, (c) = Cosigned By    Initials Name Provider Type    Olamide Heard MS CCC-SLP Speech and Language Pathologist                Therapy Charges for Today     Code Description Service Date Service Provider Modifiers Qty    [id number] HC ST EVAL ORAL PHARYNG SWALLOW 3 10/12/2022 Olamide Jain MS CCC-SLP GN 1             Patient was not wearing a face mask and did exhibit coughing during this therapy encounter.  Procedure performed was aerosolizing, involved close contact (within 6 feet for at least 15 minutes or longer), and did not involve contact with infectious secretions or specimens.  Therapist used appropriate personal protective equipment including  gloves, standard procedure mask and eye protection.  Appropriate PPE was worn during the entire therapy session.  Hand hygiene was completed before and after therapy session.   Olamide Jain MS CCC-SLP  10/12/2022

## 2022-10-12 NOTE — PLAN OF CARE
Goal Outcome Evaluation:  Plan of Care Reviewed With: patient        Progress: no change  Outcome Evaluation: OT evaluation completed with pt. demonstrating impaired strength, balance, endurance along with cognitive deficit impacting BADL independence.  Pt. needed mod A supine to sit, min A overall with transfers and up to mod A with steps pending fatigue level.  Decreased safety with walker.  Pt. donned socks and shoes overall min A and able to wipe raphael area, but dependent buttocks with bowel incontence.  SBA grooming and feeding.  Pt. appropriate for continued skilled OT services to address deficit areas and promote return to higher level of ADL independence. Recommend SNF at discharge.

## 2022-10-12 NOTE — PLAN OF CARE
Goal Outcome Evaluation:  Plan of Care Reviewed With: patient   SLP evaluation completed. Will address dysphagia w/ plan for MBS w/ Limited UGI as appropriate. Please see note for further details and recommendations.

## 2022-10-12 NOTE — THERAPY EVALUATION
Patient Name: Scott Diego  : 4/10/1930    MRN: [id number]                              Today's Date: 10/12/2022       Admit Date: 10/12/2022    Visit Dx:     ICD-10-CM ICD-9-CM   1. Acute on chronic systolic congestive heart failure (HCC)  I50.23 428.23     428.0   2. Acute respiratory failure with hypoxia (HCC)  J96.01 518.81   3. Urinary tract infection without hematuria, site unspecified  N39.0 599.0     Patient Active Problem List   Diagnosis   • Idiopathic peripheral neuropathy   • Neck pain   • Mild cognitive impairment   • Anemia   • Hypertension   • Dehydration   • History of CVA (cerebrovascular accident)   • Dementia (HCC)   • Left bundle branch block   • Orthostatic dizziness   • Chronic systolic heart failure (HCC)   • Normocytic anemia   • Dyspnea   • Hypoxia   • Hypothyroid   • Pericardial effusion   • Acute respiratory failure with hypoxia (HCC)   • Right lower lobe pneumonia   • Ventricular ectopy   • Migraine without aura and without status migrainosus, not intractable   • Acute hypoxemic respiratory failure due to COVID-19 (HCC)   • Dyspnea due to COVID-19   • COVID-19 virus detected   • Encephalopathy due to COVID-19 virus   • Acute on chronic congestive heart failure (HCC)   • Anxiety   • Bilateral hearing loss   • Impaired functional mobility, balance, gait, and endurance   • Nausea without vomiting   • Hypoxia   • Respiratory failure (HCC)     Past Medical History:   Diagnosis Date   • Anxiety    • Congestive heart failure (HCC)    • Coronary artery disease    • Dementia (HCC)    • Depression    • Hyperlipidemia    • Malignant neoplasm (HCC)    • Memory loss    • Peripheral neuropathy    • Stroke (HCC)     mini stroke   • Systolic heart failure (HCC) 2017    Chronic/compensated (EF 25%)     Past Surgical History:   Procedure Laterality Date   • CARDIAC CATHETERIZATION     • CHOLECYSTECTOMY     • EYE SURGERY     • HYSTERECTOMY        General Information     Row Name 10/12/22 1012           OT Time and Intention    Document Type evaluation  -     Mode of Treatment individual therapy;occupational therapy  -[facility] Name 10/12/22 1012          General Information    Patient Profile Reviewed yes  -ASHKAN     Prior Level of Function independent:;min assist:;gait;transfer;feeding;grooming;mod assist:;dressing;bathing;dependent:;home management;cooking;cleaning;driving  unable to fully determine and pt. with limited report, question accuracy of information  -     Existing Precautions/Restrictions fall;oxygen therapy device and L/min  -     Barriers to Rehab previous functional deficit;cognitive status  -[facility] Name 10/12/22 1012          Occupational Profile    Environmental Supports and Barriers (Occupational Profile) pt. unable to report home environment or AD/AE has, but think she may have and cane and walker  -[facility] Name 10/12/22 1012          Living Environment    People in Home child(chong), adult  per chart pt. lives with son and some of son's family  -[facility] Name 10/12/22 1012          Stairs Within Home, Primary    Stairs, Within Home, Primary pt. unable to report if has steps to navigate  -[facility] Name 10/12/22 1012          Cognition    Orientation Status (Cognition) oriented to;person;disoriented to;place;situation;time  pt. difficulty ID  name wanting to state maiden name, can ID birthday, pt. pleasantly confused, follows simple one step directions with repitition needed at times, limited problem solving, very agreeable and participatory  -[facility] Name 10/12/22 1012          Safety Issues, Functional Mobility    Safety Issues Affecting Function (Mobility) ability to follow commands;awareness of need for assistance;at risk behavior observed;insight into deficits/self-awareness;judgment;positioning of assistive device;problem-solving;safety precaution awareness;safety precautions follow-through/compliance;sequencing abilities  -     Impairments Affecting  Function (Mobility) balance;cognition;endurance/activity tolerance;postural/trunk control;strength;visual/perceptual  -           User Key  (r) = Recorded By, (t) = Taken By, (c) = Cosigned By    Initials Name Provider Type    Rosario Richardson OT Occupational Therapist                 Mobility/ADL's     Row Name 10/12/22 1018          Bed Mobility    Bed Mobility supine-sit  -ASHKAN     Supine-Sit [place] (Bed Mobility) moderate assist (50% patient effort);verbal cues;nonverbal cues (demo/gesture)  -     Bed Mobility, Safety Issues cognitive deficits limit understanding;decreased use of legs for bridging/pushing;impaired trunk control for bed mobility  -     Assistive Device (Bed Mobility) bed rails;head of bed elevated  -     Comment, (Bed Mobility) cues to sequence, most assist to slide LE's fullly to EOB and with turnk side to sit  -     Row Name 10/12/22 1018          Transfers    Transfers sit-stand transfer;stand-sit transfer;toilet transfer  -     Comment, (Transfers) pt. needs cues for hand placement and continued directions of which way to step and where going and to keep walker close  -     Row Name 10/12/22 1018          Sit-Stand Transfer    Sit-Stand [place] (Transfers) minimum assist (75% patient effort);verbal cues;nonverbal cues (demo/gesture)  -     Assistive Device (Sit-Stand Transfers) walker, front-wheeled  -     Row Name 10/12/22 1018          Stand-Sit Transfer    Stand-Sit [place] (Transfers) minimum assist (75% patient effort);nonverbal cues (demo/gesture);verbal cues  -     Assistive Device (Stand-Sit Transfers) walker, front-wheeled  -     Row Name 10/12/22 1018          Toilet Transfer    Type (Toilet Transfer) sit-stand;stand-sit  -     [place] Level (Toilet Transfer) minimum assist (75% patient effort);verbal cues  -     Assistive Device (Toilet Transfer) walker, front-wheeled;commode, bedside without drop arms  -     Row Name 10/12/22 1018           Functional Mobility    Functional Mobility- Ind. Level moderate assist (50% patient effort)  -ASHKAN     Functional Mobility- Device walker, front-wheeled  -ASHKAN     Functional Mobility-Distance (Feet) 10  -ASHKAN     Functional Mobility- Safety Issues balance decreased during turns;step length decreased  -ASHKAN     Functional Mobility- Comment assist needed to control walker, LE fatigued quickly with decreased steadiness progressing from min A to mod A last few steps, pt. wanting to mobilize to bathroom, but unable, pt. to recliner then to BSC  -ASHKAN     Row Name 10/12/22 1018          Activities of Daily Living    BADL Assessment/Intervention lower body dressing;grooming;toileting  -ASHKAN     Row Name 10/12/22 1018          Lower Body Dressing Assessment/Training    [place] Level (Lower Body Dressing) doff;don;shoes/slippers;socks;minimum assist (75% patient effort);verbal cues  up to SBA  -ASHKAN     Position (Lower Body Dressing) edge of bed sitting  -ASHKAN     Row Name 10/12/22 1018          Grooming Assessment/Training    [place] Level (Grooming) hair care, combing/brushing;wash face, hands;standby assist;verbal cues  -ASHKAN     Position (Grooming) supported sitting  -ASHKAN     Comment, (Grooming) pt. washed face 3 times during session, pt. appled chap stick min a and combed hair with 2 cues  -ASHKAN     Row Name 10/12/22 1018          Toileting Assessment/Training    [place] Level (Toileting) moderate assist (50% patient effort);perform perineal hygiene;verbal cues;adjust/manage clothing;minimum assist (75% patient effort)  -ASHKAN     Assistive Devices (Toileting) commode, bedside without drop arms  -ASHKAN     Position (Toileting) supported sitting;supported standing  -ASHKAN     Comment, (Toileting) front gown on only on, pt. wiped anterior raphael area once cued and handed wipe, pt. stood with min A while OT wiped buttocks, clean purewick redonned with cream per nurse request  -ASHKAN           User Key  (r) = Recorded By, (t) = Taken  By, (c) = Cosigned By    Initials Name Provider Type    ASHKAN Rosario Ballesteros, OT Occupational Therapist               Obj/Interventions     Row Name 10/12/22 1022          Sensory Assessment (Somatosensory)    Sensory Assessment (Somatosensory) UE sensation intact;unable/difficult to assess  -     Sensory Assessment appears intact with observation of task completion  -     Row Name 10/12/22 1022          Vision Assessment/Intervention    Visual Impairment/Limitations corrective lenses full-time  -     Vision Assessment Comment unable to formally assess, pt. difficulty ID objects and with seeing recliner to move to it, question fully vision deficit vs cognitive deficit  -     Row Name 10/12/22 1022          Range of Motion Comprehensive    General Range of Motion bilateral upper extremity ROM WFL  -[facility] Name 10/12/22 1022          Strength Comprehensive (MMT)    General Manual Muscle Testing (MMT) Assessment upper extremity strength deficits identified  -     Comment, General Manual Muscle Testing (MMT) Assessment B UE distally grossly 4+/5 and proximally grossly 4/5  -[facility] Name 10/12/22 1022          Motor Skills    Motor Skills functional endurance  -     Functional Endurance Pt. fatigues quickly in standing only being able to go a short distance, 3 L 02, 02 sats mid 90's  -     Row Name 10/12/22 1022          Balance    Balance Assessment sitting static balance;sitting dynamic balance;standing static balance;standing dynamic balance  -     Static Sitting Balance supervision  -     Dynamic Sitting Balance standby assist  -ASHKAN     Position, Sitting Balance sitting edge of bed  LBD  -     Static Standing Balance minimal assist  varies on fatigue level  -     Dynamic Standing Balance moderate assist  progressed from min A to mod A once fatigued  -     Position/Device Used, Standing Balance walker, rolling  -     Balance Interventions sit to stand;occupation based/functional task  -            User Key  (r) = Recorded By, (t) = Taken By, (c) = Cosigned By    Initials Name Provider Type    Rosario Richardson OT Occupational Therapist               Goals/Plan     Row Name 10/12/22 1031          Bed Mobility Goal 1 (OT)    Activity/Assistive Device (Bed Mobility Goal 1, OT) bed mobility activities, all;bed rails  -ASHKAN     [place] Level/Cues Needed (Bed Mobility Goal 1, OT) minimum assist (75% or more patient effort);tactile cues required;verbal cues required  -ASHKAN     Time Frame (Bed Mobility Goal 1, OT) long term goal (LTG)  -ASHKAN     Progress/Outcomes (Bed Mobility Goal 1, OT) new goal  -ASHKAN     Row Name 10/12/22 1031          Transfer Goal 1 (OT)    Activity/Assistive Device (Transfer Goal 1, OT) sit-to-stand/stand-to-sit;toilet;commode, bedside without drop arms;walker, rolling  -ASHKAN     [place] Level/Cues Needed (Transfer Goal 1, OT) contact guard required;verbal cues required;tactile cues required  -ASHKAN     Time Frame (Transfer Goal 1, OT) long term goal (LTG);10 days  -ASHKAN     Progress/Outcome (Transfer Goal 1, OT) new goal  -ASHKAN     Row Name 10/12/22 1031          Dressing Goal 1 (OT)    Activity/Device (Dressing Goal 1, OT) lower body dressing  mer undergarment  -ASHKAN     [place]/Cues Needed (Dressing Goal 1, OT) contact guard required;verbal cues required  -ASHKAN     Time Frame (Dressing Goal 1, OT) long term goal (LTG);10 days  -ASHKAN     Progress/Outcome (Dressing Goal 1, OT) new goal  -ASHKAN     Row Name 10/12/22 1031          Toileting Goal 1 (OT)    Activity/Device (Toileting Goal 1, OT) perform perineal hygiene;adjust/manage clothing;commode, bedside with drop arms;grab bar/safety frame;commode  -ASHKAN     [place] Level/Cues Needed (Toileting Goal 1, OT) minimum assist (75% or more patient effort);tactile cues required;verbal cues required  -ASHKAN     Time Frame (Toileting Goal 1, OT) long term goal (LTG);10 days  -ASHKAN     Progress/Outcome (Toileting Goal 1, OT) new goal  -     Row  Name 10/12/22 1031          Grooming Goal 1 (OT)    Activity/Device (Grooming Goal 1, OT) hair care;oral care;wash face, hands  -ASHKAN     [place] (Grooming Goal 1, OT) contact guard required;verbal cues required  -ASHKAN     Time Frame (Grooming Goal 1, OT) long term goal (LTG);10 days  -ASHKAN     Strategies/Barriers (Grooming Goal 1, OT) standing 50% or greater of task otherwise sitting sinkside  -ASHKAN     Progress/Outcome (Grooming Goal 1, OT) new goal  -ASHKAN     Row Name 10/12/22 1031          Therapy Assessment/Plan (OT)    Planned Therapy Interventions (OT) activity tolerance training;BADL retraining;cognitive/visual perception retraining;functional balance retraining;occupation/activity based interventions;ROM/therapeutic exercise;strengthening exercise;transfer/mobility retraining;patient/caregiver education/training  -ASHKAN           User Key  (r) = Recorded By, (t) = Taken By, (c) = Cosigned By    Initials Name Provider Type    Rosario Richardson, OT Occupational Therapist               Clinical Impression     Row Name 10/12/22 1026          Pain Assessment    Pretreatment Pain Rating 0/10 - no pain  -ASHKAN     Posttreatment Pain Rating 0/10 - no pain  -ASHKAN     Row Name 10/12/22 1026          Plan of Care Review    Plan of Care Reviewed With patient  -ASHKAN     Progress no change  -ASHKAN     Outcome Evaluation OT evaluation completed with pt. demonstrating impaired strength, balance, endurance along with cognitive deficit impacting BADL independence.  Pt. needed mod A supine to sit, min A overall with transfers and up to mod A with steps pending fatigue level.  Decreased safety with walker.  Pt. donned socks and shoes overall min A and able to wipe raphael area, but dependent buttocks with bowel incontence.  SBA grooming and feeding.  Pt. appropriate for continued skilled OT services to address deficit areas and promote return to higher level of ADL independence. Recommend SNF at discharge.  -ASHKAN     Row Name 10/12/22 1026           Therapy Assessment/Plan (OT)    Patient/Family Therapy Goal Statement (OT) unable to state except to get stronger  -ASHKAN     Rehab Potential (OT) good, to achieve stated therapy goals  -ASHKAN     Criteria for Skilled Therapeutic Interventions Met (OT) yes;meets criteria;skilled treatment is necessary  -ASHKAN     Therapy Frequency (OT) daily  -ASHKAN     Row Name 10/12/22 1026          Therapy Plan Review/Discharge Plan (OT)    Equipment Needs Upon Discharge (OT) --  unable to determine without family present  -ASHKAN     Anticipated Discharge Disposition (OT) skilled nursing facility  -     Row Name 10/12/22 1026          Vital Signs    Pre Systolic BP Rehab 111  -ASHKAN     Pre Treatment Diastolic BP 60  -ASHKAN     Post Systolic BP Rehab 111  -ASHKAN     Post Treatment Diastolic BP 65  -ASHKAN     Pretreatment Heart Rate (beats/min) 80  -ASHKAN     Posttreatment Heart Rate (beats/min) 85  -ASHKAN     Pre SpO2 (%) 97  -ASHKAN     O2 Delivery Pre Treatment nasal cannula  -ASHKAN     Post SpO2 (%) 97  -ASHKAN     O2 Delivery Post Treatment nasal cannula  -ASHKAN     Pre Patient Position Supine  -ASHKAN     Intra Patient Position Standing  -ASHKAN     Post Patient Position Sitting  -ASHKAN     Row Name 10/12/22 1026          Positioning and Restraints    Pre-Treatment Position in bed  -ASHKAN     Post Treatment Position chair  -ASHKAN     In Chair notified nsg;reclined;call light within reach;encouraged to call for assist;exit alarm on;waffle cushion;legs elevated  -ASHKAN           User Key  (r) = Recorded By, (t) = Taken By, (c) = Cosigned By    Initials Name Provider Type    Rosario Richardson, OT Occupational Therapist               Outcome Measures     Row Name 10/12/22 1032          How much help from another is currently needed...    Putting on and taking off regular lower body clothing? 3  -ASHKAN     Bathing (including washing, rinsing, and drying) 2  -ASHKAN     Toileting (which includes using toilet bed pan or urinal) 2  -ASHKAN     Putting on and taking off regular upper body clothing 2  -ASHKAN      Taking care of personal grooming (such as brushing teeth) 3  -ASHKAN     Eating meals 3  -ASHKAN     AM-PAC 6 Clicks Score (OT) 15  -ASHKAN     Row Name 10/12/22 1032          Functional Assessment    Outcome Measure Options AM-PAC 6 Clicks Daily Activity (OT)  -ASHKAN           User Key  (r) = Recorded By, (t) = Taken By, (c) = Cosigned By    Initials Name Provider Type    Rosario Richardson, OT Occupational Therapist                Occupational Therapy Education     Title: PT OT SLP Therapies (In Progress)     Topic: Occupational Therapy (In Progress)     Point: ADL training (In Progress)     Description:   Instruct learner(s) on proper safety adaptation and remediation techniques during self care or transfers.   Instruct in proper use of assistive devices.              Learning Progress Summary           Patient Acceptance, E,D, NR by ASHKAN at 10/12/2022 1033    Comment: reason for consult, bed mobility sequencing, trasnfer and wx safety, BADL sequencing and safety                   Point: Home exercise program (Not Started)     Description:   Instruct learner(s) on appropriate technique for monitoring, assisting and/or progressing therapeutic exercises/activities.              Learner Progress:  Not documented in this visit.          Point: Precautions (In Progress)     Description:   Instruct learner(s) on prescribed precautions during self-care and functional transfers.              Learning Progress Summary           Patient Acceptance, E,D, NR by ASHKAN at 10/12/2022 1033    Comment: reason for consult, bed mobility sequencing, trasnfer and wx safety, BADL sequencing and safety                   Point: Body mechanics (In Progress)     Description:   Instruct learner(s) on proper positioning and spine alignment during self-care, functional mobility activities and/or exercises.              Learning Progress Summary           Patient Acceptance, E,D, NR by ASHKAN at 10/12/2022 1033    Comment: reason for consult, bed mobility  sequencing, trasnfer and wx safety, BADL sequencing and safety                               User Key     Initials Effective Dates Name Provider Type Discipline     06/16/21 -  Rosario Ballesteros OT Occupational Therapist OT              OT Recommendation and Plan  Planned Therapy Interventions (OT): activity tolerance training, BADL retraining, cognitive/visual perception retraining, functional balance retraining, occupation/activity based interventions, ROM/therapeutic exercise, strengthening exercise, transfer/mobility retraining, patient/caregiver education/training  Therapy Frequency (OT): daily  Plan of Care Review  Plan of Care Reviewed With: patient  Progress: no change  Outcome Evaluation: OT evaluation completed with pt. demonstrating impaired strength, balance, endurance along with cognitive deficit impacting BADL independence.  Pt. needed mod A supine to sit, min A overall with transfers and up to mod A with steps pending fatigue level.  Decreased safety with walker.  Pt. donned socks and shoes overall min A and able to wipe raphael area, but dependent buttocks with bowel incontence.  SBA grooming and feeding.  Pt. appropriate for continued skilled OT services to address deficit areas and promote return to higher level of ADL independence. Recommend SNF at discharge.     Time Calculation:    Time Calculation- OT     Row Name 10/12/22 1035             Time Calculation- OT    OT Start Time 0910  -ASHKAN      OT Received On 10/12/22  -ASHKAN      OT Goal Re-Cert Due Date 10/22/22  -ASHKAN         Timed Charges    42661 - OT Therapeutic Activity Minutes 12  -ASHKAN      90837 - OT Self Care/Mgmt Minutes 20  -ASHKAN         Untimed Charges    OT Eval/Re-eval Minutes 50  -ASHKAN         Total Minutes    Timed Charges Total Minutes 32  -ASHKAN      Untimed Charges Total Minutes 50  -ASHKAN       Total Minutes 82  -ASHKAN            User Key  (r) = Recorded By, (t) = Taken By, (c) = Cosigned By    Initials Name Provider Type    Rosario Richardson, OT  Occupational Therapist              Therapy Charges for Today     Code Description Service Date Service Provider Modifiers Qty    64188842789  OT THERAPEUTIC ACT EA 15 MIN 10/12/2022 Rosario Ballesteros, OT GO 1    63910189733 HC OT SELF CARE/MGMT/TRAIN EA 15 MIN 10/12/2022 Rosario Ballesteros, OT GO 1    [id number]  OT EVAL LOW COMPLEXITY 4 10/12/2022 Rosario Ballesteros OT GO 1               Rosario Ballesteros OT  10/12/2022

## 2022-10-12 NOTE — CASE MANAGEMENT/SOCIAL WORK
Discharge Planning Assessment  [facility]     Patient Name: Scott Diego  MRN: [id number]  Today's Date: 10/12/2022    Admit Date: 10/12/2022    Plan: Home with resumption of  HH   Discharge Needs Assessment     Row Name 10/12/22 1434       Living Environment    People in Home child(chong), adult    Current Living Arrangements home    Primary Care Provided by child(chong)    Provides Primary Care For no one, unable/limited ability to care for self    Family Caregiver if Needed child(chong), adult    Family Caregiver Names Noah Sinha    Quality of Family Relationships supportive;involved    Able to Return to Prior Arrangements yes       Resource/Environmental Concerns    Resource/Environmental Concerns none    Transportation Concerns none       Transition Planning    Patient/Family Anticipates Transition to home with family    Patient/Family Anticipated Services at Transition     Transportation Anticipated family or friend will provide       Discharge Needs Assessment    Readmission Within the Last 30 Days no previous admission in last 30 days    Equipment Currently Used at Home oxygen;shower chair;cane, straight;commode;walker, rolling               Discharge Plan     Row Name 10/12/22 4273       Plan    Plan Home with resumption of  HH    Patient/Family in Agreement with Plan yes    Plan Comments Spoke with patient's son, Noah Sinha, over the phone.  He confirms patient lives with him in [facility] and has Ngozi Davis for PCP, [place] [employer] for insurance.  Mr. Sinha also reports patient uses rolling walker, BSC, shower chair, and continuous home 2.5L oxygen from [employer].  Spoke with Chen at [facility], she confirmed patient is current with nursing, PT and OT.  Discharge plan undetermined at this time; will await therapy recommendation.  CM will continue to follow.    Final Discharge Disposition Code 06 - home with home health care              Continued Care and Services - Admitted Since  10/12/2022    Coordination has not been started for this encounter.     Selected Continued Care - Prior Encounters Includes continued care and service providers with selected services from prior encounters from 7/14/2022 to 10/12/2022    Discharged on 9/10/2022 Admission date: 9/5/2022 - Discharge disposition: Home-Health Care [facility]    Home Medical Care     Service Provider Selected Services Address Phone Fax Patient Preferred    [facility] [facility] [id number] [phone] --                    Expected Discharge Date and Time     Expected Discharge Date Expected Discharge Time    Oct 14, 2022          Demographic Summary     Row Name 10/12/22 1433       General Information    Admission Type observation    Arrived From home    Referral Source admission list    Reason for Consult discharge planning    Preferred Language English       Contact Information    Permission Granted to Share Info With                Functional Status     Row Name 10/12/22 1434       Functional Status    Usual Activity Tolerance fair    Current Activity Tolerance fair       Functional Status, IADL    Medications completely dependent    Meal Preparation completely dependent    Housekeeping completely dependent    Laundry completely dependent    Shopping completely dependent               Psychosocial    No documentation.                Abuse/Neglect    No documentation.                Legal    No documentation.                Substance Abuse    No documentation.                Patient Forms    No documentation.                   Audrey Sanabria RN

## 2022-10-12 NOTE — H&P
[facility] Medicine Services  HISTORY AND PHYSICAL    Patient Name: Scott Diego  : 4/10/1930  MRN: [id number]  Primary Care Physician: Ngozi Davis DO  Date of admission: 10/12/2022      Subjective   Subjective     Chief Complaint:  Short of breath    HPI:  Scott Diego is a 89 y.o. female with dementia, severe systolic CHF, recent admission for Covid-19 last month, and subacute ongoing nausea who presents this morning for evaluation of shortness of breath.  She has been worked up outpatient for ongoing nausea ~1.5 months, just saw GI yesterday and was scheduled for EGD next month and additionally held atorvastatin.  Additionally she was recently Dx'd with UTI and started on Macrobid by her PCP, she took a single dose this morning.  She had a cup of coffee and went to bed, her son states she then called out for help and was acutely short of breath with wheezing prompting evaluation in the ED.  Her son was initially concerned she was having a panic attack and trialed her on her home dose of Xanax without improvement.    Review of Systems   Gen- No fevers, chills  CV- No chest pain, palpitations  Resp- No cough, yes dyspnea  GI- No V/D, abd pain; yes nausea  All other systems reviewed and are negative.     Personal History     Past Medical History:   Diagnosis Date   • Anxiety    • Congestive heart failure (HCC)    • Coronary artery disease    • Dementia (HCC)    • Depression    • Hyperlipidemia    • Malignant neoplasm (HCC)    • Memory loss    • Peripheral neuropathy    • Stroke (HCC)     mini stroke   • Systolic heart failure (HCC) 2017    Chronic/compensated (EF 25%)             Past Surgical History:   Procedure Laterality Date   • CARDIAC CATHETERIZATION     • CHOLECYSTECTOMY     • EYE SURGERY     • HYSTERECTOMY         Family History:  family history includes Cancer in her brother, mother, and sister; No Known Problems in her father and son; Parkinsonism in her  brother. Otherwise pertinent FHx was reviewed and unremarkable.     Social History:  reports that she has never smoked. She has never used smokeless tobacco. She reports that she does not drink alcohol and does not use drugs.  Social History     Social History Narrative    Caffeine Intake:0-1  servings per day    Patient lives at her son's home       Medications:  Available home medication information reviewed.  (Not in a hospital admission)      Allergies   Allergen Reactions   • Augmentin [Amoxicillin-Pot Clavulanate] Nausea And Vomiting     Makes her sick at her stomach   • Claritin [Loratadine] Unknown (See Comments)     Doesn't remember   • Keflex [Cephalexin] Nausea And Vomiting     Makes pt sick at her stomach   • Sulfa Antibiotics Other (See Comments)     Does not remember       Objective   Objective     Vital Signs:   Temp:  [98.6 °F (37 °C)] 98.6 °F (37 °C)  Heart Rate:  [118-123] 118  Resp:  [24] 24  BP: (125-129)/(73-92) 129/73  Flow (L/min):  [4] 4       Physical Exam   Constitutional: Awake, alert, elderly female laying in ED stretcher, appears acutely ill  Eyes: PERRL, sclerae anicteric, no conjunctival injection  HENT: NCAT, mucous membranes moist  Neck: Supple, trachea midline  Respiratory: Upper airway stridor, accessory muscle use, tachypnea  Cardiovascular: Regular tachycardia, no murmurs, rubs, or gallops, palpable radial pulses bilaterally  Gastrointestinal: Positive bowel sounds, soft, nontender, nondistended  Musculoskeletal: No bilateral ankle edema, no clubbing or cyanosis to extremities  Psychiatric: Appropriate affect, cooperative  Neurologic: Speech difficult to understand (edentulous) but answering questions appropriately, moving extremities spontaneously    Result Review:  I have personally reviewed the results from the time of this admission to 10/12/2022 04:16 EDT and agree with these findings:  [x]  Laboratory list / accordion  []  Microbiology  []  Radiology  []  EKG/Telemetry   []   Cardiology/Vascular   []  Pathology  [x]  Old records  []  Other:  Most notable findings include: proBNP decreased from most recent        LAB RESULTS:      Lab 10/12/22  0221 10/08/22  1649   WBC 14.67* 7.85   HEMOGLOBIN 10.3* 10.6*   HEMATOCRIT 33.3* 34.5   PLATELETS 312 307   NEUTROS ABS 11.09* 5.17   IMMATURE GRANS (ABS) 0.07* 0.02   LYMPHS ABS 2.08 1.54   MONOS ABS 1.20* 0.85   EOS ABS 0.17 0.21   MCV 98.8* 100.0*   LACTATE 2.2* 1.4         Lab 10/12/22  0221 10/08/22  1649   SODIUM 141 140   POTASSIUM 4.0 4.2   CHLORIDE 101 102   CO2 27.0 25.0   ANION GAP 13.0 13.0   BUN 16 21   CREATININE 1.25* 1.29*   EGFR 40.5* 39.0*   GLUCOSE 138* 104*   CALCIUM 9.4 9.5   MAGNESIUM 2.1  --    TSH 3.470  --          Lab 10/12/22  0221 10/08/22  1649   TOTAL PROTEIN 8.8* 8.3   ALBUMIN 3.80 3.90   GLOBULIN 5.0 4.4   ALT (SGPT) 6 5   AST (SGOT) 27 16   BILIRUBIN 0.4 0.6   ALK PHOS 53 53   LIPASE  --  35         Lab 10/12/22  0221   PROBNP 5,194.0*   TROPONIN T 0.012                 Lab 10/12/22  0328   FIO2 36   CARBOXYHEMOGLOBIN (VENOUS) 1.0     UA    Urinalysis 9/5/22 10/8/22 10/8/22 10/12/22 10/12/22     2119 2119 0251 0251   Squamous Epithelial Cells, UA   0-2  7-12 (A)   Specific [place], UA 1.018 1.025  1.018    Ketones, UA Negative Negative  Negative    Blood, UA Negative Negative  Negative    Leukocytes, UA Negative Small (1+) (A)  Large (3+) (A)    Nitrite, UA Negative Positive (A)  Positive (A)    RBC, UA   0-2  3-6 (A)   WBC, UA   31-50 (A)  Too Numerous to Count (A)   Bacteria, UA   None Seen  None Seen   (A) Abnormal value              Microbiology Results (last 10 days)     Procedure Component Value - Date/Time    COVID PRE-OP / PRE-PROCEDURE SCREENING ORDER (NO ISOLATION) - Swab, Nasopharynx [[id number]]  (Abnormal) Collected: 10/12/22 0252    Lab Status: Final result Specimen: Swab from Nasopharynx Updated: 10/12/22 0324    Narrative:      The following orders were created for panel order COVID PRE-OP /  PRE-PROCEDURE SCREENING ORDER (NO ISOLATION) - Swab, Nasopharynx.  Procedure                               Abnormality         Status                     ---------                               -----------         ------                     COVID-19 and FLU A/B PCR...[[id number]]  Abnormal            Final result                 Please view results for these tests on the individual orders.    COVID-19 and FLU A/B PCR - Swab, Nasopharynx [[id number]]  (Abnormal) Collected: 10/12/22 0252    Lab Status: Final result Specimen: Swab from Nasopharynx Updated: 10/12/22 0324     COVID19 Detected     Influenza A PCR Not Detected     Influenza B PCR Not Detected    Narrative:      Fact sheet for providers: https://www.fda.gov/media/633730/download    Fact sheet for patients: https://www.fda.gov/media/005989/download    Test performed by PCR.  Influenza A and Influenza B negative results should be considered presumptive in samples that have a positive SARS-CoV-2 result.    Competitive inhibition studies showed that SARS-CoV-2 virus, when present at concentrations above 3.6E+04 copies/mL, can inhibit the detection and amplification of influenza A and influenza B virus RNA if present at or below 1.8E+02 copies/mL or 4.9E+02 copies/mL, respectively, and may lead to false negative influenza virus results. If co-infection with influenza A or influenza B virus is suspected in samples with a positive SARS-CoV-2 result, the sample should be re-tested with another FDA cleared, approved, or authorized influenza test, if influenza virus detection would change clinical management.    COVID PRE-OP / PRE-PROCEDURE SCREENING ORDER (NO ISOLATION) - Swab, Nasopharynx [[id number]]  (Normal) Collected: 10/08/22 1837    Lab Status: Final result Specimen: Swab from Nasopharynx Updated: 10/08/22 1914    Narrative:      The following orders were created for panel order COVID PRE-OP / PRE-PROCEDURE SCREENING ORDER (NO ISOLATION) - Swab,  Nasopharynx.  Procedure                               Abnormality         Status                     ---------                               -----------         ------                     COVID-19 and FLU A/B PCR...[[id number]]  Normal              Final result                 Please view results for these tests on the individual orders.    COVID-19 and FLU A/B PCR - Swab, Nasopharynx [[id number]]  (Normal) Collected: 10/08/22 1837    Lab Status: Final result Specimen: Swab from Nasopharynx Updated: 10/08/22 1914     COVID19 Not Detected     Influenza A PCR Not Detected     Influenza B PCR Not Detected    Narrative:      Fact sheet for providers: https://www.fda.gov/media/399429/download    Fact sheet for patients: https://www.fda.gov/media/518127/download    Test performed by PCR.          XR Chest 1 View    Result Date: 10/12/2022  FRONTAL VIEW OF THE CHEST CLINICAL INDICATION: Shortness of breath COMPARISON: October 8, 2022. FINDINGS: Lines: None Increased interstitial markings and prominence of the pulmonary vasculature. Trace bilateral pleural effusions. No pneumothorax. Cardiomediastinal morphology is mildly enlarged.  Osseous structures are unremarkable.     Impression: Findings suggestive of pulmonary edema, similar to prior. Electronically signed by:  Shira Michele D.O.  10/12/2022 1:04 AM Mountain Time      Results for orders placed during the hospital encounter of 09/05/22    Adult Transthoracic Echo Complete w/ Color, Spectral and Contrast if Necessary Per Protocol    Interpretation Summary  · Left ventricular ejection fraction appears to be 21 - 25%. Left ventricular systolic function is severely decreased.  · Left ventricular diastolic function is consistent with (grade II w/high LAP) pseudonormalization.  · There is a small (<1cm) pericardial effusion.  · There is a left pleural effusion. There is a right pleural effusion.  · Moderate mitral valve regurgitation is present.  · Moderate to severe tricuspid  valve regurgitation is present.  · Estimated right ventricular systolic pressure from tricuspid regurgitation is markedly elevated (>55 mmHg). Calculated right ventricular systolic pressure from tricuspid regurgitation is 56 mmHg.      Assessment & Plan   Assessment & Plan     Active Hospital Problems    Diagnosis  POA   • **Acute respiratory failure with hypoxia (HCC) [J96.01]  Yes   • Anxiety [F41.9]  Yes   • Hypothyroid [E03.9]  Yes   • Chronic systolic heart failure (HCC) [I50.22]  Yes   • History of CVA (cerebrovascular accident) [Z86.73]  Not Applicable   • Dementia (HCC) [F03.90]  Yes   • Hypertension [I10]  Yes     Summary: This is a 89-year-old female with severe systolic CHF, dementia, recent admission for Covid-19, and known ongoing GI complaints/nausea who went to bed this evening and called out acutely for shortness of breath    Assessment/plan    Acute hypoxic respiratory failure, suspect aspiration  -Ongoing GI issues, potential aspiration of coffee before bed vs reflux of stomach contents  - Doubt CHF, proBNP significantly decreased compared to 1 mo ago; also historically poor p.o. intake and has been taking her Lasix  -DuoNeb scheduled today followed by prn  - O2 support as needed, currently on 4 L/min NC at time of admission  -Empiric coverage w/ CTX/doxy for aspiration  -SLP eval in the a.m.    Recent Covid-19 infection  -Tested positive 9/5/22; she does not require any further enhanced isolation    Recent Dx UTI  -Took a single dose of recently prescribed Macrobid  -UrCx from July during ED visit w/ MRSA?  -add Cx to urine in lab, CTX as above should cover majority of urinary pathogens    Subacute persistent nausea  Chronic constipation  -Seen by GI last admission, empiric trial of gingerroot and management of constipation  - Son at bedside reports GI appointment yesterday, PPI inc to bid and scheduled for EGD next  - Zofran prn  -Bowel regimen    Chronic severe HFrEF (EF 21-25%), compensated  Hx  prolonged QT  - Transient episode of A. fib last hospitalization, but spontaneously resolved, seen by cardiology who did not recommend anticoagulation due to brevity of episode  -cont Toprol-XL, other guideline directed medications limited 2/2 renal disease    CKD 3b  -Bline Cr 1.2-1.5; eGFR 30s  -At approximate baseline, renal dosing med    Alzheimer dementia  - Home Namenda    Anxiety  Hypothyroidism  -Home Xanax at reduced frequency, home Synthroid    DVT prophylaxis:  heparin      CODE STATUS: DNR/DNI, tentatively would not want feeding tube if found to be significantly aspirating  Code Status and Medical Interventions:   Ordered at: 10/12/22 0414     Medical Intervention Limits:    NO intubation (DNI)     Level Of Support Discussed With:    Next of Kin (If No Surrogate)    Patient     Code Status (Patient has no pulse and is not breathing):    No CPR (Do Not Attempt to Resuscitate)     Medical Interventions (Patient has pulse or is breathing):    Limited Support     Comments:    Likely would not wish for feeding tube either         Jonatan Fofana, DO  10/12/22

## 2022-10-12 NOTE — PROGRESS NOTES
"    [facility] Medicine Services  PROGRESS NOTE    Patient Name: Sctot Diego  : 4/10/1930  MRN: [id number]    Date of Admission: 10/12/2022  Primary Care Physician: Ngozi Davis DO    Subjective   Subjective     CC:  Acute wheezing     HPI:  Oxygen suppl was 4L on admission; has been turned down to 3L. Currently being helped into a wheelchair to go to swallowing study.  Sheis standing w minimal support, having a little diarrhea.  Says her breathing is \"not good\".     ROS: - limited by memory deficit  Gen- No fevers, chills  CV- No chest pain, palpitations  GI- No N/V, abd pain        Objective   Objective     Vital Signs:   Temp:  [98.2 °F (36.8 °C)-100.7 °F (38.2 °C)] 98.2 °F (36.8 °C)  Heart Rate:  [] 86  Resp:  [16-24] 16  BP: (111-129)/(60-92) 111/60  Flow (L/min):  [4] 4     Physical Exam:  Constitutional: Awake, alert, standing in room, walks a step or two w min assist and sits in w/c.  Conversant and pleasant  Eyes: PER, no conjunctival injection  HENT: NCAT, mucous membranes moist  Neck: Supple,  trachea midline  Respiratory: sl decreased at both bases, ? Mild crackles L base.   Cardiovascular: RRR, no murmurs  Gastrointestinal: Positive bowel sounds, soft, nontender, nondistended  Musculoskeletal: No bilateral ankle edema, no clubbing or cyanosis to extremities  Psychiatric: Appropriate affect, cooperative  Neurologic: Oriented x 3, strength symmetric in all extremities, Cranial Nerves grossly intact to confrontation, speech clear  Skin: No rashes      Results Reviewed:  LAB RESULTS:      Lab 10/12/22  0532 10/12/22  0221 10/08/22  1649   WBC  --  14.67* 7.85   HEMOGLOBIN  --  10.3* 10.6*   HEMATOCRIT  --  33.3* 34.5   PLATELETS  --  312 307   NEUTROS ABS  --  11.09* 5.17   IMMATURE GRANS (ABS)  --  0.07* 0.02   LYMPHS ABS  --  2.08 1.54   MONOS ABS  --  1.20* 0.85   EOS ABS  --  0.17 0.21   MCV  --  98.8* 100.0*   LACTATE 0.9 2.2* 1.4         Lab 10/12/22  0221 " 10/08/22  1649   SODIUM 141 140   POTASSIUM 4.0 4.2   CHLORIDE 101 102   CO2 27.0 25.0   ANION GAP 13.0 13.0   BUN 16 21   CREATININE 1.25* 1.29*   EGFR 40.5* 39.0*   GLUCOSE 138* 104*   CALCIUM 9.4 9.5   MAGNESIUM 2.1  --    TSH 3.470  --          Lab 10/12/22  0221 10/08/22  1649   TOTAL PROTEIN 8.8* 8.3   ALBUMIN 3.80 3.90   GLOBULIN 5.0 4.4   ALT (SGPT) 6 5   AST (SGOT) 27 16   BILIRUBIN 0.4 0.6   ALK PHOS 53 53   LIPASE  --  35         Lab 10/12/22  0221   PROBNP 5,194.0*   TROPONIN T 0.012                 Lab 10/12/22  0328   FIO2 36   CARBOXYHEMOGLOBIN (VENOUS) 1.0     Brief Urine Lab Results  (Last result in the past 365 days)      Color   Clarity   Blood   Leuk Est   Nitrite   Protein   CREAT   Urine HCG        10/12/22 0251 Yellow   Clear   Negative   Large (3+)   Positive   Trace                 Microbiology Results Abnormal     None          XR Chest 1 View    Result Date: 10/12/2022  FRONTAL VIEW OF THE CHEST CLINICAL INDICATION: Shortness of breath COMPARISON: October 8, 2022. FINDINGS: Lines: None Increased interstitial markings and prominence of the pulmonary vasculature. Trace bilateral pleural effusions. No pneumothorax. Cardiomediastinal morphology is mildly enlarged.  Osseous structures are unremarkable.     Impression: Findings suggestive of pulmonary edema, similar to prior. Electronically signed by:  Shira Michele D.O.  10/12/2022 1:04 AM Mountain Time      Results for orders placed during the hospital encounter of 09/05/22    Adult Transthoracic Echo Complete w/ Color, Spectral and Contrast if Necessary Per Protocol    Interpretation Summary  · Left ventricular ejection fraction appears to be 21 - 25%. Left ventricular systolic function is severely decreased.  · Left ventricular diastolic function is consistent with (grade II w/high LAP) pseudonormalization.  · There is a small (<1cm) pericardial effusion.  · There is a left pleural effusion. There is a right pleural effusion.  · Moderate mitral  valve regurgitation is present.  · Moderate to severe tricuspid valve regurgitation is present.  · Estimated right ventricular systolic pressure from tricuspid regurgitation is markedly elevated (>55 mmHg). Calculated right ventricular systolic pressure from tricuspid regurgitation is 56 mmHg.      I have reviewed the medications:  Scheduled Meds:artificial tears, 1 application, Both Eyes, Daily  aspirin, 81 mg, Oral, Daily  cefTRIAXone, 1 g, Intravenous, Q24H  doxycycline, 100 mg, Intravenous, Q12H  gabapentin, 100 mg, Oral, Nightly  heparin (porcine), 5,000 Units, Subcutaneous, Q12H  ipratropium-albuterol, 3 mL, Nebulization, 4x Daily - RT  levothyroxine, 50 mcg, Oral, Q AM  memantine, 5 mg, Oral, BID  metoprolol succinate XL, 25 mg, Oral, Daily  pantoprazole, 40 mg, Oral, BID  senna-docusate sodium, 2 tablet, Oral, BID  sodium chloride, 10 mL, Intravenous, Q12H      Continuous Infusions:sodium chloride, 50 mL/hr, Last Rate: 50 mL/hr (10/12/22 0447)      PRN Meds:.•  acetaminophen **OR** acetaminophen **OR** acetaminophen  •  ALPRAZolam  •  senna-docusate sodium **AND** polyethylene glycol **AND** bisacodyl **AND** bisacodyl  •  ipratropium-albuterol **FOLLOWED BY** [START ON 10/13/2022] ipratropium-albuterol  •  ondansetron **OR** ondansetron  •  polyvinyl alcohol  •  sodium chloride  •  sodium chloride    Assessment & Plan   Assessment & Plan     Active Hospital Problems    Diagnosis  POA   • **Acute respiratory failure with hypoxia (HCC) [J96.01]  Yes   • Anxiety [F41.9]  Yes   • Hypothyroid [E03.9]  Yes   • Chronic systolic heart failure (HCC) [I50.22]  Yes   • History of CVA (cerebrovascular accident) [Z86.73]  Not Applicable   • Dementia (HCC) [F03.90]  Yes   • Hypertension [I10]  Yes      Resolved Hospital Problems   No resolved problems to display.        Brief Hospital Course to date:  Scott Diego is a 89 y.o. female with severe systolic CHF, dementia, recent admission for Covid-19, and known ongoing  GI complaints/nausea x over a month.  She is followed by GI outpatient.  She just started macrobid for a UTI.  Brought in by son for acute dyspnea and wheezing.      Assessment/plan     Acute hypoxic respiratory failure, suspect aspiration  Temp to 100.7 on admission   - Ongoing GI issues, potential aspiration of coffee before bed vs reflux of stomach contents  - Doubt CHF, proBNP significantly decreased compared to 1 mo ago; also historically poor p.o. intake and has been taking her Lasix  -DuoNeb scheduled today followed by prn  - O2 support as needed,  4 L/min NC at time of admission, now 3L   -Empiric coverage w/ CTX/doxy for aspiration - day 1  -SLP eval     Recent Covid-19 infection  -Tested positive 9/5/22; she does not require any further enhanced isolation     Recent Dx UTI  -Took a single dose of recently prescribed Macrobid  -UrCx from July during ED visit w/ MRSA?  -add Cx to urine in lab, CTX as above should cover majority of urinary pathogens     Subacute persistent nausea  Chronic constipation  -Seen by GI last admission, empiric trial of gingerroot and management of constipation  - Son at bedside reports GI appointment yesterday, PPI inc to bid and scheduled for EGD next  - Zofran prn  -Bowel regimen     Chronic severe HFrEF (EF 21-25%), compensated  Hx prolonged QT  - Transient episode of A. fib last hospitalization, but spontaneously resolved, seen by cardiology who did not recommend anticoagulation due to brevity of episode  -cont Toprol-XL, other guideline directed medications limited 2/2 renal disease     CKD 3b  -Bline Cr 1.2-1.5; eGFR 30s  -At approximate baseline, renal dosing med     Alzheimer dementia  - Home Namenda     Anxiety  Hypothyroidism  -Home Xanax at reduced frequency, home Synthroid       Expected Discharge Location and Transportation: home w son, vs SNF  Expected Discharge Date: tbd    DVT prophylaxis:  Medical DVT prophylaxis orders are present.          CODE STATUS:   Code  Status and Medical Interventions:   Ordered at: 10/12/22 0414     Medical Intervention Limits:    NO intubation (DNI)     Level Of Support Discussed With:    Next of Kin (If No Surrogate)    Patient     Code Status (Patient has no pulse and is not breathing):    No CPR (Do Not Attempt to Resuscitate)     Medical Interventions (Patient has pulse or is breathing):    Limited Support     Comments:    Likely would not wish for feeding tube either       Kimberli Mcdermott MD  10/12/22

## 2022-10-12 NOTE — PLAN OF CARE
Goal Outcome Evaluation:  Plan of Care Reviewed With: patient           Outcome Evaluation: Physical therapy evaluation complete. The patient orientated to self only, able to follow commands in order to participate in PT evaluation. Patient required verbal cues throughout to increase safety awareness. Patient required CGA for sit to stand transfer with RW. Patient ambulated 20 feet with RW and Aron, patient with forward flexed posture and shuffled gait. Verbal cues to navigate safely in room. Patient presents with decreased safety awareness, strength and balance deficits. Patient would continue to benefit from skilled PT to improve functional mobility and decrease falls risk. At hospital discharge recommend SNF.

## 2022-10-13 ENCOUNTER — APPOINTMENT (OUTPATIENT)
Dept: GENERAL RADIOLOGY | Facility: HOSPITAL | Age: 87
End: 2022-10-13

## 2022-10-13 LAB
ANION GAP SERPL CALCULATED.3IONS-SCNC: 11 MMOL/L (ref 5–15)
BACTERIA SPEC AEROBE CULT: NORMAL
BASOPHILS # BLD AUTO: 0.02 10*3/MM3 (ref 0–0.2)
BASOPHILS NFR BLD AUTO: 0.1 % (ref 0–1.5)
BUN SERPL-MCNC: 25 MG/DL (ref 8–23)
BUN/CREAT SERPL: 17.6 (ref 7–25)
CALCIUM SPEC-SCNC: 8.7 MG/DL (ref 8.2–9.6)
CHLORIDE SERPL-SCNC: 103 MMOL/L (ref 98–107)
CO2 SERPL-SCNC: 22 MMOL/L (ref 22–29)
CREAT SERPL-MCNC: 1.42 MG/DL (ref 0.57–1)
DEPRECATED RDW RBC AUTO: 53.3 FL (ref 37–54)
DEPRECATED RDW RBC AUTO: 54.1 FL (ref 37–54)
EGFRCR SERPLBLD CKD-EPI 2021: 34.8 ML/MIN/1.73
EOSINOPHIL # BLD AUTO: 0 10*3/MM3 (ref 0–0.4)
EOSINOPHIL NFR BLD AUTO: 0 % (ref 0.3–6.2)
ERYTHROCYTE [DISTWIDTH] IN BLOOD BY AUTOMATED COUNT: 14.7 % (ref 12.3–15.4)
ERYTHROCYTE [DISTWIDTH] IN BLOOD BY AUTOMATED COUNT: 14.9 % (ref 12.3–15.4)
GLUCOSE SERPL-MCNC: 110 MG/DL (ref 65–99)
HCT VFR BLD AUTO: 26.4 % (ref 34–46.6)
HCT VFR BLD AUTO: 30.2 % (ref 34–46.6)
HGB BLD-MCNC: 8 G/DL (ref 12–15.9)
HGB BLD-MCNC: 9.4 G/DL (ref 12–15.9)
IMM GRANULOCYTES # BLD AUTO: 0.11 10*3/MM3 (ref 0–0.05)
IMM GRANULOCYTES NFR BLD AUTO: 0.6 % (ref 0–0.5)
LYMPHOCYTES # BLD AUTO: 0.86 10*3/MM3 (ref 0.7–3.1)
LYMPHOCYTES NFR BLD AUTO: 5 % (ref 19.6–45.3)
MCH RBC QN AUTO: 30 PG (ref 26.6–33)
MCH RBC QN AUTO: 30.6 PG (ref 26.6–33)
MCHC RBC AUTO-ENTMCNC: 30.3 G/DL (ref 31.5–35.7)
MCHC RBC AUTO-ENTMCNC: 31.1 G/DL (ref 31.5–35.7)
MCV RBC AUTO: 98.4 FL (ref 79–97)
MCV RBC AUTO: 98.9 FL (ref 79–97)
MONOCYTES # BLD AUTO: 1.57 10*3/MM3 (ref 0.1–0.9)
MONOCYTES NFR BLD AUTO: 9.2 % (ref 5–12)
NEUTROPHILS NFR BLD AUTO: 14.5 10*3/MM3 (ref 1.7–7)
NEUTROPHILS NFR BLD AUTO: 85.1 % (ref 42.7–76)
NRBC BLD AUTO-RTO: 0 /100 WBC (ref 0–0.2)
PLATELET # BLD AUTO: 226 10*3/MM3 (ref 140–450)
PLATELET # BLD AUTO: 310 10*3/MM3 (ref 140–450)
PMV BLD AUTO: 11.3 FL (ref 6–12)
PMV BLD AUTO: 11.8 FL (ref 6–12)
POTASSIUM SERPL-SCNC: 4.3 MMOL/L (ref 3.5–5.2)
RBC # BLD AUTO: 2.67 10*6/MM3 (ref 3.77–5.28)
RBC # BLD AUTO: 3.07 10*6/MM3 (ref 3.77–5.28)
SODIUM SERPL-SCNC: 136 MMOL/L (ref 136–145)
WBC NRBC COR # BLD: 17.06 10*3/MM3 (ref 3.4–10.8)
WBC NRBC COR # BLD: 22.68 10*3/MM3 (ref 3.4–10.8)

## 2022-10-13 PROCEDURE — 99233 SBSQ HOSP IP/OBS HIGH 50: CPT | Performed by: INTERNAL MEDICINE

## 2022-10-13 PROCEDURE — 94799 UNLISTED PULMONARY SVC/PX: CPT

## 2022-10-13 PROCEDURE — 93005 ELECTROCARDIOGRAM TRACING: CPT | Performed by: INTERNAL MEDICINE

## 2022-10-13 PROCEDURE — 71045 X-RAY EXAM CHEST 1 VIEW: CPT

## 2022-10-13 PROCEDURE — 25010000002 CEFTRIAXONE PER 250 MG: Performed by: INTERNAL MEDICINE

## 2022-10-13 PROCEDURE — G0378 HOSPITAL OBSERVATION PER HR: HCPCS

## 2022-10-13 PROCEDURE — 85025 COMPLETE CBC W/AUTO DIFF WBC: CPT | Performed by: INTERNAL MEDICINE

## 2022-10-13 PROCEDURE — 25010000002 FUROSEMIDE PER 20 MG: Performed by: INTERNAL MEDICINE

## 2022-10-13 PROCEDURE — 25010000002 ONDANSETRON PER 1 MG: Performed by: INTERNAL MEDICINE

## 2022-10-13 PROCEDURE — 25010000002 LORAZEPAM PER 2 MG: Performed by: INTERNAL MEDICINE

## 2022-10-13 PROCEDURE — 80048 BASIC METABOLIC PNL TOTAL CA: CPT | Performed by: INTERNAL MEDICINE

## 2022-10-13 PROCEDURE — 85027 COMPLETE CBC AUTOMATED: CPT | Performed by: INTERNAL MEDICINE

## 2022-10-13 PROCEDURE — 25010000002 HEPARIN (PORCINE) PER 1000 UNITS: Performed by: INTERNAL MEDICINE

## 2022-10-13 RX ORDER — METOPROLOL TARTRATE 5 MG/5ML
5 INJECTION INTRAVENOUS ONCE
Status: COMPLETED | OUTPATIENT
Start: 2022-10-13 | End: 2022-10-13

## 2022-10-13 RX ORDER — FUROSEMIDE 10 MG/ML
40 INJECTION INTRAMUSCULAR; INTRAVENOUS ONCE
Status: COMPLETED | OUTPATIENT
Start: 2022-10-13 | End: 2022-10-13

## 2022-10-13 RX ORDER — PROMETHAZINE HYDROCHLORIDE 25 MG/1
25 SUPPOSITORY RECTAL ONCE
Status: COMPLETED | OUTPATIENT
Start: 2022-10-13 | End: 2022-10-13

## 2022-10-13 RX ORDER — LORAZEPAM 2 MG/ML
0.5 INJECTION INTRAMUSCULAR ONCE
Status: COMPLETED | OUTPATIENT
Start: 2022-10-13 | End: 2022-10-13

## 2022-10-13 RX ADMIN — METOPROLOL TARTRATE 5 MG: 5 INJECTION INTRAVENOUS at 10:43

## 2022-10-13 RX ADMIN — HEPARIN SODIUM 5000 UNITS: 5000 INJECTION INTRAVENOUS; SUBCUTANEOUS at 09:43

## 2022-10-13 RX ADMIN — MEMANTINE 5 MG: 5 TABLET ORAL at 20:53

## 2022-10-13 RX ADMIN — ONDANSETRON 4 MG: 2 INJECTION INTRAMUSCULAR; INTRAVENOUS at 00:13

## 2022-10-13 RX ADMIN — PANTOPRAZOLE SODIUM 40 MG: 40 TABLET, DELAYED RELEASE ORAL at 09:28

## 2022-10-13 RX ADMIN — Medication 10 ML: at 20:53

## 2022-10-13 RX ADMIN — ALPRAZOLAM 0.5 MG: 0.5 TABLET ORAL at 20:53

## 2022-10-13 RX ADMIN — ONDANSETRON 4 MG: 2 INJECTION INTRAMUSCULAR; INTRAVENOUS at 07:46

## 2022-10-13 RX ADMIN — IPRATROPIUM BROMIDE 0.5 MG: 0.5 SOLUTION RESPIRATORY (INHALATION) at 20:04

## 2022-10-13 RX ADMIN — PROMETHAZINE HYDROCHLORIDE 25 MG: 25 SUPPOSITORY RECTAL at 11:02

## 2022-10-13 RX ADMIN — GABAPENTIN 100 MG: 100 CAPSULE ORAL at 20:53

## 2022-10-13 RX ADMIN — PANTOPRAZOLE SODIUM 40 MG: 40 TABLET, DELAYED RELEASE ORAL at 20:53

## 2022-10-13 RX ADMIN — DOXYCYCLINE 100 MG: 100 INJECTION, POWDER, LYOPHILIZED, FOR SOLUTION INTRAVENOUS at 06:51

## 2022-10-13 RX ADMIN — MEMANTINE 5 MG: 5 TABLET ORAL at 09:29

## 2022-10-13 RX ADMIN — METOPROLOL SUCCINATE 25 MG: 25 TABLET, EXTENDED RELEASE ORAL at 09:28

## 2022-10-13 RX ADMIN — ALPRAZOLAM 0.5 MG: 0.5 TABLET ORAL at 07:45

## 2022-10-13 RX ADMIN — POLYVINYL ALCOHOL 1 DROP: 14 SOLUTION/ DROPS OPHTHALMIC at 09:30

## 2022-10-13 RX ADMIN — DOXYCYCLINE 100 MG: 100 INJECTION, POWDER, LYOPHILIZED, FOR SOLUTION INTRAVENOUS at 18:19

## 2022-10-13 RX ADMIN — ONDANSETRON 4 MG: 2 INJECTION INTRAMUSCULAR; INTRAVENOUS at 20:52

## 2022-10-13 RX ADMIN — LORAZEPAM 0.5 MG: 2 INJECTION INTRAMUSCULAR; INTRAVENOUS at 11:34

## 2022-10-13 RX ADMIN — LEVOTHYROXINE SODIUM 50 MCG: 50 TABLET ORAL at 05:40

## 2022-10-13 RX ADMIN — HEPARIN SODIUM 5000 UNITS: 5000 INJECTION INTRAVENOUS; SUBCUTANEOUS at 20:52

## 2022-10-13 RX ADMIN — ASPIRIN 81 MG: 81 TABLET, COATED ORAL at 09:28

## 2022-10-13 RX ADMIN — MINERAL OIL AND PETROLATUM 1 APPLICATION: 150; 830 OINTMENT OPHTHALMIC at 09:30

## 2022-10-13 RX ADMIN — SODIUM CHLORIDE 1 G: 900 INJECTION INTRAVENOUS at 05:40

## 2022-10-13 RX ADMIN — Medication 10 ML: at 09:35

## 2022-10-13 RX ADMIN — ACETAMINOPHEN 650 MG: 325 TABLET, FILM COATED ORAL at 18:33

## 2022-10-13 RX ADMIN — FUROSEMIDE 40 MG: 10 INJECTION, SOLUTION INTRAMUSCULAR; INTRAVENOUS at 12:17

## 2022-10-13 RX ADMIN — IPRATROPIUM BROMIDE 0.5 MG: 0.5 SOLUTION RESPIRATORY (INHALATION) at 12:07

## 2022-10-13 NOTE — PLAN OF CARE
Goal Outcome Evaluation:  Plan of Care Reviewed With: patient           Outcome Evaluation: Pt alert with confusion, VSS, 3 LNC, Sat. 94%, no c/o pain at this time. Continue monitoring.

## 2022-10-13 NOTE — PROGRESS NOTES
"    [facility] Medicine Services  PROGRESS NOTE    Patient Name: Scott Diego  : 4/10/1930  MRN: [id number]    Date of Admission: 10/12/2022  Primary Care Physician: Ngozi Davis DO    Subjective   Subjective     CC:  Acute wheezing     HPI:  Acutely ill this morning - \"I'm sick, I'm so sick\".  Very nauseous, but denies abd pain or chest pain/pressure.  Is dyspneic.  This morning her HR jumped from 90 to 150.      Addendum:  After metoprolol 5mg IV x 1, HR is 116, pt still dyspneic and wheezing.  Treat with atrovent.  Lasix x 1 dose.  CXR reviewed.     ROS: -  Limited by dementia and acute nausea  Zofran did not help; benzo x 1 did not help.         Objective   Objective     Vital Signs:   Temp:  [97.8 °F (36.6 °C)-97.9 °F (36.6 °C)] 97.9 °F (36.6 °C)  Heart Rate:  [78-90] 90  Resp:  [16-18] 18  BP: ()/(58-79) 125/79  Flow (L/min):  [3] 3     Physical Exam:  Constitutional: Awake, in bed, distressed, \"I'm sick\".  EKG in progress.   Eyes: PER, no conjunctival injection  HENT: NCAT, mucous membranes moist  Neck: Supple,  trachea midline  Respiratory: rapid shallow resps.  Clear anteriorly.  On 5L, sat 89%   Cardiovascular: tachy 150, regular  Tele:  Wide complex regular   Gastrointestinal: Positive bowel sounds, soft, nondistended, no rebound   Musculoskeletal: No bilateral ankle edema, no clubbing or cyanosis to extremities  Neurologic: alert, moves all extremities, Cranial Nerves grossly intact to confrontation, speech clear  Skin: No rashes      Results Reviewed:  LAB RESULTS:      Lab 10/13/22  0419 10/12/22  0532 10/12/22  0221 10/08/22  1649   WBC 17.06*  --  14.67* 7.85   HEMOGLOBIN 8.0*  --  10.3* 10.6*   HEMATOCRIT 26.4*  --  33.3* 34.5   PLATELETS 226  --  312 307   NEUTROS ABS 14.50*  --  11.09* 5.17   IMMATURE GRANS (ABS) 0.11*  --  0.07* 0.02   LYMPHS ABS 0.86  --  2.08 1.54   MONOS ABS 1.57*  --  1.20* 0.85   EOS ABS 0.00  --  0.17 0.21   MCV 98.9*  --  98.8* " 100.0*   LACTATE  --  0.9 2.2* 1.4         Lab 10/13/22  0419 10/12/22  0221 10/08/22  1649   SODIUM 136 141 140   POTASSIUM 4.3 4.0 4.2   CHLORIDE 103 101 102   CO2 22.0 27.0 25.0   ANION GAP 11.0 13.0 13.0   BUN 25* 16 21   CREATININE 1.42* 1.25* 1.29*   EGFR 34.8* 40.5* 39.0*   GLUCOSE 110* 138* 104*   CALCIUM 8.7 9.4 9.5   MAGNESIUM  --  2.1  --    TSH  --  3.470  --          Lab 10/12/22  0221 10/08/22  1649   TOTAL PROTEIN 8.8* 8.3   ALBUMIN 3.80 3.90   GLOBULIN 5.0 4.4   ALT (SGPT) 6 5   AST (SGOT) 27 16   BILIRUBIN 0.4 0.6   ALK PHOS 53 53   LIPASE  --  35         Lab 10/12/22  0221   PROBNP 5,194.0*   TROPONIN T 0.012                 Lab 10/12/22  0328   FIO2 36   CARBOXYHEMOGLOBIN (VENOUS) 1.0     Brief Urine Lab Results  (Last result in the past 365 days)      Color   Clarity   Blood   Leuk Est   Nitrite   Protein   CREAT   Urine HCG        10/12/22 0251 Yellow   Clear   Negative   Large (3+)   Positive   Trace                 Microbiology Results Abnormal     Procedure Component Value - Date/Time    Blood Culture - Blood, Arm, Right [[id number]]  (Normal) Collected: 10/12/22 0347    Lab Status: Preliminary result Specimen: Blood from Arm, Right Updated: 10/13/22 0400     Blood Culture No growth at 24 hours    Blood Culture - Blood, Arm, Right [[id number]]  (Normal) Collected: 10/12/22 0347    Lab Status: Preliminary result Specimen: Blood from Arm, Right Updated: 10/13/22 0400     Blood Culture No growth at 24 hours          FL Video Swallow With Speech Single Contrast    Result Date: 10/12/2022  EXAMINATION: FL VIDEO SWALLOW W SPEECH SINGLE-CONTRAST-  INDICATION: DYSPHAGIA, OROPHARYNGEAL, HAS ATTRIBUTABLE CAUSE  TECHNIQUE: 1 minute and 42 seconds of fluoroscopic time was used for this exam. Tenderness associated of fluoroscopic loops are saved. The patient was evaluated in the seated lateral position while taking a variety of consistencies of barium by mouth under the direction of speech pathology.   COMPARISON: NONE  FINDINGS: 1 minute and 42 seconds of fluoroscopy provided for a modified barium swallow. Please see speech therapy report for full details and recommendations.       Impression: Fluoroscopy provided for a modified barium swallow. Please see speech therapy report for full details and recommendations.    This report was finalized on 10/12/2022 4:41 PM by Saúl Benites.      XR Chest 1 View    Result Date: 10/12/2022  FRONTAL VIEW OF THE CHEST CLINICAL INDICATION: Shortness of breath COMPARISON: October 8, 2022. FINDINGS: Lines: None Increased interstitial markings and prominence of the pulmonary vasculature. Trace bilateral pleural effusions. No pneumothorax. Cardiomediastinal morphology is mildly enlarged.  Osseous structures are unremarkable.     Impression: Findings suggestive of pulmonary edema, similar to prior. Electronically signed by:  Shira Michele D.O.  10/12/2022 1:04 AM Mountain Time      Results for orders placed during the hospital encounter of 09/05/22    Adult Transthoracic Echo Complete w/ Color, Spectral and Contrast if Necessary Per Protocol    Interpretation Summary  · Left ventricular ejection fraction appears to be 21 - 25%. Left ventricular systolic function is severely decreased.  · Left ventricular diastolic function is consistent with (grade II w/high LAP) pseudonormalization.  · There is a small (<1cm) pericardial effusion.  · There is a left pleural effusion. There is a right pleural effusion.  · Moderate mitral valve regurgitation is present.  · Moderate to severe tricuspid valve regurgitation is present.  · Estimated right ventricular systolic pressure from tricuspid regurgitation is markedly elevated (>55 mmHg). Calculated right ventricular systolic pressure from tricuspid regurgitation is 56 mmHg.      I have reviewed the medications:  Scheduled Meds:artificial tears, 1 application, Both Eyes, Daily  aspirin, 81 mg, Oral, Daily  cefTRIAXone, 1 g, Intravenous,  Q24H  doxycycline, 100 mg, Intravenous, Q12H  gabapentin, 100 mg, Oral, Nightly  heparin (porcine), 5,000 Units, Subcutaneous, Q12H  levothyroxine, 50 mcg, Oral, Q AM  memantine, 5 mg, Oral, BID  metoprolol succinate XL, 25 mg, Oral, Daily  pantoprazole, 40 mg, Oral, BID  senna-docusate sodium, 2 tablet, Oral, BID  sodium chloride, 10 mL, Intravenous, Q12H      Continuous Infusions:   PRN Meds:.•  acetaminophen **OR** acetaminophen **OR** acetaminophen  •  ALPRAZolam  •  senna-docusate sodium **AND** polyethylene glycol **AND** bisacodyl **AND** bisacodyl  •  [] ipratropium-albuterol **FOLLOWED BY** ipratropium-albuterol  •  ondansetron **OR** ondansetron  •  polyvinyl alcohol  •  sodium chloride  •  sodium chloride    Assessment & Plan   Assessment & Plan     Active Hospital Problems    Diagnosis  POA   • **Acute respiratory failure with hypoxia (HCC) [J96.01]  Yes   • Respiratory failure (HCC) [J96.90]  Yes   • Anxiety [F41.9]  Yes   • Hypothyroid [E03.9]  Yes   • Chronic systolic heart failure (HCC) [I50.22]  Yes   • History of CVA (cerebrovascular accident) [Z86.73]  Not Applicable   • Dementia (HCC) [F03.90]  Yes   • Hypertension [I10]  Yes      Resolved Hospital Problems   No resolved problems to display.        Brief Hospital Course to date:  Scott Diego is a 89 y.o. female with severe systolic CHF, dementia, recent admission for Covid-19, and known ongoing GI complaints/nausea x over a month.  She is followed by GI outpatient.  She just started macrobid for a UTI.  Brought in by son for acute dyspnea and wheezing.      Assessment/plan       New:  Tachycardia  History of Afib  - wide complex, acute onset:  Suspect AF/RVR paroxysmal  - EKG noted.  - /90  - give metoprolol 5mg x1  - dyspnea worse:  Check CXR, consider CHF     Nausea, persistent/recurrent  - worse today  - did not respond to zofran or xanax  - phenergan x 1   -  GI clinic recently, PPI BID, plan for EGD -   - consider inpt GI  for EGD here     Anemia  - hgb 8.0 diown from 10.3 but note that 9.5 was her prev baseline.    - given nausea and tachycardia, consider UGI bleed  - recheck at 1600     Acute hypoxic respiratory failure, suspect aspiration episode pta   Temp to 100.7 on admission   - Ongoing GI issues, potential aspiration of coffee before bed vs reflux of stomach contents  - O2 support as needed,  Now 5-6L but shallow resps    -Empiric coverage w/ CTX/doxy for aspiration - day 2  -SLP eval:  Puree and honey thickened      Recent Covid-19 infection  -Tested positive 9/5/22; she does not require any further enhanced isolation     Recent Dx UTI  -Took a single dose of recently prescribed Macrobid  -UrCx from July during ED visit w/ MRSA?  -add Cx to urine in lab, CTX as above should cover majority of urinary pathogens      Chronic severe HFrEF (EF 21-25%), compensated  Hx prolonged QT  - Transient episode of A. fib last hospitalization, but spontaneously resolved, seen by cardiology who did not recommend anticoagulation due to brevity of episode  -cont Toprol-XL, other guideline directed medications limited 2/2 renal disease     CKD 3b  -Bline Cr 1.2-1.5; eGFR 30s  -At approximate baseline, renal dosing med     Alzheimer dementia  - Home Namenda     Anxiety  Hypothyroidism  -Home Xanax at reduced frequency, home Synthroid       Expected Discharge Location and Transportation: home w son, vs SNF  Expected Discharge Date: tbd    DVT prophylaxis:  Medical DVT prophylaxis orders are present.     AM-PAC 6 Clicks Score (PT): 18 (10/12/22 1120)    CODE STATUS:   Code Status and Medical Interventions:   Ordered at: 10/12/22 6662     Medical Intervention Limits:    NO intubation (DNI)     Level Of Support Discussed With:    Next of Kin (If No Surrogate)    Patient     Code Status (Patient has no pulse and is not breathing):    No CPR (Do Not Attempt to Resuscitate)     Medical Interventions (Patient has pulse or is breathing):    Limited  Support     Comments:    Likely would not wish for feeding tube either       Kimberli Mcdermott MD  10/13/22

## 2022-10-13 NOTE — CASE MANAGEMENT/SOCIAL WORK
Continued Stay Note  [facility]     Patient Name: Scott Diego  MRN: [id number]  Today's Date: 10/13/2022    Admit Date: 10/12/2022    Plan: Bea ew/Home Health   Discharge Plan     Row Name 10/13/22 1416       Plan    Plan Home w/Home Health    Patient/Family in Agreement with Plan yes    Plan Comments Spoke with patient's son by phone.  Therapy recommending SNF at discharge.  Patient ambulated 20 ft wCGA and RW.  Son states that is her baseline.  He is planning to bring her home with home health at discharge.  She is current with [facility] for PT, OT an dskilled nursing.  CM will continue to follow and notify them when patient discharges.    Final Discharge Disposition Code 06 - home with home health care               Discharge Codes    No documentation.               Expected Discharge Date and Time     Expected Discharge Date Expected Discharge Time    Oct 14, 2022             Ann Palacios RN

## 2022-10-13 NOTE — PLAN OF CARE
Goal Outcome Evaluation:  Plan of Care Reviewed With: patient      Pt is alert, confusion X3. VSS. Lung sounds noted diminished at bases. 02 at 3 LPM, Sp02 noted  93%, no c/o pain at this time.   Progress: improving

## 2022-10-14 PROBLEM — I50.23 ACUTE ON CHRONIC SYSTOLIC CONGESTIVE HEART FAILURE: Status: ACTIVE | Noted: 2022-10-14

## 2022-10-14 LAB
ALBUMIN SERPL-MCNC: 3.1 G/DL (ref 3.5–5.2)
ALBUMIN/GLOB SERPL: 0.8 G/DL
ALP SERPL-CCNC: 41 U/L (ref 39–117)
ALT SERPL W P-5'-P-CCNC: 5 U/L (ref 1–33)
ANION GAP SERPL CALCULATED.3IONS-SCNC: 12 MMOL/L (ref 5–15)
AST SERPL-CCNC: 12 U/L (ref 1–32)
BILIRUB SERPL-MCNC: 0.3 MG/DL (ref 0–1.2)
BUN SERPL-MCNC: 26 MG/DL (ref 8–23)
BUN/CREAT SERPL: 18.8 (ref 7–25)
CALCIUM SPEC-SCNC: 8.8 MG/DL (ref 8.2–9.6)
CHLORIDE SERPL-SCNC: 104 MMOL/L (ref 98–107)
CO2 SERPL-SCNC: 24 MMOL/L (ref 22–29)
CREAT SERPL-MCNC: 1.38 MG/DL (ref 0.57–1)
DEPRECATED RDW RBC AUTO: 53.8 FL (ref 37–54)
EGFRCR SERPLBLD CKD-EPI 2021: 36 ML/MIN/1.73
ERYTHROCYTE [DISTWIDTH] IN BLOOD BY AUTOMATED COUNT: 14.6 % (ref 12.3–15.4)
GLOBULIN UR ELPH-MCNC: 4 GM/DL
GLUCOSE SERPL-MCNC: 84 MG/DL (ref 65–99)
HCT VFR BLD AUTO: 28.5 % (ref 34–46.6)
HGB BLD-MCNC: 8.6 G/DL (ref 12–15.9)
MCH RBC QN AUTO: 30.1 PG (ref 26.6–33)
MCHC RBC AUTO-ENTMCNC: 30.2 G/DL (ref 31.5–35.7)
MCV RBC AUTO: 99.7 FL (ref 79–97)
PLATELET # BLD AUTO: 232 10*3/MM3 (ref 140–450)
PMV BLD AUTO: 11.6 FL (ref 6–12)
POTASSIUM SERPL-SCNC: 4 MMOL/L (ref 3.5–5.2)
PROT SERPL-MCNC: 7.1 G/DL (ref 6–8.5)
QT INTERVAL: 434 MS
QTC INTERVAL: 545 MS
RBC # BLD AUTO: 2.86 10*6/MM3 (ref 3.77–5.28)
SODIUM SERPL-SCNC: 140 MMOL/L (ref 136–145)
WBC NRBC COR # BLD: 7.56 10*3/MM3 (ref 3.4–10.8)

## 2022-10-14 PROCEDURE — 93005 ELECTROCARDIOGRAM TRACING: CPT | Performed by: NURSE PRACTITIONER

## 2022-10-14 PROCEDURE — 25010000002 CEFTRIAXONE PER 250 MG: Performed by: INTERNAL MEDICINE

## 2022-10-14 PROCEDURE — 25010000002 HEPARIN (PORCINE) PER 1000 UNITS: Performed by: INTERNAL MEDICINE

## 2022-10-14 PROCEDURE — 80053 COMPREHEN METABOLIC PANEL: CPT | Performed by: INTERNAL MEDICINE

## 2022-10-14 PROCEDURE — 94799 UNLISTED PULMONARY SVC/PX: CPT

## 2022-10-14 PROCEDURE — 25010000002 AMIODARONE IN DEXTROSE 5% 150-4.21 MG/100ML-% SOLUTION: Performed by: NURSE PRACTITIONER

## 2022-10-14 PROCEDURE — 85027 COMPLETE CBC AUTOMATED: CPT | Performed by: INTERNAL MEDICINE

## 2022-10-14 PROCEDURE — 63710000001 ONDANSETRON PER 8 MG: Performed by: INTERNAL MEDICINE

## 2022-10-14 PROCEDURE — 92526 ORAL FUNCTION THERAPY: CPT

## 2022-10-14 PROCEDURE — 25010000002 AMIODARONE IN DEXTROSE 5% 360-4.14 MG/200ML-% SOLUTION: Performed by: NURSE PRACTITIONER

## 2022-10-14 PROCEDURE — 99232 SBSQ HOSP IP/OBS MODERATE 35: CPT | Performed by: INTERNAL MEDICINE

## 2022-10-14 PROCEDURE — 25010000002 ONDANSETRON PER 1 MG: Performed by: INTERNAL MEDICINE

## 2022-10-14 PROCEDURE — 99222 1ST HOSP IP/OBS MODERATE 55: CPT | Performed by: INTERNAL MEDICINE

## 2022-10-14 RX ORDER — DOXYCYCLINE 100 MG/1
100 CAPSULE ORAL EVERY 12 HOURS SCHEDULED
Status: COMPLETED | OUTPATIENT
Start: 2022-10-14 | End: 2022-10-16

## 2022-10-14 RX ORDER — AMIODARONE HYDROCHLORIDE 200 MG/1
200 TABLET ORAL ONCE
Status: COMPLETED | OUTPATIENT
Start: 2022-10-15 | End: 2022-10-15

## 2022-10-14 RX ORDER — METOPROLOL SUCCINATE 25 MG/1
25 TABLET, EXTENDED RELEASE ORAL EVERY 12 HOURS SCHEDULED
Status: DISCONTINUED | OUTPATIENT
Start: 2022-10-14 | End: 2022-10-18 | Stop reason: HOSPADM

## 2022-10-14 RX ORDER — AMIODARONE HYDROCHLORIDE 200 MG/1
200 TABLET ORAL EVERY 8 HOURS
Status: DISCONTINUED | OUTPATIENT
Start: 2022-10-16 | End: 2022-10-18 | Stop reason: HOSPADM

## 2022-10-14 RX ORDER — AMIODARONE HYDROCHLORIDE 200 MG/1
200 TABLET ORAL EVERY 12 HOURS
Status: DISCONTINUED | OUTPATIENT
Start: 2022-10-22 | End: 2022-10-18 | Stop reason: HOSPADM

## 2022-10-14 RX ORDER — DIPHENHYDRAMINE HYDROCHLORIDE AND LIDOCAINE HYDROCHLORIDE AND ALUMINUM HYDROXIDE AND MAGNESIUM HYDRO
5 KIT EVERY 6 HOURS
Status: DISCONTINUED | OUTPATIENT
Start: 2022-10-14 | End: 2022-10-18 | Stop reason: HOSPADM

## 2022-10-14 RX ORDER — AMIODARONE HYDROCHLORIDE 200 MG/1
200 TABLET ORAL DAILY
Status: DISCONTINUED | OUTPATIENT
Start: 2022-11-05 | End: 2022-10-18 | Stop reason: HOSPADM

## 2022-10-14 RX ADMIN — METOPROLOL SUCCINATE 25 MG: 25 TABLET, FILM COATED, EXTENDED RELEASE ORAL at 20:17

## 2022-10-14 RX ADMIN — DIPHENHYDRAMINE HYDROCHLORIDE AND LIDOCAINE HYDROCHLORIDE AND ALUMINUM HYDROXIDE AND MAGNESIUM HYDRO 5 ML: KIT at 20:23

## 2022-10-14 RX ADMIN — ALPRAZOLAM 0.5 MG: 0.5 TABLET ORAL at 20:17

## 2022-10-14 RX ADMIN — Medication 10 ML: at 20:18

## 2022-10-14 RX ADMIN — LEVOTHYROXINE SODIUM 50 MCG: 50 TABLET ORAL at 05:25

## 2022-10-14 RX ADMIN — IPRATROPIUM BROMIDE 0.5 MG: 0.5 SOLUTION RESPIRATORY (INHALATION) at 07:45

## 2022-10-14 RX ADMIN — MEMANTINE 5 MG: 5 TABLET ORAL at 09:23

## 2022-10-14 RX ADMIN — POLYVINYL ALCOHOL 1 DROP: 14 SOLUTION/ DROPS OPHTHALMIC at 09:28

## 2022-10-14 RX ADMIN — ALPRAZOLAM 0.5 MG: 0.5 TABLET ORAL at 14:20

## 2022-10-14 RX ADMIN — MEMANTINE 5 MG: 5 TABLET ORAL at 20:17

## 2022-10-14 RX ADMIN — AMIODARONE HYDROCHLORIDE 0.5 MG/MIN: 1.8 INJECTION, SOLUTION INTRAVENOUS at 23:43

## 2022-10-14 RX ADMIN — AMIODARONE HYDROCHLORIDE 1 MG/MIN: 1.8 INJECTION, SOLUTION INTRAVENOUS at 17:13

## 2022-10-14 RX ADMIN — PANTOPRAZOLE SODIUM 40 MG: 40 TABLET, DELAYED RELEASE ORAL at 20:17

## 2022-10-14 RX ADMIN — HEPARIN SODIUM 5000 UNITS: 5000 INJECTION INTRAVENOUS; SUBCUTANEOUS at 20:15

## 2022-10-14 RX ADMIN — HEPARIN SODIUM 5000 UNITS: 5000 INJECTION INTRAVENOUS; SUBCUTANEOUS at 09:23

## 2022-10-14 RX ADMIN — ONDANSETRON 4 MG: 2 INJECTION INTRAMUSCULAR; INTRAVENOUS at 20:15

## 2022-10-14 RX ADMIN — AMIODARONE HYDROCHLORIDE 150 MG: 1.5 INJECTION, SOLUTION INTRAVENOUS at 16:21

## 2022-10-14 RX ADMIN — ASPIRIN 81 MG: 81 TABLET, COATED ORAL at 09:23

## 2022-10-14 RX ADMIN — GABAPENTIN 100 MG: 100 CAPSULE ORAL at 20:17

## 2022-10-14 RX ADMIN — ONDANSETRON HYDROCHLORIDE 4 MG: 4 TABLET, FILM COATED ORAL at 14:44

## 2022-10-14 RX ADMIN — Medication 10 ML: at 09:22

## 2022-10-14 RX ADMIN — DOXYCYCLINE 100 MG: 100 CAPSULE ORAL at 17:13

## 2022-10-14 RX ADMIN — ACETAMINOPHEN 650 MG: 325 TABLET, FILM COATED ORAL at 20:16

## 2022-10-14 RX ADMIN — METOPROLOL SUCCINATE 25 MG: 25 TABLET, EXTENDED RELEASE ORAL at 09:23

## 2022-10-14 RX ADMIN — PANTOPRAZOLE SODIUM 40 MG: 40 TABLET, DELAYED RELEASE ORAL at 09:23

## 2022-10-14 RX ADMIN — MINERAL OIL AND PETROLATUM 1 APPLICATION: 150; 830 OINTMENT OPHTHALMIC at 09:33

## 2022-10-14 RX ADMIN — SENNOSIDES AND DOCUSATE SODIUM 2 TABLET: 50; 8.6 TABLET ORAL at 20:16

## 2022-10-14 RX ADMIN — SODIUM CHLORIDE 1 G: 900 INJECTION INTRAVENOUS at 05:25

## 2022-10-14 RX ADMIN — IPRATROPIUM BROMIDE 0.5 MG: 0.5 SOLUTION RESPIRATORY (INHALATION) at 00:38

## 2022-10-14 RX ADMIN — DOXYCYCLINE 100 MG: 100 INJECTION, POWDER, LYOPHILIZED, FOR SOLUTION INTRAVENOUS at 05:25

## 2022-10-14 NOTE — CONSULTS
[facility] Consult/H&P Note      Referring Provider: No ref. provider found  Primary Provider:  Ngozi Davis DO  Reason for Consultation: Afib    PROBLEM LIST:  1. Nonischemic cardiomyopathy/chronic systolic congestive heart failure  1. 6/2018 2D echo: LVEF = 20%.  Mild TR.  Mild MR.  Moderate AR.  Mild RA diastolic collapse from pericardial effusion.  There is a moderate 1-2 cm circumferential pericardial effusion.  2. 3/2022 2D echo: LVEF = 30%.  Moderate MR.  Calcification of aortic valve.  RVSP due to TR mildly elevated (35-45)  3. Echo 9/6/2022: EF 21-25%, small pericardial effusion, bilateral pleural effusions, moderate MR, moderate to severe TR, RVSP 56 mmHg.   2. New onset atrial fibrillation 9/2022 in setting of COVID  1. CHADSVASC = 8   3. History of CVA  1. 6/2018 14-day Holter monitor: Min 57, AVG 84, max 240.  SVE 2.3%.  VE 15.3%.  39 runs of VT - fastest 18 beats at 240 bpm; longest 17 beats at 131 BPM.  1 SVT episode - 8 beats at 213 bpm.  4. CAD  1. 6/2018 coronary calcifications appreciated on CT chest.  5. COVID19 positive admission 9/2022  6. Hypertension   7. hyperlipidemia  8. Orthostasis  9. CKD stage III  1. 5/2022 CR 2.01  2. 5/2022 CR 1.55  10. Hypothyroidism  11. Dementia  12. Appetite/weight loss  1. 3/2022 [facility] admission for poor p.o. intake, CARYN, hyperkalemia, and chronic systolic HF.  13. Surgical  1. CCX  2. Eye surgery  3. Hysterectomy      HISTORY OF PRESENT ILLNESS:  Scott Diego is a 89 y.o. female with the above noted pmhx who presented with complaints of atrial fibrillation. She presented to ED on 10/12/22 with complaints of shortness of breath. Workup has included: CXR pulmonary edema, Troponin neg, ProBNP ~5000. Patient was admitted by medicine team. Cardiology has been consulted for Afib. Of note, patient had episode of brief Afib early September during admission for COVID 19. Given the brief nature, her age, dementia/risk for falls, she was not started on  anticoagulation. She was rate controlled with metoprolol.        REVIEW OF SYSTEMS  Pertinent positives are listed in the HPI and all other ROS are negative.      SOCIAL HISTORY:   reports that she has never smoked. She has never used smokeless tobacco. She reports that she does not drink alcohol and does not use drugs.     FAMILY HISTORY:  family history includes Cancer in her brother, mother, and sister; No Known Problems in her father and son; Parkinsonism in her brother.     CURRENT MEDICATIONS:      Current Facility-Administered Medications:   •  acetaminophen (TYLENOL) tablet 650 mg, 650 mg, Oral, Q4H PRN, 650 mg at 10/13/22 1833 **OR** acetaminophen (TYLENOL) 160 MG/5ML solution 650 mg, 650 mg, Oral, Q4H PRN **OR** acetaminophen (TYLENOL) suppository 650 mg, 650 mg, Rectal, Q4H PRN, Jonatan Fofana DO  •  ALPRAZolam (XANAX) tablet 0.5 mg, 0.5 mg, Oral, TID PRN, Jonatan Fofana DO, 0.5 mg at 10/13/22 2053  •  artificial tears ophthalmic ointment 1 application, 1 application, Both Eyes, Daily, Jonatan Fofana DO, 1 application at 10/14/22 0933  •  aspirin EC tablet 81 mg, 81 mg, Oral, Daily, Jonatan Fofana DO, 81 mg at 10/14/22 0923  •  sennosides-docusate (PERICOLACE) 8.6-50 MG per tablet 2 tablet, 2 tablet, Oral, BID, 2 tablet at 10/12/22 2014 **AND** polyethylene glycol (MIRALAX) packet 17 g, 17 g, Oral, Daily PRN **AND** bisacodyl (DULCOLAX) EC tablet 5 mg, 5 mg, Oral, Daily PRN **AND** bisacodyl (DULCOLAX) suppository 10 mg, 10 mg, Rectal, Daily PRN, Jonatan Fofana DO  •  cefTRIAXone (ROCEPHIN) 1 g/100 mL 0.9% NS (MBP), 1 g, Intravenous, Q24H, Jonatan Fofana DO, Last Rate: 0 mL/hr at 10/12/22 0947, 1 g at 10/14/22 0525  •  doxycycline (MONODOX) capsule 100 mg, 100 mg, Oral, Q12H, Jose Malone, PharmD  •  gabapentin (NEURONTIN) capsule 100 mg, 100 mg, Oral, Nightly, Jonatan Fofana DO, 100 mg at 10/13/22 2053  •  heparin (porcine) 5000 UNIT/ML injection 5,000  "Units, 5,000 Units, Subcutaneous, Q12H, Jonatan Fofana DO, 5,000 Units at 10/14/22 0923  •  ipratropium (ATROVENT) nebulizer solution 0.5 mg, 0.5 mg, Nebulization, Q6H, Kimberli Mcdermott MD, 0.5 mg at 10/14/22 0745  •  levothyroxine (SYNTHROID, LEVOTHROID) tablet 50 mcg, 50 mcg, Oral, Q AM, Jonatan Fofana DO, 50 mcg at 10/14/22 0525  •  memantine (NAMENDA) tablet 5 mg, 5 mg, Oral, BID, Jonatan Fofana DO, 5 mg at 10/14/22 0923  •  metoprolol succinate XL (TOPROL-XL) 24 hr tablet 25 mg, 25 mg, Oral, Daily, Jonatan Fofana DO, 25 mg at 10/14/22 0923  •  ondansetron (ZOFRAN) tablet 4 mg, 4 mg, Oral, Q6H PRN **OR** ondansetron (ZOFRAN) injection 4 mg, 4 mg, Intravenous, Q6H PRN, Jonatan Fofana DO, 4 mg at 10/13/22 2052  •  pantoprazole (PROTONIX) EC tablet 40 mg, 40 mg, Oral, BID, Jonatan Fofana DO, 40 mg at 10/14/22 0923  •  polyvinyl alcohol (LIQUIFILM) 1.4 % ophthalmic solution 1 drop, 1 drop, Both Eyes, Q2H PRN, Jonatan Fofana DO, 1 drop at 10/14/22 0928  •  sodium chloride 0.9 % flush 10 mL, 10 mL, Intravenous, PRN, Martin Russell MD  •  sodium chloride 0.9 % flush 10 mL, 10 mL, Intravenous, Q12H, Jonatan Fofana DO, 10 mL at 10/14/22 0922  •  sodium chloride 0.9 % flush 10 mL, 10 mL, Intravenous, PRN, Jonatan Fofana DO     Allergies:  Augmentin [amoxicillin-pot clavulanate], Claritin [loratadine], Keflex [cephalexin], and Sulfa antibiotics    Objective     Vital Sign Min/Max for last 24 hours  Temp  Min: 97.5 °F (36.4 °C)  Max: 98.9 °F (37.2 °C)   BP  Min: 111/70  Max: 140/80   Pulse  Min: 27  Max: 108   Resp  Min: 17  Max: 20   SpO2  Min: 92 %  Max: 98 %   No data recorded   Weight  Min: 55.2 kg (121 lb 9.6 oz)  Max: 55.2 kg (121 lb 9.6 oz)     Flowsheet Rows    Flowsheet Row First Filed Value   Admission Height 162.6 cm (64\") Documented at 10/12/2022 0206   Admission Weight 53.5 kg (118 lb) Documented at 10/12/2022 0206          PHYSICAL " EXAM:  GENERAL: This is a well-developed, well-nourished, female who is in no acute distress.   SKIN: Pink and warm without rash or abnormality noted.   HEENT: Head is normocephalic and atraumatic. Pupils are equal and reactive to light bilaterally. Mucous membranes are pink and moist.   NECK: Supple without lymphadenopathy or thyromegaly. There is no jugular venous distention at 30°.  LUNGS: Clear to auscultation bilaterally without wheezing, rhonchi, or rales noted.   CARDIOVASCULAR: The heart has an irregular rhythm, reg rate with a normal S1 and S2. There is no murmur, gallop, rub, or click appreciated. The PMI is nondisplaced. Carotid upstrokes are 2+ and symmetrical without bruits.   ABDOMEN: Soft and nondistended with positive bowel sounds x4. The patient denies tenderness of palpitation.   MUSCULOSKELETAL: There are no obvious bony abnormalities. Normal range of tenderness to palpation.   NEUROLOGICAL: Nonfocal. Alert and oriented x3.   PERIPHERAL VASCULAR: Femoral pulses are 2+ and symmetrical without bruits. Posterior tibial and dorsalis pedis pulses are 2+ and symmetrical. There is no peripheral edema.   PSYCH: Normal mood and affect.      EKG:    Tele: NSR with PACs    LABS:      Lab Results   Component Value Date    WBC 22.68 (H) 10/13/2022    HGB 9.4 (L) 10/13/2022    HCT 30.2 (L) 10/13/2022    MCV 98.4 (H) 10/13/2022     10/13/2022     Lab Results   Component Value Date    GLUCOSE 110 (H) 10/13/2022    BUN 25 (H) 10/13/2022    CREATININE 1.42 (H) 10/13/2022    EGFRIFNONA 46 (L) 06/19/2018    BCR 17.6 10/13/2022    K 4.3 10/13/2022    CO2 22.0 10/13/2022    CALCIUM 8.7 10/13/2022    ALBUMIN 3.80 10/12/2022    AST 27 10/12/2022    ALT 6 10/12/2022     Lab Results   Component Value Date    CKTOTAL 26 08/26/2022    TROPONINT 0.012 10/12/2022     Lab Results   Component Value Date    INR 1.42 (H) 09/10/2022    INR 1.33 (H) 09/08/2022    INR 1.38 (H) 09/06/2022    PROTIME 17.3 (H) 09/10/2022     PROTIME 16.4 (H) 09/08/2022    PROTIME 16.9 (H) 09/06/2022     No results found for: CHOL, CHLPL, TRIG, HDL, LDL, LDLDIRECT     Results Review: I reviewed the patient's new clinical results.      ASSESSMENT:    1.  Atrial fibrillation  a. CHADSVASC = 8  2. HFrEF  a. EF 21-25%  3. Anemia  a. H&H stable  4. Acute hypoxic respiratory failure, febrile, leukocytosis, suspected aspiration   a. Per medicine  5. Recent COVID 19 infection  6. CKDIIIb  7. Dementia    This is a 89-year-old patient with a high [id number] score of 8 who is not a good candidate for anticoagulation.  Fortunately with rate control she has converted back to sinus rhythm on her own.  I think her best option is amiodarone.  I think anticoagulation in this lady is a higher risk than her risk for stroke.  The patient has a left bundle branch block at baseline and I think that the 2 EKGs that show a rapid rate are most likely consistent with a left bundle and atrial fibrillation although with an EF of 20-25% VT cannot be entirely excluded.  Amiodarone would also benefit her with regard to her risk for VT.    PLAN:    1. Change metoprolol succinate 25 mg daily to be D dosing for better rate control  2. Begin amiodarone protocol  3. Defer anticoagulation at this time due to age debility and dementia.      I discussed the patient's findings and my recommendations with patient.    Scribed for Charleen Rodriguez MD by VASILE Haines. 10/14/2022  13:14 EDT    I Charleen Rodriguez MD personally performed the services described in this documentation as scribed by the above individual in my presence, and it is both accurate and complete.    Charleen Rodriguez MD, [facility]

## 2022-10-14 NOTE — PLAN OF CARE
Goal Outcome Evaluation:  Plan of Care Reviewed With: patient           Outcome Evaluation: Pt alert with confusion, VSS, 3 LNC, Sat. 94%, pt c/o SOA, abd. discomfort, MD notified, see MAR. Continue monitoring.

## 2022-10-14 NOTE — NURSING NOTE
Placed a called to Cardiology NP Emeli, was concerned about starting amio gtt on pt. Got a stat ekg, pt was in sinus rhythm with frequent PACs & PVCs.   Over the phone with NP just verified that they were still wanting amio gtt to be started.

## 2022-10-14 NOTE — PLAN OF CARE
Problem: Adult Inpatient Plan of Care  Goal: Plan of Care Review  Outcome: Ongoing, Progressing  Flowsheets  Taken 10/14/2022 1334 by Keyla Wharton RN  Progress: no change  Taken 10/14/2022 0506 by Palomo Morales RN  Plan of Care Reviewed With: patient  Goal: Patient-Specific Goal (Individualized)  Outcome: Ongoing, Progressing  Goal: Absence of Hospital-Acquired Illness or Injury  Outcome: Ongoing, Progressing  Intervention: Identify and Manage Fall Risk  Recent Flowsheet Documentation  Taken 10/14/2022 1240 by Keyla Wharton RN  Safety Promotion/Fall Prevention: safety round/check completed  Taken 10/14/2022 1031 by Keyla Wharton RN  Safety Promotion/Fall Prevention: safety round/check completed  Taken 10/14/2022 0845 by Keyla Wharton RN  Safety Promotion/Fall Prevention:   assistive device/personal items within reach   clutter free environment maintained   fall prevention program maintained   nonskid shoes/slippers when out of bed   safety round/check completed  Intervention: Prevent Skin Injury  Recent Flowsheet Documentation  Taken 10/14/2022 0845 by Keyla Wharton RN  Skin Protection: adhesive use limited  Intervention: Prevent and Manage VTE (Venous Thromboembolism) Risk  Recent Flowsheet Documentation  Taken 10/14/2022 0845 by Keyla Wharton RN  Range of Motion: ROM (range of motion) performed  Intervention: Prevent Infection  Recent Flowsheet Documentation  Taken 10/14/2022 1240 by Keyla Wharton RN  Infection Prevention: hand hygiene promoted  Taken 10/14/2022 1031 by Keyla Wharton RN  Infection Prevention: hand hygiene promoted  Taken 10/14/2022 0845 by Keyla Wharton RN  Infection Prevention: hand hygiene promoted  Goal: Optimal Comfort and Wellbeing  Outcome: Ongoing, Progressing  Intervention: Provide Person-Centered Care  Recent Flowsheet Documentation  Taken 10/14/2022 0845 by Keyla Wharton RN  Trust Relationship/Rapport: care explained  Goal: Readiness  for Transition of Care  Outcome: Ongoing, Progressing     Problem: Fall Injury Risk  Goal: Absence of Fall and Fall-Related Injury  Outcome: Ongoing, Progressing  Intervention: Identify and Manage Contributors  Recent Flowsheet Documentation  Taken 10/14/2022 0845 by Keyla Wharton RN  Medication Review/Management: medications reviewed  Intervention: Promote Injury-Free Environment  Recent Flowsheet Documentation  Taken 10/14/2022 1240 by Keyla Wharton RN  Safety Promotion/Fall Prevention: safety round/check completed  Taken 10/14/2022 1031 by Keyla Wharton RN  Safety Promotion/Fall Prevention: safety round/check completed  Taken 10/14/2022 0845 by Keyla Wharton RN  Safety Promotion/Fall Prevention:   assistive device/personal items within reach   clutter free environment maintained   fall prevention program maintained   nonskid shoes/slippers when out of bed   safety round/check completed     Problem: Skin Injury Risk Increased  Goal: Skin Health and Integrity  Outcome: Ongoing, Progressing  Intervention: Optimize Skin Protection  Recent Flowsheet Documentation  Taken 10/14/2022 0845 by Keyla Wharton RN  Pressure Reduction Techniques: frequent weight shift encouraged  Pressure Reduction Devices: pressure-redistributing mattress utilized  Skin Protection: adhesive use limited     Problem: Breathing Pattern Ineffective  Goal: Effective Breathing Pattern  Outcome: Ongoing, Progressing  Intervention: Promote Improved Breathing Pattern  Recent Flowsheet Documentation  Taken 10/14/2022 1240 by Keyla Wharton RN  Supportive Measures: active listening utilized  Taken 10/14/2022 0845 by Keyla Wharton RN  Supportive Measures: active listening utilized  Airway/Ventilation Management: airway patency maintained  Breathing Techniques/Airway Clearance: deep/controlled cough encouraged     Problem: Confusion Acute  Goal: Optimal Cognitive Function  Outcome: Ongoing, Progressing  Intervention:  Minimize Contributing Factors  Recent Flowsheet Documentation  Taken 10/14/2022 1240 by Keyla Wharton RN  Sensory Stimulation Regulation: television on  Reorientation Measures: clock in view  Taken 10/14/2022 0845 by Keyla Wharton RN  Sensory Stimulation Regulation: television on  Reorientation Measures: clock in view     Problem: Adjustment to Illness (Heart Failure)  Goal: Optimal Coping  Outcome: Ongoing, Progressing  Intervention: Support Psychosocial Response  Recent Flowsheet Documentation  Taken 10/14/2022 1240 by Keyla Wharton RN  Supportive Measures: active listening utilized  Taken 10/14/2022 0845 by Keyla Wharton RN  Supportive Measures: active listening utilized     Problem: Cardiac Output Decreased (Heart Failure)  Goal: Optimal Cardiac Output  Outcome: Ongoing, Progressing  Intervention: Optimize Cardiac Output  Recent Flowsheet Documentation  Taken 10/14/2022 1240 by Keyla Wharton RN  Environmental Support: calm environment promoted  Taken 10/14/2022 0845 by Keyla Wharton RN  Environmental Support: calm environment promoted     Problem: Dysrhythmia (Heart Failure)  Goal: Stable Heart Rate and Rhythm  Outcome: Ongoing, Progressing     Problem: Fluid Imbalance (Heart Failure)  Goal: Fluid Balance  Outcome: Ongoing, Progressing     Problem: Functional Ability Impaired (Heart Failure)  Goal: Optimal Functional Ability  Outcome: Ongoing, Progressing     Problem: Oral Intake Inadequate (Heart Failure)  Goal: Optimal Nutrition Intake  Outcome: Ongoing, Progressing     Problem: Respiratory Compromise (Heart Failure)  Goal: Effective Oxygenation and Ventilation  Outcome: Ongoing, Progressing  Intervention: Promote Airway Secretion Clearance  Recent Flowsheet Documentation  Taken 10/14/2022 0845 by Keyla Wharton RN  Breathing Techniques/Airway Clearance: deep/controlled cough encouraged  Cough And Deep Breathing: done independently per patient  Intervention: Optimize  Oxygenation and Ventilation  Recent Flowsheet Documentation  Taken 10/14/2022 0845 by Keyla Wharton RN  Airway/Ventilation Management: airway patency maintained     Problem: Sleep Disordered Breathing (Heart Failure)  Goal: Effective Breathing Pattern During Sleep  Outcome: Ongoing, Progressing  Intervention: Monitor and Manage Obstructive Sleep Apnea  Recent Flowsheet Documentation  Taken 10/14/2022 0845 by Keyla Wharton RN  NPPV/CPAP Maintenance: tubes secured     Problem: Heart Failure Comorbidity  Goal: Maintenance of Heart Failure Symptom Control  Outcome: Ongoing, Progressing  Intervention: Maintain Heart Failure-Management  Recent Flowsheet Documentation  Taken 10/14/2022 0845 by Keyla Wharton, RN  Medication Review/Management: medications reviewed   Goal Outcome Evaluation:           Progress: no change

## 2022-10-14 NOTE — PROGRESS NOTES
[facility]  PROGRESS NOTE    Patient Name: Scott Diego  : 4/10/1930  MRN: [id number]    Date of Admission: 10/12/2022  Primary Care Physician: Ngozi Davis DO    Subjective   Subjective     CC:  Acute wheezing     HPI:  Yesterday during episode of AF/RVR (rate 140s) she felt very ill w resp distress and nausea; did improve with lasix and metoprolol. Since then, she has remained on 3L nc oxygen.  She is currently sleeping; I did not wake her.      ROS: -  Remains confused pleasantly   Speech was clear this morning  No complaints of nausea today       Objective   Objective     Vital Signs:   Temp:  [97.5 °F (36.4 °C)-98.9 °F (37.2 °C)] 97.5 °F (36.4 °C)  Heart Rate:  [] 27  Resp:  [17-22] 18  BP: (111-134)/(70-92) 111/70  Flow (L/min):  [3-5] 3     Physical Exam:  Constitutional: asleep in bed, NAD  HENT: NCAT, mucous membranes moist  Neck: Supple,  trachea midline  Respiratory: 3L, reg resps, nonlabored  Cardiovascular: irreg irreg.  Tele:  p waves, long OK, wide QRS, with intermittent PACs with narrow QRS.     Gastrointestinal: Positive bowel sounds, soft, nondistended, no rebound   Musculoskeletal: No bilateral ankle edema  Neurologic: asleep,not restless  Skin: No rashes      Results Reviewed:  LAB RESULTS:      Lab 10/13/22  1523 10/13/22  0419 10/12/22  0532 10/12/22  0221 10/08/22  1649   WBC 22.68* 17.06*  --  14.67* 7.85   HEMOGLOBIN 9.4* 8.0*  --  10.3* 10.6*   HEMATOCRIT 30.2* 26.4*  --  33.3* 34.5   PLATELETS 310 226  --  312 307   NEUTROS ABS  --  14.50*  --  11.09* 5.17   IMMATURE GRANS (ABS)  --  0.11*  --  0.07* 0.02   LYMPHS ABS  --  0.86  --  2.08 1.54   MONOS ABS  --  1.57*  --  1.20* 0.85   EOS ABS  --  0.00  --  0.17 0.21   MCV 98.4* 98.9*  --  98.8* 100.0*   LACTATE  --   --  0.9 2.2* 1.4         Lab 10/13/22  0419 10/12/22  0221 10/08/22  1649   SODIUM 136 141 140   POTASSIUM 4.3 4.0 4.2   CHLORIDE 103 101 102   CO2 22.0 27.0 25.0    ANION GAP 11.0 13.0 13.0   BUN 25* 16 21   CREATININE 1.42* 1.25* 1.29*   EGFR 34.8* 40.5* 39.0*   GLUCOSE 110* 138* 104*   CALCIUM 8.7 9.4 9.5   MAGNESIUM  --  2.1  --    TSH  --  3.470  --          Lab 10/12/22  0221 10/08/22  1649   TOTAL PROTEIN 8.8* 8.3   ALBUMIN 3.80 3.90   GLOBULIN 5.0 4.4   ALT (SGPT) 6 5   AST (SGOT) 27 16   BILIRUBIN 0.4 0.6   ALK PHOS 53 53   LIPASE  --  35         Lab 10/12/22  0221   PROBNP 5,194.0*   TROPONIN T 0.012                 Lab 10/12/22  0328   FIO2 36   CARBOXYHEMOGLOBIN (VENOUS) 1.0     Brief Urine Lab Results  (Last result in the past 365 days)      Color   Clarity   Blood   Leuk Est   Nitrite   Protein   CREAT   Urine HCG        10/12/22 0251 Yellow   Clear   Negative   Large (3+)   Positive   Trace                 Microbiology Results Abnormal     Procedure Component Value - Date/Time    Blood Culture - Blood, Arm, Right [[id number]]  (Normal) Collected: 10/12/22 0347    Lab Status: Preliminary result Specimen: Blood from Arm, Right Updated: 10/14/22 0400     Blood Culture No growth at 2 days    Blood Culture - Blood, Arm, Right [[id number]]  (Normal) Collected: 10/12/22 0347    Lab Status: Preliminary result Specimen: Blood from Arm, Right Updated: 10/14/22 0400     Blood Culture No growth at 2 days    Urine Culture - Urine, Urine, Clean Catch [[id number]] Collected: 10/12/22 0251    Lab Status: Final result Specimen: Urine, Clean Catch Updated: 10/13/22 1320     Urine Culture >100,000 CFU/mL Normal Urogenital Neeta    Narrative:      Colonization of the urinary tract without infection is common. Treatment is discouraged unless the patient is symptomatic, pregnant, or undergoing an invasive urologic procedure.          FL Video Swallow With Speech Single Contrast    Result Date: 10/12/2022  EXAMINATION: FL VIDEO SWALLOW W SPEECH SINGLE-CONTRAST-  INDICATION: DYSPHAGIA, OROPHARYNGEAL, HAS ATTRIBUTABLE CAUSE  TECHNIQUE: 1 minute and 42 seconds of fluoroscopic time was used  for this exam. Tenderness associated of fluoroscopic loops are saved. The patient was evaluated in the seated lateral position while taking a variety of consistencies of barium by mouth under the direction of speech pathology.  COMPARISON: NONE  FINDINGS: 1 minute and 42 seconds of fluoroscopy provided for a modified barium swallow. Please see speech therapy report for full details and recommendations.       Impression: Fluoroscopy provided for a modified barium swallow. Please see speech therapy report for full details and recommendations.    This report was finalized on 10/12/2022 4:41 PM by Saúl Benites.      XR Chest 1 View    Result Date: 10/13/2022  DATE OF EXAM: 10/13/2022 11:00 AM  PROCEDURE: XR CHEST 1 VW-  INDICATIONS: discomfort with breathing; I50.23-Acute on chronic systolic (congestive) heart failure; J96.01-Acute respiratory failure with hypoxia; N39.0-Urinary tract infection, site not specified; R13.12-Dysphagia, oropharyngeal phase  COMPARISON: 10/12/2022  TECHNIQUE: Single radiographic AP view of the chest was obtained.  FINDINGS: Heart is borderline enlarged. There is a persistent diffuse interstitial disease pattern resembling pulmonary edema, similar in overall extent to the prior exam. Interstitial edema appears slightly improved in the left midlung, mildly increased in the right apex and base. There are small effusions. No pneumothorax or dense lung consolidation are seen.      Impression: Persistent appearance of pulmonary interstitial edema similar overall to yesterday's exam.  This report was finalized on 10/13/2022 11:25 AM by Dr. Jan Alvarez MD.        Results for orders placed during the hospital encounter of 09/05/22    Adult Transthoracic Echo Complete w/ Color, Spectral and Contrast if Necessary Per Protocol    Interpretation Summary  · Left ventricular ejection fraction appears to be 21 - 25%. Left ventricular systolic function is severely decreased.  · Left ventricular diastolic  function is consistent with (grade II w/high LAP) pseudonormalization.  · There is a small (<1cm) pericardial effusion.  · There is a left pleural effusion. There is a right pleural effusion.  · Moderate mitral valve regurgitation is present.  · Moderate to severe tricuspid valve regurgitation is present.  · Estimated right ventricular systolic pressure from tricuspid regurgitation is markedly elevated (>55 mmHg). Calculated right ventricular systolic pressure from tricuspid regurgitation is 56 mmHg.      I have reviewed the medications:  Scheduled Meds:artificial tears, 1 application, Both Eyes, Daily  aspirin, 81 mg, Oral, Daily  cefTRIAXone, 1 g, Intravenous, Q24H  doxycycline, 100 mg, Intravenous, Q12H  gabapentin, 100 mg, Oral, Nightly  heparin (porcine), 5,000 Units, Subcutaneous, Q12H  ipratropium, 0.5 mg, Nebulization, Q6H  levothyroxine, 50 mcg, Oral, Q AM  memantine, 5 mg, Oral, BID  metoprolol succinate XL, 25 mg, Oral, Daily  pantoprazole, 40 mg, Oral, BID  senna-docusate sodium, 2 tablet, Oral, BID  sodium chloride, 10 mL, Intravenous, Q12H      Continuous Infusions:   PRN Meds:.•  acetaminophen **OR** acetaminophen **OR** acetaminophen  •  ALPRAZolam  •  senna-docusate sodium **AND** polyethylene glycol **AND** bisacodyl **AND** bisacodyl  •  ondansetron **OR** ondansetron  •  polyvinyl alcohol  •  sodium chloride  •  sodium chloride    Assessment & Plan   Assessment & Plan     Active Hospital Problems    Diagnosis  POA   • **Acute respiratory failure with hypoxia (HCC) [J96.01]  Yes   • Respiratory failure (HCC) [J96.90]  Yes   • Anxiety [F41.9]  Yes   • Hypothyroid [E03.9]  Yes   • Chronic systolic heart failure (HCC) [I50.22]  Yes   • History of CVA (cerebrovascular accident) [Z86.73]  Not Applicable   • Dementia (HCC) [F03.90]  Yes   • Hypertension [I10]  Yes      Resolved Hospital Problems   No resolved problems to display.        Brief Hospital Course to date:  Scott Diego is a 89 y.o. female  with severe systolic CHF, dementia, recent admission for Covid-19, and known ongoing GI complaints/nausea x over a month.  She is followed by GI outpatient.  She just started macrobid for a UTI.  Brought in by son for acute dyspnea and wheezing.      Assessment/plan     HFrEF, EF 21-25  New: AF/RVR (?) episode on 10/13 with nausea and dyspnea  - wide complex, acute onset:  Suspect AF/RVR paroxysmal  - EKG noted. /90 during episode  - gave metoprolol 5mg x1 with slowing to 110s  - consult cards for recs.  Curently ? NSR with aberrant conduction.   - Transient episode of A. fib last hospitalization, but spontaneously resolved, seen by cardiology who did not recommend anticoagulation due to brevity of episode  -cont Toprol-XL, other guideline directed medications limited 2/2 renal disease       Nausea, persistent/recurrent  - worse today  - did not respond to zofran or xanax  - phenergan x 1   -  GI clinic recently, PPI BID, plan for EGD -   - consider inpt GI for EGD here     Anemia  - hgb 9.3 on recheck yest.  Stable.     - given nausea and tachycardia, consider UGI bleed  - monitor.      Acute hypoxic respiratory failure, suspect aspiration episode pta   Temp to 100.7 on admission    leukocytosis after steroids 10/12  - Ongoing GI issues, potential aspiration of coffee before bed vs reflux of stomach contents  - O2 support as needed,  Now 5-6L but shallow resps    -Empiric coverage w/ CTX/doxy for aspiration - day 3  -SLP eval:  Puree and honey thickened   - leukocytosis:  Got methylpred on 10/12.  Recheck WBC today      Recent Covid-19 infection  -Tested positive 9/5/22; she does not require any further enhanced isolation     Recent Dx UTI  -Took a single dose of recently prescribed Macrobid  -UrCx from July during ED visit w/ MRSA?  -add Cx to urine in lab, CTX as above should cover majority of urinary pathogens       CKD 3b  -Bline Cr 1.2-1.5; eGFR 30s  -At approximate baseline, renal dosing  med     Alzheimer dementia  - Home Nam[place]     Anxiety  Hypothyroidism  -Home Xanax at reduced frequency, home Synthroid       Expected Discharge Location and Transportation: home w son, vs SNF  Expected Discharge Date: tbd    DVT prophylaxis:  Medical DVT prophylaxis orders are present.     AM-PAC 6 Clicks Score (PT): 18 (10/12/22 1120)    CODE STATUS:   Code Status and Medical Interventions:   Ordered at: 10/12/22 041     Medical Intervention Limits:    NO intubation (DNI)     Level Of Support Discussed With:    Next of Kin (If No Surrogate)    Patient     Code Status (Patient has no pulse and is not breathing):    No CPR (Do Not Attempt to Resuscitate)     Medical Interventions (Patient has pulse or is breathing):    Limited Support     Comments:    Likely would not wish for feeding tube either       Kimberli Mcdermott MD  10/14/22

## 2022-10-14 NOTE — PAYOR COMM NOTE
"Ref # [id number]  Obs to inpatient admission.  Inpt order on 10/14/22  MRN [id number]  Jessie Mills RN, BSN, [facility]  Phone # [phone]  Fax # [phone]  Scott Diego (89 y.o. Female)     Date of Birth   04/10/1930    Social Security Number       Address   [address] [place] [address]    Home Phone   [phone]    MRN   [id number]       P[employer]alism   Amish    Marital Status                               Admission Date   10/12/22    Admission Type   Emergency    Admitting Provider   Kimberli Mcdermott MD    Attending Provider   Kimberli Mcdermott MD    Department, Room/Bed   [facility] 6A, N621/1       Discharge Date       Discharge Disposition       Discharge Destination                               Attending Provider: Kimberli Mcdermott MD    Allergies: Augmentin [Amoxicillin-pot Clavulanate], Claritin [Loratadine], Keflex [Cephalexin], Sulfa Antibiotics    Isolation: None   Infection: COVID (History) (10/12/22)   Code Status: No CPR    Ht: 162.6 cm (64\")   Wt: 55.2 kg (121 lb 9.6 oz)    Admission Cmt: None   Principal Problem: Acute respiratory failure with hypoxia (HCC) [J96.01]                      History & Physical      Jonatan Fofana DO at 10/12/22 0416              [facility] Medicine Services  HISTORY AND PHYSICAL    Patient Name: Scott Diego  : 4/10/1930  MRN: [id number]  Primary Care Physician: Ngozi Davis DO  Date of admission: 10/12/2022      Subjective   Subjective     Chief Complaint:  Short of breath    HPI:  Scott Diego is a 89 y.o. female with dementia, severe systolic CHF, recent admission for Covid-19 last month, and subacute ongoing nausea who presents this morning for evaluation of shortness of breath.  She has been worked up outpatient for ongoing nausea ~1.5 months, just saw GI yesterday and was scheduled for EGD next month and additionally held atorvastatin.  Additionally she was recently Dx'd with UTI " and started on Macrobid by her PCP, she took a single dose this morning.  She had a cup of coffee and went to bed, her son states she then called out for help and was acutely short of breath with wheezing prompting evaluation in the ED.  Her son was initially concerned she was having a panic attack and trialed her on her home dose of Xanax without improvement.    Review of Systems   Gen- No fevers, chills  CV- No chest pain, palpitations  Resp- No cough, yes dyspnea  GI- No V/D, abd pain; yes nausea  All other systems reviewed and are negative.     Personal History     Past Medical History:   Diagnosis Date   • Anxiety    • Congestive heart failure (HCC)    • Coronary artery disease    • Dementia (HCC)    • Depression    • Hyperlipidemia    • Malignant neoplasm (HCC)    • Memory loss    • Peripheral neuropathy    • Stroke (HCC)     mini stroke   • Systolic heart failure (HCC) 09/25/2017    Chronic/compensated (EF 25%)             Past Surgical History:   Procedure Laterality Date   • CARDIAC CATHETERIZATION     • CHOLECYSTECTOMY     • EYE SURGERY     • HYSTERECTOMY         Family History:  family history includes Cancer in her brother, mother, and sister; No Known Problems in her father and son; Parkinsonism in her brother. Otherwise pertinent FHx was reviewed and unremarkable.     Social History:  reports that she has never smoked. She has never used smokeless tobacco. She reports that she does not drink alcohol and does not use drugs.  Social History     Social History Narrative    Caffeine Intake:0-1  servings per day    Patient lives at her son's home       Medications:  Available home medication information reviewed.  (Not in a hospital admission)      Allergies   Allergen Reactions   • Augmentin [Amoxicillin-Pot Clavulanate] Nausea And Vomiting     Makes her sick at her stomach   • Claritin [Loratadine] Unknown (See Comments)     Doesn't remember   • Keflex [Cephalexin] Nausea And Vomiting     Makes pt sick at  her stomach   • Sulfa Antibiotics Other (See Comments)     Does not remember       Objective   Objective     Vital Signs:   Temp:  [98.6 °F (37 °C)] 98.6 °F (37 °C)  Heart Rate:  [118-123] 118  Resp:  [24] 24  BP: (125-129)/(73-92) 129/73  Flow (L/min):  [4] 4       Physical Exam   Constitutional: Awake, alert, elderly female laying in ED stretcher, appears acutely ill  Eyes: PERRL, sclerae anicteric, no conjunctival injection  HENT: NCAT, mucous membranes moist  Neck: Supple, trachea midline  Respiratory: Upper airway stridor, accessory muscle use, tachypnea  Cardiovascular: Regular tachycardia, no murmurs, rubs, or gallops, palpable radial pulses bilaterally  Gastrointestinal: Positive bowel sounds, soft, nontender, nondistended  Musculoskeletal: No bilateral ankle edema, no clubbing or cyanosis to extremities  Psychiatric: Appropriate affect, cooperative  Neurologic: Speech difficult to understand (edentulous) but answering questions appropriately, moving extremities spontaneously    Result Review:  I have personally reviewed the results from the time of this admission to 10/12/2022 04:16 EDT and agree with these findings:  [x]  Laboratory list / accordion  []  Microbiology  []  Radiology  []  EKG/Telemetry   []  Cardiology/Vascular   []  Pathology  [x]  Old records  []  Other:  Most notable findings include: proBNP decreased from most recent        LAB RESULTS:      Lab 10/12/22  0221 10/08/22  1649   WBC 14.67* 7.85   HEMOGLOBIN 10.3* 10.6*   HEMATOCRIT 33.3* 34.5   PLATELETS 312 307   NEUTROS ABS 11.09* 5.17   IMMATURE GRANS (ABS) 0.07* 0.02   LYMPHS ABS 2.08 1.54   MONOS ABS 1.20* 0.85   EOS ABS 0.17 0.21   MCV 98.8* 100.0*   LACTATE 2.2* 1.4         Lab 10/12/22  0221 10/08/22  1649   SODIUM 141 140   POTASSIUM 4.0 4.2   CHLORIDE 101 102   CO2 27.0 25.0   ANION GAP 13.0 13.0   BUN 16 21   CREATININE 1.25* 1.29*   EGFR 40.5* 39.0*   GLUCOSE 138* 104*   CALCIUM 9.4 9.5   MAGNESIUM 2.1  --    TSH 3.470  --           Lab 10/12/22  0221 10/08/22  1649   TOTAL PROTEIN 8.8* 8.3   ALBUMIN 3.80 3.90   GLOBULIN 5.0 4.4   ALT (SGPT) 6 5   AST (SGOT) 27 16   BILIRUBIN 0.4 0.6   ALK PHOS 53 53   LIPASE  --  35         Lab 10/12/22  0221   PROBNP 5,194.0*   TROPONIN T 0.012                 Lab 10/12/22  0328   FIO2 36   CARBOXYHEMOGLOBIN (VENOUS) 1.0     UA    Urinalysis 9/5/22 10/8/22 10/8/22 10/12/22 10/12/22     2119 2119 0251 0251   Squamous Epithelial Cells, UA   0-2  7-12 (A)   Specific [place], UA 1.018 1.025  1.018    Ketones, UA Negative Negative  Negative    Blood, UA Negative Negative  Negative    Leukocytes, UA Negative Small (1+) (A)  Large (3+) (A)    Nitrite, UA Negative Positive (A)  Positive (A)    RBC, UA   0-2  3-6 (A)   WBC, UA   31-50 (A)  Too Numerous to Count (A)   Bacteria, UA   None Seen  None Seen   (A) Abnormal value              Microbiology Results (last 10 days)     Procedure Component Value - Date/Time    COVID PRE-OP / PRE-PROCEDURE SCREENING ORDER (NO ISOLATION) - Swab, Nasopharynx [[id number]]  (Abnormal) Collected: 10/12/22 0252    Lab Status: Final result Specimen: Swab from Nasopharynx Updated: 10/12/22 0324    Narrative:      The following orders were created for panel order COVID PRE-OP / PRE-PROCEDURE SCREENING ORDER (NO ISOLATION) - Swab, Nasopharynx.  Procedure                               Abnormality         Status                     ---------                               -----------         ------                     COVID-19 and FLU A/B PCR...[[id number]]  Abnormal            Final result                 Please view results for these tests on the individual orders.    COVID-19 and FLU A/B PCR - Swab, Nasopharynx [[id number]]  (Abnormal) Collected: 10/12/22 0252    Lab Status: Final result Specimen: Swab from Nasopharynx Updated: 10/12/22 0324     COVID19 Detected     Influenza A PCR Not Detected     Influenza B PCR Not Detected    Narrative:      Fact sheet for providers:  https://www.fda.gov/media/433633/download    Fact sheet for patients: https://www.fda.gov/media/025120/download    Test performed by PCR.  Influenza A and Influenza B negative results should be considered presumptive in samples that have a positive SARS-CoV-2 result.    Competitive inhibition studies showed that SARS-CoV-2 virus, when present at concentrations above 3.6E+04 copies/mL, can inhibit the detection and amplification of influenza A and influenza B virus RNA if present at or below 1.8E+02 copies/mL or 4.9E+02 copies/mL, respectively, and may lead to false negative influenza virus results. If co-infection with influenza A or influenza B virus is suspected in samples with a positive SARS-CoV-2 result, the sample should be re-tested with another FDA cleared, approved, or authorized influenza test, if influenza virus detection would change clinical management.    COVID PRE-OP / PRE-PROCEDURE SCREENING ORDER (NO ISOLATION) - Swab, Nasopharynx [[id number]]  (Normal) Collected: 10/08/22 1837    Lab Status: Final result Specimen: Swab from Nasopharynx Updated: 10/08/22 1914    Narrative:      The following orders were created for panel order COVID PRE-OP / PRE-PROCEDURE SCREENING ORDER (NO ISOLATION) - Swab, Nasopharynx.  Procedure                               Abnormality         Status                     ---------                               -----------         ------                     COVID-19 and FLU A/B PCR...[[id number]]  Normal              Final result                 Please view results for these tests on the individual orders.    COVID-19 and FLU A/B PCR - Swab, Nasopharynx [[id number]]  (Normal) Collected: 10/08/22 1837    Lab Status: Final result Specimen: Swab from Nasopharynx Updated: 10/08/22 1914     COVID19 Not Detected     Influenza A PCR Not Detected     Influenza B PCR Not Detected    Narrative:      Fact sheet for providers: https://www.fda.gov/media/974095/download    Fact sheet for  patients: https://www."[employer].gov/media/366021/download    Test performed by PCR.          XR Chest 1 View    Result Date: 10/12/2022  FRONTAL VIEW OF THE CHEST CLINICAL INDICATION: Shortness of breath COMPARISON: October 8, 2022. FINDINGS: Lines: None Increased interstitial markings and prominence of the pulmonary vasculature. Trace bilateral pleural effusions. No pneumothorax. Cardiomediastinal morphology is mildly enlarged.  Osseous structures are unremarkable.     Impression: Findings suggestive of pulmonary edema, similar to prior. Electronically signed by:  Shira Michele D.O.  10/12/2022 1:04 AM Mountain Time      Results for orders placed during the hospital encounter of 09/05/22    Adult Transthoracic Echo Complete w/ Color, Spectral and Contrast if Necessary Per Protocol    Interpretation Summary  · Left ventricular ejection fraction appears to be 21 - 25%. Left ventricular systolic function is severely decreased.  · Left ventricular diastolic function is consistent with (grade II w/high LAP) pseudonormalization.  · There is a small (<1cm) pericardial effusion.  · There is a left pleural effusion. There is a right pleural effusion.  · Moderate mitral valve regurgitation is present.  · Moderate to severe tricuspid valve regurgitation is present.  · Estimated right ventricular systolic pressure from tricuspid regurgitation is markedly elevated (>55 mmHg). Calculated right ventricular systolic pressure from tricuspid regurgitation is 56 mmHg.      Assessment & Plan   Assessment & Plan     Active Hospital Problems    Diagnosis  POA   • **Acute respiratory failure with hypoxia (HCC) [J96.01]  Yes   • Anxiety [F41.9]  Yes   • Hypothyroid [E03.9]  Yes   • Chronic systolic heart failure (HCC) [I50.22]  Yes   • History of CVA (cerebrovascular accident) [Z86.73]  Not Applicable   • Dementia (HCC) [F03.90]  Yes   • Hypertension [I10]  Yes     Summary: This is a 89-year-old female with severe systolic CHF, dementia, recent  admission for Covid-19, and known ongoing GI complaints/nausea who went to bed this evening and called out acutely for shortness of breath    Assessment/plan    Acute hypoxic respiratory failure, suspect aspiration  -Ongoing GI issues, potential aspiration of coffee before bed vs reflux of stomach contents  - Doubt CHF, proBNP significantly decreased compared to 1 mo ago; also historically poor p.o. intake and has been taking her Lasix  -DuoNeb scheduled today followed by prn  - O2 support as needed, currently on 4 L/min NC at time of admission  -Empiric coverage w/ CTX/doxy for aspiration  -SLP eval in the a.m.    Recent Covid-19 infection  -Tested positive 9/5/22; she does not require any further enhanced isolation    Recent Dx UTI  -Took a single dose of recently prescribed Macrobid  -UrCx from July during ED visit w/ MRSA?  -add Cx to urine in lab, CTX as above should cover majority of urinary pathogens    Subacute persistent nausea  Chronic constipation  -Seen by GI last admission, empiric trial of gingerroot and management of constipation  - Son at bedside reports GI appointment yesterday, PPI inc to bid and scheduled for EGD next  - Zofran prn  -Bowel regimen    Chronic severe HFrEF (EF 21-25%), compensated  Hx prolonged QT  - Transient episode of A. fib last hospitalization, but spontaneously resolved, seen by cardiology who did not recommend anticoagulation due to brevity of episode  -cont Toprol-XL, other guideline directed medications limited 2/2 renal disease    CKD 3b  -Bline Cr 1.2-1.5; eGFR 30s  -At approximate baseline, renal dosing med    Alzheimer dementia  - Home Namenda    Anxiety  Hypothyroidism  -Home Xanax at reduced frequency, home Synthroid    DVT prophylaxis:  heparin      CODE STATUS: DNR/DNI, tentatively would not want feeding tube if found to be significantly aspirating  Code Status and Medical Interventions:   Ordered at: 10/12/22 0414     Medical Intervention Limits:    NO intubation  (DNI)     Level Of Support Discussed With:    Next of Kin (If No Surrogate)    Patient     Code Status (Patient has no pulse and is not breathing):    No CPR (Do Not Attempt to Resuscitate)     Medical Interventions (Patient has pulse or is breathing):    Limited Support     Comments:    Likely would not wish for feeding tube either         Jonatan Fofana DO  10/12/22      Electronically signed by Jonatan Fofana DO at 10/12/22 0446         Current Facility-Administered Medications   Medication Dose Route Frequency Provider Last Rate Last Admin   • acetaminophen (TYLENOL) tablet 650 mg  650 mg Oral Q4H PRN Jonatan Fofana DO   650 mg at 10/13/22 1833    Or   • acetaminophen (TYLENOL) 160 MG/5ML solution 650 mg  650 mg Oral Q4H PRN Jonatan Fofana DO        Or   • acetaminophen (TYLENOL) suppository 650 mg  650 mg Rectal Q4H PRN Jonatan Fofana DO       • ALPRAZolam (XANAX) tablet 0.5 mg  0.5 mg Oral TID PRN Jonatan Fofana DO   0.5 mg at 10/13/22 2053   • artificial tears ophthalmic ointment 1 application  1 application Both Eyes Daily Jonatan Fofana DO   1 application at 10/14/22 0933   • aspirin EC tablet 81 mg  81 mg Oral Daily Jonatan Fofana DO   81 mg at 10/14/22 0923   • sennosides-docusate (PERICOLACE) 8.6-50 MG per tablet 2 tablet  2 tablet Oral BID Jonatan Fofana DO   2 tablet at 10/12/22 2014    And   • polyethylene glycol (MIRALAX) packet 17 g  17 g Oral Daily PRN Jonatan Fofana DO        And   • bisacodyl (DULCOLAX) EC tablet 5 mg  5 mg Oral Daily PRN Jonatan Fofana DO        And   • bisacodyl (DULCOLAX) suppository 10 mg  10 mg Rectal Daily PRN Jonaatn Fofana DO       • cefTRIAXone (ROCEPHIN) 1 g/100 mL 0.9% NS (MBP)  1 g Intravenous Q24H Jonatan Fofana DO 0 mL/hr at 10/12/22 0947 1 g at 10/14/22 0525   • doxycycline (MONODOX) capsule 100 mg  100 mg Oral Q12H Jose Malone, PharmD       • gabapentin (NEURONTIN) capsule  100 mg  100 mg Oral Nightly Jonatan Fofana DO   100 mg at 10/13/22 2053   • heparin (porcine) 5000 UNIT/ML injection 5,000 Units  5,000 Units Subcutaneous Q12H Jonatan Fofana DO   5,000 Units at 10/14/22 0923   • ipratropium (ATROVENT) nebulizer solution 0.5 mg  0.5 mg Nebulization Q6H Kimberli Mcdermott MD   0.5 mg at 10/14/22 0745   • levothyroxine (SYNTHROID, LEVOTHROID) tablet 50 mcg  50 mcg Oral Q AM Jonatan Fofana DO   50 mcg at 10/14/22 0525   • memantine (NAMENDA) tablet 5 mg  5 mg Oral BID Jonatan Fofana DO   5 mg at 10/14/22 0923   • metoprolol succinate XL (TOPROL-XL) 24 hr tablet 25 mg  25 mg Oral Daily Jonatan Fofana DO   25 mg at 10/14/22 0923   • ondansetron (ZOFRAN) tablet 4 mg  4 mg Oral Q6H PRN Jonatan Fofana DO        Or   • ondansetron (ZOFRAN) injection 4 mg  4 mg Intravenous Q6H PRN Jonatan oFfana DO   4 mg at 10/13/22 2052   • pantoprazole (PROTONIX) EC tablet 40 mg  40 mg Oral BID Jonatan Fofana DO   40 mg at 10/14/22 0923   • polyvinyl alcohol (LIQUIFILM) 1.4 % ophthalmic solution 1 drop  1 drop Both Eyes Q2H PRN Jonatan Fofana DO   1 drop at 10/14/22 0928   • sodium chloride 0.9 % flush 10 mL  10 mL Intravenous PRN Martin Russell MD       • sodium chloride 0.9 % flush 10 mL  10 mL Intravenous Q12H Jonatan Fofana DO   10 mL at 10/14/22 0922   • sodium chloride 0.9 % flush 10 mL  10 mL Intravenous PRN Jonatan Fofana DO         Lab Results (last 72 hours)     Procedure Component Value Units Date/Time    Blood Culture - Blood, Arm, Right [[id number]]  (Normal) Collected: 10/12/22 0347    Specimen: Blood from Arm, Right Updated: 10/14/22 0400     Blood Culture No growth at 2 days    Blood Culture - Blood, Arm, Right [[id number]]  (Normal) Collected: 10/12/22 0347    Specimen: Blood from Arm, Right Updated: 10/14/22 0400     Blood Culture No growth at 2 days    CBC (No Diff) [[id number]]  (Abnormal) Collected: 10/13/22  1523    Specimen: Blood Updated: 10/13/22 1636     WBC 22.68 10*3/mm3      RBC 3.07 10*6/mm3      Hemoglobin 9.4 g/dL      Hematocrit 30.2 %      MCV 98.4 fL      MCH 30.6 pg      MCHC 31.1 g/dL      RDW 14.9 %      RDW-SD 54.1 fl      MPV 11.8 fL      Platelets 310 10*3/mm3     Urine Culture - Urine, Urine, Clean Catch [[id number]] Collected: 10/12/22 0251    Specimen: Urine, Clean Catch Updated: 10/13/22 1320     Urine Culture >100,000 CFU/mL Normal Urogenital Neeta    Narrative:      Colonization of the urinary tract without infection is common. Treatment is discouraged unless the patient is symptomatic, pregnant, or undergoing an invasive urologic procedure.    Basic Metabolic Panel [[id number]]  (Abnormal) Collected: 10/13/22 0419    Specimen: Blood Updated: 10/13/22 0551     Glucose 110 mg/dL      BUN 25 mg/dL      Creatinine 1.42 mg/dL      Sodium 136 mmol/L      Potassium 4.3 mmol/L      Chloride 103 mmol/L      CO2 22.0 mmol/L      Calcium 8.7 mg/dL      BUN/Creatinine Ratio 17.6     Anion Gap 11.0 mmol/L      eGFR 34.8 mL/min/1.73      Comment: National Kidney Foundation and American Society of Nephrology (ASN) Task Force recommended calculation based on the Chronic Kidney Disease Epidemiology Collaboration (CKD-EPI) equation refit without adjustment for race.       Narrative:      GFR Normal >60  Chronic Kidney Disease <60  Kidney Failure <15      CBC & Differential [[id number]]  (Abnormal) Collected: 10/13/22 0419    Specimen: Blood Updated: 10/13/22 0523    Narrative:      The following orders were created for panel order CBC & Differential.  Procedure                               Abnormality         Status                     ---------                               -----------         ------                     CBC Auto Differential[[id number]]        Abnormal            Final result                 Please view results for these tests on the individual orders.    CBC Auto Differential [[id number]]   (Abnormal) Collected: 10/13/22 0419    Specimen: Blood Updated: 10/13/22 0523     WBC 17.06 10*3/mm3      RBC 2.67 10*6/mm3      Hemoglobin 8.0 g/dL      Hematocrit 26.4 %      MCV 98.9 fL      MCH 30.0 pg      MCHC 30.3 g/dL      RDW 14.7 %      RDW-SD 53.3 fl      MPV 11.3 fL      Platelets 226 10*3/mm3      Neutrophil % 85.1 %      Lymphocyte % 5.0 %      Monocyte % 9.2 %      Eosinophil % 0.0 %      Basophil % 0.1 %      Immature Grans % 0.6 %      Neutrophils, Absolute 14.50 10*3/mm3      Lymphocytes, Absolute 0.86 10*3/mm3      Monocytes, Absolute 1.57 10*3/mm3      Eosinophils, Absolute 0.00 10*3/mm3      Basophils, Absolute 0.02 10*3/mm3      Immature Grans, Absolute 0.11 10*3/mm3      nRBC 0.0 /100 WBC     Narrative:      Result checked    STAT Lactic Acid, Reflex [[id number]]  (Normal) Collected: 10/12/22 0532    Specimen: Blood Updated: 10/12/22 0645     Lactate 0.9 mmol/L      Comment: Falsely depressed results may occur on samples drawn from patients receiving N-Acetylcysteine (NAC) or Metamizole.       Albert Draw [[id number]] Collected: 10/12/22 0221    Specimen: Blood Updated: 10/12/22 0630    Narrative:      The following orders were created for panel order Albert Draw.  Procedure                               Abnormality         Status                     ---------                               -----------         ------                     Green Top (Gel)[[id number]]                                  Final result               Lavender Top[[id number]]                                     Final result               Gold Top - SST[[id number]]                                   Final result               Gray Top[[id number]]                                         Final result               Light Blue Top[[id number]]                                   Final result                 Please view results for these tests on the individual orders.    Gray Top [[id number]] Collected: 10/12/22 0221    Specimen: Blood  Updated: 10/12/22 0630     Extra Tube Hold for add-ons.     Comment: Auto resulted.       Lactic Acid, Plasma [[id number]]  (Abnormal) Collected: 10/12/22 0221    Specimen: Blood Updated: 10/12/22 0413     Lactate 2.2 mmol/L      Comment: Falsely depressed results may occur on samples drawn from patients receiving N-Acetylcysteine (NAC) or Metamizole.       Narrative:      Confirmed/called    Comprehensive Metabolic Panel [[id number]]  (Abnormal) Collected: 10/12/22 0221    Specimen: Blood Updated: 10/12/22 0350     Glucose 138 mg/dL      BUN 16 mg/dL      Creatinine 1.25 mg/dL      Sodium 141 mmol/L      Potassium 4.0 mmol/L      Comment: Slight hemolysis detected by analyzer. Results may be affected.        Chloride 101 mmol/L      CO2 27.0 mmol/L      Calcium 9.4 mg/dL      Total Protein 8.8 g/dL      Albumin 3.80 g/dL      ALT (SGPT) 6 U/L      AST (SGOT) 27 U/L      Alkaline Phosphatase 53 U/L      Total Bilirubin 0.4 mg/dL      Globulin 5.0 gm/dL      Comment: Calculated Result        A/G Ratio 0.8 g/dL      BUN/Creatinine Ratio 12.8     Anion Gap 13.0 mmol/L      eGFR 40.5 mL/min/1.73      Comment: National Kidney Foundation and American Society of Nephrology (ASN) Task Force recommended calculation based on the Chronic Kidney Disease Epidemiology Collaboration (CKD-EPI) equation refit without adjustment for race.       Narrative:      GFR Normal >60  Chronic Kidney Disease <60  Kidney Failure <15      Lavender Top [[id number]] Collected: 10/12/22 0221    Specimen: Blood Updated: 10/12/22 0330     Extra Tube hold for add-on     Comment: Auto resulted       Gold Top - SST [[id number]] Collected: 10/12/22 0221    Specimen: Blood Updated: 10/12/22 0330     Extra Tube Hold for add-ons.     Comment: Auto resulted.       Light Blue Top [[id number]] Collected: 10/12/22 0221    Specimen: Blood Updated: 10/12/22 0330     Extra Tube Hold for add-ons.     Comment: Auto resulted       Green Top (Gel) [[id number]] Collected:  10/12/22 0221    Specimen: Blood Updated: 10/12/22 0330     Extra Tube Hold for add-ons.     Comment: Auto resulted.       BNP [[id number]]  (Abnormal) Collected: 10/12/22 0221    Specimen: Blood Updated: 10/12/22 0329     proBNP 5,194.0 pg/mL     Narrative:      Among patients with dyspnea, NT-proBNP is highly sensitive for the detection of acute congestive heart failure. In addition NT-proBNP of <300 pg/ml effectively rules out acute congestive heart failure with 99% negative predictive value.    Results may be falsely decreased if patient taking Biotin.      TSH [[id number]]  (Normal) Collected: 10/12/22 0221    Specimen: Blood Updated: 10/12/22 0329     TSH 3.470 uIU/mL     Troponin [[id number]]  (Normal) Collected: 10/12/22 0221    Specimen: Blood Updated: 10/12/22 0329     Troponin T 0.012 ng/mL     Narrative:      Troponin T Reference Range:  <= 0.03 ng/mL-   Negative for AMI  >0.03 ng/mL-     Abnormal for myocardial necrosis.  Clinicians would have to utilize clinical acumen, EKG, Troponin and serial changes to determine if it is an Acute Myocardial Infarction or myocardial injury due to an underlying chronic condition.       Results may be falsely decreased if patient taking Biotin.  Hemolyzed specimen. Testing performed per physician request.SLIGHTHEMOLYSIS    Blood Gas, Venous With Co-Ox [[id number]]  (Abnormal) Collected: 10/12/22 0328    Specimen: Venous Blood Updated: 10/12/22 0328     Site Nurse/Dr Draw     pH, Venous 7.358 pH Units      pCO2, Venous 54.5 mm Hg      Comment: 83 Value above reference range        pO2, Venous 20.9 mm Hg      Comment: 84 Value below reference range        HCO3, Venous 30.6 mmol/L      Base Excess, Venous 4.1 mmol/L      Hemoglobin, Blood Gas 10.4 g/dL      Oxyhemoglobin Venous 26.7 %      Comment: 84 Value below reference range        Methemoglobin Venous 0.5 %      Carboxyhemoglobin Venous 1.0 %      CO2 Content 32.2 mmol/L      Temperature 37.0 C      Barometric Pressure  for Blood Gas --     Comment: N/A        Modality Nasal Cannula     FIO2 36 %      Rate 0 Breaths/minute      PIP 0 cmH2O      Comment: Meter: [id number]     :  [id number]        IPAP 0     EPAP 0     Note --    Magnesium [[id number]]  (Normal) Collected: 10/12/22 0221    Specimen: Blood Updated: 10/12/22 0324     Magnesium 2.1 mg/dL     COVID PRE-OP / PRE-PROCEDURE SCREENING ORDER (NO ISOLATION) - Swab, Nasopharynx [[id number]]  (Abnormal) Collected: 10/12/22 0252    Specimen: Swab from Nasopharynx Updated: 10/12/22 0324    Narrative:      The following orders were created for panel order COVID PRE-OP / PRE-PROCEDURE SCREENING ORDER (NO ISOLATION) - Swab, Nasopharynx.  Procedure                               Abnormality         Status                     ---------                               -----------         ------                     COVID-19 and FLU A/B PCR...[[id number]]  Abnormal            Final result                 Please view results for these tests on the individual orders.    COVID-19 and FLU A/B PCR - Swab, Nasopharynx [[id number]]  (Abnormal) Collected: 10/12/22 0252    Specimen: Swab from Nasopharynx Updated: 10/12/22 0324     COVID19 Detected     Influenza A PCR Not Detected     Influenza B PCR Not Detected    Narrative:      Fact sheet for providers: https://www.fda.gov/media/991502/download    Fact sheet for patients: https://www.fda.gov/media/330475/download    Test performed by PCR.  Influenza A and Influenza B negative results should be considered presumptive in samples that have a positive SARS-CoV-2 result.    Competitive inhibition studies showed that SARS-CoV-2 virus, when present at concentrations above 3.6E+04 copies/mL, can inhibit the detection and amplification of influenza A and influenza B virus RNA if present at or below 1.8E+02 copies/mL or 4.9E+02 copies/mL, respectively, and may lead to false negative influenza virus results. If co-infection with influenza A or  influenza B virus is suspected in samples with a positive SARS-CoV-2 result, the sample should be re-tested with another FDA cleared, approved, or authorized influenza test, if influenza virus detection would change clinical management.    Urinalysis, Microscopic Only - Urine, Clean Catch [[id number]]  (Abnormal) Collected: 10/12/22 0251    Specimen: Urine, Clean Catch Updated: 10/12/22 0312     RBC, UA 3-6 /HPF      WBC, UA Too Numerous to Count /HPF      Bacteria, UA None Seen /HPF      Squamous Epithelial Cells, UA 7-12 /HPF      Hyaline Casts, UA 0-6 /LPF      Methodology Automated Microscopy    Urinalysis With Microscopic If Indicated (No Culture) - Urine, Clean Catch [[id number]]  (Abnormal) Collected: 10/12/22 0251    Specimen: Urine, Clean Catch Updated: 10/12/22 0312     Color, UA Yellow     Appearance, UA Clear     pH, UA 6.5     Specific Gravity, UA 1.018     Glucose, UA Negative     Ketones, UA Negative     Bilirubin, UA Negative     Blood, UA Negative     Protein, UA Trace     Leuk Esterase, UA Large (3+)     Nitrite, UA Positive     Urobilinogen, UA 0.2 E.U./dL    CBC & Differential [[id number]]  (Abnormal) Collected: 10/12/22 0221    Specimen: Blood Updated: 10/12/22 0301    Narrative:      The following orders were created for panel order CBC & Differential.  Procedure                               Abnormality         Status                     ---------                               -----------         ------                     CBC Auto Differential[[id number]]        Abnormal            Final result                 Please view results for these tests on the individual orders.    CBC Auto Differential [[id number]]  (Abnormal) Collected: 10/12/22 0221    Specimen: Blood Updated: 10/12/22 0301     WBC 14.67 10*3/mm3      RBC 3.37 10*6/mm3      Hemoglobin 10.3 g/dL      Hematocrit 33.3 %      MCV 98.8 fL      MCH 30.6 pg      MCHC 30.9 g/dL      RDW 14.6 %      RDW-SD 53.4 fl      MPV 11.2 fL       Platelets 312 10*3/mm3      Neutrophil % 75.5 %      Lymphocyte % 14.2 %      Monocyte % 8.2 %      Eosinophil % 1.2 %      Basophil % 0.4 %      Immature Grans % 0.5 %      Neutrophils, Absolute 11.09 10*3/mm3      Lymphocytes, Absolute 2.08 10*3/mm3      Monocytes, Absolute 1.20 10*3/mm3      Eosinophils, Absolute 0.17 10*3/mm3      Basophils, Absolute 0.06 10*3/mm3      Immature Grans, Absolute 0.07 10*3/mm3      nRBC 0.0 /100 WBC           Imaging Results (Last 72 Hours)     Procedure Component Value Units Date/Time    XR Chest 1 View [[id number]] Collected: 10/13/22 1123     Updated: 10/13/22 1128    Narrative:      DATE OF EXAM: 10/13/2022 11:00 AM     PROCEDURE: XR CHEST 1 VW-     INDICATIONS: discomfort with breathing; I50.23-Acute on chronic systolic  (congestive) heart failure; J96.01-Acute respiratory failure with  hypoxia; N39.0-Urinary tract infection, site not specified;  R13.12-Dysphagia, oropharyngeal phase     COMPARISON: 10/12/2022     TECHNIQUE: Single radiographic AP view of the chest was obtained.     FINDINGS:  Heart is borderline enlarged. There is a persistent diffuse interstitial  disease pattern resembling pulmonary edema, similar in overall extent to  the prior exam. Interstitial edema appears slightly improved in the left  midlung, mildly increased in the right apex and base. There are small  effusions. No pneumothorax or dense lung consolidation are seen.        Impression:      Persistent appearance of pulmonary interstitial edema similar overall to  yesterday's exam.     This report was finalized on 10/13/2022 11:25 AM by Dr. Jan Alvarez MD.       FL Video Swallow With Speech Single Contrast [[id number]] Collected: 10/12/22 1452     Updated: 10/12/22 1644    Narrative:      EXAMINATION: FL VIDEO SWALLOW W SPEECH SINGLE-CONTRAST-     INDICATION: DYSPHAGIA, OROPHARYNGEAL, HAS ATTRIBUTABLE CAUSE     TECHNIQUE: 1 minute and 42 seconds of fluoroscopic time was used for  this exam. Tenderness  "associated of fluoroscopic loops are saved. The  patient was evaluated in the seated lateral position while taking a  variety of consistencies of barium by mouth under the direction of  speech pathology.     COMPARISON: NONE     FINDINGS: 1 minute and 42 seconds of fluoroscopy provided for a modified  barium swallow. Please see speech therapy report for full details and  recommendations.          Impression:      Fluoroscopy provided for a modified barium swallow. Please  see speech therapy report for full details and recommendations.         This report was finalized on 10/12/2022 4:41 PM by Saúl Benites.       XR Chest 1 View [[id number]] Collected: 10/12/22 0303     Updated: 10/12/22 0305    Narrative:      FRONTAL VIEW OF THE CHEST    CLINICAL INDICATION: Shortness of breath    COMPARISON: 2022.    FINDINGS:     Lines: None    Increased interstitial markings and prominence of the pulmonary vasculature. Trace bilateral pleural effusions. No pneumothorax. Cardiomediastinal morphology is mildly enlarged.  Osseous structures are unremarkable.        Impression:      Findings suggestive of pulmonary edema, similar to prior.    Electronically signed by:  Shira Michele D.O.    10/12/2022 1:04 AM Mountain Time        Operative/Procedure Notes (last 72 hours)  Notes from 10/11/22 1306 through 10/14/22 1306   No notes of this type exist for this encounter.            Physician Progress Notes (last 72 hours)      Kimberli Mcdermott MD at 10/13/22 0930              [facility] Medicine Services  PROGRESS NOTE    Patient Name: Scott Diego  : 4/10/1930  MRN: [id number]    Date of Admission: 10/12/2022  Primary Care Physician: Ngozi Davis DO    Subjective   Subjective     CC:  Acute wheezing     HPI:  Acutely ill this morning - \"I'm sick, I'm so sick\".  Very nauseous, but denies abd pain or chest pain/pressure.  Is dyspneic.  This morning her HR jumped from 90 to 150.      Addendum:  " "After metoprolol 5mg IV x 1, HR is 116, pt still dyspneic and wheezing.  Treat with atrovent.  Lasix x 1 dose.  CXR reviewed.     ROS: -  Limited by dementia and acute nausea  Zofran did not help; benzo x 1 did not help.         Objective   Objective     Vital Signs:   Temp:  [97.8 °F (36.6 °C)-97.9 °F (36.6 °C)] 97.9 °F (36.6 °C)  Heart Rate:  [78-90] 90  Resp:  [16-18] 18  BP: ()/(58-79) 125/79  Flow (L/min):  [3] 3     Physical Exam:  Constitutional: Awake, in bed, distressed, \"I'm sick\".  EKG in progress.   Eyes: PER, no conjunctival injection  HENT: NCAT, mucous membranes moist  Neck: Supple,  trachea midline  Respiratory: rapid shallow resps.  Clear anteriorly.  On 5L, sat 89%   Cardiovascular: tachy 150, regular  Tele:  Wide complex regular   Gastrointestinal: Positive bowel sounds, soft, nondistended, no rebound   Musculoskeletal: No bilateral ankle edema, no clubbing or cyanosis to extremities  Neurologic: alert, moves all extremities, Cranial Nerves grossly intact to confrontation, speech clear  Skin: No rashes      Results Reviewed:  LAB RESULTS:      Lab 10/13/22  0419 10/12/22  0532 10/12/22  0221 10/08/22  1649   WBC 17.06*  --  14.67* 7.85   HEMOGLOBIN 8.0*  --  10.3* 10.6*   HEMATOCRIT 26.4*  --  33.3* 34.5   PLATELETS 226  --  312 307   NEUTROS ABS 14.50*  --  11.09* 5.17   IMMATURE GRANS (ABS) 0.11*  --  0.07* 0.02   LYMPHS ABS 0.86  --  2.08 1.54   MONOS ABS 1.57*  --  1.20* 0.85   EOS ABS 0.00  --  0.17 0.21   MCV 98.9*  --  98.8* 100.0*   LACTATE  --  0.9 2.2* 1.4         Lab 10/13/22  0419 10/12/22  0221 10/08/22  1649   SODIUM 136 141 140   POTASSIUM 4.3 4.0 4.2   CHLORIDE 103 101 102   CO2 22.0 27.0 25.0   ANION GAP 11.0 13.0 13.0   BUN 25* 16 21   CREATININE 1.42* 1.25* 1.29*   EGFR 34.8* 40.5* 39.0*   GLUCOSE 110* 138* 104*   CALCIUM 8.7 9.4 9.5   MAGNESIUM  --  2.1  --    TSH  --  3.470  --          Lab 10/12/22  0221 10/08/22  1649   TOTAL PROTEIN 8.8* 8.3   ALBUMIN 3.80 3.90 "   GLOBULIN 5.0 4.4   ALT (SGPT) 6 5   AST (SGOT) 27 16   BILIRUBIN 0.4 0.6   ALK PHOS 53 53   LIPASE  --  35         Lab 10/12/22  0221   PROBNP 5,194.0*   TROPONIN T 0.012                 Lab 10/12/22  0328   FIO2 36   CARBOXYHEMOGLOBIN (VENOUS) 1.0     Brief Urine Lab Results  (Last result in the past 365 days)      Color   Clarity   Blood   Leuk Est   Nitrite   Protein   CREAT   Urine HCG        10/12/22 0251 Yellow   Clear   Negative   Large (3+)   Positive   Trace                 Microbiology Results Abnormal     Procedure Component Value - Date/Time    Blood Culture - Blood, Arm, Right [[id number]]  (Normal) Collected: 10/12/22 0347    Lab Status: Preliminary result Specimen: Blood from Arm, Right Updated: 10/13/22 0400     Blood Culture No growth at 24 hours    Blood Culture - Blood, Arm, Right [[id number]]  (Normal) Collected: 10/12/22 0347    Lab Status: Preliminary result Specimen: Blood from Arm, Right Updated: 10/13/22 0400     Blood Culture No growth at 24 hours          FL Video Swallow With Speech Single Contrast    Result Date: 10/12/2022  EXAMINATION: FL VIDEO SWALLOW W SPEECH SINGLE-CONTRAST-  INDICATION: DYSPHAGIA, OROPHARYNGEAL, HAS ATTRIBUTABLE CAUSE  TECHNIQUE: 1 minute and 42 seconds of fluoroscopic time was used for this exam. Tenderness associated of fluoroscopic loops are saved. The patient was evaluated in the seated lateral position while taking a variety of consistencies of barium by mouth under the direction of speech pathology.  COMPARISON: NONE  FINDINGS: 1 minute and 42 seconds of fluoroscopy provided for a modified barium swallow. Please see speech therapy report for full details and recommendations.       Impression: Fluoroscopy provided for a modified barium swallow. Please see speech therapy report for full details and recommendations.    This report was finalized on 10/12/2022 4:41 PM by Saúl Benites.      XR Chest 1 View    Result Date: 10/12/2022  FRONTAL VIEW OF THE CHEST  CLINICAL INDICATION: Shortness of breath COMPARISON: October 8, 2022. FINDINGS: Lines: None Increased interstitial markings and prominence of the pulmonary vasculature. Trace bilateral pleural effusions. No pneumothorax. Cardiomediastinal morphology is mildly enlarged.  Osseous structures are unremarkable.     Impression: Findings suggestive of pulmonary edema, similar to prior. Electronically signed by:  Shira Michele D.O.  10/12/2022 1:04 AM Mountain Time      Results for orders placed during the hospital encounter of 09/05/22    Adult Transthoracic Echo Complete w/ Color, Spectral and Contrast if Necessary Per Protocol    Interpretation Summary  · Left ventricular ejection fraction appears to be 21 - 25%. Left ventricular systolic function is severely decreased.  · Left ventricular diastolic function is consistent with (grade II w/high LAP) pseudonormalization.  · There is a small (<1cm) pericardial effusion.  · There is a left pleural effusion. There is a right pleural effusion.  · Moderate mitral valve regurgitation is present.  · Moderate to severe tricuspid valve regurgitation is present.  · Estimated right ventricular systolic pressure from tricuspid regurgitation is markedly elevated (>55 mmHg). Calculated right ventricular systolic pressure from tricuspid regurgitation is 56 mmHg.      I have reviewed the medications:  Scheduled Meds:artificial tears, 1 application, Both Eyes, Daily  aspirin, 81 mg, Oral, Daily  cefTRIAXone, 1 g, Intravenous, Q24H  doxycycline, 100 mg, Intravenous, Q12H  gabapentin, 100 mg, Oral, Nightly  heparin (porcine), 5,000 Units, Subcutaneous, Q12H  levothyroxine, 50 mcg, Oral, Q AM  memantine, 5 mg, Oral, BID  metoprolol succinate XL, 25 mg, Oral, Daily  pantoprazole, 40 mg, Oral, BID  senna-docusate sodium, 2 tablet, Oral, BID  sodium chloride, 10 mL, Intravenous, Q12H      Continuous Infusions:   PRN Meds:.•  acetaminophen **OR** acetaminophen **OR** acetaminophen  •  ALPRAZolam  •   senna-docusate sodium **AND** polyethylene glycol **AND** bisacodyl **AND** bisacodyl  •  [] ipratropium-albuterol **FOLLOWED BY** ipratropium-albuterol  •  ondansetron **OR** ondansetron  •  polyvinyl alcohol  •  sodium chloride  •  sodium chloride    Assessment & Plan   Assessment & Plan     Active Hospital Problems    Diagnosis  POA   • **Acute respiratory failure with hypoxia (HCC) [J96.01]  Yes   • Respiratory failure (HCC) [J96.90]  Yes   • Anxiety [F41.9]  Yes   • Hypothyroid [E03.9]  Yes   • Chronic systolic heart failure (HCC) [I50.22]  Yes   • History of CVA (cerebrovascular accident) [Z86.73]  Not Applicable   • Dementia (HCC) [F03.90]  Yes   • Hypertension [I10]  Yes      Resolved Hospital Problems   No resolved problems to display.        Brief Hospital Course to date:  Scott Diego is a 89 y.o. female with severe systolic CHF, dementia, recent admission for Covid-19, and known ongoing GI complaints/nausea x over a month.  She is followed by GI outpatient.  She just started macrobid for a UTI.  Brought in by son for acute dyspnea and wheezing.      Assessment/plan       New:  Tachycardia  History of Afib  - wide complex, acute onset:  Suspect AF/RVR paroxysmal  - EKG noted.  - /90  - give metoprolol 5mg x1  - dyspnea worse:  Check CXR, consider CHF     Nausea, persistent/recurrent  - worse today  - did not respond to zofran or xanax  - phenergan x 1   -  GI clinic recently, PPI BID, plan for EGD -   - consider inpt GI for EGD here     Anemia  - hgb 8.0 diown from 10.3 but note that 9.5 was her prev baseline.    - given nausea and tachycardia, consider UGI bleed  - recheck at 1600     Acute hypoxic respiratory failure, suspect aspiration episode pta   Temp to 100.7 on admission   - Ongoing GI issues, potential aspiration of coffee before bed vs reflux of stomach contents  - O2 support as needed,  Now 5-6L but shallow resps    -Empiric coverage w/ CTX/doxy for aspiration - day 2  -SLP  eval:  Puree and honey thickened      Recent Covid-19 infection  -Tested positive 22; she does not require any further enhanced isolation     Recent Dx UTI  -Took a single dose of recently prescribed Macrobid  -UrCx from July during ED visit w/ MRSA?  -add Cx to urine in lab, CTX as above should cover majority of urinary pathogens      Chronic severe HFrEF (EF 21-25%), compensated  Hx prolonged QT  - Transient episode of A. fib last hospitalization, but spontaneously resolved, seen by cardiology who did not recommend anticoagulation due to brevity of episode  -cont Toprol-XL, other guideline directed medications limited 2/2 renal disease     CKD 3b  -Bline Cr 1.2-1.5; eGFR 30s  -At approximate baseline, renal dosing med     Alzheimer dementia  - Home Namenda     Anxiety  Hypothyroidism  -Home Xanax at reduced frequency, home Synthroid       Expected Discharge Location and Transportation: home w son, vs SNF  Expected Discharge Date: tbd    DVT prophylaxis:  Medical DVT prophylaxis orders are present.     AM-PAC 6 Clicks Score (PT): 18 (10/12/22 1120)    CODE STATUS:   Code Status and Medical Interventions:   Ordered at: 10/12/22 0414     Medical Intervention Limits:    NO intubation (DNI)     Level Of Support Discussed With:    Next of Kin (If No Surrogate)    Patient     Code Status (Patient has no pulse and is not breathing):    No CPR (Do Not Attempt to Resuscitate)     Medical Interventions (Patient has pulse or is breathing):    Limited Support     Comments:    Likely would not wish for feeding tube either       Kimberli Mcdermott MD  10/13/22                Electronically signed by Kimberli Mcdermott MD at 10/13/22 1209     Kimberli Mcdermott MD at 10/12/22 0913              [facility] Medicine Services  PROGRESS NOTE    Patient Name: Scott Diego  : 4/10/1930  MRN: [id number]    Date of Admission: 10/12/2022  Primary Care Physician: Ngozi Davis DO    Subjective   Subjective     CC:   "Acute wheezing     HPI:  Oxygen suppl was 4L on admission; has been turned down to 3L. Currently being helped into a wheelchair to go to swallowing study.  Sheis standing w minimal support, having a little diarrhea.  Says her breathing is \"not good\".     ROS: - limited by memory deficit  Gen- No fevers, chills  CV- No chest pain, palpitations  GI- No N/V, abd pain        Objective   Objective     Vital Signs:   Temp:  [98.2 °F (36.8 °C)-100.7 °F (38.2 °C)] 98.2 °F (36.8 °C)  Heart Rate:  [] 86  Resp:  [16-24] 16  BP: (111-129)/(60-92) 111/60  Flow (L/min):  [4] 4     Physical Exam:  Constitutional: Awake, alert, standing in room, walks a step or two w min assist and sits in w/c.  Conversant and pleasant  Eyes: PER, no conjunctival injection  HENT: NCAT, mucous membranes moist  Neck: Supple,  trachea midline  Respiratory: sl decreased at both bases, ? Mild crackles L base.   Cardiovascular: RRR, no murmurs  Gastrointestinal: Positive bowel sounds, soft, nontender, nondistended  Musculoskeletal: No bilateral ankle edema, no clubbing or cyanosis to extremities  Psychiatric: Appropriate affect, cooperative  Neurologic: Oriented x 3, strength symmetric in all extremities, Cranial Nerves grossly intact to confrontation, speech clear  Skin: No rashes      Results Reviewed:  LAB RESULTS:      Lab 10/12/22  0532 10/12/22  0221 10/08/22  1649   WBC  --  14.67* 7.85   HEMOGLOBIN  --  10.3* 10.6*   HEMATOCRIT  --  33.3* 34.5   PLATELETS  --  312 307   NEUTROS ABS  --  11.09* 5.17   IMMATURE GRANS (ABS)  --  0.07* 0.02   LYMPHS ABS  --  2.08 1.54   MONOS ABS  --  1.20* 0.85   EOS ABS  --  0.17 0.21   MCV  --  98.8* 100.0*   LACTATE 0.9 2.2* 1.4         Lab 10/12/22  0221 10/08/22  1649   SODIUM 141 140   POTASSIUM 4.0 4.2   CHLORIDE 101 102   CO2 27.0 25.0   ANION GAP 13.0 13.0   BUN 16 21   CREATININE 1.25* 1.29*   EGFR 40.5* 39.0*   GLUCOSE 138* 104*   CALCIUM 9.4 9.5   MAGNESIUM 2.1  --    TSH 3.470  --          Lab " 10/12/22  0221 10/08/22  1649   TOTAL PROTEIN 8.8* 8.3   ALBUMIN 3.80 3.90   GLOBULIN 5.0 4.4   ALT (SGPT) 6 5   AST (SGOT) 27 16   BILIRUBIN 0.4 0.6   ALK PHOS 53 53   LIPASE  --  35         Lab 10/12/22  0221   PROBNP 5,194.0*   TROPONIN T 0.012                 Lab 10/12/22  0328   FIO2 36   CARBOXYHEMOGLOBIN (VENOUS) 1.0     Brief Urine Lab Results  (Last result in the past 365 days)      Color   Clarity   Blood   Leuk Est   Nitrite   Protein   CREAT   Urine HCG        10/12/22 0251 Yellow   Clear   Negative   Large (3+)   Positive   Trace                 Microbiology Results Abnormal     None          XR Chest 1 View    Result Date: 10/12/2022  FRONTAL VIEW OF THE CHEST CLINICAL INDICATION: Shortness of breath COMPARISON: October 8, 2022. FINDINGS: Lines: None Increased interstitial markings and prominence of the pulmonary vasculature. Trace bilateral pleural effusions. No pneumothorax. Cardiomediastinal morphology is mildly enlarged.  Osseous structures are unremarkable.     Impression: Findings suggestive of pulmonary edema, similar to prior. Electronically signed by:  Shira Michele D.O.  10/12/2022 1:04 AM Mountain Time      Results for orders placed during the hospital encounter of 09/05/22    Adult Transthoracic Echo Complete w/ Color, Spectral and Contrast if Necessary Per Protocol    Interpretation Summary  · Left ventricular ejection fraction appears to be 21 - 25%. Left ventricular systolic function is severely decreased.  · Left ventricular diastolic function is consistent with (grade II w/high LAP) pseudonormalization.  · There is a small (<1cm) pericardial effusion.  · There is a left pleural effusion. There is a right pleural effusion.  · Moderate mitral valve regurgitation is present.  · Moderate to severe tricuspid valve regurgitation is present.  · Estimated right ventricular systolic pressure from tricuspid regurgitation is markedly elevated (>55 mmHg). Calculated right ventricular systolic  pressure from tricuspid regurgitation is 56 mmHg.      I have reviewed the medications:  Scheduled Meds:artificial tears, 1 application, Both Eyes, Daily  aspirin, 81 mg, Oral, Daily  cefTRIAXone, 1 g, Intravenous, Q24H  doxycycline, 100 mg, Intravenous, Q12H  gabapentin, 100 mg, Oral, Nightly  heparin (porcine), 5,000 Units, Subcutaneous, Q12H  ipratropium-albuterol, 3 mL, Nebulization, 4x Daily - RT  levothyroxine, 50 mcg, Oral, Q AM  memantine, 5 mg, Oral, BID  metoprolol succinate XL, 25 mg, Oral, Daily  pantoprazole, 40 mg, Oral, BID  senna-docusate sodium, 2 tablet, Oral, BID  sodium chloride, 10 mL, Intravenous, Q12H      Continuous Infusions:sodium chloride, 50 mL/hr, Last Rate: 50 mL/hr (10/12/22 0447)      PRN Meds:.•  acetaminophen **OR** acetaminophen **OR** acetaminophen  •  ALPRAZolam  •  senna-docusate sodium **AND** polyethylene glycol **AND** bisacodyl **AND** bisacodyl  •  ipratropium-albuterol **FOLLOWED BY** [START ON 10/13/2022] ipratropium-albuterol  •  ondansetron **OR** ondansetron  •  polyvinyl alcohol  •  sodium chloride  •  sodium chloride    Assessment & Plan   Assessment & Plan     Active Hospital Problems    Diagnosis  POA   • **Acute respiratory failure with hypoxia (HCC) [J96.01]  Yes   • Anxiety [F41.9]  Yes   • Hypothyroid [E03.9]  Yes   • Chronic systolic heart failure (HCC) [I50.22]  Yes   • History of CVA (cerebrovascular accident) [Z86.73]  Not Applicable   • Dementia (HCC) [F03.90]  Yes   • Hypertension [I10]  Yes      Resolved Hospital Problems   No resolved problems to display.        Brief Hospital Course to date:  Scott Diego is a 89 y.o. female with severe systolic CHF, dementia, recent admission for Covid-19, and known ongoing GI complaints/nausea x over a month.  She is followed by GI outpatient.  She just started macrobid for a UTI.  Brought in by son for acute dyspnea and wheezing.      Assessment/plan     Acute hypoxic respiratory failure, suspect aspiration  Temp  to 100.7 on admission   - Ongoing GI issues, potential aspiration of coffee before bed vs reflux of stomach contents  - Doubt CHF, proBNP significantly decreased compared to 1 mo ago; also historically poor p.o. intake and has been taking her Lasix  -DuoNeb scheduled today followed by prn  - O2 support as needed,  4 L/min NC at time of admission, now 3L   -Empiric coverage w/ CTX/doxy for aspiration - day 1  -SLP eval     Recent Covid-19 infection  -Tested positive 9/5/22; she does not require any further enhanced isolation     Recent Dx UTI  -Took a single dose of recently prescribed Macrobid  -UrCx from July during ED visit w/ MRSA?  -add Cx to urine in lab, CTX as above should cover majority of urinary pathogens     Subacute persistent nausea  Chronic constipation  -Seen by GI last admission, empiric trial of gingerroot and management of constipation  - Son at bedside reports GI appointment yesterday, PPI inc to bid and scheduled for EGD next  - Zofran prn  -Bowel regimen     Chronic severe HFrEF (EF 21-25%), compensated  Hx prolonged QT  - Transient episode of A. fib last hospitalization, but spontaneously resolved, seen by cardiology who did not recommend anticoagulation due to brevity of episode  -cont Toprol-XL, other guideline directed medications limited 2/2 renal disease     CKD 3b  -Bline Cr 1.2-1.5; eGFR 30s  -At approximate baseline, renal dosing med     Alzheimer dementia  - Home Namenda     Anxiety  Hypothyroidism  -Home Xanax at reduced frequency, home Synthroid       Expected Discharge Location and Transportation: home w son, vs SNF  Expected Discharge Date: tbd    DVT prophylaxis:  Medical DVT prophylaxis orders are present.          CODE STATUS:   Code Status and Medical Interventions:   Ordered at: 10/12/22 0414     Medical Intervention Limits:    NO intubation (DNI)     Level Of Support Discussed With:    Next of Kin (If No Surrogate)    Patient     Code Status (Patient has no pulse and is not  breathing):    No CPR (Do Not Attempt to Resuscitate)     Medical Interventions (Patient has pulse or is breathing):    Limited Support     Comments:    Likely would not wish for feeding tube either       Kimberli Mcdermott MD  10/12/22                Electronically signed by Kimberli Mcdermott MD at 10/12/22 6211

## 2022-10-14 NOTE — PLAN OF CARE
Goal Outcome Evaluation:  Plan of Care Reviewed With: patient    Pt is alert and oriented x1 to self only, confusion X3. Anxious at start of this shift, given PRN Xanax. VSS. Lung sounds noted diminished. 02 at 3 LPM, Sp02 noted  95%, no c/o pain at this time.      Progress: improving

## 2022-10-14 NOTE — CASE MANAGEMENT/SOCIAL WORK
Continued Stay Note  [facility]     Patient Name: Scott Diego  MRN: [id number]  Today's Date: 10/14/2022    Admit Date: 10/12/2022    Plan: Home with HH   Discharge Plan     Row Name 10/14/22 1503       Plan    Plan Home with     Plan Comments Patient's plan remains home with [facility].  Resume orders in Epic for PT, OT, and nursing.   will continue to follow and notify HH when patient discharges.               Discharge Codes    No documentation.               Expected Discharge Date and Time     Expected Discharge Date Expected Discharge Time    Oct 14, 2022             Audrey Sanabria RN

## 2022-10-14 NOTE — PROGRESS NOTES
"                  Clinical Nutrition     Nutrition Assessment  Reason for Visit:   Identified at risk by screening criteria, Malnutrition Severity Assessment      Patient Name: Scott Diego  YOB: 1930  MRN: [id number]  Date of Encounter: 10/14/22 18:52 EDT  Admission date: 10/12/2022      Comments:   Pt qualifies for non severe chronic malnutrition based on wasting See flowsheet note.    Admission Diagnosis    Acute on chronic systolic congestive heart failure (HCC) [I50.23]  Respiratory failure (HCC) [J96.90]     Hospital Problem List    Acute respiratory failure with hypoxia (HCC)    Hypertension    History of CVA (cerebrovascular accident)    Dementia (HCC)    Chronic systolic heart failure (HCC)    Hypothyroid    Anxiety    Respiratory failure (HCC)    Acute on chronic systolic congestive heart failure (HCC)   New A fib   Anemia   UTI   Hypothyroid   CKD III   Chr consitpation    Other Applicable: hx \"malignant neoplasm\", \"stroke\"    Applicable Interval History:  10/12 and 14 SLP rec Dys III diet Honey thick liquid    Applicable PMH/PSxH:     PMH: She  has a past medical history of Anxiety, Congestive heart failure (HCC), Coronary artery disease, Dementia (HCC), Depression, Hyperlipidemia, Malignant neoplasm (HCC), Memory loss, Peripheral neuropathy, Stroke (HCC), and Systolic heart failure (HCC) (09/25/2017).   PSxH: She  has a past surgical history that includes Cholecystectomy; Hysterectomy; Eye surgery; and Cardiac catheterization.         Diet/Nutrition Related History:     Family allow pt will use suppl. At home had been able to eat fish sandwich in small bites, oatmeal.       Labs reviewed   Yes      Medications reviewed   Yes  Abx, Protoix, Pericolace        Intake/Ouptut 24 hrs reviewed   Yes  Intake & Output (last day)       10/13 0701  10/14 0700 10/14 0701  10/15 0700    P.O.  200    Total Intake(mL/kg)  200 (3.6)    Urine (mL/kg/hr) 1950 (1.5) 400 (0.6)    Total Output 1950 400    " "Net -1950 -200              No stool recorded    Anthropometrics     Admission Height 162.6 cm (64\") Documented at 10/12/2022 0206   Admission Weight 53.5 kg (118 lb) Documented at 10/12/2022 0206       Height: 162.6 cm (64\")    Last filed wt: Weight: 55.2 kg (121 lb 9.6 oz) (10/14/22 0401)  Weight Method: Bed scale    BMI: BMI (Calculated): 20.9  Normal: 18.5-24.9kg/m2    Ideal Body Weight (IBW) (kg): 55    Weight Change   UBW:rpt of 118 lbs on 10/10 and of 123 lbs on 8/22 per family has been as low as 113 lbs - varies  Weight change: bt 8/22 and 10/10 5 lbs  % wt change: 4%   Time frame of weight loss: 1 1/2 mo      Nutrition Focused Physical Exam  Date:10/14     Pt qualifies for non severe chronic malnutrition based on wasting See flowsheet note.    Current Nutrition Prescription     PO: Diet Dysphagia; III - Pureed With Some Mashed; Honey Thick; No Straws, Other; small drinks; Cardiac  Orders Placed This Encounter      Dietary Nutrition Supplements Boost Plus; strawberry  3x/da added per RD    Intake: insuffic data \"bites\" of 2 meals recorded    Nutrition Diagnosis     10/14  Problem Malnutrition  non severe chronic   Etiology Alt GI fx   Signs/Symptoms Wasting   Status:      Goal:   General: Nutrition to support treatment  PO: Establish PO  Additional goals:      Nutrition Intervention     Follow treatment progress, Care plan reviewed, Advise alternate selection, Menu provided, Supplement provided      Monitoring/Evaluation:   Per protocol, I&O, PO intake, Supplement intake, Pertinent labs, Weight, GI status, Symptoms, Swallow function        Kemi Ramirez RD,   Time Spent: 30 min      "

## 2022-10-14 NOTE — THERAPY TREATMENT NOTE
Acute Care - Speech Language Pathology   Swallow Treatment Note Psychiatric     Patient Name: Scott Diego  : 4/10/1930  MRN: [id number]  Today's Date: 10/14/2022               Admit Date: 10/12/2022    Visit Dx:     ICD-10-CM ICD-9-CM   1. Acute on chronic systolic congestive heart failure (HCC)  I50.23 428.23     428.0   2. Acute respiratory failure with hypoxia (HCC)  J96.01 518.81   3. Urinary tract infection without hematuria, site unspecified  N39.0 599.0   4. Oropharyngeal dysphagia  R13.12 787.22     Patient Active Problem List   Diagnosis   • Idiopathic peripheral neuropathy   • Neck pain   • Mild cognitive impairment   • Anemia   • Hypertension   • Dehydration   • History of CVA (cerebrovascular accident)   • Dementia (HCC)   • Left bundle branch block   • Orthostatic dizziness   • Chronic systolic heart failure (HCC)   • Normocytic anemia   • Dyspnea   • Hypoxia   • Hypothyroid   • Pericardial effusion   • Acute respiratory failure with hypoxia (HCC)   • Right lower lobe pneumonia   • Ventricular ectopy   • Migraine without aura and without status migrainosus, not intractable   • Acute hypoxemic respiratory failure due to COVID-19 (HCC)   • Dyspnea due to COVID-19   • COVID-19 virus detected   • Encephalopathy due to COVID-19 virus   • Acute on chronic congestive heart failure (HCC)   • Anxiety   • Bilateral hearing loss   • Impaired functional mobility, balance, gait, and endurance   • Nausea without vomiting   • Hypoxia   • Respiratory failure (HCC)   • Acute on chronic systolic congestive heart failure (HCC)     Past Medical History:   Diagnosis Date   • Anxiety    • Congestive heart failure (HCC)    • Coronary artery disease    • Dementia (HCC)    • Depression    • Hyperlipidemia    • Malignant neoplasm (HCC)    • Memory loss    • Peripheral neuropathy    • Stroke (HCC)     mini stroke   • Systolic heart failure (HCC) 2017    Chronic/compensated (EF 25%)     Past Surgical History:    Procedure Laterality Date   • CARDIAC CATHETERIZATION     • CHOLECYSTECTOMY     • EYE SURGERY     • HYSTERECTOMY         SLP Recommendation and Plan     SLP Diet Recommendation: puree with some mashed, honey thick liquids (10/14/22 1135)  Recommended Precautions and Strategies: upright posture during/after eating, small bites of food and sips of liquid, no straw, general aspiration precautions, reflux precautions (10/14/22 1135)  SLP Rec. for Method of Medication Administration: meds whole, with pudding or applesauce, as tolerated (10/14/22 1135)     Monitor for Signs of Aspiration: yes, notify SLP if any concerns (10/14/22 1135)        Anticipated Discharge Disposition (SLP): unknown, anticipate therapy at next level of care (10/14/22 1135)     Therapy Frequency (Swallow): 5 days per week (10/14/22 1135)  Predicted Duration Therapy Intervention (Days): until discharge (10/14/22 1135)        Daily Summary of Progress (SLP): progress toward functional goals as expected (10/14/22 1135)               Treatment Assessment (SLP): Pt lethargic but able to accept PO trials. No overt s/s of aspiration w/ HTL or pudding thick consistencies. Difficult to complete all exercises 2/2 fatigue, focused on CTAR and hard effortful swallow. SLP will cont to f/u for tx (10/14/22 1135)  Plan for Continued Treatment (SLP): continue treatment per plan of care (10/14/22 1135)                SWALLOW EVALUATION (last 72 hours)     SLP Adult Swallow Evaluation     Row Name 10/14/22 1135 10/12/22 1415 10/12/22 0955             Rehab Evaluation    Document Type -- evaluation  -RD evaluation  -RD      Subjective Information no complaints  -MH no complaints  -RD no complaints  -RD      Patient Observations lethargic  -MH alert;cooperative;agree to therapy  -RD alert;cooperative;agree to therapy  -RD      Patient/Family/Caregiver Comments/Observations No family present  -MH none  -RD none  -RD      Patient Effort good  -MH good  -RD good  -RD       Symptoms Noted During/After Treatment none  - -- --         General Information    Patient Profile Reviewed -- yes  -RD yes  -RD      Pertinent History Of Current Problem -- Adm w/ A/C systolic CHF, hx recent COVID-19, respiratory insufficiency, HTN, hx CVA, dementia, MCI, hypothyroid, hearing loss. Consult for swallowing given concern for ? aspiration or reflux of coffee before bed resulting in resp issues per chart.  -RD Adm w/ A/C systolic CHF, hx recent COVID-19, respiratory insufficiency, HTN, hx CVA, dementia, MCI, hypothyroid, hearing loss. Consult for swallowing given concern for ? aspiration or reflux of coffee before bed resulting in resp issues per chart.  -RD      Current Method of Nutrition -- mechanical soft, no mixed consistencies;nectar/syrup-thick liquids  -RD NPO  -RD      Precautions/Limitations, Vision -- WFL with corrective lenses;for purposes of eval  -RD WFL with corrective lenses;for purposes of eval  -RD      Precautions/Limitations, Hearing -- hearing impairment, bilaterally  -RD hearing impairment, bilaterally  -RD      Prior Level of Function-Communication -- cognitive-linguistic impairment;other (see comments)  dementia, MCI, previous CVA  -RD cognitive-linguistic impairment;other (see comments)  dementia, MCI, prior CVA  -RD      Prior Level of Function-Swallowing -- no diet consistency restrictions;esophageal concerns;other (see comments)  issue w/ drinking coffee causing resp issues  -RD no diet consistency restrictions;other (see comments)  per pt  -RD      Plans/Goals Discussed with -- patient;agreed upon  -RD patient;agreed upon  -RD      Barriers to Rehab -- none identified  -RD none identified  -RD      Patient's Goals for Discharge -- eat/drink without coughing/choking  -RD eat/drink without coughing/choking  -RD         Pain    Additional Documentation Pain Scale: FACES Pre/Post-Treatment (Group)  - Pain Scale: Numbers Pre/Post-Treatment (Group)  -RD Pain Scale: Numbers  Pre/Post-Treatment (Group)  -RD         Pain Scale: Numbers Pre/Post-Treatment    Pretreatment Pain Rating -- 0/10 - no pain  -RD 0/10 - no pain  -RD      Posttreatment Pain Rating -- 0/10 - no pain  -RD 0/10 - no pain  -RD         Pain Scale: FACES Pre/Post-Treatment    Pain: FACES Scale, Pretreatment 0-->no hurt  -MH -- --      Posttreatment Pain Rating 0-->no hurt  -MH -- --         Oral Motor Structure and Function    Dentition Assessment -- -- edentulous, dentures not available  -RD      Secretion Management -- -- WNL/WFL  -RD      Mucosal Quality -- -- moist, healthy  -RD      Volitional Swallow -- -- delayed  -RD      Volitional Cough -- -- WFL  -RD         Oral Musculature and Cranial Nerve Assessment    Oral Motor General Assessment -- -- generalized oral motor weakness  -RD         General Eating/Swallowing Observations    Respiratory Support Currently in Use -- -- nasal cannula  -RD      O2 Liters -- -- 4L  -RD      Eating/Swallowing Skills -- -- fed by SLP;self-fed;appropriate self-feeding skills observed  -RD      Positioning During Eating -- -- upright 90 degree;upright in chair  -RD      Utensils Used -- -- spoon;cup;straw  -RD      Consistencies Trialed -- -- thin liquids;nectar/syrup-thick liquids;pureed;soft textures  -RD         Clinical Swallow Eval    Oral Prep Phase -- -- impaired  -RD      Oral Transit -- -- impaired  -RD      Oral Residue -- -- impaired  -RD      Pharyngeal Phase -- -- suspected pharyngeal impairment  -RD      Esophageal Phase -- -- suspected esophageal impairment  -RD         Oral Prep Concerns    Oral Prep Concerns -- -- prolonged mastication  -RD      Prolonged Mastication -- -- mechanical soft  -RD      Oral Prep Concerns, Comment -- -- edentulous status impacting  -RD         Oral Transit Concerns    Oral Transit Concerns -- -- increased oral transit time  -RD      Increased Oral Transit Time -- -- mechanical soft  -RD         Oral Residue Concerns    Oral Residue  Concerns -- -- diffuse residue throughout oral cavity  -RD      Diffuse Residue Throughout Oral Cavity -- -- mechanical soft  -RD      Oral Residue Concerns, Comment -- -- mostly cleared w/ liquid wash  -RD         Pharyngeal Phase Concerns    Pharyngeal Phase Concerns -- -- wet vocal quality  -RD      Wet Vocal Quality -- -- thin  -RD      Pharyngeal Phase Concerns, Comment -- -- Concern for aspiration event vs. reflux of coffee at home before bed resulting in resp issues per chart. Overt s/s of aspiration c/b wet/hoarse vocal quality noted w/ thin liquids. No immediate s/s of aspiration w/ nectar-thick liquids, pudding, or solids. Okay for modified diet until MBS w/ warren UGI. Pt agreeable  -RD         Esophageal Phase Concerns    Esophageal Phase Concerns -- -- belching  -RD      Belching -- -- thin  -RD      Esophageal Phase Concerns, Comment -- -- hx of reflux  -RD         MBS/VFSS    Utensils Used -- spoon;cup;straw  -RD --      Consistencies Trialed -- thin liquids;nectar/syrup-thick liquids;honey-thick liquids;pudding thick;regular textures  -RD --         MBS/VFSS Interpretation    Oral Prep Phase -- impaired oral phase of swallowing  -RD --      Oral Transit Phase -- impaired  -RD --      Oral Residue -- impaired  -RD --         Oral Preparatory Phase    Oral Preparatory Phase -- prolonged manipulation  -RD --      Prolonged Manipulation -- regular textures;secondary to reduced lingual strength;other (see comments)  edentulous  -RD --      Oral Preparatory Phase, Comment -- edentulous status also impacting. Adequate for pureed some mashed w/o teeth  -RD --         Oral Transit Phase    Impaired Oral Transit Phase -- increased A-P transit time;premature spillage of liquids into pharynx  -RD --      Increased A-P Transit Time -- regular textures;secondary to reduced lingual control  -RD --      Premature Spillage of Liquids into Pharynx -- thin liquids;nectar-thick liquids;honey-thick liquids;secondary to  reduced lingual control  -RD --         Oral Residue    Impaired Oral Residue -- lingual residue  -RD --      Lingual Residue -- regular textures;secondary to reduced lingual strength  -RD --      Response to Oral Residue -- cleared residue;with spontaneous subsequent swallow;with liquid wash;other (see comments)  eventually  -RD --         Initiation of Pharyngeal Swallow    Initiation of Pharyngeal Swallow -- bolus in pyriform sinuses  -RD --      Pharyngeal Phase -- impaired pharyngeal phase of swallowing  -RD --      Anatomical abnormalities noted -- anterior cervical c-spine prominence;other (see comments)  suspect osteophytes at C5-C6  -RD --      Anatomical abnormalities functional impact -- no functional impact on swallowing  -RD --      Penetration Before the Swallow -- thin liquids;nectar-thick liquids;secondary to reduced back of tongue control;secondary to delayed swallow initiation or mistiming  -RD --      Penetration During the Swallow -- thin liquids;nectar-thick liquids;honey-thick liquids;secondary to delayed swallow initiation or mistiming;secondary to reduced vestibular closure  -RD --      Aspiration During the Swallow -- thin liquids;secondary to delayed swallow initiation or mistiming;secondary to reduced vestibular closure  -RD --      Aspiration After the Swallow -- thin liquids;secondary to residue;in laryngeal vestibule;other (see comments)  at risk w/ nectar-thick liquids & honey-thick via straw  -RD --      Response to Penetration -- deep;no response  -RD --      Response to Aspiration -- no response, silent aspiration  -RD --      Rosenbek's Scale -- thin:;8--->level 8;nectar:;5--->level 5;honey:;3--->level 3;pudding/puree:;regular textures:;1--->level 1  -RD --      Pharyngeal Residue -- all consistencies tested;base of tongue;valleculae;pyriform sinuses;posterior pharyngeal wall;diffuse within pharynx;secondary to reduced base of tongue retraction;secondary to reduced hyolaryngeal  excursion;other (see comments)  mild diffuse  -RD --      Response to Residue -- unable to clear residue  -RD --      Attempted Compensatory Maneuvers -- bolus size;bolus presentation style;additional subsequent swallow;throat clear after swallow;other (see comments)  cognition impacting carryover  -RD --      Response to Attempted Compensatory Maneuvers -- prevented aspiration;other (see comments)  small tsp/cup sips of honey-thick liquids  -RD --      Successful Compensatory Maneuver Competency -- patient able to;demonstrate compensations;with cues  -RD --      Pharyngeal Phase, Comment -- Moderate pharyngeal dysphagia. Penetration before/during w/ aspiration during/after w/ thin liquids 2' pre-spill, delayed initiation, & reduced vestibular closure. Pt noted to aspirate thin vestibular residue upon subsequent swallows. Aspiration was silent. Penetration before/during the swallow that deepened to the level of the true vocal folds & did not clear w/ nectar-thick liquids. Penetration during the swallow w/ honey-thick liquids via straw that did not clear from vestibule. Eventual aspiration inevitable w/ nectar-thick liquids, & honey-thick via straw. No penetration/aspiration w/ small tsp/cup sips of honey-thick liquids, pudding, or solids. Mild diffuse pharyngeal residue (> BOT/valleculae) 2' reduced BOT retraction & reduced hyolaryngeal excursion. Residue did not appear to build t/o exam.  -RD --         Esophageal Phase    Esophageal Phase -- see radiology report for further details;other (see comments)  limited UGI cancelled 2' aspiration present on exam  -RD --         SLP Evaluation Clinical Impression    SLP Swallowing Diagnosis -- mild-moderate;oral dysphagia;moderate;pharyngeal dysphagia  -RD R/O pharyngeal dysphagia;esophageal dysphagia  -RD      Functional Impact -- risk of aspiration/pneumonia;risk of malnutrition;risk of dehydration  -RD risk of aspiration/pneumonia  -RD      Rehab Potential/Prognosis,  Swallowing -- adequate, monitor progress closely  -RD good, to achieve stated therapy goals  -RD      Swallow Criteria for Skilled Therapeutic Interventions Met -- demonstrates skilled criteria  -RD demonstrates skilled criteria  -RD         SLP Treatment Clinical Impressions    Treatment Assessment (SLP) Pt lethargic but able to accept PO trials. No overt s/s of aspiration w/ HTL or pudding thick consistencies. Difficult to complete all exercises 2/2 fatigue, focused on CTAR and hard effortful swallow. SLP will cont to f/u for tx  - -- --      Daily Summary of Progress (SLP) progress toward functional goals as expected  - -- --      Barriers to Overall Progress (SLP) Lethargy  - -- --      Plan for Continued Treatment (SLP) continue treatment per plan of care  - -- --      Care Plan Review evaluation/treatment results reviewed  - -- --         Recommendations    Therapy Frequency (Swallow) 5 days per week  - 5 days per week  -RD PRN  -RD      Predicted Duration Therapy Intervention (Days) until discharge  - until discharge  - until discharge  -      SLP Diet Recommendation puree with some mashed;honey thick liquids  - puree with some mashed;honey thick liquids  -RD puree with some mashed;nectar thick liquids  -RD      Recommended Diagnostics -- -- VFSS (MBS);other (see comments)  w/ warren UGI  -RD      Recommended Precautions and Strategies upright posture during/after eating;small bites of food and sips of liquid;no straw;general aspiration precautions;reflux precautions  - upright posture during/after eating;small bites of food and sips of liquid;no straw;general aspiration precautions;reflux precautions  -RD upright posture during/after eating;general aspiration precautions;reflux precautions  -RD      Oral Care Recommendations Oral Care BID/PRN  - Oral Care BID/PRN  -RD Oral Care BID/PRN  -RD      SLP Rec. for Method of Medication Administration meds whole;with pudding or applesauce;as  tolerated  - meds whole;with pudding or applesauce;as tolerated  -RD meds whole;with thick liquids;with pudding or applesauce;as tolerated  -RD      Monitor for Signs of Aspiration yes;notify SLP if any concerns  -MH yes;notify SLP if any concerns  -RD yes;notify SLP if any concerns  -RD      Anticipated Discharge Disposition (SLP) unknown;anticipate therapy at next level of care  -MH unknown;anticipate therapy at next level of care  -RD unknown;anticipate therapy at next level of care  -RD            User Key  (r) = Recorded By, (t) = Taken By, (c) = Cosigned By    Initials Name Effective Dates    RD Olamide Jain MS CCC-SLP 06/16/21 -     Adriana Sandhu MS, YVETTEY-SLP 06/22/22 -                 EDUCATION  The patient has been educated in the following areas:   Dysphagia (Swallowing Impairment).        SLP GOALS     Row Name 10/14/22 1135 10/12/22 1415          (LTG) Patient will demonstrate functional swallow for    Diet Texture (Demonstrate functional swallow) soft whole textures  - soft whole textures  -     Liquid viscosity (Demonstrate functional swallow) thin liquids  - thin liquids  -     [place] (Demonstrate functional swallow) independently (over 90% accuracy)  - independently (over 90% accuracy)  -     Time Frame (Demonstrate functional swallow) by discharge  - by discharge  -     Progress/Outcomes (Demonstrate functional swallow) goal ongoing  - --        (STG) Patient will tolerate trials of    Consistencies Trialed (Tolerate trials) pureed/ mashed textures;honey/ moderately thick liquids  - pureed/ mashed textures;honey/ moderately thick liquids  -RD     Desired Outcome (Tolerate trials) without signs/symptoms of aspiration;without signs of distress;with adequate oral prep/transit/clearance;with use of compensatory strategies (see comments)  - without signs/symptoms of aspiration;without signs of distress;with adequate oral prep/transit/clearance;with use of  compensatory strategies (see comments)  -RD     [place] (Tolerate trials) with minimal cues (75-90% accuracy)  -MH with minimal cues (75-90% accuracy)  -RD     Time Frame (Tolerate trials) by discharge  - by discharge  -RD     Progress/Outcomes (Tolerate trials) continuing progress toward goal  - --     Comment (Tolerate trials) Pt tolerated trials of HTL/pudding thick w/o overt s/s of aspiration.  - --        (STG) Lingual Strengthening Goal 1 (SLP)    Activity (Lingual Strengthening Goal 1, SLP) increase tongue back strength  - increase tongue back strength  -     Increase Tongue Back Strength lingual resistance exercises  - lingual resistance exercises  -RD     [place]/Accuracy (Lingual Strengthening Goal 1, SLP) with minimal cues (75-90% accuracy)  - with minimal cues (75-90% accuracy)  -RD     Time Frame (Lingual Strengthening Goal 1, SLP) short term goal (STG)  - short term goal (STG)  -RD     Progress/Outcomes (Lingual Strengthening Goal 1, SLP) continuing progress toward goal  - --        (STG) Pharyngeal Strengthening Exercise Goal 1 (SLP)    Activity (Pharyngeal Strengthening Goal 1, SLP) increase timing;increase anterior movement of the hyolaryngeal complex;increase closure at entrance to airway/closure of airway at glottis;increase tongue base retraction  - increase timing;increase anterior movement of the hyolaryngeal complex;increase closure at entrance to airway/closure of airway at glottis;increase tongue base retraction  -RD     Increase Timing prepping - 3 second prep or suck swallow or 3-step swallow  - prepping - 3 second prep or suck swallow or 3-step swallow  -RD     Increase Anterior Movement of the Hyolaryngeal Complex chin tuck against resistance (CTAR)  - chin tuck against resistance (CTAR)  -RD     Increase Closure at Entrance to Airway/Closure of Airway at Glottis super-supraglottic swallow  - super-supraglottic swallow  -RD     Increase Tongue Base  Retraction hard effortful swallow  -MH hard effortful swallow  -RD     [place]/Accuracy (Pharyngeal Strengthening Goal 1, SLP) with minimal cues (75-90% accuracy)  -MH with minimal cues (75-90% accuracy)  -RD     Time Frame (Pharyngeal Strengthening Goal 1, SLP) short term goal (STG)  -MH short term goal (STG)  -RD     Progress/Outcomes (Pharyngeal Strengthening Goal 1, SLP) continuing progress toward goal  -MH --     Comment (Pharyngeal Strengthening Goal 1, SLP) Reviewed all exercises. Pt w/ inc difficulty completing 2/2 lethargy. Focused on CTAR and hard effortful swallow  - --        (STG) Swallow Management Recall Goal 1 (SLP)    Activity (Swallow Management Recall Goal 1, SLP) safe diet/liquid level;safe diet level/texture;safe liquid viscosity;compensatory swallow strategies/techniques;postural techniques;rationale for use of strategies/techniques  - safe diet/liquid level;safe diet level/texture;safe liquid viscosity;compensatory swallow strategies/techniques;postural techniques;rationale for use of strategies/techniques  -RD     [place]/Accuracy (Swallow Management Recall Goal 1, SLP) with minimal cues (75-90% accuracy)  -MH with minimal cues (75-90% accuracy)  -RD     Time Frame (Swallow Management Recall Goal 1, SLP) short term goal (STG)  -MH short term goal (STG)  -RD     Barriers (Swallow Management Recall Goal 1, SLP) cognition/STM deficits  - cognition/STM deficits  -RD     Progress/Outcomes (Swallow Management Recall Goal 1, SLP) continuing progress toward goal  -MH --     Comment (Swallow Management Recall Goal 1, SLP) Reviewed comps and rationale w/ pt, pt unable to verbalize full understanding at this time.  - --           User Key  (r) = Recorded By, (t) = Taken By, (c) = Cosigned By    Initials Name Provider Type    Olamide Heard MS CCC-SLP Speech and Language Pathologist    Adriana Sandhu MS, CFY-SLP Speech and Language Pathologist                   Time  Calculation:    Time Calculation- SLP     Row Name 10/14/22 1322             Time Calculation- SLP    SLP Start Time 1135  -MH      SLP Received On 10/14/22  -MH         Untimed Charges    78628-[id number] Treatment Swallow Minutes 40  -MH         Total Minutes    Untimed Charges Total Minutes 40  -MH       Total Minutes 40  -MH            User Key  (r) = Recorded By, (t) = Taken By, (c) = Cosigned By    Initials Name Provider Type     Adriana Briggs MS, CFY-SLP Speech and Language Pathologist                Therapy Charges for Today     Code Description Service Date Service Provider Modifiers Qty    [id number]  ST TREATMENT SWALLOW 3 10/14/2022 Adriana Briggs MS, CFY-SLP GN 1            Patient was not wearing a face mask and did not exhibit coughing during this therapy encounter.  Procedure performed was not aerosolizing, did not involve close contact (within 6 feet for at least 15 minutes or longer), and did not involve contact with infectious secretions or specimens.  Therapist used appropriate personal protective equipment including gloves, standard procedure mask and eye protection.  Appropriate PPE was worn during the entire therapy session.  Hand hygiene was completed before and after therapy session.       Adriana Briggs MS, DARREN-SLP  10/14/2022

## 2022-10-14 NOTE — PROGRESS NOTES
Malnutrition Severity Assessment    Patient Name:  Scott Diego  YOB: 1930  MRN: [id number]  Admit Date:  10/12/2022    Patient meets criteria for : Moderate (non-severe) Malnutrition (pt qualifies for non severe chronic malnutrition based on wasting)    Comments:      Malnutrition Severity Assessment  Malnutrition Type: Chronic Disease - Related Malnutrition  Malnutrition Type (last 8 hours)     Malnutrition Severity Assessment     Row Name 10/14/22 1847       Malnutrition Severity Assessment    Malnutrition Type Chronic Disease - Related Malnutrition    Row Name 10/14/22 1847       Insufficient Energy Intake     Insufficient Energy Intake Findings --  unable to quantify Note persistent N &V > 1 month    Row Name 10/14/22 1847       Unintentional Weight Loss     Unintentional Weight Loss Findings --  rpt of gains and losses Unable to key to category    Row Name 10/14/22 1847       Muscle Loss    Loss of Muscle Mass Findings Moderate    Mandaeism Region Moderate - slight depression    Clavicle Bone Region Moderate - some protrusion in females, visible in males    Acromion Bone Region Moderate - acromion may slightly protrude    Scapular Bone Region --  mild    Dorsal Hand Region --  mild    Patellar Region Moderate - patella more prominent, less muscle definition around patella    Anterior Thigh Region --  mild    Posterior Calf Region Moderate - some roundness, slight firmness    Row Name 10/14/22 1847       Fat Loss    Subcutaneous Fat Loss Findings Mild    Orbital Region  --  mild    Upper Arm Region --  mild    Thoracic & Lumbar Region Moderate - ribs visible with mild depressions, iliac crest somewhat prominent    Row Name 10/14/22 1847       Criteria Met (Must meet criteria for severity in at least 2 of these categories: M Wasting, Fat Loss, Fluid, Secondary Signs, Wt. Status, Intake)    Patient meets criteria for  Moderate (non-severe) Malnutrition  pt qualifies for non severe chronic  malnutrition based on wasting                Electronically signed by:  Kemi Ramirez RD  10/14/22 19:03 EDT

## 2022-10-15 PROBLEM — E44.0 MODERATE MALNUTRITION: Status: ACTIVE | Noted: 2022-10-15

## 2022-10-15 LAB
ANION GAP SERPL CALCULATED.3IONS-SCNC: 11 MMOL/L (ref 5–15)
BUN SERPL-MCNC: 25 MG/DL (ref 8–23)
BUN/CREAT SERPL: 19.8 (ref 7–25)
CALCIUM SPEC-SCNC: 8.8 MG/DL (ref 8.2–9.6)
CHLORIDE SERPL-SCNC: 106 MMOL/L (ref 98–107)
CO2 SERPL-SCNC: 24 MMOL/L (ref 22–29)
CREAT SERPL-MCNC: 1.26 MG/DL (ref 0.57–1)
DEPRECATED RDW RBC AUTO: 54.1 FL (ref 37–54)
EGFRCR SERPLBLD CKD-EPI 2021: 40.1 ML/MIN/1.73
ERYTHROCYTE [DISTWIDTH] IN BLOOD BY AUTOMATED COUNT: 14.6 % (ref 12.3–15.4)
GLUCOSE SERPL-MCNC: 86 MG/DL (ref 65–99)
HCT VFR BLD AUTO: 27.7 % (ref 34–46.6)
HGB BLD-MCNC: 8.3 G/DL (ref 12–15.9)
MAGNESIUM SERPL-MCNC: 2 MG/DL (ref 1.7–2.3)
MCH RBC QN AUTO: 30 PG (ref 26.6–33)
MCHC RBC AUTO-ENTMCNC: 30 G/DL (ref 31.5–35.7)
MCV RBC AUTO: 100 FL (ref 79–97)
PLATELET # BLD AUTO: 213 10*3/MM3 (ref 140–450)
PMV BLD AUTO: 11.4 FL (ref 6–12)
POTASSIUM SERPL-SCNC: 3.8 MMOL/L (ref 3.5–5.2)
RBC # BLD AUTO: 2.77 10*6/MM3 (ref 3.77–5.28)
SODIUM SERPL-SCNC: 141 MMOL/L (ref 136–145)
WBC NRBC COR # BLD: 5.08 10*3/MM3 (ref 3.4–10.8)

## 2022-10-15 PROCEDURE — 80048 BASIC METABOLIC PNL TOTAL CA: CPT | Performed by: INTERNAL MEDICINE

## 2022-10-15 PROCEDURE — 99233 SBSQ HOSP IP/OBS HIGH 50: CPT | Performed by: PHYSICIAN ASSISTANT

## 2022-10-15 PROCEDURE — 99232 SBSQ HOSP IP/OBS MODERATE 35: CPT | Performed by: INTERNAL MEDICINE

## 2022-10-15 PROCEDURE — 25010000002 AMIODARONE IN DEXTROSE 5% 360-4.14 MG/200ML-% SOLUTION: Performed by: NURSE PRACTITIONER

## 2022-10-15 PROCEDURE — 25010000002 CEFTRIAXONE PER 250 MG: Performed by: INTERNAL MEDICINE

## 2022-10-15 PROCEDURE — 85027 COMPLETE CBC AUTOMATED: CPT | Performed by: INTERNAL MEDICINE

## 2022-10-15 PROCEDURE — 83735 ASSAY OF MAGNESIUM: CPT | Performed by: PHYSICIAN ASSISTANT

## 2022-10-15 PROCEDURE — 93005 ELECTROCARDIOGRAM TRACING: CPT | Performed by: NURSE PRACTITIONER

## 2022-10-15 PROCEDURE — 25010000002 ONDANSETRON PER 1 MG: Performed by: INTERNAL MEDICINE

## 2022-10-15 PROCEDURE — 84100 ASSAY OF PHOSPHORUS: CPT | Performed by: INTERNAL MEDICINE

## 2022-10-15 PROCEDURE — 25010000002 HEPARIN (PORCINE) PER 1000 UNITS: Performed by: INTERNAL MEDICINE

## 2022-10-15 RX ORDER — BISACODYL 10 MG
10 SUPPOSITORY, RECTAL RECTAL DAILY PRN
Status: DISCONTINUED | OUTPATIENT
Start: 2022-10-15 | End: 2022-10-18 | Stop reason: HOSPADM

## 2022-10-15 RX ORDER — MIRTAZAPINE 15 MG/1
30 TABLET, FILM COATED ORAL NIGHTLY
Status: DISCONTINUED | OUTPATIENT
Start: 2022-10-15 | End: 2022-10-18 | Stop reason: HOSPADM

## 2022-10-15 RX ORDER — AMOXICILLIN 250 MG
2 CAPSULE ORAL NIGHTLY
Status: DISCONTINUED | OUTPATIENT
Start: 2022-10-15 | End: 2022-10-18 | Stop reason: HOSPADM

## 2022-10-15 RX ORDER — FUROSEMIDE 20 MG/1
20 TABLET ORAL EVERY OTHER DAY
Status: DISCONTINUED | OUTPATIENT
Start: 2022-10-16 | End: 2022-10-18 | Stop reason: HOSPADM

## 2022-10-15 RX ORDER — BISACODYL 5 MG/1
5 TABLET, DELAYED RELEASE ORAL DAILY PRN
Status: DISCONTINUED | OUTPATIENT
Start: 2022-10-15 | End: 2022-10-18 | Stop reason: HOSPADM

## 2022-10-15 RX ORDER — METOCLOPRAMIDE 5 MG/1
5 TABLET ORAL
Status: DISCONTINUED | OUTPATIENT
Start: 2022-10-15 | End: 2022-10-15

## 2022-10-15 RX ORDER — POLYETHYLENE GLYCOL 3350 17 G/17G
17 POWDER, FOR SOLUTION ORAL DAILY
Status: DISCONTINUED | OUTPATIENT
Start: 2022-10-16 | End: 2022-10-18 | Stop reason: HOSPADM

## 2022-10-15 RX ADMIN — AMIODARONE HYDROCHLORIDE 0.5 MG/MIN: 1.8 INJECTION, SOLUTION INTRAVENOUS at 13:19

## 2022-10-15 RX ADMIN — ONDANSETRON 4 MG: 2 INJECTION INTRAMUSCULAR; INTRAVENOUS at 05:39

## 2022-10-15 RX ADMIN — AMIODARONE HYDROCHLORIDE 200 MG: 200 TABLET ORAL at 16:59

## 2022-10-15 RX ADMIN — ALPRAZOLAM 0.5 MG: 0.5 TABLET ORAL at 13:07

## 2022-10-15 RX ADMIN — DIPHENHYDRAMINE HYDROCHLORIDE AND LIDOCAINE HYDROCHLORIDE AND ALUMINUM HYDROXIDE AND MAGNESIUM HYDRO 5 ML: KIT at 20:25

## 2022-10-15 RX ADMIN — HEPARIN SODIUM 5000 UNITS: 5000 INJECTION INTRAVENOUS; SUBCUTANEOUS at 08:45

## 2022-10-15 RX ADMIN — GABAPENTIN 100 MG: 100 CAPSULE ORAL at 20:22

## 2022-10-15 RX ADMIN — MIRTAZAPINE 30 MG: 15 TABLET, FILM COATED ORAL at 20:22

## 2022-10-15 RX ADMIN — ALPRAZOLAM 0.5 MG: 0.5 TABLET ORAL at 23:11

## 2022-10-15 RX ADMIN — DIPHENHYDRAMINE HYDROCHLORIDE AND LIDOCAINE HYDROCHLORIDE AND ALUMINUM HYDROXIDE AND MAGNESIUM HYDRO 5 ML: KIT at 08:53

## 2022-10-15 RX ADMIN — SODIUM CHLORIDE 1 G: 900 INJECTION INTRAVENOUS at 05:40

## 2022-10-15 RX ADMIN — DOXYCYCLINE 100 MG: 100 CAPSULE ORAL at 08:45

## 2022-10-15 RX ADMIN — SENNOSIDES AND DOCUSATE SODIUM 2 TABLET: 50; 8.6 TABLET ORAL at 08:45

## 2022-10-15 RX ADMIN — METOPROLOL SUCCINATE 25 MG: 25 TABLET, FILM COATED, EXTENDED RELEASE ORAL at 20:22

## 2022-10-15 RX ADMIN — DOXYCYCLINE 100 MG: 100 CAPSULE ORAL at 20:22

## 2022-10-15 RX ADMIN — DIPHENHYDRAMINE HYDROCHLORIDE AND LIDOCAINE HYDROCHLORIDE AND ALUMINUM HYDROXIDE AND MAGNESIUM HYDRO 5 ML: KIT at 13:11

## 2022-10-15 RX ADMIN — PANTOPRAZOLE SODIUM 40 MG: 40 TABLET, DELAYED RELEASE ORAL at 20:26

## 2022-10-15 RX ADMIN — MEMANTINE 5 MG: 5 TABLET ORAL at 08:44

## 2022-10-15 RX ADMIN — Medication 10 ML: at 20:27

## 2022-10-15 RX ADMIN — SENNOSIDES AND DOCUSATE SODIUM 2 TABLET: 50; 8.6 TABLET ORAL at 20:22

## 2022-10-15 RX ADMIN — ASPIRIN 81 MG: 81 TABLET, COATED ORAL at 08:45

## 2022-10-15 RX ADMIN — LEVOTHYROXINE SODIUM 50 MCG: 50 TABLET ORAL at 05:39

## 2022-10-15 RX ADMIN — PANTOPRAZOLE SODIUM 40 MG: 40 TABLET, DELAYED RELEASE ORAL at 08:45

## 2022-10-15 RX ADMIN — MEMANTINE 5 MG: 5 TABLET ORAL at 20:22

## 2022-10-15 RX ADMIN — HEPARIN SODIUM 5000 UNITS: 5000 INJECTION INTRAVENOUS; SUBCUTANEOUS at 20:22

## 2022-10-15 RX ADMIN — METOPROLOL SUCCINATE 25 MG: 25 TABLET, FILM COATED, EXTENDED RELEASE ORAL at 08:45

## 2022-10-15 RX ADMIN — MINERAL OIL AND PETROLATUM 1 APPLICATION: 150; 830 OINTMENT OPHTHALMIC at 08:46

## 2022-10-15 RX ADMIN — Medication 10 ML: at 08:44

## 2022-10-15 RX ADMIN — POLYVINYL ALCOHOL 1 DROP: 14 SOLUTION/ DROPS OPHTHALMIC at 08:46

## 2022-10-15 NOTE — PLAN OF CARE
Goal Outcome Evaluation:  Plan of Care Reviewed With: patient    Pt is alert and oriented x1 to self only, confusion X3. PRN Xanax given . VSS. Lung sounds noted diminished. 02 at 3 LPM, Sp02 noted  94%, no c/o pain at this time. Amiodarone drip infusing at 16.6ml/hr.      Progress: no change

## 2022-10-15 NOTE — PROGRESS NOTES
"      Cardiac Electrophysiology Inpatient Follow Up Note         [facility]    Progress Note    INITIAL REASON FOR CONSULT: Atrial fibrillation    CHIEF COMPLAINT:  Chief Complaint   Patient presents with   • Shortness of Breath     Atrial fibrillation    Subjective   Mrs. Scott Diego denies vomiting, abdominal pain, fussiness, diarrhea, cough, difficulty breathing and notes a very poor appetite.        PROBLEM LIST:  1. Nonischemic cardiomyopathy/chronic systolic congestive heart failure  1. 6/2018 2D echo: LVEF = 20%.  Mild TR.  Mild MR.  Moderate AR.  Mild RA diastolic collapse from pericardial effusion.  There is a moderate 1-2 cm circumferential pericardial effusion.  2. 3/2022 2D echo: LVEF = 30%.  Moderate MR.  Calcification of aortic valve.  RVSP due to TR mildly elevated (35-45)  3. Echo 9/6/2022: EF 21-25%, small pericardial effusion, bilateral pleural effusions, moderate MR, moderate to severe TR, RVSP 56 mmHg.   2. New onset atrial fibrillation 9/2022 in setting of COVID  1. CHADSVASC = 8   3. History of CVA  1. 6/2018 14-day Holter monitor: Min 57, AVG 84, max 240.  SVE 2.3%.  VE 15.3%.  39 runs of VT - fastest 18 beats at 240 bpm; longest 17 beats at 131 BPM.  1 SVT episode - 8 beats at 213 bpm.  4. CAD  1. 6/2018 coronary calcifications appreciated on CT chest.  5. COVID19 positive admission 9/2022  6. Hypertension   7. hyperlipidemia  8. Orthostasis  9. CKD stage III  1. 5/2022 CR 2.01  2. 5/2022 CR 1.55  10. Hypothyroidism  11. Dementia  12. Appetite/weight loss  1. 3/2022 [facility] admission for poor p.o. intake, CARYN, hyperkalemia, and chronic systolic HF.  13. Surgical  1. CCX  2. Eye surgery  3. Hysterectomy  REVIEW OF SYSTEMS  ROS no change from admission H&P except for: above    Objective   VITAL SIGNS  /80 (BP Location: Left arm, Patient Position: Sitting)   Pulse 74   Temp 97.7 °F (36.5 °C) (Oral)   Resp 19   Ht 162.6 cm (64\")   Wt 55.3 kg (122 " lb)   SpO2 99%   BMI 20.94 kg/m²   [unfilled]  Pulse Ox: SpO2  Av.7 %  Min: 95 %  Max: 100 %  Supplemental O2:       Admit Weight  Weight: 53.5 kg (118 lb)  Last 3 Weights  [unfilled]  Body mass index is 20.94 kg/m².    INTAKE/OUTPUT  I/O last 3 completed shifts:  In: 618 [P.O.:200; I.V.:418]  Out: 900 [Urine:900]    Intake/Output Summary (Last 24 hours) at 10/15/2022 1116  Last data filed at 10/15/2022 1020  Gross per 24 hour   Intake 618 ml   Output 480 ml   Net 138 ml       PHYSICAL EXAM  General appearance: Scaphoid abdomen cachectic.  Lungs: no rhonchi, no wheezes, no rales  Heart: Regular rate and rhythm occasional ectopy  Abdomen: Scaphoid abdomen  Extremities: warm and dry, no cyanosis, no clubbing    LABS  Results from last 7 days   Lab Units 10/15/22  0522 10/14/22  1421 10/13/22  1523   WBC 10*3/mm3 5.08 7.56 22.68*   HEMOGLOBIN g/dL 8.3* 8.6* 9.4*   HEMATOCRIT % 27.7* 28.5* 30.2*   MCV fL 100.0* 99.7* 98.4*   PLATELETS 10*3/mm3 213 232 310         Results from last 7 days   Lab Units 10/15/22  0522 10/14/22  1421 10/13/22  0419 10/12/22  0221   POTASSIUM mmol/L 3.8 4.0 4.3 4.0   CHLORIDE mmol/L 106 104 103 101   CO2 mmol/L 24.0 24.0 22.0 27.0   BUN mg/dL 25* 26* 25* 16   CREATININE mg/dL 1.26* 1.38* 1.42* 1.25*   GLUCOSE mg/dL 86 84 110* 138*   CALCIUM mg/dL 8.8 8.8 8.7 9.4   MAGNESIUM mg/dL 2.0  --   --  2.1             Results from last 7 days   Lab Units 10/15/22  0522 10/12/22  0221   MAGNESIUM mg/dL 2.0 2.1                 No results found for: CHOL, TRIG, HDL    CURRENT MEDICATIONS  amiodarone, 200 mg, Oral, Once   Followed by  [START ON 10/16/2022] amiodarone, 200 mg, Oral, Q8H   Followed by  [START ON 10/22/2022] amiodarone, 200 mg, Oral, Q12H   Followed by  [START ON 2022] amiodarone, 200 mg, Oral, Daily  artificial tears, 1 application, Both Eyes, Daily  aspirin, 81 mg, Oral, Daily  cefTRIAXone, 1 g, Intravenous, Q24H  doxycycline, 100 mg, Oral, Q12H  First Mouthwash (Magic  Mouthwash), 5 mL, Swish & Spit, Q6H  gabapentin, 100 mg, Oral, Nightly  heparin (porcine), 5,000 Units, Subcutaneous, Q12H  ipratropium, 0.5 mg, Nebulization, Q6H While Awake - RT  levothyroxine, 50 mcg, Oral, Q AM  memantine, 5 mg, Oral, BID  metoprolol succinate XL, 25 mg, Oral, Q12H  mirtazapine, 30 mg, Oral, Nightly  pantoprazole, 40 mg, Oral, BID  senna-docusate sodium, 2 tablet, Oral, BID  sodium chloride, 10 mL, Intravenous, Q12H      CONTINUOUS INFUSIONS  amiodarone, 0.5 mg/min, Last Rate: 0.5 mg/min (10/14/22 9780)            EKG: Sinus rhythm left bundle branch block occasional ectopy in the form of PACs.  ECHO:REVIEWED   STRESS: REVIEWED  CATH: REVIEWED  CXR: Noted    TELEMETRY: Sinus         Diagnosis Plan   1. Acute on chronic systolic congestive heart failure (HCC)   euvolemic today.    Metoprolol succinate.          2.   Atrial fibrillation.   Anticoagulation would not be appropriate for this patient.  High risk of eating complications.  Decision per Dr. Rodriguez.    Amiodarone for rhythm control.    Home per primary team.     Body mass index is 20.94 kg/m².    I spent 35 minutes in consultation with this patient which included more than 65% of this time in direct face-to-face counseling, physical examination and discussion of my assessment and findings and shared decision making with the patient.  The remainder of the time not spent face to face was performing one, some or all of the following actions:  preparing to see this patient ( eg. Review of tests),  ordering medications, tests or procedures ), care coordination, discussion of the plan with other healthcare providers, documenting clinical information in Epic well as independently interpreting results and communicating results to patient, family and or caregiver.  All time noted occurred on the date of service.        Kedar Sanchez DO, FAC, RS  Cardiac Electrophysiologist  [facility] / [facility]

## 2022-10-15 NOTE — PLAN OF CARE
Problem: Adult Inpatient Plan of Care  Goal: Plan of Care Review  Outcome: Ongoing, Progressing  Flowsheets (Taken 10/15/2022 0430 by Palomo Morales RN)  Progress: no change  Goal: Patient-Specific Goal (Individualized)  Outcome: Ongoing, Progressing  Goal: Absence of Hospital-Acquired Illness or Injury  Outcome: Ongoing, Progressing  Intervention: Identify and Manage Fall Risk  Recent Flowsheet Documentation  Taken 10/15/2022 1245 by Keyla Wharton RN  Safety Promotion/Fall Prevention: safety round/check completed  Taken 10/15/2022 1020 by Keyla Wharton RN  Safety Promotion/Fall Prevention: safety round/check completed  Taken 10/15/2022 0850 by Keyla Wharton RN  Safety Promotion/Fall Prevention:   assistive device/personal items within reach   clutter free environment maintained   fall prevention program maintained   nonskid shoes/slippers when out of bed   safety round/check completed  Intervention: Prevent Skin Injury  Recent Flowsheet Documentation  Taken 10/15/2022 0850 by Keyla Wharton RN  Skin Protection: tubing/devices free from skin contact  Intervention: Prevent Infection  Recent Flowsheet Documentation  Taken 10/15/2022 1245 by Keyla Wharton RN  Infection Prevention: hand hygiene promoted  Taken 10/15/2022 1020 by Keyla Wharton RN  Infection Prevention: hand hygiene promoted  Taken 10/15/2022 0850 by Keyla Wharton RN  Infection Prevention: hand hygiene promoted  Goal: Optimal Comfort and Wellbeing  Outcome: Ongoing, Progressing  Intervention: Provide Person-Centered Care  Recent Flowsheet Documentation  Taken 10/15/2022 1020 by Keyla Wharton RN  Trust Relationship/Rapport: care explained  Taken 10/15/2022 0850 by Keyla Wharton RN  Trust Relationship/Rapport: care explained  Goal: Readiness for Transition of Care  Outcome: Ongoing, Progressing     Problem: Fall Injury Risk  Goal: Absence of Fall and Fall-Related Injury  Outcome: Ongoing,  Progressing  Intervention: Identify and Manage Contributors  Recent Flowsheet Documentation  Taken 10/15/2022 0850 by Keyla Wharton RN  Medication Review/Management: medications reviewed  Intervention: Promote Injury-Free Environment  Recent Flowsheet Documentation  Taken 10/15/2022 1245 by Keyla Wharton RN  Safety Promotion/Fall Prevention: safety round/check completed  Taken 10/15/2022 1020 by Keyla Wharton RN  Safety Promotion/Fall Prevention: safety round/check completed  Taken 10/15/2022 0850 by Keyla Wharton RN  Safety Promotion/Fall Prevention:   assistive device/personal items within reach   clutter free environment maintained   fall prevention program maintained   nonskid shoes/slippers when out of bed   safety round/check completed     Problem: Skin Injury Risk Increased  Goal: Skin Health and Integrity  Outcome: Ongoing, Progressing  Intervention: Promote and Optimize Oral Intake  Recent Flowsheet Documentation  Taken 10/15/2022 0850 by Keyla Wharton RN  Oral Nutrition Promotion: calorie-dense foods provided  Intervention: Optimize Skin Protection  Recent Flowsheet Documentation  Taken 10/15/2022 0850 by Keyla Wharton RN  Pressure Reduction Techniques: frequent weight shift encouraged  Pressure Reduction Devices: pressure-redistributing mattress utilized  Skin Protection: tubing/devices free from skin contact     Problem: Breathing Pattern Ineffective  Goal: Effective Breathing Pattern  Outcome: Ongoing, Progressing  Intervention: Promote Improved Breathing Pattern  Recent Flowsheet Documentation  Taken 10/15/2022 0850 by Keyla Wharton RN  Supportive Measures: active listening utilized  Airway/Ventilation Management: airway patency maintained  Breathing Techniques/Airway Clearance: deep/controlled cough encouraged     Problem: Confusion Acute  Goal: Optimal Cognitive Function  Outcome: Ongoing, Progressing  Intervention: Minimize Contributing Factors  Recent Flowsheet  Documentation  Taken 10/15/2022 0850 by Keyla Wharton RN  Sensory Stimulation Regulation: music on  Reorientation Measures: clock in view     Problem: Adjustment to Illness (Heart Failure)  Goal: Optimal Coping  Outcome: Ongoing, Progressing  Intervention: Support Psychosocial Response  Recent Flowsheet Documentation  Taken 10/15/2022 0850 by Keyla Wharton RN  Supportive Measures: active listening utilized  Family/Support System Care: support provided     Problem: Cardiac Output Decreased (Heart Failure)  Goal: Optimal Cardiac Output  Outcome: Ongoing, Progressing  Intervention: Optimize Cardiac Output  Recent Flowsheet Documentation  Taken 10/15/2022 0850 by Keyla Wharton RN  Environmental Support: calm environment promoted     Problem: Dysrhythmia (Heart Failure)  Goal: Stable Heart Rate and Rhythm  Outcome: Ongoing, Progressing     Problem: Fluid Imbalance (Heart Failure)  Goal: Fluid Balance  Outcome: Ongoing, Progressing     Problem: Functional Ability Impaired (Heart Failure)  Goal: Optimal Functional Ability  Outcome: Ongoing, Progressing     Problem: Oral Intake Inadequate (Heart Failure)  Goal: Optimal Nutrition Intake  Outcome: Ongoing, Progressing  Intervention: Promote and Optimize Nutrition Intake  Recent Flowsheet Documentation  Taken 10/15/2022 0850 by Keyla Wharton RN  Oral Nutrition Promotion: calorie-dense foods provided     Problem: Respiratory Compromise (Heart Failure)  Goal: Effective Oxygenation and Ventilation  Outcome: Ongoing, Progressing  Intervention: Promote Airway Secretion Clearance  Recent Flowsheet Documentation  Taken 10/15/2022 0850 by Keyla Wharton RN  Breathing Techniques/Airway Clearance: deep/controlled cough encouraged  Intervention: Optimize Oxygenation and Ventilation  Recent Flowsheet Documentation  Taken 10/15/2022 0850 by Keyla Wharton RN  Airway/Ventilation Management: airway patency maintained     Problem: Sleep Disordered Breathing (Heart  Failure)  Goal: Effective Breathing Pattern During Sleep  Outcome: Ongoing, Progressing  Intervention: Monitor and Manage Obstructive Sleep Apnea  Recent Flowsheet Documentation  Taken 10/15/2022 0850 by Keyla Wharton, RN  NPPV/CPAP Maintenance: tubes secured     Problem: Heart Failure Comorbidity  Goal: Maintenance of Heart Failure Symptom Control  Outcome: Ongoing, Progressing  Intervention: Maintain Heart Failure-Management  Recent Flowsheet Documentation  Taken 10/15/2022 0850 by Keyla Wharton, RN  Medication Review/Management: medications reviewed   Goal Outcome Evaluation:           Progress: no change

## 2022-10-15 NOTE — PROGRESS NOTES
[facility] Medicine Services  PROGRESS NOTE    Patient Name: Scott Diego  : 4/10/1930  MRN: [id number]    Date of Admission: 10/12/2022  Primary Care Physician: Ngozi Davis DO    Subjective     CC: f/u respiratory failure     HPI:  Sitting in chair. Says she is ready to go home. She is still on Amiodarone gtt under the direction or cardiology and remains on 3L NC. She says did not each much breakfast because she does not like eggs and the thickened coffee was strange. She denied nausea, says she just does not have an appetite. This has been an ongoing issue for several months or more. QTc was prolonged at 559 this morning.     This afternoon, RN reported that refused lunch due to nausea. Told RN that her son gives her a pill for nausea every morning and then she can eat. However, she got multiple doses of Zofran yesterday and per charting, PO intake was still quite poor.     ROS:  Gen- No fevers, chills  CV- No chest pain, palpitations  Resp- No cough, dyspnea  GI- No N/V/D, abd pain    Objective     Vital Signs:   Temp:  [97.7 °F (36.5 °C)-98.4 °F (36.9 °C)] 98.1 °F (36.7 °C)  Heart Rate:  [] 80  Resp:  [18-20] 20  BP: (102-132)/(67-84) 117/67  Flow (L/min):  [3] 3     Physical Exam:  Constitutional: No acute distress, awake, alert and conversant. Sitting upright in chair.   HENT: NCAT, mucous membranes moist  Respiratory: Clear to auscultation bilaterally, respiratory effort normal. On 3L NC  Cardiovascular: Rate 70's, regular rhythm without m/r/g  Gastrointestinal: Positive bowel sounds, soft, nontender, nondistended  Musculoskeletal: No bilateral ankle edema  Psychiatric: Appropriate affect, cooperative  Neurologic: Pleasantly confused. Moves all extremities spontaneously without focal deficits. Speech clear and coherent     Results Reviewed:  LAB RESULTS:      Lab 10/15/22  0522 10/14/22  1421 10/13/22  1523 10/13/22  0419 10/12/22  0532 10/12/22  0221  10/08/22  1649   WBC 5.08 7.56 22.68* 17.06*  --  14.67* 7.85   HEMOGLOBIN 8.3* 8.6* 9.4* 8.0*  --  10.3* 10.6*   HEMATOCRIT 27.7* 28.5* 30.2* 26.4*  --  33.3* 34.5   PLATELETS 213 232 310 226  --  312 307   NEUTROS ABS  --   --   --  14.50*  --  11.09* 5.17   IMMATURE GRANS (ABS)  --   --   --  0.11*  --  0.07* 0.02   LYMPHS ABS  --   --   --  0.86  --  2.08 1.54   MONOS ABS  --   --   --  1.57*  --  1.20* 0.85   EOS ABS  --   --   --  0.00  --  0.17 0.21   .0* 99.7* 98.4* 98.9*  --  98.8* 100.0*   LACTATE  --   --   --   --  0.9 2.2* 1.4         Lab 10/15/22  0522 10/14/22  1421 10/13/22  0419 10/12/22  0221 10/08/22  1649   SODIUM 141 140 136 141 140   POTASSIUM 3.8 4.0 4.3 4.0 4.2   CHLORIDE 106 104 103 101 102   CO2 24.0 24.0 22.0 27.0 25.0   ANION GAP 11.0 12.0 11.0 13.0 13.0   BUN 25* 26* 25* 16 21   CREATININE 1.26* 1.38* 1.42* 1.25* 1.29*   EGFR 40.1* 36.0* 34.8* 40.5* 39.0*   GLUCOSE 86 84 110* 138* 104*   CALCIUM 8.8 8.8 8.7 9.4 9.5   MAGNESIUM 2.0  --   --  2.1  --    TSH  --   --   --  3.470  --          Lab 10/14/22  1421 10/12/22  0221 10/08/22  1649   TOTAL PROTEIN 7.1 8.8* 8.3   ALBUMIN 3.10* 3.80 3.90   GLOBULIN 4.0 5.0 4.4   ALT (SGPT) 5 6 5   AST (SGOT) 12 27 16   BILIRUBIN 0.3 0.4 0.6   ALK PHOS 41 53 53   LIPASE  --   --  35         Lab 10/12/22  0221   PROBNP 5,194.0*   TROPONIN T 0.012         Lab 10/12/22  0328   FIO2 36   CARBOXYHEMOGLOBIN (VENOUS) 1.0     Brief Urine Lab Results  (Last result in the past 365 days)      Color   Clarity   Blood   Leuk Est   Nitrite   Protein   CREAT   Urine HCG        10/12/22 0251 Yellow   Clear   Negative   Large (3+)   Positive   Trace               Microbiology Results Abnormal     Procedure Component Value - Date/Time    Blood Culture - Blood, Arm, Right [[id number]]  (Normal) Collected: 10/12/22 0347    Lab Status: Preliminary result Specimen: Blood from Arm, Right Updated: 10/15/22 0400     Blood Culture No growth at 3 days    Blood Culture -  Blood, Arm, Right [[id number]]  (Normal) Collected: 10/12/22 0347    Lab Status: Preliminary result Specimen: Blood from Arm, Right Updated: 10/15/22 0400     Blood Culture No growth at 3 days    Urine Culture - Urine, Urine, Clean Catch [[id number]] Collected: 10/12/22 0251    Lab Status: Final result Specimen: Urine, Clean Catch Updated: 10/13/22 1320     Urine Culture >100,000 CFU/mL Normal Urogenital Neeta    Narrative:      Colonization of the urinary tract without infection is common. Treatment is discouraged unless the patient is symptomatic, pregnant, or undergoing an invasive urologic procedure.        No radiology results from the last 24 hrs    Results for orders placed during the hospital encounter of 09/05/22    Adult Transthoracic Echo Complete w/ Color, Spectral and Contrast if Necessary Per Protocol    Interpretation Summary  · Left ventricular ejection fraction appears to be 21 - 25%. Left ventricular systolic function is severely decreased.  · Left ventricular diastolic function is consistent with (grade II w/high LAP) pseudonormalization.  · There is a small (<1cm) pericardial effusion.  · There is a left pleural effusion. There is a right pleural effusion.  · Moderate mitral valve regurgitation is present.  · Moderate to severe tricuspid valve regurgitation is present.  · Estimated right ventricular systolic pressure from tricuspid regurgitation is markedly elevated (>55 mmHg). Calculated right ventricular systolic pressure from tricuspid regurgitation is 56 mmHg.    I have reviewed the medications:  Scheduled Meds:amiodarone, 200 mg, Oral, Once   Followed by  [START ON 10/16/2022] amiodarone, 200 mg, Oral, Q8H   Followed by  [START ON 10/22/2022] amiodarone, 200 mg, Oral, Q12H   Followed by  [START ON 11/5/2022] amiodarone, 200 mg, Oral, Daily  artificial tears, 1 application, Both Eyes, Daily  aspirin, 81 mg, Oral, Daily  cefTRIAXone, 1 g, Intravenous, Q24H  doxycycline, 100 mg, Oral, Q12H  First  Mouthwash (Magic Mouthwash), 5 mL, Swish & Spit, Q6H  gabapentin, 100 mg, Oral, Nightly  heparin (porcine), 5,000 Units, Subcutaneous, Q12H  ipratropium, 0.5 mg, Nebulization, Q6H While Awake - RT  levothyroxine, 50 mcg, Oral, Q AM  memantine, 5 mg, Oral, BID  metoprolol succinate XL, 25 mg, Oral, Q12H  mirtazapine, 30 mg, Oral, Nightly  pantoprazole, 40 mg, Oral, BID  senna-docusate sodium, 2 tablet, Oral, BID  sodium chloride, 10 mL, Intravenous, Q12H      Continuous Infusions:amiodarone, 0.5 mg/min, Last Rate: 0.5 mg/min (10/14/22 4693)      PRN Meds:.•  acetaminophen **OR** [DISCONTINUED] acetaminophen **OR** [DISCONTINUED] acetaminophen  •  ALPRAZolam  •  senna-docusate sodium **AND** polyethylene glycol **AND** bisacodyl **AND** bisacodyl  •  polyvinyl alcohol  •  sodium chloride  •  sodium chloride    Assessment & Plan     Active Hospital Problems    Diagnosis  POA   • **Acute respiratory failure with hypoxia (HCC) [J96.01]  Yes   • Moderate malnutrition (HCC) [E44.0]  Yes   • Acute on chronic systolic congestive heart failure (HCC) [I50.23]  Yes   • Respiratory failure (HCC) [J96.90]  Yes   • Anxiety [F41.9]  Yes   • Hypothyroid [E03.9]  Yes   • Chronic systolic heart failure (HCC) [I50.22]  Yes   • History of CVA (cerebrovascular accident) [Z86.73]  Not Applicable   • Dementia (HCC) [F03.90]  Yes   • Hypertension [I10]  Yes      Resolved Hospital Problems   No resolved problems to display.     Brief Hospital Course to date:  Scott Diego is a 89 y.o. female with PMH significant for HTN, chronic systolic CHF (LVEF 21-25%), hypothyroidism, anxiety and dementia. She was recently admitted to [facility] 9/5-9/10/22 for respiratory failure secondary to COVID-19 and a/c HFrEF. During this hospitalization, she had new atrial fibrillation with RVR. Son noted several weeks of nausea, for which she was evaluated by GI and started on TID Guadalupe Root. She did not require antiemetics during her  hospitalization and there was concern that her symptoms may be exacerbated by her progressive dementia or anxiety. She saw GI outpatient on 10/11 - PPI was increased to BID and an EGD was planned.    Admitted to [facility] 10/12/22 for acute hypoxic respiratory failure with concerns for aspiration pneumonia. Cardiology consulted to follow due to atrial fibrillation with RVR.     Acute hypoxic respiratory failure, suspect aspiration pneumonia   - Ongoing GI issues at home. Potential aspiration of coffee before bed vs reflux of stomach contents  - proBNP reassuring. Doubt a/c HFeEF   - Continue IV Ceftriaxone / Doxycycline for pneumonia  - Required 4L NC on admission, currently on 3L NC. Wean O2 as able   - SLP following - currently recommending pureed solids with some mashed. Honey thick liquids.   - Patient is DNR/DNI - could consider comfort diet if within patient/family wishes, given her poor PO intake at baseline     Subacute nausea  Poor oral intake   - Evaluated by GI last admission - recommended TID guadalupe root and management of constipation  - Saw GI outpatient on 10/11 - PPI increased to BID and plans were made for outpatient EGD  - Continue bowel regimen and BID PPI. Guadalupe root is not on formulary - family can bring in   - QTc 559 today - stop Zofran and other antiemetics  - Try to manage nausea with home PRN Alprazolam today and see how this does. Some concern last admission that her nausea symptoms were exacerbated by her progressive dementia or anxiety  - Unclear if Reglan or other prokinetic medications would be helpful to her   - Add Mirtazapine QHS for appetite stimulation  - Will ask GI to evaluate     Atrial fibrillation with RVR  - Appreciate cardiology assistance. Has converted to NSR.   - Started on Amiodarone gtt with plan to transition to PO  - Continue Metoprolol XL 25mg BID   -  this AM   - Not anticoagulated due to age and risk of falls    Chronic HFrEF (LVEF 21-25%),  "currently compensated  - Cardiology following.   - proBNP on admission significantly lower than 9/2022 admission for a/c CHF   - Continue Metoprolol. Other guideline-directed medications limited due to renal disease   - Cautious use of diuretics due to poor PO intake and risk of volume depletion   - Will check CXR and proBNP in AM     CKD IIIb  - Baseline creatinine appears to be 1.2-1.5  - Creatinine currently stable / at baseline  - Renally dose medications     Recent COVID-19 infection  - First positive test 9/5/22 - she does not require any further enhanced isolation     Recently diagnosed UTI  - Took a single dose of recently prescribed Macrobid prior to admission  - UrCx from July during ED grew Staph aureus  - Urine culture this admission growing \"normal urogenital rebecca\" - most pathogens should be covered by above Ceftriaxone / Doxy     Alzheimer dementia  - Continue Namenda     Anxiety, continue home Alprazolam  Hypothyroidism, continue Synthroid      DVT prophylaxis: Heparin    Expected Discharge Location and Transportation: home via private vehicle   Expected Discharge Date: 10/17/22    DVT prophylaxis:  Medical DVT prophylaxis orders are present.     AM-PAC 6 Clicks Score (PT): 18 (10/12/22 1120)    CODE STATUS:   Code Status and Medical Interventions:   Ordered at: 10/12/22 0413     Medical Intervention Limits:    NO intubation (DNI)     Level Of Support Discussed With:    Next of Kin (If No Surrogate)    Patient     Code Status (Patient has no pulse and is not breathing):    No CPR (Do Not Attempt to Resuscitate)     Medical Interventions (Patient has pulse or is breathing):    Limited Support     Comments:    Likely would not wish for feeding tube either       Steph Henson PA-C  10/15/22              "

## 2022-10-16 ENCOUNTER — APPOINTMENT (OUTPATIENT)
Dept: GENERAL RADIOLOGY | Facility: HOSPITAL | Age: 87
End: 2022-10-16

## 2022-10-16 LAB
ANION GAP SERPL CALCULATED.3IONS-SCNC: 12 MMOL/L (ref 5–15)
BUN SERPL-MCNC: 23 MG/DL (ref 8–23)
BUN/CREAT SERPL: 19.3 (ref 7–25)
CALCIUM SPEC-SCNC: 9.1 MG/DL (ref 8.2–9.6)
CHLORIDE SERPL-SCNC: 104 MMOL/L (ref 98–107)
CO2 SERPL-SCNC: 23 MMOL/L (ref 22–29)
CREAT SERPL-MCNC: 1.19 MG/DL (ref 0.57–1)
DEPRECATED RDW RBC AUTO: 51.4 FL (ref 37–54)
EGFRCR SERPLBLD CKD-EPI 2021: 43 ML/MIN/1.73
ERYTHROCYTE [DISTWIDTH] IN BLOOD BY AUTOMATED COUNT: 14.3 % (ref 12.3–15.4)
GLUCOSE SERPL-MCNC: 97 MG/DL (ref 65–99)
HCT VFR BLD AUTO: 29.9 % (ref 34–46.6)
HGB BLD-MCNC: 9.2 G/DL (ref 12–15.9)
MAGNESIUM SERPL-MCNC: 1.8 MG/DL (ref 1.7–2.3)
MCH RBC QN AUTO: 30 PG (ref 26.6–33)
MCHC RBC AUTO-ENTMCNC: 30.8 G/DL (ref 31.5–35.7)
MCV RBC AUTO: 97.4 FL (ref 79–97)
NT-PROBNP SERPL-MCNC: ABNORMAL PG/ML (ref 0–1800)
PHOSPHATE SERPL-MCNC: 3.7 MG/DL (ref 2.5–4.5)
PHOSPHATE SERPL-MCNC: 3.9 MG/DL (ref 2.5–4.5)
PLATELET # BLD AUTO: 250 10*3/MM3 (ref 140–450)
PMV BLD AUTO: 11.1 FL (ref 6–12)
POTASSIUM SERPL-SCNC: 4.1 MMOL/L (ref 3.5–5.2)
RBC # BLD AUTO: 3.07 10*6/MM3 (ref 3.77–5.28)
SODIUM SERPL-SCNC: 139 MMOL/L (ref 136–145)
WBC NRBC COR # BLD: 7.56 10*3/MM3 (ref 3.4–10.8)

## 2022-10-16 PROCEDURE — 99232 SBSQ HOSP IP/OBS MODERATE 35: CPT | Performed by: INTERNAL MEDICINE

## 2022-10-16 PROCEDURE — 94799 UNLISTED PULMONARY SVC/PX: CPT

## 2022-10-16 PROCEDURE — 99222 1ST HOSP IP/OBS MODERATE 55: CPT | Performed by: INTERNAL MEDICINE

## 2022-10-16 PROCEDURE — 94664 DEMO&/EVAL PT USE INHALER: CPT

## 2022-10-16 PROCEDURE — 93005 ELECTROCARDIOGRAM TRACING: CPT | Performed by: NURSE PRACTITIONER

## 2022-10-16 PROCEDURE — 83735 ASSAY OF MAGNESIUM: CPT | Performed by: PHYSICIAN ASSISTANT

## 2022-10-16 PROCEDURE — 97530 THERAPEUTIC ACTIVITIES: CPT

## 2022-10-16 PROCEDURE — 25010000002 FUROSEMIDE PER 20 MG: Performed by: INTERNAL MEDICINE

## 2022-10-16 PROCEDURE — 83880 ASSAY OF NATRIURETIC PEPTIDE: CPT | Performed by: PHYSICIAN ASSISTANT

## 2022-10-16 PROCEDURE — 71045 X-RAY EXAM CHEST 1 VIEW: CPT

## 2022-10-16 PROCEDURE — 94761 N-INVAS EAR/PLS OXIMETRY MLT: CPT

## 2022-10-16 PROCEDURE — 80048 BASIC METABOLIC PNL TOTAL CA: CPT | Performed by: PHYSICIAN ASSISTANT

## 2022-10-16 PROCEDURE — 85027 COMPLETE CBC AUTOMATED: CPT | Performed by: PHYSICIAN ASSISTANT

## 2022-10-16 PROCEDURE — 25010000002 CEFTRIAXONE PER 250 MG: Performed by: INTERNAL MEDICINE

## 2022-10-16 PROCEDURE — 97535 SELF CARE MNGMENT TRAINING: CPT

## 2022-10-16 PROCEDURE — 84100 ASSAY OF PHOSPHORUS: CPT | Performed by: PHYSICIAN ASSISTANT

## 2022-10-16 PROCEDURE — 25010000002 HEPARIN (PORCINE) PER 1000 UNITS: Performed by: INTERNAL MEDICINE

## 2022-10-16 RX ORDER — FUROSEMIDE 10 MG/ML
20 INJECTION INTRAMUSCULAR; INTRAVENOUS ONCE
Status: COMPLETED | OUTPATIENT
Start: 2022-10-16 | End: 2022-10-16

## 2022-10-16 RX ADMIN — SENNOSIDES AND DOCUSATE SODIUM 2 TABLET: 50; 8.6 TABLET ORAL at 21:20

## 2022-10-16 RX ADMIN — METOPROLOL SUCCINATE 25 MG: 25 TABLET, FILM COATED, EXTENDED RELEASE ORAL at 09:06

## 2022-10-16 RX ADMIN — ALPRAZOLAM 0.5 MG: 0.5 TABLET ORAL at 18:22

## 2022-10-16 RX ADMIN — DIPHENHYDRAMINE HYDROCHLORIDE AND LIDOCAINE HYDROCHLORIDE AND ALUMINUM HYDROXIDE AND MAGNESIUM HYDRO 5 ML: KIT at 01:17

## 2022-10-16 RX ADMIN — AMIODARONE HYDROCHLORIDE 200 MG: 200 TABLET ORAL at 01:18

## 2022-10-16 RX ADMIN — PANTOPRAZOLE SODIUM 40 MG: 40 TABLET, DELAYED RELEASE ORAL at 21:20

## 2022-10-16 RX ADMIN — DOXYCYCLINE 100 MG: 100 CAPSULE ORAL at 21:20

## 2022-10-16 RX ADMIN — DIPHENHYDRAMINE HYDROCHLORIDE AND LIDOCAINE HYDROCHLORIDE AND ALUMINUM HYDROXIDE AND MAGNESIUM HYDRO 5 ML: KIT at 21:19

## 2022-10-16 RX ADMIN — Medication 10 ML: at 21:21

## 2022-10-16 RX ADMIN — AMIODARONE HYDROCHLORIDE 200 MG: 200 TABLET ORAL at 09:06

## 2022-10-16 RX ADMIN — MINERAL OIL AND PETROLATUM 1 APPLICATION: 150; 830 OINTMENT OPHTHALMIC at 09:08

## 2022-10-16 RX ADMIN — FUROSEMIDE 20 MG: 10 INJECTION INTRAMUSCULAR; INTRAVENOUS at 12:05

## 2022-10-16 RX ADMIN — PANTOPRAZOLE SODIUM 40 MG: 40 TABLET, DELAYED RELEASE ORAL at 09:09

## 2022-10-16 RX ADMIN — MEMANTINE 5 MG: 5 TABLET ORAL at 09:09

## 2022-10-16 RX ADMIN — MEMANTINE 5 MG: 5 TABLET ORAL at 21:20

## 2022-10-16 RX ADMIN — DIPHENHYDRAMINE HYDROCHLORIDE AND LIDOCAINE HYDROCHLORIDE AND ALUMINUM HYDROXIDE AND MAGNESIUM HYDRO 5 ML: KIT at 14:01

## 2022-10-16 RX ADMIN — Medication 10 ML: at 09:10

## 2022-10-16 RX ADMIN — HEPARIN SODIUM 5000 UNITS: 5000 INJECTION INTRAVENOUS; SUBCUTANEOUS at 09:08

## 2022-10-16 RX ADMIN — IPRATROPIUM BROMIDE 0.5 MG: 0.5 SOLUTION RESPIRATORY (INHALATION) at 10:30

## 2022-10-16 RX ADMIN — DOXYCYCLINE 100 MG: 100 CAPSULE ORAL at 09:06

## 2022-10-16 RX ADMIN — ASPIRIN 81 MG: 81 TABLET, COATED ORAL at 09:06

## 2022-10-16 RX ADMIN — LEVOTHYROXINE SODIUM 50 MCG: 50 TABLET ORAL at 05:10

## 2022-10-16 RX ADMIN — AMIODARONE HYDROCHLORIDE 200 MG: 200 TABLET ORAL at 16:55

## 2022-10-16 RX ADMIN — ACETAMINOPHEN 650 MG: 325 TABLET, FILM COATED ORAL at 16:55

## 2022-10-16 RX ADMIN — GABAPENTIN 100 MG: 100 CAPSULE ORAL at 21:20

## 2022-10-16 RX ADMIN — SODIUM CHLORIDE 1 G: 900 INJECTION INTRAVENOUS at 05:10

## 2022-10-16 RX ADMIN — MIRTAZAPINE 30 MG: 15 TABLET, FILM COATED ORAL at 21:20

## 2022-10-16 RX ADMIN — METOPROLOL SUCCINATE 25 MG: 25 TABLET, FILM COATED, EXTENDED RELEASE ORAL at 21:20

## 2022-10-16 RX ADMIN — HEPARIN SODIUM 5000 UNITS: 5000 INJECTION INTRAVENOUS; SUBCUTANEOUS at 21:20

## 2022-10-16 RX ADMIN — DIPHENHYDRAMINE HYDROCHLORIDE AND LIDOCAINE HYDROCHLORIDE AND ALUMINUM HYDROXIDE AND MAGNESIUM HYDRO 5 ML: KIT at 09:08

## 2022-10-16 RX ADMIN — FUROSEMIDE 20 MG: 20 TABLET ORAL at 09:07

## 2022-10-16 NOTE — PLAN OF CARE
Goal Outcome Evaluation:  Plan of Care Reviewed With: patient, son        Progress: no change  Outcome Evaluation: Patient had a good day. VSS, 3.5LNC. Pt not eating at meals, however, eating well with son at bedside. Up to bedside commode x1 assist. Pleasantly confused. Patient hopeful for discharge home soon.

## 2022-10-16 NOTE — PROGRESS NOTES
[facility] Medicine Services  PROGRESS NOTE    Patient Name: Scott Diego  : 4/10/1930  MRN: [id number]    Date of Admission: 10/12/2022  Primary Care Physician: Ngozi Davis DO    Subjective     CC: f/u respiratory failure     HPI: in bed, telling me her address.  She looks more dyspneic and endorses that this is so.  Per staff, she recently was helped up to BR but got very short of breath and was brought back to bed.    ROS: limited by dementia  Gen- No fevers, chills  CV- No chest pain, palpitations  Resp- dyspnea currently  Gi - still not eating well     Objective     Vital Signs:   Temp:  [97.5 °F (36.4 °C)-98.4 °F (36.9 °C)] 98.2 °F (36.8 °C)  Heart Rate:  [74-98] 87  Resp:  [16-20] 16  BP: (102-144)/(67-94) 129/74  Flow (L/min):  [2-3] 3     Physical Exam:  Constitutional: in bed.  Breathing is fast.  Tells me her address and asks me to call someone for her.   Rolls on her side without difficulty   HENT: NCAT, mucous membranes moist  Respiratory: no crackle or wheeze but she is tachypneic  Cardiovascular: RRR.  HR 90s.    Gastrointestinal: Positive bowel sounds, soft, nontender, nondistended  Musculoskeletal: No bilateral ankle edema  Psychiatric: Appropriate affect, cooperative  Neurologic: Pleasantly confused. Cooperative, rolls in bed easily. Speech clear      Results Reviewed:  LAB RESULTS:      Lab 10/16/22  0647 10/15/22  0522 10/14/22  1421 10/13/22  1523 10/13/22  0419 10/12/22  0532 10/12/22  0221   WBC 7.56 5.08 7.56 22.68* 17.06*  --  14.67*   HEMOGLOBIN 9.2* 8.3* 8.6* 9.4* 8.0*  --  10.3*   HEMATOCRIT 29.9* 27.7* 28.5* 30.2* 26.4*  --  33.3*   PLATELETS 250 213 232 310 226  --  312   NEUTROS ABS  --   --   --   --  14.50*  --  11.09*   IMMATURE GRANS (ABS)  --   --   --   --  0.11*  --  0.07*   LYMPHS ABS  --   --   --   --  0.86  --  2.08   MONOS ABS  --   --   --   --  1.57*  --  1.20*   EOS ABS  --   --   --   --  0.00  --  0.17   MCV 97.4* 100.0* 99.7*  98.4* 98.9*  --  98.8*   LACTATE  --   --   --   --   --  0.9 2.2*         Lab 10/16/22  0647 10/15/22  0522 10/14/22  1421 10/13/22  0419 10/12/22  0221   SODIUM 139 141 140 136 141   POTASSIUM 4.1 3.8 4.0 4.3 4.0   CHLORIDE 104 106 104 103 101   CO2 23.0 24.0 24.0 22.0 27.0   ANION GAP 12.0 11.0 12.0 11.0 13.0   BUN 23 25* 26* 25* 16   CREATININE 1.19* 1.26* 1.38* 1.42* 1.25*   EGFR 43.0* 40.1* 36.0* 34.8* 40.5*   GLUCOSE 97 86 84 110* 138*   CALCIUM 9.1 8.8 8.8 8.7 9.4   MAGNESIUM 1.8 2.0  --   --  2.1   PHOSPHORUS 3.9  --   --   --   --    TSH  --   --   --   --  3.470         Lab 10/14/22  1421 10/12/22  0221   TOTAL PROTEIN 7.1 8.8*   ALBUMIN 3.10* 3.80   GLOBULIN 4.0 5.0   ALT (SGPT) 5 6   AST (SGOT) 12 27   BILIRUBIN 0.3 0.4   ALK PHOS 41 53         Lab 10/16/22  0647 10/12/22  0221   PROBNP 22,502.0* 5,194.0*   TROPONIN T  --  0.012         Lab 10/12/22  0328   FIO2 36   CARBOXYHEMOGLOBIN (VENOUS) 1.0     Brief Urine Lab Results  (Last result in the past 365 days)      Color   Clarity   Blood   Leuk Est   Nitrite   Protein   CREAT   Urine HCG        10/12/22 0251 Yellow   Clear   Negative   Large (3+)   Positive   Trace               Microbiology Results Abnormal     Procedure Component Value - Date/Time    Blood Culture - Blood, Arm, Right [[id number]]  (Normal) Collected: 10/12/22 0347    Lab Status: Preliminary result Specimen: Blood from Arm, Right Updated: 10/16/22 0400     Blood Culture No growth at 4 days    Blood Culture - Blood, Arm, Right [[id number]]  (Normal) Collected: 10/12/22 0347    Lab Status: Preliminary result Specimen: Blood from Arm, Right Updated: 10/16/22 0400     Blood Culture No growth at 4 days    Urine Culture - Urine, Urine, Clean Catch [[id number]] Collected: 10/12/22 0251    Lab Status: Final result Specimen: Urine, Clean Catch Updated: 10/13/22 1320     Urine Culture >100,000 CFU/mL Normal Urogenital Neeta    Narrative:      Colonization of the urinary tract without infection is  common. Treatment is discouraged unless the patient is symptomatic, pregnant, or undergoing an invasive urologic procedure.        No radiology results from the last 24 hrs    Results for orders placed during the hospital encounter of 09/05/22    Adult Transthoracic Echo Complete w/ Color, Spectral and Contrast if Necessary Per Protocol    Interpretation Summary  · Left ventricular ejection fraction appears to be 21 - 25%. Left ventricular systolic function is severely decreased.  · Left ventricular diastolic function is consistent with (grade II w/high LAP) pseudonormalization.  · There is a small (<1cm) pericardial effusion.  · There is a left pleural effusion. There is a right pleural effusion.  · Moderate mitral valve regurgitation is present.  · Moderate to severe tricuspid valve regurgitation is present.  · Estimated right ventricular systolic pressure from tricuspid regurgitation is markedly elevated (>55 mmHg). Calculated right ventricular systolic pressure from tricuspid regurgitation is 56 mmHg.    I have reviewed the medications:  Scheduled Meds:amiodarone, 200 mg, Oral, Q8H   Followed by  [START ON 10/22/2022] amiodarone, 200 mg, Oral, Q12H   Followed by  [START ON 11/5/2022] amiodarone, 200 mg, Oral, Daily  artificial tears, 1 application, Both Eyes, Daily  aspirin, 81 mg, Oral, Daily  doxycycline, 100 mg, Oral, Q12H  First Mouthwash (Magic Mouthwash), 5 mL, Swish & Spit, Q6H  furosemide, 20 mg, Oral, Every Other Day  gabapentin, 100 mg, Oral, Nightly  heparin (porcine), 5,000 Units, Subcutaneous, Q12H  levothyroxine, 50 mcg, Oral, Q AM  memantine, 5 mg, Oral, BID  metoprolol succinate XL, 25 mg, Oral, Q12H  mirtazapine, 30 mg, Oral, Nightly  pantoprazole, 40 mg, Oral, BID  polyethylene glycol, 17 g, Oral, Daily  senna-docusate sodium, 2 tablet, Oral, Nightly  sodium chloride, 10 mL, Intravenous, Q12H      Continuous Infusions:   PRN Meds:.•  acetaminophen **OR** [DISCONTINUED] acetaminophen **OR**  [DISCONTINUED] acetaminophen  •  ALPRAZolam  •  senna-docusate sodium **AND** [DISCONTINUED] polyethylene glycol **AND** bisacodyl **AND** bisacodyl  •  ipratropium  •  polyvinyl alcohol  •  sodium chloride  •  sodium chloride    Assessment & Plan     Active Hospital Problems    Diagnosis  POA   • **Acute respiratory failure with hypoxia (HCC) [J96.01]  Yes   • Moderate malnutrition (HCC) [E44.0]  Yes   • Acute on chronic systolic congestive heart failure (HCC) [I50.23]  Yes   • Respiratory failure (HCC) [J96.90]  Yes   • Anxiety [F41.9]  Yes   • Hypothyroid [E03.9]  Yes   • Chronic systolic heart failure (HCC) [I50.22]  Yes   • History of CVA (cerebrovascular accident) [Z86.73]  Not Applicable   • Dementia (HCC) [F03.90]  Yes   • Hypertension [I10]  Yes      Resolved Hospital Problems   No resolved problems to display.     Brief Hospital Course to date:  Scott Diego is a 89 y.o. female with PMH significant for HTN, chronic systolic CHF (LVEF 21-25%), hypothyroidism, anxiety and dementia. She was recently admitted to [facility] 9/5-9/10/22 for respiratory failure secondary to COVID-19 and a/c HFrEF. During this hospitalization, she had new atrial fibrillation with RVR. Son noted several weeks of nausea, for which she was evaluated by GI and started on TID Guadalupe Root. She did not require antiemetics during her hospitalization and there was concern that her symptoms may be exacerbated by her progressive dementia or anxiety. She saw GI outpatient on 10/11 - PPI was increased to BID and an EGD was planned.    Admitted to [facility] 10/12/22 for acute hypoxic respiratory failure with concerns for aspiration pneumonia. Cardiology consulted to follow due to atrial fibrillation with RVR.     Acute hypoxic respiratory failure, suspect aspiration pneumonia   - 10/16 suspect CHF worse;  - Ongoing GI issues at home. Potential aspiration of coffee before bed vs reflux of stomach contents  -  proBNP reassuring. Doubt a/c HFeEF   - Continue IV Ceftriaxone / Doxycycline for pneumonia  - Required 4L NC on admission, currently on 3L NC. Wean O2 as able   - SLP following - currently recommending pureed solids with some mashed. Honey thick liquids.   - Patient is DNR/DNI - could consider comfort diet if within patient/family wishes, given her poor PO intake at baseline     Subacute nausea  Poor oral intake   - Evaluated by GI last admission - recommended TID guadalupe root and management of constipation  - Saw GI outpatient on 10/11 - PPI increased to BID and plans were made for outpatient EGD  - Continue bowel regimen and BID PPI. Guadalupe root is not on formulary - family can bring in   - QTc 562, stable, on amio.  Stopped Zofran and other antiemetics  - Try to manage nausea with home PRN Alprazolam today and see how this does. Some concern last admission that her nausea symptoms were exacerbated by her progressive dementia or anxiety  - Unclear if Reglan or other prokinetic medications would be helpful to her   - Added Mirtazapine QHS for appetite stimulation  -  GI consulted    Atrial fibrillation with RVR   - Appreciate cardiology assistance. Has converted to NSR.   - Started on Amiodarone gtt , now PO- Continue Metoprolol XL 25mg BID   -  now   - Not anticoagulated due to age and risk of falls    Chronic HFrEF (LVEF 21-25%), currently compensated  - Cardiology following.   - proBNP on admission significantly lower than 9/2022 admission for a/c CHF   - Continue Metoprolol. Other guideline-directed medications limited due to renal disease   - Cautious use of diuretics due to poor PO intake and risk of volume depletion   - proBNP quite up and pt tachypneic after getting up about 15 mins ago.  Will give extra dose lasix.     CKD IIIb  - Baseline creatinine appears to be 1.2-1.5  - Creatinine currently stable / at baseline  - Renally dose medications     Recent COVID-19 infection  - First positive test  "9/5/22 - she does not require any further enhanced isolation     Recently diagnosed UTI  - Took a single dose of recently prescribed Macrobid prior to admission  - UrCx from July during ED grew Staph aureus  - Urine culture this admission growing \"normal urogenital rebecca\" - most pathogens should be covered by above Ceftriaxone / Doxy     Alzheimer dementia  - Continue Namenda     Anxiety, continue home Alprazolam  Hypothyroidism, continue Synthroid      DVT prophylaxis: Heparin    Expected Discharge Location and Transportation: home via private vehicle   Expected Discharge Date: 10/17/22    DVT prophylaxis:  Medical DVT prophylaxis orders are present.     AM-PAC 6 Clicks Score (PT): 18 (10/15/22 1951)    CODE STATUS:   Code Status and Medical Interventions:   Ordered at: 10/12/22 0414     Medical Intervention Limits:    NO intubation (DNI)     Level Of Support Discussed With:    Next of Kin (If No Surrogate)    Patient     Code Status (Patient has no pulse and is not breathing):    No CPR (Do Not Attempt to Resuscitate)     Medical Interventions (Patient has pulse or is breathing):    Limited Support     Comments:    Likely would not wish for feeding tube either       Kimberli Mcdermott MD  10/16/22              "

## 2022-10-16 NOTE — PLAN OF CARE
Goal Outcome Evaluation:  Plan of Care Reviewed With: patient        Progress: no change  Outcome Evaluation: Pt confused, anxious and upset upon OT arrival. OT reoriented pt to place, situation, time and assisted pt w/ calling son. Pt demo good effort w/ grooming tasks SUA. Deferred OOB/EOB or ther ex d/t wanting to rest. Continue per current POC as able.

## 2022-10-16 NOTE — THERAPY TREATMENT NOTE
Patient Name: Scott Diego  : 4/10/1930    MRN: [id number]                              Today's Date: 10/16/2022       Admit Date: 10/12/2022    Visit Dx:     ICD-10-CM ICD-9-CM   1. Acute on chronic systolic congestive heart failure (HCC)  I50.23 428.23     428.0   2. Acute respiratory failure with hypoxia (HCC)  J96.01 518.81   3. Urinary tract infection without hematuria, site unspecified  N39.0 599.0   4. Oropharyngeal dysphagia  R13.12 787.22     Patient Active Problem List   Diagnosis   • Idiopathic peripheral neuropathy   • Neck pain   • Mild cognitive impairment   • Anemia   • Hypertension   • Dehydration   • History of CVA (cerebrovascular accident)   • Dementia (HCC)   • Left bundle branch block   • Orthostatic dizziness   • Chronic systolic heart failure (HCC)   • Normocytic anemia   • Dyspnea   • Hypoxia   • Hypothyroid   • Pericardial effusion   • Acute respiratory failure with hypoxia (HCC)   • Right lower lobe pneumonia   • Ventricular ectopy   • Migraine without aura and without status migrainosus, not intractable   • Acute hypoxemic respiratory failure due to COVID-19 (HCC)   • Dyspnea due to COVID-19   • COVID-19 virus detected   • Encephalopathy due to COVID-19 virus   • Acute on chronic congestive heart failure (HCC)   • Anxiety   • Bilateral hearing loss   • Impaired functional mobility, balance, gait, and endurance   • Nausea without vomiting   • Hypoxia   • Respiratory failure (HCC)   • Acute on chronic systolic congestive heart failure (HCC)   • Moderate malnutrition (HCC)     Past Medical History:   Diagnosis Date   • Anxiety    • Congestive heart failure (HCC)    • Coronary artery disease    • Dementia (HCC)    • Depression    • Hyperlipidemia    • Malignant neoplasm (HCC)    • Memory loss    • Peripheral neuropathy    • Stroke (HCC)     mini stroke   • Systolic heart failure (HCC) 2017    Chronic/compensated (EF 25%)     Past Surgical History:   Procedure Laterality Date   •  CARDIAC CATHETERIZATION     • CHOLECYSTECTOMY     • EYE SURGERY     • HYSTERECTOMY        General Information     Row Name 10/16/22 1512          OT Time and Intention    Document Type therapy note (daily note)  -MR     Mode of Treatment occupational therapy  -MR     Row Name 10/16/22 1512          General Information    Patient Profile Reviewed yes  -MR     Existing Precautions/Restrictions fall;oxygen therapy device and L/min  -MR     Barriers to Rehab previous functional deficit;cognitive status  -MR     Row Name 10/16/22 1512          Cognition    Orientation Status (Cognition) oriented to;person;disoriented to;place;situation;time  -MR     Row Name 10/16/22 1512          Safety Issues, Functional Mobility    Safety Issues Affecting Function (Mobility) ability to follow commands;awareness of need for assistance;insight into deficits/self-awareness;judgment;positioning of assistive device;safety precaution awareness;safety precautions follow-through/compliance;sequencing abilities  -MR     Impairments Affecting Function (Mobility) balance;cognition;endurance/activity tolerance;postural/trunk control;strength;visual/perceptual  -MR     Cognitive Impairments, Mobility Safety/Performance awareness, need for assistance;insight into deficits/self-awareness;judgment;safety precaution awareness;safety precaution follow-through;sequencing abilities  -MR           User Key  (r) = Recorded By, (t) = Taken By, (c) = Cosigned By    Initials Name Provider Type     Mariposa Zapata, OT Occupational Therapist                 Mobility/ADL's     Row Name 10/16/22 1513          Bed Mobility    Comment, (Bed Mobility) Pt deferred OOB/EOB this date.  -MR     Row Name 10/16/22 1513          Transfers    Comment, (Transfers) Pt deferred OOB/EOB this date.  -MR     Row Name 10/16/22 1513          Activities of Daily Living    BADL Assessment/Intervention grooming  -MR     Row Name 10/16/22 1513          Grooming Assessment/Training     [place] Level (Grooming) wash face, hands;set up  -MR     Position (Grooming) sitting up in bed  -MR           User Key  (r) = Recorded By, (t) = Taken By, (c) = Cosigned By    Initials Name Provider Type    Mariposa Hughes, OT Occupational Therapist               Obj/Interventions    No documentation.                Goals/Plan    No documentation.                Clinical Impression     Row Name 10/16/22 1514          Pain Assessment    Pretreatment Pain Rating 0/10 - no pain  -MR     Posttreatment Pain Rating 0/10 - no pain  -MR     Pain Intervention(s) Ambulation/increased activity;Repositioned  -MR     Row Name 10/16/22 1514          Plan of Care Review    Plan of Care Reviewed With patient  -MR     Progress no change  -MR     Outcome Evaluation Pt confused, anxious and upset upon OT arrival. OT reoriented pt to place, situation, time and assisted pt w/ calling son. Pt demo good effort w/ grooming tasks SUA. Deferred OOB/EOB or ther ex d/t wanting to rest. Continue per current POC as able.  -MR     Row Name 10/16/22 1514          Therapy Plan Review/Discharge Plan (OT)    Anticipated Discharge Disposition (OT) skilled nursing facility  -     Row Name 10/16/22 1514          Vital Signs    Pre Systolic BP Rehab 138  -MR     Pre Treatment Diastolic BP 85  -MR     Pretreatment Heart Rate (beats/min) 88  -MR     Posttreatment Heart Rate (beats/min) 85  -MR     Pre SpO2 (%) 97  -MR     O2 Delivery Pre Treatment nasal cannula  -MR     O2 Delivery Intra Treatment nasal cannula  -MR     Post SpO2 (%) 94  -MR     O2 Delivery Post Treatment nasal cannula  -MR     Pre Patient Position Supine  -MR     Intra Patient Position Supine  -MR     Post Patient Position Supine  -MR     Row Name 10/16/22 1514          Positioning and Restraints    Pre-Treatment Position in bed  -MR     Post Treatment Position bed  -MR     In Bed notified nsg;call light within reach;encouraged to call for assist;exit alarm on;with  family/caregiver;side rails up x3;fowlers  -MR           User Key  (r) = Recorded By, (t) = Taken By, (c) = Cosigned By    Initials Name Provider Type     BennyMariposa, JOURDAN Occupational Therapist               Outcome Measures     Row Name 10/16/22 1521          How much help from another is currently needed...    Putting on and taking off regular lower body clothing? 3  -MR     Bathing (including washing, rinsing, and drying) 2  -MR     Toileting (which includes using toilet bed pan or urinal) 2  -MR     Putting on and taking off regular upper body clothing 2  -MR     Taking care of personal grooming (such as brushing teeth) 3  -MR     Eating meals 3  -MR     AM-PAC 6 Clicks Score (OT) 15  -MR     Row Name 10/16/22 1521          Functional Assessment    Outcome Measure Options AM-PAC 6 Clicks Daily Activity (OT)  -MR           User Key  (r) = Recorded By, (t) = Taken By, (c) = Cosigned By    Initials Name Provider Type    Mariposa HughesJOURDAN Occupational Therapist                Occupational Therapy Education     Title: PT OT SLP Therapies (Done)     Topic: Occupational Therapy (Done)     Point: ADL training (Done)     Description:   Instruct learner(s) on proper safety adaptation and remediation techniques during self care or transfers.   Instruct in proper use of assistive devices.              Learning Progress Summary           Patient Acceptance, E, VU by MR at 10/16/2022 1521    Acceptance, E,TB, VU by MA at 10/15/2022 1313    Acceptance, E, VU,NR by JT at 10/13/2022 0318    Acceptance, E,D, NR by ASHKAN at 10/12/2022 1033    Comment: reason for consult, bed mobility sequencing, trasnfer and wx safety, BADL sequencing and safety   Family Acceptance, E, VU by MR at 10/16/2022 1521                   Point: Home exercise program (Done)     Description:   Instruct learner(s) on appropriate technique for monitoring, assisting and/or progressing therapeutic exercises/activities.              Learning Progress  Summary           Patient Acceptance, E,TB, VU by MA at 10/15/2022 1313                   Point: Precautions (Done)     Description:   Instruct learner(s) on prescribed precautions during self-care and functional transfers.              Learning Progress Summary           Patient Acceptance, E, VU by MR at 10/16/2022 1521    Acceptance, E,TB, VU by MA at 10/15/2022 1313    Acceptance, E, VU,NR by JT at 10/13/2022 0318    Acceptance, E,D, NR by ASHKAN at 10/12/2022 1033    Comment: reason for consult, bed mobility sequencing, trasnfer and wx safety, BADL sequencing and safety   Family Acceptance, E, VU by MR at 10/16/2022 1521                   Point: Body mechanics (Done)     Description:   Instruct learner(s) on proper positioning and spine alignment during self-care, functional mobility activities and/or exercises.              Learning Progress Summary           Patient Acceptance, E, VU by MR at 10/16/2022 1521    Acceptance, E,TB, VU by MA at 10/15/2022 1313    Acceptance, E, VU,NR by JAG at 10/13/2022 0318    Acceptance, E,D, NR by ASHKAN at 10/12/2022 1033    Comment: reason for consult, bed mobility sequencing, trasnfer and wx safety, BADL sequencing and safety   Family Acceptance, E, VU by MR at 10/16/2022 1521                               User Key     Initials Effective Dates Name Provider Type Discipline     06/16/21 -  Rosario Ballesteros, OT Occupational Therapist OT    MA 10/11/22 -  Keyla Wharton, RN Registered Nurse Nurse     09/22/22 -  Mariposa Zapata OT Occupational Therapist OT    JT 03/25/22 -  Palomo Morales, SOHEILA Registered Nurse Nurse              OT Recommendation and Plan     Plan of Care Review  Plan of Care Reviewed With: patient  Progress: no change  Outcome Evaluation: Pt confused, anxious and upset upon OT arrival. OT reoriented pt to place, situation, time and assisted pt w/ calling son. Pt demo good effort w/ grooming tasks SUA. Deferred OOB/EOB or ther ex d/t wanting to rest. Continue per  current POC as able.     Time Calculation:    Time Calculation- OT     Row Name 10/16/22 1522             Time Calculation- OT    OT Start Time 1447  -MR      OT Received On 10/16/22  -MR      OT Goal Re-Cert Due Date 10/22/22  -MR         Timed Charges    46507 - OT Therapeutic Activity Minutes 8  -MR      91749 - OT Self Care/Mgmt Minutes 20  -MR         Total Minutes    Timed Charges Total Minutes 28  -MR       Total Minutes 28  -MR            User Key  (r) = Recorded By, (t) = Taken By, (c) = Cosigned By    Initials Name Provider Type     Mariposa Zapata OT Occupational Therapist              Therapy Charges for Today     Code Description Service Date Service Provider Modifiers Qty    65076087002 HC OT THERAPEUTIC ACT EA 15 MIN 10/16/2022 Mariposa Zapata OT GO 1    93933744055 HC OT SELF CARE/MGMT/TRAIN EA 15 MIN 10/16/2022 Mariposa Zapata OT GO 1               Mariposa Zapata OT  10/16/2022

## 2022-10-16 NOTE — CONSULTS
[facility] Consult    Referring Provider: Kimberli Mcdermott MD    PCP: Ngozi Davis DO    Reason for Consultation: persistent nausea. prolonged QTc limits antiemetic options. Outpatient EGD planned    Chief complaint: Short of breath    History of present illness:    Scott Diego is a 89 y.o. female who is admitted with dyspnea.  There was associated wheezing.  Symptoms not improved with Xanax.  Since hospitalized, she has been treated for possible aspiration pneumonitis.  She recently had COVID-pneumonia.      She has had intermittent nausea for 4 months.  She was evaluated by our service 1 month ago when hospitalized for COVID-pneumonia.  She had similar complaints of nausea prior to her last admission, which resolved in the hospital without specific intervention.  Several CT scans of the abdomen were unremarkable apart from excess stool burden consistent with constipation.  At the time of her admission 1 month ago, scopolamine was discontinued (ineffective), Pepcid was switched to pantoprazole, and gingerroot 500 mg 3 times daily was recommended for nausea along with MiraLAX daily for bowel regimen.  On this regimen, constipation improved.  She initially did well with respect to nausea, but symptoms of nausea returned after a couple of weeks.  Patient had a telemedicine visit with Priya Sanabria PA-C on 10/11/2022, and an EGD was scheduled on 11/1/2022.    Allergies:  Augmentin [amoxicillin-pot clavulanate], Claritin [loratadine], Keflex [cephalexin], and Sulfa antibiotics    Scheduled Meds:  amiodarone, 200 mg, Oral, Q8H   Followed by  [START ON 10/22/2022] amiodarone, 200 mg, Oral, Q12H   Followed by  [START ON 11/5/2022] amiodarone, 200 mg, Oral, Daily  artificial tears, 1 application, Both Eyes, Daily  aspirin, 81 mg, Oral, Daily  doxycycline, 100 mg, Oral, Q12H  First Mouthwash (Magic Mouthwash), 5 mL, Swish & Spit, Q6H  furosemide, 20 mg, Oral, Every Other Day  gabapentin, 100 mg, Oral,  Nightly  heparin (porcine), 5,000 Units, Subcutaneous, Q12H  levothyroxine, 50 mcg, Oral, Q AM  memantine, 5 mg, Oral, BID  metoprolol succinate XL, 25 mg, Oral, Q12H  mirtazapine, 30 mg, Oral, Nightly  pantoprazole, 40 mg, Oral, BID  polyethylene glycol, 17 g, Oral, Daily  senna-docusate sodium, 2 tablet, Oral, Nightly  sodium chloride, 10 mL, Intravenous, Q12H         Infusions:       PRN Meds:  •  acetaminophen **OR** [DISCONTINUED] acetaminophen **OR** [DISCONTINUED] acetaminophen  •  ALPRAZolam  •  senna-docusate sodium **AND** [DISCONTINUED] polyethylene glycol **AND** bisacodyl **AND** bisacodyl  •  ipratropium  •  polyvinyl alcohol  •  sodium chloride  •  sodium chloride    Home Meds:  Medications Prior to Admission   Medication Sig Dispense Refill Last Dose   • ALPRAZolam (XANAX) 0.5 MG tablet Take 0.5 mg by mouth Every 6 (Six) Hours As Needed.   10/12/2022   • fexofenadine (ALLEGRA) 60 MG tablet Take 1 tablet by mouth Daily. 30 tablet 5 10/11/2022   • furosemide (LASIX) 20 MG tablet Take 1 tablet by mouth Daily As Needed (increased shortness of breath, swelling). (Patient taking differently: Take 1 tablet by mouth Every Other Day.) 90 tablet 0 10/10/2022   • gabapentin (NEURONTIN) 100 MG capsule Take 100 mg by mouth Daily.   10/11/2022   • gabapentin (NEURONTIN) 300 MG capsule Take 1 capsule by mouth Every Night. 90 capsule 0 10/5/2022   • Ginger, Zingiber officinalis, (Ginger Root) 500 MG capsule Take 1 capsule by mouth 3 (Three) Times a Day for 30 days. 90 capsule 2 10/11/2022   • levothyroxine (SYNTHROID, LEVOTHROID) 50 MCG tablet Take 1 tablet by mouth Every Morning. 30 tablet 5 10/11/2022   • memantine (NAMENDA) 5 MG tablet Take 1 tablet by mouth 2 (Two) Times a Day. 180 tablet 1 10/11/2022   • metoprolol succinate XL (TOPROL-XL) 25 MG 24 hr tablet Take 1 tablet by mouth Daily. 30 tablet 5 10/11/2022   • ondansetron ODT (ZOFRAN-ODT) 4 MG disintegrating tablet Place 1 tablet on the tongue Every 6 (Six)  Hours As Needed for Nausea. (Patient taking differently: Place 1 tablet on the tongue Every 4 (Four) Hours As Needed for Nausea.) 20 tablet 0 Patient Taking Differently   • pantoprazole (PROTONIX) 40 MG EC tablet Take 1 tablet by mouth 2 (Two) Times a Day. 60 tablet 1 10/11/2022   • SM Aspirin Adult Low Strength 81 MG EC tablet Take 81 mg by mouth Daily.   10/11/2022   • acetaminophen (TYLENOL) 325 MG tablet Take 2 tablets by mouth Every 4 (Four) Hours As Needed for Mild Pain .   10/9/2022   • Artificial Tear Ointment (artificial tears) ophthalmic ointment Administer 1 application to both eyes Daily.   10/9/2022   • atorvastatin (LIPITOR) 80 MG tablet Take 80 mg by mouth Every Night.   10/10/2022   • bisacodyl (Dulcolax) 10 MG suppository Insert 1 suppository into the rectum Daily As Needed for Constipation. If no BM in 3 days take supp 15 suppository 1    • carboxymethylcellulose (REFRESH PLUS) 0.5 % solution 3 (Three) Times a Day As Needed for Dry Eyes.      • Glycerin-Polysorbate 80 (REFRESH DRY EYE THERAPY OP) Apply  to eye(s) as directed by provider As Needed.      • [] polyethylene glycol (MIRALAX) 17 GM/SCOOP powder Mix 17 g in liquid and drink by mouth Daily for 30 days. 510 g 0        ROS: Review of Systems   Constitutional: Positive for activity change, appetite change and fatigue. Negative for chills and fever.   HENT: Negative.  Negative for mouth sores, nosebleeds and trouble swallowing.    Eyes: Negative.    Respiratory: Positive for shortness of breath. Negative for cough, chest tightness, wheezing and stridor.    Cardiovascular: Negative.  Negative for chest pain and leg swelling.   Gastrointestinal: Positive for constipation and nausea. Negative for abdominal pain, blood in stool and vomiting.   Endocrine: Negative.    Genitourinary: Negative.    Musculoskeletal: Negative.    Skin: Negative.    Allergic/Immunologic: Negative.    Neurological: Positive for weakness. Negative for tremors,  "seizures, syncope, light-headedness and headaches.   Hematological: Negative.  Negative for adenopathy. Does not bruise/bleed easily.   Psychiatric/Behavioral: Negative for agitation and behavioral problems.       PAST MED HX:  Past Medical History:   Diagnosis Date   • Anxiety    • Congestive heart failure (HCC)    • Coronary artery disease    • Dementia (HCC)    • Depression    • Hyperlipidemia    • Malignant neoplasm (HCC)    • Memory loss    • Peripheral neuropathy    • Stroke (HCC)     mini stroke   • Systolic heart failure (HCC) 09/25/2017    Chronic/compensated (EF 25%)       PAST SURG HX:  Past Surgical History:   Procedure Laterality Date   • CARDIAC CATHETERIZATION     • CHOLECYSTECTOMY     • EYE SURGERY     • HYSTERECTOMY         FAM HX:  Family History   Problem Relation Age of Onset   • Cancer Mother    • No Known Problems Father    • Cancer Sister    • Cancer Brother    • Parkinsonism Brother    • No Known Problems Son        SOC HX:  Social History     Socioeconomic History   • Marital status:    Tobacco Use   • Smoking status: Never   • Smokeless tobacco: Never   Vaping Use   • Vaping Use: Never used   Substance and Sexual Activity   • Alcohol use: Never   • Drug use: Never   • Sexual activity: Defer     Comment:  since 1/2022       PHYSICAL EXAM  /85 (BP Location: Right arm, Patient Position: Lying)   Pulse 67   Temp 98.8 °F (37.1 °C) (Oral)   Resp 16   Ht 162.6 cm (64\")   Wt 53.7 kg (118 lb 6.4 oz)   SpO2 98%   BMI 20.32 kg/m²   Wt Readings from Last 3 Encounters:   10/16/22 53.7 kg (118 lb 6.4 oz)   10/10/22 53.7 kg (118 lb 6.4 oz)   10/08/22 53.1 kg (117 lb)   ,body mass index is 20.32 kg/m².  Physical Exam  Vitals and nursing note reviewed.   Constitutional:       General: She is not in acute distress.     Appearance: She is ill-appearing. She is not toxic-appearing or diaphoretic.      Comments: Appears elderly, and pleasant.   HENT:      Head: Normocephalic.      " Nose: Nose normal. No congestion.      Mouth/Throat:      Mouth: Mucous membranes are moist.      Pharynx: No oropharyngeal exudate.   Eyes:      General: No scleral icterus.     Conjunctiva/sclera: Conjunctivae normal.   Cardiovascular:      Rate and Rhythm: Normal rate.      Pulses: Normal pulses.   Pulmonary:      Effort: Pulmonary effort is normal. No respiratory distress.      Breath sounds: No stridor. No wheezing or rales.   Abdominal:      General: Bowel sounds are normal. There is no distension.      Palpations: Abdomen is soft.      Tenderness: There is no abdominal tenderness.   Genitourinary:     Comments: Deferred  Musculoskeletal:      Cervical back: Normal range of motion.      Right lower leg: No edema.      Left lower leg: No edema.   Skin:     General: Skin is warm and dry.      Capillary Refill: Capillary refill takes less than 2 seconds.      Coloration: Skin is not jaundiced or pale.      Findings: No erythema or rash.   Neurological:      General: No focal deficit present.   Psychiatric:         Mood and Affect: Mood normal.         Behavior: Behavior normal.         Results Review:   I reviewed the patient's new clinical results.    Lab Results   Component Value Date    WBC 7.56 10/16/2022    HGB 9.2 (L) 10/16/2022    HGB 8.3 (L) 10/15/2022    HGB 8.6 (L) 10/14/2022    HCT 29.9 (L) 10/16/2022    MCV 97.4 (H) 10/16/2022     10/16/2022       Lab Results   Component Value Date    INR 1.42 (H) 09/10/2022    INR 1.33 (H) 09/08/2022    INR 1.38 (H) 09/06/2022       Lab Results   Component Value Date    GLUCOSE 97 10/16/2022    BUN 23 10/16/2022    CREATININE 1.19 (H) 10/16/2022    EGFRIFNONA 46 (L) 06/19/2018    BCR 19.3 10/16/2022     10/16/2022    K 4.1 10/16/2022    CO2 23.0 10/16/2022    CALCIUM 9.1 10/16/2022    ALBUMIN 3.10 (L) 10/14/2022    ALKPHOS 41 10/14/2022    BILITOT 0.3 10/14/2022    BILIDIR 0.2 09/09/2022    ALT 5 10/14/2022    AST 12 10/14/2022  "      ASSESSMENTS/PLANS    1.  Subacute intermittent nausea.  No emesis.  Patient points to her epigastrium and states \"I'm sick\".   At risk for Candida esophagitis. Doubt post-COVID gastroparesis, as symptoms preceded this infection.  Symptoms unrelated to meals.  No evidence for biliary disease.  Some concern that her symptoms may be exacerbated by anxiety and her progressive cognitive impairment.  Symptoms have been disabling.  She has had nearly a dozen ER visits for these symptoms.  Multiple interventions have been unhelpful including Zofran, anxiolytics, gingerroot, resolution of constipation, high-dose PPI therapy, and a trial off her statin medication.  Prolonged QT precludes use of several antiemetics.  2.  Severe systolic congestive heart failure. EF 21-25%.  3.  Dyspnea, ongoing.  Worse with exertion, but appears at baseline.  4.  Anorexia.  Patient started on Remeron yesterday.  5.  Oropharyngeal dysphagia.  Puréed/mashed solids and honey thick liquids recommended by ST.    >> EGD tomorrow.  Discussed risk and benefit with patient's son.  Symptoms have consumed patient's life for the past 4 months.  Noninvasive testing has been unrevealing, and multiple trials of medications have been unhelpful.  Patient's son understands that her heart function is poor and that sedation carries more risk due to this.  He states that she is miserable, and is anxious to proceed with EGD tomorrow for his mother.  >> Continue Remeron.  >> Continue PPI.  >> Continue bowel regimen (MiraLAX daily, and stool softener daily).    I discussed the patient's findings and my recommendations with patient, family and nursing staff    Mark I. Brunner, MD  10/16/22  17:48 EDT    "

## 2022-10-16 NOTE — PLAN OF CARE
Goal Outcome Evaluation:      VSS. On 3L nasal cannula stating at 94%. Alert and oriented to self. Amiodarone drip stopped, amiodarone PO started. Denies shortness of breath or pain. Patient became anxious during the night, anxiety medication administered, see MAR. Resting comfortably call light in reach.

## 2022-10-17 ENCOUNTER — ANESTHESIA EVENT (OUTPATIENT)
Dept: GASTROENTEROLOGY | Facility: HOSPITAL | Age: 87
End: 2022-10-17

## 2022-10-17 ENCOUNTER — ANESTHESIA (OUTPATIENT)
Dept: GASTROENTEROLOGY | Facility: HOSPITAL | Age: 87
End: 2022-10-17

## 2022-10-17 LAB
ANION GAP SERPL CALCULATED.3IONS-SCNC: 13 MMOL/L (ref 5–15)
BACTERIA SPEC AEROBE CULT: NORMAL
BACTERIA SPEC AEROBE CULT: NORMAL
BUN SERPL-MCNC: 25 MG/DL (ref 8–23)
BUN/CREAT SERPL: 17.2 (ref 7–25)
CALCIUM SPEC-SCNC: 9.1 MG/DL (ref 8.2–9.6)
CHLORIDE SERPL-SCNC: 102 MMOL/L (ref 98–107)
CO2 SERPL-SCNC: 26 MMOL/L (ref 22–29)
CREAT SERPL-MCNC: 1.45 MG/DL (ref 0.57–1)
EGFRCR SERPLBLD CKD-EPI 2021: 33.9 ML/MIN/1.73
GLUCOSE SERPL-MCNC: 85 MG/DL (ref 65–99)
MAGNESIUM SERPL-MCNC: 2.1 MG/DL (ref 1.7–2.3)
POTASSIUM SERPL-SCNC: 3.7 MMOL/L (ref 3.5–5.2)
SODIUM SERPL-SCNC: 141 MMOL/L (ref 136–145)

## 2022-10-17 PROCEDURE — 92526 ORAL FUNCTION THERAPY: CPT

## 2022-10-17 PROCEDURE — 25010000002 HEPARIN (PORCINE) PER 1000 UNITS: Performed by: INTERNAL MEDICINE

## 2022-10-17 PROCEDURE — 25010000002 PROPOFOL 10 MG/ML EMULSION

## 2022-10-17 PROCEDURE — 93005 ELECTROCARDIOGRAM TRACING: CPT | Performed by: NURSE PRACTITIONER

## 2022-10-17 PROCEDURE — 83735 ASSAY OF MAGNESIUM: CPT | Performed by: INTERNAL MEDICINE

## 2022-10-17 PROCEDURE — 43235 EGD DIAGNOSTIC BRUSH WASH: CPT | Performed by: INTERNAL MEDICINE

## 2022-10-17 PROCEDURE — 97116 GAIT TRAINING THERAPY: CPT

## 2022-10-17 PROCEDURE — 99232 SBSQ HOSP IP/OBS MODERATE 35: CPT | Performed by: INTERNAL MEDICINE

## 2022-10-17 PROCEDURE — 0DJ08ZZ INSPECTION OF UPPER INTESTINAL TRACT, VIA NATURAL OR ARTIFICIAL OPENING ENDOSCOPIC: ICD-10-PCS | Performed by: INTERNAL MEDICINE

## 2022-10-17 PROCEDURE — 80048 BASIC METABOLIC PNL TOTAL CA: CPT | Performed by: INTERNAL MEDICINE

## 2022-10-17 PROCEDURE — 97530 THERAPEUTIC ACTIVITIES: CPT

## 2022-10-17 RX ORDER — MIDAZOLAM HYDROCHLORIDE 1 MG/ML
0.5 INJECTION INTRAMUSCULAR; INTRAVENOUS
Status: CANCELLED | OUTPATIENT
Start: 2022-10-17

## 2022-10-17 RX ORDER — FAMOTIDINE 20 MG/1
20 TABLET, FILM COATED ORAL ONCE
Status: CANCELLED | OUTPATIENT
Start: 2022-10-17 | End: 2022-10-17

## 2022-10-17 RX ORDER — SODIUM CHLORIDE 0.9 % (FLUSH) 0.9 %
10 SYRINGE (ML) INJECTION AS NEEDED
Status: CANCELLED | OUTPATIENT
Start: 2022-10-17

## 2022-10-17 RX ORDER — HYDROMORPHONE HYDROCHLORIDE 1 MG/ML
0.5 INJECTION, SOLUTION INTRAMUSCULAR; INTRAVENOUS; SUBCUTANEOUS
Status: DISCONTINUED | OUTPATIENT
Start: 2022-10-17 | End: 2022-10-17

## 2022-10-17 RX ORDER — LIDOCAINE HYDROCHLORIDE 10 MG/ML
INJECTION, SOLUTION EPIDURAL; INFILTRATION; INTRACAUDAL; PERINEURAL AS NEEDED
Status: DISCONTINUED | OUTPATIENT
Start: 2022-10-17 | End: 2022-10-17 | Stop reason: SURG

## 2022-10-17 RX ORDER — SODIUM CHLORIDE, SODIUM LACTATE, POTASSIUM CHLORIDE, CALCIUM CHLORIDE 600; 310; 30; 20 MG/100ML; MG/100ML; MG/100ML; MG/100ML
9 INJECTION, SOLUTION INTRAVENOUS CONTINUOUS
Status: CANCELLED | OUTPATIENT
Start: 2022-10-17

## 2022-10-17 RX ORDER — SODIUM CHLORIDE 9 MG/ML
INJECTION, SOLUTION INTRAVENOUS CONTINUOUS PRN
Status: DISCONTINUED | OUTPATIENT
Start: 2022-10-17 | End: 2022-10-17 | Stop reason: SURG

## 2022-10-17 RX ORDER — LIDOCAINE HYDROCHLORIDE 10 MG/ML
0.5 INJECTION, SOLUTION EPIDURAL; INFILTRATION; INTRACAUDAL; PERINEURAL ONCE AS NEEDED
Status: CANCELLED | OUTPATIENT
Start: 2022-10-17

## 2022-10-17 RX ORDER — SODIUM CHLORIDE 0.9 % (FLUSH) 0.9 %
10 SYRINGE (ML) INJECTION EVERY 12 HOURS SCHEDULED
Status: CANCELLED | OUTPATIENT
Start: 2022-10-17

## 2022-10-17 RX ORDER — FUROSEMIDE 20 MG/1
20 TABLET ORAL ONCE
Status: COMPLETED | OUTPATIENT
Start: 2022-10-17 | End: 2022-10-17

## 2022-10-17 RX ORDER — FAMOTIDINE 10 MG/ML
20 INJECTION, SOLUTION INTRAVENOUS ONCE
Status: CANCELLED | OUTPATIENT
Start: 2022-10-17 | End: 2022-10-17

## 2022-10-17 RX ORDER — PROPOFOL 10 MG/ML
VIAL (ML) INTRAVENOUS AS NEEDED
Status: DISCONTINUED | OUTPATIENT
Start: 2022-10-17 | End: 2022-10-17 | Stop reason: SURG

## 2022-10-17 RX ADMIN — MEMANTINE 5 MG: 5 TABLET ORAL at 21:42

## 2022-10-17 RX ADMIN — AMIODARONE HYDROCHLORIDE 200 MG: 200 TABLET ORAL at 01:43

## 2022-10-17 RX ADMIN — ALPRAZOLAM 0.5 MG: 0.5 TABLET ORAL at 21:42

## 2022-10-17 RX ADMIN — SENNOSIDES AND DOCUSATE SODIUM 2 TABLET: 50; 8.6 TABLET ORAL at 21:42

## 2022-10-17 RX ADMIN — AMIODARONE HYDROCHLORIDE 200 MG: 200 TABLET ORAL at 10:18

## 2022-10-17 RX ADMIN — AMIODARONE HYDROCHLORIDE 200 MG: 200 TABLET ORAL at 17:02

## 2022-10-17 RX ADMIN — DIPHENHYDRAMINE HYDROCHLORIDE AND LIDOCAINE HYDROCHLORIDE AND ALUMINUM HYDROXIDE AND MAGNESIUM HYDRO 5 ML: KIT at 01:43

## 2022-10-17 RX ADMIN — MIRTAZAPINE 30 MG: 15 TABLET, FILM COATED ORAL at 21:42

## 2022-10-17 RX ADMIN — Medication 10 ML: at 21:43

## 2022-10-17 RX ADMIN — METOPROLOL SUCCINATE 25 MG: 25 TABLET, FILM COATED, EXTENDED RELEASE ORAL at 21:41

## 2022-10-17 RX ADMIN — ASPIRIN 81 MG: 81 TABLET, COATED ORAL at 10:18

## 2022-10-17 RX ADMIN — SODIUM CHLORIDE: 9 INJECTION, SOLUTION INTRAVENOUS at 08:00

## 2022-10-17 RX ADMIN — HEPARIN SODIUM 5000 UNITS: 5000 INJECTION INTRAVENOUS; SUBCUTANEOUS at 21:43

## 2022-10-17 RX ADMIN — LIDOCAINE HYDROCHLORIDE 50 MG: 10 INJECTION, SOLUTION EPIDURAL; INFILTRATION; INTRACAUDAL; PERINEURAL at 08:51

## 2022-10-17 RX ADMIN — POLYETHYLENE GLYCOL 3350 17 G: 17 POWDER, FOR SOLUTION ORAL at 10:18

## 2022-10-17 RX ADMIN — PROPOFOL 10 MG: 10 INJECTION, EMULSION INTRAVENOUS at 08:53

## 2022-10-17 RX ADMIN — PROPOFOL 30 MG: 10 INJECTION, EMULSION INTRAVENOUS at 08:51

## 2022-10-17 RX ADMIN — FUROSEMIDE 20 MG: 20 TABLET ORAL at 12:58

## 2022-10-17 RX ADMIN — HEPARIN SODIUM 5000 UNITS: 5000 INJECTION INTRAVENOUS; SUBCUTANEOUS at 10:18

## 2022-10-17 RX ADMIN — MEMANTINE 5 MG: 5 TABLET ORAL at 10:18

## 2022-10-17 RX ADMIN — PANTOPRAZOLE SODIUM 40 MG: 40 TABLET, DELAYED RELEASE ORAL at 10:18

## 2022-10-17 RX ADMIN — PANTOPRAZOLE SODIUM 40 MG: 40 TABLET, DELAYED RELEASE ORAL at 21:42

## 2022-10-17 RX ADMIN — Medication 10 ML: at 10:19

## 2022-10-17 RX ADMIN — DIPHENHYDRAMINE HYDROCHLORIDE AND LIDOCAINE HYDROCHLORIDE AND ALUMINUM HYDROXIDE AND MAGNESIUM HYDRO 5 ML: KIT at 21:45

## 2022-10-17 RX ADMIN — MINERAL OIL AND PETROLATUM 1 APPLICATION: 150; 830 OINTMENT OPHTHALMIC at 10:19

## 2022-10-17 RX ADMIN — LEVOTHYROXINE SODIUM 50 MCG: 50 TABLET ORAL at 05:34

## 2022-10-17 RX ADMIN — GABAPENTIN 100 MG: 100 CAPSULE ORAL at 21:43

## 2022-10-17 RX ADMIN — DIPHENHYDRAMINE HYDROCHLORIDE AND LIDOCAINE HYDROCHLORIDE AND ALUMINUM HYDROXIDE AND MAGNESIUM HYDRO 5 ML: KIT at 13:02

## 2022-10-17 RX ADMIN — METOPROLOL SUCCINATE 25 MG: 25 TABLET, FILM COATED, EXTENDED RELEASE ORAL at 10:18

## 2022-10-17 RX ADMIN — POLYVINYL ALCOHOL 1 DROP: 14 SOLUTION/ DROPS OPHTHALMIC at 10:19

## 2022-10-17 NOTE — THERAPY TREATMENT NOTE
Patient Name: Scott Diego  : 4/10/1930    MRN: [id number]                              Today's Date: 10/17/2022       Admit Date: 10/12/2022    Visit Dx:     ICD-10-CM ICD-9-CM   1. Acute on chronic systolic congestive heart failure (HCC)  I50.23 428.23     428.0   2. Acute respiratory failure with hypoxia (HCC)  J96.01 518.81   3. Urinary tract infection without hematuria, site unspecified  N39.0 599.0   4. Oropharyngeal dysphagia  R13.12 787.22   5. Nausea  R11.0 787.02     Patient Active Problem List   Diagnosis   • Idiopathic peripheral neuropathy   • Neck pain   • Mild cognitive impairment   • Anemia   • Hypertension   • Dehydration   • History of CVA (cerebrovascular accident)   • Dementia (HCC)   • Left bundle branch block   • Orthostatic dizziness   • Chronic systolic heart failure (HCC)   • Normocytic anemia   • Dyspnea   • Hypoxia   • Hypothyroid   • Pericardial effusion   • Acute respiratory failure with hypoxia (HCC)   • Right lower lobe pneumonia   • Ventricular ectopy   • Migraine without aura and without status migrainosus, not intractable   • Acute hypoxemic respiratory failure due to COVID-19 (HCC)   • Dyspnea due to COVID-19   • COVID-19 virus detected   • Encephalopathy due to COVID-19 virus   • Acute on chronic congestive heart failure (HCC)   • Anxiety   • Bilateral hearing loss   • Impaired functional mobility, balance, gait, and endurance   • Nausea without vomiting   • Hypoxia   • Respiratory failure (HCC)   • Acute on chronic systolic congestive heart failure (HCC)   • Moderate malnutrition (HCC)     Past Medical History:   Diagnosis Date   • Anxiety    • Congestive heart failure (HCC)    • Coronary artery disease    • Dementia (HCC)    • Depression    • Hyperlipidemia    • Malignant neoplasm (HCC)    • Memory loss    • Peripheral neuropathy    • Stroke (HCC)     mini stroke   • Systolic heart failure (HCC) 2017    Chronic/compensated (EF 25%)     Past Surgical History:    Procedure Laterality Date   • CARDIAC CATHETERIZATION     • CHOLECYSTECTOMY     • EYE SURGERY     • HYSTERECTOMY        General Information     [facility] Name 10/17/22 1059          Physical Therapy Time and Intention    Document Type therapy note (daily note)  -     Mode of Treatment physical therapy  -[facility] Name 10/17/22 1059          General Information    Existing Precautions/Restrictions fall;oxygen therapy device and L/min  -[facility] Name 10/17/22 1059          Cognition    Orientation Status (Cognition) oriented to;person;place  -[facility] Name 10/17/22 1059          Safety Issues, Functional Mobility    Safety Issues Affecting Function (Mobility) insight into deficits/self-awareness;sequencing abilities;safety precaution awareness  -     Impairments Affecting Function (Mobility) balance;cognition;endurance/activity tolerance;postural/trunk control;strength;visual/perceptual  -           User Key  (r) = Recorded By, (t) = Taken By, (c) = Cosigned By    Initials Name Provider Type     Priya Monzon PT Physical Therapist               Mobility     Row Name 10/17/22 1151          Bed Mobility    Bed Mobility supine-sit  -     Supine-Sit [employer] (Bed Mobility) contact guard  -     Assistive Device (Bed Mobility) bed rails;head of bed elevated  -     Comment, (Bed Mobility) Pt able to complete with extra time and verbal cues  -     Row Name 10/17/22 1151          Transfers    Comment, (Transfers) STS from EOB with CGA and bathroom toilet with Aron with cues for hand placement and sequencing  -[facility] Name 10/17/22 1151          Sit-Stand Transfer    Sit-Stand [employer] (Transfers) contact guard;1 person assist;verbal cues;minimum assist (75% patient effort)  -     Assistive Device (Sit-Stand Transfers) walker, front-wheeled  -[facility] Name 10/17/22 1151          Gait/Stairs (Locomotion)    [employer] Level (Gait) contact guard;1 person assist  -     Assistive Device  (Gait) walker, front-wheeled  -SJ     Distance in Feet (Gait) 10ft + 20ft  -SJ     Deviations/Abnormal Patterns (Gait) bilateral deviations;festinating/shuffling;stride length decreased;weight shifting decreased  -SJ     Bilateral Gait Deviations forward flexed posture;heel strike decreased  -SJ     Comment, (Gait/Stairs) pt amb within room with RW and CGA, dem forward flexed posture and slow gait speed. VSS on 4LO2nc.  -           User Key  (r) = Recorded By, (t) = Taken By, (c) = Cosigned By    Initials Name Provider Type    Priya Miller PT Physical Therapist               Obj/Interventions     Row Name 10/17/22 1154          Motor Skills    Therapeutic Exercise hip;knee;ankle  -     Row Name 10/17/22 115          Hip (Therapeutic Exercise)    Hip (Therapeutic Exercise) AROM (active range of motion)  -     Hip AROM (Therapeutic Exercise) bilateral;flexion;sitting;10 repetitions  -     Row Name 10/17/22 1154          Knee (Therapeutic Exercise)    Knee (Therapeutic Exercise) AROM (active range of motion)  -     Knee AROM (Therapeutic Exercise) LAQ (long arc quad);10 repetitions;bilateral;sitting  -     Row Name 10/17/22 1154          Ankle (Therapeutic Exercise)    Ankle (Therapeutic Exercise) AROM (active range of motion)  -     Ankle AROM (Therapeutic Exercise) bilateral;dorsiflexion;plantarflexion;sitting;10 repetitions  -     Row Name 10/17/22 1154          Balance    Static Sitting Balance supervision  -     Dynamic Sitting Balance supervision  -SJ     Position, Sitting Balance unsupported;sitting edge of bed  -SJ     Static Standing Balance contact guard  -SJ     Position/Device Used, Standing Balance supported;walker, rolling  -SJ     Balance Interventions dynamic;sitting;occupation based/functional task  -SJ           User Key  (r) = Recorded By, (t) = Taken By, (c) = Cosigned By    Initials Name Provider Type    Priya Miller PT Physical Therapist               Goals/Plan     No documentation.                Clinical Impression     Row Name 10/17/22 1155          Pain    Pretreatment Pain Rating 0/10 - no pain  -SJ     Posttreatment Pain Rating 0/10 - no pain  -SJ     Row Name 10/17/22 1156 10/17/22 1155       Plan of Care Review    Plan of Care Reviewed With -- patient  -SJ    Progress -- improving  -SJ    Outcome Evaluation Pt performed bed mob with CGA, STS from EOB with CGA and bathroom toilet with Aron with cues for hand placement and sequencing. Pt able to amb short distances in room with RW and CGA on 4LO2nc with VSS. Pt pleasant and cooperative. Continue POC.  -SJ --    Row Name 10/17/22 1156          Vital Signs    Pre Systolic BP Rehab 131  -SJ     Pre Treatment Diastolic BP 63  -SJ     Pretreatment Heart Rate (beats/min) 79  -SJ     Pre SpO2 (%) 98  -SJ     O2 Delivery Pre Treatment nasal cannula  -SJ     Pre Patient Position Supine  -SJ     Post Patient Position Sitting  -SJ     Row Name 10/17/22 1156          Positioning and Restraints    Pre-Treatment Position in bed  -SJ     Post Treatment Position chair  -SJ     In Chair reclined;call light within reach;encouraged to call for assist;exit alarm on;waffle cushion;heels elevated;with nsg  -SJ           User Key  (r) = Recorded By, (t) = Taken By, (c) = Cosigned By    Initials Name Provider Type    Priya Miller, ALINA Physical Therapist               Outcome Measures     Row Name 10/17/22 1156          How much help from another person do you currently need...    Turning from your back to your side while in flat bed without using bedrails? 4  -SJ     Moving from lying on back to sitting on the side of a flat bed without bedrails? 4  -SJ     Moving to and from a bed to a chair (including a wheelchair)? 3  -SJ     Standing up from a chair using your arms (e.g., wheelchair, bedside chair)? 3  -SJ     Climbing 3-5 steps with a railing? 3  -SJ     To walk in hospital room? 3  -SJ     AM-PAC 6 Clicks Score (PT) 20  -SJ      Highest level of mobility 6 --> Walked 10 steps or more  -     Row Name 10/17/22 1157          Functional Assessment    Outcome Measure Options AM-PAC 6 Clicks Basic Mobility (PT)  -           User Key  (r) = Recorded By, (t) = Taken By, (c) = Cosigned By    Initials Name Provider Type     Priya Monzon PT Physical Therapist                             Physical Therapy Education     Title: PT OT SLP Therapies (Done)     Topic: Physical Therapy (Done)     Point: Mobility training (Done)     Learning Progress Summary           Patient Acceptance, E, VU by  at 10/17/2022 1158    Acceptance, E,TB, VU by MA at 10/15/2022 1313    Acceptance, E, NR by ML at 10/12/2022 1120                   Point: Home exercise program (Done)     Learning Progress Summary           Patient Acceptance, E, VU by  at 10/17/2022 1158    Acceptance, E,TB, VU by MA at 10/15/2022 1313                   Point: Body mechanics (Done)     Learning Progress Summary           Patient Acceptance, E, VU by  at 10/17/2022 1158    Acceptance, E,TB, VU by MA at 10/15/2022 1313    Acceptance, E, VU,NR by JT at 10/13/2022 0318    Acceptance, E, NR by ML at 10/12/2022 1120                   Point: Precautions (Done)     Learning Progress Summary           Patient Acceptance, E, VU by  at 10/17/2022 1158    Acceptance, E,TB, VU by MA at 10/15/2022 1313    Acceptance, E, VU,NR by JT at 10/13/2022 0318    Acceptance, E, NR by ML at 10/12/2022 1120                               User Key     Initials Effective Dates Name Provider Type Discipline     06/16/21 -  Priya Monzon, PT Physical Therapist PT    MA 10/11/22 -  Keyla Wharton, RN Registered Nurse Nurse    ML 04/22/21 -  Izzy Harris Physical Therapist PT    JT 03/25/22 -  Palomo Morales, RN Registered Nurse Nurse              PT Recommendation and Plan     Plan of Care Reviewed With: patient  Progress: improving  Outcome Evaluation: Pt performed bed mob with CGA, STS from EOB with  CGA and bathroom toilet with Aron with cues for hand placement and sequencing. Pt able to amb short distances in room with RW and CGA on 4LO2nc with VSS. Pt pleasant and cooperative. Continue POC.     Time Calculation:    PT Charges     Row Name 10/17/22 1158             Time Calculation    Start Time 1059  -SJ      PT Received On 10/17/22  -      PT Goal Re-Cert Due Date 10/22/22  -         Time Calculation- PT    Total Timed Code Minutes- PT 24 minute(s)  -SJ         Timed Charges    19196 - Gait Training Minutes  10  -SJ      04049 - PT Therapeutic Activity Minutes 14  -SJ         Total Minutes    Timed Charges Total Minutes 24  -SJ       Total Minutes 24  -SJ            User Key  (r) = Recorded By, (t) = Taken By, (c) = Cosigned By    Initials Name Provider Type     Priya Monzon PT Physical Therapist              Therapy Charges for Today     Code Description Service Date Service Provider Modifiers Qty    23622580306 HC GAIT TRAINING EA 15 MIN 10/17/2022 Priya Monzon, PT GP 1    [id number]  PT THERAPEUTIC ACT EA 15 MIN 10/17/2022 Priya oMnzon PT GP 1          PT G-Codes  Outcome Measure Options: AM-PAC 6 Clicks Basic Mobility (PT)  AM-PAC 6 Clicks Score (PT): 20  AM-PAC 6 Clicks Score (OT): 15    Priya Monzon PT  10/17/2022

## 2022-10-17 NOTE — PLAN OF CARE
Goal Outcome Evaluation:  Plan of Care Reviewed With: patient, son        Progress: improving   SLP treatment completed. Will continue to address dysphagia. Please see note for further details and recommendations.

## 2022-10-17 NOTE — PROGRESS NOTES
[facility] Medicine Services  PROGRESS NOTE    Patient Name: Scott Diego  : 4/10/1930  MRN: [id number]    Date of Admission: 10/12/2022  Primary Care Physician: Ngozi Davis,     Subjective     CC:   Follow-up respiratory failure     HPI: in bed, telling me her address.  She looks more dyspneic and endorses that this is so.  Per staff, she recently was helped up to BR but got very short of breath and was brought back to bed.    ROS: limited by dementia  Gen- No fevers, chills  CV- No chest pain, palpitations  Resp- dyspnea currently  Gi - still not eating well     Objective     Vital Signs:   Temp:  [98.3 °F (36.8 °C)-98.8 °F (37.1 °C)] 98.3 °F (36.8 °C)  Heart Rate:  [] 64  Resp:  [16] 16  BP: (100-138)/(51-85) 124/64  Flow (L/min):  [3-4] 4     Physical Exam:  Constitutional: in bed.  Breathing is fast.  Tells me her address and asks me to call someone for her.   Rolls on her side without difficulty   HENT: NCAT, mucous membranes moist  Respiratory: no crackle or wheeze but she is tachypneic  Cardiovascular: RRR.  HR 90s.    Gastrointestinal: Positive bowel sounds, soft, nontender, nondistended  Musculoskeletal: No bilateral ankle edema  Psychiatric: Appropriate affect, cooperative  Neurologic: Pleasantly confused. Cooperative, rolls in bed easily. Speech clear      Results Reviewed:  LAB RESULTS:      Lab 10/16/22  0647 10/15/22  0522 10/14/22  1421 10/13/22  1523 10/13/22  0419 10/12/22  0532 10/12/22  0221   WBC 7.56 5.08 7.56 22.68* 17.06*  --  14.67*   HEMOGLOBIN 9.2* 8.3* 8.6* 9.4* 8.0*  --  10.3*   HEMATOCRIT 29.9* 27.7* 28.5* 30.2* 26.4*  --  33.3*   PLATELETS 250 213 232 310 226  --  312   NEUTROS ABS  --   --   --   --  14.50*  --  11.09*   IMMATURE GRANS (ABS)  --   --   --   --  0.11*  --  0.07*   LYMPHS ABS  --   --   --   --  0.86  --  2.08   MONOS ABS  --   --   --   --  1.57*  --  1.20*   EOS ABS  --   --   --   --  0.00  --  0.17   MCV 97.4* 100.0*  99.7* 98.4* 98.9*  --  98.8*   LACTATE  --   --   --   --   --  0.9 2.2*         Lab 10/17/22  0449 10/16/22  0647 10/15/22  0522 10/14/22  1421 10/13/22  0419 10/12/22  0221   SODIUM 141 139 141 140 136 141   POTASSIUM 3.7 4.1 3.8 4.0 4.3 4.0   CHLORIDE 102 104 106 104 103 101   CO2 26.0 23.0 24.0 24.0 22.0 27.0   ANION GAP 13.0 12.0 11.0 12.0 11.0 13.0   BUN 25* 23 25* 26* 25* 16   CREATININE 1.45* 1.19* 1.26* 1.38* 1.42* 1.25*   EGFR 33.9* 43.0* 40.1* 36.0* 34.8* 40.5*   GLUCOSE 85 97 86 84 110* 138*   CALCIUM 9.1 9.1 8.8 8.8 8.7 9.4   MAGNESIUM 2.1 1.8 2.0  --   --  2.1   PHOSPHORUS  --  3.9 3.7  --   --   --    TSH  --   --   --   --   --  3.470         Lab 10/14/22  1421 10/12/22  0221   TOTAL PROTEIN 7.1 8.8*   ALBUMIN 3.10* 3.80   GLOBULIN 4.0 5.0   ALT (SGPT) 5 6   AST (SGOT) 12 27   BILIRUBIN 0.3 0.4   ALK PHOS 41 53         Lab 10/16/22  0647 10/12/22  0221   PROBNP 22,502.0* 5,194.0*   TROPONIN T  --  0.012         Lab 10/12/22  0328   FIO2 36   CARBOXYHEMOGLOBIN (VENOUS) 1.0     Brief Urine Lab Results  (Last result in the past 365 days)      Color   Clarity   Blood   Leuk Est   Nitrite   Protein   CREAT   Urine HCG        10/12/22 0251 Yellow   Clear   Negative   Large (3+)   Positive   Trace               Microbiology Results Abnormal     Procedure Component Value - Date/Time    Blood Culture - Blood, Arm, Right [[id number]]  (Normal) Collected: 10/12/22 0347    Lab Status: Final result Specimen: Blood from Arm, Right Updated: 10/17/22 0400     Blood Culture No growth at 5 days    Blood Culture - Blood, Arm, Right [[id number]]  (Normal) Collected: 10/12/22 0347    Lab Status: Final result Specimen: Blood from Arm, Right Updated: 10/17/22 0400     Blood Culture No growth at 5 days    Urine Culture - Urine, Urine, Clean Catch [[id number]] Collected: 10/12/22 0251    Lab Status: Final result Specimen: Urine, Clean Catch Updated: 10/13/22 1320     Urine Culture >100,000 CFU/mL Normal Urogenital Neeta     Narrative:      Colonization of the urinary tract without infection is common. Treatment is discouraged unless the patient is symptomatic, pregnant, or undergoing an invasive urologic procedure.        XR Chest 1 View    Result Date: 10/16/2022  DATE OF EXAM: 10/16/2022 10:49 AM  PROCEDURE: XR CHEST 1 VW-  INDICATIONS: follow up aspiration PNA/hypoxic respiratory failure/history of systolic CHF; I50.23-Acute on chronic systolic (congestive) heart failure; J96.01-Acute respiratory failure with hypoxia; N39.0-Urinary tract infection, site not specified; R13.12-Dysphagia, oropharyngeal phase  COMPARISON: 10/13/2022  TECHNIQUE: Single radiographic AP view of the chest was obtained.  FINDINGS: Persistent moderate pulmonary edema with small bilateral pleural effusions, similar to prior. Focal opacity in the right upper lobe appears mildly decreased, with increased perihilar opacities, likely shifting volume overload. There is no pneumothorax. Unchanged cardiac enlargement.      Impression: Persistent moderate pulmonary edema with small bilateral pleural effusions, similar to prior. Focal opacity in the right upper lobe appears mildly decreased, with increased perihilar opacities, likely shifting volume overload. There is no pneumothorax. Unchanged cardiac enlargement.  This report was finalized on 10/16/2022 12:10 PM by Saúl Benites.        Results for orders placed during the hospital encounter of 09/05/22    Adult Transthoracic Echo Complete w/ Color, Spectral and Contrast if Necessary Per Protocol    Interpretation Summary  · Left ventricular ejection fraction appears to be 21 - 25%. Left ventricular systolic function is severely decreased.  · Left ventricular diastolic function is consistent with (grade II w/high LAP) pseudonormalization.  · There is a small (<1cm) pericardial effusion.  · There is a left pleural effusion. There is a right pleural effusion.  · Moderate mitral valve regurgitation is present.  ·  Moderate to severe tricuspid valve regurgitation is present.  · Estimated right ventricular systolic pressure from tricuspid regurgitation is markedly elevated (>55 mmHg). Calculated right ventricular systolic pressure from tricuspid regurgitation is 56 mmHg.    I have reviewed the medications:  Scheduled Meds:amiodarone, 200 mg, Oral, Q8H   Followed by  [START ON 10/22/2022] amiodarone, 200 mg, Oral, Q12H   Followed by  [START ON 11/5/2022] amiodarone, 200 mg, Oral, Daily  artificial tears, 1 application, Both Eyes, Daily  aspirin, 81 mg, Oral, Daily  First Mouthwash (Magic Mouthwash), 5 mL, Swish & Spit, Q6H  furosemide, 20 mg, Oral, Every Other Day  gabapentin, 100 mg, Oral, Nightly  heparin (porcine), 5,000 Units, Subcutaneous, Q12H  levothyroxine, 50 mcg, Oral, Q AM  memantine, 5 mg, Oral, BID  metoprolol succinate XL, 25 mg, Oral, Q12H  mirtazapine, 30 mg, Oral, Nightly  pantoprazole, 40 mg, Oral, BID  polyethylene glycol, 17 g, Oral, Daily  senna-docusate sodium, 2 tablet, Oral, Nightly  sodium chloride, 10 mL, Intravenous, Q12H      Continuous Infusions:   PRN Meds:.•  acetaminophen **OR** [DISCONTINUED] acetaminophen **OR** [DISCONTINUED] acetaminophen  •  ALPRAZolam  •  senna-docusate sodium **AND** [DISCONTINUED] polyethylene glycol **AND** bisacodyl **AND** bisacodyl  •  HYDROmorphone  •  ipratropium  •  lactated ringers  •  polyvinyl alcohol  •  sodium chloride  •  sodium chloride    Assessment & Plan     Active Hospital Problems    Diagnosis  POA   • **Acute respiratory failure with hypoxia (HCC) [J96.01]  Yes   • Moderate malnutrition (HCC) [E44.0]  Yes   • Acute on chronic systolic congestive heart failure (HCC) [I50.23]  Yes   • Respiratory failure (HCC) [J96.90]  Yes   • Nausea without vomiting [R11.0]  Unknown   • Anxiety [F41.9]  Yes   • Hypothyroid [E03.9]  Yes   • Chronic systolic heart failure (HCC) [I50.22]  Yes   • History of CVA (cerebrovascular accident) [Z86.73]  Not Applicable   •  Dementia (HCC) [F03.90]  Yes   • Hypertension [I10]  Yes      Resolved Hospital Problems   No resolved problems to display.     Brief Hospital Course to date:  Scott Diego is a 89 y.o. female with PMH significant for essential hypertension, chronic systolic CHF (LVEF 21-25%), hypothyroidism, anxiety and dementia. She was recently admitted to [facility] 9/5-9/10/22 for respiratory failure secondary to COVID-19 and a/c HFrEF. During this hospitalization, she had new atrial fibrillation with RVR. Son noted several weeks of nausea, for which she was evaluated by GI and started on TID Guadalupe Root. She did not require antiemetics during her hospitalization and there was concern that her symptoms may be exacerbated by her progressive dementia or anxiety. She saw GI outpatient on 10/11 - PPI was increased to BID and an EGD was planned.    Admitted to [facility] 10/12/22 for acute hypoxic respiratory failure with concerns for aspiration pneumonia. Cardiology consulted to follow due to atrial fibrillation with RVR.     Assessment and plan:  Acute hypoxic respiratory failure, suspect aspiration pneumonia   Mildly decompensated systolic heart failure  · Has been having issues with aspiration.  Evaluated by speech team and currently on modified diet with puréed diet honey with thick liquids.  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  Wean off as tolerated  · Heart failure seems to be compensated.  Gentle diuresis with p.o. Lasix to keep the patient euvolemic    Poor oral intake   · Evaluated by GI last admission - recommended TID guadalupe root and management of constipation  · Saw GI outpatient on 10/11 - PPI increased to BID and plans were made for outpatient EGD  · Continue bowel regimen and BID PPI. Guadalupe root is not on formulary -  family can bring in   · QTc 562, stable, on amio.  Stopped Zofran and other antiemetics  · Continue mirtazapine QHS for appetite stimulation  · EGD today 10/17/2022 with evidence of atrophic gastritis    Atrial fibrillation with RVR   Chronic systolic heart failure, ejection fraction 20 to 25%, compensated  · Currently in sinus rhythm  · Seen by cardiology team and was started on amiodarone drip.  Now transition to p.o.  · Continue metoprolol XL  · Not candidate for anticoagulation given her age and falls  · Gentle diuresis with p.o. Lasix 20 mg daily to keep the patient euvolemic    CKD IIIb  · Creatinine is around baseline: 1.2-1.5  · Continue to monitor    Recent COVID-19 infection  · Positive test 9/5/2022  · Out of isolation     Recently diagnosed UTI  · Currently receiving Rocephin for pneumonia  · Urine culture negative to date    Alzheimer dementia  · Continue Namenda     Anxiety  · Alprazolam    Hypothyroidism  · Synthroid      DVT prophylaxis: Heparin    Expected Discharge Location and Transportation: home via private vehicle   Expected Discharge Date: 10/18/22    DVT prophylaxis:  Medical DVT prophylaxis orders are present.     AM-PAC 6 Clicks Score (PT): 18 (10/15/22 1951)    CODE STATUS:   Code Status and Medical Interventions:   Ordered at: 10/12/22 0414     Medical Intervention Limits:    NO intubation (DNI)     Level Of Support Discussed With:    Next of Kin (If No Surrogate)    Patient     Code Status (Patient has no pulse and is not breathing):    No CPR (Do Not Attempt to Resuscitate)     Medical Interventions (Patient has pulse or is breathing):    Limited Support     Comments:    Likely would not wish for feeding tube either       Perico Bae MD  10/17/22

## 2022-10-17 NOTE — PROGRESS NOTES
"  [facility]  PROGRESS NOTE    Date of Admission: 10/12/2022  Date of Service: 10/17/22    Primary Care Physician: Ngozi Davis DO    Chief Complaint: afib, HFrEF    Subjective      HPI: Patient sitting up in bed in no distress on 3 L/min O2 via NC. Denies chest pain, palpitations or dizziness      Objective   Vitals: /63   Pulse 77   Temp 98.3 °F (36.8 °C) (Temporal)   Resp 16   Ht 162.6 cm (64\")   Wt 51.9 kg (114 lb 8 oz)   SpO2 94%   BMI 19.65 kg/m²     Physical Exam:   GENERAL: Alert, cooperative, in no acute distress.   HEART: Regular rate and rhythm; no murmurs, rubs or gallops  LUNGS: Soft Bibasilar rales, equal chest expanision, in no respiratory distress on 3 L/min O2 via NC  EXTREMITIES: Trace pretibial edema      Results:  Results from last 7 days   Lab Units 10/16/22  0647 10/15/22  0522 10/14/22  1421   WBC 10*3/mm3 7.56 5.08 7.56   HEMOGLOBIN g/dL 9.2* 8.3* 8.6*   HEMATOCRIT % 29.9* 27.7* 28.5*   PLATELETS 10*3/mm3 250 213 232     Results from last 7 days   Lab Units 10/17/22  0449 10/16/22  0647 10/15/22  0522   SODIUM mmol/L 141 139 141   POTASSIUM mmol/L 3.7 4.1 3.8   CHLORIDE mmol/L 102 104 106   CO2 mmol/L 26.0 23.0 24.0   BUN mg/dL 25* 23 25*   CREATININE mg/dL 1.45* 1.19* 1.26*   GLUCOSE mg/dL 85 97 86      Lab Results   Component Value Date    AST 12 10/14/2022    ALT 5 10/14/2022             Results from last 7 days   Lab Units 10/12/22  0221   TSH uIU/mL 3.470             Results from last 7 days   Lab Units 10/12/22  0221   TROPONIN T ng/mL 0.012     Results from last 7 days   Lab Units 10/16/22  0647   PROBNP pg/mL 22,502.0*         Intake/Output Summary (Last 24 hours) at 10/17/2022 1150  Last data filed at 10/17/2022 0900  Gross per 24 hour   Intake 490 ml   Output 675 ml   Net -185 ml       I personally reviewed the patient's EKG/Telemetry data    Radiology Data:  CXR 10/16/22:  IMPRESSION:  Persistent moderate pulmonary edema with " small bilateral pleural  effusions, similar to prior. Focal opacity in the right upper lobe  appears mildly decreased, with increased perihilar opacities, likely  shifting volume overload. There is no pneumothorax. Unchanged cardiac  enlargement.    Current Medications:  amiodarone, 200 mg, Oral, Q8H   Followed by  [START ON 10/22/2022] amiodarone, 200 mg, Oral, Q12H   Followed by  [START ON 11/5/2022] amiodarone, 200 mg, Oral, Daily  artificial tears, 1 application, Both Eyes, Daily  aspirin, 81 mg, Oral, Daily  First Mouthwash (Magic Mouthwash), 5 mL, Swish & Spit, Q6H  furosemide, 20 mg, Oral, Every Other Day  gabapentin, 100 mg, Oral, Nightly  heparin (porcine), 5,000 Units, Subcutaneous, Q12H  levothyroxine, 50 mcg, Oral, Q AM  memantine, 5 mg, Oral, BID  metoprolol succinate XL, 25 mg, Oral, Q12H  mirtazapine, 30 mg, Oral, Nightly  pantoprazole, 40 mg, Oral, BID  polyethylene glycol, 17 g, Oral, Daily  senna-docusate sodium, 2 tablet, Oral, Nightly  sodium chloride, 10 mL, Intravenous, Q12H           Assessment  1. Acute Hypoxic Respiratory Failure  2. Acute on Chronic Systolic Heart Failure  a. Echo 9/6/22: EF 21-25%  3. Paroxysmal Atrial Fibrillation  a. New onset during 9/2022 admission at [facility]  b. Recurrence on 10/13/22  c. Initiated on amiodarone protocol, currently taking 200 mg q8 hrs  d. On metoprolol succinate 25 bid  e. CHADSVASC= 8, not candidate for AC due to history of recurrent falls  4. HTN  5. HLD  6. CKD Stage III    Plan  · Patient is currently in NSR. Continue amiodarone per protocol and metoprolol. Not a candidate for chronic anticoagulation.   · Patient has pulmonary edema on cxr and bibasilar crackles on exam. Currently receiving 20 mg lasix every other day s/t decreased kidney function with last dose yesterday. Will order additional dose of 20 mg po today.  · EGD performed this AM for chronic nausea and decreased PO intake per GI.  · Other comorbidities per hospitalist.    Anup Holly,  JULEE

## 2022-10-17 NOTE — PLAN OF CARE
Goal Outcome Evaluation:  Plan of Care Reviewed With: patient        Progress: improving  Outcome Evaluation: Pt performed bed mob with CGA, STS from EOB with CGA and bathroom toilet with Aron with cues for hand placement and sequencing. Pt able to amb short distances in room with RW and CGA on 4LO2nc with VSS. Pt pleasant and cooperative. Continue POC.

## 2022-10-17 NOTE — PLAN OF CARE
Problem: Adult Inpatient Plan of Care  Goal: Plan of Care Review  Outcome: Ongoing, Progressing  Flowsheets (Taken 10/17/2022 1316)  Progress: no change  Goal: Patient-Specific Goal (Individualized)  Outcome: Ongoing, Progressing  Goal: Absence of Hospital-Acquired Illness or Injury  Outcome: Ongoing, Progressing  Intervention: Identify and Manage Fall Risk  Recent Flowsheet Documentation  Taken 10/17/2022 1045 by Keyla Wharton RN  Safety Promotion/Fall Prevention: safety round/check completed  Intervention: Prevent Infection  Recent Flowsheet Documentation  Taken 10/17/2022 1045 by Keyla Wharton RN  Infection Prevention: hand hygiene promoted  Goal: Optimal Comfort and Wellbeing  Outcome: Ongoing, Progressing  Goal: Readiness for Transition of Care  Outcome: Ongoing, Progressing     Problem: Fall Injury Risk  Goal: Absence of Fall and Fall-Related Injury  Outcome: Ongoing, Progressing  Intervention: Promote Injury-Free Environment  Recent Flowsheet Documentation  Taken 10/17/2022 1045 by Keyla Wharton RN  Safety Promotion/Fall Prevention: safety round/check completed     Problem: Skin Injury Risk Increased  Goal: Skin Health and Integrity  Outcome: Ongoing, Progressing     Problem: Breathing Pattern Ineffective  Goal: Effective Breathing Pattern  Outcome: Ongoing, Progressing     Problem: Confusion Acute  Goal: Optimal Cognitive Function  Outcome: Ongoing, Progressing     Problem: Adjustment to Illness (Heart Failure)  Goal: Optimal Coping  Outcome: Ongoing, Progressing     Problem: Cardiac Output Decreased (Heart Failure)  Goal: Optimal Cardiac Output  Outcome: Ongoing, Progressing     Problem: Dysrhythmia (Heart Failure)  Goal: Stable Heart Rate and Rhythm  Outcome: Ongoing, Progressing     Problem: Fluid Imbalance (Heart Failure)  Goal: Fluid Balance  Outcome: Ongoing, Progressing     Problem: Functional Ability Impaired (Heart Failure)  Goal: Optimal Functional Ability  Outcome: Ongoing,  Progressing     Problem: Oral Intake Inadequate (Heart Failure)  Goal: Optimal Nutrition Intake  Outcome: Ongoing, Progressing     Problem: Respiratory Compromise (Heart Failure)  Goal: Effective Oxygenation and Ventilation  Outcome: Ongoing, Progressing     Problem: Sleep Disordered Breathing (Heart Failure)  Goal: Effective Breathing Pattern During Sleep  Outcome: Ongoing, Progressing     Problem: Heart Failure Comorbidity  Goal: Maintenance of Heart Failure Symptom Control  Outcome: Ongoing, Progressing   Goal Outcome Evaluation:   Pt rested throughout shift. No complaints of pain. Pt had EGD this morning, see MD note. Pt remains in SR and on 3L O2. Podiatry to see pt this afternoon. Pt to have video swallow tomorrow. Will continue to observe.         Progress: no change

## 2022-10-17 NOTE — THERAPY TREATMENT NOTE
Acute Care - Speech Language Pathology   Swallow Treatment Note [facility]     Patient Name: Scott Diego  : 4/10/1930  MRN: [id number]  Today's Date: 10/17/2022               Admit Date: 10/12/2022    Visit Dx:     ICD-10-CM ICD-9-CM   1. Acute on chronic systolic congestive heart failure (HCC)  I50.23 428.23     428.0   2. Acute respiratory failure with hypoxia (HCC)  J96.01 518.81   3. Urinary tract infection without hematuria, site unspecified  N39.0 599.0   4. Oropharyngeal dysphagia  R13.12 787.22   5. Nausea  R11.0 787.02     Patient Active Problem List   Diagnosis   • Idiopathic peripheral neuropathy   • Neck pain   • Mild cognitive impairment   • Anemia   • Hypertension   • Dehydration   • History of CVA (cerebrovascular accident)   • Dementia (HCC)   • Left bundle branch block   • Orthostatic dizziness   • Chronic systolic heart failure (HCC)   • Normocytic anemia   • Dyspnea   • Hypoxia   • Hypothyroid   • Pericardial effusion   • Acute respiratory failure with hypoxia (HCC)   • Right lower lobe pneumonia   • Ventricular ectopy   • Migraine without aura and without status migrainosus, not intractable   • Acute hypoxemic respiratory failure due to COVID-19 (HCC)   • Dyspnea due to COVID-19   • COVID-19 virus detected   • Encephalopathy due to COVID-19 virus   • Acute on chronic congestive heart failure (HCC)   • Anxiety   • Bilateral hearing loss   • Impaired functional mobility, balance, gait, and endurance   • Nausea without vomiting   • Hypoxia   • Respiratory failure (HCC)   • Acute on chronic systolic congestive heart failure (HCC)   • Moderate malnutrition (HCC)     Past Medical History:   Diagnosis Date   • Anxiety    • Congestive heart failure (HCC)    • Coronary artery disease    • Dementia (HCC)    • Depression    • Hyperlipidemia    • Malignant neoplasm (HCC)    • Memory loss    • Peripheral neuropathy    • Stroke (HCC)     mini stroke   • Systolic heart failure (HCC) 2017     Chronic/compensated (EF 25%)     Past Surgical History:   Procedure Laterality Date   • CARDIAC CATHETERIZATION     • CHOLECYSTECTOMY     • EYE SURGERY     • HYSTERECTOMY         SLP Recommendation and Plan  SLP Swallowing Diagnosis: suspected pharyngeal dysphagia (10/17/22 1045)  SLP Diet Recommendation: puree with some mashed, honey thick liquids (10/17/22 1045)  Recommended Precautions and Strategies: upright posture during/after eating, small bites of food and sips of liquid, no straw, general aspiration precautions, reflux precautions (10/17/22 1045)  SLP Rec. for Method of Medication Administration: meds whole, with pudding or applesauce, as tolerated (10/17/22 1045)     Monitor for Signs of Aspiration: yes, notify SLP if any concerns (10/17/22 1045)  Recommended Diagnostics: reassess via VFSS (MBS) (10/17/22 1045)  Swallow Criteria for Skilled Therapeutic Interventions Met: demonstrates skilled criteria (10/17/22 1045)  Anticipated Discharge Disposition (SLP): unknown, anticipate therapy at next level of care (10/17/22 1045)  Rehab Potential/Prognosis, Swallowing: adequate, monitor progress closely (10/17/22 1045)  Therapy Frequency (Swallow): 5 days per week (10/17/22 1045)  Predicted Duration Therapy Intervention (Days): until discharge (10/17/22 1045)        Daily Summary of Progress (SLP): progress toward functional goals is good (10/17/22 1045)               Treatment Assessment (SLP): Pt continues w/ wet vocal quality and multiple swallows w/ thin liquids. No s/s of aspiration w/ nectar-thick liquids. Will plan for repeat MBS tomorrow to assess readiness for upgrade in liquid viscosity and/or solid texture level. (10/17/22 1045)  Plan for Continued Treatment (SLP): continue treatment per plan of care (10/17/22 1045)         Plan of Care Reviewed With: patient, son  Progress: improving      SWALLOW EVALUATION (last 72 hours)     SLP Adult Swallow Evaluation     Row Name 10/17/22 1045                    Rehab Evaluation    Document Type therapy note (daily note)  -EN        Subjective Information no complaints  -EN        Patient Observations alert;cooperative;agree to therapy  -EN        Patient/Family/Caregiver Comments/Observations son present  -EN        Patient Effort good  -EN        Symptoms Noted During/After Treatment none  -EN           General Information    Patient Profile Reviewed yes  -EN           Pain    Additional Documentation Pain Scale: FACES Pre/Post-Treatment (Group)  -EN           Pain Scale: FACES Pre/Post-Treatment    Pain: FACES Scale, Pretreatment 0-->no hurt  -EN        Posttreatment Pain Rating 0-->no hurt  -EN           SLP Evaluation Clinical Impression    SLP Swallowing Diagnosis suspected pharyngeal dysphagia  -EN        Functional Impact risk of aspiration/pneumonia;risk of malnutrition;risk of dehydration  -EN        Rehab Potential/Prognosis, Swallowing adequate, monitor progress closely  -EN        Swallow Criteria for Skilled Therapeutic Interventions Met demonstrates skilled criteria  -EN           SLP Treatment Clinical Impressions    Treatment Assessment (SLP) Pt continues w/ wet vocal quality and multiple swallows w/ thin liquids. No s/s of aspiration w/ nectar-thick liquids. Will plan for repeat MBS tomorrow to assess readiness for upgrade in liquid viscosity and/or solid texture level.  -EN        Daily Summary of Progress (SLP) progress toward functional goals is good  -EN        Barriers to Overall Progress (SLP) Advanced age  -EN        Plan for Continued Treatment (SLP) continue treatment per plan of care  -EN        Care Plan Review evaluation/treatment results reviewed  -EN        Care Plan Review, Other Participant(s) son  -EN           Recommendations    Therapy Frequency (Swallow) 5 days per week  -EN        Predicted Duration Therapy Intervention (Days) until discharge  -EN        SLP Diet Recommendation puree with some mashed;honey thick liquids  -EN         Recommended Diagnostics reassess via VFSS ([facility])  -EN        Recommended Precautions and Strategies upright posture during/after eating;small bites of food and sips of liquid;no straw;general aspiration precautions;reflux precautions  -EN        Oral Care Recommendations Oral Care BID/PRN  -EN        SLP Rec. for Method of Medication Administration meds whole;with pudding or applesauce;as tolerated  -EN        Monitor for Signs of Aspiration yes;notify SLP if any concerns  -EN        Anticipated Discharge Disposition (SLP) unknown;anticipate therapy at next level of care  -EN              User Key  (r) = Recorded By, (t) = Taken By, (c) = Cosigned By    Initials Name Effective Dates    EN VincentMarni edwards, MS CCC-SLP 06/22/22 -                 EDUCATION  The patient has been educated in the following areas:   Dysphagia (Swallowing Impairment).        SLP GOALS     Row Name 10/17/22 1045             (LTG) Patient will demonstrate functional swallow for    Diet Texture (Demonstrate functional swallow) soft whole textures  -EN      Liquid viscosity (Demonstrate functional swallow) thin liquids  -EN      [place] (Demonstrate functional swallow) independently (over 90% accuracy)  -EN      Time Frame (Demonstrate functional swallow) by discharge  -EN      Progress/Outcomes (Demonstrate functional swallow) continuing progress toward goal  -EN         (STG) Patient will tolerate trials of    Consistencies Trialed (Tolerate trials) pureed/ mashed textures;honey/ moderately thick liquids  -EN      Desired Outcome (Tolerate trials) without signs/symptoms of aspiration;without signs of distress;with adequate oral prep/transit/clearance;with use of compensatory strategies (see comments)  -EN      [place] (Tolerate trials) with minimal cues (75-90% accuracy)  -EN      Time Frame (Tolerate trials) by discharge  -EN      Progress/Outcomes (Tolerate trials) continuing progress toward goal  -EN         (STG) Lingual  Strengthening Goal 1 (SLP)    Activity (Lingual Strengthening Goal 1, SLP) increase tongue back strength  -EN      Increase Tongue Back Strength lingual resistance exercises  -EN      [place]/Accuracy (Lingual Strengthening Goal 1, SLP) with minimal cues (75-90% accuracy)  -EN      Time Frame (Lingual Strengthening Goal 1, SLP) short term goal (STG)  -EN      Progress/Outcomes (Lingual Strengthening Goal 1, SLP) continuing progress toward goal  -EN         (STG) Pharyngeal Strengthening Exercise Goal 1 (SLP)    Activity (Pharyngeal Strengthening Goal 1, SLP) increase timing;increase anterior movement of the hyolaryngeal complex;increase closure at entrance to airway/closure of airway at glottis;increase tongue base retraction  -EN      Increase Timing prepping - 3 second prep or suck swallow or 3-step swallow  -EN      Increase Anterior Movement of the Hyolaryngeal Complex chin tuck against resistance (CTAR)  -EN      Increase Closure at Entrance to Airway/Closure of Airway at Glottis super-supraglottic swallow  -EN      Increase Tongue Base Retraction hard effortful swallow  -EN      [place]/Accuracy (Pharyngeal Strengthening Goal 1, SLP) with minimal cues (75-90% accuracy)  -EN      Time Frame (Pharyngeal Strengthening Goal 1, SLP) short term goal (STG)  -EN      Progress/Outcomes (Pharyngeal Strengthening Goal 1, SLP) continuing progress toward goal  -EN         (STG) Swallow Management Recall Goal 1 (SLP)    Activity (Swallow Management Recall Goal 1, SLP) safe diet/liquid level;safe diet level/texture;safe liquid viscosity;compensatory swallow strategies/techniques;postural techniques;rationale for use of strategies/techniques  -EN      [place]/Accuracy (Swallow Management Recall Goal 1, SLP) with minimal cues (75-90% accuracy)  -EN      Time Frame (Swallow Management Recall Goal 1, SLP) short term goal (STG)  -EN      Barriers (Swallow Management Recall Goal 1, SLP) cognition/STM deficits  -EN       Progress/Outcomes (Swallow Management Recall Goal 1, SLP) continuing progress toward goal  -EN            User Key  (r) = Recorded By, (t) = Taken By, (c) = Cosigned By    Initials Name Provider Type    Marni Barrios MS CCC-SLP Speech and Language Pathologist                   Time Calculation:    Time Calculation- SLP     Row Name 10/17/22 1201             Time Calculation- SLP    SLP Start Time 1045  -EN      SLP Received On 10/17/22  -EN         Untimed Charges    02533-NL Treatment Swallow Minutes 38  -EN         Total Minutes    Untimed Charges Total Minutes 38  -EN       Total Minutes 38  -EN            User Key  (r) = Recorded By, (t) = Taken By, (c) = Cosigned By    Initials Name Provider Type    Marni Barrios MS CCC-SLP Speech and Language Pathologist                Therapy Charges for Today     Code Description Service Date Service Provider Modifiers Qty    [id number]  ST TREATMENT SWALLOW 3 10/17/2022 Marni Kaur MS CCC-SLP GN 1               MS NOY Jimenez  10/17/2022

## 2022-10-17 NOTE — CASE MANAGEMENT/SOCIAL WORK
Continued Stay Note  [facility]     Patient Name: Scott Diego  MRN: [id number]  Today's Date: 10/17/2022    Admit Date: 10/12/2022    Plan: Home w/Home Health   Discharge Plan     Row Name 10/17/22 1327       Plan    Plan Home w/Home Health    Patient/Family in Agreement with Plan yes    Plan Comments Discussed patient in MDR.  Patient is having EGD today.  Plan remains to return home with [facility] at discharge.  No other needs noted.  CM will continue to follow.    Final Discharge Disposition Code 06 - home with home health care               Discharge Codes    No documentation.               Expected Discharge Date and Time     Expected Discharge Date Expected Discharge Time    Oct 18, 2022             Ann Palacios RN

## 2022-10-17 NOTE — ANESTHESIA POSTPROCEDURE EVALUATION
Patient: Scott Diego    Procedure Summary     Date: 10/17/22 Room / Location: [facility] ENDOSCOPY 3 /  [facility]    Anesthesia Start: 0846 Anesthesia Stop: 0906    Procedure: ESOPHAGOGASTRODUODENOSCOPY Diagnosis:       Nausea      (Nausea [R11.0])    Surgeons: Brunner, Mark I, MD Provider: Fabio Gardner MD    Anesthesia Type: general ASA Status: 4          Anesthesia Type: general    Vitals  No vitals data found for the desired time range.          Post Anesthesia Care and Evaluation    Patient location during evaluation: PACU  Patient participation: complete - patient participated  Level of consciousness: awake and alert  Pain management: adequate    Airway patency: patent  Anesthetic complications: No anesthetic complications  PONV Status: none  Cardiovascular status: hemodynamically stable and acceptable  Respiratory status: nonlabored ventilation, acceptable and nasal cannula  Hydration status: acceptable    Comments: /53  HR 67  Sats 93 on 4 L NC

## 2022-10-17 NOTE — BRIEF OP NOTE
ESOPHAGOGASTRODUODENOSCOPY  Progress Note    Scott Diego  10/17/2022    EGD shows atrophic gastritis.  There are a few tertiary contractions of the esophagus consistent with dysmotility.  Nothing seen to explain intermittent nausea.    >> Recommend take 2 Altoid mints before meals.  >> Assess response to recently-started mirtazapine.  >> If above not useful after 4 weeks, consider switch mirtazapine to combination of trazodone 50 mg at bedtime and Celexa 20 mg at bedtime.      Mark I. Brunner, MD     Date: 10/17/2022  Time: 09:01 EDT

## 2022-10-18 ENCOUNTER — APPOINTMENT (OUTPATIENT)
Dept: GENERAL RADIOLOGY | Facility: HOSPITAL | Age: 87
End: 2022-10-18

## 2022-10-18 ENCOUNTER — READMISSION MANAGEMENT (OUTPATIENT)
Dept: CALL CENTER | Facility: HOSPITAL | Age: 87
End: 2022-10-18

## 2022-10-18 VITALS
WEIGHT: 109.2 LBS | BODY MASS INDEX: 18.64 KG/M2 | HEIGHT: 64 IN | TEMPERATURE: 97.5 F | OXYGEN SATURATION: 92 % | RESPIRATION RATE: 18 BRPM | HEART RATE: 80 BPM | DIASTOLIC BLOOD PRESSURE: 55 MMHG | SYSTOLIC BLOOD PRESSURE: 114 MMHG

## 2022-10-18 LAB
ALBUMIN SERPL-MCNC: 3.3 G/DL (ref 3.5–5.2)
ALBUMIN/GLOB SERPL: 0.9 G/DL
ALP SERPL-CCNC: 37 U/L (ref 39–117)
ALT SERPL W P-5'-P-CCNC: 5 U/L (ref 1–33)
ANION GAP SERPL CALCULATED.3IONS-SCNC: 13 MMOL/L (ref 5–15)
AST SERPL-CCNC: 11 U/L (ref 1–32)
BASOPHILS # BLD AUTO: 0.05 10*3/MM3 (ref 0–0.2)
BASOPHILS NFR BLD AUTO: 0.7 % (ref 0–1.5)
BILIRUB SERPL-MCNC: 0.3 MG/DL (ref 0–1.2)
BUN SERPL-MCNC: 25 MG/DL (ref 8–23)
BUN/CREAT SERPL: 18.2 (ref 7–25)
CALCIUM SPEC-SCNC: 9.3 MG/DL (ref 8.2–9.6)
CHLORIDE SERPL-SCNC: 100 MMOL/L (ref 98–107)
CO2 SERPL-SCNC: 29 MMOL/L (ref 22–29)
CREAT SERPL-MCNC: 1.37 MG/DL (ref 0.57–1)
DEPRECATED RDW RBC AUTO: 49.7 FL (ref 37–54)
EGFRCR SERPLBLD CKD-EPI 2021: 36.3 ML/MIN/1.73
EOSINOPHIL # BLD AUTO: 0.18 10*3/MM3 (ref 0–0.4)
EOSINOPHIL NFR BLD AUTO: 2.7 % (ref 0.3–6.2)
ERYTHROCYTE [DISTWIDTH] IN BLOOD BY AUTOMATED COUNT: 14.3 % (ref 12.3–15.4)
GLOBULIN UR ELPH-MCNC: 3.5 GM/DL
GLUCOSE SERPL-MCNC: 96 MG/DL (ref 65–99)
HCT VFR BLD AUTO: 29.9 % (ref 34–46.6)
HGB BLD-MCNC: 9.4 G/DL (ref 12–15.9)
IMM GRANULOCYTES # BLD AUTO: 0.02 10*3/MM3 (ref 0–0.05)
IMM GRANULOCYTES NFR BLD AUTO: 0.3 % (ref 0–0.5)
LYMPHOCYTES # BLD AUTO: 1.35 10*3/MM3 (ref 0.7–3.1)
LYMPHOCYTES NFR BLD AUTO: 19.9 % (ref 19.6–45.3)
MCH RBC QN AUTO: 29.8 PG (ref 26.6–33)
MCHC RBC AUTO-ENTMCNC: 31.4 G/DL (ref 31.5–35.7)
MCV RBC AUTO: 94.9 FL (ref 79–97)
MONOCYTES # BLD AUTO: 1.08 10*3/MM3 (ref 0.1–0.9)
MONOCYTES NFR BLD AUTO: 15.9 % (ref 5–12)
NEUTROPHILS NFR BLD AUTO: 4.11 10*3/MM3 (ref 1.7–7)
NEUTROPHILS NFR BLD AUTO: 60.5 % (ref 42.7–76)
NRBC BLD AUTO-RTO: 0 /100 WBC (ref 0–0.2)
PLATELET # BLD AUTO: 237 10*3/MM3 (ref 140–450)
PMV BLD AUTO: 11.6 FL (ref 6–12)
POTASSIUM SERPL-SCNC: 3.7 MMOL/L (ref 3.5–5.2)
PROT SERPL-MCNC: 6.8 G/DL (ref 6–8.5)
RBC # BLD AUTO: 3.15 10*6/MM3 (ref 3.77–5.28)
SODIUM SERPL-SCNC: 142 MMOL/L (ref 136–145)
WBC NRBC COR # BLD: 6.79 10*3/MM3 (ref 3.4–10.8)

## 2022-10-18 PROCEDURE — 80053 COMPREHEN METABOLIC PANEL: CPT | Performed by: INTERNAL MEDICINE

## 2022-10-18 PROCEDURE — 25010000002 HEPARIN (PORCINE) PER 1000 UNITS: Performed by: INTERNAL MEDICINE

## 2022-10-18 PROCEDURE — 92611 MOTION FLUOROSCOPY/SWALLOW: CPT

## 2022-10-18 PROCEDURE — 85025 COMPLETE CBC W/AUTO DIFF WBC: CPT | Performed by: INTERNAL MEDICINE

## 2022-10-18 PROCEDURE — 99239 HOSP IP/OBS DSCHRG MGMT >30: CPT | Performed by: INTERNAL MEDICINE

## 2022-10-18 PROCEDURE — 74230 X-RAY XM SWLNG FUNCJ C+: CPT

## 2022-10-18 RX ORDER — MIRTAZAPINE 30 MG/1
30 TABLET, FILM COATED ORAL NIGHTLY
Qty: 30 TABLET | Refills: 2 | Status: SHIPPED | OUTPATIENT
Start: 2022-10-18 | End: 2023-01-16

## 2022-10-18 RX ORDER — AMIODARONE HYDROCHLORIDE 200 MG/1
200 TABLET ORAL EVERY 12 HOURS
Qty: 28 TABLET | Refills: 0 | Status: SHIPPED | OUTPATIENT
Start: 2022-10-22 | End: 2022-11-05

## 2022-10-18 RX ORDER — AMIODARONE HYDROCHLORIDE 200 MG/1
200 TABLET ORAL DAILY
Qty: 90 TABLET | Refills: 0 | Status: SHIPPED | OUTPATIENT
Start: 2022-11-05 | End: 2022-10-24

## 2022-10-18 RX ORDER — POLYETHYLENE GLYCOL 3350 17 G/17G
17 POWDER, FOR SOLUTION ORAL DAILY
Qty: 510 G | Refills: 0 | Status: SHIPPED | OUTPATIENT
Start: 2022-10-18 | End: 2022-11-17

## 2022-10-18 RX ORDER — AMOXICILLIN AND CLAVULANATE POTASSIUM 500; 125 MG/1; MG/1
1 TABLET, FILM COATED ORAL 2 TIMES DAILY
Qty: 10 TABLET | Refills: 0 | Status: SHIPPED | OUTPATIENT
Start: 2022-10-18 | End: 2022-10-18 | Stop reason: HOSPADM

## 2022-10-18 RX ORDER — CEFDINIR 300 MG/1
300 CAPSULE ORAL DAILY
Qty: 5 CAPSULE | Refills: 0 | Status: SHIPPED | OUTPATIENT
Start: 2022-10-18 | End: 2022-11-17

## 2022-10-18 RX ORDER — FUROSEMIDE 20 MG/1
20 TABLET ORAL DAILY
Qty: 90 TABLET | Refills: 0
Start: 2022-10-18 | End: 2022-12-08

## 2022-10-18 RX ADMIN — FUROSEMIDE 20 MG: 20 TABLET ORAL at 09:07

## 2022-10-18 RX ADMIN — ASPIRIN 81 MG: 81 TABLET, COATED ORAL at 09:06

## 2022-10-18 RX ADMIN — HEPARIN SODIUM 5000 UNITS: 5000 INJECTION INTRAVENOUS; SUBCUTANEOUS at 09:00

## 2022-10-18 RX ADMIN — DIPHENHYDRAMINE HYDROCHLORIDE AND LIDOCAINE HYDROCHLORIDE AND ALUMINUM HYDROXIDE AND MAGNESIUM HYDRO 5 ML: KIT at 01:32

## 2022-10-18 RX ADMIN — BARIUM SULFATE 20 ML: 400 PASTE ORAL at 09:45

## 2022-10-18 RX ADMIN — AMIODARONE HYDROCHLORIDE 200 MG: 200 TABLET ORAL at 09:06

## 2022-10-18 RX ADMIN — LEVOTHYROXINE SODIUM 50 MCG: 50 TABLET ORAL at 05:01

## 2022-10-18 RX ADMIN — MINERAL OIL AND PETROLATUM 1 APPLICATION: 150; 830 OINTMENT OPHTHALMIC at 09:02

## 2022-10-18 RX ADMIN — BISACODYL 10 MG: 10 SUPPOSITORY RECTAL at 14:29

## 2022-10-18 RX ADMIN — METOPROLOL SUCCINATE 25 MG: 25 TABLET, FILM COATED, EXTENDED RELEASE ORAL at 09:05

## 2022-10-18 RX ADMIN — BARIUM SULFATE 100 ML: 0.81 POWDER, FOR SUSPENSION ORAL at 09:44

## 2022-10-18 RX ADMIN — AMIODARONE HYDROCHLORIDE 200 MG: 200 TABLET ORAL at 01:29

## 2022-10-18 RX ADMIN — Medication 10 ML: at 09:02

## 2022-10-18 RX ADMIN — PANTOPRAZOLE SODIUM 40 MG: 40 TABLET, DELAYED RELEASE ORAL at 09:06

## 2022-10-18 RX ADMIN — DIPHENHYDRAMINE HYDROCHLORIDE AND LIDOCAINE HYDROCHLORIDE AND ALUMINUM HYDROXIDE AND MAGNESIUM HYDRO 5 ML: KIT at 09:14

## 2022-10-18 RX ADMIN — MEMANTINE 5 MG: 5 TABLET ORAL at 09:06

## 2022-10-18 RX ADMIN — POLYETHYLENE GLYCOL 3350 17 G: 17 POWDER, FOR SOLUTION ORAL at 09:07

## 2022-10-18 NOTE — PROGRESS NOTES
"  [facility]  PROGRESS NOTE    Date of Admission: 10/12/2022  Date of Service: 10/18/22    Primary Care Physician: Ngozi Davis DO    Chief Complaint: afib, HFrEF      Subjective      HPI: Patient is lying in bed comfortably. She has no acute complaints. No recurrence of atrial fibrillation      Objective   Vitals: /60 (BP Location: Right arm, Patient Position: Lying)   Pulse 76   Temp 97.6 °F (36.4 °C) (Oral)   Resp 18   Ht 162.6 cm (64\")   Wt 49.5 kg (109 lb 3.2 oz)   SpO2 92%   BMI 18.74 kg/m²     Physical Exam:   GENERAL: Alert, cooperative, in no acute distress.   HEART: Regular rate and rhythm; no murmurs, rubs or gallops  LUNGS: Clear to auscultation bilaterally. No wheezing, rales or rhonchi. Nonlabored breathing  EXTREMITIES: No obvious deformities, cyanosis, or edema noted      Results:  Results from last 7 days   Lab Units 10/18/22  0443 10/16/22  0647 10/15/22  0522   WBC 10*3/mm3 6.79 7.56 5.08   HEMOGLOBIN g/dL 9.4* 9.2* 8.3*   HEMATOCRIT % 29.9* 29.9* 27.7*   PLATELETS 10*3/mm3 237 250 213     Results from last 7 days   Lab Units 10/18/22  0443 10/17/22  0449 10/16/22  0647   SODIUM mmol/L 142 141 139   POTASSIUM mmol/L 3.7 3.7 4.1   CHLORIDE mmol/L 100 102 104   CO2 mmol/L 29.0 26.0 23.0   BUN mg/dL 25* 25* 23   CREATININE mg/dL 1.37* 1.45* 1.19*   GLUCOSE mg/dL 96 85 97      Lab Results   Component Value Date    AST 11 10/18/2022    ALT 5 10/18/2022             Results from last 7 days   Lab Units 10/12/22  0221   TSH uIU/mL 3.470             Results from last 7 days   Lab Units 10/12/22  0221   TROPONIN T ng/mL 0.012     Results from last 7 days   Lab Units 10/16/22  0647   PROBNP pg/mL 22,502.0*         Intake/Output Summary (Last 24 hours) at 10/18/2022 1001  Last data filed at 10/17/2022 1300  Gross per 24 hour   Intake 440 ml   Output --   Net 440 ml       I personally reviewed the patient's EKG/Telemetry data    Radiology Data: no new " data    Current Medications:  amiodarone, 200 mg, Oral, Q8H   Followed by  [START ON 10/22/2022] amiodarone, 200 mg, Oral, Q12H   Followed by  [START ON 11/5/2022] amiodarone, 200 mg, Oral, Daily  artificial tears, 1 application, Both Eyes, Daily  aspirin, 81 mg, Oral, Daily  First Mouthwash (Magic Mouthwash), 5 mL, Swish & Spit, Q6H  furosemide, 20 mg, Oral, Every Other Day  gabapentin, 100 mg, Oral, Nightly  heparin (porcine), 5,000 Units, Subcutaneous, Q12H  levothyroxine, 50 mcg, Oral, Q AM  memantine, 5 mg, Oral, BID  metoprolol succinate XL, 25 mg, Oral, Q12H  mirtazapine, 30 mg, Oral, Nightly  pantoprazole, 40 mg, Oral, BID  polyethylene glycol, 17 g, Oral, Daily  senna-docusate sodium, 2 tablet, Oral, Nightly  sodium chloride, 10 mL, Intravenous, Q12H         Assessment  1. Acute Hypoxic Respiratory Failure  2. Acute on Chronic Systolic Heart Failure  a. Echo 9/6/22: EF 21-25%  3. Paroxysmal Atrial Fibrillation  a. New onset during 9/2022 admission at [facility]  b. Recurrence on 10/13/22  c. Initiated on amiodarone protocol, currently taking 200 mg q8 hrs  d. On metoprolol succinate 25 bid  e. CHADSVASC= 8, not candidate for AC due to history of recurrent falls  4. HTN  5. HLD  6. CKD Stage III     Plan  • Patient is currently in NSR. Stable vital signs on 2 L/min supplemental O2 via NC. Volume overload has improved with light diuresis. Patient is stable for discharge from a cardiovascular standpoint when acceptable with rest of hospital team.  • Continue amiodarone per protocol, metoprolol and furosemide 20 mg PO every other day. Not a candidate for chronic anticoagulation.   • Other comorbidities per hospitalist.    Anup Holly PA-C

## 2022-10-18 NOTE — CASE MANAGEMENT/SOCIAL WORK
Case Management Discharge Note      Final Note: Patient's plan is home with home health.  Notified Chen with [facility] of patient's discharge today.         Selected Continued Care - Admitted Since 10/12/2022     Destination    No services have been selected for the patient.              Durable Medical Equipment    No services have been selected for the patient.              Dialysis/Infusion    No services have been selected for the patient.              Home Medical Care     Service Provider Selected Services Address Phone Fax Patient Preferred    [facility] [facility] [address] [phone] --          Therapy    No services have been selected for the patient.              Community Resources    No services have been selected for the patient.              Community & DME    No services have been selected for the patient.                Selected Continued Care - Prior Encounters Includes continued care and service providers with selected services from prior encounters from 7/14/2022 to 10/18/2022    Discharged on 9/10/2022 Admission date: 9/5/2022 - Discharge disposition[place] [facility]    [place] Care     Service Provider Selected Services Address Phone Fax Patient Preferred    [facility] [address] [facility] [address] [phone] --                         Final Discharge Disposition Code: 06 - home with home health care

## 2022-10-18 NOTE — OUTREACH NOTE
Prep Survey    Flowsheet Row Responses   [facility] patient discharged from? [place]   Is LACE score < 7 ? No   Emergency Room discharge w/ pulse ox? No   Eligibility [facility]   Date of Admission 10/12/22   Date of Discharge 10/18/22   Discharge Disposition Home or Self Care   Discharge diagnosis Acute hypoxic respiratory failure, suspect aspiration pneumonia,   Mildly decompensated systolic heart failure   Does the patient have one of the following disease processes/diagnoses(primary or secondary)? Pneumonia   Does the patient have Home health ordered? Yes   What is the Home health agency?  [facility]   Is there a DME ordered? No   Prep survey completed? Yes          PANFILO CHONG - Registered Nurse

## 2022-10-18 NOTE — MBS/VFSS/FEES
Acute Care - Speech Language Pathology   Swallow Re-Evaluation  [place]     Modified Barium Swallow Study (MBS)       Patient Name: Scott Diego  : 4/10/1930  MRN: [id number]  Today's Date: 10/18/2022               Admit Date: 10/12/2022    Visit Dx:     ICD-10-CM ICD-9-CM   1. Acute on chronic systolic congestive heart failure (HCC)  I50.23 428.23     428.0   2. Acute respiratory failure with hypoxia (HCC)  J96.01 518.81   3. Urinary tract infection without hematuria, site unspecified  N39.0 599.0   4. Oropharyngeal dysphagia  R13.12 787.22   5. Nausea  R11.0 787.02     Patient Active Problem List   Diagnosis   • Idiopathic peripheral neuropathy   • Neck pain   • Mild cognitive impairment   • Anemia   • Hypertension   • Dehydration   • History of CVA (cerebrovascular accident)   • Dementia (HCC)   • Left bundle branch block   • Orthostatic dizziness   • Chronic systolic heart failure (HCC)   • Normocytic anemia   • Dyspnea   • Hypoxia   • Hypothyroid   • Pericardial effusion   • Acute respiratory failure with hypoxia (HCC)   • Right lower lobe pneumonia   • Ventricular ectopy   • Migraine without aura and without status migrainosus, not intractable   • Acute hypoxemic respiratory failure due to COVID-19 (HCC)   • Dyspnea due to COVID-19   • COVID-19 virus detected   • Encephalopathy due to COVID-19 virus   • Acute on chronic congestive heart failure (HCC)   • Anxiety   • Bilateral hearing loss   • Impaired functional mobility, balance, gait, and endurance   • Nausea without vomiting   • Hypoxia   • Respiratory failure (HCC)   • Acute on chronic systolic congestive heart failure (HCC)   • Moderate malnutrition (HCC)     Past Medical History:   Diagnosis Date   • Anxiety    • Congestive heart failure (HCC)    • Coronary artery disease    • Dementia (HCC)    • Depression    • Hyperlipidemia    • Malignant neoplasm (HCC)    • Memory loss    • Peripheral neuropathy    • Stroke (HCC)     mini stroke   •  Systolic heart failure (HCC) 09/25/2017    Chronic/compensated (EF 25%)     Past Surgical History:   Procedure Laterality Date   • CARDIAC CATHETERIZATION     • CHOLECYSTECTOMY     • ENDOSCOPY N/A 10/17/2022    Procedure: ESOPHAGOGASTRODUODENOSCOPY;  Surgeon: Brunner, Mark I, MD;  Location: [facility] ENDOSCOPY;  Service: Gastroenterology;  Laterality: N/A;   • EYE SURGERY     • HYSTERECTOMY         SLP Recommendation and Plan  SLP Swallowing Diagnosis: mild-moderate, oral dysphagia, mild, pharyngeal dysphagia (10/18/22 0930)  SLP Diet Recommendation: puree with some mashed, thin liquids (10/18/22 0930)  Recommended Precautions and Strategies: upright posture during/after eating, small bites of food and sips of liquid, general aspiration precautions, reflux precautions (10/18/22 0930)  SLP Rec. for Method of Medication Administration: meds whole, with puree, as tolerated (10/18/22 0930)     Monitor for Signs of Aspiration: yes, notify SLP if any concerns (10/18/22 0930)     Swallow Criteria for Skilled Therapeutic Interventions Met: demonstrates skilled criteria (10/18/22 0930)  Anticipated Discharge Disposition (SLP): unknown, anticipate therapy at next level of care (10/18/22 0930)  Rehab Potential/Prognosis, Swallowing: good, to achieve stated therapy goals (10/18/22 0930)  Therapy Frequency (Swallow): 5 days per week, other (see comments) (trial tx. May possibly be her baseline d/t dementia and age) (10/18/22 0930)  Predicted Duration Therapy Intervention (Days): until discharge (10/18/22 0930)                                     Plan of Care Reviewed With: patient      SWALLOW EVALUATION (last 72 hours)     SLP Adult Swallow Evaluation     Row Name 10/18/22 0930 10/17/22 6597                Rehab Evaluation    Document Type re-evaluation  -CH therapy note (daily note)  -EN       Subjective Information no complaints  -CH no complaints  -EN       Patient Observations alert;cooperative;agree to therapy  -CH  alert;cooperative;agree to therapy  -EN       Patient/Family/Caregiver Comments/Observations none present  -CH son present  -EN       Patient Effort good  -CH good  -EN       Comment MBS completed in collaboration with   - --       Symptoms Noted During/After Treatment none  -CH none  -EN          General Information    Patient Profile Reviewed yes  -CH yes  -EN       Pertinent History Of Current Problem see prior evaluation  -CH --       Current Method of Nutrition pureed with some mashed;honey-thick liquids  -CH --       Precautions/Limitations, Vision WFL with corrective lenses;for purposes of eval  - --       Precautions/Limitations, Hearing hearing impairment, bilaterally  -CH --       Plans/Goals Discussed with patient;agreed upon  -CH --       Barriers to Rehab none identified  -CH --          Pain    Additional Documentation Pain Scale: FACES Pre/Post-Treatment (Group)  -CH Pain Scale: FACES Pre/Post-Treatment (Group)  -EN          Pain Scale: FACES Pre/Post-Treatment    Pain: FACES Scale, Pretreatment 0-->no hurt  -CH 0-->no hurt  -EN       Posttreatment Pain Rating 0-->no hurt  -CH 0-->no hurt  -EN          MBS/VFSS    Utensils Used spoon;cup;straw  -CH --       Consistencies Trialed thin liquids;pureed;regular textures  -CH --          MBS/VFSS Interpretation    Oral Prep Phase impaired oral phase of swallowing  -CH --       Oral Transit Phase impaired  -CH --       Oral Residue impaired  -CH --          Oral Preparatory Phase    Oral Preparatory Phase reduced lip closure around utensil;prolonged manipulation;inadequate manipulation;bolus removed from mouth manually  -CH --       Reduced Lip Closure Around Utensil all consistencies tested  -CH --       Prolonged Manipulation regular textures  -CH --       Inadequate Manipulation regular textures  -CH --       Bolus Removed from Mouth Manually regular textures  -CH --       Oral Preparatory Phase, Comment edentulous status also impacting. Adequate  for pureed some mashed w/o teeth  -CH --          Oral Transit Phase    Impaired Oral Transit Phase increased A-P transit time;premature spillage of liquids into pharynx  -CH --       Increased A-P Transit Time regular textures;secondary to reduced lingual control  -CH --       Premature Spillage of Liquids into Pharynx thin liquids;secondary to reduced lingual control  -CH --          Oral Residue    Impaired Oral Residue floor of mouth residue;palatal residue;lingual residue  -CH --       Floor of Mouth Residue all consistencies tested  -CH --       Palatal Residue all consistencies tested  -CH --       Lingual Residue thin liquids;pudding/puree;regular textures  -CH --       Response to Oral Residue cleared residue;with spontaneous subsequent swallow;with liquid wash  -CH --          Initiation of Pharyngeal Swallow    Initiation of Pharyngeal Swallow bolus in pyriform sinuses  -CH --       Pharyngeal Phase impaired pharyngeal phase of swallowing  -CH --       Anatomical abnormalities noted anterior cervical c-spine prominence;other (see comments)  Suspect osteophytes C-5, C-6  -CH --       Anatomical abnormalities functional impact no functional impact on swallowing  -CH --       Penetration Before the Swallow thin liquids  -CH --       Penetration During the Swallow thin liquids;secondary to delayed swallow initiation or mistiming;secondary to reduced laryngeal elevation;secondary to reduced vestibular closure  -CH --       Response to Penetration transient;no response  -CH --       Rosenbek's Scale thin:;2--->level 2  -CH --       Pharyngeal Residue no significant pharyngeal residue noted  -CH --       Pharyngeal Phase, Comment Penetration into the nasopharynx of consecutive sips of thin liquids by straw observed which did not enter the nasal cavity.  -CH --          Esophageal Phase    Esophageal Phase esophageal retention;other (see comments)  r/t osteophytes, however eventually cleared  -CH --          SLP  Evaluation Clinical Impression    SLP Swallowing Diagnosis mild-moderate;oral dysphagia;mild;pharyngeal dysphagia  -CH suspected pharyngeal dysphagia  -EN       Functional Impact risk of aspiration/pneumonia  -CH risk of aspiration/pneumonia;risk of malnutrition;risk of dehydration  -EN       Rehab Potential/Prognosis, Swallowing good, to achieve stated therapy goals  -CH adequate, monitor progress closely  -EN       Swallow Criteria for Skilled Therapeutic Interventions Met demonstrates skilled criteria  -CH demonstrates skilled criteria  -EN          SLP Treatment Clinical Impressions    Treatment Assessment (SLP) -- Pt continues w/ wet vocal quality and multiple swallows w/ thin liquids. No s/s of aspiration w/ nectar-thick liquids. Will plan for repeat MBS tomorrow to assess readiness for upgrade in liquid viscosity and/or solid texture level.  -EN       Daily Summary of Progress (SLP) -- progress toward functional goals is good  -EN       Barriers to Overall Progress (SLP) -- Advanced age  -EN       Plan for Continued Treatment (SLP) -- continue treatment per plan of care  -EN       Care Plan Review -- evaluation/treatment results reviewed  -EN       Care Plan Review, Other Participant(s) -- son  -EN          Recommendations    Therapy Frequency (Swallow) 5 days per week;other (see comments)  trial tx. May possibly be her baseline d/t dementia and age  -CH 5 days per week  -EN       Predicted Duration Therapy Intervention (Days) until discharge  -CH until discharge  -EN       SLP Diet Recommendation puree with some mashed;thin liquids  -CH puree with some mashed;honey thick liquids  -EN       Recommended Diagnostics -- reassess via VFSS (MBS)  -EN       Recommended Precautions and Strategies upright posture during/after eating;small bites of food and sips of liquid;general aspiration precautions;reflux precautions  -CH upright posture during/after eating;small bites of food and sips of liquid;no straw;general  aspiration precautions;reflux precautions  -EN       Oral Care Recommendations Oral Care BID/PRN  -CH Oral Care BID/PRN  -EN       SLP Rec. for Method of Medication Administration meds whole;with puree;as tolerated  -CH meds whole;with pudding or applesauce;as tolerated  -EN       Monitor for Signs of Aspiration yes;notify SLP if any concerns  -CH yes;notify SLP if any concerns  -EN       Anticipated Discharge Disposition (SLP) unknown;anticipate therapy at next level of care  -CH unknown;anticipate therapy at next level of care  -EN             User Key  (r) = Recorded By, (t) = Taken By, (c) = Cosigned By    Initials Name Effective Dates     Herminia Vasquez MS CCC-SLP 06/16/21 -     EN Marni Kaur MS CCC-SLP 06/22/22 -                 EDUCATION  The patient has been educated in the following areas:   Home Exercise Program (HEP) Dysphagia (Swallowing Impairment) Oral Care/Hydration Modified Diet Instruction.        SLP GOALS     Row Name 10/18/22 0930 10/17/22 1045          (LTG) Patient will demonstrate functional swallow for    Diet Texture (Demonstrate functional swallow) soft whole textures  -CH soft whole textures  -EN     Liquid viscosity (Demonstrate functional swallow) thin liquids  -CH thin liquids  -EN     [place] (Demonstrate functional swallow) independently (over 90% accuracy)  -CH independently (over 90% accuracy)  -EN     Time Frame (Demonstrate functional swallow) by discharge  -CH by discharge  -EN     Progress/Outcomes (Demonstrate functional swallow) -- continuing progress toward goal  -EN        (STG) Patient will tolerate trials of    Consistencies Trialed (Tolerate trials) pureed/ mashed textures;thin liquids  - pureed/ mashed textures;honey/ moderately thick liquids  -EN     Desired Outcome (Tolerate trials) without signs/symptoms of aspiration;without signs of distress;with adequate oral prep/transit/clearance;with use of compensatory strategies (see comments)  -CH without  signs/symptoms of aspiration;without signs of distress;with adequate oral prep/transit/clearance;with use of compensatory strategies (see comments)  -EN     [place] (Tolerate trials) with minimal cues (75-90% accuracy)  -CH with minimal cues (75-90% accuracy)  -EN     Time Frame (Tolerate trials) by discharge  -CH by discharge  -EN     Progress/Outcomes (Tolerate trials) -- continuing progress toward goal  -EN        (STG) Lingual Strengthening Goal 1 (SLP)    Activity (Lingual Strengthening Goal 1, SLP) increase tongue back strength  -CH increase tongue back strength  -EN     Increase Tongue Back Strength lingual resistance exercises  -CH lingual resistance exercises  -EN     [place]/Accuracy (Lingual Strengthening Goal 1, SLP) with minimal cues (75-90% accuracy)  -CH with minimal cues (75-90% accuracy)  -EN     Time Frame (Lingual Strengthening Goal 1, SLP) short term goal (STG)  -CH short term goal (STG)  -EN     Progress/Outcomes (Lingual Strengthening Goal 1, SLP) -- continuing progress toward goal  -EN        (STG) Pharyngeal Strengthening Exercise Goal 1 (SLP)    Activity (Pharyngeal Strengthening Goal 1, SLP) increase timing;increase closure at entrance to airway/closure of airway at glottis  -CH increase timing;increase anterior movement of the hyolaryngeal complex;increase closure at entrance to airway/closure of airway at glottis;increase tongue base retraction  -EN     Increase Timing prepping - 3 second prep or suck swallow or 3-step swallow  -CH prepping - 3 second prep or suck swallow or 3-step swallow  -EN     Increase Anterior Movement of the Hyolaryngeal Complex --  -CH chin tuck against resistance (CTAR)  -EN     Increase Closure at Entrance to Airway/Closure of Airway at Glottis super-supraglottic swallow  -CH super-supraglottic swallow  -EN     Increase Tongue Base Retraction --  -CH hard effortful swallow  -EN     [place]/Accuracy (Pharyngeal Strengthening Goal 1, SLP) with  minimal cues (75-90% accuracy)  -CH with minimal cues (75-90% accuracy)  -EN     Time Frame (Pharyngeal Strengthening Goal 1, SLP) short term goal (STG)  -CH short term goal (STG)  -EN     Progress/Outcomes (Pharyngeal Strengthening Goal 1, SLP) -- continuing progress toward goal  -EN        (STG) Swallow Management Recall Goal 1 (SLP)    Activity (Swallow Management Recall Goal 1, SLP) -- safe diet/liquid level;safe diet level/texture;safe liquid viscosity;compensatory swallow strategies/techniques;postural techniques;rationale for use of strategies/techniques  -EN     [place]/Accuracy (Swallow Management Recall Goal 1, SLP) -- with minimal cues (75-90% accuracy)  -EN     Time Frame (Swallow Management Recall Goal 1, SLP) -- short term goal (STG)  -EN     Barriers (Swallow Management Recall Goal 1, SLP) -- cognition/STM deficits  -EN     Progress/Outcomes (Swallow Management Recall Goal 1, SLP) -- continuing progress toward goal  -EN           User Key  (r) = Recorded By, (t) = Taken By, (c) = Cosigned By    Initials Name Provider Type    Herminia Abdi MS CCC-SLP Speech and Language Pathologist    Marni Barrios MS CCC-SLP Speech and Language Pathologist                   Time Calculation:    Time Calculation- SLP     Row Name 10/18/22 1027             Time Calculation- SLP    SLP Start Time 0930  -      SLP Received On 10/18/22  -CH         Untimed Charges    SLP Eval/Re-eval  ST Motion Fluoro Eval Swallow - 52522  -CH      [id number]-RO Motion Fluoro Eval Swallow Minutes 68  -CH         Total Minutes    Untimed Charges Total Minutes 68  -CH       Total Minutes 68  -CH            User Key  (r) = Recorded By, (t) = Taken By, (c) = Cosigned By    Initials Name Provider Type    Herminia Abdi MS CCC-SLP Speech and Language Pathologist                Therapy Charges for Today     Code Description Service Date Service Provider Modifiers Qty    [id number]  ST MOTION FLUORO EVAL SWALLOW 5  10/18/2022 Herminia Vasquez, MS CCC-SLP GN 1               Herminia Vasquez MS CCC-SLP  10/18/2022     Patient was not wearing a face mask and did exhibit coughing during this therapy encounter.  Procedure performed was aerosolizing, involved close contact (within 6 feet for at least 15 minutes or longer), and did not involve contact with infectious secretions or specimens.  Therapist used appropriate personal protective equipment including gloves, standard procedure mask and eye protection.  Appropriate PPE was worn during the entire therapy session.  Hand hygiene was completed before and after therapy session.

## 2022-10-18 NOTE — PLAN OF CARE
Goal Outcome Evaluation:  Plan of Care Reviewed With: patient  SLP re-evaluation completed. Will continue to address dysphagia. Please see note for further details and recommendations.

## 2022-10-18 NOTE — DISCHARGE SUMMARY
[facility] Medicine Services  DISCHARGE SUMMARY    Patient Name: Scott Diego  : 4/10/1930  MRN: [id number]    Date of Admission: 10/12/2022  2:10 AM  Date of Discharge:  10/18/2022  Primary Care Physician: Ngozi Davis DO    Consults     Date and Time Order Name Status Description    10/15/2022 12:55 PM Inpatient Gastroenterology Consult Completed     10/14/2022  9:08 AM Inpatient Cardiology Consult Completed         Hospital Course     Presenting Problem:   Acute on chronic systolic congestive heart failure (HCC) [I50.23]  Respiratory failure (HCC) [J96.90]    Active Hospital Problems    Diagnosis  POA   • **Acute respiratory failure with hypoxia (HCC) [J96.01]  Yes   • Moderate malnutrition (HCC) [E44.0]  Yes   • Acute on chronic systolic congestive heart failure (HCC) [I50.23]  Yes   • Respiratory failure (HCC) [J96.90]  Yes   • Nausea without vomiting [R11.0]  Unknown   • Anxiety [F41.9]  Yes   • Hypothyroid [E03.9]  Yes   • Chronic systolic heart failure (HCC) [I50.22]  Yes   • History of CVA (cerebrovascular accident) [Z86.73]  Not Applicable   • Dementia (HCC) [F03.90]  Yes   • Hypertension [I10]  Yes      Resolved Hospital Problems   No resolved problems to display.      Hospital Course:  Scott Diego is a 89 y.o. female with PMH significant for essential hypertension, chronic systolic CHF (LVEF 21-25%), hypothyroidism, anxiety and dementia. She was recently admitted to [facility] -9/10/22 for respiratory failure secondary to COVID-19 and a/c HFrEF. During this hospitalization, she had new atrial fibrillation with RVR. Son noted several weeks of nausea, for which she was evaluated by GI and started on TID Guadalupe Root. She did not require antiemetics during her hospitalization and there was concern that her symptoms may be exacerbated by her progressive dementia or anxiety. She saw GI outpatient on 10/11 - PPI was increased to BID and an EGD was  planned.   Admitted to [facility] 10/12/22 for acute hypoxic respiratory failure with concerns for aspiration pneumonia that was treated with Rocephin and doxycycline and transition to p.o. Augmentin upon discharge. Cardiology consulted to follow due to atrial fibrillation with RVR and patient converted to sinus rhythm with IV amiodarone and patient was transition to p.o. amiodarone upon discharge.  During this hospitalization seen by GI team who performed EGD that showed atrophic gastritis and recommended p.o. mirtazapine trial x4 weeks for her poor appetite.  She underwent speech eval and modified barium swallow and speech team recommended puréed diet with thin liquid diet.  Her poor appetite.  She was discharged from the hospital in a stable condition    Acute hypoxic respiratory failure, suspect aspiration pneumonia   Mildly decompensated systolic heart failure  Has been having issues with aspiration.  Evaluated by speech team modified barium swallow was done on 10/18/2022 and recommendation was puréed diet with thin liquids  Received EGD 10/17/2022 that showed atrophic gastritis   Currently on Rocephin and doxycycline  Ox requirement is stable at 3 L/min.    On home O2  Heart failure seems to be compensated.  Continue gentle diuresis with p.o. Lasix 20 mg daily     Poor oral intake   Evaluated by GI last admission - recommended TID guadalupe root and management of constipation  Saw GI outpatient on 10/11 - PPI increased to BID and plans were made for outpatient EGD  Continue bowel regimen and BID PPI. Guadalupe root is not on formulary - family can bring in   QTc 562, stable, on amio.  Stopped Zofran and other antiemetics  Continue mirtazapine QHS for appetite stimulation  EGD  done on 10/17/2022 with evidence of atrophic gastritis.  GI recommended trial with p.o. mirtazapine x4 weeks.  Given referral to GI services outpatient     Atrial fibrillation with RVR   Chronic systolic heart failure, ejection  fraction 20 to 25%, compensated  A. fib with controlled and she converted to sinus rhythm with amiodarone drip.  Transition to p.o. amiodarone upon discharge   Continue metoprolol XL  Not candidate for anticoagulation given her age and falls  Gentle diuresis with p.o. Lasix 20 mg daily to keep the patient euvolemic     CKD IIIb  Creatinine is around baseline: 1.2-1.5     Recent COVID-19 infection  Positive test 9/5/2022  Out of isolation     Recently diagnosed UTI  Currently receiving Rocephin for pneumonia  Urine culture negative to date     Alzheimer dementia  Continue Namenda     Anxiety  Alprazolam     Hypothyroidism  Synthroid      Discharge Follow Up Recommendations for outpatient labs/diagnostics:  PCP in 1 week  Heart valve clinic in 1 week    Day of Discharge     HPI:   I have seen and evaluated the patient this morning.  Just returned from her modified barium swallow.  Feeling much better today.  No shortness of breath, no hypoxia.  Excited to go home    Review of Systems  General : no fevers, chills  CVS: No chest pain, palpitations  Respiratory: No cough, dyspnea  GI: No N/V/D, abd pain  10 point review of systems is negative except for what is mentioned in HPI    Vital Signs:   Temp:  [97.5 °F (36.4 °C)-98.4 °F (36.9 °C)] 97.5 °F (36.4 °C)  Heart Rate:  [71-88] 80  Resp:  [16-18] 18  BP: (113-135)/(55-83) 114/55  Flow (L/min):  [2] 2    Physical Exam:  General: Chronically ill looking, pleasant and conversant   Head: Atraumatic and normocephalic  Eyes: No Icterus. No pallor  Ears:  Ears appear intact with no abnormalities noted  Throat: No oral lesions, no thrush  Neck: Supple, trachea midline  Lungs: Clear to auscultation bilaterally, equal air entry, no wheezing or crackles  Heart:  Normal S1 and S2, no murmur, no gallop, No JVD, no lower extremity swelling  Abdomen:  Soft, no tenderness, no organomegaly, normal bowel sounds, no organomegaly  Extremities: pulses equal bilaterally  Skin: No bleeding,  bruising or rash, normal skin turgor and elasticity  Neurologic: Cranial nerves appear intact with no evidence of facial asymmetry, normal motor and sensory functions in all 4 extremities  Psych: Alert and oriented x2    Pertinent  and/or Most Recent Results     LAB RESULTS:      Lab 10/18/22  0443 10/16/22  0647 10/15/22  0522 10/14/22  1421 10/13/22  1523 10/13/22  0419 10/12/22  0532 10/12/22  0221   WBC 6.79 7.56 5.08 7.56 22.68* 17.06*  --  14.67*   HEMOGLOBIN 9.4* 9.2* 8.3* 8.6* 9.4* 8.0*  --  10.3*   HEMATOCRIT 29.9* 29.9* 27.7* 28.5* 30.2* 26.4*  --  33.3*   PLATELETS 237 250 213 232 310 226  --  312   NEUTROS ABS 4.11  --   --   --   --  14.50*  --  11.09*   IMMATURE GRANS (ABS) 0.02  --   --   --   --  0.11*  --  0.07*   LYMPHS ABS 1.35  --   --   --   --  0.86  --  2.08   MONOS ABS 1.08*  --   --   --   --  1.57*  --  1.20*   EOS ABS 0.18  --   --   --   --  0.00  --  0.17   MCV 94.9 97.4* 100.0* 99.7* 98.4* 98.9*  --  98.8*   LACTATE  --   --   --   --   --   --  0.9 2.2*         Lab 10/18/22  0443 10/17/22  0449 10/16/22  0647 10/15/22  0522 10/14/22  1421 10/13/22  0419 10/12/22  0221   SODIUM 142 141 139 141 140   < > 141   POTASSIUM 3.7 3.7 4.1 3.8 4.0   < > 4.0   CHLORIDE 100 102 104 106 104   < > 101   CO2 29.0 26.0 23.0 24.0 24.0   < > 27.0   ANION GAP 13.0 13.0 12.0 11.0 12.0   < > 13.0   BUN 25* 25* 23 25* 26*   < > 16   CREATININE 1.37* 1.45* 1.19* 1.26* 1.38*   < > 1.25*   EGFR 36.3* 33.9* 43.0* 40.1* 36.0*   < > 40.5*   GLUCOSE 96 85 97 86 84   < > 138*   CALCIUM 9.3 9.1 9.1 8.8 8.8   < > 9.4   MAGNESIUM  --  2.1 1.8 2.0  --   --  2.1   PHOSPHORUS  --   --  3.9 3.7  --   --   --    TSH  --   --   --   --   --   --  3.470    < > = values in this interval not displayed.         Lab 10/18/22  0443 10/14/22  1421 10/12/22  0221   TOTAL PROTEIN 6.8 7.1 8.8*   ALBUMIN 3.30* 3.10* 3.80   GLOBULIN 3.5 4.0 5.0   ALT (SGPT) 5 5 6   AST (SGOT) 11 12 27   BILIRUBIN 0.3 0.3 0.4   ALK PHOS 37* 41 53          Lab 10/16/22  0647 10/12/22  0221   PROBNP 22,502.0* 5,194.0*   TROPONIN T  --  0.012                 Lab 10/12/22  0328   FIO2 36   CARBOXYHEMOGLOBIN (VENOUS) 1.0     Brief Urine Lab Results  (Last result in the past 365 days)      Color   Clarity   Blood   Leuk Est   Nitrite   Protein   CREAT   Urine HCG        10/12/22 0251 Yellow   Clear   Negative   Large (3+)   Positive   Trace               Microbiology Results (last 10 days)     Procedure Component Value - Date/Time    Blood Culture - Blood, Arm, Right [[id number]]  (Normal) Collected: 10/12/22 0347    Lab Status: Final result Specimen: Blood from Arm, Right Updated: 10/17/22 0400     Blood Culture No growth at 5 days    Blood Culture - Blood, Arm, Right [[id number]]  (Normal) Collected: 10/12/22 0347    Lab Status: Final result Specimen: Blood from Arm, Right Updated: 10/17/22 0400     Blood Culture No growth at 5 days    COVID PRE-OP / PRE-PROCEDURE SCREENING ORDER (NO ISOLATION) - Swab, Nasopharynx [[id number]]  (Abnormal) Collected: 10/12/22 0252    Lab Status: Final result Specimen: Swab from Nasopharynx Updated: 10/12/22 0324    Narrative:      The following orders were created for panel order COVID PRE-OP / PRE-PROCEDURE SCREENING ORDER (NO ISOLATION) - Swab, Nasopharynx.  Procedure                               Abnormality         Status                     ---------                               -----------         ------                     COVID-19 and FLU A/B PCR...[[id number]]  Abnormal            Final result                 Please view results for these tests on the individual orders.    COVID-19 and FLU A/B PCR - Swab, Nasopharynx [[id number]]  (Abnormal) Collected: 10/12/22 0252    Lab Status: Final result Specimen: Swab from Nasopharynx Updated: 10/12/22 0324     COVID19 Detected     Influenza A PCR Not Detected     Influenza B PCR Not Detected    Narrative:      Fact sheet for providers: https://www.fda.gov/media/089124/download    Fact sheet  for patients: https://www.fda.gov/media/275342/download    Test performed by PCR.  Influenza A and Influenza B negative results should be considered presumptive in samples that have a positive SARS-CoV-2 result.    Competitive inhibition studies showed that SARS-CoV-2 virus, when present at concentrations above 3.6E+04 copies/mL, can inhibit the detection and amplification of influenza A and influenza B virus RNA if present at or below 1.8E+02 copies/mL or 4.9E+02 copies/mL, respectively, and may lead to false negative influenza virus results. If co-infection with influenza A or influenza B virus is suspected in samples with a positive SARS-CoV-2 result, the sample should be re-tested with another FDA cleared, approved, or authorized influenza test, if influenza virus detection would change clinical management.    Urine Culture - Urine, Urine, Clean Catch [[id number]] Collected: 10/12/22 0251    Lab Status: Final result Specimen: Urine, Clean Catch Updated: 10/13/22 1320     Urine Culture >100,000 CFU/mL Normal Urogenital Neeta    Narrative:      Colonization of the urinary tract without infection is common. Treatment is discouraged unless the patient is symptomatic, pregnant, or undergoing an invasive urologic procedure.    COVID PRE-OP / PRE-PROCEDURE SCREENING ORDER (NO ISOLATION) - Swab, Nasopharynx [[id number]]  (Normal) Collected: 10/08/22 1837    Lab Status: Final result Specimen: Swab from Nasopharynx Updated: 10/08/22 1914    Narrative:      The following orders were created for panel order COVID PRE-OP / PRE-PROCEDURE SCREENING ORDER (NO ISOLATION) - Swab, Nasopharynx.  Procedure                               Abnormality         Status                     ---------                               -----------         ------                     COVID-19 and FLU A/B PCR...[[id number]]  Normal              Final result                 Please view results for these tests on the individual orders.    COVID-19 and FLU  A/B PCR - Swab, Nasopharynx [[id number]]  (Normal) Collected: 10/08/22 1837    Lab Status: Final result Specimen: Swab from Nasopharynx Updated: 10/08/22 1914     COVID19 Not Detected     Influenza A PCR Not Detected     Influenza B PCR Not Detected    Narrative:      Fact sheet for providers: https://www.fda.gov/media/178476/download    Fact sheet for patients: https://www.fda.gov/media/803105/download    Test performed by PCR.        CT Abdomen Pelvis Without Contrast    Result Date: 10/8/2022  DATE OF EXAM: 10/8/2022 6:48 PM  PROCEDURE: CT ABDOMEN PELVIS WO CONTRAST-  INDICATIONS: abdominal  pain  COMPARISON: 8/30/2022  TECHNIQUE: Routine transaxial slices were obtained through the abdomen and pelvis without the administration of intravenous contrast. Reconstructed coronal and sagittal images were also obtained. Automated exposure control and iterative construction methods were used.  The radiation dose reduction device was turned on for each scan per the ALARA (As Low as Reasonably Achievable) protocol.  FINDINGS: Lower thorax:Interval increase in small right greater than left pleural effusions. Associated groundglass and consolidative opacities may represent atelectasis or infection. Cardiomegaly. Small pericardial effusion.  Liver: No focal hepatic lesions seen.  Normal hepatic size. Normal density of the liver.  Gallbladder and bile ducts: Gallbladder is not seen, presumably surgically absent. No intra- or extra- hepatic biliary ductal dilatation.  Spleen: Normal appearance of the spleen.  Pancreas: Senescent atrophy of the pancreas. Main pancreatic duct is nondilated.  Adrenals: Normal appearance of the adrenal glands.  Kidneys: Atrophy of the right kidney. Left kidney appears within normal limits. No nephrolithiasis.  Normal caliber left ureter.  Bowel: Normal caliber of the bowels. Appendix is not visualized but no secondary signs of appendicitis. No substantial diverticular disease. Small hiatal hernia.   Pelvis: Limited evaluation of partially distended bladder. Hysterectomy. Ovaries are not visualized.  Peritoneum: No free air. No free fluid. No peritoneal nodularity. Hernia repair mesh.  Vessels: Normal aortic caliber.  Atherosclerotic calcification of the aorta.  Lymph nodes: No enlarged or suspicious adenopathy.. Mildly prominent retroperitoneal and mesenteric nodes.  Bones: No acute osseous abnormality. Degenerative changes of the spine. Diffuse bone demineralization. Degenerative changes of the hips.  Soft tissues: Unremarkable appearance of the soft tissues.      No acute intra-abdominal abnormality.  Mild interval increase in small right greater than left pleural effusions. There is associated adjacent groundglass and consolidation which may reflect atelectasis or infection.  Similar small pericardial effusion.  This report was finalized on 10/8/2022 8:29 PM by Joel Bustamante.      FL Video Swallow With Speech Single Contrast    Result Date: 10/12/2022  EXAMINATION: FL VIDEO SWALLOW W SPEECH SINGLE-CONTRAST-  INDICATION: DYSPHAGIA, OROPHARYNGEAL, HAS ATTRIBUTABLE CAUSE  TECHNIQUE: 1 minute and 42 seconds of fluoroscopic time was used for this exam. Tenderness associated of fluoroscopic loops are saved. The patient was evaluated in the seated lateral position while taking a variety of consistencies of barium by mouth under the direction of speech pathology.  COMPARISON: NONE  FINDINGS: 1 minute and 42 seconds of fluoroscopy provided for a modified barium swallow. Please see speech therapy report for full details and recommendations.       Fluoroscopy provided for a modified barium swallow. Please see speech therapy report for full details and recommendations.    This report was finalized on 10/12/2022 4:41 PM by Saúl Benites.      XR Chest 1 View    Result Date: 10/16/2022  DATE OF EXAM: 10/16/2022 10:49 AM  PROCEDURE: XR CHEST 1 VW-  INDICATIONS: follow up aspiration PNA/hypoxic respiratory  failure/history of systolic CHF; I50.23-Acute on chronic systolic (congestive) heart failure; J96.01-Acute respiratory failure with hypoxia; N39.0-Urinary tract infection, site not specified; R13.12-Dysphagia, oropharyngeal phase  COMPARISON: 10/13/2022  TECHNIQUE: Single radiographic AP view of the chest was obtained.  FINDINGS: Persistent moderate pulmonary edema with small bilateral pleural effusions, similar to prior. Focal opacity in the right upper lobe appears mildly decreased, with increased perihilar opacities, likely shifting volume overload. There is no pneumothorax. Unchanged cardiac enlargement.      Persistent moderate pulmonary edema with small bilateral pleural effusions, similar to prior. Focal opacity in the right upper lobe appears mildly decreased, with increased perihilar opacities, likely shifting volume overload. There is no pneumothorax. Unchanged cardiac enlargement.  This report was finalized on 10/16/2022 12:10 PM by Saúl Benites.      XR Chest 1 View    Result Date: 10/13/2022  DATE OF EXAM: 10/13/2022 11:00 AM  PROCEDURE: XR CHEST 1 VW-  INDICATIONS: discomfort with breathing; I50.23-Acute on chronic systolic (congestive) heart failure; J96.01-Acute respiratory failure with hypoxia; N39.0-Urinary tract infection, site not specified; R13.12-Dysphagia, oropharyngeal phase  COMPARISON: 10/12/2022  TECHNIQUE: Single radiographic AP view of the chest was obtained.  FINDINGS: Heart is borderline enlarged. There is a persistent diffuse interstitial disease pattern resembling pulmonary edema, similar in overall extent to the prior exam. Interstitial edema appears slightly improved in the left midlung, mildly increased in the right apex and base. There are small effusions. No pneumothorax or dense lung consolidation are seen.      Persistent appearance of pulmonary interstitial edema similar overall to yesterday's exam.  This report was finalized on 10/13/2022 11:25 AM by Dr. Jan Alvarez MD.       XR Chest 1 View    Result Date: 10/12/2022  FRONTAL VIEW OF THE CHEST CLINICAL INDICATION: Shortness of breath COMPARISON: October 8, 2022. FINDINGS: Lines: None Increased interstitial markings and prominence of the pulmonary vasculature. Trace bilateral pleural effusions. No pneumothorax. Cardiomediastinal morphology is mildly enlarged.  Osseous structures are unremarkable.     Findings suggestive of pulmonary edema, similar to prior. Electronically signed by:  Shira Michele D.O.  10/12/2022 1:04 AM Mountain Time    XR Chest 1 View    Result Date: 10/8/2022  EXAMINATION: XR CHEST 1 VW DATE: 10/8/2022 7:04 PM INDICATION:  Dyspnea; COMPARISON:  September 2022 FINDINGS: Moderate cephalization of the pulmonary vasculature and interstitial opacities. Trace bilateral pleural effusions. No pneumothorax. Minor cardiomegaly. No acute osseous abnormality.     1.  Minor CHF/volume overload. 2.  Probable background COPD as well. Electronically signed by:  Krystina Casas M.D.  10/8/2022 7:50 PM Mountain Time    Results for orders placed during the hospital encounter of 09/05/22    Adult Transthoracic Echo Complete w/ Color, Spectral and Contrast if Necessary Per Protocol    Interpretation Summary  · Left ventricular ejection fraction appears to be 21 - 25%. Left ventricular systolic function is severely decreased.  · Left ventricular diastolic function is consistent with (grade II w/high LAP) pseudonormalization.  · There is a small (<1cm) pericardial effusion.  · There is a left pleural effusion. There is a right pleural effusion.  · Moderate mitral valve regurgitation is present.  · Moderate to severe tricuspid valve regurgitation is present.  · Estimated right ventricular systolic pressure from tricuspid regurgitation is markedly elevated (>55 mmHg). Calculated right ventricular systolic pressure from tricuspid regurgitation is 56 mmHg.    Plan for Follow-up of Pending Labs/Results:     Discharge Details        Discharge  Medications      New Medications      Instructions Start Date   amiodarone 200 MG tablet  Commonly known as: PACERONE   200 mg, Oral, Every 12 Hours   Start Date: October 22, 2022     amiodarone 200 MG tablet  Commonly known as: PACERONE   200 mg, Oral, Daily   Start Date: November 5, 2022     amoxicillin-clavulanate 500-125 MG per tablet  Commonly known as: Augmentin   500 mg, Oral, 2 Times Daily      mirtazapine 30 MG tablet  Commonly known as: REMERON   30 mg, Oral, Nightly         Changes to Medications      Instructions Start Date   furosemide 20 MG tablet  Commonly known as: LASIX  What changed:   when to take this  reasons to take this   20 mg, Oral, Daily      ondansetron ODT 4 MG disintegrating tablet  Commonly known as: ZOFRAN-ODT  What changed: when to take this   4 mg, Translingual, Every 6 Hours PRN         Continue These Medications      Instructions Start Date   acetaminophen 325 MG tablet  Commonly known as: TYLENOL   650 mg, Oral, Every 4 Hours PRN      ALPRAZolam 0.5 MG tablet  Commonly known as: XANAX   0.5 mg, Oral, Every 6 Hours PRN      artificial tears ophthalmic ointment   1 application, Both Eyes, Daily      atorvastatin 80 MG tablet  Commonly known as: LIPITOR   80 mg, Oral, Nightly      bisacodyl 10 MG suppository  Commonly known as: Dulcolax   10 mg, Rectal, Daily PRN, If no BM in 3 days take supp      carboxymethylcellulose 0.5 % solution  Commonly known as: REFRESH PLUS   3 Times Daily PRN      fexofenadine 60 MG tablet  Commonly known as: ALLEGRA   60 mg, Oral, Daily      gabapentin 100 MG capsule  Commonly known as: NEURONTIN   100 mg, Oral, Daily      gabapentin 300 MG capsule  Commonly known as: NEURONTIN   300 mg, Oral, Nightly      Guadalupe Root 500 MG capsule   Take 1 capsule by mouth 3 (Three) Times a Day for 30 days.      levothyroxine 50 MCG tablet  Commonly known as: SYNTHROID, LEVOTHROID   50 mcg, Oral, Every Early Morning      memantine 5 MG tablet  Commonly known as:  NAMENDA   5 mg, Oral, 2 Times Daily      metoprolol succinate XL 25 MG 24 hr tablet  Commonly known as: TOPROL-XL   25 mg, Oral, Daily      pantoprazole 40 MG EC tablet  Commonly known as: PROTONIX   40 mg, Oral, 2 Times Daily      polyethylene glycol 17 GM/SCOOP powder  Commonly known as: MIRALAX   Mix 17 g in liquid and drink by mouth Daily for 30 days.      REFRESH DRY EYE THERAPY OP   Ophthalmic, As Needed      SM Aspirin Adult Low Strength 81 MG EC tablet  Generic drug: aspirin   81 mg, Oral, Daily           Allergies   Allergen Reactions   • Augmentin [Amoxicillin-Pot Clavulanate] Nausea And Vomiting     Makes her sick at her stomach   • Claritin [Loratadine] Unknown (See Comments)     Doesn't remember   • Keflex [Cephalexin] Nausea And Vomiting     Makes pt sick at her stomach   • Sulfa Antibiotics Other (See Comments)     Does not remember     Discharge Disposition:  Home or Self Care    Diet:  Hospital:  Diet Order   Procedures   • Diet Dysphagia; III - Pureed With Some Mashed; Thin; Cardiac     Activity:  Activity Instructions     Activity as Tolerated          Restrictions or Other Recommendations:  None        CODE STATUS:    Code Status and Medical Interventions:   Ordered at: 10/12/22 0414     Medical Intervention Limits:    NO intubation (DNI)     Level Of Support Discussed With:    Next of Kin (If No Surrogate)    Patient     Code Status (Patient has no pulse and is not breathing):    No CPR (Do Not Attempt to Resuscitate)     Medical Interventions (Patient has pulse or is breathing):    Limited Support     Comments:    Likely would not wish for feeding tube either     Future Appointments   Date Time Provider Department Center   10/24/2022 11:15 AM Ngozi Davis DO [facility]   10/24/2022  3:15 PM Chrissy Toribio APRN [facility]   12/19/2022  1:00 PM Ngozi Davis DO [facility]   2/1/2023 11:30 AM Jessie Dover APRN [facility]     Additional Instructions  for the Follow-ups that You Need to Schedule     Ambulatory Referral to Home Health   As directed      Face to Face Visit Date: 10/14/2022    Follow-up provider for Plan of Care?: I treated the patient in an acute care facility and will not continue treatment after discharge.    Follow-up provider: CODY CARD [[id number]]    Reason/Clinical Findings: acute respiratory failure    Describe mobility limitations that make leaving home difficult: impaired functional mobility, gait, balance, and endurance.    Nursing/Therapeutic Services Requested: Skilled Nursing Physical Therapy Occupational Therapy    Skilled nursing orders: Other (resume HH)    PT orders: Other (add comment) (resume HH)    Occupational orders: Other (resume HH)    Frequency: 1 Week 1             Perico Bae MD  10/18/22    Time Spent on Discharge:  I spent  33 minutes on this discharge activity which included: face-to-face encounter with the patient, reviewing the data in the system, coordination of the care with the nursing staff as well as consultants, documentation, and entering orders.

## 2022-10-19 ENCOUNTER — TRANSITIONAL CARE MANAGEMENT TELEPHONE ENCOUNTER (OUTPATIENT)
Dept: CALL CENTER | Facility: HOSPITAL | Age: 87
End: 2022-10-19

## 2022-10-19 LAB
QT INTERVAL: 354 MS
QT INTERVAL: 374 MS
QT INTERVAL: 408 MS
QT INTERVAL: 460 MS
QTC INTERVAL: 518 MS
QTC INTERVAL: 536 MS
QTC INTERVAL: 562 MS
QTC INTERVAL: 577 MS

## 2022-10-19 NOTE — OUTREACH NOTE
Call Center TCM Note    Flowsheet Row Responses   [facility] patient discharged from? [place]   Does the patient have one of the following disease processes/diagnoses(primary or secondary)? Pneumonia   TCM attempt successful? Yes   Call start time 1019   Call end time 1024   Discharge diagnosis Acute hypoxic respiratory failure, suspect aspiration pneumonia,   Mildly decompensated systolic heart failure   Person spoke with today (if not patient) and relationship VAUGHN GASPAR    Meds reviewed with patient/caregiver? Yes   Is the patient having any side effects they believe may be caused by any medication additions or changes? No   Does the patient have all medications ordered at discharge? Yes   Is the patient taking all medications as directed (includes completed medication regime)? Yes   Comments Patient has a PCP followup scheduled on 10/24/2022 and cardiology also on that day.    Does the patient have an appointment with their PCP within 7 days of discharge? Yes   What is the Home health agency?  [facility]   Has home health visited the patient within 72 hours of discharge? Call prior to 72 hours   DME comments Wears home O2 2.5 L   Pulse Ox monitoring Intermittent   Pulse Ox device source Patient   Psychosocial issues? No   Psychosocial comments Patient with dementia.    Did the patient receive a copy of their discharge instructions? Yes   Nursing interventions Reviewed instructions with patient   What is the patient's perception of their health status since discharge? Improving   Nursing Interventions Nurse provided patient education   If the patient is a current smoker, are they able to teach back resources for cessation? Not a smoker   Is the patient/caregiver able to teach back the hierarchy of who to call/visit for symptoms/problems? PCP, Specialist, Home health nurse, Urgent Care, ED, 911 Yes   Is the patient able to teach back COPD zones? Yes   Nursing interventions Education provided on  various zones   Patient reports what zone on this call? Green Zone   Green Zone Reports doing well   Green Zone interventions: Take daily medications, Use oxygen as prescribed   Is the patient/caregiver able to teach back signs and symptoms of worsening condition: Fever/chills, Shortness of breath, Chest pain   Is the patient/caregiver able to teach back importance of completing antibiotic course of treatment? Yes   TCM call completed? Yes   Call end time 1024   Would this patient benefit from a Referral to [facility] Social Work? No   Is the patient interested in additional calls from an ambulatory ?  NOTE:  applies to high risk patients requiring additional follow-up. No          Nagi Laboy RN    10/19/2022, 10:24 EDT

## 2022-10-20 LAB
QT INTERVAL: 508 MS
QT INTERVAL: 524 MS
QTC INTERVAL: 559 MS
QTC INTERVAL: 573 MS

## 2022-10-21 ENCOUNTER — TELEPHONE (OUTPATIENT)
Dept: FAMILY MEDICINE CLINIC | Facility: CLINIC | Age: 87
End: 2022-10-21

## 2022-10-21 DIAGNOSIS — R53.81 PHYSICAL DEBILITY: Primary | ICD-10-CM

## 2022-10-21 NOTE — PROGRESS NOTES
"[facility]  Heart and Valve Center      Chief Complaint  Congestive Heart Failure and Follow-up    History of Present Illness    Scott Diego is a 89 y.o. female with nonischemic cardiomyopathy/chronic systolic heart failure, orthostasis, dementia, hypothyroidism and CKD  who presents today to follow up on chronic systolic heart failure.      She has had multiple ED visits recently and  was admitted on 9/5-9/10 with COVID-19 and heart failure.  She received IV Lasix but had worsening CKD and required IV fluid.  She had A. fib while in the hospital but spontaneously resolved.  Cardiology was consulted and recommended no anticoagulation due to short episode of A. fib and high risk for falls.    She unfortunately was readmitted 10/12-10/18 with acute respiratory failure with concerns for aspiration pneumonia.  She had atrial fibrillation with RVR and converted to normal sinus rhythm with IV amiodarone and she was transitioned to p.o. amiodarone.  Anticoagulation deferred due to high risk for falls and bleeding.  She was value by EGD EGD which showed atrophic gastritis and recommended mirtazapine.  She underwent speech evaluation and modified barium swallow and was recommended puréed diet.    She reports that her shortness of breath is improved.  Continues to have some ongoing nausea.  She saw her PCP today who did have some concerns regarding low heart rates.  Her son reports that her heart rate was in the 40s according to pulse oximeter.  EKG showed frequent PVCs with a heart rate in the 60s.  She denies any dizziness or lightheadedness.          .     Objective     Vital Signs:   Vitals:    10/24/22 1515   BP: 107/58   BP Location: Left arm   Patient Position: Sitting   Cuff Size: Adult   Pulse: 61   Resp: 16   Temp: 96.4 °F (35.8 °C)   TempSrc: Temporal   SpO2: 99%   Weight: 53.9 kg (118 lb 12.8 oz)   Height: 162.6 cm (64\")     Body mass index is 20.39 kg/m².    Physical Exam  Vitals reviewed. "   Constitutional:       Appearance: Normal appearance.   HENT:      Head: Normocephalic.   Neck:      Vascular: No carotid bruit.   Cardiovascular:      Rate and Rhythm: Normal rate and regular rhythm.      Pulses: Normal pulses.      Heart sounds: S1 normal and S2 normal. Murmur heard.    Systolic murmur is present with a grade of 2/6.  Pulmonary:      Effort: Pulmonary effort is normal. No respiratory distress.      Breath sounds: Rales present.   Chest:      Chest wall: No tenderness.   Abdominal:      General: Abdomen is flat.      Palpations: Abdomen is soft.   Musculoskeletal:      Cervical back: Neck supple.      Right lower leg: No edema.      Left lower leg: No edema.   Skin:     General: Skin is warm and dry.   Neurological:      General: No focal deficit present.      Mental Status: She is alert and oriented to person, place, and time. Mental status is at baseline.   Psychiatric:         Mood and Affect: Mood normal.         Behavior: Behavior normal.         Thought Content: Thought content normal.           Result Review :   Adult Transthoracic Echo Complete w/ Color, Spectral and Contrast if Necessary Per Protocol (09/06/2022 11:58)      Comprehensive Metabolic Panel (10/18/2022 04:43)  CBC & Differential (10/18/2022 04:43)  Magnesium (10/17/2022 04:49)  proBNP (10/16/2022 06:47)       hospital notes reviewed     Assessment and Plan {CC Problem List  Visit Diagnosis  ROS  Review (Popup)  Health Maintenance  Quality  BestPractice  Medications  SmartSets  SnapShot Encounters  Media :23}   1.  Acute on chronic HFrEF  Stable symptoms  Continue Lasix 20 mg daily  Continue metoprolol succinate  No ACE/ARB/Arni due to CKD and hypotension    2. CKD IIIb  BMP drawn earlier today per PCP    3. Paroxysmal afib  Would continue amiodarone  She is not a candidate for anticoagulation due to risk for bleedings and falls    4. Frequent PVCs  Patient asymptomatic  Likely the source of bradycardia at recent  office visit, discussed this with her son    5. Nausea  Recommended to try and avoid or limit Zofran use secondary to potential to prolong QT.  We discussed trying ginger and Pepcid as needed    Follow Up {Instructions Charge Capture  Follow-up Communications :23}   Return in about 1 month (around 11/24/2022) for Office follow up, HF.    Dictated Utilizing Dragon Dictation

## 2022-10-21 NOTE — TELEPHONE ENCOUNTER
Caller: BRII    Relationship: Home Health    Best call back number: [phone]    What orders are you requesting (i.e. lab or imaging): REEVALUATION FOR THE PT ORDERS      In what timeframe would the patient need to come in: ASAP     Where will you receive your lab/imaging services: IN HOME    Additional notes:  [facility] NEED A ORDER TO CONTINUE PT FOR THE PATIENT,   PATIENT HAD A FALL 10/21/22  NON INJURY , KNEES GAVE OUT ON THE PATIENT, REINSTATE THE PT

## 2022-10-21 NOTE — TELEPHONE ENCOUNTER
Dru with [facility] called stating that a written order is required for continued physical therapy. Please fax order to: [phone]

## 2022-10-24 ENCOUNTER — OFFICE VISIT (OUTPATIENT)
Dept: FAMILY MEDICINE CLINIC | Facility: CLINIC | Age: 87
End: 2022-10-24

## 2022-10-24 ENCOUNTER — OFFICE VISIT (OUTPATIENT)
Dept: CARDIOLOGY | Facility: HOSPITAL | Age: 87
End: 2022-10-24

## 2022-10-24 ENCOUNTER — LAB (OUTPATIENT)
Dept: LAB | Facility: HOSPITAL | Age: 87
End: 2022-10-24

## 2022-10-24 VITALS
RESPIRATION RATE: 16 BRPM | OXYGEN SATURATION: 99 % | DIASTOLIC BLOOD PRESSURE: 58 MMHG | SYSTOLIC BLOOD PRESSURE: 107 MMHG | HEART RATE: 61 BPM | HEIGHT: 64 IN | WEIGHT: 118.8 LBS | BODY MASS INDEX: 20.28 KG/M2 | TEMPERATURE: 96.4 F

## 2022-10-24 VITALS
WEIGHT: 118.4 LBS | SYSTOLIC BLOOD PRESSURE: 112 MMHG | DIASTOLIC BLOOD PRESSURE: 64 MMHG | HEART RATE: 40 BPM | HEIGHT: 64 IN | OXYGEN SATURATION: 100 % | BODY MASS INDEX: 20.22 KG/M2

## 2022-10-24 DIAGNOSIS — I50.22 CHRONIC SYSTOLIC HEART FAILURE: ICD-10-CM

## 2022-10-24 DIAGNOSIS — I48.0 PAROXYSMAL ATRIAL FIBRILLATION: ICD-10-CM

## 2022-10-24 DIAGNOSIS — Z09 HOSPITAL DISCHARGE FOLLOW-UP: ICD-10-CM

## 2022-10-24 DIAGNOSIS — R11.0 NAUSEA: ICD-10-CM

## 2022-10-24 DIAGNOSIS — I49.3 FREQUENT PVCS: ICD-10-CM

## 2022-10-24 DIAGNOSIS — R00.1 BRADYCARDIA: Primary | ICD-10-CM

## 2022-10-24 DIAGNOSIS — N18.32 STAGE 3B CHRONIC KIDNEY DISEASE: ICD-10-CM

## 2022-10-24 DIAGNOSIS — Z23 IMMUNIZATION DUE: ICD-10-CM

## 2022-10-24 DIAGNOSIS — K29.40 ATROPHIC GASTRITIS, PRESENCE OF BLEEDING UNSPECIFIED: ICD-10-CM

## 2022-10-24 DIAGNOSIS — I50.23 ACUTE ON CHRONIC SYSTOLIC HEART FAILURE: Primary | ICD-10-CM

## 2022-10-24 DIAGNOSIS — R63.0 POOR APPETITE: ICD-10-CM

## 2022-10-24 PROCEDURE — 1111F DSCHRG MED/CURRENT MED MERGE: CPT | Performed by: STUDENT IN AN ORGANIZED HEALTH CARE EDUCATION/TRAINING PROGRAM

## 2022-10-24 PROCEDURE — 93000 ELECTROCARDIOGRAM COMPLETE: CPT | Performed by: STUDENT IN AN ORGANIZED HEALTH CARE EDUCATION/TRAINING PROGRAM

## 2022-10-24 PROCEDURE — G0008 ADMIN INFLUENZA VIRUS VAC: HCPCS | Performed by: STUDENT IN AN ORGANIZED HEALTH CARE EDUCATION/TRAINING PROGRAM

## 2022-10-24 PROCEDURE — 90662 IIV NO PRSV INCREASED AG IM: CPT | Performed by: STUDENT IN AN ORGANIZED HEALTH CARE EDUCATION/TRAINING PROGRAM

## 2022-10-24 PROCEDURE — 99214 OFFICE O/P EST MOD 30 MIN: CPT | Performed by: NURSE PRACTITIONER

## 2022-10-24 PROCEDURE — 80048 BASIC METABOLIC PNL TOTAL CA: CPT

## 2022-10-24 PROCEDURE — 83735 ASSAY OF MAGNESIUM: CPT

## 2022-10-24 PROCEDURE — 99495 TRANSJ CARE MGMT MOD F2F 14D: CPT | Performed by: STUDENT IN AN ORGANIZED HEALTH CARE EDUCATION/TRAINING PROGRAM

## 2022-10-24 RX ORDER — AMIODARONE HYDROCHLORIDE 200 MG/1
200 TABLET ORAL DAILY
COMMUNITY
Start: 2022-11-05 | End: 2023-01-29 | Stop reason: SDUPTHER

## 2022-10-24 NOTE — PROGRESS NOTES
Transitional Care Follow Up Visit  Subjective     Scott Diego is a 89 y.o. female who presents for a transitional care management visit.    Within 48 business hours after discharge our office contacted her via telephone to coordinate her care and needs.      I reviewed and discussed the details of that call along with the discharge summary, hospital problems, inpatient lab results, inpatient diagnostic studies, and consultation reports with Scott.     Current outpatient and discharge medications have been reconciled for the patient.  Reviewed by: gNozi Davis DO      Date of TCM Phone Call 10/18/2022   [facility]   Date of Admission 10/12/2022   Date of Discharge 10/18/2022   Discharge Disposition Home or Self Care     Risk for Readmission (LACE) Score: 16 (10/18/2022  6:00 AM)      History of Present Illness   Course During Hospital Stay:  Per discharge summary  Scott Diego is a 89 y.o. female with PMH significant for essential hypertension, chronic systolic CHF (LVEF 21-25%), hypothyroidism, anxiety and dementia. She was recently admitted to [facility] 9/5-9/10/22 for respiratory failure secondary to COVID-19 and a/c HFrEF. During this hospitalization, she had new atrial fibrillation with RVR. Son noted several weeks of nausea, for which she was evaluated by GI and started on TID Guadalupe Root. She did not require antiemetics during her hospitalization and there was concern that her symptoms may be exacerbated by her progressive dementia or anxiety. She saw GI outpatient on 10/11 - PPI was increased to BID and an EGD was planned.   Admitted to [facility] 10/12/22 for acute hypoxic respiratory failure with concerns for aspiration pneumonia that was treated with Rocephin and doxycycline and transition to p.o. Augmentin upon discharge. Cardiology consulted to follow due to atrial fibrillation with RVR and patient converted to sinus rhythm with IV  amiodarone and patient was transition to p.o. amiodarone upon discharge.  During this hospitalization seen by GI team who performed EGD that showed atrophic gastritis and recommended p.o. mirtazapine trial x4 weeks for her poor appetite.  She underwent speech eval and modified barium swallow and speech team recommended puréed diet with thin liquid diet.  Her poor appetite.  She was discharged from the hospital in a stable condition    Today -   Respiratory effort has returned to baseline. She is on Nc 2-3L continuously. Denies cough, orthopnea, swelling, chest pain, palpitations, light headed, dizziness, syncope. She is compliant with all medications. Taking Amiodarone BID as well as metoprolol once daily. She has appointment with cardiology heart and valve clinic this afternoon.    Poor oral intake, atrophic gastritis - she was started on  Mirtazapine to help stimulate her appetite. She is compliant with PPI BID. She feels the mirtazapine has helped with her appetite. She is trying to drink more water. Nausea has somewhat improved with improved appetite.      The following portions of the patient's history were reviewed and updated as appropriate: allergies, current medications, past family history, past medical history, past social history, past surgical history and problem list.    Review of Systems    Objective   Physical Exam  Vitals reviewed.   Constitutional:       Appearance: Normal appearance.      Comments: Chronically ill, elderly, pleasant    Cardiovascular:      Rate and Rhythm: Bradycardia present.      Pulses: Normal pulses.      Comments: No LE edema  Pulmonary:      Effort: Pulmonary effort is normal. No respiratory distress.      Comments: 2.5L Nc  Skin:     General: Skin is warm and dry.   Neurological:      Mental Status: She is alert. Mental status is at baseline.   Psychiatric:         Mood and Affect: Mood normal.         Behavior: Behavior normal.         Judgment: Judgment normal.          ECG 12 Lead    Date/Time: 10/24/2022 11:51 AM  Performed by: Ngozi Davis DO  Authorized by: Ngozi Davis DO   Comparison: compared with previous ECG from 10/20/2022  Comparison to previous ECG: First degree AV block is new  PVCs still present   Rhythm: sinus rhythm  Conduction: left bundle branch block and 1st degree AV block    Clinical impression: abnormal EKG            Assessment & Plan   Diagnoses and all orders for this visit:    1. Bradycardia (Primary)  -     ECG 12 Lead    2. Paroxysmal atrial fibrillation (HCC)  -     Basic metabolic panel; Future  -     Magnesium; Future  -     ECG 12 Lead    3. Chronic systolic heart failure (HCC)  -     Basic metabolic panel; Future  -     Magnesium; Future    4. Stage 3b chronic kidney disease (HCC)  -     Basic metabolic panel; Future  -     Magnesium; Future    5. Atrophic gastritis, presence of bleeding unspecified  -     Basic metabolic panel; Future  -     Magnesium; Future    6. Poor appetite  -     Basic metabolic panel; Future  -     Magnesium; Future    7. Hospital discharge follow-up    8. Immunization due  -     Fluzone High-Dose 65+yrs ([id number])        Reviewed hospitalization, labs, medication changes.    Atrial Fibrillation - she is compliant with amiodarone and metoprolol. Her HR today is 40 which did improve to 66 during her visit. EKG with new first degree AV block and PVCs. She admits to generalized weakness since her hospitalization but is otherwise asymptomatic. She would benefit from medication adjustment, has already taken all of her medications today. She has appointment with heart and valve this afternoon and will plan to discuss then. I will update their office as well. Consider lowering and/or holding metoprolol.    Chronic systolic heart failure - appears euvolemic today. We will obtain labs today to monitor renal function, electrolytes with her medication changes. She has been taking her lasix daily. Respiratory status  has returned to baseline. She is using 2-3L Nc continuously, which son reports she has been on for some time. Previously did not take her O2 to appointments with her.    Appetite seems to be improving with Mirtazapine, continue without changes     Annual flu shot given today     Follow up as currently scheduled in 6 weeks

## 2022-10-25 LAB
ANION GAP SERPL CALCULATED.3IONS-SCNC: 12.6 MMOL/L (ref 5–15)
BUN SERPL-MCNC: 19 MG/DL (ref 8–23)
BUN/CREAT SERPL: 10.4 (ref 7–25)
CALCIUM SPEC-SCNC: 9.7 MG/DL (ref 8.2–9.6)
CHLORIDE SERPL-SCNC: 100 MMOL/L (ref 98–107)
CO2 SERPL-SCNC: 26.4 MMOL/L (ref 22–29)
CREAT SERPL-MCNC: 1.83 MG/DL (ref 0.57–1)
EGFRCR SERPLBLD CKD-EPI 2021: 25.6 ML/MIN/1.73
GLUCOSE SERPL-MCNC: 91 MG/DL (ref 65–99)
MAGNESIUM SERPL-MCNC: 2.1 MG/DL (ref 1.7–2.3)
POTASSIUM SERPL-SCNC: 4.5 MMOL/L (ref 3.5–5.2)
SODIUM SERPL-SCNC: 139 MMOL/L (ref 136–145)

## 2022-10-26 ENCOUNTER — TELEPHONE (OUTPATIENT)
Dept: FAMILY MEDICINE CLINIC | Facility: CLINIC | Age: 87
End: 2022-10-26

## 2022-10-26 ENCOUNTER — READMISSION MANAGEMENT (OUTPATIENT)
Dept: CALL CENTER | Facility: HOSPITAL | Age: 87
End: 2022-10-26

## 2022-10-26 ENCOUNTER — TELEPHONE (OUTPATIENT)
Dept: CARDIOLOGY | Facility: HOSPITAL | Age: 87
End: 2022-10-26

## 2022-10-26 DIAGNOSIS — N18.32 STAGE 3B CHRONIC KIDNEY DISEASE: ICD-10-CM

## 2022-10-26 DIAGNOSIS — I50.23 ACUTE ON CHRONIC SYSTOLIC HEART FAILURE: Primary | ICD-10-CM

## 2022-10-26 NOTE — TELEPHONE ENCOUNTER
[facility] Physical therapist is requesting a verbal order for treatment 2x/week for one week    Is this okay?

## 2022-10-26 NOTE — TELEPHONE ENCOUNTER
Caller: LILIANE    Relationship: Provider    Best call back number: [phone]    What is the best time to reach you: ANYTIME    Who are you requesting to speak with (clinical staff, provider,  specific staff member): CLINICAL    What was the call regarding: WANTS TO SEE HER 2X A WEEK FOR ONE WEEK AND THEN RE ACCESS HER. PLEASE REACH OUT AND ADVISE.    Do you require a callback: YES

## 2022-10-26 NOTE — OUTREACH NOTE
COPD/PN Week 2 Survey    Flowsheet Row Responses   [facility] patient discharged from? [place]   Does the patient have one of the following disease processes/diagnoses(primary or secondary)? Pneumonia   Week 2 attempt successful? Yes   Call start time 1502   Call end time 0303   Discharge diagnosis Acute hypoxic respiratory failure, suspect aspiration pneumonia,   Mildly decompensated systolic heart failure   Person spoke with today (if not patient) and relationship VAUGHN GASPAR Son    Meds reviewed with patient/caregiver? Yes   Is the patient having any side effects they believe may be caused by any medication additions or changes? No   Does the patient have all medications ordered at discharge? Yes   Is the patient taking all medications as directed (includes completed medication regime)? Yes   Does the patient have a primary care provider?  Yes   Has the patient kept scheduled appointments due by today? Yes   What is the Home health agency?  [facility]   Has home health visited the patient within 72 hours of discharge? Yes   Home health comments working with PT, OT   DME comments Wears home O2 2.5 L   Psychosocial issues? No   What is the patient's perception of their health status since discharge? Improving  [Call brief--son reports f/u's completed and no further medication changes. She has had some weakness to her knees and is working with PT, OT.  Pt uses O2 and son monitors her sats and daily weights]   Nursing Interventions Nurse provided patient education   Is the patient/caregiver able to teach back the hierarchy of who to call/visit for symptoms/problems? PCP, Specialist, Home health nurse, Urgent Care, ED, 911 Yes   Week 2 call completed? Yes   Wrap up additional comments NO questions or concenrs today          ILIANA PERES - Registered Nurse

## 2022-10-28 ENCOUNTER — TELEPHONE (OUTPATIENT)
Dept: FAMILY MEDICINE CLINIC | Facility: CLINIC | Age: 87
End: 2022-10-28

## 2022-10-28 NOTE — TELEPHONE ENCOUNTER
Caller: REESE BATES    Best call back number:    [phone]     What is the call regarding:  JANA IS NOTIFYING DR RODRIGUEZ OA A BLOOD PRESSURE OF 96/54 TODAY.

## 2022-10-28 NOTE — TELEPHONE ENCOUNTER
Contacted pt & spoke with pt's son (verbal release reviewed) He states the blood pressure was low 102/60 on Monday  96/54 today. He states he's doing his best with hydration but she's usually pretty weak and sleepy. He states she does take all her medications as prescribed. He took her blood pressure while on the phone and it was 109/62 heart rate 67. Oxygen 97.     I advised him to check her blood pressure/heart rate over the next few days document and give us a call back next week with the readings. He verbalized understanding and did not have any additional questions at this time.     Ngozi Davis, DO  You 34 minutes ago (12:08 PM)     Please inquire about how the patient is feeling? Repeat blood pressure and heart rate? Has she taken her medications today? Very important that she stays hydrated

## 2022-11-01 ENCOUNTER — PATIENT MESSAGE (OUTPATIENT)
Dept: CARDIOLOGY | Facility: HOSPITAL | Age: 87
End: 2022-11-01

## 2022-11-03 ENCOUNTER — PATIENT MESSAGE (OUTPATIENT)
Dept: FAMILY MEDICINE CLINIC | Facility: CLINIC | Age: 87
End: 2022-11-03

## 2022-11-03 ENCOUNTER — TELEPHONE (OUTPATIENT)
Dept: FAMILY MEDICINE CLINIC | Facility: CLINIC | Age: 87
End: 2022-11-03

## 2022-11-03 NOTE — TELEPHONE ENCOUNTER
Contacted patient's son to discuss further. He advised me that patient is trying to eat and drink but has been declining within the past couple of days. I relayed PCP's recommendation and he verbalized understanding and agreed

## 2022-11-03 NOTE — TELEPHONE ENCOUNTER
Caller: JANA WITH [facility]    Relationship: HOME HEALTH CARE       Best call back number:  [phone]    Who are you requesting to speak with (clinical staff, provider,  specific staff member): SO Conde was the call regarding:  PATIENT IS NOT EATING AND NOT DRINKING  SHE IS ALSO CONFUSED   THERE IS A COMPANY THAN CAN ORDER THE SUPPLIES IF THEY CAN BE ORDERED THEN JANA    CAN  DO AN IV FOR FLUIDS   AND SHE CAN ALSO DO BLOOD WORK AND URINE SPECIMEN IF NEEDED    JANA SAID IT IS THE SAME SYMPTOMS THE PATIENT HAD BEFORE SHE WAS HOSPITALIZED FOR A UTI   HER LUNGS ARE CLEAR     PLEASE CALL AND ADVISE

## 2022-11-04 ENCOUNTER — READMISSION MANAGEMENT (OUTPATIENT)
Dept: CALL CENTER | Facility: HOSPITAL | Age: 87
End: 2022-11-04

## 2022-11-04 NOTE — TELEPHONE ENCOUNTER
Called patient's son, Noah Sinha, to discuss further. She has been experiencing nausea, lack of appetite, and confusion as of yesterday. He feels her confusion has improved today. He is going to try to get her to drink more water and has home health coming this afternoon. He reports patient did not want to go to the ER yesterday. We discussed goals of care. He is currently following with palliative and is considering hospice evaluation. He understands patient is 89 with several medical conditions complicating her health however he believes patient would want to continue testing/ER visits if indicated to evaluate for reversible/treatable causes at this time. We discussed indications that would warrant ER evaluation such as fevers, change in mental status, inability to tolerate PO intake. He plans to discuss symptom management of chronic nausea with palliative and also plans to request more information about hospice care if he and patient's goals of care were to change with intentions of focusing predominantly on quality of life.

## 2022-11-04 NOTE — OUTREACH NOTE
"COPD/PN Week 3 Survey    Flowsheet Row Responses   [facility] patient discharged from? [place]   Does the patient have one of the following disease processes/diagnoses(primary or secondary)? Pneumonia   Week 3 attempt successful? Yes   Call start time 1517   Call end time 1522   Person spoke with today (if not patient) and relationship Noah-son.   Meds reviewed with patient/caregiver? Yes   Is the patient having any side effects they believe may be caused by any medication additions or changes? No   Does the patient have all medications ordered at discharge? Yes   Is the patient taking all medications as directed (includes completed medication regime)? Yes   Does the patient have a primary care provider?  Yes   Does the patient have an appointment with their PCP or specialist within 7 days of discharge? Yes   Has the patient kept scheduled appointments due by today? Yes   Has home health visited the patient within 72 hours of discharge? Yes   Pulse Ox monitoring Intermittent   Pulse Ox device source Patient   O2 Sat comments States O2 sats were \"good\" today.   O2 Sat: education provided Sat levels   Psychosocial issues? No   Did the patient receive a copy of their discharge instructions? Yes   Nursing interventions Reviewed instructions with patient   What is the patient's perception of their health status since discharge? Returned to baseline/stable   Nursing Interventions Nurse provided patient education   If the patient is a current smoker, are they able to teach back resources for cessation? Not a smoker   Is the patient/caregiver able to teach back the hierarchy of who to call/visit for symptoms/problems? PCP, Specialist, Home health nurse, Urgent Care, ED, 911 Yes   Is the patient/caregiver able to teach back signs and symptoms of worsening condition: Fever/chills, Shortness of breath, Chest pain   Is the patient/caregiver able to teach back importance of completing antibiotic course of treatment? Yes "   Week 3 call completed? Yes   Wrap up additional comments Son states patient is better today-was told to return to ER yesterday by her PCP due to some confusion/possible dehydration, but patient refused. Son states has been pushing fluids since yesterday, and patient has improved. States HH OT visited today. Denies any needs/concerns today.          MARILYN PONCE - Registered Nurse

## 2022-11-08 ENCOUNTER — TELEPHONE (OUTPATIENT)
Dept: FAMILY MEDICINE CLINIC | Facility: CLINIC | Age: 87
End: 2022-11-08

## 2022-11-08 NOTE — TELEPHONE ENCOUNTER
Caller: LILIANE    Relationship: NURSE WITH [facility]     Best call back number: [phone]    What was the call regarding:SHARON CALLED TO REQUEST VERBAL ORDERS FOR CONTINUED OCCUPATIONAL THERAPY 1 TIME A WEEK FOR 4 WEEKS.    Do you require a callback: YES

## 2022-11-17 ENCOUNTER — OFFICE VISIT (OUTPATIENT)
Dept: CARDIOLOGY | Facility: CLINIC | Age: 87
End: 2022-11-17

## 2022-11-17 VITALS
SYSTOLIC BLOOD PRESSURE: 110 MMHG | DIASTOLIC BLOOD PRESSURE: 60 MMHG | HEIGHT: 64 IN | BODY MASS INDEX: 19.29 KG/M2 | HEART RATE: 75 BPM | WEIGHT: 113 LBS

## 2022-11-17 DIAGNOSIS — I48.91 ATRIAL FIBRILLATION, CONTROLLED: Primary | ICD-10-CM

## 2022-11-17 PROCEDURE — 99214 OFFICE O/P EST MOD 30 MIN: CPT | Performed by: PHYSICIAN ASSISTANT

## 2022-11-17 PROCEDURE — 1111F DSCHRG MED/CURRENT MED MERGE: CPT | Performed by: PHYSICIAN ASSISTANT

## 2022-11-17 PROCEDURE — 93000 ELECTROCARDIOGRAM COMPLETE: CPT | Performed by: PHYSICIAN ASSISTANT

## 2022-11-17 NOTE — PROGRESS NOTES
[facility] at [facility]   OFFICE NOTE      Scott Diego  4/10/1930  PCP: Ngozi Davis DO    SUBJECTIVE:   Scott Diego is a 89 y.o. female seen for a follow up visit regarding the following:     CC: ZHANG nunez    HPI:   Very pleasant 89-year-old female presents today after hospitalization for evaluation regarding atrial fibrillation.  She is accompanied by her son today who helps answer some of the questions during conversation.  She is currently living with her son.  Since being home she is recovering from her hospitalization still feels weak at times.  She has not had any marcia syncope.  She states she is not bothered by any palpitations.  She has had no chest pain or chest tightness suggesting angina.  Her breathing seems to have have improved some.  She is taking her medications and tolerating them well.  She does have trouble with her appetite is not eating much.    Cardiac PMH: (Old records have been reviewed and summarized below)  1. Nonischemic cardiomyopathy/chronic systolic congestive heart failure  1. 6/2018 2D echo: LVEF = 20%.  Mild TR.  Mild MR.  Moderate AR.  Mild RA diastolic collapse from pericardial effusion.  There is a moderate 1-2 cm circumferential pericardial effusion.  2. 3/2022 2D echo: LVEF = 30%.  Moderate MR.  Calcification of aortic valve.  RVSP due to TR mildly elevated (35-45)  3. Echo 9/6/2022: EF 21-25%, small pericardial effusion, bilateral pleural effusions, moderate MR, moderate to severe TR, RVSP 56 mmHg.   2. New onset atrial fibrillation 9/2022 in setting of COVID  1. CHADSVASC = 8   3. History of CVA  1. 6/2018 14-day Holter monitor: Min 57, AVG 84, max 240.  SVE 2.3%.  VE 15.3%.  39 runs of VT - fastest 18 beats at 240 bpm; longest 17 beats at 131 BPM.  1 SVT episode - 8 beats at 213 bpm.  4. CAD  1. 6/2018 coronary calcifications appreciated on CT chest.  5. COVID19 positive admission 9/2022  6. Hypertension    7. hyperlipidemia  8. Orthostasis  9. CKD stage III  1. 5/2022 CR 2.01  2. 5/2022 CR 1.55  10. Hypothyroidism  11. Dementia  12. Appetite/weight loss  1. 3/2022 [facility] admission for poor p.o. intake, CARYN, hyperkalemia, and chronic systolic HF.  13. Surgical  1. CCX  2. Eye surgery  3. Hysterectomy    Past Medical History, Past Surgical History, Family history, Social History, and Medications were all reviewed with the patient today and updated as necessary.       Current Outpatient Medications:   •  acetaminophen (TYLENOL) 325 MG tablet, Take 2 tablets by mouth Every 4 (Four) Hours As Needed for Mild Pain ., Disp: , Rfl:   •  ALPRAZolam (XANAX) 0.5 MG tablet, Take 0.5 mg by mouth Every 6 (Six) Hours As Needed., Disp: , Rfl:   •  amiodarone (PACERONE) 200 MG tablet, Take 1 tablet by mouth Daily., Disp: , Rfl:   •  Artificial Tear Ointment (artificial tears) ophthalmic ointment, Administer 1 application to both eyes Daily., Disp: , Rfl:   •  bisacodyl (Dulcolax) 10 MG suppository, Insert 1 suppository into the rectum Daily As Needed for Constipation. If no BM in 3 days take supp, Disp: 15 suppository, Rfl: 1  •  carboxymethylcellulose (REFRESH PLUS) 0.5 % solution, 3 (Three) Times a Day As Needed for Dry Eyes., Disp: , Rfl:   •  fexofenadine (ALLEGRA) 60 MG tablet, Take 1 tablet by mouth Daily., Disp: 30 tablet, Rfl: 5  •  furosemide (LASIX) 20 MG tablet, Take 1 tablet by mouth Daily., Disp: 90 tablet, Rfl: 0  •  gabapentin (NEURONTIN) 100 MG capsule, Take 3 capsules by mouth As Needed., Disp: , Rfl:   •  Glycerin-Polysorbate 80 (REFRESH DRY EYE THERAPY OP), Apply  to eye(s) as directed by provider As Needed., Disp: , Rfl:   •  levothyroxine (SYNTHROID, LEVOTHROID) 50 MCG tablet, Take 1 tablet by mouth Every Morning., Disp: 30 tablet, Rfl: 5  •  memantine (NAMENDA) 5 MG tablet, Take 1 tablet by mouth 2 (Two) Times a Day., Disp: 180 tablet, Rfl: 1  •  metoprolol succinate XL (TOPROL-XL) 25 MG 24 hr tablet, Take 1 tablet  "by mouth Daily., Disp: 30 tablet, Rfl: 5  •  mirtazapine (REMERON) 30 MG tablet, Take 1 tablet by mouth Every Night for 90 days., Disp: 30 tablet, Rfl: 2  •  O2 (OXYGEN), Inhale 2.5 L/min Daily As Needed., Disp: , Rfl:   •  ondansetron ODT (ZOFRAN-ODT) 4 MG disintegrating tablet, Place 1 tablet on the tongue Every 6 (Six) Hours As Needed for Nausea., Disp: 20 tablet, Rfl: 0  •  pantoprazole (PROTONIX) 40 MG EC tablet, Take 1 tablet by mouth 2 (Two) Times a Day., Disp: 60 tablet, Rfl: 1  •  polyethylene glycol (MIRALAX) 17 GM/SCOOP powder, Mix 17 g in liquid and drink by mouth Daily for 30 days., Disp: 510 g, Rfl: 0  •  SM Aspirin Adult Low Strength 81 MG EC tablet, Take 81 mg by mouth Daily., Disp: , Rfl:   •  atorvastatin (LIPITOR) 80 MG tablet, Take 80 mg by mouth Every Night., Disp: , Rfl:       Allergies   Allergen Reactions   • Augmentin [Amoxicillin-Pot Clavulanate] Nausea And Vomiting     Makes her sick at her stomach   • Claritin [Loratadine] Unknown (See Comments)     Doesn't remember   • Keflex [Cephalexin] Nausea And Vomiting     Makes pt sick at her stomach   • Sulfa Antibiotics Other (See Comments)     Does not remember         PHYSICAL EXAM:    /60 (BP Location: Left arm, Patient Position: Sitting)   Pulse 75   Ht 162.6 cm (64\")   Wt 51.3 kg (113 lb)   BMI 19.40 kg/m²        Wt Readings from Last 5 Encounters:   11/17/22 51.3 kg (113 lb)   10/24/22 53.9 kg (118 lb 12.8 oz)   10/24/22 53.7 kg (118 lb 6.4 oz)   10/18/22 49.5 kg (109 lb 3.2 oz)   10/10/22 53.7 kg (118 lb 6.4 oz)       BP Readings from Last 5 Encounters:   11/17/22 110/60   10/24/22 107/58   10/24/22 112/64   10/18/22 114/55   10/10/22 110/62       General appearance - Alert, well appearing, and in no distress   Mental status - Affect appropriate to mood.  Eyes - Sclerae anicteric,  ENMT - Hearing grossly normal bilaterally, Dental hygiene good.  Neck - Carotids upstroke normal bilaterally, no bruits, no JVD.  Resp - Clear to " auscultation, no wheezes, fine crackles in bases.  Heart - Normal rate, regular rhythm, normal S1, S2, no murmurs, rubs, clicks or gallops.  GI - Soft, nontender, nondistended, no masses or organomegaly.  Neurological - Grossly intact - normal speech, no focal findings  Musculoskeletal - No joint tenderness, deformity or swelling, no muscular tenderness noted.  Extremities - Peripheral pulses normal, no pedal edema, no clubbing or cyanosis.  Skin - Normal coloration and turgor.  Psych -  oriented to person, place, and time.    Medical problems and test results were reviewed with the patient today.     Recent Results (from the past 672 hour(s))   Basic metabolic panel    Collection Time: 10/24/22 12:43 PM    Specimen: Blood   Result Value Ref Range    Glucose 91 65 - 99 mg/dL    BUN 19 8 - 23 mg/dL    Creatinine 1.83 (H) 0.57 - 1.00 mg/dL    Sodium 139 136 - 145 mmol/L    Potassium 4.5 3.5 - 5.2 mmol/L    Chloride 100 98 - 107 mmol/L    CO2 26.4 22.0 - 29.0 mmol/L    Calcium 9.7 (H) 8.2 - 9.6 mg/dL    BUN/Creatinine Ratio 10.4 7.0 - 25.0    Anion Gap 12.6 5.0 - 15.0 mmol/L    eGFR 25.6 (L) >60.0 mL/min/1.73   Magnesium    Collection Time: 10/24/22 12:43 PM    Specimen: Blood   Result Value Ref Range    Magnesium 2.1 1.7 - 2.3 mg/dL         EKG: (EKG has been independently visualized by me and summarized below)    ECG 12 Lead    Date/Time: 11/17/2022 3:05 PM  Performed by: Saúl Arroyo PA  Authorized by: Saúl Arroyo PA   Comparison: compared with previous ECG from 10/24/2022  Rhythm: sinus rhythm  Ectopy: unifocal PVCs  Conduction: left bundle branch block and 1st degree AV block  ST Segments: ST segments normal    Clinical impression: abnormal EKG              ASSESSMENT   1.  Paroxysmal atrial fibrillation: Well mitigated on amiodarone therapy.  Chads Vascor of 8.  Patient is not considered a good candidate for anticoagulation due to falls, frailty.  2.  PVCs: Asymptomatic.  3.  Congestive heart  failure/nonischemic cardiomyopathy EF 25%: [facility] by [facility].  Delicate balance of managing fluid status and renal disease.  Currently appears euvolemic and stable.    PLAN  Continue current medical therapy and follow-up appointments as scheduled    11/17/2022  15:03 EST    Will Dina LADD

## 2022-11-23 ENCOUNTER — OFFICE VISIT (OUTPATIENT)
Dept: GASTROENTEROLOGY | Facility: CLINIC | Age: 87
End: 2022-11-23

## 2022-11-23 VITALS
WEIGHT: 114.8 LBS | HEART RATE: 72 BPM | OXYGEN SATURATION: 98 % | SYSTOLIC BLOOD PRESSURE: 110 MMHG | BODY MASS INDEX: 19.6 KG/M2 | HEIGHT: 64 IN | TEMPERATURE: 97.8 F | DIASTOLIC BLOOD PRESSURE: 62 MMHG

## 2022-11-23 DIAGNOSIS — K21.9 GASTROESOPHAGEAL REFLUX DISEASE, UNSPECIFIED WHETHER ESOPHAGITIS PRESENT: Primary | ICD-10-CM

## 2022-11-23 DIAGNOSIS — K29.70 GASTRITIS WITHOUT BLEEDING, UNSPECIFIED CHRONICITY, UNSPECIFIED GASTRITIS TYPE: ICD-10-CM

## 2022-11-23 DIAGNOSIS — R11.0 NAUSEA: ICD-10-CM

## 2022-11-23 PROCEDURE — 99214 OFFICE O/P EST MOD 30 MIN: CPT | Performed by: PHYSICIAN ASSISTANT

## 2022-11-23 NOTE — PROGRESS NOTES
Follow Up      Patient Name: Scott Diego  : 4/10/1930   MRN: [id number]     Chief Complaint:    Chief Complaint   Patient presents with   • Hospital Follow Up Visit       History of Present Illness: Scott Diego is a 89 y.o. female who is here today for hospital follow up. Son is with patient for visit today.     Since last visit, pt was admitted to [facility] 10/12 - 10/18 for evaluation of acute on chronic systolic heart failure, respiratory failure.     EGD 10/17 with Dr. Brunner. Diffuse mild inflammation characterized by congestion and erythema in entire stomach. Normal duodenum. Abnormal motility of the esophagus. Tertiary peristaltic waves noted.     Pt is doing well on mirtazapine 30mg daily. She continues to have nausea daily, but less severe overall. She is taking pantoprazole 40mg BID as instructed. No change in swallowing with use of altoids, so pt stopped these. She denies episodes of dysphagia or choking. Patient denies associated fever, chills, abdominal pain, indigestion, vomiting, diarrhea, constipation, hematemesis, hematochezia, melena, bloating, weight loss or gain, dysuria, jaundice or bruising.    Subjective      Review of Systems:   Review of Systems   Constitutional: Negative for appetite change, chills, diaphoresis, fatigue, fever, unexpected weight gain and unexpected weight loss.   HENT: Negative for drooling, facial swelling, mouth sores, rhinorrhea, sore throat, tinnitus, trouble swallowing and voice change.    Eyes: Negative.    Respiratory: Negative for cough, chest tightness and shortness of breath.    Cardiovascular: Negative for chest pain.   Gastrointestinal: Positive for nausea. Negative for abdominal pain, blood in stool, constipation, diarrhea, vomiting, GERD and indigestion.   Genitourinary: Negative for dysuria, flank pain, hematuria and pelvic pain.   Musculoskeletal: Negative for arthralgias and myalgias.   Skin: Negative for color change, pallor and rash.    Neurological: Positive for confusion. Negative for dizziness, tremors, syncope, weakness and numbness.   Psychiatric/Behavioral: Negative for hallucinations and sleep disturbance. The patient is not nervous/anxious.    All other systems reviewed and are negative.      Medications:     Current Outpatient Medications:   •  ALPRAZolam (XANAX) 0.5 MG tablet, Take 0.5 mg by mouth Every 6 (Six) Hours As Needed., Disp: , Rfl:   •  Artificial Tear Ointment (artificial tears) ophthalmic ointment, Administer 1 application to both eyes Daily., Disp: , Rfl:   •  bisacodyl (Dulcolax) 10 MG suppository, Insert 1 suppository into the rectum Daily As Needed for Constipation. If no BM in 3 days take supp, Disp: 15 suppository, Rfl: 1  •  carboxymethylcellulose (REFRESH PLUS) 0.5 % solution, 3 (Three) Times a Day As Needed for Dry Eyes., Disp: , Rfl:   •  fexofenadine (ALLEGRA) 60 MG tablet, Take 1 tablet by mouth Daily., Disp: 30 tablet, Rfl: 5  •  furosemide (LASIX) 20 MG tablet, Take 1 tablet by mouth Daily., Disp: 90 tablet, Rfl: 0  •  gabapentin (NEURONTIN) 100 MG capsule, Take 3 capsules by mouth As Needed., Disp: , Rfl:   •  Glycerin-Polysorbate 80 (REFRESH DRY EYE THERAPY OP), Apply  to eye(s) as directed by provider As Needed., Disp: , Rfl:   •  levothyroxine (SYNTHROID, LEVOTHROID) 50 MCG tablet, Take 1 tablet by mouth Every Morning., Disp: 30 tablet, Rfl: 5  •  memantine (NAMENDA) 5 MG tablet, Take 1 tablet by mouth 2 (Two) Times a Day., Disp: 180 tablet, Rfl: 1  •  metoprolol succinate XL (TOPROL-XL) 25 MG 24 hr tablet, Take 1 tablet by mouth Daily., Disp: 30 tablet, Rfl: 5  •  mirtazapine (REMERON) 30 MG tablet, Take 1 tablet by mouth Every Night for 90 days., Disp: 30 tablet, Rfl: 2  •  O2 (OXYGEN), Inhale 2.5 L/min Daily As Needed., Disp: , Rfl:   •  ondansetron ODT (ZOFRAN-ODT) 4 MG disintegrating tablet, Place 1 tablet on the tongue Every 6 (Six) Hours As Needed for Nausea., Disp: 20 tablet, Rfl: 0  •  pantoprazole  (PROTONIX) 40 MG EC tablet, Take 1 tablet by mouth 2 (Two) Times a Day., Disp: 60 tablet, Rfl: 1  •  SM Aspirin Adult Low Strength 81 MG EC tablet, Take 81 mg by mouth Daily., Disp: , Rfl:   •  acetaminophen (TYLENOL) 325 MG tablet, Take 2 tablets by mouth Every 4 (Four) Hours As Needed for Mild Pain ., Disp: , Rfl:   •  amiodarone (PACERONE) 200 MG tablet, Take 1 tablet by mouth Daily., Disp: , Rfl:     Allergies:   Allergies   Allergen Reactions   • Augmentin [Amoxicillin-Pot Clavulanate] Nausea And Vomiting     Makes her sick at her stomach   • Claritin [Loratadine] Unknown (See Comments)     Doesn't remember   • Keflex [Cephalexin] Nausea And Vomiting     Makes pt sick at her stomach   • Sulfa Antibiotics Other (See Comments)     Does not remember       Social History:   Social History     Socioeconomic History   • Marital status:    Tobacco Use   • Smoking status: Never   • Smokeless tobacco: Never   Vaping Use   • Vaping Use: Never used   Substance and Sexual Activity   • Alcohol use: Never   • Drug use: Never   • Sexual activity: Defer     Comment:  since 1/2022        Surgical History:   Past Surgical History:   Procedure Laterality Date   • CARDIAC CATHETERIZATION     • CHOLECYSTECTOMY     • ENDOSCOPY N/A 10/17/2022    Procedure: ESOPHAGOGASTRODUODENOSCOPY;  Surgeon: Brunner, Mark I, MD;  [facility] END[facility]COPY;  Service: Gastroenterology;  Laterality: N/A;   • EYE SURGERY     • HYSTERECTOMY          Medical History:   Past Medical History:   Diagnosis Date   • Anxiety    • Congestive heart failure (HCC)    • Coronary artery disease    • Dementia (HCC)    • Depression    • Hyperlipidemia    • Malignant neoplasm (HCC)    • Memory loss    • Peripheral neuropathy    • Stroke (HCC)     mini stroke   • Systolic heart failure (HCC) 09/25/2017    Chronic/compensated (EF 25%)        Objective     Physical Exam:  Vital Signs:   Vitals:    11/23/22 1318   BP: 110/62   BP Location: Right arm  "  Patient Position: Sitting   Cuff Size: Adult   Pulse: 72   Temp: 97.8 °F (36.6 °C)   TempSrc: Temporal   SpO2: 98%   Weight: 52.1 kg (114 lb 12.8 oz)   Height: 162.6 cm (64.02\")     Body mass index is 19.7 kg/m².     Physical Exam  Vitals and nursing note reviewed.   Constitutional:       Appearance: Normal appearance. She is normal weight. She is not ill-appearing or diaphoretic.      Comments: Pleasantly demented. Ambulates with wheelchair.    HENT:      Head: Normocephalic and atraumatic.      Right Ear: External ear normal.      Left Ear: External ear normal.      Nose: Nose normal. No rhinorrhea.      Mouth/Throat:      Mouth: Mucous membranes are moist.      Pharynx: Oropharynx is clear. No posterior oropharyngeal erythema.   Eyes:      General:         Right eye: No discharge.         Left eye: No discharge.      Conjunctiva/sclera: Conjunctivae normal.      Pupils: Pupils are equal, round, and reactive to light.   Neck:      Thyroid: No thyromegaly.   Cardiovascular:      Rate and Rhythm: Normal rate and regular rhythm.      Pulses: Normal pulses.      Heart sounds: Normal heart sounds. No murmur heard.  Pulmonary:      Effort: Pulmonary effort is normal.      Breath sounds: Normal breath sounds. No wheezing.   Abdominal:      General: Abdomen is flat. Bowel sounds are normal. There is no distension.      Tenderness: There is no abdominal tenderness. There is no guarding or rebound.   Musculoskeletal:         General: No tenderness. Normal range of motion.      Cervical back: Normal range of motion and neck supple.      Right lower leg: No edema.      Left lower leg: No edema.   Skin:     General: Skin is warm and dry.      Capillary Refill: Capillary refill takes less than 2 seconds.      Coloration: Skin is not jaundiced.      Findings: No bruising.   Neurological:      General: No focal deficit present.         Assessment / Plan      Assessment/Plan:   There are no diagnoses linked to this encounter. "     GERD  Gastritis  Nausea   - continue all medications as prescribed   - consider trial of trazodone 50mg qhs, celexa 20mg qhs if no further improvement of nausea sx   - pt given GERD diet instructions, advised to avoid GI irritants such as caffeine, carbonation, EtOH, tobacco, chocolate, peppermint, acid-based foods   - previous labs, imaging, hospital notes, endoscopy and pathology reports reviewed   - follow up in clinic as needed   - call clinic at any time for questions or new / worsened sx    Follow Up:   Return for Next scheduled follow up.    Plan of care reviewed with the patient at the conclusion of today's visit.  Education was provided regarding diagnosis, management, and any prescribed or recommended OTC medications.  Patient verbalized understanding of and agreement with management plan.     NOTE TO PATIENT: The 21st Century Cures Act makes medical notes like these available to patients in the interest of transparency. However, be advised this is a medical document. It is intended as peer to peer communication. It is written in medical language and may contain abbreviations or verbiage that are unfamiliar. It may appear blunt or direct. Medical documents are intended to carry relevant information, facts as evident, and the clinical opinion of the practitioner.     Time Statement:   Discussed plan of care in detail with patient today. Patient verbally understands and agrees. I have spent 30 minutes reviewing available diagnostics, obtaining history, examining the patient, developing a treatment plan, and educating the patient on disease process and plan of care.     Priya Sanabria PA-C   [facility]

## 2022-11-29 NOTE — PROGRESS NOTES
"[facility]      Chief Complaint  Follow-up and systolic Heart Failure    History of Present Illness    Scott Diego is a 89 y.o. female with nonischemic cardiomyopathy/chronic systolic heart failure, orthostasis, dementia, hypothyroidism and CKD  who presents today to follow up on chronic systolic heart failure.      She has had multiple ED visits recently and  was admitted on 9/5-9/10 with COVID-19 and heart failure.  She received IV Lasix but had worsening CKD and required IV fluid.  She had A. fib while in the hospital but spontaneously resolved.  Cardiology was consulted and recommended no anticoagulation due to short episode of A. fib and high risk for falls.    She unfortunately was readmitted 10/12-10/18 with acute respiratory failure with concerns for aspiration pneumonia.  She had atrial fibrillation with RVR and converted to normal sinus rhythm with IV amiodarone and she was transitioned to p.o. amiodarone.  Anticoagulation deferred due to high risk for falls and bleeding.    She notes ongoing nausea, feels it in her epigastric area. Reports shortness of breath is stable. No lower extremity edema. No recent hospital stay. Son reports that nausea is her greatest complaint          .     Objective     Vital Signs:   Vitals:    11/30/22 1508   BP: 119/59   BP Location: Left arm   Patient Position: Sitting   Cuff Size: Adult   Pulse: 69   Resp: 18   Temp: 96.3 °F (35.7 °C)   TempSrc: Temporal   Weight: 50.8 kg (112 lb)   Height: 162.6 cm (64.02\")     Body mass index is 19.21 kg/m².    Physical Exam  Vitals reviewed.   Constitutional:       Appearance: Normal appearance.   HENT:      Head: Normocephalic.   Neck:      Vascular: No carotid bruit.   Cardiovascular:      Rate and Rhythm: Normal rate and regular rhythm.      Pulses: Normal pulses.      Heart sounds: S1 normal and S2 normal. Murmur heard.    Systolic murmur is present with a grade of 2/6.  Pulmonary:      " Effort: Pulmonary effort is normal. No respiratory distress.      Breath sounds: Rales present.   Chest:      Chest wall: No tenderness.   Abdominal:      General: Abdomen is flat.      Palpations: Abdomen is soft.   Musculoskeletal:      Cervical back: Neck supple.      Right lower leg: No edema.      Left lower leg: No edema.   Skin:     General: Skin is warm and dry.   Neurological:      General: No focal deficit present.      Mental Status: She is alert and oriented to person, place, and time. Mental status is at baseline.   Psychiatric:         Mood and Affect: Mood normal.         Behavior: Behavior normal.         Thought Content: Thought content normal.           Result Review :   Adult Transthoracic Echo Complete w/ Color, Spectral and Contrast if Necessary Per Protocol (09/06/2022 11:58)      Comprehensive Metabolic Panel (10/18/2022 04:43)  CBC & Differential (10/18/2022 04:43)  Magnesium (10/17/2022 04:49)  proBNP (10/16/2022 06:47)       hospital notes reviewed     Assessment and Plan {CC Problem List  Visit Diagnosis  ROS  Review (Popup)  Health Maintenance  Quality  BestPractice  Medications  SmartSets  SnapShot Encounters  Media :23}   1.  Chronic HFrEF  Stable symptoms, she still has some fine bibasilar rales but asymptomatic. Due to recent worsening CKD will continue current Lasix 20 mg daily  Continue metoprolol succinate  No ACE/ARB/Arni due to CKD and hypotension    2. CKD IIIb  Recheck BMP- she would like to wait until her appt next week with her PCP because of nausea today    3. Paroxysmal afib  RRR today  Continue amiodarone  She is not a candidate for anticoagulation due to risk for bleedings and falls    4.  Nausea  Recommended to try to limit Zofran use secondary to potential to prolong QT.    Sent in a prescription for carafate. If no improvement in a couple of weeks, may stop    Follow Up {Instructions Charge Capture  Follow-up Communications :23}   Return in about 3  months (around 2/28/2023) for Office follow up, HF.    Dictated Utilizing Dragon Dictation

## 2022-11-30 ENCOUNTER — OFFICE VISIT (OUTPATIENT)
Dept: CARDIOLOGY | Facility: HOSPITAL | Age: 87
End: 2022-11-30

## 2022-11-30 VITALS
SYSTOLIC BLOOD PRESSURE: 119 MMHG | WEIGHT: 112 LBS | HEIGHT: 64 IN | DIASTOLIC BLOOD PRESSURE: 59 MMHG | RESPIRATION RATE: 18 BRPM | BODY MASS INDEX: 19.12 KG/M2 | HEART RATE: 69 BPM | TEMPERATURE: 96.3 F

## 2022-11-30 DIAGNOSIS — N18.32 STAGE 3B CHRONIC KIDNEY DISEASE: Primary | ICD-10-CM

## 2022-11-30 PROCEDURE — 99214 OFFICE O/P EST MOD 30 MIN: CPT | Performed by: NURSE PRACTITIONER

## 2022-11-30 RX ORDER — GABAPENTIN 300 MG/1
300 CAPSULE ORAL NIGHTLY
COMMUNITY

## 2022-11-30 RX ORDER — SUCRALFATE ORAL 1 G/10ML
1 SUSPENSION ORAL 3 TIMES DAILY
Qty: 414 ML | Refills: 0 | Status: SHIPPED | OUTPATIENT
Start: 2022-11-30 | End: 2023-01-03

## 2022-12-05 RX ORDER — PANTOPRAZOLE SODIUM 40 MG/1
TABLET, DELAYED RELEASE ORAL
Qty: 60 TABLET | Refills: 0 | Status: SHIPPED | OUTPATIENT
Start: 2022-12-05 | End: 2022-12-16

## 2022-12-07 ENCOUNTER — LAB (OUTPATIENT)
Dept: LAB | Facility: HOSPITAL | Age: 87
End: 2022-12-07

## 2022-12-07 ENCOUNTER — HOSPITAL ENCOUNTER (OUTPATIENT)
Dept: GENERAL RADIOLOGY | Facility: HOSPITAL | Age: 87
Discharge: HOME | End: 2022-12-07

## 2022-12-07 ENCOUNTER — OFFICE VISIT (OUTPATIENT)
Dept: FAMILY MEDICINE CLINIC | Facility: CLINIC | Age: 87
End: 2022-12-07

## 2022-12-07 VITALS
HEART RATE: 76 BPM | BODY MASS INDEX: 19.09 KG/M2 | WEIGHT: 111.8 LBS | DIASTOLIC BLOOD PRESSURE: 60 MMHG | OXYGEN SATURATION: 98 % | HEIGHT: 64 IN | SYSTOLIC BLOOD PRESSURE: 100 MMHG

## 2022-12-07 DIAGNOSIS — R11.0 NAUSEA: ICD-10-CM

## 2022-12-07 DIAGNOSIS — N18.32 STAGE 3B CHRONIC KIDNEY DISEASE: ICD-10-CM

## 2022-12-07 DIAGNOSIS — I50.22 CHRONIC SYSTOLIC HEART FAILURE: ICD-10-CM

## 2022-12-07 DIAGNOSIS — R05.1 ACUTE COUGH: ICD-10-CM

## 2022-12-07 DIAGNOSIS — R05.1 ACUTE COUGH: Primary | ICD-10-CM

## 2022-12-07 DIAGNOSIS — R41.0 CONFUSION: ICD-10-CM

## 2022-12-07 PROCEDURE — 99214 OFFICE O/P EST MOD 30 MIN: CPT | Performed by: STUDENT IN AN ORGANIZED HEALTH CARE EDUCATION/TRAINING PROGRAM

## 2022-12-07 PROCEDURE — 36415 COLL VENOUS BLD VENIPUNCTURE: CPT

## 2022-12-07 PROCEDURE — 85027 COMPLETE CBC AUTOMATED: CPT

## 2022-12-07 PROCEDURE — 80053 COMPREHEN METABOLIC PANEL: CPT

## 2022-12-07 PROCEDURE — 71046 X-RAY EXAM CHEST 2 VIEWS: CPT

## 2022-12-07 RX ORDER — BENZONATATE 100 MG/1
100 CAPSULE ORAL 3 TIMES DAILY PRN
Qty: 30 CAPSULE | Refills: 0 | Status: SHIPPED | OUTPATIENT
Start: 2022-12-07

## 2022-12-07 RX ORDER — GUAIFENESIN AND DEXTROMETHORPHAN HYDROBROMIDE 600; 30 MG/1; MG/1
1 TABLET, EXTENDED RELEASE ORAL 2 TIMES DAILY PRN
Qty: 30 TABLET | Refills: 0 | Status: SHIPPED | OUTPATIENT
Start: 2022-12-07

## 2022-12-07 NOTE — PROGRESS NOTES
Established Office Visit      Patient Name: Scott Diego  : 4/10/1930   MRN: [id number]   Care Team: Patient Care Team:  Ngozi Davis DO as PCP - General (Internal Medicine)  Jose Antonio Aragon MD as Consulting Physician (Cardiology)    Chief Complaint:    Chief Complaint   Patient presents with   • Cough   • URI   • Slow Heart Rate       History of Present Illness: Scott Diego is a 89 y.o. female  with nonischemic cardiomyopathy/chronic systolic heart failure, orthostasis, dementia, hypothyroidism and CKD3 who is here today to follow up with cough.    She has been exposed to her great grandson who was diagnosed with RSV. Reports cough and chest congestion ongoing for 5 days. Symptoms are stable, no worsening or improvement. Has not taken anything over the counter. Denies fever, sinus congestion, headache, GI symptoms, body aches, confusion,  changes. Reports nagging productive cough.     Subjective      Review of Systems:   Review of Systems - See HPI    I have reviewed and the following portions of the patient's history were updated as appropriate: past family history, past medical history, past social history, past surgical history and problem list.    Medications:     Current Outpatient Medications:   •  acetaminophen (TYLENOL) 325 MG tablet, Take 2 tablets by mouth Every 4 (Four) Hours As Needed for Mild Pain ., Disp: , Rfl:   •  ALPRAZolam (XANAX) 0.5 MG tablet, Take 0.5 mg by mouth Every 6 (Six) Hours As Needed., Disp: , Rfl:   •  amiodarone (PACERONE) 200 MG tablet, Take 1 tablet by mouth Daily., Disp: , Rfl:   •  Artificial Tear Ointment (artificial tears) ophthalmic ointment, Administer 1 application to both eyes Daily., Disp: , Rfl:   •  bisacodyl (Dulcolax) 10 MG suppository, Insert 1 suppository into the rectum Daily As Needed for Constipation. If no BM in 3 days take supp, Disp: 15 suppository, Rfl: 1  •  carboxymethylcellulose (REFRESH PLUS) 0.5 % solution, 3 (Three) Times a Day As  Needed for Dry Eyes., Disp: , Rfl:   •  fexofenadine (ALLEGRA) 60 MG tablet, Take 1 tablet by mouth Daily., Disp: 30 tablet, Rfl: 5  •  furosemide (LASIX) 20 MG tablet, Take 1 tablet by mouth Daily., Disp: 90 tablet, Rfl: 0  •  gabapentin (NEURONTIN) 300 MG capsule, Take 300 mg by mouth Every Night., Disp: , Rfl:   •  Glycerin-Polysorbate 80 (REFRESH DRY EYE THERAPY OP), Apply  to eye(s) as directed by provider As Needed., Disp: , Rfl:   •  levothyroxine (SYNTHROID, LEVOTHROID) 50 MCG tablet, Take 1 tablet by mouth Every Morning., Disp: 30 tablet, Rfl: 5  •  memantine (NAMENDA) 5 MG tablet, Take 1 tablet by mouth 2 (Two) Times a Day., Disp: 180 tablet, Rfl: 1  •  metoprolol succinate XL (TOPROL-XL) 25 MG 24 hr tablet, Take 1 tablet by mouth Daily., Disp: 30 tablet, Rfl: 5  •  mirtazapine (REMERON) 30 MG tablet, Take 1 tablet by mouth Every Night for 90 days., Disp: 30 tablet, Rfl: 2  •  O2 (OXYGEN), Inhale 2.5 L/min Daily As Needed., Disp: , Rfl:   •  ondansetron ODT (ZOFRAN-ODT) 4 MG disintegrating tablet, Place 1 tablet on the tongue Every 6 (Six) Hours As Needed for Nausea., Disp: 20 tablet, Rfl: 0  •  pantoprazole (PROTONIX) 40 MG EC tablet, TAKE 1 TABLET BY MOUTH TWO TIMES A DAY, Disp: 60 tablet, Rfl: 0  •  SM Aspirin Adult Low Strength 81 MG EC tablet, Take 81 mg by mouth Daily., Disp: , Rfl:   •  sucralfate (Carafate) 1 GM/10ML suspension, Take 10 mL by mouth 3 (Three) Times a Day. Take one hour before meals or meds or 2 hours after medications/meals, Disp: 414 mL, Rfl: 0  •  benzonatate (Tessalon Perles) 100 MG capsule, Take 1 capsule by mouth 3 (Three) Times a Day As Needed for Cough., Disp: 30 capsule, Rfl: 0  •  guaifenesin-dextromethorphan (MUCINEX DM)  MG tablet sustained-release 12 hour tablet, Take 1 tablet by mouth 2 (Two) Times a Day As Needed (cough and congestion)., Disp: 30 tablet, Rfl: 0    Allergies:   Allergies   Allergen Reactions   • Augmentin [Amoxicillin-Pot Clavulanate] Nausea And  "Vomiting     Makes her sick at her stomach   • Claritin [Loratadine] Unknown (See Comments)     Doesn't remember   • Keflex [Cephalexin] Nausea And Vomiting     Makes pt sick at her stomach   • Sulfa Antibiotics Other (See Comments)     Does not remember       Objective     Physical Exam:  Vital Signs:   Vitals:    12/07/22 1302   BP: 100/60   Pulse: 76   SpO2: 98%   Weight: 50.7 kg (111 lb 12.8 oz)   Height: 162.6 cm (64.02\")     Body mass index is 19.18 kg/m².     Physical Exam  Constitutional:       General: She is not in acute distress.     Appearance: She is not ill-appearing.      Comments: Chronically ill appearing, in wheel chair    HENT:      Nose: No congestion.   Eyes:      Conjunctiva/sclera: Conjunctivae normal.   Cardiovascular:      Rate and Rhythm: Normal rate.   Pulmonary:      Effort: Pulmonary effort is normal. No respiratory distress.      Comments: Rhonchi appreciated in left upper and lower lung fields. Right lung fields are clear.   Skin:     General: Skin is warm.   Neurological:      Mental Status: Mental status is at baseline.         Assessment / Plan      Assessment/Plan:   Problems Addressed This Visit  Diagnoses and all orders for this visit:    1. Acute cough (Primary)  -     XR Chest PA & Lateral; Future  -     guaifenesin-dextromethorphan (MUCINEX DM)  MG tablet sustained-release 12 hour tablet; Take 1 tablet by mouth 2 (Two) Times a Day As Needed (cough and congestion).  Dispense: 30 tablet; Refill: 0  -     benzonatate (Tessalon Perles) 100 MG capsule; Take 1 capsule by mouth 3 (Three) Times a Day As Needed for Cough.  Dispense: 30 capsule; Refill: 0    2. Stage 3b chronic kidney disease (HCC)    3. Chronic systolic heart failure (HCC)    Will obtain CXR to rule out bacterial pneumonia and treat with abx if indicated. If unrevealing, suspect viral infection (likely RSV due to recent exposure to great grandson who tested positive). Discussed supportive treatment with " medication to help symptoms - Mucinex DM and tessalon perles prn. Okay to take tylenol prn if she develops headache/fever.  Discussed importance of adequate hydration and breathing exercises using incentive spirometer she has at home to prevent atelectasis. Will let me know if symptoms worsen or do not improve.       She is also due for BMP to monitor renal function and will plan to have this drawn today. She appears to be euvolemic on current lasix 20mg daily. Will follow up with labs. Continue to avoid NSAIDs, nephrotoxic agents.     Plan of care reviewed with patient at the conclusion of today's visit. Education was provided regarding diagnosis and management.  Patient verbalizes understanding of and agreement with management plan.    Follow Up:   Return in about 3 months (around 3/7/2023) for Recheck.    I spent 31 minutes caring for Scott on this date of service. This time includes time spent by me in the following activities:preparing for the visit, reviewing tests, obtaining and/or reviewing a separately obtained history, performing a medically appropriate examination and/or evaluation , ordering medications, tests, or procedures and documenting information in the medical record    DO GONZALO Fink RD  [facility]  [address]  [facility] [id number]  Fax [phone]  Phone [phone]

## 2022-12-08 ENCOUNTER — TELEPHONE (OUTPATIENT)
Dept: CARDIOLOGY | Facility: HOSPITAL | Age: 87
End: 2022-12-08

## 2022-12-08 DIAGNOSIS — N18.32 STAGE 3B CHRONIC KIDNEY DISEASE: Primary | ICD-10-CM

## 2022-12-08 LAB
ALBUMIN SERPL-MCNC: 3.6 G/DL (ref 3.5–5.2)
ALBUMIN/GLOB SERPL: 0.8 G/DL
ALP SERPL-CCNC: 51 U/L (ref 39–117)
ALT SERPL W P-5'-P-CCNC: 7 U/L (ref 1–33)
ANION GAP SERPL CALCULATED.3IONS-SCNC: 13 MMOL/L (ref 5–15)
AST SERPL-CCNC: 18 U/L (ref 1–32)
BILIRUB SERPL-MCNC: 0.3 MG/DL (ref 0–1.2)
BUN SERPL-MCNC: 32 MG/DL (ref 8–23)
BUN/CREAT SERPL: 14.3 (ref 7–25)
CALCIUM SPEC-SCNC: 9.3 MG/DL (ref 8.2–9.6)
CHLORIDE SERPL-SCNC: 100 MMOL/L (ref 98–107)
CO2 SERPL-SCNC: 28 MMOL/L (ref 22–29)
CREAT SERPL-MCNC: 2.23 MG/DL (ref 0.57–1)
DEPRECATED RDW RBC AUTO: 41.3 FL (ref 37–54)
EGFRCR SERPLBLD CKD-EPI 2021: 20.2 ML/MIN/1.73
ERYTHROCYTE [DISTWIDTH] IN BLOOD BY AUTOMATED COUNT: 12.8 % (ref 12.3–15.4)
GLOBULIN UR ELPH-MCNC: 4.4 GM/DL
GLUCOSE SERPL-MCNC: 126 MG/DL (ref 65–99)
HCT VFR BLD AUTO: 31.7 % (ref 34–46.6)
HGB BLD-MCNC: 10 G/DL (ref 12–15.9)
MCH RBC QN AUTO: 27.8 PG (ref 26.6–33)
MCHC RBC AUTO-ENTMCNC: 31.5 G/DL (ref 31.5–35.7)
MCV RBC AUTO: 88.1 FL (ref 79–97)
PLATELET # BLD AUTO: 233 10*3/MM3 (ref 140–450)
PMV BLD AUTO: 11.7 FL (ref 6–12)
POTASSIUM SERPL-SCNC: 4 MMOL/L (ref 3.5–5.2)
PROT SERPL-MCNC: 8 G/DL (ref 6–8.5)
RBC # BLD AUTO: 3.6 10*6/MM3 (ref 3.77–5.28)
SODIUM SERPL-SCNC: 141 MMOL/L (ref 136–145)
WBC NRBC COR # BLD: 9.1 10*3/MM3 (ref 3.4–10.8)

## 2022-12-08 RX ORDER — TORSEMIDE 10 MG/1
10 TABLET ORAL EVERY OTHER DAY
Qty: 30 TABLET | Refills: 1 | Status: SHIPPED | OUTPATIENT
Start: 2022-12-08

## 2022-12-08 NOTE — TELEPHONE ENCOUNTER
Reviewed recent labs from PCP.  Creatinine up to 2.23 (previous 1.8, was 1.3 prior to that).  Also reviewed recent chest x-ray which showed no pulmonary vascular congestion.  I called her son and he reports that she has had ongoing weakness and nausea as well as decreased p.o. intake.  Advised to stop Lasix.  Hold diuretics for the next 3 days and start torsemide 10 mg every other day on Monday.  Advised him to have repeat labs in 2 weeks.  We discussed difficult balance between maintaining fluid overload with decreased p.o. intake.  Advised to let me know if p.o. intake continues to be very poor.

## 2022-12-16 RX ORDER — PANTOPRAZOLE SODIUM 40 MG/1
TABLET, DELAYED RELEASE ORAL
Qty: 60 TABLET | Refills: 0 | Status: SHIPPED | OUTPATIENT
Start: 2022-12-16

## 2023-01-03 ENCOUNTER — TELEPHONE (OUTPATIENT)
Dept: CARDIOLOGY | Facility: HOSPITAL | Age: 88
End: 2023-01-03
Payer: MEDICARE

## 2023-01-03 RX ORDER — SUCRALFATE ORAL 1 G/10ML
SUSPENSION ORAL
Qty: 414 ML | Refills: 0 | Status: SHIPPED | OUTPATIENT
Start: 2023-01-03

## 2023-01-03 NOTE — TELEPHONE ENCOUNTER
Called patient and reminded him to have his mom's labs rechecked after starting torsemide.  He reports he will bring her in this week.  He reports she seems to be tolerating torsemide well.

## 2023-01-09 RX ORDER — METOPROLOL SUCCINATE 25 MG/1
TABLET, EXTENDED RELEASE ORAL
Qty: 30 TABLET | Refills: 0 | Status: SHIPPED | OUTPATIENT
Start: 2023-01-09 | End: 2023-02-16 | Stop reason: SDUPTHER

## 2023-01-09 NOTE — TELEPHONE ENCOUNTER
Rx Refill Note  Requested Prescriptions     Pending Prescriptions Disp Refills   • metoprolol succinate XL (TOPROL-XL) 25 MG 24 hr tablet [Pharmacy Med Name: Metoprolol Succinate ER Oral Tablet Extended Release 24 Hour 25 MG] 30 tablet 0     Sig: TAKE 1 TABLET BY MOUTH EVERY DAY      Last office visit with prescribing clinician: 12/7/2022   Last telemedicine visit with prescribing clinician: 3/8/2023   Next office visit with prescribing clinician: 3/8/2023                         Would you like a call back once the refill request has been completed: [] Yes [] No    If the office needs to give you a call back, can they leave a voicemail: [] Yes [] No    Mago Lemon MA  01/09/23, 07:44 EST

## 2023-01-11 ENCOUNTER — TELEPHONE (OUTPATIENT)
Dept: FAMILY MEDICINE CLINIC | Facility: CLINIC | Age: 88
End: 2023-01-11
Payer: MEDICARE

## 2023-01-11 DIAGNOSIS — I48.0 PAROXYSMAL ATRIAL FIBRILLATION: ICD-10-CM

## 2023-01-11 DIAGNOSIS — I50.22 CHRONIC SYSTOLIC HEART FAILURE: ICD-10-CM

## 2023-01-11 DIAGNOSIS — N18.32 STAGE 3B CHRONIC KIDNEY DISEASE: Primary | ICD-10-CM

## 2023-01-11 DIAGNOSIS — R53.1 GENERALIZED WEAKNESS: ICD-10-CM

## 2023-01-11 DIAGNOSIS — R53.81 PHYSICAL DEBILITY: ICD-10-CM

## 2023-01-11 NOTE — TELEPHONE ENCOUNTER
Contacted pt's son and notified him that HH orders have been put in and they should be contacting him soon. He verbalized understanding and did not have any additional questions at this time.

## 2023-01-11 NOTE — TELEPHONE ENCOUNTER
Caller: VAUGHN GASPAR    Relationship: Emergency Contact    Best call back number: [phone]    What is the best time to reach you: ANY TIME    Who are you requesting to speak with (clinical staff, provider,  specific staff member): CLINICAL STAFF    Do you know the name of the person who called: VAUGHN    What was the call regarding: PATIENT'S SON IS REQUESTING A CALL BACK TO DISCUSS THE POSSIBILITY OF HAVING SOMEONE COME TO THE PATIENT'S HOME TO HAVE HER BLOOD DRAWN. HE STATES THAT HE SPOKE WITH [facility], BUT THEY REFERRED HIM TO CONTACT DR CARD REGARDING THIS.     PATIENT'S SON STATES THAT THE PATIENT IS HAVING A VERY HARD TIME BEING MOBILE RIGHT NOW. PLEASE ADVISE.     Do you require a callback: YES

## 2023-01-12 ENCOUNTER — HOME HEALTH ADMISSION (OUTPATIENT)
Dept: HOME HEALTH SERVICES | Facility: HOME HEALTHCARE | Age: 88
End: 2023-01-12
Payer: COMMERCIAL

## 2023-01-18 ENCOUNTER — TELEPHONE (OUTPATIENT)
Dept: CARDIOLOGY | Facility: HOSPITAL | Age: 88
End: 2023-01-18
Payer: MEDICARE

## 2023-01-18 NOTE — TELEPHONE ENCOUNTER
----- Message from Faina Virgen MA sent at 1/18/2023  1:10 PM EST -----  Son stated he has not heard from anyone but he will call her PCP to see if they have found another home health for her.  ----- Message -----  From: Chrissy Toribio APRN  Sent: 1/18/2023  11:51 AM EST  To: Faina Virgen MA     I looked at the chart and Dr. Davis put in a referral for home health to draw labs, I am just not sure which home health. Can you see if labs were drawn?

## 2023-01-30 RX ORDER — AMIODARONE HYDROCHLORIDE 200 MG/1
200 TABLET ORAL DAILY
Qty: 30 TABLET | Refills: 5 | Status: SHIPPED | OUTPATIENT
Start: 2023-01-30

## 2023-01-30 NOTE — TELEPHONE ENCOUNTER
Rx Refill Note  Requested Prescriptions     Pending Prescriptions Disp Refills   • amiodarone (PACERONE) 200 MG tablet       Sig: Take 1 tablet by mouth Daily.      Last office visit with prescribing clinician: 12/7/2022   Last telemedicine visit with prescribing clinician: 3/8/2023   Next office visit with prescribing clinician: 3/8/2023                         Would you like a call back once the refill request has been completed: [] Yes [] No    If the office needs to give you a call back, can they leave a voicemail: [] Yes [] No    Mago Lemon MA  01/30/23, 08:28 EST

## 2023-02-16 RX ORDER — METOPROLOL SUCCINATE 25 MG/1
25 TABLET, EXTENDED RELEASE ORAL DAILY
Qty: 30 TABLET | Refills: 1 | Status: SHIPPED | OUTPATIENT
Start: 2023-02-16

## 2023-02-16 NOTE — TELEPHONE ENCOUNTER
Rx Refill Note  Requested Prescriptions     Pending Prescriptions Disp Refills   • metoprolol succinate XL (TOPROL-XL) 25 MG 24 hr tablet 30 tablet 0     Sig: Take 1 tablet by mouth Daily.      Last office visit with prescribing clinician: 12/7/2022   Last telemedicine visit with prescribing clinician: 3/8/2023   Next office visit with prescribing clinician: 3/8/2023                         Would you like a call back once the refill request has been completed: [] Yes [] No    If the office needs to give you a call back, can they leave a voicemail: [] Yes [] No    Mago Lemon MA  02/16/23, 11:12 EST

## 2023-07-12 NOTE — MBS/VFSS/FEES
Acute Care - Speech Language Pathology   Swallow Initial Evaluation  [place]   Modified Barium Swallow Study (MBS)     Patient Name: Scott Diego  : 4/10/1930  MRN: [id number]  Today's Date: 10/12/2022               Admit Date: 10/12/2022    Visit Dx:     ICD-10-CM ICD-9-CM   1. Acute on chronic systolic congestive heart failure (HCC)  I50.23 428.23     428.0   2. Acute respiratory failure with hypoxia (HCC)  J96.01 518.81   3. Urinary tract infection without hematuria, site unspecified  N39.0 599.0   4. Oropharyngeal dysphagia  R13.12 787.22     Patient Active Problem List   Diagnosis   • Idiopathic peripheral neuropathy   • Neck pain   • Mild cognitive impairment   • Anemia   • Hypertension   • Dehydration   • History of CVA (cerebrovascular accident)   • Dementia (HCC)   • Left bundle branch block   • Orthostatic dizziness   • Chronic systolic heart failure (HCC)   • Normocytic anemia   • Dyspnea   • Hypoxia   • Hypothyroid   • Pericardial effusion   • Acute respiratory failure with hypoxia (HCC)   • Right lower lobe pneumonia   • Ventricular ectopy   • Migraine without aura and without status migrainosus, not intractable   • Acute hypoxemic respiratory failure due to COVID-19 (HCC)   • Dyspnea due to COVID-19   • COVID-19 virus detected   • Encephalopathy due to COVID-19 virus   • Acute on chronic congestive heart failure (HCC)   • Anxiety   • Bilateral hearing loss   • Impaired functional mobility, balance, gait, and endurance   • Nausea without vomiting   • Hypoxia   • Respiratory failure (HCC)     Past Medical History:   Diagnosis Date   • Anxiety    • Congestive heart failure (HCC)    • Coronary artery disease    • Dementia (HCC)    • Depression    • Hyperlipidemia    • Malignant neoplasm (HCC)    • Memory loss    • Peripheral neuropathy    • Stroke (HCC)     mini stroke   • Systolic heart failure (HCC) 2017    Chronic/compensated (EF 25%)     Past Surgical History:   Procedure Laterality  Date   • CARDIAC CATHETERIZATION     • CHOLECYSTECTOMY     • EYE SURGERY     • HYSTERECTOMY         SLP Recommendation and Plan  SLP Swallowing Diagnosis: mild-moderate, oral dysphagia, moderate, pharyngeal dysphagia (10/12/22 1415)  SLP Diet Recommendation: puree with some mashed, honey thick liquids (10/12/22 1415)  Recommended Precautions and Strategies: upright posture during/after eating, small bites of food and sips of liquid, no straw, general aspiration precautions, reflux precautions (10/12/22 1415)  SLP Rec. for Method of Medication Administration: meds whole, with pudding or applesauce, as tolerated (10/12/22 1415)     Monitor for Signs of Aspiration: yes, notify SLP if any concerns (10/12/22 1415)  Recommended Diagnostics: VFSS (MBS), other (see comments) (w/ warren UGI) (10/12/22 0955)  Swallow Criteria for Skilled Therapeutic Interventions Met: demonstrates skilled criteria (10/12/22 1415)  Anticipated Discharge Disposition (SLP): unknown, anticipate therapy at next level of care (10/12/22 1415)  Rehab Potential/Prognosis, Swallowing: adequate, monitor progress closely (10/12/22 1415)  Therapy Frequency (Swallow): 5 days per week (10/12/22 1415)  Predicted Duration Therapy Intervention (Days): until discharge (10/12/22 1415)                                     Plan of Care Reviewed With: patient      SWALLOW EVALUATION (last 72 hours)     SLP Adult Swallow Evaluation     Row Name 10/12/22 1415                Rehab Evaluation    Document Type evaluation  -RD       Subjective Information no complaints  -RD       Patient Observations alert;cooperative;agree to therapy  -RD       Patient/Family/Caregiver Comments/Observations none  -RD       Patient Effort good  -RD          General Information    Patient Profile Reviewed yes  -RD       Pertinent History Of Current Problem Adm w/ A/C systolic CHF, hx recent COVID-19, respiratory insufficiency, HTN, hx CVA, dementia, MCI, hypothyroid, hearing loss. Consult for  swallowing given concern for ? aspiration or reflux of coffee before bed resulting in resp issues per chart.  -RD       Current Method of Nutrition mechanical soft, no mixed consistencies;nectar/syrup-thick liquids  -RD       Precautions/Limitations, Vision WFL with corrective lenses;for purposes of eval  -RD       Precautions/Limitations, Hearing hearing impairment, bilaterally  -RD       Prior Level of Function-Communication cognitive-linguistic impairment;other (see comments)  dementia, MCI, previous CVA  -RD       Prior Level of Function-Swallowing no diet consistency restrictions;esophageal concerns;other (see comments)  issue w/ drinking coffee causing resp issues  -RD       Plans/Goals Discussed with patient;agreed upon  -RD       Barriers to Rehab none identified  -RD       Patient's Goals for Discharge eat/drink without coughing/choking  -RD          Pain    Additional Documentation Pain Scale: Numbers Pre/Post-Treatment (Group)  -RD          Pain Scale: Numbers Pre/Post-Treatment    Pretreatment Pain Rating 0/10 - no pain  -RD       Posttreatment Pain Rating 0/10 - no pain  -RD          MBS/VFSS    Utensils Used spoon;cup;straw  -RD       Consistencies Trialed thin liquids;nectar/syrup-thick liquids;honey-thick liquids;pudding thick;regular textures  -RD          MBS/VFSS Interpretation    Oral Prep Phase impaired oral phase of swallowing  -RD       Oral Transit Phase impaired  -RD       Oral Residue impaired  -RD          Oral Preparatory Phase    Oral Preparatory Phase prolonged manipulation  -RD       Prolonged Manipulation regular textures;secondary to reduced lingual strength;other (see comments)  edentulous  -RD       Oral Preparatory Phase, Comment edentulous status also impacting. Adequate for pureed some mashed w/o teeth  -RD          Oral Transit Phase    Impaired Oral Transit Phase increased A-P transit time;premature spillage of liquids into pharynx  -RD       Increased A-P Transit Time regular  textures;secondary to reduced lingual control  -RD       Premature Spillage of Liquids into Pharynx thin liquids;nectar-thick liquids;honey-thick liquids;secondary to reduced lingual control  -RD          Oral Residue    Impaired Oral Residue lingual residue  -RD       Lingual Residue regular textures;secondary to reduced lingual strength  -RD       Response to Oral Residue cleared residue;with spontaneous subsequent swallow;with liquid wash;other (see comments)  eventually  -RD          Initiation of Pharyngeal Swallow    Initiation of Pharyngeal Swallow bolus in pyriform sinuses  -RD       Pharyngeal Phase impaired pharyngeal phase of swallowing  -RD       Anatomical abnormalities noted anterior cervical c-spine prominence;other (see comments)  suspect osteophytes at C5-C6  -RD       Anatomical abnormalities functional impact no functional impact on swallowing  -RD       Penetration Before the Swallow thin liquids;nectar-thick liquids;secondary to reduced back of tongue control;secondary to delayed swallow initiation or mistiming  -RD       Penetration During the Swallow thin liquids;nectar-thick liquids;honey-thick liquids;secondary to delayed swallow initiation or mistiming;secondary to reduced vestibular closure  -RD       Aspiration During the Swallow thin liquids;secondary to delayed swallow initiation or mistiming;secondary to reduced vestibular closure  -RD       Aspiration After the Swallow thin liquids;secondary to residue;in laryngeal vestibule;other (see comments)  at risk w/ nectar-thick liquids & honey-thick via straw  -RD       Response to Penetration deep;no response  -RD       Response to Aspiration no response, silent aspiration  -RD       Rosenbek's Scale thin:;8--->level 8;nectar:;5--->level 5;honey:;3--->level 3;pudding/puree:;regular textures:;1--->level 1  -RD       Pharyngeal Residue all consistencies tested;base of tongue;valleculae;pyriform sinuses;posterior pharyngeal wall;diffuse within  pharynx;secondary to reduced base of tongue retraction;secondary to reduced hyolaryngeal excursion;other (see comments)  mild diffuse  -RD       Response to Residue unable to clear residue  -RD       Attempted Compensatory Maneuvers bolus size;bolus presentation style;additional subsequent swallow;throat clear after swallow;other (see comments)  cognition impacting carryover  -RD       Response to Attempted Compensatory Maneuvers prevented aspiration;other (see comments)  small tsp/cup sips of honey-thick liquids  -RD       Successful Compensatory Maneuver Competency patient able to;demonstrate compensations;with cues  -RD       Pharyngeal Phase, Comment Moderate pharyngeal dysphagia. Penetration before/during w/ aspiration during/after w/ thin liquids 2' pre-spill, delayed initiation, & reduced vestibular closure. Pt noted to aspirate thin vestibular residue upon subsequent swallows. Aspiration was silent. Penetration before/during the swallow that deepened to the level of the true vocal folds & did not clear w/ nectar-thick liquids. Penetration during the swallow w/ honey-thick liquids via straw that did not clear from vestibule. Eventual aspiration inevitable w/ nectar-thick liquids, & honey-thick via straw. No penetration/aspiration w/ small tsp/cup sips of honey-thick liquids, pudding, or solids. Mild diffuse pharyngeal residue (> BOT/valleculae) 2' reduced BOT retraction & reduced hyolaryngeal excursion. Residue did not appear to build t/o exam.  -RD          Esophageal Phase    Esophageal Phase see radiology report for further details;other (see comments)  limited UGI cancelled 2' aspiration present on exam  -RD          SLP Evaluation Clinical Impression    SLP Swallowing Diagnosis mild-moderate;oral dysphagia;moderate;pharyngeal dysphagia  -RD       Functional Impact risk of aspiration/pneumonia;risk of malnutrition;risk of dehydration  -RD       Rehab Potential/Prognosis, Swallowing adequate, monitor  progress closely  -RD       Swallow Criteria for Skilled Therapeutic Interventions Met demonstrates skilled criteria  -RD          Recommendations    Therapy Frequency (Swallow) 5 days per week  -RD       Predicted Duration Therapy Intervention (Days) until discharge  -RD       SLP Diet Recommendation puree with some mashed;honey thick liquids  -RD       Recommended Diagnostics --       Recommended Precautions and Strategies upright posture during/after eating;small bites of food and sips of liquid;no straw;general aspiration precautions;reflux precautions  -RD       Oral Care Recommendations Oral Care BID/PRN  -RD       SLP Rec. for Method of Medication Administration meds whole;with pudding or applesauce;as tolerated  -RD       Monitor for Signs of Aspiration yes;notify SLP if any concerns  -RD       Anticipated Discharge Disposition (SLP) unknown;anticipate therapy at next level of care  -RD             User Key  (r) = Recorded By, (t) = Taken By, (c) = Cosigned By    Initials Name Effective Dates    RD Olamide Jain MS CCC-SLP 06/16/21 -                 EDUCATION  The patient has been educated in the following areas:   Dysphagia (Swallowing Impairment) Oral Care/Hydration Modified Diet Instruction.        SLP GOALS     Row Name 10/12/22 1415             (LTG) Patient will demonstrate functional swallow for    Diet Texture (Demonstrate functional swallow) soft whole textures  -RD      Liquid viscosity (Demonstrate functional swallow) thin liquids  -RD      [place] (Demonstrate functional swallow) independently (over 90% accuracy)  -RD      Time Frame (Demonstrate functional swallow) by discharge  -RD         (STG) Patient will tolerate trials of    Consistencies Trialed (Tolerate trials) pureed/ mashed textures;honey/ moderately thick liquids  -RD      Desired Outcome (Tolerate trials) without signs/symptoms of aspiration;without signs of distress;with adequate oral prep/transit/clearance;with use of  compensatory strategies (see comments)  -RD      [place] (Tolerate trials) with minimal cues (75-90% accuracy)  -RD      Time Frame (Tolerate trials) by discharge  -RD         (STG) Lingual Strengthening Goal 1 (SLP)    Activity (Lingual Strengthening Goal 1, SLP) increase tongue back strength  -RD      Increase Tongue Back Strength lingual resistance exercises  -RD      [place]/Accuracy (Lingual Strengthening Goal 1, SLP) with minimal cues (75-90% accuracy)  -RD      Time Frame (Lingual Strengthening Goal 1, SLP) short term goal (STG)  -RD         (STG) Pharyngeal Strengthening Exercise Goal 1 (SLP)    Activity (Pharyngeal Strengthening Goal 1, SLP) increase timing;increase anterior movement of the hyolaryngeal complex;increase closure at entrance to airway/closure of airway at glottis;increase tongue base retraction  -RD      Increase Timing prepping - 3 second prep or suck swallow or 3-step swallow  -RD      Increase Anterior Movement of the Hyolaryngeal Complex chin tuck against resistance (CTAR)  -RD      Increase Closure at Entrance to Airway/Closure of Airway at Glottis super-supraglottic swallow  -RD      Increase Tongue Base Retraction hard effortful swallow  -RD      [place]/Accuracy (Pharyngeal Strengthening Goal 1, SLP) with minimal cues (75-90% accuracy)  -RD      Time Frame (Pharyngeal Strengthening Goal 1, SLP) short term goal (STG)  -RD         (STG) Swallow Management Recall Goal 1 (SLP)    Activity (Swallow Management Recall Goal 1, SLP) safe diet/liquid level;safe diet level/texture;safe liquid viscosity;compensatory swallow strategies/techniques;postural techniques;rationale for use of strategies/techniques  -RD      [place]/Accuracy (Swallow Management Recall Goal 1, SLP) with minimal cues (75-90% accuracy)  -RD      Time Frame (Swallow Management Recall Goal 1, SLP) short term goal (STG)  -RD      Barriers (Swallow Management Recall Goal 1, SLP) cognition/STM deficits  -RD             User Key  (r) = Recorded By, (t) = Taken By, (c) = Cosigned By    Initials Name Provider Type    Olamide Heard MS CCC-SLP Speech and Language Pathologist                   Time Calculation:    Time Calculation- SLP     Row Name 10/12/22 1526 10/12/22 1029          Time Calculation- SLP    SLP Start Time 1415  -RD 0955  -RD     SLP Received On 10/12/22  -RD 10/12/22  -RD        Untimed Charges    69400-HX Eval Oral Pharyng Swallow Minutes -- 45  -RD     41270-XU Motion Fluoro Eval Swallow Minutes 55  -RD --        Total Minutes    Untimed Charges Total Minutes 55  -RD 45  -RD      Total Minutes 55  -RD 45  -RD           User Key  (r) = Recorded By, (t) = Taken By, (c) = Cosigned By    Initials Name Provider Type    Olamide Heard MS CCC-SLP Speech and Language Pathologist                Therapy Charges for Today     Code Description Service Date Service Provider Modifiers Qty    87017718429 HC ST EVAL ORAL PHARYNG SWALLOW 3 10/12/2022 Olamide Jain MS CCC-SLP GN 1    53068711204 HC ST MOTION FLUORO EVAL SWALLOW 4 10/12/2022 Olamide Jain MS CCC-SLP GN 1            Patient was not wearing a face mask and did exhibit coughing during this therapy encounter.  Procedure performed was aerosolizing, involved close contact (within 6 feet for at least 15 minutes or longer), and did not involve contact with infectious secretions or specimens.  Therapist used appropriate personal protective equipment including gloves, standard procedure mask and eye protection.  Appropriate PPE was worn during the entire therapy session.  Hand hygiene was completed before and after therapy session.     MS NOY Bob  10/12/2022   Quality 47: Advance Care Plan: Advance Care Planning discussed and documented; advance care plan or surrogate decision maker documented in the medical record. Quality 110: Preventive Care And Screening: Influenza Immunization: Influenza Immunization Administered during Influenza season Quality 111:Pneumonia Vaccination Status For Older Adults: Patient received any pneumococcal conjugate or polysaccharide vaccine on or after their 60th birthday and before the end of the measurement period Detail Level: Detailed Quality 226: Preventive Care And Screening: Tobacco Use: Screening And Cessation Intervention: Patient screened for tobacco use and is an ex/non-smoker

## 2025-06-26 NOTE — CASE MANAGEMENT/SOCIAL WORK
Continued Stay Note   [place]     Patient Name: Scott Diego  MRN: [id number]  Today's Date: 3/10/2022    Admit Date: 3/3/2022     Discharge Plan     Row Name 03/10/22 1124       Plan    Plan Stacey Deutsch    Patient/Family in Agreement with Plan yes    Plan Comments Received a call from Maria Fernanda/Stacey Deutsch, stating that patient has been medically accepted and they are starting the precert for her insurance.  Maria Fernanda recommended arranging transportation for Monday.  [facility] will pick patient up in her room on Monday at 1515.  CM is to call back Monday to see if patient's insurance has come through.  Called and spoke with patient's son Noah over the phone and updated him on plan of care.  No other issues were voiced.  CM will continue to follow.    Final Discharge Disposition Code 03 - skilled nursing facility (SNF)               Discharge Codes    No documentation.               Expected Discharge Date and Time     Expected Discharge Date Expected Discharge Time    Mar 11, 2022             Francine Rose RN     Kajal Beauchamp is requesting office notes from visit 6/25, please call [phone] ext [phone]      Fax [phone]            Claim #[id number]                   Will send inbasket message to Dr. Chicas to notify of closing note.     Hitesh Marsh MS, OTC   Sports Medicine Assistant   [facility]  (P) [phone]  (F) [phone]

## (undated) DEVICE — HYBRID CO2 TUBING/CAP SET FOR OLYMPUS® SCOPES & CO2 SOURCE: Brand: ERBE

## (undated) DEVICE — SOL IRR H2O BTL 1000ML STRL

## (undated) DEVICE — KT ORCA ORCAPOD DISP STRL

## (undated) DEVICE — LUBE GEL ENDOGLIDE 1.1OZ

## (undated) DEVICE — SYR LUERLOK 50ML

## (undated) DEVICE — INTRO ACCSR BLNT TP

## (undated) DEVICE — THE BITE BLOCK MAXI, LATEX FREE STRAP IS USED TO PROTECT THE ENDOSCOPE INSERTION TUBE FROM BEING BITTEN BY THE PATIENT.

## (undated) DEVICE — CONTN GRAD MEAS TRIANG 32OZ BLK

## (undated) DEVICE — TUBING, SUCTION, 1/4" X 10', STRAIGHT: Brand: MEDLINE

## (undated) DEVICE — SPNG ENDO BEDSIDE TUB ENZYM